# Patient Record
Sex: MALE | Race: WHITE | NOT HISPANIC OR LATINO | Employment: OTHER | URBAN - METROPOLITAN AREA
[De-identification: names, ages, dates, MRNs, and addresses within clinical notes are randomized per-mention and may not be internally consistent; named-entity substitution may affect disease eponyms.]

---

## 2017-02-10 ENCOUNTER — COMMUNICATION - HEALTHEAST (OUTPATIENT)
Dept: ADMINISTRATIVE | Facility: CLINIC | Age: 72
End: 2017-02-10

## 2017-04-03 ENCOUNTER — AMBULATORY - HEALTHEAST (OUTPATIENT)
Dept: CARDIOLOGY | Facility: CLINIC | Age: 72
End: 2017-04-03

## 2017-04-03 DIAGNOSIS — Z95.810 ICD (IMPLANTABLE CARDIOVERTER-DEFIBRILLATOR), BIVENTRICULAR, IN SITU: ICD-10-CM

## 2017-05-04 ENCOUNTER — AMBULATORY - HEALTHEAST (OUTPATIENT)
Dept: CARDIOLOGY | Facility: CLINIC | Age: 72
End: 2017-05-04

## 2017-05-09 ENCOUNTER — OFFICE VISIT - HEALTHEAST (OUTPATIENT)
Dept: CARDIOLOGY | Facility: CLINIC | Age: 72
End: 2017-05-09

## 2017-05-09 ENCOUNTER — COMMUNICATION - HEALTHEAST (OUTPATIENT)
Dept: CARDIOLOGY | Facility: CLINIC | Age: 72
End: 2017-05-09

## 2017-05-09 ENCOUNTER — AMBULATORY - HEALTHEAST (OUTPATIENT)
Dept: CARDIOLOGY | Facility: CLINIC | Age: 72
End: 2017-05-09

## 2017-05-09 DIAGNOSIS — I48.0 PAROXYSMAL ATRIAL FIBRILLATION (H): ICD-10-CM

## 2017-05-09 DIAGNOSIS — E11.9 TYPE 2 DIABETES MELLITUS (H): ICD-10-CM

## 2017-05-09 DIAGNOSIS — I50.42 CHRONIC COMBINED SYSTOLIC AND DIASTOLIC HEART FAILURE (H): ICD-10-CM

## 2017-05-09 DIAGNOSIS — I42.9 CARDIOMYOPATHY (H): ICD-10-CM

## 2017-05-09 DIAGNOSIS — I25.83 CORONARY ATHEROSCLEROSIS DUE TO LIPID RICH PLAQUE: ICD-10-CM

## 2017-05-09 DIAGNOSIS — Z95.810 ICD (IMPLANTABLE CARDIOVERTER-DEFIBRILLATOR), BIVENTRICULAR, IN SITU: ICD-10-CM

## 2017-05-09 ASSESSMENT — MIFFLIN-ST. JEOR: SCORE: 1536.18

## 2017-05-23 ENCOUNTER — HOSPITAL ENCOUNTER (OUTPATIENT)
Dept: CARDIOLOGY | Facility: HOSPITAL | Age: 72
Discharge: HOME | End: 2017-05-23
Attending: INTERNAL MEDICINE

## 2017-05-23 DIAGNOSIS — I50.42 CHRONIC COMBINED SYSTOLIC AND DIASTOLIC HEART FAILURE (H): ICD-10-CM

## 2017-05-23 DIAGNOSIS — I42.9 CARDIOMYOPATHY (H): ICD-10-CM

## 2017-05-23 DIAGNOSIS — I25.83 CORONARY ATHEROSCLEROSIS DUE TO LIPID RICH PLAQUE: ICD-10-CM

## 2017-05-23 ASSESSMENT — MIFFLIN-ST. JEOR: SCORE: 1536.18

## 2017-05-24 LAB
AORTIC ROOT: 3.1 CM
AORTIC VALVE MEAN VELOCITY: 127 CM/S
AV DIMENSIONLESS INDEX VTI: 0.5
AV MEAN GRADIENT: 7 MMHG
AV PEAK GRADIENT: 15.7 MMHG
AV VALVE AREA: 1.6 CM2
AV VELOCITY RATIO: 0.5
BSA FOR ECHO PROCEDURE: 1.98 M2
CV BLOOD PRESSURE: NORMAL MMHG
CV ECHO HEIGHT: 69.5 IN
CV ECHO WEIGHT: 177 LBS
DOP CALC AO PEAK VEL: 198 CM/S
DOP CALC AO VTI: 39.5 CM
DOP CALC LVOT AREA: 3.14 CM2
DOP CALC LVOT DIAMETER: 2 CM
DOP CALC LVOT PEAK VEL: 97.8 CM/S
DOP CALC LVOT STROKE VOLUME: 64.7 CM3
DOP CALCLVOT PEAK VEL VTI: 20.6 CM
EJECTION FRACTION: 26 % (ref 55–75)
FRACTIONAL SHORTENING: 13.1 % (ref 28–44)
INTERVENTRICULAR SEPTUM IN END DIASTOLE: 1.2 CM (ref 0.6–1)
IVS/PW RATIO: 1
LA AREA 1: 23.6 CM2
LA AREA 2: 28.7 CM2
LEFT ATRIUM LENGTH: 5.92 CM
LEFT ATRIUM SIZE: 4.7 CM
LEFT ATRIUM VOLUME INDEX: 49.1 ML/M2
LEFT ATRIUM VOLUME: 97.3 CM3
LEFT VENTRICLE CARDIAC INDEX: 3.1 L/MIN/M2
LEFT VENTRICLE CARDIAC OUTPUT: 6.1 L/MIN
LEFT VENTRICLE DIASTOLIC VOLUME INDEX: 117.2 CM3/M2 (ref 34–74)
LEFT VENTRICLE DIASTOLIC VOLUME: 232 CM3 (ref 62–150)
LEFT VENTRICLE HEART RATE: 94 BPM
LEFT VENTRICLE MASS INDEX: 163 G/M2
LEFT VENTRICLE SYSTOLIC VOLUME INDEX: 86.4 CM3/M2 (ref 11–31)
LEFT VENTRICLE SYSTOLIC VOLUME: 171 CM3 (ref 21–61)
LEFT VENTRICULAR INTERNAL DIMENSION IN DIASTOLE: 6.1 CM (ref 4.2–5.8)
LEFT VENTRICULAR INTERNAL DIMENSION IN SYSTOLE: 5.3 CM (ref 2.5–4)
LEFT VENTRICULAR MASS: 322.7 G
LEFT VENTRICULAR OUTFLOW TRACT MEAN GRADIENT: 2 MMHG
LEFT VENTRICULAR OUTFLOW TRACT MEAN VELOCITY: 75 CM/S
LEFT VENTRICULAR OUTFLOW TRACT PEAK GRADIENT: 4 MMHG
LEFT VENTRICULAR POSTERIOR WALL IN END DIASTOLE: 1.2 CM (ref 0.6–1)
LV STROKE VOLUME INDEX: 32.7 ML/M2
MITRAL REGURGITANT VELOCITY TIME INTEGRAL: 165 CM
MITRAL VALVE E/A RATIO: 1.2
MR FLOW: 12 CM3
MR MEAN GRADIENT: 59 MMHG
MR MEAN VELOCITY: 379 CM/S
MR PEAK GRADIENT: 71.6 MMHG
MR PISA EROA: 0.1 CM2
MR PISA RADIUS: 0.4 CM
MR PISA VN NYQUIST: 30.3 CM/S
MV AVERAGE E/E' RATIO: 15.8 CM/S
MV DECELERATION TIME: 197 MS
MV E'TISSUE VEL-LAT: 6.43 CM/S
MV E'TISSUE VEL-MED: 4.29 CM/S
MV LATERAL E/E' RATIO: 13.2
MV MEDIAL E/E' RATIO: 19.8
MV PEAK A VELOCITY: 72.1 CM/S
MV PEAK E VELOCITY: 84.9 CM/S
MV REGURGITANT VOLUME: 11.9 CC
NUC REST DIASTOLIC VOLUME INDEX: 2832 LBS
NUC REST SYSTOLIC VOLUME INDEX: 69.5 IN
PISA MR PEAK VEL: 423 CM/S
TRICUSPID REGURGITATION PEAK PRESSURE GRADIENT: 21 MMHG
TRICUSPID VALVE ANULAR PLANE SYSTOLIC EXCURSION: 1.7 CM
TRICUSPID VALVE PEAK REGURGITANT VELOCITY: 229 CM/S

## 2017-06-01 ENCOUNTER — AMBULATORY - HEALTHEAST (OUTPATIENT)
Dept: CARDIOLOGY | Facility: CLINIC | Age: 72
End: 2017-06-01

## 2017-06-01 DIAGNOSIS — Z95.810 ICD (IMPLANTABLE CARDIOVERTER-DEFIBRILLATOR), BIVENTRICULAR, IN SITU: ICD-10-CM

## 2017-06-01 DIAGNOSIS — I48.0 PAROXYSMAL ATRIAL FIBRILLATION (H): ICD-10-CM

## 2017-06-01 DIAGNOSIS — I50.42 CHRONIC COMBINED SYSTOLIC AND DIASTOLIC HEART FAILURE (H): ICD-10-CM

## 2017-06-01 DIAGNOSIS — I25.10 CORONARY ARTERY DISEASE INVOLVING NATIVE CORONARY ARTERY: ICD-10-CM

## 2017-06-01 DIAGNOSIS — I42.9 CARDIOMYOPATHY (H): ICD-10-CM

## 2017-06-01 DIAGNOSIS — I50.22 CHF (CONGESTIVE HEART FAILURE), NYHA CLASS IV, CHRONIC, SYSTOLIC (H): ICD-10-CM

## 2017-06-01 LAB — HCC DEVICE COMMENTS: NORMAL

## 2017-06-01 ASSESSMENT — MIFFLIN-ST. JEOR: SCORE: 1546.16

## 2017-06-20 ENCOUNTER — AMBULATORY - HEALTHEAST (OUTPATIENT)
Dept: CARDIOLOGY | Facility: CLINIC | Age: 72
End: 2017-06-20

## 2017-06-20 DIAGNOSIS — E11.9 TYPE 2 DIABETES MELLITUS (H): ICD-10-CM

## 2017-06-21 ENCOUNTER — AMBULATORY - HEALTHEAST (OUTPATIENT)
Dept: CARDIOLOGY | Facility: CLINIC | Age: 72
End: 2017-06-21

## 2017-06-27 ENCOUNTER — RECORDS - HEALTHEAST (OUTPATIENT)
Dept: LAB | Facility: CLINIC | Age: 72
End: 2017-06-27

## 2017-06-27 ENCOUNTER — OFFICE VISIT - HEALTHEAST (OUTPATIENT)
Dept: CARDIOLOGY | Facility: CLINIC | Age: 72
End: 2017-06-27

## 2017-06-27 ENCOUNTER — AMBULATORY - HEALTHEAST (OUTPATIENT)
Dept: CARDIOLOGY | Facility: CLINIC | Age: 72
End: 2017-06-27

## 2017-06-27 DIAGNOSIS — I48.0 PAROXYSMAL ATRIAL FIBRILLATION (H): ICD-10-CM

## 2017-06-27 DIAGNOSIS — E11.9 TYPE 2 DIABETES MELLITUS (H): ICD-10-CM

## 2017-06-27 DIAGNOSIS — I50.22 CHF (CONGESTIVE HEART FAILURE), NYHA CLASS IV, CHRONIC, SYSTOLIC (H): ICD-10-CM

## 2017-06-27 DIAGNOSIS — I42.9 CARDIOMYOPATHY (H): ICD-10-CM

## 2017-06-27 DIAGNOSIS — I25.10 CORONARY ARTERY DISEASE INVOLVING NATIVE CORONARY ARTERY OF NATIVE HEART WITHOUT ANGINA PECTORIS: ICD-10-CM

## 2017-06-27 DIAGNOSIS — Z95.810 ICD (IMPLANTABLE CARDIOVERTER-DEFIBRILLATOR), BIVENTRICULAR, IN SITU: ICD-10-CM

## 2017-06-27 LAB
CHOLEST SERPL-MCNC: 174 MG/DL
FASTING STATUS PATIENT QL REPORTED: YES
HDLC SERPL-MCNC: 31 MG/DL
LDLC SERPL CALC-MCNC: 80 MG/DL
TRIGL SERPL-MCNC: 316 MG/DL

## 2017-06-27 ASSESSMENT — MIFFLIN-ST. JEOR: SCORE: 1536.18

## 2017-06-29 ENCOUNTER — AMBULATORY - HEALTHEAST (OUTPATIENT)
Dept: CARDIOLOGY | Facility: CLINIC | Age: 72
End: 2017-06-29

## 2017-06-30 ENCOUNTER — HOSPITAL ENCOUNTER (OUTPATIENT)
Dept: NUCLEAR MEDICINE | Facility: HOSPITAL | Age: 72
Discharge: HOME | End: 2017-06-30
Attending: INTERNAL MEDICINE

## 2017-06-30 ENCOUNTER — HOSPITAL ENCOUNTER (OUTPATIENT)
Dept: CARDIOLOGY | Facility: HOSPITAL | Age: 72
Discharge: HOME | End: 2017-06-30
Attending: INTERNAL MEDICINE

## 2017-06-30 DIAGNOSIS — I42.9 CARDIOMYOPATHY (H): ICD-10-CM

## 2017-06-30 DIAGNOSIS — I25.10 CORONARY ARTERY DISEASE INVOLVING NATIVE CORONARY ARTERY OF NATIVE HEART WITHOUT ANGINA PECTORIS: ICD-10-CM

## 2017-06-30 LAB
CV STRESS CURRENT BP HE: NORMAL
CV STRESS CURRENT HR HE: 107
CV STRESS CURRENT HR HE: 107
CV STRESS CURRENT HR HE: 111
CV STRESS CURRENT HR HE: 112
CV STRESS CURRENT HR HE: 113
CV STRESS CURRENT HR HE: 114
CV STRESS CURRENT HR HE: 115
CV STRESS CURRENT HR HE: 115
CV STRESS CURRENT HR HE: 75
CV STRESS CURRENT HR HE: 79
CV STRESS CURRENT HR HE: 84
CV STRESS CURRENT HR HE: 84
CV STRESS CURRENT HR HE: 86
CV STRESS CURRENT HR HE: 88
CV STRESS CURRENT HR HE: 89
CV STRESS CURRENT HR HE: 90
CV STRESS CURRENT HR HE: 91
CV STRESS CURRENT HR HE: 98
CV STRESS DEVIATION TIME HE: NORMAL
CV STRESS ECHO PERCENT HR HE: NORMAL
CV STRESS EXERCISE STAGE HE: NORMAL
CV STRESS FINAL RESTING BP HE: NORMAL
CV STRESS FINAL RESTING HR HE: 84
CV STRESS MAX HR HE: 115
CV STRESS MAX TREADMILL GRADE HE: 0
CV STRESS MAX TREADMILL SPEED HE: 1
CV STRESS PEAK DIA BP HE: NORMAL
CV STRESS PEAK SYS BP HE: NORMAL
CV STRESS PHASE HE: NORMAL
CV STRESS PROTOCOL HE: NORMAL
CV STRESS RESTING PT POSITION HE: NORMAL
CV STRESS RESTING PT POSITION HE: NORMAL
CV STRESS ST DEVIATION AMOUNT HE: NORMAL
CV STRESS ST DEVIATION ELEVATION HE: NORMAL
CV STRESS ST EVELATION AMOUNT HE: NORMAL
CV STRESS TEST TYPE HE: NORMAL
CV STRESS TOTAL STAGE TIME MIN 1 HE: NORMAL
NUC STRESS EJECTION FRACTION: 19 %
STRESS ECHO BASELINE BP: NORMAL
STRESS ECHO BASELINE HR: 75
STRESS ECHO CALCULATED PERCENT HR: 77 %
STRESS ECHO LAST STRESS BP: NORMAL
STRESS ECHO LAST STRESS HR: 107
STRESS ECHO POST ESTIMATED WORKLOAD: 1.8
STRESS ECHO POST EXERCISE DUR MIN: 4
STRESS ECHO POST EXERCISE DUR SEC: 4
STRESS ECHO TARGET HR: 127

## 2017-07-03 ENCOUNTER — COMMUNICATION - HEALTHEAST (OUTPATIENT)
Dept: CARDIOLOGY | Facility: CLINIC | Age: 72
End: 2017-07-03

## 2017-07-03 ENCOUNTER — AMBULATORY - HEALTHEAST (OUTPATIENT)
Dept: CARDIOLOGY | Facility: CLINIC | Age: 72
End: 2017-07-03

## 2017-07-03 DIAGNOSIS — I42.9 CARDIOMYOPATHY (H): ICD-10-CM

## 2017-07-03 DIAGNOSIS — R06.02 SOB (SHORTNESS OF BREATH): ICD-10-CM

## 2017-07-25 ENCOUNTER — AMBULATORY - HEALTHEAST (OUTPATIENT)
Dept: CARDIOLOGY | Facility: CLINIC | Age: 72
End: 2017-07-25

## 2017-07-25 ENCOUNTER — RECORDS - HEALTHEAST (OUTPATIENT)
Dept: ADMINISTRATIVE | Facility: OTHER | Age: 72
End: 2017-07-25

## 2017-07-25 ENCOUNTER — OFFICE VISIT - HEALTHEAST (OUTPATIENT)
Dept: CARDIOLOGY | Facility: CLINIC | Age: 72
End: 2017-07-25

## 2017-07-25 DIAGNOSIS — E11.9 TYPE 2 DIABETES MELLITUS (H): ICD-10-CM

## 2017-07-25 DIAGNOSIS — Z95.810 ICD (IMPLANTABLE CARDIOVERTER-DEFIBRILLATOR), BIVENTRICULAR, IN SITU: ICD-10-CM

## 2017-07-25 DIAGNOSIS — I25.10 CORONARY ARTERY DISEASE INVOLVING NATIVE CORONARY ARTERY OF NATIVE HEART WITHOUT ANGINA PECTORIS: ICD-10-CM

## 2017-07-25 DIAGNOSIS — I50.22 CHF (CONGESTIVE HEART FAILURE), NYHA CLASS IV, CHRONIC, SYSTOLIC (H): ICD-10-CM

## 2017-07-25 DIAGNOSIS — I48.0 PAROXYSMAL ATRIAL FIBRILLATION (H): ICD-10-CM

## 2017-07-25 DIAGNOSIS — I42.9 CARDIOMYOPATHY (H): ICD-10-CM

## 2017-07-25 ASSESSMENT — MIFFLIN-ST. JEOR: SCORE: 1522.57

## 2017-07-26 ENCOUNTER — OFFICE VISIT - HEALTHEAST (OUTPATIENT)
Dept: CARDIOLOGY | Facility: CLINIC | Age: 72
End: 2017-07-26

## 2017-07-26 ENCOUNTER — AMBULATORY - HEALTHEAST (OUTPATIENT)
Dept: CARDIOLOGY | Facility: CLINIC | Age: 72
End: 2017-07-26

## 2017-07-26 DIAGNOSIS — E11.9 TYPE 2 DIABETES MELLITUS (H): ICD-10-CM

## 2017-07-26 DIAGNOSIS — I42.9 CARDIOMYOPATHY (H): ICD-10-CM

## 2017-07-26 DIAGNOSIS — I50.20 HEART FAILURE WITH REDUCED EJECTION FRACTION, NYHA CLASS II (H): ICD-10-CM

## 2017-07-26 ASSESSMENT — MIFFLIN-ST. JEOR: SCORE: 1527.1

## 2017-08-08 ENCOUNTER — COMMUNICATION - HEALTHEAST (OUTPATIENT)
Dept: CARDIOLOGY | Facility: CLINIC | Age: 72
End: 2017-08-08

## 2017-08-11 ENCOUNTER — COMMUNICATION - HEALTHEAST (OUTPATIENT)
Dept: CARDIOLOGY | Facility: CLINIC | Age: 72
End: 2017-08-11

## 2017-09-06 ENCOUNTER — AMBULATORY - HEALTHEAST (OUTPATIENT)
Dept: CARDIOLOGY | Facility: CLINIC | Age: 72
End: 2017-09-06

## 2017-09-06 DIAGNOSIS — Z95.810 ICD (IMPLANTABLE CARDIOVERTER-DEFIBRILLATOR) IN PLACE: ICD-10-CM

## 2017-09-06 LAB — HCC DEVICE COMMENTS: NORMAL

## 2017-09-19 ENCOUNTER — AMBULATORY - HEALTHEAST (OUTPATIENT)
Dept: CARDIOLOGY | Facility: CLINIC | Age: 72
End: 2017-09-19

## 2017-09-19 DIAGNOSIS — E11.9 TYPE 2 DIABETES MELLITUS (H): ICD-10-CM

## 2017-09-19 ASSESSMENT — MIFFLIN-ST. JEOR: SCORE: 1504.42

## 2017-09-20 ENCOUNTER — RECORDS - HEALTHEAST (OUTPATIENT)
Dept: ADMINISTRATIVE | Facility: OTHER | Age: 72
End: 2017-09-20

## 2017-09-21 ENCOUNTER — AMBULATORY - HEALTHEAST (OUTPATIENT)
Dept: CARDIOLOGY | Facility: CLINIC | Age: 72
End: 2017-09-21

## 2017-11-09 ENCOUNTER — RECORDS - HEALTHEAST (OUTPATIENT)
Dept: ADMINISTRATIVE | Facility: OTHER | Age: 72
End: 2017-11-09

## 2017-11-09 ENCOUNTER — AMBULATORY - HEALTHEAST (OUTPATIENT)
Dept: CARDIOLOGY | Facility: CLINIC | Age: 72
End: 2017-11-09

## 2017-11-14 ENCOUNTER — AMBULATORY - HEALTHEAST (OUTPATIENT)
Dept: CARDIOLOGY | Facility: CLINIC | Age: 72
End: 2017-11-14

## 2017-11-14 ENCOUNTER — OFFICE VISIT - HEALTHEAST (OUTPATIENT)
Dept: CARDIOLOGY | Facility: CLINIC | Age: 72
End: 2017-11-14

## 2017-11-14 DIAGNOSIS — I50.22 CHRONIC SYSTOLIC HEART FAILURE (H): ICD-10-CM

## 2017-11-14 DIAGNOSIS — E11.9 TYPE 2 DIABETES MELLITUS (H): ICD-10-CM

## 2017-11-14 ASSESSMENT — MIFFLIN-ST. JEOR: SCORE: 1494.45

## 2017-11-17 ENCOUNTER — AMBULATORY - HEALTHEAST (OUTPATIENT)
Dept: CARDIOLOGY | Facility: CLINIC | Age: 72
End: 2017-11-17

## 2017-11-20 ENCOUNTER — COMMUNICATION - HEALTHEAST (OUTPATIENT)
Dept: CARDIOLOGY | Facility: CLINIC | Age: 72
End: 2017-11-20

## 2017-11-22 ENCOUNTER — OFFICE VISIT - HEALTHEAST (OUTPATIENT)
Dept: CARDIOLOGY | Facility: CLINIC | Age: 72
End: 2017-11-22

## 2017-11-22 DIAGNOSIS — I50.20 HEART FAILURE WITH REDUCED EJECTION FRACTION, NYHA CLASS II (H): ICD-10-CM

## 2017-11-22 DIAGNOSIS — I50.22 CHRONIC SYSTOLIC HEART FAILURE (H): ICD-10-CM

## 2017-11-22 DIAGNOSIS — I42.9 CARDIOMYOPATHY (H): ICD-10-CM

## 2017-11-22 ASSESSMENT — MIFFLIN-ST. JEOR: SCORE: 1495.92

## 2017-12-04 ENCOUNTER — OFFICE VISIT - HEALTHEAST (OUTPATIENT)
Dept: CARDIOLOGY | Facility: CLINIC | Age: 72
End: 2017-12-04

## 2017-12-04 DIAGNOSIS — I42.9 CARDIOMYOPATHY (H): ICD-10-CM

## 2017-12-04 DIAGNOSIS — I50.22 CHRONIC SYSTOLIC HEART FAILURE (H): ICD-10-CM

## 2017-12-04 ASSESSMENT — MIFFLIN-ST. JEOR: SCORE: 1491.96

## 2017-12-05 ENCOUNTER — AMBULATORY - HEALTHEAST (OUTPATIENT)
Dept: CARDIOLOGY | Facility: CLINIC | Age: 72
End: 2017-12-05

## 2017-12-05 DIAGNOSIS — Z95.810 ICD (IMPLANTABLE CARDIOVERTER-DEFIBRILLATOR) IN PLACE: ICD-10-CM

## 2017-12-05 LAB — HCC DEVICE COMMENTS: NORMAL

## 2018-01-09 ENCOUNTER — COMMUNICATION - HEALTHEAST (OUTPATIENT)
Dept: CARDIOLOGY | Facility: CLINIC | Age: 73
End: 2018-01-09

## 2018-01-09 ENCOUNTER — AMBULATORY - HEALTHEAST (OUTPATIENT)
Dept: CARDIOLOGY | Facility: CLINIC | Age: 73
End: 2018-01-09

## 2018-01-09 ENCOUNTER — OFFICE VISIT - HEALTHEAST (OUTPATIENT)
Dept: CARDIOLOGY | Facility: CLINIC | Age: 73
End: 2018-01-09

## 2018-01-09 DIAGNOSIS — I42.9 CARDIOMYOPATHY (H): ICD-10-CM

## 2018-01-09 DIAGNOSIS — I50.22 CHRONIC SYSTOLIC HEART FAILURE (H): ICD-10-CM

## 2018-01-09 DIAGNOSIS — E11.9 TYPE 2 DIABETES MELLITUS (H): ICD-10-CM

## 2018-01-09 DIAGNOSIS — E78.5 DYSLIPIDEMIA: ICD-10-CM

## 2018-01-09 ASSESSMENT — MIFFLIN-ST. JEOR: SCORE: 1528.25

## 2018-01-15 ENCOUNTER — RECORDS - HEALTHEAST (OUTPATIENT)
Dept: LAB | Facility: CLINIC | Age: 73
End: 2018-01-15

## 2018-01-15 LAB
C REACTIVE PROTEIN LHE: <0.1 MG/DL (ref 0–0.8)
ERYTHROCYTE [SEDIMENTATION RATE] IN BLOOD BY WESTERGREN METHOD: 9 MM/HR (ref 0–15)

## 2018-01-16 ENCOUNTER — AMBULATORY - HEALTHEAST (OUTPATIENT)
Dept: CARDIOLOGY | Facility: CLINIC | Age: 73
End: 2018-01-16

## 2018-03-05 ENCOUNTER — AMBULATORY - HEALTHEAST (OUTPATIENT)
Dept: CARDIOLOGY | Facility: CLINIC | Age: 73
End: 2018-03-05

## 2018-03-05 DIAGNOSIS — Z95.810 ICD (IMPLANTABLE CARDIOVERTER-DEFIBRILLATOR) IN PLACE: ICD-10-CM

## 2018-03-06 LAB — HCC DEVICE COMMENTS: NORMAL

## 2018-03-19 ENCOUNTER — RECORDS - HEALTHEAST (OUTPATIENT)
Dept: ADMINISTRATIVE | Facility: OTHER | Age: 73
End: 2018-03-19

## 2018-03-19 ENCOUNTER — AMBULATORY - HEALTHEAST (OUTPATIENT)
Dept: CARDIOLOGY | Facility: CLINIC | Age: 73
End: 2018-03-19

## 2018-03-20 ENCOUNTER — AMBULATORY - HEALTHEAST (OUTPATIENT)
Dept: CARDIOLOGY | Facility: CLINIC | Age: 73
End: 2018-03-20

## 2018-03-20 ENCOUNTER — OFFICE VISIT - HEALTHEAST (OUTPATIENT)
Dept: CARDIOLOGY | Facility: CLINIC | Age: 73
End: 2018-03-20

## 2018-03-20 DIAGNOSIS — E78.5 DYSLIPIDEMIA: ICD-10-CM

## 2018-03-20 DIAGNOSIS — I50.22 CHRONIC SYSTOLIC HEART FAILURE (H): ICD-10-CM

## 2018-03-20 DIAGNOSIS — I42.9 CARDIOMYOPATHY (H): ICD-10-CM

## 2018-03-20 DIAGNOSIS — E11.9 TYPE 2 DIABETES MELLITUS (H): ICD-10-CM

## 2018-03-20 ASSESSMENT — MIFFLIN-ST. JEOR: SCORE: 1532.78

## 2018-03-26 ENCOUNTER — COMMUNICATION - HEALTHEAST (OUTPATIENT)
Dept: CARDIOLOGY | Facility: CLINIC | Age: 73
End: 2018-03-26

## 2018-03-29 ENCOUNTER — RECORDS - HEALTHEAST (OUTPATIENT)
Dept: LAB | Facility: CLINIC | Age: 73
End: 2018-03-29

## 2018-03-29 LAB
ALBUMIN SERPL-MCNC: 4 G/DL (ref 3.5–5)
ALP SERPL-CCNC: 63 U/L (ref 45–120)
ALT SERPL W P-5'-P-CCNC: 24 U/L (ref 0–45)
ANION GAP SERPL CALCULATED.3IONS-SCNC: 11 MMOL/L (ref 5–18)
AST SERPL W P-5'-P-CCNC: 21 U/L (ref 0–40)
BILIRUB SERPL-MCNC: 1.6 MG/DL (ref 0–1)
BUN SERPL-MCNC: 23 MG/DL (ref 8–28)
CALCIUM SERPL-MCNC: 9.2 MG/DL (ref 8.5–10.5)
CHLORIDE BLD-SCNC: 103 MMOL/L (ref 98–107)
CO2 SERPL-SCNC: 22 MMOL/L (ref 22–31)
CREAT SERPL-MCNC: 0.88 MG/DL (ref 0.7–1.3)
GFR SERPL CREATININE-BSD FRML MDRD: >60 ML/MIN/1.73M2
GLUCOSE BLD-MCNC: 179 MG/DL (ref 70–125)
POTASSIUM BLD-SCNC: 4.1 MMOL/L (ref 3.5–5)
PROT SERPL-MCNC: 7.2 G/DL (ref 6–8)
SODIUM SERPL-SCNC: 136 MMOL/L (ref 136–145)

## 2018-04-06 ENCOUNTER — RECORDS - HEALTHEAST (OUTPATIENT)
Dept: ADMINISTRATIVE | Facility: OTHER | Age: 73
End: 2018-04-06

## 2018-04-11 ENCOUNTER — OFFICE VISIT - HEALTHEAST (OUTPATIENT)
Dept: CARDIOLOGY | Facility: CLINIC | Age: 73
End: 2018-04-11

## 2018-04-11 DIAGNOSIS — I50.22 CHRONIC SYSTOLIC HEART FAILURE (H): ICD-10-CM

## 2018-04-11 DIAGNOSIS — I50.20 HEART FAILURE WITH REDUCED EJECTION FRACTION, NYHA CLASS II (H): ICD-10-CM

## 2018-04-11 DIAGNOSIS — I42.9 CARDIOMYOPATHY (H): ICD-10-CM

## 2018-04-11 ASSESSMENT — MIFFLIN-ST. JEOR: SCORE: 1541.85

## 2018-04-16 ENCOUNTER — COMMUNICATION - HEALTHEAST (OUTPATIENT)
Dept: CARDIOLOGY | Facility: CLINIC | Age: 73
End: 2018-04-16

## 2018-04-16 ENCOUNTER — AMBULATORY - HEALTHEAST (OUTPATIENT)
Dept: LAB | Facility: CLINIC | Age: 73
End: 2018-04-16

## 2018-04-16 ENCOUNTER — AMBULATORY - HEALTHEAST (OUTPATIENT)
Dept: CARDIOLOGY | Facility: CLINIC | Age: 73
End: 2018-04-16

## 2018-04-16 DIAGNOSIS — I50.22 CHRONIC SYSTOLIC HEART FAILURE (H): ICD-10-CM

## 2018-04-16 LAB
ANION GAP SERPL CALCULATED.3IONS-SCNC: 13 MMOL/L (ref 5–18)
BUN SERPL-MCNC: 28 MG/DL (ref 8–28)
CALCIUM SERPL-MCNC: 9.7 MG/DL (ref 8.5–10.5)
CHLORIDE BLD-SCNC: 99 MMOL/L (ref 98–107)
CO2 SERPL-SCNC: 24 MMOL/L (ref 22–31)
CREAT SERPL-MCNC: 1.21 MG/DL (ref 0.7–1.3)
GFR SERPL CREATININE-BSD FRML MDRD: 59 ML/MIN/1.73M2
GLUCOSE BLD-MCNC: 267 MG/DL (ref 70–125)
POTASSIUM BLD-SCNC: 4.3 MMOL/L (ref 3.5–5)
SODIUM SERPL-SCNC: 136 MMOL/L (ref 136–145)

## 2018-05-01 ENCOUNTER — RECORDS - HEALTHEAST (OUTPATIENT)
Dept: LAB | Facility: CLINIC | Age: 73
End: 2018-05-01

## 2018-05-01 LAB
ALBUMIN SERPL-MCNC: 4.1 G/DL (ref 3.5–5)
ALP SERPL-CCNC: 71 U/L (ref 45–120)
ALT SERPL W P-5'-P-CCNC: 36 U/L (ref 0–45)
ANION GAP SERPL CALCULATED.3IONS-SCNC: 11 MMOL/L (ref 5–18)
AST SERPL W P-5'-P-CCNC: 36 U/L (ref 0–40)
BILIRUB SERPL-MCNC: 1.9 MG/DL (ref 0–1)
BUN SERPL-MCNC: 34 MG/DL (ref 8–28)
CALCIUM SERPL-MCNC: 9.9 MG/DL (ref 8.5–10.5)
CHLORIDE BLD-SCNC: 102 MMOL/L (ref 98–107)
CHOLEST SERPL-MCNC: 159 MG/DL
CO2 SERPL-SCNC: 25 MMOL/L (ref 22–31)
CREAT SERPL-MCNC: 1.02 MG/DL (ref 0.7–1.3)
FASTING STATUS PATIENT QL REPORTED: YES
GFR SERPL CREATININE-BSD FRML MDRD: >60 ML/MIN/1.73M2
GLUCOSE BLD-MCNC: 155 MG/DL (ref 70–125)
HDLC SERPL-MCNC: 30 MG/DL
LDLC SERPL CALC-MCNC: 76 MG/DL
POTASSIUM BLD-SCNC: 4.4 MMOL/L (ref 3.5–5)
PROT SERPL-MCNC: 7.3 G/DL (ref 6–8)
SODIUM SERPL-SCNC: 138 MMOL/L (ref 136–145)
TRIGL SERPL-MCNC: 265 MG/DL

## 2018-05-15 ENCOUNTER — COMMUNICATION - HEALTHEAST (OUTPATIENT)
Dept: CARDIOLOGY | Facility: CLINIC | Age: 73
End: 2018-05-15

## 2018-06-14 ENCOUNTER — AMBULATORY - HEALTHEAST (OUTPATIENT)
Dept: CARDIOLOGY | Facility: CLINIC | Age: 73
End: 2018-06-14

## 2018-06-14 ENCOUNTER — RECORDS - HEALTHEAST (OUTPATIENT)
Dept: ADMINISTRATIVE | Facility: OTHER | Age: 73
End: 2018-06-14

## 2018-06-20 ENCOUNTER — AMBULATORY - HEALTHEAST (OUTPATIENT)
Dept: CARDIOLOGY | Facility: CLINIC | Age: 73
End: 2018-06-20

## 2018-06-20 DIAGNOSIS — I25.10 CORONARY ARTERY DISEASE INVOLVING NATIVE CORONARY ARTERY OF NATIVE HEART WITHOUT ANGINA PECTORIS: ICD-10-CM

## 2018-06-20 DIAGNOSIS — I50.22 CHRONIC SYSTOLIC HEART FAILURE (H): ICD-10-CM

## 2018-06-20 DIAGNOSIS — Z95.810 ICD (IMPLANTABLE CARDIOVERTER-DEFIBRILLATOR), BIVENTRICULAR, IN SITU: ICD-10-CM

## 2018-06-20 DIAGNOSIS — I25.5 ISCHEMIC CARDIOMYOPATHY: ICD-10-CM

## 2018-06-20 DIAGNOSIS — I48.0 PAROXYSMAL ATRIAL FIBRILLATION (H): ICD-10-CM

## 2018-06-20 LAB
HCC DEVICE COMMENTS: NORMAL
HCC DEVICE IMPLANTING PROVIDER: NORMAL
HCC DEVICE MANUFACTURE: NORMAL
HCC DEVICE MODEL: NORMAL
HCC DEVICE SERIAL NUMBER: NORMAL
HCC DEVICE TYPE: NORMAL

## 2018-06-20 ASSESSMENT — MIFFLIN-ST. JEOR: SCORE: 1522.57

## 2018-06-27 ENCOUNTER — OFFICE VISIT - HEALTHEAST (OUTPATIENT)
Dept: CARDIOLOGY | Facility: CLINIC | Age: 73
End: 2018-06-27

## 2018-06-27 ENCOUNTER — COMMUNICATION - HEALTHEAST (OUTPATIENT)
Dept: CARDIOLOGY | Facility: CLINIC | Age: 73
End: 2018-06-27

## 2018-06-27 DIAGNOSIS — I25.10 CORONARY ARTERY DISEASE INVOLVING NATIVE CORONARY ARTERY OF NATIVE HEART WITHOUT ANGINA PECTORIS: ICD-10-CM

## 2018-06-27 DIAGNOSIS — I50.22 CHRONIC SYSTOLIC HEART FAILURE (H): ICD-10-CM

## 2018-06-27 DIAGNOSIS — I50.23 ACUTE ON CHRONIC SYSTOLIC CONGESTIVE HEART FAILURE (H): ICD-10-CM

## 2018-06-27 DIAGNOSIS — E78.5 DYSLIPIDEMIA: ICD-10-CM

## 2018-06-27 DIAGNOSIS — Z95.810 ICD (IMPLANTABLE CARDIOVERTER-DEFIBRILLATOR), BIVENTRICULAR, IN SITU: ICD-10-CM

## 2018-06-27 DIAGNOSIS — I48.0 PAROXYSMAL ATRIAL FIBRILLATION (H): ICD-10-CM

## 2018-06-27 DIAGNOSIS — E11.9 TYPE 2 DIABETES MELLITUS (H): ICD-10-CM

## 2018-06-27 DIAGNOSIS — I25.5 ISCHEMIC CARDIOMYOPATHY: ICD-10-CM

## 2018-06-27 ASSESSMENT — MIFFLIN-ST. JEOR: SCORE: 1531.64

## 2018-07-10 ENCOUNTER — OFFICE VISIT - HEALTHEAST (OUTPATIENT)
Dept: CARDIOLOGY | Facility: CLINIC | Age: 73
End: 2018-07-10

## 2018-07-10 ENCOUNTER — AMBULATORY - HEALTHEAST (OUTPATIENT)
Dept: CARDIOLOGY | Facility: CLINIC | Age: 73
End: 2018-07-10

## 2018-07-10 DIAGNOSIS — E78.1 HYPERTRIGLYCERIDEMIA: ICD-10-CM

## 2018-07-10 DIAGNOSIS — E78.5 DYSLIPIDEMIA: ICD-10-CM

## 2018-07-10 DIAGNOSIS — I50.22 CHRONIC SYSTOLIC HEART FAILURE (H): ICD-10-CM

## 2018-07-10 DIAGNOSIS — E11.9 TYPE 2 DIABETES MELLITUS (H): ICD-10-CM

## 2018-07-10 DIAGNOSIS — I25.5 ISCHEMIC CARDIOMYOPATHY: ICD-10-CM

## 2018-07-10 ASSESSMENT — MIFFLIN-ST. JEOR: SCORE: 1499.89

## 2018-07-16 ENCOUNTER — AMBULATORY - HEALTHEAST (OUTPATIENT)
Dept: CARDIOLOGY | Facility: CLINIC | Age: 73
End: 2018-07-16

## 2018-08-07 ENCOUNTER — COMMUNICATION - HEALTHEAST (OUTPATIENT)
Dept: CARDIOLOGY | Facility: CLINIC | Age: 73
End: 2018-08-07

## 2018-08-07 DIAGNOSIS — I50.23 ACUTE ON CHRONIC SYSTOLIC CONGESTIVE HEART FAILURE (H): ICD-10-CM

## 2018-08-08 ENCOUNTER — COMMUNICATION - HEALTHEAST (OUTPATIENT)
Dept: CARDIOLOGY | Facility: CLINIC | Age: 73
End: 2018-08-08

## 2018-08-08 DIAGNOSIS — I50.20 HEART FAILURE WITH REDUCED EJECTION FRACTION, NYHA CLASS II (H): ICD-10-CM

## 2018-08-08 DIAGNOSIS — I42.9 CARDIOMYOPATHY (H): ICD-10-CM

## 2018-08-10 ENCOUNTER — AMBULATORY - HEALTHEAST (OUTPATIENT)
Dept: CARDIOLOGY | Facility: CLINIC | Age: 73
End: 2018-08-10

## 2018-08-10 ENCOUNTER — OFFICE VISIT - HEALTHEAST (OUTPATIENT)
Dept: CARDIOLOGY | Facility: CLINIC | Age: 73
End: 2018-08-10

## 2018-08-10 DIAGNOSIS — I50.20 HEART FAILURE WITH REDUCED EJECTION FRACTION, NYHA CLASS II (H): ICD-10-CM

## 2018-08-10 DIAGNOSIS — I42.9 CARDIOMYOPATHY (H): ICD-10-CM

## 2018-08-10 DIAGNOSIS — I50.20 HFREF (HEART FAILURE WITH REDUCED EJECTION FRACTION) (H): ICD-10-CM

## 2018-08-10 DIAGNOSIS — I25.5 ISCHEMIC CARDIOMYOPATHY: ICD-10-CM

## 2018-08-10 DIAGNOSIS — I50.22 CHRONIC SYSTOLIC HEART FAILURE (H): ICD-10-CM

## 2018-08-10 LAB
ANION GAP SERPL CALCULATED.3IONS-SCNC: 7 MMOL/L (ref 5–18)
BUN SERPL-MCNC: 26 MG/DL (ref 8–28)
CALCIUM SERPL-MCNC: 9.6 MG/DL (ref 8.5–10.5)
CHLORIDE BLD-SCNC: 105 MMOL/L (ref 98–107)
CO2 SERPL-SCNC: 26 MMOL/L (ref 22–31)
CREAT SERPL-MCNC: 0.99 MG/DL (ref 0.7–1.3)
GFR SERPL CREATININE-BSD FRML MDRD: >60 ML/MIN/1.73M2
GLUCOSE BLD-MCNC: 202 MG/DL (ref 70–125)
MAGNESIUM SERPL-MCNC: 1.9 MG/DL (ref 1.8–2.6)
POTASSIUM BLD-SCNC: 4.6 MMOL/L (ref 3.5–5)
SODIUM SERPL-SCNC: 138 MMOL/L (ref 136–145)

## 2018-08-10 ASSESSMENT — MIFFLIN-ST. JEOR: SCORE: 1508.96

## 2018-08-13 ENCOUNTER — COMMUNICATION - HEALTHEAST (OUTPATIENT)
Dept: CARDIOLOGY | Facility: CLINIC | Age: 73
End: 2018-08-13

## 2018-09-07 ENCOUNTER — COMMUNICATION - HEALTHEAST (OUTPATIENT)
Dept: CARDIOLOGY | Facility: CLINIC | Age: 73
End: 2018-09-07

## 2018-09-07 DIAGNOSIS — I42.9 CARDIOMYOPATHY (H): ICD-10-CM

## 2018-09-10 ENCOUNTER — COMMUNICATION - HEALTHEAST (OUTPATIENT)
Dept: CARDIOLOGY | Facility: CLINIC | Age: 73
End: 2018-09-10

## 2018-09-11 ENCOUNTER — RECORDS - HEALTHEAST (OUTPATIENT)
Dept: ADMINISTRATIVE | Facility: OTHER | Age: 73
End: 2018-09-11

## 2018-09-11 ENCOUNTER — AMBULATORY - HEALTHEAST (OUTPATIENT)
Dept: CARDIOLOGY | Facility: CLINIC | Age: 73
End: 2018-09-11

## 2018-09-12 ENCOUNTER — OFFICE VISIT - HEALTHEAST (OUTPATIENT)
Dept: CARDIOLOGY | Facility: CLINIC | Age: 73
End: 2018-09-12

## 2018-09-12 DIAGNOSIS — I25.5 ISCHEMIC CARDIOMYOPATHY: ICD-10-CM

## 2018-09-12 DIAGNOSIS — I50.22 CHRONIC SYSTOLIC HEART FAILURE (H): ICD-10-CM

## 2018-09-12 ASSESSMENT — MIFFLIN-ST. JEOR: SCORE: 1535.04

## 2018-09-25 ENCOUNTER — COMMUNICATION - HEALTHEAST (OUTPATIENT)
Dept: CARDIOLOGY | Facility: CLINIC | Age: 73
End: 2018-09-25

## 2018-09-25 ENCOUNTER — AMBULATORY - HEALTHEAST (OUTPATIENT)
Dept: CARDIOLOGY | Facility: CLINIC | Age: 73
End: 2018-09-25

## 2018-09-25 DIAGNOSIS — Z95.810 ICD (IMPLANTABLE CARDIOVERTER-DEFIBRILLATOR), BIVENTRICULAR, IN SITU: ICD-10-CM

## 2018-10-05 ENCOUNTER — COMMUNICATION - HEALTHEAST (OUTPATIENT)
Dept: CARDIOLOGY | Facility: CLINIC | Age: 73
End: 2018-10-05

## 2018-10-15 ENCOUNTER — COMMUNICATION - HEALTHEAST (OUTPATIENT)
Dept: CARDIOLOGY | Facility: CLINIC | Age: 73
End: 2018-10-15

## 2018-10-15 DIAGNOSIS — I25.10 CAD (CORONARY ARTERY DISEASE): ICD-10-CM

## 2018-11-08 ENCOUNTER — OFFICE VISIT - HEALTHEAST (OUTPATIENT)
Dept: CARDIOLOGY | Facility: CLINIC | Age: 73
End: 2018-11-08

## 2018-11-08 ENCOUNTER — AMBULATORY - HEALTHEAST (OUTPATIENT)
Dept: CARDIOLOGY | Facility: CLINIC | Age: 73
End: 2018-11-08

## 2018-11-08 DIAGNOSIS — I25.5 ISCHEMIC CARDIOMYOPATHY: ICD-10-CM

## 2018-11-08 DIAGNOSIS — E11.9 DIABETES MELLITUS, TYPE 2 (H): ICD-10-CM

## 2018-11-08 DIAGNOSIS — I50.22 CHRONIC SYSTOLIC HEART FAILURE (H): ICD-10-CM

## 2018-11-08 DIAGNOSIS — E78.5 DYSLIPIDEMIA: ICD-10-CM

## 2018-11-08 ASSESSMENT — MIFFLIN-ST. JEOR: SCORE: 1535.04

## 2018-11-12 ENCOUNTER — AMBULATORY - HEALTHEAST (OUTPATIENT)
Dept: CARDIOLOGY | Facility: CLINIC | Age: 73
End: 2018-11-12

## 2018-11-27 ENCOUNTER — RECORDS - HEALTHEAST (OUTPATIENT)
Dept: LAB | Facility: CLINIC | Age: 73
End: 2018-11-27

## 2018-11-27 LAB
ALBUMIN SERPL-MCNC: 4.3 G/DL (ref 3.5–5)
ANION GAP SERPL CALCULATED.3IONS-SCNC: 12 MMOL/L (ref 5–18)
BUN SERPL-MCNC: 39 MG/DL (ref 8–28)
CALCIUM SERPL-MCNC: 9.9 MG/DL (ref 8.5–10.5)
CHLORIDE BLD-SCNC: 102 MMOL/L (ref 98–107)
CO2 SERPL-SCNC: 25 MMOL/L (ref 22–31)
CREAT SERPL-MCNC: 1.02 MG/DL (ref 0.7–1.3)
GFR SERPL CREATININE-BSD FRML MDRD: >60 ML/MIN/1.73M2
GLUCOSE BLD-MCNC: 114 MG/DL (ref 70–125)
PHOSPHATE SERPL-MCNC: 4.2 MG/DL (ref 2.5–4.5)
POTASSIUM BLD-SCNC: 4.4 MMOL/L (ref 3.5–5)
SODIUM SERPL-SCNC: 139 MMOL/L (ref 136–145)

## 2018-12-24 ENCOUNTER — AMBULATORY - HEALTHEAST (OUTPATIENT)
Dept: CARDIOLOGY | Facility: CLINIC | Age: 73
End: 2018-12-24

## 2018-12-24 DIAGNOSIS — Z95.810 ICD (IMPLANTABLE CARDIOVERTER-DEFIBRILLATOR) IN PLACE: ICD-10-CM

## 2018-12-28 ENCOUNTER — COMMUNICATION - HEALTHEAST (OUTPATIENT)
Dept: CARDIOLOGY | Facility: CLINIC | Age: 73
End: 2018-12-28

## 2019-01-02 ENCOUNTER — COMMUNICATION - HEALTHEAST (OUTPATIENT)
Dept: CARDIOLOGY | Facility: CLINIC | Age: 74
End: 2019-01-02

## 2019-01-04 ENCOUNTER — AMBULATORY - HEALTHEAST (OUTPATIENT)
Dept: CARDIOLOGY | Facility: CLINIC | Age: 74
End: 2019-01-04

## 2019-01-04 ENCOUNTER — OFFICE VISIT - HEALTHEAST (OUTPATIENT)
Dept: CARDIOLOGY | Facility: CLINIC | Age: 74
End: 2019-01-04

## 2019-01-04 ENCOUNTER — RECORDS - HEALTHEAST (OUTPATIENT)
Dept: ADMINISTRATIVE | Facility: OTHER | Age: 74
End: 2019-01-04

## 2019-01-04 ENCOUNTER — HOSPITAL ENCOUNTER (OUTPATIENT)
Dept: LAB | Age: 74
Setting detail: SPECIMEN
Discharge: HOME | End: 2019-01-04

## 2019-01-04 DIAGNOSIS — I25.10 CORONARY ARTERY DISEASE INVOLVING NATIVE CORONARY ARTERY OF NATIVE HEART WITHOUT ANGINA PECTORIS: ICD-10-CM

## 2019-01-04 DIAGNOSIS — I50.22 CHRONIC SYSTOLIC HEART FAILURE (H): ICD-10-CM

## 2019-01-04 DIAGNOSIS — I48.0 PAROXYSMAL ATRIAL FIBRILLATION (H): ICD-10-CM

## 2019-01-04 DIAGNOSIS — I25.5 ISCHEMIC CARDIOMYOPATHY: ICD-10-CM

## 2019-01-04 LAB
ANION GAP SERPL CALCULATED.3IONS-SCNC: 11 MMOL/L (ref 5–18)
ATRIAL RATE - MUSE: 72 BPM
BNP SERPL-MCNC: 398 PG/ML (ref 0–72)
BUN SERPL-MCNC: 32 MG/DL (ref 8–28)
CALCIUM SERPL-MCNC: 9.6 MG/DL (ref 8.5–10.5)
CHLORIDE BLD-SCNC: 104 MMOL/L (ref 98–107)
CO2 SERPL-SCNC: 24 MMOL/L (ref 22–31)
CREAT SERPL-MCNC: 1.08 MG/DL (ref 0.7–1.3)
DIASTOLIC BLOOD PRESSURE - MUSE: NORMAL MMHG
GFR SERPL CREATININE-BSD FRML MDRD: >60 ML/MIN/1.73M2
GLUCOSE BLD-MCNC: 197 MG/DL (ref 70–125)
INTERPRETATION ECG - MUSE: NORMAL
P AXIS - MUSE: 38 DEGREES
POTASSIUM BLD-SCNC: 4.7 MMOL/L (ref 3.5–5)
PR INTERVAL - MUSE: 188 MS
QRS DURATION - MUSE: 124 MS
QT - MUSE: 426 MS
QTC - MUSE: 466 MS
R AXIS - MUSE: -33 DEGREES
SODIUM SERPL-SCNC: 139 MMOL/L (ref 136–145)
SYSTOLIC BLOOD PRESSURE - MUSE: NORMAL MMHG
T AXIS - MUSE: 165 DEGREES
VENTRICULAR RATE- MUSE: 72 BPM

## 2019-01-04 ASSESSMENT — MIFFLIN-ST. JEOR: SCORE: 1539.58

## 2019-01-09 ENCOUNTER — HOSPITAL ENCOUNTER (OUTPATIENT)
Dept: NUCLEAR MEDICINE | Facility: HOSPITAL | Age: 74
Discharge: HOME | End: 2019-01-09

## 2019-01-09 ENCOUNTER — HOSPITAL ENCOUNTER (OUTPATIENT)
Dept: CARDIOLOGY | Facility: HOSPITAL | Age: 74
Discharge: HOME | End: 2019-01-09

## 2019-01-09 DIAGNOSIS — I25.10 CORONARY ARTERY DISEASE INVOLVING NATIVE CORONARY ARTERY OF NATIVE HEART WITHOUT ANGINA PECTORIS: ICD-10-CM

## 2019-01-09 LAB
CV STRESS CURRENT BP HE: NORMAL
CV STRESS CURRENT HR HE: 71
CV STRESS CURRENT HR HE: 75
CV STRESS CURRENT HR HE: 77
CV STRESS CURRENT HR HE: 80
CV STRESS CURRENT HR HE: 84
CV STRESS CURRENT HR HE: 86
CV STRESS CURRENT HR HE: 87
CV STRESS CURRENT HR HE: 88
CV STRESS CURRENT HR HE: 89
CV STRESS CURRENT HR HE: 89
CV STRESS CURRENT HR HE: 90
CV STRESS CURRENT HR HE: 91
CV STRESS CURRENT HR HE: 95
CV STRESS DEVIATION TIME HE: NORMAL
CV STRESS ECHO PERCENT HR HE: NORMAL
CV STRESS EXERCISE STAGE HE: NORMAL
CV STRESS FINAL RESTING BP HE: NORMAL
CV STRESS FINAL RESTING HR HE: 90
CV STRESS MAX HR HE: 98
CV STRESS MAX TREADMILL GRADE HE: 0
CV STRESS MAX TREADMILL SPEED HE: 0
CV STRESS PEAK DIA BP HE: NORMAL
CV STRESS PEAK SYS BP HE: NORMAL
CV STRESS PHASE HE: NORMAL
CV STRESS PROTOCOL HE: NORMAL
CV STRESS RESTING PT POSITION HE: NORMAL
CV STRESS ST DEVIATION AMOUNT HE: NORMAL
CV STRESS ST DEVIATION ELEVATION HE: NORMAL
CV STRESS ST EVELATION AMOUNT HE: NORMAL
CV STRESS TEST TYPE HE: NORMAL
CV STRESS TOTAL STAGE TIME MIN 1 HE: NORMAL
NUC STRESS EJECTION FRACTION: 15 %
STRESS ECHO BASELINE BP: NORMAL
STRESS ECHO BASELINE HR: 72
STRESS ECHO CALCULATED PERCENT HR: 67 %
STRESS ECHO LAST STRESS BP: NORMAL
STRESS ECHO LAST STRESS HR: 89

## 2019-01-09 ASSESSMENT — MIFFLIN-ST. JEOR: SCORE: 1512.36

## 2019-01-10 ENCOUNTER — AMBULATORY - HEALTHEAST (OUTPATIENT)
Dept: CARDIOLOGY | Facility: CLINIC | Age: 74
End: 2019-01-10

## 2019-01-10 ENCOUNTER — COMMUNICATION - HEALTHEAST (OUTPATIENT)
Dept: CARDIOLOGY | Facility: CLINIC | Age: 74
End: 2019-01-10

## 2019-01-10 ENCOUNTER — SURGERY - HEALTHEAST (OUTPATIENT)
Dept: CARDIOLOGY | Facility: CLINIC | Age: 74
End: 2019-01-10

## 2019-01-10 DIAGNOSIS — R94.39 ABNORMAL STRESS TEST: ICD-10-CM

## 2019-01-14 ENCOUNTER — COMMUNICATION - HEALTHEAST (OUTPATIENT)
Dept: CARDIOLOGY | Facility: CLINIC | Age: 74
End: 2019-01-14

## 2019-01-15 ENCOUNTER — SURGERY - HEALTHEAST (OUTPATIENT)
Dept: CARDIOLOGY | Facility: CLINIC | Age: 74
End: 2019-01-15

## 2019-01-15 ASSESSMENT — MIFFLIN-ST. JEOR
SCORE: 1535.5
SCORE: 1519.17

## 2019-01-16 ENCOUNTER — AMBULATORY - HEALTHEAST (OUTPATIENT)
Dept: CARDIOLOGY | Facility: CLINIC | Age: 74
End: 2019-01-16

## 2019-01-16 ASSESSMENT — MIFFLIN-ST. JEOR: SCORE: 1540.04

## 2019-01-17 ENCOUNTER — COMMUNICATION - HEALTHEAST (OUTPATIENT)
Dept: CARDIOLOGY | Facility: CLINIC | Age: 74
End: 2019-01-17

## 2019-01-17 DIAGNOSIS — E78.5 DYSLIPIDEMIA: ICD-10-CM

## 2019-01-17 DIAGNOSIS — I25.10 CORONARY ARTERY DISEASE INVOLVING NATIVE CORONARY ARTERY OF NATIVE HEART WITHOUT ANGINA PECTORIS: ICD-10-CM

## 2019-01-29 ENCOUNTER — OFFICE VISIT - HEALTHEAST (OUTPATIENT)
Dept: CARDIOLOGY | Facility: CLINIC | Age: 74
End: 2019-01-29

## 2019-01-29 DIAGNOSIS — E78.5 DYSLIPIDEMIA: ICD-10-CM

## 2019-01-29 DIAGNOSIS — I50.22 CHRONIC SYSTOLIC HEART FAILURE (H): ICD-10-CM

## 2019-01-29 DIAGNOSIS — I25.10 CORONARY ARTERY DISEASE INVOLVING NATIVE CORONARY ARTERY OF NATIVE HEART WITHOUT ANGINA PECTORIS: ICD-10-CM

## 2019-01-29 DIAGNOSIS — I25.5 ISCHEMIC CARDIOMYOPATHY: ICD-10-CM

## 2019-01-29 DIAGNOSIS — E78.1 HYPERTRIGLYCERIDEMIA: ICD-10-CM

## 2019-01-29 DIAGNOSIS — E11.8 TYPE 2 DIABETES MELLITUS WITH COMPLICATION, WITH LONG-TERM CURRENT USE OF INSULIN (H): ICD-10-CM

## 2019-01-29 DIAGNOSIS — Z79.4 TYPE 2 DIABETES MELLITUS WITH COMPLICATION, WITH LONG-TERM CURRENT USE OF INSULIN (H): ICD-10-CM

## 2019-01-29 ASSESSMENT — MIFFLIN-ST. JEOR: SCORE: 1519.17

## 2019-01-31 ENCOUNTER — COMMUNICATION - HEALTHEAST (OUTPATIENT)
Dept: CARDIOLOGY | Facility: CLINIC | Age: 74
End: 2019-01-31

## 2019-02-14 ENCOUNTER — RECORDS - HEALTHEAST (OUTPATIENT)
Dept: LAB | Facility: CLINIC | Age: 74
End: 2019-02-14

## 2019-02-14 LAB
ALBUMIN SERPL-MCNC: 4.2 G/DL (ref 3.5–5)
ALP SERPL-CCNC: 80 U/L (ref 45–120)
ALT SERPL W P-5'-P-CCNC: 22 U/L (ref 0–45)
ANION GAP SERPL CALCULATED.3IONS-SCNC: 11 MMOL/L (ref 5–18)
AST SERPL W P-5'-P-CCNC: 23 U/L (ref 0–40)
BILIRUB SERPL-MCNC: 2.1 MG/DL (ref 0–1)
BUN SERPL-MCNC: 22 MG/DL (ref 8–28)
CALCIUM SERPL-MCNC: 9.1 MG/DL (ref 8.5–10.5)
CHLORIDE BLD-SCNC: 105 MMOL/L (ref 98–107)
CHOLEST SERPL-MCNC: 140 MG/DL
CO2 SERPL-SCNC: 24 MMOL/L (ref 22–31)
CREAT SERPL-MCNC: 0.99 MG/DL (ref 0.7–1.3)
FASTING STATUS PATIENT QL REPORTED: YES
GFR SERPL CREATININE-BSD FRML MDRD: >60 ML/MIN/1.73M2
GLUCOSE BLD-MCNC: 128 MG/DL (ref 70–125)
HDLC SERPL-MCNC: 27 MG/DL
LDLC SERPL CALC-MCNC: 69 MG/DL
POTASSIUM BLD-SCNC: 4.5 MMOL/L (ref 3.5–5)
PROT SERPL-MCNC: 7.4 G/DL (ref 6–8)
SODIUM SERPL-SCNC: 140 MMOL/L (ref 136–145)
TRIGL SERPL-MCNC: 222 MG/DL

## 2019-03-06 ENCOUNTER — AMBULATORY - HEALTHEAST (OUTPATIENT)
Dept: CARDIOLOGY | Facility: CLINIC | Age: 74
End: 2019-03-06

## 2019-03-06 ENCOUNTER — RECORDS - HEALTHEAST (OUTPATIENT)
Dept: ADMINISTRATIVE | Facility: OTHER | Age: 74
End: 2019-03-06

## 2019-03-12 ENCOUNTER — OFFICE VISIT - HEALTHEAST (OUTPATIENT)
Dept: CARDIOLOGY | Facility: CLINIC | Age: 74
End: 2019-03-12

## 2019-03-12 ENCOUNTER — AMBULATORY - HEALTHEAST (OUTPATIENT)
Dept: CARDIOLOGY | Facility: CLINIC | Age: 74
End: 2019-03-12

## 2019-03-12 DIAGNOSIS — I25.10 CORONARY ARTERY DISEASE INVOLVING NATIVE CORONARY ARTERY OF NATIVE HEART WITHOUT ANGINA PECTORIS: ICD-10-CM

## 2019-03-12 DIAGNOSIS — Z95.810 ICD (IMPLANTABLE CARDIOVERTER-DEFIBRILLATOR), BIVENTRICULAR, IN SITU: ICD-10-CM

## 2019-03-12 DIAGNOSIS — Z79.4 TYPE 2 DIABETES MELLITUS WITH COMPLICATION, WITH LONG-TERM CURRENT USE OF INSULIN (H): ICD-10-CM

## 2019-03-12 DIAGNOSIS — I48.0 PAROXYSMAL ATRIAL FIBRILLATION (H): ICD-10-CM

## 2019-03-12 DIAGNOSIS — I50.22 CHRONIC SYSTOLIC HEART FAILURE (H): ICD-10-CM

## 2019-03-12 DIAGNOSIS — I25.5 ISCHEMIC CARDIOMYOPATHY: ICD-10-CM

## 2019-03-12 DIAGNOSIS — E11.8 TYPE 2 DIABETES MELLITUS WITH COMPLICATION, WITH LONG-TERM CURRENT USE OF INSULIN (H): ICD-10-CM

## 2019-03-12 DIAGNOSIS — E78.1 HYPERTRIGLYCERIDEMIA: ICD-10-CM

## 2019-03-12 DIAGNOSIS — I50.23 ACUTE ON CHRONIC SYSTOLIC HEART FAILURE (H): ICD-10-CM

## 2019-03-12 DIAGNOSIS — E78.5 DYSLIPIDEMIA: ICD-10-CM

## 2019-03-12 ASSESSMENT — MIFFLIN-ST. JEOR
SCORE: 1502.85
SCORE: 1505.57
SCORE: 1502.85

## 2019-03-15 ENCOUNTER — AMBULATORY - HEALTHEAST (OUTPATIENT)
Dept: CARDIOLOGY | Facility: CLINIC | Age: 74
End: 2019-03-15

## 2019-03-19 ENCOUNTER — HOSPITAL ENCOUNTER (OUTPATIENT)
Dept: CARDIOLOGY | Facility: HOSPITAL | Age: 74
Discharge: HOME | End: 2019-03-19
Attending: INTERNAL MEDICINE

## 2019-03-19 DIAGNOSIS — Z95.810 ICD (IMPLANTABLE CARDIOVERTER-DEFIBRILLATOR), BIVENTRICULAR, IN SITU: ICD-10-CM

## 2019-03-19 DIAGNOSIS — I25.10 CORONARY ARTERY DISEASE INVOLVING NATIVE CORONARY ARTERY OF NATIVE HEART WITHOUT ANGINA PECTORIS: ICD-10-CM

## 2019-03-19 DIAGNOSIS — I25.5 ISCHEMIC CARDIOMYOPATHY: ICD-10-CM

## 2019-03-19 ASSESSMENT — MIFFLIN-ST. JEOR: SCORE: 1501.03

## 2019-03-20 LAB
AORTIC ROOT: 3.3 CM
AORTIC VALVE MEAN VELOCITY: 117 CM/S
AV DIMENSIONLESS INDEX VTI: 0.4
AV MEAN GRADIENT: 6 MMHG
AV PEAK GRADIENT: 8.1 MMHG
AV VALVE AREA: 1.1 CM2
AV VELOCITY RATIO: 0.5
BSA FOR ECHO PROCEDURE: 1.94 M2
CV BLOOD PRESSURE: ABNORMAL MMHG
CV ECHO HEIGHT: 69 IN
CV ECHO WEIGHT: 171 LBS
DOP CALC AO PEAK VEL: 142 CM/S
DOP CALC AO VTI: 30 CM
DOP CALC LVOT AREA: 2.83 CM2
DOP CALC LVOT DIAMETER: 1.9 CM
DOP CALC LVOT PEAK VEL: 66.5 CM/S
DOP CALC LVOT STROKE VOLUME: 32 CM3
DOP CALCLVOT PEAK VEL VTI: 11.3 CM
ECHO EJECTION FRACTION ESTIMATED: 25 %
EJECTION FRACTION: 25 % (ref 55–75)
FRACTIONAL SHORTENING: 9 % (ref 28–44)
INTERVENTRICULAR SEPTUM IN END DIASTOLE: 0.94 CM (ref 0.6–1)
IVS/PW RATIO: 0.9
LA AREA 1: 31 CM2
LA AREA 2: 34 CM2
LEFT ATRIUM LENGTH: 5.7 CM
LEFT ATRIUM SIZE: 4.7 CM
LEFT ATRIUM VOLUME INDEX: 81 ML/M2
LEFT ATRIUM VOLUME: 157.2 ML
LEFT VENTRICLE CARDIAC INDEX: 1.2 L/MIN/M2
LEFT VENTRICLE CARDIAC OUTPUT: 2.3 L/MIN
LEFT VENTRICLE DIASTOLIC VOLUME INDEX: 130.4 CM3/M2 (ref 34–74)
LEFT VENTRICLE DIASTOLIC VOLUME: 253 CM3 (ref 62–150)
LEFT VENTRICLE HEART RATE: 71 BPM
LEFT VENTRICLE MASS INDEX: 148 G/M2
LEFT VENTRICLE SYSTOLIC VOLUME INDEX: 98.5 CM3/M2 (ref 11–31)
LEFT VENTRICLE SYSTOLIC VOLUME: 191 CM3 (ref 21–61)
LEFT VENTRICULAR INTERNAL DIMENSION IN DIASTOLE: 6.57 CM (ref 4.2–5.8)
LEFT VENTRICULAR INTERNAL DIMENSION IN SYSTOLE: 5.98 CM (ref 2.5–4)
LEFT VENTRICULAR MASS: 287 G
LEFT VENTRICULAR OUTFLOW TRACT MEAN GRADIENT: 1 MMHG
LEFT VENTRICULAR OUTFLOW TRACT MEAN VELOCITY: 48.2 CM/S
LEFT VENTRICULAR OUTFLOW TRACT PEAK GRADIENT: 2 MMHG
LEFT VENTRICULAR POSTERIOR WALL IN END DIASTOLE: 1.05 CM (ref 0.6–1)
LV STROKE VOLUME INDEX: 16.5 ML/M2
MITRAL REGURGITANT VELOCITY TIME INTEGRAL: 117 CM
MITRAL VALVE E/A RATIO: 1.9
MR FLOW: 37 CM3
MR MEAN GRADIENT: 44 MMHG
MR MEAN VELOCITY: 326 CM/S
MR PEAK GRADIENT: 55.7 MMHG
MR PISA EROA: 0.4 CM2
MR PISA RADIUS: 0.9 CM
MR PISA VN NYQUIST: 30.8 CM/S
MV AVERAGE E/E' RATIO: 17.7 CM/S
MV DECELERATION TIME: 137 MS
MV E'TISSUE VEL-LAT: 6.74 CM/S
MV E'TISSUE VEL-MED: 5.11 CM/S
MV LATERAL E/E' RATIO: 15.6
MV MEDIAL E/E' RATIO: 20.5
MV PEAK A VELOCITY: 54.8 CM/S
MV PEAK E VELOCITY: 105 CM/S
MV REGURGITANT VOLUME: 49.1 CC
NUC REST DIASTOLIC VOLUME INDEX: 2736 LBS
NUC REST SYSTOLIC VOLUME INDEX: 69 IN
PISA MR PEAK VEL: 373 CM/S
TRICUSPID REGURGITATION PEAK PRESSURE GRADIENT: 30.9 MMHG
TRICUSPID VALVE ANULAR PLANE SYSTOLIC EXCURSION: 1.4 CM
TRICUSPID VALVE PEAK REGURGITANT VELOCITY: 278 CM/S

## 2019-03-22 ENCOUNTER — COMMUNICATION - HEALTHEAST (OUTPATIENT)
Dept: CARDIOLOGY | Facility: CLINIC | Age: 74
End: 2019-03-22

## 2019-03-22 DIAGNOSIS — I50.22 CHRONIC SYSTOLIC HEART FAILURE (H): ICD-10-CM

## 2019-03-25 ENCOUNTER — AMBULATORY - HEALTHEAST (OUTPATIENT)
Dept: CARDIOLOGY | Facility: CLINIC | Age: 74
End: 2019-03-25

## 2019-03-25 DIAGNOSIS — Z95.810 ICD (IMPLANTABLE CARDIOVERTER-DEFIBRILLATOR) IN PLACE: ICD-10-CM

## 2019-03-28 ENCOUNTER — COMMUNICATION - HEALTHEAST (OUTPATIENT)
Dept: CARDIOLOGY | Facility: CLINIC | Age: 74
End: 2019-03-28

## 2019-03-28 DIAGNOSIS — I42.9 CARDIOMYOPATHY (H): ICD-10-CM

## 2019-05-03 ENCOUNTER — COMMUNICATION - HEALTHEAST (OUTPATIENT)
Dept: CARDIOLOGY | Facility: CLINIC | Age: 74
End: 2019-05-03

## 2019-05-03 DIAGNOSIS — I42.9 CARDIOMYOPATHY (H): ICD-10-CM

## 2019-05-03 DIAGNOSIS — I50.22 CHRONIC SYSTOLIC HEART FAILURE (H): ICD-10-CM

## 2019-05-13 ENCOUNTER — AMBULATORY - HEALTHEAST (OUTPATIENT)
Dept: CARDIOLOGY | Facility: CLINIC | Age: 74
End: 2019-05-13

## 2019-05-13 ENCOUNTER — COMMUNICATION - HEALTHEAST (OUTPATIENT)
Dept: CARDIOLOGY | Facility: CLINIC | Age: 74
End: 2019-05-13

## 2019-05-13 DIAGNOSIS — I50.22 CHRONIC SYSTOLIC HEART FAILURE (H): ICD-10-CM

## 2019-05-13 DIAGNOSIS — I25.5 ISCHEMIC CARDIOMYOPATHY: ICD-10-CM

## 2019-05-13 DIAGNOSIS — Z95.5 S/P CORONARY ARTERY STENT PLACEMENT: ICD-10-CM

## 2019-05-16 ENCOUNTER — AMBULATORY - HEALTHEAST (OUTPATIENT)
Dept: CARDIOLOGY | Facility: CLINIC | Age: 74
End: 2019-05-16

## 2019-05-17 ENCOUNTER — HOSPITAL ENCOUNTER (OUTPATIENT)
Dept: CARDIOLOGY | Facility: CLINIC | Age: 74
Discharge: HOME | End: 2019-05-17
Attending: INTERNAL MEDICINE

## 2019-05-17 ENCOUNTER — HOSPITAL ENCOUNTER (OUTPATIENT)
Dept: NUCLEAR MEDICINE | Facility: CLINIC | Age: 74
Discharge: HOME | End: 2019-05-17
Attending: INTERNAL MEDICINE

## 2019-05-17 ENCOUNTER — AMBULATORY - HEALTHEAST (OUTPATIENT)
Dept: CARDIOLOGY | Facility: CLINIC | Age: 74
End: 2019-05-17

## 2019-05-17 DIAGNOSIS — Z95.5 S/P CORONARY ARTERY STENT PLACEMENT: ICD-10-CM

## 2019-05-17 DIAGNOSIS — I25.5 ISCHEMIC CARDIOMYOPATHY: ICD-10-CM

## 2019-05-17 DIAGNOSIS — I50.22 CHRONIC SYSTOLIC HEART FAILURE (H): ICD-10-CM

## 2019-05-17 LAB
ANION GAP SERPL CALCULATED.3IONS-SCNC: 9 MMOL/L (ref 5–18)
BUN SERPL-MCNC: 22 MG/DL (ref 8–28)
CALCIUM SERPL-MCNC: 9.6 MG/DL (ref 8.5–10.5)
CHLORIDE BLD-SCNC: 107 MMOL/L (ref 98–107)
CO2 SERPL-SCNC: 23 MMOL/L (ref 22–31)
CREAT SERPL-MCNC: 0.91 MG/DL (ref 0.7–1.3)
CV STRESS CURRENT BP HE: NORMAL
CV STRESS CURRENT HR HE: 65
CV STRESS CURRENT HR HE: 66
CV STRESS CURRENT HR HE: 67
CV STRESS CURRENT HR HE: 67
CV STRESS CURRENT HR HE: 73
CV STRESS CURRENT HR HE: 74
CV STRESS CURRENT HR HE: 74
CV STRESS CURRENT HR HE: 77
CV STRESS CURRENT HR HE: 77
CV STRESS CURRENT HR HE: 80
CV STRESS CURRENT HR HE: 81
CV STRESS CURRENT HR HE: 82
CV STRESS DEVIATION TIME HE: NORMAL
CV STRESS ECHO PERCENT HR HE: NORMAL
CV STRESS EXERCISE STAGE HE: NORMAL
CV STRESS FINAL RESTING BP HE: NORMAL
CV STRESS FINAL RESTING HR HE: 73
CV STRESS MAX HR HE: 82
CV STRESS MAX TREADMILL GRADE HE: 0
CV STRESS MAX TREADMILL SPEED HE: 0
CV STRESS PEAK DIA BP HE: NORMAL
CV STRESS PEAK SYS BP HE: NORMAL
CV STRESS PHASE HE: NORMAL
CV STRESS PROTOCOL HE: NORMAL
CV STRESS RESTING PT POSITION HE: NORMAL
CV STRESS ST DEVIATION AMOUNT HE: NORMAL
CV STRESS ST DEVIATION ELEVATION HE: NORMAL
CV STRESS ST EVELATION AMOUNT HE: NORMAL
CV STRESS TEST TYPE HE: NORMAL
CV STRESS TOTAL STAGE TIME MIN 1 HE: NORMAL
GFR SERPL CREATININE-BSD FRML MDRD: >60 ML/MIN/1.73M2
GLUCOSE BLD-MCNC: 137 MG/DL (ref 70–125)
NUC STRESS EJECTION FRACTION: 27 %
POTASSIUM BLD-SCNC: 4.6 MMOL/L (ref 3.5–5)
SODIUM SERPL-SCNC: 139 MMOL/L (ref 136–145)
STRESS ECHO BASELINE BP: NORMAL
STRESS ECHO BASELINE HR: 68
STRESS ECHO CALCULATED PERCENT HR: 56 %
STRESS ECHO LAST STRESS BP: NORMAL
STRESS ECHO LAST STRESS HR: 77

## 2019-05-28 ENCOUNTER — AMBULATORY - HEALTHEAST (OUTPATIENT)
Dept: CARDIOLOGY | Facility: CLINIC | Age: 74
End: 2019-05-28

## 2019-05-28 ENCOUNTER — COMMUNICATION - HEALTHEAST (OUTPATIENT)
Dept: CARDIOLOGY | Facility: CLINIC | Age: 74
End: 2019-05-28

## 2019-05-28 DIAGNOSIS — Z95.810 ICD (IMPLANTABLE CARDIOVERTER-DEFIBRILLATOR) IN PLACE: ICD-10-CM

## 2019-05-28 DIAGNOSIS — I48.0 PAROXYSMAL ATRIAL FIBRILLATION (H): ICD-10-CM

## 2019-06-22 ENCOUNTER — AMBULATORY - HEALTHEAST (OUTPATIENT)
Dept: CARDIOLOGY | Facility: CLINIC | Age: 74
End: 2019-06-22

## 2019-06-22 DIAGNOSIS — Z95.810 ICD (IMPLANTABLE CARDIOVERTER-DEFIBRILLATOR), BIVENTRICULAR, IN SITU: ICD-10-CM

## 2019-06-23 ENCOUNTER — AMBULATORY - HEALTHEAST (OUTPATIENT)
Dept: CARDIOLOGY | Facility: CLINIC | Age: 74
End: 2019-06-23

## 2019-06-23 DIAGNOSIS — Z95.810 ICD (IMPLANTABLE CARDIOVERTER-DEFIBRILLATOR), BIVENTRICULAR, IN SITU: ICD-10-CM

## 2019-06-24 ENCOUNTER — COMMUNICATION - HEALTHEAST (OUTPATIENT)
Dept: CARDIOLOGY | Facility: CLINIC | Age: 74
End: 2019-06-24

## 2019-06-24 ENCOUNTER — AMBULATORY - HEALTHEAST (OUTPATIENT)
Dept: CARDIOLOGY | Facility: CLINIC | Age: 74
End: 2019-06-24

## 2019-06-24 DIAGNOSIS — E78.5 DYSLIPIDEMIA: ICD-10-CM

## 2019-06-24 DIAGNOSIS — I25.10 CORONARY ARTERY DISEASE INVOLVING NATIVE CORONARY ARTERY OF NATIVE HEART WITHOUT ANGINA PECTORIS: ICD-10-CM

## 2019-07-01 ENCOUNTER — AMBULATORY - HEALTHEAST (OUTPATIENT)
Dept: CARDIOLOGY | Facility: CLINIC | Age: 74
End: 2019-07-01

## 2019-07-01 ENCOUNTER — COMMUNICATION - HEALTHEAST (OUTPATIENT)
Dept: CARDIOLOGY | Facility: CLINIC | Age: 74
End: 2019-07-01

## 2019-07-01 DIAGNOSIS — Z51.81 ENCOUNTER FOR MONITORING AMIODARONE THERAPY: ICD-10-CM

## 2019-07-01 DIAGNOSIS — Z79.899 ENCOUNTER FOR MONITORING AMIODARONE THERAPY: ICD-10-CM

## 2019-07-01 DIAGNOSIS — Z95.810 ICD (IMPLANTABLE CARDIOVERTER-DEFIBRILLATOR), BIVENTRICULAR, IN SITU: ICD-10-CM

## 2019-07-01 DIAGNOSIS — Z95.810 ICD (IMPLANTABLE CARDIOVERTER-DEFIBRILLATOR) IN PLACE: ICD-10-CM

## 2019-07-01 DIAGNOSIS — I48.0 PAROXYSMAL ATRIAL FIBRILLATION (H): ICD-10-CM

## 2019-07-09 ENCOUNTER — COMMUNICATION - HEALTHEAST (OUTPATIENT)
Dept: CARDIOLOGY | Facility: CLINIC | Age: 74
End: 2019-07-09

## 2019-07-10 ENCOUNTER — RECORDS - HEALTHEAST (OUTPATIENT)
Dept: ADMINISTRATIVE | Facility: OTHER | Age: 74
End: 2019-07-10

## 2019-07-10 ENCOUNTER — AMBULATORY - HEALTHEAST (OUTPATIENT)
Dept: CARDIOLOGY | Facility: CLINIC | Age: 74
End: 2019-07-10

## 2019-07-16 ENCOUNTER — AMBULATORY - HEALTHEAST (OUTPATIENT)
Dept: CARDIOLOGY | Facility: CLINIC | Age: 74
End: 2019-07-16

## 2019-07-16 ENCOUNTER — OFFICE VISIT - HEALTHEAST (OUTPATIENT)
Dept: CARDIOLOGY | Facility: CLINIC | Age: 74
End: 2019-07-16

## 2019-07-16 ENCOUNTER — COMMUNICATION - HEALTHEAST (OUTPATIENT)
Dept: CARDIOLOGY | Facility: CLINIC | Age: 74
End: 2019-07-16

## 2019-07-16 DIAGNOSIS — I42.8 NONISCHEMIC CARDIOMYOPATHY (H): ICD-10-CM

## 2019-07-16 DIAGNOSIS — I25.5 ISCHEMIC CARDIOMYOPATHY: ICD-10-CM

## 2019-07-16 DIAGNOSIS — E78.1 HYPERTRIGLYCERIDEMIA: ICD-10-CM

## 2019-07-16 DIAGNOSIS — Z95.810 ICD (IMPLANTABLE CARDIOVERTER-DEFIBRILLATOR) IN PLACE: ICD-10-CM

## 2019-07-16 DIAGNOSIS — J84.10 PULMONARY FIBROSIS, UNSPECIFIED (H): ICD-10-CM

## 2019-07-16 DIAGNOSIS — I48.0 PAROXYSMAL ATRIAL FIBRILLATION (H): ICD-10-CM

## 2019-07-16 DIAGNOSIS — I25.10 CORONARY ARTERY DISEASE INVOLVING NATIVE CORONARY ARTERY OF NATIVE HEART WITHOUT ANGINA PECTORIS: ICD-10-CM

## 2019-07-16 DIAGNOSIS — Z79.4 TYPE 2 DIABETES MELLITUS WITH COMPLICATION, WITH LONG-TERM CURRENT USE OF INSULIN (H): ICD-10-CM

## 2019-07-16 DIAGNOSIS — I50.22 CHRONIC SYSTOLIC HEART FAILURE (H): ICD-10-CM

## 2019-07-16 DIAGNOSIS — E11.8 TYPE 2 DIABETES MELLITUS WITH COMPLICATION, WITH LONG-TERM CURRENT USE OF INSULIN (H): ICD-10-CM

## 2019-07-16 DIAGNOSIS — Z95.5 S/P CORONARY ARTERY STENT PLACEMENT: ICD-10-CM

## 2019-07-16 ASSESSMENT — MIFFLIN-ST. JEOR: SCORE: 1512.36

## 2019-07-17 ENCOUNTER — AMBULATORY - HEALTHEAST (OUTPATIENT)
Dept: CARDIOLOGY | Facility: CLINIC | Age: 74
End: 2019-07-17

## 2019-07-17 ENCOUNTER — TELEPHONE (OUTPATIENT)
Dept: PHARMACY | Facility: PHYSICIAN GROUP | Age: 74
End: 2019-07-17

## 2019-07-17 ENCOUNTER — OFFICE VISIT - HEALTHEAST (OUTPATIENT)
Dept: CARDIOLOGY | Facility: CLINIC | Age: 74
End: 2019-07-17

## 2019-07-17 ENCOUNTER — COMMUNICATION - HEALTHEAST (OUTPATIENT)
Dept: PULMONOLOGY | Facility: OTHER | Age: 74
End: 2019-07-17

## 2019-07-17 DIAGNOSIS — I42.8 NONISCHEMIC CARDIOMYOPATHY (H): ICD-10-CM

## 2019-07-17 DIAGNOSIS — R93.3 ABNORMAL FINDINGS ON DIAGNOSTIC IMAGING OF OTHER PARTS OF DIGESTIVE TRACT: ICD-10-CM

## 2019-07-17 DIAGNOSIS — E78.5 DYSLIPIDEMIA: ICD-10-CM

## 2019-07-17 DIAGNOSIS — I50.22 CHRONIC SYSTOLIC HEART FAILURE (H): ICD-10-CM

## 2019-07-17 DIAGNOSIS — E78.1 HYPERTRIGLYCERIDEMIA: ICD-10-CM

## 2019-07-17 DIAGNOSIS — R59.0 MEDIASTINAL ADENOPATHY: ICD-10-CM

## 2019-07-17 DIAGNOSIS — D86.9 SARCOIDOSIS: ICD-10-CM

## 2019-07-17 DIAGNOSIS — C85.90 LYMPHOMA, UNSPECIFIED BODY REGION, UNSPECIFIED LYMPHOMA TYPE (H): ICD-10-CM

## 2019-07-17 ASSESSMENT — MIFFLIN-ST. JEOR: SCORE: 1524.61

## 2019-07-17 NOTE — TELEPHONE ENCOUNTER
Pt was scheduled for MTM apt this morning, but wasn't a good time as he we just at a cardiology apt and not home yet.  Pt seemed frustrated with the number of calls he has been getting, so mentioned that we could reschedule.  Pt wanted me to ask some of the questions I had now, so we spoke briefly.      Note:  A nurse that works with Dr. Vigil is calling him to do med rec, and make sure he is taking the correct meds/dosing for cardiology medications.       Chart review:    Recommendations:  1. Recommend consider trial on lower dose of Metformin 500-1000 mg daily, or trial off metformin due to his advaned CHF.  Could recheck A1c in after 1 month or review home BG to assess if additional medication is needed.  2.  If another oral agent is needed for Ken, an SGLT-2 inhibitor (Jardiance) may be a good option if not too expensive.    3.  Could consider adding famotidine or pantoprazole for GI prophylaxis.    Assessment:    [Diabetes]:   Pt Currently taking Basaglar 65 units daily, and Metformin 2,000mg BID.    Hospital discharge summary mentioned concern for metformin given his ICD and CHF, and requested assessment for other oral options.  We briefly discussed possible alternatives for diabetes in place of metformin, or discontinuing metformin due to risk of lactic acidosis.  Could consider SGLT-2 inhibitor, but would be concerned with the risk of hypotension given his recent low BP and HR in clinic.  His hypotension while in the cardiology clinic today and with PCP yesterday could be from taking enalapril and Entresto concurrently.  An SGLT-2 inhibitor may be an option if his BP is above 100 mmHg systolic.  Jardiance was shown to reduce CHF mortality and CHF hospitalizations.    His last A1c is below goal of 8.5%, so could consider trying to reduce Metformin or attempt a trial off if needed.    GLYCOSYLATED HGB A1C - 04/22/2019      NAME VALUE REFERENCE RANGE LAB   F HGB A1C 8.0 H         [CHF]:   Per cardiology  notes in [facility], pt was taking both enalapril and Entresto concurrently for a few days after discharge.  Per Dr. Vigil,  pt is to discontinue the enalapril hold Entresto for 2 days and then restart Entresto.  Pt should be taking Furosemide 40mg BID, Carvedilol 25mg BID, and Spironolactone 12.5mg BID.  Pt is also on a study drug called Pemafibrate.    With patient being on Eliquis and clopidogrel, pt has higher risk of GI bleeding.  May be beneficial to add PPI (pantoprazole) for GI prophylaxis or H2RA.  PPIs are more effective, but also interact with his clopidogrel.      Has-Bled Score - 2    7/17/19:  BP 90/62 and heart rate 58.    Results for STEFF FRANK ([facility])   7/11/2019 07:36   Sodium 136   Potassium 4.1   Chloride 104   CO2 22   Anion Gap, Calculation 10   BUN 31 (H)   Creatinine 0.97   GFR MDRD Af Amer >60   GFR MDRD Non Af Amer >60   Calcium 9.1   Glucose 155 (H)          place/person

## 2019-07-19 ENCOUNTER — COMMUNICATION - HEALTHEAST (OUTPATIENT)
Dept: CARDIOLOGY | Facility: CLINIC | Age: 74
End: 2019-07-19

## 2019-07-22 ENCOUNTER — AMBULATORY - HEALTHEAST (OUTPATIENT)
Dept: CARDIOLOGY | Facility: CLINIC | Age: 74
End: 2019-07-22

## 2019-07-23 ENCOUNTER — HOSPITAL ENCOUNTER (OUTPATIENT)
Dept: RESPIRATORY THERAPY | Facility: HOSPITAL | Age: 74
Discharge: HOME | End: 2019-07-23
Attending: INTERNAL MEDICINE

## 2019-07-23 ENCOUNTER — AMBULATORY - HEALTHEAST (OUTPATIENT)
Dept: CARDIOLOGY | Facility: CLINIC | Age: 74
End: 2019-07-23

## 2019-07-23 DIAGNOSIS — R59.0 MEDIASTINAL ADENOPATHY: ICD-10-CM

## 2019-07-23 DIAGNOSIS — Z95.810 ICD (IMPLANTABLE CARDIOVERTER-DEFIBRILLATOR) IN PLACE: ICD-10-CM

## 2019-07-24 ENCOUNTER — HOSPITAL ENCOUNTER (OUTPATIENT)
Dept: PET IMAGING | Facility: HOSPITAL | Age: 74
Discharge: HOME | End: 2019-07-24
Attending: INTERNAL MEDICINE

## 2019-07-24 ENCOUNTER — AMBULATORY - HEALTHEAST (OUTPATIENT)
Dept: CARDIOLOGY | Facility: CLINIC | Age: 74
End: 2019-07-24

## 2019-07-24 DIAGNOSIS — D86.9 SARCOIDOSIS: ICD-10-CM

## 2019-07-24 DIAGNOSIS — R93.3 ABNORMAL FINDINGS ON DIAGNOSTIC IMAGING OF OTHER PARTS OF DIGESTIVE TRACT: ICD-10-CM

## 2019-07-24 LAB — GLUCOSE BLDC GLUCOMTR-MCNC: 151 MG/DL (ref 70–139)

## 2019-07-24 ASSESSMENT — MIFFLIN-ST. JEOR: SCORE: 1494.22

## 2019-07-25 ENCOUNTER — OFFICE VISIT - HEALTHEAST (OUTPATIENT)
Dept: PULMONOLOGY | Facility: OTHER | Age: 74
End: 2019-07-25

## 2019-07-25 DIAGNOSIS — R59.0 LYMPHADENOPATHY, MEDIASTINAL: ICD-10-CM

## 2019-07-25 DIAGNOSIS — J84.9 ILD (INTERSTITIAL LUNG DISEASE) (H): ICD-10-CM

## 2019-07-25 DIAGNOSIS — R59.0 MEDIASTINAL ADENOPATHY: ICD-10-CM

## 2019-07-25 DIAGNOSIS — J60: ICD-10-CM

## 2019-07-25 ASSESSMENT — MIFFLIN-ST. JEOR: SCORE: 1503.75

## 2019-07-26 ENCOUNTER — COMMUNICATION - HEALTHEAST (OUTPATIENT)
Dept: CARDIOLOGY | Facility: CLINIC | Age: 74
End: 2019-07-26

## 2019-07-29 ENCOUNTER — COMMUNICATION - HEALTHEAST (OUTPATIENT)
Dept: CARDIOLOGY | Facility: CLINIC | Age: 74
End: 2019-07-29

## 2019-08-01 ENCOUNTER — RECORDS - HEALTHEAST (OUTPATIENT)
Dept: LAB | Facility: CLINIC | Age: 74
End: 2019-08-01

## 2019-08-01 LAB
ALBUMIN SERPL-MCNC: 4.1 G/DL (ref 3.5–5)
ALP SERPL-CCNC: 87 U/L (ref 45–120)
ALT SERPL W P-5'-P-CCNC: 38 U/L (ref 0–45)
ANION GAP SERPL CALCULATED.3IONS-SCNC: 12 MMOL/L (ref 5–18)
AST SERPL W P-5'-P-CCNC: 30 U/L (ref 0–40)
BILIRUB SERPL-MCNC: 1.3 MG/DL (ref 0–1)
BUN SERPL-MCNC: 33 MG/DL (ref 8–28)
CALCIUM SERPL-MCNC: 9.1 MG/DL (ref 8.5–10.5)
CHLORIDE BLD-SCNC: 106 MMOL/L (ref 98–107)
CO2 SERPL-SCNC: 22 MMOL/L (ref 22–31)
CREAT SERPL-MCNC: 1.23 MG/DL (ref 0.7–1.3)
GFR SERPL CREATININE-BSD FRML MDRD: 58 ML/MIN/1.73M2
GLUCOSE BLD-MCNC: 223 MG/DL (ref 70–125)
POTASSIUM BLD-SCNC: 3.9 MMOL/L (ref 3.5–5)
PROT SERPL-MCNC: 6.8 G/DL (ref 6–8)
SODIUM SERPL-SCNC: 140 MMOL/L (ref 136–145)

## 2019-08-06 ENCOUNTER — RECORDS - HEALTHEAST (OUTPATIENT)
Dept: ADMINISTRATIVE | Facility: OTHER | Age: 74
End: 2019-08-06

## 2019-08-08 ENCOUNTER — AMBULATORY - HEALTHEAST (OUTPATIENT)
Dept: CARDIOLOGY | Facility: CLINIC | Age: 74
End: 2019-08-08

## 2019-08-12 ENCOUNTER — AMBULATORY - HEALTHEAST (OUTPATIENT)
Dept: CARDIOLOGY | Facility: CLINIC | Age: 74
End: 2019-08-12

## 2019-08-13 ENCOUNTER — COMMUNICATION - HEALTHEAST (OUTPATIENT)
Dept: CARDIOLOGY | Facility: CLINIC | Age: 74
End: 2019-08-13

## 2019-08-13 ENCOUNTER — OFFICE VISIT - HEALTHEAST (OUTPATIENT)
Dept: CARDIOLOGY | Facility: CLINIC | Age: 74
End: 2019-08-13

## 2019-08-13 ENCOUNTER — AMBULATORY - HEALTHEAST (OUTPATIENT)
Dept: CARDIOLOGY | Facility: CLINIC | Age: 74
End: 2019-08-13

## 2019-08-13 ENCOUNTER — RECORDS - HEALTHEAST (OUTPATIENT)
Dept: ADMINISTRATIVE | Facility: OTHER | Age: 74
End: 2019-08-13

## 2019-08-13 DIAGNOSIS — I50.22 CHRONIC SYSTOLIC HEART FAILURE (H): ICD-10-CM

## 2019-08-13 DIAGNOSIS — I50.9 HEART FAILURE (H): ICD-10-CM

## 2019-08-13 DIAGNOSIS — I42.8 NONISCHEMIC CARDIOMYOPATHY (H): ICD-10-CM

## 2019-08-13 DIAGNOSIS — I48.91 ATRIAL FIBRILLATION (H): ICD-10-CM

## 2019-08-13 DIAGNOSIS — Z95.810 ICD (IMPLANTABLE CARDIOVERTER-DEFIBRILLATOR), BIVENTRICULAR, IN SITU: ICD-10-CM

## 2019-08-13 DIAGNOSIS — I48.19 PERSISTENT ATRIAL FIBRILLATION (H): ICD-10-CM

## 2019-08-13 DIAGNOSIS — I25.10 CORONARY ARTERY DISEASE INVOLVING NATIVE CORONARY ARTERY OF NATIVE HEART WITHOUT ANGINA PECTORIS: ICD-10-CM

## 2019-08-13 ASSESSMENT — MIFFLIN-ST. JEOR: SCORE: 1535.04

## 2019-08-15 ENCOUNTER — COMMUNICATION - HEALTHEAST (OUTPATIENT)
Dept: CARDIOLOGY | Facility: CLINIC | Age: 74
End: 2019-08-15

## 2019-08-19 ENCOUNTER — COMMUNICATION - HEALTHEAST (OUTPATIENT)
Dept: CARDIOLOGY | Facility: CLINIC | Age: 74
End: 2019-08-19

## 2019-08-20 ENCOUNTER — HOSPITAL ENCOUNTER (OUTPATIENT)
Facility: CLINIC | Age: 74
End: 2019-08-20
Attending: INTERNAL MEDICINE | Admitting: INTERNAL MEDICINE
Payer: COMMERCIAL

## 2019-08-20 ENCOUNTER — COMMUNICATION - HEALTHEAST (OUTPATIENT)
Dept: CARDIOLOGY | Facility: CLINIC | Age: 74
End: 2019-08-20

## 2019-08-20 ENCOUNTER — AMBULATORY - HEALTHEAST (OUTPATIENT)
Dept: CARDIOLOGY | Facility: CLINIC | Age: 74
End: 2019-08-20

## 2019-08-20 DIAGNOSIS — I42.8 NONISCHEMIC CARDIOMYOPATHY (H): ICD-10-CM

## 2019-08-20 DIAGNOSIS — I50.22 CHRONIC SYSTOLIC HEART FAILURE (H): ICD-10-CM

## 2019-08-20 DIAGNOSIS — Z95.5 S/P CORONARY ARTERY STENT PLACEMENT: ICD-10-CM

## 2019-08-20 DIAGNOSIS — I25.5 ISCHEMIC CARDIOMYOPATHY: ICD-10-CM

## 2019-08-20 DIAGNOSIS — I48.91 ATRIAL FIBRILLATION WITH RAPID VENTRICULAR RESPONSE (H): ICD-10-CM

## 2019-08-20 DIAGNOSIS — I48.91 ATRIAL FIBRILLATION (H): ICD-10-CM

## 2019-08-20 LAB
ATRIAL RATE - MUSE: NORMAL BPM
DIASTOLIC BLOOD PRESSURE - MUSE: NORMAL MMHG
INTERPRETATION ECG - MUSE: NORMAL
P AXIS - MUSE: NORMAL DEGREES
PR INTERVAL - MUSE: NORMAL MS
QRS DURATION - MUSE: 130 MS
QT - MUSE: 420 MS
QTC - MUSE: 415 MS
R AXIS - MUSE: -39 DEGREES
SYSTOLIC BLOOD PRESSURE - MUSE: NORMAL MMHG
T AXIS - MUSE: 136 DEGREES
VENTRICULAR RATE- MUSE: 59 BPM

## 2019-08-20 ASSESSMENT — MIFFLIN-ST. JEOR: SCORE: 1517.84

## 2019-08-21 ENCOUNTER — COMMUNICATION - HEALTHEAST (OUTPATIENT)
Dept: CARDIOLOGY | Facility: CLINIC | Age: 74
End: 2019-08-21

## 2019-09-09 ENCOUNTER — COMMUNICATION - HEALTHEAST (OUTPATIENT)
Dept: CARDIOLOGY | Facility: CLINIC | Age: 74
End: 2019-09-09

## 2019-09-11 ENCOUNTER — COMMUNICATION - HEALTHEAST (OUTPATIENT)
Dept: CARDIOLOGY | Facility: CLINIC | Age: 74
End: 2019-09-11

## 2019-09-11 DIAGNOSIS — I48.0 PAROXYSMAL ATRIAL FIBRILLATION (H): ICD-10-CM

## 2019-09-12 ENCOUNTER — RECORDS - HEALTHEAST (OUTPATIENT)
Dept: ADMINISTRATIVE | Facility: OTHER | Age: 74
End: 2019-09-12

## 2019-09-16 ENCOUNTER — SURGERY - HEALTHEAST (OUTPATIENT)
Dept: CARDIOLOGY | Facility: CLINIC | Age: 74
End: 2019-09-16

## 2019-09-16 ENCOUNTER — AMBULATORY - HEALTHEAST (OUTPATIENT)
Dept: CARDIOLOGY | Facility: CLINIC | Age: 74
End: 2019-09-16

## 2019-09-16 DIAGNOSIS — E78.1 HYPERTRIGLYCERIDEMIA: ICD-10-CM

## 2019-09-16 DIAGNOSIS — I50.22 CHRONIC SYSTOLIC HEART FAILURE (H): ICD-10-CM

## 2019-09-16 DIAGNOSIS — I25.10 CORONARY ARTERY DISEASE INVOLVING NATIVE CORONARY ARTERY OF NATIVE HEART WITHOUT ANGINA PECTORIS: ICD-10-CM

## 2019-09-16 DIAGNOSIS — I42.8 NONISCHEMIC CARDIOMYOPATHY (H): ICD-10-CM

## 2019-09-16 DIAGNOSIS — I48.19 PERSISTENT ATRIAL FIBRILLATION (H): ICD-10-CM

## 2019-09-16 DIAGNOSIS — Z95.810 ICD (IMPLANTABLE CARDIOVERTER-DEFIBRILLATOR), BIVENTRICULAR, IN SITU: ICD-10-CM

## 2019-09-16 DIAGNOSIS — R06.01 ORTHOPNEA: ICD-10-CM

## 2019-09-16 ASSESSMENT — MIFFLIN-ST. JEOR: SCORE: 1558.86

## 2019-09-17 ENCOUNTER — COMMUNICATION - HEALTHEAST (OUTPATIENT)
Dept: CARDIOLOGY | Facility: CLINIC | Age: 74
End: 2019-09-17

## 2019-09-18 ENCOUNTER — COMMUNICATION - HEALTHEAST (OUTPATIENT)
Dept: CARDIOLOGY | Facility: CLINIC | Age: 74
End: 2019-09-18

## 2019-09-19 ENCOUNTER — OFFICE VISIT - HEALTHEAST (OUTPATIENT)
Dept: CARDIOLOGY | Facility: CLINIC | Age: 74
End: 2019-09-19

## 2019-09-19 ENCOUNTER — COMMUNICATION - HEALTHEAST (OUTPATIENT)
Dept: CARDIOLOGY | Facility: CLINIC | Age: 74
End: 2019-09-19

## 2019-09-19 DIAGNOSIS — I48.19 PERSISTENT ATRIAL FIBRILLATION (H): ICD-10-CM

## 2019-09-19 DIAGNOSIS — I50.23 ACUTE ON CHRONIC SYSTOLIC CONGESTIVE HEART FAILURE (H): ICD-10-CM

## 2019-09-19 LAB
ANION GAP SERPL CALCULATED.3IONS-SCNC: 10 MMOL/L (ref 5–18)
BNP SERPL-MCNC: 1917 PG/ML (ref 0–72)
BUN SERPL-MCNC: 27 MG/DL (ref 8–28)
CALCIUM SERPL-MCNC: 9.4 MG/DL (ref 8.5–10.5)
CHLORIDE BLD-SCNC: 105 MMOL/L (ref 98–107)
CO2 SERPL-SCNC: 26 MMOL/L (ref 22–31)
CREAT SERPL-MCNC: 1.06 MG/DL (ref 0.7–1.3)
GFR SERPL CREATININE-BSD FRML MDRD: >60 ML/MIN/1.73M2
GLUCOSE BLD-MCNC: 137 MG/DL (ref 70–125)
MAGNESIUM SERPL-MCNC: 1.5 MG/DL (ref 1.8–2.6)
POTASSIUM BLD-SCNC: 4.5 MMOL/L (ref 3.5–5)
SODIUM SERPL-SCNC: 141 MMOL/L (ref 136–145)

## 2019-09-19 ASSESSMENT — MIFFLIN-ST. JEOR: SCORE: 1540.71

## 2019-09-20 ENCOUNTER — AMBULATORY - HEALTHEAST (OUTPATIENT)
Dept: CARDIOLOGY | Facility: CLINIC | Age: 74
End: 2019-09-20

## 2019-09-20 DIAGNOSIS — Z95.810 ICD (IMPLANTABLE CARDIOVERTER-DEFIBRILLATOR), BIVENTRICULAR, IN SITU: ICD-10-CM

## 2019-09-21 ENCOUNTER — TRANSFERRED RECORDS (OUTPATIENT)
Dept: HEALTH INFORMATION MANAGEMENT | Facility: CLINIC | Age: 74
End: 2019-09-21

## 2019-09-23 ENCOUNTER — MEDICAL CORRESPONDENCE (OUTPATIENT)
Dept: HEALTH INFORMATION MANAGEMENT | Facility: CLINIC | Age: 74
End: 2019-09-23

## 2019-09-23 ENCOUNTER — COMMUNICATION - HEALTHEAST (OUTPATIENT)
Dept: CARDIOLOGY | Facility: CLINIC | Age: 74
End: 2019-09-23

## 2019-09-24 ENCOUNTER — MEDICAL CORRESPONDENCE (OUTPATIENT)
Dept: HEALTH INFORMATION MANAGEMENT | Facility: CLINIC | Age: 74
End: 2019-09-24

## 2019-09-27 ENCOUNTER — AMBULATORY - HEALTHEAST (OUTPATIENT)
Dept: CARDIOLOGY | Facility: CLINIC | Age: 74
End: 2019-09-27

## 2019-09-27 ENCOUNTER — SURGERY - HEALTHEAST (OUTPATIENT)
Dept: CARDIOLOGY | Facility: CLINIC | Age: 74
End: 2019-09-27

## 2019-09-27 DIAGNOSIS — Z95.810 ICD (IMPLANTABLE CARDIOVERTER-DEFIBRILLATOR), BIVENTRICULAR, IN SITU: ICD-10-CM

## 2019-09-27 ASSESSMENT — MIFFLIN-ST. JEOR: SCORE: 1502.85

## 2019-10-01 ENCOUNTER — AMBULATORY - HEALTHEAST (OUTPATIENT)
Dept: CARDIOLOGY | Facility: CLINIC | Age: 74
End: 2019-10-01

## 2019-10-07 ENCOUNTER — COMMUNICATION - HEALTHEAST (OUTPATIENT)
Dept: SCHEDULING | Facility: CLINIC | Age: 74
End: 2019-10-07

## 2019-10-07 ENCOUNTER — RECORDS - HEALTHEAST (OUTPATIENT)
Dept: LAB | Facility: CLINIC | Age: 74
End: 2019-10-07

## 2019-10-07 ENCOUNTER — ANCILLARY PROCEDURE (OUTPATIENT)
Dept: PET IMAGING | Facility: CLINIC | Age: 74
End: 2019-10-07
Payer: COMMERCIAL

## 2019-10-07 DIAGNOSIS — D86.9 SARCOID: ICD-10-CM

## 2019-10-07 LAB — URATE SERPL-MCNC: 13.5 MG/DL (ref 3–8)

## 2019-10-09 ENCOUNTER — ANCILLARY PROCEDURE (OUTPATIENT)
Dept: PET IMAGING | Facility: CLINIC | Age: 74
End: 2019-10-09
Payer: COMMERCIAL

## 2019-10-09 ENCOUNTER — COMMUNICATION - HEALTHEAST (OUTPATIENT)
Dept: CARDIOLOGY | Facility: CLINIC | Age: 74
End: 2019-10-09

## 2019-10-09 DIAGNOSIS — I50.9 CHF (CONGESTIVE HEART FAILURE) (H): Primary | ICD-10-CM

## 2019-10-09 DIAGNOSIS — Z09 ENCOUNTER FOR FOLLOW-UP EXAMINATION AFTER COMPLETED TREATMENT FOR CONDITIONS OTHER THAN MALIGNANT NEOPLASM: ICD-10-CM

## 2019-10-09 DIAGNOSIS — I48.91 ATRIAL FIBRILLATION WITH RAPID VENTRICULAR RESPONSE (H): ICD-10-CM

## 2019-10-09 DIAGNOSIS — D86.9 SARCOID: ICD-10-CM

## 2019-10-09 DIAGNOSIS — I42.8 NONISCHEMIC CARDIOMYOPATHY (H): ICD-10-CM

## 2019-10-09 LAB — GLUCOSE SERPL-MCNC: 140 MG/DL (ref 70–99)

## 2019-10-10 ENCOUNTER — COMMUNICATION - HEALTHEAST (OUTPATIENT)
Dept: CARDIOLOGY | Facility: CLINIC | Age: 74
End: 2019-10-10

## 2019-10-10 ENCOUNTER — OFFICE VISIT - HEALTHEAST (OUTPATIENT)
Dept: CARDIOLOGY | Facility: CLINIC | Age: 74
End: 2019-10-10

## 2019-10-10 DIAGNOSIS — I50.22 CHRONIC SYSTOLIC HEART FAILURE (H): ICD-10-CM

## 2019-10-10 DIAGNOSIS — I48.19 PERSISTENT ATRIAL FIBRILLATION (H): ICD-10-CM

## 2019-10-10 DIAGNOSIS — I42.8 NONISCHEMIC CARDIOMYOPATHY (H): ICD-10-CM

## 2019-10-10 LAB
ANION GAP SERPL CALCULATED.3IONS-SCNC: 11 MMOL/L (ref 5–18)
BUN SERPL-MCNC: 58 MG/DL (ref 8–28)
CALCIUM SERPL-MCNC: 9.3 MG/DL (ref 8.5–10.5)
CHLORIDE BLD-SCNC: 102 MMOL/L (ref 98–107)
CO2 SERPL-SCNC: 20 MMOL/L (ref 22–31)
CREAT SERPL-MCNC: 1.22 MG/DL (ref 0.7–1.3)
GFR SERPL CREATININE-BSD FRML MDRD: 58 ML/MIN/1.73M2
GLUCOSE BLD-MCNC: 407 MG/DL (ref 70–125)
POTASSIUM BLD-SCNC: 4.2 MMOL/L (ref 3.5–5)
SODIUM SERPL-SCNC: 133 MMOL/L (ref 136–145)

## 2019-10-10 ASSESSMENT — MIFFLIN-ST. JEOR: SCORE: 1471.55

## 2019-10-16 ENCOUNTER — COMMUNICATION - HEALTHEAST (OUTPATIENT)
Dept: CARDIOLOGY | Facility: CLINIC | Age: 74
End: 2019-10-16

## 2019-10-16 DIAGNOSIS — I50.22 CHRONIC SYSTOLIC HEART FAILURE (H): ICD-10-CM

## 2019-10-17 ENCOUNTER — COMMUNICATION - HEALTHEAST (OUTPATIENT)
Dept: CARDIOLOGY | Facility: CLINIC | Age: 74
End: 2019-10-17

## 2019-10-17 DIAGNOSIS — I50.22 CHRONIC SYSTOLIC HEART FAILURE (H): ICD-10-CM

## 2019-10-31 ENCOUNTER — AMBULATORY - HEALTHEAST (OUTPATIENT)
Dept: CARDIOLOGY | Facility: CLINIC | Age: 74
End: 2019-10-31

## 2019-10-31 DIAGNOSIS — Z95.810 ICD (IMPLANTABLE CARDIOVERTER-DEFIBRILLATOR), BIVENTRICULAR, IN SITU: ICD-10-CM

## 2019-10-31 DIAGNOSIS — I42.8 NONISCHEMIC CARDIOMYOPATHY (H): ICD-10-CM

## 2019-10-31 ASSESSMENT — MIFFLIN-ST. JEOR: SCORE: 1502.85

## 2019-11-06 ENCOUNTER — RECORDS - HEALTHEAST (OUTPATIENT)
Dept: ADMINISTRATIVE | Facility: OTHER | Age: 74
End: 2019-11-06

## 2019-11-06 ENCOUNTER — AMBULATORY - HEALTHEAST (OUTPATIENT)
Dept: CARDIOLOGY | Facility: CLINIC | Age: 74
End: 2019-11-06

## 2019-11-06 ENCOUNTER — OFFICE VISIT - HEALTHEAST (OUTPATIENT)
Dept: CARDIOLOGY | Facility: CLINIC | Age: 74
End: 2019-11-06

## 2019-11-06 DIAGNOSIS — I50.22 CHRONIC SYSTOLIC HEART FAILURE (H): ICD-10-CM

## 2019-11-06 DIAGNOSIS — Z95.810 ICD (IMPLANTABLE CARDIOVERTER-DEFIBRILLATOR), BIVENTRICULAR, IN SITU: ICD-10-CM

## 2019-11-06 DIAGNOSIS — I42.8 NONISCHEMIC CARDIOMYOPATHY (H): ICD-10-CM

## 2019-11-06 ASSESSMENT — MIFFLIN-ST. JEOR: SCORE: 1501.03

## 2019-11-12 ENCOUNTER — COMMUNICATION - HEALTHEAST (OUTPATIENT)
Dept: CARDIOLOGY | Facility: CLINIC | Age: 74
End: 2019-11-12

## 2019-11-12 ENCOUNTER — AMBULATORY - HEALTHEAST (OUTPATIENT)
Dept: CARDIOLOGY | Facility: CLINIC | Age: 74
End: 2019-11-12

## 2019-11-12 ENCOUNTER — OFFICE VISIT - HEALTHEAST (OUTPATIENT)
Dept: CARDIOLOGY | Facility: CLINIC | Age: 74
End: 2019-11-12

## 2019-11-12 DIAGNOSIS — I50.22 CHRONIC SYSTOLIC HEART FAILURE (H): ICD-10-CM

## 2019-11-12 DIAGNOSIS — E11.9 TYPE 2 DIABETES MELLITUS (H): ICD-10-CM

## 2019-11-12 DIAGNOSIS — I42.8 NONISCHEMIC CARDIOMYOPATHY (H): ICD-10-CM

## 2019-11-12 DIAGNOSIS — Z95.810 ICD (IMPLANTABLE CARDIOVERTER-DEFIBRILLATOR), BIVENTRICULAR, IN SITU: ICD-10-CM

## 2019-11-12 DIAGNOSIS — I48.19 PERSISTENT ATRIAL FIBRILLATION (H): ICD-10-CM

## 2019-11-12 DIAGNOSIS — R93.89 ABNORMAL CHEST CT: ICD-10-CM

## 2019-11-12 DIAGNOSIS — I25.10 CORONARY ARTERY DISEASE INVOLVING NATIVE CORONARY ARTERY OF NATIVE HEART WITHOUT ANGINA PECTORIS: ICD-10-CM

## 2019-11-12 DIAGNOSIS — E78.5 DYSLIPIDEMIA: ICD-10-CM

## 2019-11-12 ASSESSMENT — MIFFLIN-ST. JEOR: SCORE: 1519.17

## 2019-11-14 ENCOUNTER — AMBULATORY - HEALTHEAST (OUTPATIENT)
Dept: CARDIOLOGY | Facility: CLINIC | Age: 74
End: 2019-11-14

## 2019-11-26 ENCOUNTER — AMBULATORY - HEALTHEAST (OUTPATIENT)
Dept: CARDIOLOGY | Facility: CLINIC | Age: 74
End: 2019-11-26

## 2019-11-26 DIAGNOSIS — I42.8 NONISCHEMIC CARDIOMYOPATHY (H): ICD-10-CM

## 2019-11-26 DIAGNOSIS — Z95.810 ICD (IMPLANTABLE CARDIOVERTER-DEFIBRILLATOR), BIVENTRICULAR, IN SITU: ICD-10-CM

## 2019-11-26 ASSESSMENT — MIFFLIN-ST. JEOR: SCORE: 1471.55

## 2019-12-10 ENCOUNTER — COMMUNICATION - HEALTHEAST (OUTPATIENT)
Dept: CARDIOLOGY | Facility: CLINIC | Age: 74
End: 2019-12-10

## 2019-12-12 ENCOUNTER — RECORDS - HEALTHEAST (OUTPATIENT)
Dept: LAB | Facility: CLINIC | Age: 74
End: 2019-12-12

## 2019-12-12 LAB
ALBUMIN SERPL-MCNC: 3.9 G/DL (ref 3.5–5)
ALP SERPL-CCNC: 93 U/L (ref 45–120)
ALT SERPL W P-5'-P-CCNC: 16 U/L (ref 0–45)
ANION GAP SERPL CALCULATED.3IONS-SCNC: 12 MMOL/L (ref 5–18)
AST SERPL W P-5'-P-CCNC: 20 U/L (ref 0–40)
BILIRUB SERPL-MCNC: 1.7 MG/DL (ref 0–1)
BUN SERPL-MCNC: 21 MG/DL (ref 8–28)
CALCIUM SERPL-MCNC: 9.3 MG/DL (ref 8.5–10.5)
CHLORIDE BLD-SCNC: 104 MMOL/L (ref 98–107)
CHOLEST SERPL-MCNC: 93 MG/DL
CO2 SERPL-SCNC: 24 MMOL/L (ref 22–31)
CREAT SERPL-MCNC: 1.01 MG/DL (ref 0.7–1.3)
FASTING STATUS PATIENT QL REPORTED: NO
GFR SERPL CREATININE-BSD FRML MDRD: >60 ML/MIN/1.73M2
GLUCOSE BLD-MCNC: 218 MG/DL (ref 70–125)
HDLC SERPL-MCNC: 27 MG/DL
LDLC SERPL CALC-MCNC: 24 MG/DL
POTASSIUM BLD-SCNC: 4.6 MMOL/L (ref 3.5–5)
PROT SERPL-MCNC: 7 G/DL (ref 6–8)
SODIUM SERPL-SCNC: 140 MMOL/L (ref 136–145)
TRIGL SERPL-MCNC: 208 MG/DL

## 2019-12-18 ENCOUNTER — COMMUNICATION - HEALTHEAST (OUTPATIENT)
Dept: CARDIOLOGY | Facility: CLINIC | Age: 74
End: 2019-12-18

## 2019-12-18 ASSESSMENT — MIFFLIN-ST. JEOR: SCORE: 1478.35

## 2019-12-19 ENCOUNTER — SURGERY - HEALTHEAST (OUTPATIENT)
Dept: SURGERY | Facility: HOSPITAL | Age: 74
End: 2019-12-19

## 2019-12-19 ENCOUNTER — ANESTHESIA - HEALTHEAST (OUTPATIENT)
Dept: SURGERY | Facility: HOSPITAL | Age: 74
End: 2019-12-19

## 2020-01-04 ENCOUNTER — COMMUNICATION - HEALTHEAST (OUTPATIENT)
Dept: CARDIOLOGY | Facility: CLINIC | Age: 75
End: 2020-01-04

## 2020-01-04 DIAGNOSIS — E78.5 DYSLIPIDEMIA: ICD-10-CM

## 2020-01-04 DIAGNOSIS — I50.9 CHF (CONGESTIVE HEART FAILURE) (H): ICD-10-CM

## 2020-01-04 DIAGNOSIS — I25.10 CORONARY ARTERY DISEASE INVOLVING NATIVE CORONARY ARTERY OF NATIVE HEART WITHOUT ANGINA PECTORIS: ICD-10-CM

## 2020-01-06 ENCOUNTER — COMMUNICATION - HEALTHEAST (OUTPATIENT)
Dept: CARDIOLOGY | Facility: CLINIC | Age: 75
End: 2020-01-06

## 2020-01-08 ENCOUNTER — RECORDS - HEALTHEAST (OUTPATIENT)
Dept: ADMINISTRATIVE | Facility: OTHER | Age: 75
End: 2020-01-08

## 2020-01-10 ENCOUNTER — OFFICE VISIT - HEALTHEAST (OUTPATIENT)
Dept: CARDIOLOGY | Facility: CLINIC | Age: 75
End: 2020-01-10

## 2020-01-10 ENCOUNTER — AMBULATORY - HEALTHEAST (OUTPATIENT)
Dept: CARDIOLOGY | Facility: CLINIC | Age: 75
End: 2020-01-10

## 2020-01-10 DIAGNOSIS — I50.22 CHRONIC SYSTOLIC HEART FAILURE (H): ICD-10-CM

## 2020-01-10 DIAGNOSIS — I25.10 CORONARY ARTERY DISEASE INVOLVING NATIVE CORONARY ARTERY OF NATIVE HEART WITHOUT ANGINA PECTORIS: ICD-10-CM

## 2020-01-10 DIAGNOSIS — I25.10 CORONARY ARTERY DISEASE INVOLVING NATIVE CORONARY ARTERY: ICD-10-CM

## 2020-01-10 DIAGNOSIS — I48.19 PERSISTENT ATRIAL FIBRILLATION (H): ICD-10-CM

## 2020-01-10 DIAGNOSIS — Z95.810 ICD (IMPLANTABLE CARDIOVERTER-DEFIBRILLATOR), BIVENTRICULAR, IN SITU: ICD-10-CM

## 2020-01-10 DIAGNOSIS — I42.8 NONISCHEMIC CARDIOMYOPATHY (H): ICD-10-CM

## 2020-01-10 DIAGNOSIS — Z95.810 ICD (IMPLANTABLE CARDIOVERTER-DEFIBRILLATOR) IN PLACE: ICD-10-CM

## 2020-01-10 DIAGNOSIS — I25.5 ISCHEMIC CARDIOMYOPATHY: ICD-10-CM

## 2020-01-10 DIAGNOSIS — E11.9 TYPE 2 DIABETES MELLITUS (H): ICD-10-CM

## 2020-01-10 DIAGNOSIS — I42.8 OTHER CARDIOMYOPATHY (H): ICD-10-CM

## 2020-01-10 DIAGNOSIS — I48.91 ATRIAL FIBRILLATION (H): ICD-10-CM

## 2020-01-10 LAB
ANION GAP SERPL CALCULATED.3IONS-SCNC: 11 MMOL/L (ref 5–18)
BNP SERPL-MCNC: 2212 PG/ML (ref 0–74)
BUN SERPL-MCNC: 12 MG/DL (ref 8–28)
CALCIUM SERPL-MCNC: 9.4 MG/DL (ref 8.5–10.5)
CHLORIDE BLD-SCNC: 102 MMOL/L (ref 98–107)
CO2 SERPL-SCNC: 26 MMOL/L (ref 22–31)
CREAT SERPL-MCNC: 0.77 MG/DL (ref 0.7–1.3)
GFR SERPL CREATININE-BSD FRML MDRD: >60 ML/MIN/1.73M2
GLUCOSE BLD-MCNC: 88 MG/DL (ref 70–125)
POTASSIUM BLD-SCNC: 4.7 MMOL/L (ref 3.5–5)
SODIUM SERPL-SCNC: 139 MMOL/L (ref 136–145)

## 2020-01-10 ASSESSMENT — MIFFLIN-ST. JEOR: SCORE: 1451.14

## 2020-01-13 ENCOUNTER — CARE COORDINATION (OUTPATIENT)
Dept: CARDIOLOGY | Facility: CLINIC | Age: 75
End: 2020-01-13

## 2020-01-13 NOTE — PROGRESS NOTES
Received referral via staff message from Dr. Payton's nurse for patient with ischemic cardiomyopathy from Dr. Mary Jane Vigil, [facility] in [place] [phone].  Per Dr. Vigil's note in care everywhere:    1. Nonischemic cardiomyopathy (H) - in past ejection fraction 60% and now diminished based on stress testing and echo. Felt to be nonischemic.  On carvedilol and Entresto.  Has biventricular ICD in place with frequent ventricular pacing.  Symptoms not improved will need to consider upgrading to LVAD, have him see Dr. Jb Muhammad. No room to add Aldactone.  Not sarcoid related cardiomyopathy based on PET scan and unable to obtain MRI given ICD. Status post AV marianna ablation with permanent pacing, will recheck limited echo to reevaluate ejection fraction.   2. Coronary artery disease involving native coronary artery of native heart without angina pectoris -angiography January 2019 showed 20% left main, mid 80% LAD lesion that received rotational atherectomy as well as drug-coated stent, circumflex normal and right coronary artery with a proximal 99% stenosis that received a drug-coated stent.  Nuclear stress test May of 2019 showed diminished ejection fraction with nontransmural inferior and inferoseptal scar with no significant ischemia.  Medical therapy   3. Chronic systolic heart failure (H) -significant fluid retention, back on furosemide only once a day, did have altitude sickness in [place], if he should decide to go again we will need to consider Diamox.   4. Biventricular ICD, in situ -Biotronik device placed in 2013 with Medtronic right atrial lead, Biotronik right ventricular lead and [place] left ventricular lead now with 94% biventricular pacing, no atrial pacing giving recent ablation and will discontinue digoxin.   5. Persistent atrial fibrillation )-persistent and symptomatic and that it caused him go to heart failure.  Amiodarone was related to pulmonary fibrosis. Had AV marianna  ablation and as above recheck echo. On Eliquis twice daily.   6. Type 2 diabetes mellitus (H) -hemoglobin A1c 8.7 and defer to primary.        Called and spoke with patient today.  Discussed need for appt and CPX prior. Dr. Payton has opening on Monday 1/20 and patient is able to come.  Directions and CPX prep discussed with patient. No need for labs - patient had on 1/10. Will have  mail confirmation letter to him.    1/15 - Patient notified. Appts canceled due to insurance issue.  See separate care coordination note from today.    2/11/20 - Patient was seen by Dr. Payton at [facility] for initial consult.

## 2020-01-14 ENCOUNTER — COMMUNICATION - HEALTHEAST (OUTPATIENT)
Dept: CARDIOLOGY | Facility: CLINIC | Age: 75
End: 2020-01-14

## 2020-01-14 ENCOUNTER — AMBULATORY - HEALTHEAST (OUTPATIENT)
Dept: CARDIOLOGY | Facility: CLINIC | Age: 75
End: 2020-01-14

## 2020-01-14 DIAGNOSIS — I50.22 CHRONIC SYSTOLIC HEART FAILURE (H): ICD-10-CM

## 2020-01-14 DIAGNOSIS — I25.5 ISCHEMIC CARDIOMYOPATHY: Primary | ICD-10-CM

## 2020-01-15 ENCOUNTER — CARE COORDINATION (OUTPATIENT)
Dept: CARDIOLOGY | Facility: CLINIC | Age: 75
End: 2020-01-15

## 2020-01-15 NOTE — PROGRESS NOTES
Patient is scheduled for CPX and appt with Dr. Payton on Monday. Received staff message today from financial services/billing that we are out of network for the patient and his CPX could not be authorized.  Although his insurance is Medicare, he has an Advantage plan which requires him to use specific networks.  We may be able to get andrae authorization for the visit, but it is too short notice to obtain by Monday.  I will call patient today and let him  Know.  He may wish to have referring physician send referral to center which is in his network so he doesn't have to pay more out of pocket. Will cancel appts for Monday after I speak with patient.    11:30 - Called and informed patient that the [facility] is out of network for his Medicare Advantage plan.  He's not sure really what he has but I offered to contact his insurance carrier to find out if [facility] ([facility]) or [facility] is in network for advanced HF and possible LVAD.  If neither is in network, we will need to get authorization for him to come to the [facility].  Will call patient back within a week.

## 2020-01-22 ENCOUNTER — HOSPITAL ENCOUNTER (OUTPATIENT)
Dept: CARDIOLOGY | Facility: HOSPITAL | Age: 75
Discharge: HOME | End: 2020-01-22
Attending: INTERNAL MEDICINE

## 2020-01-22 ENCOUNTER — HOSPITAL ENCOUNTER (OUTPATIENT)
Dept: CT IMAGING | Facility: HOSPITAL | Age: 75
Discharge: HOME | End: 2020-01-22
Attending: INTERNAL MEDICINE

## 2020-01-22 DIAGNOSIS — R59.0 MEDIASTINAL ADENOPATHY: ICD-10-CM

## 2020-01-22 DIAGNOSIS — I25.10 CORONARY ARTERY DISEASE INVOLVING NATIVE CORONARY ARTERY OF NATIVE HEART WITHOUT ANGINA PECTORIS: ICD-10-CM

## 2020-01-22 DIAGNOSIS — I42.8 NONISCHEMIC CARDIOMYOPATHY (H): ICD-10-CM

## 2020-01-22 LAB
BSA FOR ECHO PROCEDURE: 1.88 M2
CV BLOOD PRESSURE: ABNORMAL MMHG
CV ECHO HEIGHT: 69 IN
CV ECHO WEIGHT: 160 LBS
EJECTION FRACTION: 29 % (ref 55–75)
FRACTIONAL SHORTENING: 11.5 % (ref 28–44)
INTERVENTRICULAR SEPTUM IN END DIASTOLE: 1.1 CM (ref 0.6–1)
IVS/PW RATIO: 1.4
LEFT VENTRICLE DIASTOLIC VOLUME INDEX: 64.4 CM3/M2 (ref 34–74)
LEFT VENTRICLE DIASTOLIC VOLUME: 121 CM3 (ref 62–150)
LEFT VENTRICLE MASS INDEX: 126.5 G/M2
LEFT VENTRICLE SYSTOLIC VOLUME INDEX: 45.7 CM3/M2 (ref 11–31)
LEFT VENTRICLE SYSTOLIC VOLUME: 86 CM3 (ref 21–61)
LEFT VENTRICULAR INTERNAL DIMENSION IN DIASTOLE: 6.1 CM (ref 4.2–5.8)
LEFT VENTRICULAR INTERNAL DIMENSION IN SYSTOLE: 5.4 CM (ref 2.5–4)
LEFT VENTRICULAR MASS: 237.7 G
LEFT VENTRICULAR POSTERIOR WALL IN END DIASTOLE: 0.8 CM (ref 0.6–1)
NUC REST DIASTOLIC VOLUME INDEX: 2560 LBS
NUC REST SYSTOLIC VOLUME INDEX: 69 IN
TRICUSPID REGURGITATION PEAK PRESSURE GRADIENT: 28.3 MMHG
TRICUSPID VALVE PEAK REGURGITANT VELOCITY: 266 CM/S

## 2020-01-22 ASSESSMENT — MIFFLIN-ST. JEOR: SCORE: 1451.14

## 2020-01-24 ENCOUNTER — OFFICE VISIT - HEALTHEAST (OUTPATIENT)
Dept: PULMONOLOGY | Facility: OTHER | Age: 75
End: 2020-01-24

## 2020-01-24 DIAGNOSIS — J18.9 ATYPICAL PNEUMONIA: ICD-10-CM

## 2020-01-24 ASSESSMENT — MIFFLIN-ST. JEOR: SCORE: 1473.82

## 2020-01-29 ENCOUNTER — COMMUNICATION - HEALTHEAST (OUTPATIENT)
Dept: CARDIOLOGY | Facility: CLINIC | Age: 75
End: 2020-01-29

## 2020-02-04 ENCOUNTER — COMMUNICATION - HEALTHEAST (OUTPATIENT)
Dept: CARDIOLOGY | Facility: CLINIC | Age: 75
End: 2020-02-04

## 2020-02-04 ENCOUNTER — COMMUNICATION - HEALTHEAST (OUTPATIENT)
Dept: PULMONOLOGY | Facility: OTHER | Age: 75
End: 2020-02-04

## 2020-02-04 DIAGNOSIS — J18.9 ATYPICAL PNEUMONIA: ICD-10-CM

## 2020-02-07 ENCOUNTER — COMMUNICATION - HEALTHEAST (OUTPATIENT)
Dept: CARDIOLOGY | Facility: CLINIC | Age: 75
End: 2020-02-07

## 2020-02-07 ENCOUNTER — CARE COORDINATION (OUTPATIENT)
Dept: CARDIOLOGY | Facility: CLINIC | Age: 75
End: 2020-02-07

## 2020-02-07 DIAGNOSIS — E78.5 DYSLIPIDEMIA: ICD-10-CM

## 2020-02-07 NOTE — PROGRESS NOTES
2/5/20 - Reviewed notes to see if [facility] had received message that we were not in network for patient's Medicare Advantage plan. Dr. Payton's nurse had sent message back to them about week ago, but notes there indicate they haven't heard back about referral. I left message on Dr. Vigil's nurse's phone to call me to discuss further when she is back in the office on Friday.    2/6/20 - Talked to another nurse from [facility] to discuss insurance issue.  Attempted to call [employer] to clarify but was on lengthy hold    2/7/20 - I called and spoke to representative from [facility]. Tax ID given for [facility] and she informed me patient would be considered in network.  I will call patient on Monday and get him rescheduled for CPX and advanced HF provider.    2/10/20 - Per Dr. Payton's nurse, patient is scheduled to see him at [facility] tomorrow on 2/11.  Will close out referral.

## 2020-02-11 ENCOUNTER — OFFICE VISIT - HEALTHEAST (OUTPATIENT)
Dept: CARDIOLOGY | Facility: CLINIC | Age: 75
End: 2020-02-11

## 2020-02-11 DIAGNOSIS — I50.22 CHRONIC SYSTOLIC HEART FAILURE (H): ICD-10-CM

## 2020-02-11 PROBLEM — J84.9 ILD (INTERSTITIAL LUNG DISEASE) (H): Status: ACTIVE | Noted: 2019-07-25

## 2020-02-11 PROBLEM — J60: Status: ACTIVE | Noted: 2020-02-11

## 2020-02-11 PROBLEM — E11.42 DIABETIC PERIPHERAL NEUROPATHY ASSOCIATED WITH TYPE 2 DIABETES MELLITUS (H): Status: ACTIVE | Noted: 2020-02-11

## 2020-02-11 PROBLEM — R94.39 CARDIOVASCULAR STRESS TEST ABNORMAL: Status: ACTIVE | Noted: 2020-02-11

## 2020-02-11 PROBLEM — M10.472: Status: ACTIVE | Noted: 2020-02-11

## 2020-02-11 PROBLEM — I48.19 PERSISTENT ATRIAL FIBRILLATION (H): Status: ACTIVE | Noted: 2020-02-11

## 2020-02-11 PROBLEM — R59.0 MEDIASTINAL LYMPHADENOPATHY: Status: ACTIVE | Noted: 2020-02-11

## 2020-02-11 PROBLEM — I25.10 CORONARY ARTERY DISEASE INVOLVING NATIVE CORONARY ARTERY: Status: ACTIVE | Noted: 2020-02-11

## 2020-02-11 PROBLEM — J18.9 ATYPICAL PNEUMONIA: Status: ACTIVE | Noted: 2020-01-24

## 2020-02-11 PROBLEM — I10 BENIGN ESSENTIAL HYPERTENSION: Status: ACTIVE | Noted: 2020-02-11

## 2020-02-11 PROBLEM — R93.89 ABNORMAL CHEST CT: Status: ACTIVE | Noted: 2020-02-11

## 2020-02-11 ASSESSMENT — MIFFLIN-ST. JEOR: SCORE: 1510.1

## 2020-02-12 ENCOUNTER — COMMUNICATION - HEALTHEAST (OUTPATIENT)
Dept: CARDIOLOGY | Facility: CLINIC | Age: 75
End: 2020-02-12

## 2020-02-12 DIAGNOSIS — I48.0 PAROXYSMAL ATRIAL FIBRILLATION (H): ICD-10-CM

## 2020-02-13 DIAGNOSIS — J84.9 ILD (INTERSTITIAL LUNG DISEASE) (H): ICD-10-CM

## 2020-02-13 DIAGNOSIS — R09.89 OTHER SPECIFIED SYMPTOMS AND SIGNS INVOLVING THE CIRCULATORY AND RESPIRATORY SYSTEMS: ICD-10-CM

## 2020-02-13 DIAGNOSIS — Z79.899 OTHER LONG TERM (CURRENT) DRUG THERAPY: ICD-10-CM

## 2020-02-13 DIAGNOSIS — Z12.5 SCREENING PSA (PROSTATE SPECIFIC ANTIGEN): ICD-10-CM

## 2020-02-13 DIAGNOSIS — R79.9 ABNORMAL FINDING OF BLOOD CHEMISTRY, UNSPECIFIED: ICD-10-CM

## 2020-02-13 DIAGNOSIS — I50.22 CHRONIC SYSTOLIC CONGESTIVE HEART FAILURE (H): Primary | ICD-10-CM

## 2020-02-13 RX ORDER — LIDOCAINE 40 MG/G
CREAM TOPICAL
Status: CANCELLED | OUTPATIENT
Start: 2020-02-13

## 2020-02-13 NOTE — PROGRESS NOTES
Called pt today to introduce self and explain LVAD eval process. Discussed testing/procedures and need for a caregiver. Pt verbalized understanding. Eval ordered with plans to complete in approx 3 weeks.

## 2020-02-14 ENCOUNTER — MEDICAL CORRESPONDENCE (OUTPATIENT)
Dept: HEALTH INFORMATION MANAGEMENT | Facility: CLINIC | Age: 75
End: 2020-02-14

## 2020-02-17 ENCOUNTER — COMMUNICATION - HEALTHEAST (OUTPATIENT)
Dept: CARDIOLOGY | Facility: CLINIC | Age: 75
End: 2020-02-17

## 2020-02-17 ENCOUNTER — RECORDS - HEALTHEAST (OUTPATIENT)
Dept: LAB | Facility: CLINIC | Age: 75
End: 2020-02-17

## 2020-02-17 LAB
CREAT UR-MCNC: 22.3 MG/DL
MICROALBUMIN UR-MCNC: 4.61 MG/DL (ref 0–1.99)
MICROALBUMIN/CREAT UR: 206.7 MG/G

## 2020-02-18 ENCOUNTER — HOSPITAL ENCOUNTER (OUTPATIENT)
Facility: CLINIC | Age: 75
DRG: 247 | End: 2020-02-18
Attending: INTERNAL MEDICINE | Admitting: INTERNAL MEDICINE
Payer: COMMERCIAL

## 2020-02-18 DIAGNOSIS — I50.22 CHRONIC SYSTOLIC CONGESTIVE HEART FAILURE (H): ICD-10-CM

## 2020-02-19 ENCOUNTER — DOCUMENTATION ONLY (OUTPATIENT)
Dept: CARE COORDINATION | Facility: CLINIC | Age: 75
End: 2020-02-19

## 2020-02-24 ENCOUNTER — TELEPHONE (OUTPATIENT)
Dept: CARDIOLOGY | Facility: CLINIC | Age: 75
End: 2020-02-24

## 2020-02-24 ENCOUNTER — COMMUNICATION - HEALTHEAST (OUTPATIENT)
Dept: CARDIOLOGY | Facility: CLINIC | Age: 75
End: 2020-02-24

## 2020-02-24 NOTE — TELEPHONE ENCOUNTER
D: Pt and wife called to report increase in SOB. They state he is having a harder time walking up the aisle at [employer]. He states the SOB seems to be intermittent. He notices it with activity and when he is talking. He did become more SOB throughout our conversation. He has noticed that his abdomen is bloated and is having trouble buttoning some of his pants. He states his urine output has tapered off over the last several days. He does not think he is drinking enough. He reports that his weight has been stable.   I: Offered support and reassurance. Pt and wife were instructed to call with any worsening SOB, especially at rest, further decrease in urine output, fever, or chills. Pt was instructed to avoid increasing his fluid intake, as this may be an issue of fluid overload rather than dehydration. Notified pt's heart failure RN.  A: Pt and wife verbalized understanding.   P: Plan of care per heart failure team. Pt is set up for pre VAD education and VAD workup on 02/27 and 02/28. Please contact on-call VAD Coordinator with any VAD related needs.

## 2020-02-25 ENCOUNTER — HOSPITAL ENCOUNTER (INPATIENT)
Facility: CLINIC | Age: 75
LOS: 5 days | Discharge: HOME | DRG: 247 | End: 2020-03-01
Attending: EMERGENCY MEDICINE | Admitting: INTERNAL MEDICINE
Payer: COMMERCIAL

## 2020-02-25 ENCOUNTER — PATIENT OUTREACH (OUTPATIENT)
Dept: CARDIOLOGY | Facility: CLINIC | Age: 75
End: 2020-02-25

## 2020-02-25 ENCOUNTER — APPOINTMENT (OUTPATIENT)
Dept: GENERAL RADIOLOGY | Facility: CLINIC | Age: 75
DRG: 247 | End: 2020-02-25
Attending: EMERGENCY MEDICINE
Payer: COMMERCIAL

## 2020-02-25 ENCOUNTER — COMMUNICATION - HEALTHEAST (OUTPATIENT)
Dept: CARDIOLOGY | Facility: CLINIC | Age: 75
End: 2020-02-25

## 2020-02-25 ENCOUNTER — COMMUNICATION - HEALTHEAST (OUTPATIENT)
Dept: PULMONOLOGY | Facility: OTHER | Age: 75
End: 2020-02-25

## 2020-02-25 DIAGNOSIS — I50.9 ACUTE ON CHRONIC CONGESTIVE HEART FAILURE, UNSPECIFIED HEART FAILURE TYPE (H): ICD-10-CM

## 2020-02-25 DIAGNOSIS — I25.10 CORONARY ARTERY DISEASE INVOLVING NATIVE CORONARY ARTERY OF NATIVE HEART WITHOUT ANGINA PECTORIS: ICD-10-CM

## 2020-02-25 DIAGNOSIS — I50.23 ACUTE ON CHRONIC SYSTOLIC HEART FAILURE (H): ICD-10-CM

## 2020-02-25 DIAGNOSIS — I50.22 CHRONIC SYSTOLIC CONGESTIVE HEART FAILURE (H): ICD-10-CM

## 2020-02-25 DIAGNOSIS — I50.23 ACUTE ON CHRONIC SYSTOLIC HEART FAILURE (H): Primary | ICD-10-CM

## 2020-02-25 DIAGNOSIS — I50.22 CHRONIC SYSTOLIC HEART FAILURE (H): Primary | ICD-10-CM

## 2020-02-25 DIAGNOSIS — Z95.820 S/P ANGIOPLASTY WITH STENT: ICD-10-CM

## 2020-02-25 LAB
ANION GAP SERPL CALCULATED.3IONS-SCNC: 7 MMOL/L (ref 3–14)
BASOPHILS # BLD AUTO: 0 10E9/L (ref 0–0.2)
BASOPHILS NFR BLD AUTO: 0.4 %
BUN SERPL-MCNC: 43 MG/DL (ref 7–30)
CALCIUM SERPL-MCNC: 8.7 MG/DL (ref 8.5–10.1)
CHLORIDE SERPL-SCNC: 104 MMOL/L (ref 94–109)
CO2 SERPL-SCNC: 24 MMOL/L (ref 20–32)
CREAT SERPL-MCNC: 1.01 MG/DL (ref 0.66–1.25)
DIFFERENTIAL METHOD BLD: ABNORMAL
EOSINOPHIL # BLD AUTO: 0.1 10E9/L (ref 0–0.7)
EOSINOPHIL NFR BLD AUTO: 1.6 %
ERYTHROCYTE [DISTWIDTH] IN BLOOD BY AUTOMATED COUNT: 19 % (ref 10–15)
GFR SERPL CREATININE-BSD FRML MDRD: 73 ML/MIN/{1.73_M2}
GLUCOSE BLDC GLUCOMTR-MCNC: 187 MG/DL (ref 70–99)
GLUCOSE SERPL-MCNC: 129 MG/DL (ref 70–99)
HCT VFR BLD AUTO: 39.2 % (ref 40–53)
HGB BLD-MCNC: 12.1 G/DL (ref 13.3–17.7)
IMM GRANULOCYTES # BLD: 0 10E9/L (ref 0–0.4)
IMM GRANULOCYTES NFR BLD: 0.4 %
INTERPRETATION ECG - MUSE: NORMAL
LYMPHOCYTES # BLD AUTO: 1.5 10E9/L (ref 0.8–5.3)
LYMPHOCYTES NFR BLD AUTO: 19.5 %
MCH RBC QN AUTO: 30.6 PG (ref 26.5–33)
MCHC RBC AUTO-ENTMCNC: 30.9 G/DL (ref 31.5–36.5)
MCV RBC AUTO: 99 FL (ref 78–100)
MONOCYTES # BLD AUTO: 0.7 10E9/L (ref 0–1.3)
MONOCYTES NFR BLD AUTO: 9.2 %
NEUTROPHILS # BLD AUTO: 5.2 10E9/L (ref 1.6–8.3)
NEUTROPHILS NFR BLD AUTO: 68.9 %
NRBC # BLD AUTO: 0 10*3/UL
NRBC BLD AUTO-RTO: 0 /100
NT-PROBNP SERPL-MCNC: 5924 PG/ML (ref 0–900)
PLATELET # BLD AUTO: 123 10E9/L (ref 150–450)
POTASSIUM SERPL-SCNC: 4.3 MMOL/L (ref 3.4–5.3)
RBC # BLD AUTO: 3.95 10E12/L (ref 4.4–5.9)
SODIUM SERPL-SCNC: 136 MMOL/L (ref 133–144)
TROPONIN I SERPL-MCNC: <0.015 UG/L (ref 0–0.04)
WBC # BLD AUTO: 7.5 10E9/L (ref 4–11)

## 2020-02-25 PROCEDURE — 25000128 H RX IP 250 OP 636: Performed by: EMERGENCY MEDICINE

## 2020-02-25 PROCEDURE — 85025 COMPLETE CBC W/AUTO DIFF WBC: CPT | Performed by: EMERGENCY MEDICINE

## 2020-02-25 PROCEDURE — 84484 ASSAY OF TROPONIN QUANT: CPT | Performed by: EMERGENCY MEDICINE

## 2020-02-25 PROCEDURE — 96365 THER/PROPH/DIAG IV INF INIT: CPT | Performed by: EMERGENCY MEDICINE

## 2020-02-25 PROCEDURE — 25000132 ZZH RX MED GY IP 250 OP 250 PS 637: Performed by: STUDENT IN AN ORGANIZED HEALTH CARE EDUCATION/TRAINING PROGRAM

## 2020-02-25 PROCEDURE — 25800030 ZZH RX IP 258 OP 636: Performed by: STUDENT IN AN ORGANIZED HEALTH CARE EDUCATION/TRAINING PROGRAM

## 2020-02-25 PROCEDURE — 25000128 H RX IP 250 OP 636: Performed by: STUDENT IN AN ORGANIZED HEALTH CARE EDUCATION/TRAINING PROGRAM

## 2020-02-25 PROCEDURE — 93010 ELECTROCARDIOGRAM REPORT: CPT | Mod: Z6 | Performed by: EMERGENCY MEDICINE

## 2020-02-25 PROCEDURE — 96376 TX/PRO/DX INJ SAME DRUG ADON: CPT | Performed by: EMERGENCY MEDICINE

## 2020-02-25 PROCEDURE — 83880 ASSAY OF NATRIURETIC PEPTIDE: CPT | Performed by: EMERGENCY MEDICINE

## 2020-02-25 PROCEDURE — 99221 1ST HOSP IP/OBS SF/LOW 40: CPT | Mod: AI | Performed by: INTERNAL MEDICINE

## 2020-02-25 PROCEDURE — 99285 EMERGENCY DEPT VISIT HI MDM: CPT | Mod: 25 | Performed by: EMERGENCY MEDICINE

## 2020-02-25 PROCEDURE — 93005 ELECTROCARDIOGRAM TRACING: CPT | Performed by: EMERGENCY MEDICINE

## 2020-02-25 PROCEDURE — 00000146 ZZHCL STATISTIC GLUCOSE BY METER IP

## 2020-02-25 PROCEDURE — 71046 X-RAY EXAM CHEST 2 VIEWS: CPT

## 2020-02-25 PROCEDURE — 25000125 ZZHC RX 250: Performed by: STUDENT IN AN ORGANIZED HEALTH CARE EDUCATION/TRAINING PROGRAM

## 2020-02-25 PROCEDURE — 80048 BASIC METABOLIC PNL TOTAL CA: CPT | Performed by: EMERGENCY MEDICINE

## 2020-02-25 PROCEDURE — 21400000 ZZH R&B CCU UMMC

## 2020-02-25 RX ORDER — FUROSEMIDE 40 MG
40 TABLET ORAL 2 TIMES DAILY
Status: ON HOLD | COMMUNITY
End: 2020-03-01

## 2020-02-25 RX ORDER — LIDOCAINE 40 MG/G
CREAM TOPICAL
Status: DISCONTINUED | OUTPATIENT
Start: 2020-02-25 | End: 2020-03-01 | Stop reason: HOSPADM

## 2020-02-25 RX ORDER — ALUMINA, MAGNESIA, AND SIMETHICONE 2400; 2400; 240 MG/30ML; MG/30ML; MG/30ML
30 SUSPENSION ORAL EVERY 4 HOURS PRN
Status: DISCONTINUED | OUTPATIENT
Start: 2020-02-25 | End: 2020-03-01 | Stop reason: HOSPADM

## 2020-02-25 RX ORDER — INSULIN GLARGINE 100 [IU]/ML
25 INJECTION, SOLUTION SUBCUTANEOUS AT BEDTIME
Status: DISCONTINUED | OUTPATIENT
Start: 2020-02-25 | End: 2020-02-27

## 2020-02-25 RX ORDER — ACETAMINOPHEN 650 MG/1
650 SUPPOSITORY RECTAL EVERY 4 HOURS PRN
Status: DISCONTINUED | OUTPATIENT
Start: 2020-02-25 | End: 2020-03-01 | Stop reason: HOSPADM

## 2020-02-25 RX ORDER — FUROSEMIDE 10 MG/ML
40 INJECTION INTRAMUSCULAR; INTRAVENOUS ONCE
Status: COMPLETED | OUTPATIENT
Start: 2020-02-25 | End: 2020-02-25

## 2020-02-25 RX ORDER — CARVEDILOL 12.5 MG/1
12.5 TABLET ORAL 2 TIMES DAILY WITH MEALS
Status: DISCONTINUED | OUTPATIENT
Start: 2020-02-25 | End: 2020-02-27

## 2020-02-25 RX ORDER — IBUPROFEN 200 MG
200 TABLET ORAL DAILY
Status: ON HOLD | COMMUNITY
End: 2020-03-01

## 2020-02-25 RX ORDER — ASPIRIN 81 MG/1
81 TABLET, CHEWABLE ORAL DAILY
Status: DISCONTINUED | OUTPATIENT
Start: 2020-02-26 | End: 2020-02-25

## 2020-02-25 RX ORDER — DIGOXIN 125 MCG
125 TABLET ORAL DAILY
Status: DISCONTINUED | OUTPATIENT
Start: 2020-02-25 | End: 2020-02-25

## 2020-02-25 RX ORDER — DEXTROSE MONOHYDRATE 25 G/50ML
25-50 INJECTION, SOLUTION INTRAVENOUS
Status: DISCONTINUED | OUTPATIENT
Start: 2020-02-25 | End: 2020-03-01 | Stop reason: HOSPADM

## 2020-02-25 RX ORDER — NICOTINE POLACRILEX 4 MG
15-30 LOZENGE BUCCAL
Status: DISCONTINUED | OUTPATIENT
Start: 2020-02-25 | End: 2020-03-01 | Stop reason: HOSPADM

## 2020-02-25 RX ORDER — HYDROCODONE BITARTRATE AND ACETAMINOPHEN 10; 325 MG/1; MG/1
1 TABLET ORAL EVERY 6 HOURS PRN
Status: DISCONTINUED | OUTPATIENT
Start: 2020-02-25 | End: 2020-03-01 | Stop reason: HOSPADM

## 2020-02-25 RX ORDER — POTASSIUM CHLORIDE 1500 MG/1
20 TABLET, EXTENDED RELEASE ORAL DAILY
Status: ON HOLD | COMMUNITY
End: 2020-07-30

## 2020-02-25 RX ORDER — ACETAMINOPHEN 325 MG/1
650 TABLET ORAL EVERY 4 HOURS PRN
Status: DISCONTINUED | OUTPATIENT
Start: 2020-02-25 | End: 2020-03-01 | Stop reason: HOSPADM

## 2020-02-25 RX ORDER — DIGOXIN 125 MCG
125 TABLET ORAL DAILY
Status: DISCONTINUED | OUTPATIENT
Start: 2020-02-26 | End: 2020-02-25

## 2020-02-25 RX ORDER — HEPARIN SODIUM 5000 [USP'U]/.5ML
5000 INJECTION, SOLUTION INTRAVENOUS; SUBCUTANEOUS EVERY 12 HOURS
Status: DISCONTINUED | OUTPATIENT
Start: 2020-02-25 | End: 2020-02-27

## 2020-02-25 RX ORDER — INSULIN GLARGINE 100 [IU]/ML
25 INJECTION, SOLUTION SUBCUTANEOUS AT BEDTIME
Status: DISCONTINUED | OUTPATIENT
Start: 2020-02-26 | End: 2020-02-25

## 2020-02-25 RX ORDER — POLYETHYLENE GLYCOL 3350 17 G/17G
17 POWDER, FOR SOLUTION ORAL DAILY PRN
Status: DISCONTINUED | OUTPATIENT
Start: 2020-02-25 | End: 2020-03-01 | Stop reason: HOSPADM

## 2020-02-25 RX ORDER — ATORVASTATIN CALCIUM 40 MG/1
40 TABLET, FILM COATED ORAL EVERY EVENING
Status: DISCONTINUED | OUTPATIENT
Start: 2020-02-25 | End: 2020-03-01 | Stop reason: HOSPADM

## 2020-02-25 RX ORDER — INSULIN GLARGINE 100 [IU]/ML
65 INJECTION, SOLUTION SUBCUTANEOUS AT BEDTIME
Status: DISCONTINUED | OUTPATIENT
Start: 2020-02-25 | End: 2020-02-25

## 2020-02-25 RX ORDER — CLOPIDOGREL BISULFATE 75 MG/1
75 TABLET ORAL DAILY
Status: DISCONTINUED | OUTPATIENT
Start: 2020-02-25 | End: 2020-02-26

## 2020-02-25 RX ADMIN — ATORVASTATIN CALCIUM 40 MG: 40 TABLET, FILM COATED ORAL at 21:17

## 2020-02-25 RX ADMIN — FUROSEMIDE 40 MG: 10 INJECTION, SOLUTION INTRAVENOUS at 14:25

## 2020-02-25 RX ADMIN — SACUBITRIL AND VALSARTAN 1 TABLET: 24; 26 TABLET, FILM COATED ORAL at 21:15

## 2020-02-25 RX ADMIN — FUROSEMIDE 40 MG: 10 INJECTION, SOLUTION INTRAVENOUS at 16:31

## 2020-02-25 RX ADMIN — HEPARIN SODIUM 5000 UNITS: 5000 INJECTION, SOLUTION INTRAVENOUS; SUBCUTANEOUS at 21:19

## 2020-02-25 RX ADMIN — CLOPIDOGREL BISULFATE 75 MG: 75 TABLET, FILM COATED ORAL at 21:16

## 2020-02-25 RX ADMIN — CARVEDILOL 12.5 MG: 12.5 TABLET, FILM COATED ORAL at 21:16

## 2020-02-25 RX ADMIN — INSULIN GLARGINE 25 UNITS: 100 INJECTION, SOLUTION SUBCUTANEOUS at 23:22

## 2020-02-25 RX ADMIN — FUROSEMIDE 5 MG/HR: 10 INJECTION, SOLUTION INTRAVENOUS at 19:01

## 2020-02-25 ASSESSMENT — ACTIVITIES OF DAILY LIVING (ADL)
RETIRED_EATING: 0-->INDEPENDENT
WHICH_OF_THE_ABOVE_FUNCTIONAL_RISKS_HAD_A_RECENT_ONSET_OR_CHANGE?: AMBULATION;TRANSFERRING;TOILETING
BATHING: 0-->INDEPENDENT
SWALLOWING: 0-->SWALLOWS FOODS/LIQUIDS WITHOUT DIFFICULTY
COGNITION: 0 - NO COGNITION ISSUES REPORTED
TRANSFERRING: 0-->INDEPENDENT
FALL_HISTORY_WITHIN_LAST_SIX_MONTHS: NO
PRIOR_FUNCTIONAL_LEVEL_COMMENT: 4
AMBULATION: 0-->INDEPENDENT
RETIRED_COMMUNICATION: 0-->UNDERSTANDS/COMMUNICATES WITHOUT DIFFICULTY
DRESS: 0-->INDEPENDENT
TOILETING: 0-->INDEPENDENT

## 2020-02-25 ASSESSMENT — ENCOUNTER SYMPTOMS
SHORTNESS OF BREATH: 1
CONFUSION: 0
ARTHRALGIAS: 0
COUGH: 1
ABDOMINAL PAIN: 0
HEADACHES: 0
EYE REDNESS: 0
NECK STIFFNESS: 0
DIFFICULTY URINATING: 0
FATIGUE: 1
COLOR CHANGE: 0
FEVER: 0

## 2020-02-25 ASSESSMENT — MIFFLIN-ST. JEOR: SCORE: 1496.51

## 2020-02-25 NOTE — Clinical Note
The first balloon was inserted into the left anterior descending and middle left anterior descending.Max pressure = 18 loulou. Total duration = 14 seconds.

## 2020-02-25 NOTE — Clinical Note
Stent deployed in the ostium right coronary artery. Max pressure = 13 loulou. Total duration = 12 seconds.

## 2020-02-25 NOTE — ED NOTES
ED Triage Provider Note  [facility]  Encounter Date: Feb 25, 2020  1:13 PM     History:  Chief Complaint   Patient presents with     Shortness of Breath     Ken Doss is a 74 year old male who presents with a few weeks of worsening SOB, especially when he is laying down and trying to sleep.  He denies chest pain, cough, fevers or chills.  He is being evaluated by cardiology for potential LVAD in the near future.       Review of Systems:  Review of Systems  ROS: 14 point ROS neg other than the symptoms noted above in the HPI.      Exam:  /78   Temp 97.7  F (36.5  C) (Oral)   Resp 16   Wt 80.2 kg (176 lb 11.2 oz)   SpO2 98%   General: No acute distress. Appears stated age.   Cardio: Regular rate, extremities well perfused  Resp: Normal work of breathing, grossly normal respiratory rate  Neuro: Alert. CN II-XII grossly intact. Grossly intact strength.       Medical Decision Making:  Patient arriving to the ED with problem as above. A medical screening exam was performed. EKG, labs, CXR orders initiated from Triage. The patient is appropriate to wait for next available ed bed.     Raulito Wu MD      Note created by Raulito Wu MD on 2/25/2020 at 1:13 PM       Raulito Wu MD  02/25/20 1310

## 2020-02-25 NOTE — ED PROVIDER NOTES
[facility] EMERGENCY DEPARTMENT ([facility])  2/25/20  History     Chief Complaint   Patient presents with     Shortness of Breath     The history is provided by the patient and medical records.     Ken Doss is a 74 year old male with a past medical history of pulmonary anthracosis, mediastinal lymphadenopathy, pulmonary fibrosis, nonischemic cardiomyopathy, persistent atrial fibrillation, CAD involving native coronary artery, type 2 diabetes mellitus, biventricular ICD, hypertriglyceridemia, interstitial lung disease, and chronic systolic heart failure who is s/p coronary angiogram x2, and cardiac catheterization who presents to the Emergency Department for evaluation of shortness of breath.  Patient states he has a 3-4-month history of increased shortness of breath.  He states he has been slowly losing his ability to do things.  He states that even walking out to his car is challenging, and he has to stop and take a breath.  Patient states he has had no energy.  Patient states he has also not been outputting as much urine as normal, and noticing additional leg swelling.  Patient has a future cardiology appointment on 2/27/2020 for a possible LVAD.  He also states that his ability to exercise has diminished.  He states that as well as the leg swelling, he has noticed additional abdominal bloating as well.  Patient states that laying down causes increased coughing, and shortness of breath.  Patient states he has had to sleep in recliners lately due to this shortness of breath, and coughing.  Additionally, patient has had very low appetite, and hard time keeping his blood sugars up.  Patient denies any recent changes in diuretic dosages.    History reviewed. No pertinent past medical history.    History reviewed. No pertinent surgical history.    History reviewed. No pertinent family history.    Social History     Tobacco Use     Smoking status: Not on file   Substance Use Topics     Alcohol use:  Not on file       No current facility-administered medications for this encounter.      Current Outpatient Medications   Medication     apixaban ANTICOAGULANT (ELIQUIS) 5 MG tablet     aspirin (ASA) 81 MG tablet     atorvastatin (LIPITOR) 40 MG tablet     blood glucose (NO BRAND SPECIFIED) test strip     carvedilol (COREG) 12.5 MG tablet     clopidogrel (PLAVIX) 75 MG tablet     digoxin (LANOXIN) 125 MCG tablet     furosemide (LASIX) 40 MG tablet     glucosamine-chondroitin (GLUCOSAMINE CHONDR COMPLEX) 500-400 MG CAPS per capsule     insulin glargine (BASAGLAR KWIKPEN) 100 UNIT/ML pen     metFORMIN (GLUCOPHAGE) 1000 MG tablet     acetaminophen (TYLENOL) 500 MG tablet     Coenzyme Q10 (COQ-10) 100 MG CAPS     HYDROcodone-acetaminophen (NORCO)  MG per tablet     Multiple Vitamin (MULTI-VITAMINS) TABS     sacubitril-valsartan (ENTRESTO) 24-26 MG per tablet      No Known Allergies    I have reviewed the Medications, Allergies, Past Medical and Surgical History, and Social History in the Epic system.    Review of Systems   Constitutional: Positive for fatigue. Negative for fever.   HENT: Negative for congestion.    Eyes: Negative for redness.   Respiratory: Positive for cough and shortness of breath.    Cardiovascular: Positive for leg swelling. Negative for chest pain.   Gastrointestinal: Negative for abdominal pain.   Genitourinary: Negative for difficulty urinating.   Musculoskeletal: Negative for arthralgias and neck stiffness.   Skin: Negative for color change.   Neurological: Negative for headaches.   Psychiatric/Behavioral: Negative for confusion.     ROS: 14 point ROS neg other than the symptoms noted above in the HPI.  Physical Exam   BP: 110/78  Heart Rate: 68  Temp: 97.7  F (36.5  C)  Resp: 16  Weight: 80.2 kg (176 lb 11.2 oz)  SpO2: 98 %      Physical Exam  Constitutional:       General: He is not in acute distress.     Appearance: He is well-developed. He is not diaphoretic.   HENT:      Head:  Normocephalic and atraumatic.      Mouth/Throat:      Pharynx: No oropharyngeal exudate.   Eyes:      General: No scleral icterus.        Right eye: No discharge.         Left eye: No discharge.      Pupils: Pupils are equal, round, and reactive to light.   Neck:      Musculoskeletal: Normal range of motion and neck supple.   Cardiovascular:      Rate and Rhythm: Normal rate and regular rhythm.      Heart sounds: Murmur present. Systolic murmur present with a grade of 4/6. No friction rub. No gallop.    Pulmonary:      Effort: Pulmonary effort is normal. No respiratory distress.      Breath sounds: Rales present. No wheezing.   Chest:      Chest wall: No tenderness.   Abdominal:      General: Bowel sounds are normal. There is no distension.      Palpations: Abdomen is soft.      Tenderness: There is no abdominal tenderness.   Musculoskeletal: Normal range of motion.         General: No tenderness or deformity.      Right lower leg: Edema present.      Left lower leg: Edema present.   Skin:     General: Skin is warm and dry.      Coloration: Skin is not pale.      Findings: No erythema or rash.   Neurological:      Mental Status: He is alert and oriented to person, place, and time.      Cranial Nerves: No cranial nerve deficit.         ED Course   2:44 PM  The patient was seen and examined by Rambo Lange DO in Room ED31.    Medications   furosemide (LASIX) injection 40 mg (40 mg Intravenous Given 2/25/20 1425)         Procedures             EKG Interpretation:      Interpreted by Rambo Lange DO  Time reviewed: 1325  Symptoms at time of EKG: shortness of breath   Rhythm: paced  Rate: 60  Axis: Normal  Ectopy: none  Conduction: nonspecific interventricular conduction block  ST Segments/ T Waves: No acute ischemic changes  Q Waves: nonspecific  Comparison to prior: No old EKG available    Clinical Impression: non-specific EKG                              Labs Ordered and Resulted from Time of ED Arrival Up to the  Time of Departure from the ED   CBC WITH PLATELETS DIFFERENTIAL - Abnormal; Notable for the following components:       Result Value    RBC Count 3.95 (*)     Hemoglobin 12.1 (*)     Hematocrit 39.2 (*)     MCHC 30.9 (*)     RDW 19.0 (*)     Platelet Count 123 (*)     All other components within normal limits   BASIC METABOLIC PANEL - Abnormal; Notable for the following components:    Glucose 129 (*)     Urea Nitrogen 43 (*)     All other components within normal limits   NT PROBNP INPATIENT - Abnormal; Notable for the following components:    N-Terminal Pro BNP Inpatient 5,924 (*)     All other components within normal limits   TROPONIN I     Results for orders placed or performed during the hospital encounter of 02/25/20 (from the past 24 hour(s))   EKG 12-lead, tracing only   Result Value Ref Range    Interpretation ECG Click View Image link to view waveform and result    CBC with platelets differential   Result Value Ref Range    WBC 7.5 4.0 - 11.0 10e9/L    RBC Count 3.95 (L) 4.4 - 5.9 10e12/L    Hemoglobin 12.1 (L) 13.3 - 17.7 g/dL    Hematocrit 39.2 (L) 40.0 - 53.0 %    MCV 99 78 - 100 fl    MCH 30.6 26.5 - 33.0 pg    MCHC 30.9 (L) 31.5 - 36.5 g/dL    RDW 19.0 (H) 10.0 - 15.0 %    Platelet Count 123 (L) 150 - 450 10e9/L    Diff Method Automated Method     % Neutrophils 68.9 %    % Lymphocytes 19.5 %    % Monocytes 9.2 %    % Eosinophils 1.6 %    % Basophils 0.4 %    % Immature Granulocytes 0.4 %    Nucleated RBCs 0 0 /100    Absolute Neutrophil 5.2 1.6 - 8.3 10e9/L    Absolute Lymphocytes 1.5 0.8 - 5.3 10e9/L    Absolute Monocytes 0.7 0.0 - 1.3 10e9/L    Absolute Eosinophils 0.1 0.0 - 0.7 10e9/L    Absolute Basophils 0.0 0.0 - 0.2 10e9/L    Abs Immature Granulocytes 0.0 0 - 0.4 10e9/L    Absolute Nucleated RBC 0.0    Basic metabolic panel   Result Value Ref Range    Sodium 136 133 - 144 mmol/L    Potassium 4.3 3.4 - 5.3 mmol/L    Chloride 104 94 - 109 mmol/L    Carbon Dioxide 24 20 - 32 mmol/L    Anion Gap 7  3 - 14 mmol/L    Glucose 129 (H) 70 - 99 mg/dL    Urea Nitrogen 43 (H) 7 - 30 mg/dL    Creatinine 1.01 0.66 - 1.25 mg/dL    GFR Estimate 73 >60 mL/min/[1.73_m2]    GFR Estimate If Black 84 >60 mL/min/[1.73_m2]    Calcium 8.7 8.5 - 10.1 mg/dL   Troponin I   Result Value Ref Range    Troponin I ES <0.015 0.000 - 0.045 ug/L   Nt probnp inpatient (BNP)   Result Value Ref Range    N-Terminal Pro BNP Inpatient 5,924 (H) 0 - 900 pg/mL            Assessments & Plan (with Medical Decision Making)   This is a 74 year old male with a known EF of 20-25% with increasing shortness of breath and fatigue. This has been progressive in nature. He also notes orthopnea. On exam he has peripheral edema, rales and a murmur. ECG shows no acute abnormalities. CXR shows likely pulmonary edema. BNP is 5924. Patient was given furosemide with an increase in urine output. I discussed the case with Cardiology who will admit to their service.     I have reviewed the nursing notes.    I have reviewed the findings, diagnosis, plan and need for follow up with the patient.    New Prescriptions    No medications on file       Final diagnoses:   None   Marc GARCIA, am serving as a trained medical scribe to document services personally performed by Rambo Lange DO, based on the provider's statements to me.      Rambo GARCIA DO, was physically present and have reviewed and verified the accuracy of this note documented by Marc Granados.    2/25/2020   [facility], [place], EMERGENCY DEPARTMENT     Rambo Lange DO  02/25/20 2058       Rambo Lange DO  02/25/20 2058

## 2020-02-25 NOTE — Clinical Note
The first balloon was inserted into the left anterior descending and middle left anterior descending.Max pressure = 16 loulou. Total duration = 15 seconds.

## 2020-02-25 NOTE — ED NOTES
[facility], [place]   ED Nurse to Floor Handoff     Ken Doss is a 74 year old male who speaks English and lives with a spouse,  in a home  They arrived in the ED by car from home    ED Chief Complaint: Shortness of Breath    ED Dx;   Final diagnoses:   Acute on chronic congestive heart failure, unspecified heart failure type (H)         Needed?: No    Allergies: No Known Allergies.  Past Medical Hx: History reviewed. No pertinent past medical history.   Baseline Mental status: WDL  Current Mental Status changes: at basesline    Infection present or suspected this encounter: no  Sepsis suspected: No  Isolation type: No active isolations     Activity level - Baseline/Home:  Independent  Activity Level - Current:   Stand with Assist    Bariatric equipment needed?: No    In the ED these meds were given:   Medications   furosemide (LASIX) injection 40 mg (40 mg Intravenous Given 2/25/20 1425)   furosemide (LASIX) injection 40 mg (40 mg Intravenous Given 2/25/20 1631)       Drips running?  No    Home pump  No    Current LDAs       Labs results:   Labs Ordered and Resulted from Time of ED Arrival Up to the Time of Departure from the ED   CBC WITH PLATELETS DIFFERENTIAL - Abnormal; Notable for the following components:       Result Value    RBC Count 3.95 (*)     Hemoglobin 12.1 (*)     Hematocrit 39.2 (*)     MCHC 30.9 (*)     RDW 19.0 (*)     Platelet Count 123 (*)     All other components within normal limits   BASIC METABOLIC PANEL - Abnormal; Notable for the following components:    Glucose 129 (*)     Urea Nitrogen 43 (*)     All other components within normal limits   NT PROBNP INPATIENT - Abnormal; Notable for the following components:    N-Terminal Pro BNP Inpatient 5,924 (*)     All other components within normal limits   TROPONIN I       Imaging Studies:   Recent Results (from the past 24 hour(s))   XR Chest 2 Views    Narrative    EXAM: XR CHEST 2 VW  2/25/2020  2:10 PM     HISTORY:  sob       COMPARISON:  None    FINDINGS:   PA and lateral radiographs of the chest. Left chest pacemaker with  dual leads, both appear intact.    The trachea is midline. The cardiomediastinal silhouette is enlarged.  The point vasculature are prominent with redistribution. The vascular  pedicle is wide. Bibasilar and posterior opacities.    No acute osseous abnormalities. The upper abdomen is within normal  limits.        Impression    IMPRESSION: Pulmonary interstitial edema. Superimposed infection is  not excluded.     I have personally reviewed the examination and initial interpretation  and I agree with the findings.    FIDENCIO BOCANEGRA MD       Recent vital signs:   BP (!) 125/97   Pulse 60   Temp 97.7  F (36.5  C) (Oral)   Resp 18   Wt 80.2 kg (176 lb 11.2 oz)   SpO2 99%     Weikert Coma Scale Score: 15 (02/25/20 1309)       Cardiac Rhythm: Other  Pt needs tele? Yes  Skin/wound Issues: None- crusted shingles spots on buttocks    Code Status: Full Code    Pain control: fair    Nausea control: pt had none    Abnormal labs/tests/findings requiring intervention: see epic     Family present during ED course? Yes   Family Comments/Social Situation comments: wife     Tasks needing completion: None    Vania Ann, RN  [phone] [facility]

## 2020-02-25 NOTE — Clinical Note
The first balloon was inserted into the right coronary artery and ostium right coronary artery.Max pressure = 15 loulou. Total duration = 8 seconds.     Max pressure = 12 loulou. Total duration = 8 seconds.    Balloon reinflated a second time: Max pressure = 12 loulou. Total duration = 8 seconds.

## 2020-02-25 NOTE — Clinical Note
The first balloon was inserted into the right coronary artery and ostium right coronary artery.Max pressure = 16 loulou. Total duration = 14 seconds.     Max pressure = 10 loulou. Total duration = 5 seconds.    Balloon reinflated a second time: Max pressure = 10 loulou. Total duration = 5 seconds.  Balloon reinflated a third time: Max pressure = 18 loulou. Total duration = 30 seconds.

## 2020-02-25 NOTE — ED TRIAGE NOTES
"Pt arrived via car with c/o shortness of breath \"on and off for a few weeks.\" Pt reports that he cannot sleep at night. Reports no recent changes in his diuretic. Pt reports he last saw his cardiologist a few weeks ago.   "

## 2020-02-25 NOTE — Clinical Note
Ultrasound catheter inserted for high resolution imaging into left anterior descending and middle left anterior descending.

## 2020-02-25 NOTE — H&P
Cardiology History and Physical  Ken Doss MRN: [id number]  Age: 74 year old, : 1945  Primary care provider: Lewis Reeves            Assessment and Plan:     Mr. Doss is a 75 y/o M w/ HFrEF (LVEF 20-25%), CAD s/p PCI pLAD and pRCA, atrial fibrillation s/p AVN ablation s/p CRT-D, DM, ILD presenting with ESTRADA, LE edema, and orthopnea all consistent with acute on chronic LV systolic heart failure exacerbation 2/2 non-ischemic cardiomyopathy.    #Acute decompensation of chronic LV systolic heart failure:  Grossly decompensation on exam. NYHA class III. Has had 2 CHF admissions in 2019. However, no end organ dysfunction and tolerating modest dose of neurohormonal blockade as outpatient. Overall plan to diurese patient, then in coming days undergo RHC, CPX +/- coronary angiogram in order to assess cardiac performance and timing of the need for advanced therapies. Patient would be DT LVAD given age.    Plan:  -s/p lasix 40 mg IV x2 in ED; start lasix ggt at 5 mg/hr  -Decrease home coreg to 12.5 mg PO BID  -Continue Entresto 24/26 mg PO BID  -Plan for  RHC, CPX +/- coronary angiogram tentatively on     #CAD s/p PCI pLAD, pRCA in 2019:  -Continue ASA, plavix  -May stop plavix given >12 mo after PCI    #Afib:  [id number] score of 5.  -Holding apixaban given upcoming procedures    #DM:  -Continue home glargine, holding metformin, start ISS    FEN: Low NA diet, 2L fluid restriction  PPX: SQH    Code Status: FULL CODE     Patient discussed with staff attending, Dr. Payton.    Julio Noyola MD  Cardiology Fellow  Pager: [phone]            Chief Complaint:     LE edema, orthopnea, PND          History of Present Illness:     Mr. Doss is a 75 y/o M w/ HFrEF (LVEF 20-25%), CAD s/p PCI pLAD and pRCA, atrial fibrillation s/p AVN ablation s/p CRT-D, DM, ILD presenting with ESTRADA, LE edema, and orthopnea.    Patient recently saw Dr. Payton in outpatient consultation on  2/11/20. Per that outpatient clinci note, patient about 10 year ago  Started to develop ESTRADA and at that time was noted ot have a reduced LV systolic function thought to be due to silent MI. No coronary angiogram was done at that time.    About 2 year ago his functional status continued to decline. Angiogram in 1/2019 showed obstructive disease in his epicardial arteries and received PCI to pLAD and pRCA. He has had 2 hospitalizations in the last year for fluid retention and heart failure exacerbations. His most recent hospitalization was in July.    Over past 3-4 nights he has been experiencing increased orthopnea, PND and has been havign to wake up in the middle of the night and walk around in order to breath easier. Increase LE edema as well. He is now able to walk only less than half a mile on a flat surface however a year ago he could walk up to a mile. He is getting winded when he climbs a flight of stairs. He feels tired and exhausted most of the time. I would classify him has NYHA class III            Past Medical History:     History reviewed. No pertinent past medical history.           Past Surgical History:      History reviewed. No pertinent surgical history.           Social History:     Social History     Socioeconomic History     Marital status: Unknown     Spouse name: Not on file     Number of children: Not on file     Years of education: Not on file     Highest education level: Not on file   Occupational History     Not on file   Social Needs     Financial resource strain: Not on file     Food insecurity:     Worry: Not on file     Inability: Not on file     Transportation needs:     Medical: Not on file     Non-medical: Not on file   Tobacco Use     Smoking status: Not on file   Substance and Sexual Activity     Alcohol use: Not on file     Drug use: Not on file     Sexual activity: Not on file   Lifestyle     Physical activity:     Days per week: Not on file     Minutes per session: Not on file      Stress: Not on file   Relationships     Social connections:     Talks on phone: Not on file     Gets together: Not on file     Attends Orthodox service: Not on file     Active member of club or organization: Not on file     Attends meetings of clubs or organizations: Not on file     Relationship status: Not on file     Intimate partner violence:     Fear of current or ex partner: Not on file     Emotionally abused: Not on file     Physically abused: Not on file     Forced sexual activity: Not on file   Other Topics Concern     Not on file   Social History Narrative     Not on file              Family History:     History reviewed. No pertinent family history.  Family history reviewed and updated in EPIC          Allergies:     No Known Allergies           Medications:     (Not in a hospital admission)                   Physical Exam:     B/P: 125/97, T: 97.7, P: 60, R: 18    Wt Readings from Last 4 Encounters:   02/25/20 80.2 kg (176 lb 11.2 oz)         Intake/Output Summary (Last 24 hours) at 2/25/2020 1700  Last data filed at 2/25/2020 1633  Gross per 24 hour   Intake --   Output 1900 ml   Net -1900 ml       Gen: No acute distress  HEENT: NC/AT, PERRL, EOM intact, MMM, OP without exudates  PULM/THORAX: Clear to auscultation bilaterally, no rales/rhonchi/wheezes  CV: normal rate, regular rhythm, normal S1 and S2, no murmurs or rubs. JVD to earlobe  ABD: Soft, NTND, bowel sounds present, no masses  EXT: WWP. 2+ pitting edema in BLE  NEURO: CN II-XII grossly intact. A&Ox3              Data:     Labs Reviewed on Admission  Pertinent for:  Lab Results   Component Value Date    TROPI <0.015 02/25/2020               Most Recent Imaging:       Echo: (1/22/20)  Limited study to evaluate left ventricular ejection fraction.    Definity contrast utilized    Moderate left ventricular enlargement.    Left ventricular systolic function is severely reduced. Left ventricular   ejection fraction estimated 20 to 25%. Global  hypokinesis with possibly   more prominent wall motion abnormality involving the inferior wall and   apical portions of the left ventricle.    Abnormal left ventricular septal motion    Normal right ventricular size and systolic function.    Mild to moderate mitral regurgitation.    Moderate to severe tricuspid regurgitation. The RV systolic pressure is   28 mmHg plus right atrial pressure.    Pacer lead catheter within the right heart.    Moderate left atrial enlargement    Compared to the previous study March 2019. Biventricular ejection   fraction appears similar. The degree of tricuspid insufficiency appears   more prominent.

## 2020-02-25 NOTE — PROGRESS NOTES
Third call to Skyler.     He reports he is currently on the way to the [facility] emergency room. He reports he is still feeling poorly and so will present to ED for further care. He states he didn't sleep well overnight because he couldn't breathe well. He thinks his bloating is maybe a little better and feels his breathing is better when he is upright. He tells me he just took his second dose of diuretic for the day. Let him know we would follow along with his care at the emergency room. He was thankful for the call.    Did not discuss the previous recommendations as he will go to emergency room now.

## 2020-02-25 NOTE — PROGRESS NOTES
Discussed recent symptoms with dr diamond.  Date: 2/25/2020    Time of Call: 9:56 AM     Diagnosis:  Heart failure     [ VORB ] Ordering provider: Tata Diamond MD    Order: Increase lasix to 80mg in AM and 40mg in PM. BMP today     Order received by: chalino atwood RN     Follow-up/additional notes: orders placed. Called Skyler, left voicemail and asked for call back when able to discuss.

## 2020-02-25 NOTE — LETTER
Ken Doss MRN# [id number]   YOB: 1945 Age: 74 year old     Date of Admission:  2/26/2020  Date of Discharge:  3/1/2020  Admitting Physician:  Tata Payton MD  Discharging Physician: George Johnson MD (c [phone])  Discharging Service:  Cardiology  Hospitalization Status: Inpatient     Primary Care Clinic:  [facility]  Primary Care Provider: Lewis Reeves     To Whom it May Concern:            We would like to communicate our care of Ken Doss, who was recently admitted to the [facility].  He was treated for acute heart failure likely secondary to restenosis of prior PCI of his LAD and RCA. You may find our plan and course of therapy in attached discharged summary, but we would like to coordinate post-hospital care with you. The patient verbalized he would like assistance with his cardiac medications and management of his heart failure in ambulatory setting. We scheduled close follow up with the [facility]. He may need more instruction on future visits. After reviewing the following discharge summary, please contact us if you have any remaining questions. The Discharging Physician will be the best informed, with their contact information listed above.  If unable to reach them, or if you have received this letter in error, please call [phone] and someone will try to help you.

## 2020-02-26 ENCOUNTER — ANCILLARY PROCEDURE (OUTPATIENT)
Dept: CARDIOLOGY | Facility: CLINIC | Age: 75
DRG: 247 | End: 2020-02-26
Attending: STUDENT IN AN ORGANIZED HEALTH CARE EDUCATION/TRAINING PROGRAM
Payer: COMMERCIAL

## 2020-02-26 LAB
ANION GAP SERPL CALCULATED.3IONS-SCNC: 8 MMOL/L (ref 3–14)
BUN SERPL-MCNC: 32 MG/DL (ref 7–30)
CALCIUM SERPL-MCNC: 8.2 MG/DL (ref 8.5–10.1)
CHLORIDE SERPL-SCNC: 105 MMOL/L (ref 94–109)
CO2 SERPL-SCNC: 26 MMOL/L (ref 20–32)
CREAT SERPL-MCNC: 0.87 MG/DL (ref 0.66–1.25)
ERYTHROCYTE [DISTWIDTH] IN BLOOD BY AUTOMATED COUNT: 18.6 % (ref 10–15)
GFR SERPL CREATININE-BSD FRML MDRD: 85 ML/MIN/{1.73_M2}
GLUCOSE BLDC GLUCOMTR-MCNC: 166 MG/DL (ref 70–99)
GLUCOSE BLDC GLUCOMTR-MCNC: 195 MG/DL (ref 70–99)
GLUCOSE BLDC GLUCOMTR-MCNC: 246 MG/DL (ref 70–99)
GLUCOSE BLDC GLUCOMTR-MCNC: 74 MG/DL (ref 70–99)
GLUCOSE SERPL-MCNC: 111 MG/DL (ref 70–99)
HCT VFR BLD AUTO: 35.2 % (ref 40–53)
HGB BLD-MCNC: 11.4 G/DL (ref 13.3–17.7)
MAGNESIUM SERPL-MCNC: 1.5 MG/DL (ref 1.6–2.3)
MAGNESIUM SERPL-MCNC: 2.4 MG/DL (ref 1.6–2.3)
MCH RBC QN AUTO: 30.6 PG (ref 26.5–33)
MCHC RBC AUTO-ENTMCNC: 32.4 G/DL (ref 31.5–36.5)
MCV RBC AUTO: 94 FL (ref 78–100)
PLATELET # BLD AUTO: 108 10E9/L (ref 150–450)
POTASSIUM SERPL-SCNC: 2.9 MMOL/L (ref 3.4–5.3)
POTASSIUM SERPL-SCNC: 4.5 MMOL/L (ref 3.4–5.3)
RBC # BLD AUTO: 3.73 10E12/L (ref 4.4–5.9)
SODIUM SERPL-SCNC: 139 MMOL/L (ref 133–144)
WBC # BLD AUTO: 6.2 10E9/L (ref 4–11)

## 2020-02-26 PROCEDURE — 25000132 ZZH RX MED GY IP 250 OP 250 PS 637: Performed by: STUDENT IN AN ORGANIZED HEALTH CARE EDUCATION/TRAINING PROGRAM

## 2020-02-26 PROCEDURE — 36415 COLL VENOUS BLD VENIPUNCTURE: CPT | Performed by: STUDENT IN AN ORGANIZED HEALTH CARE EDUCATION/TRAINING PROGRAM

## 2020-02-26 PROCEDURE — 00000146 ZZHCL STATISTIC GLUCOSE BY METER IP

## 2020-02-26 PROCEDURE — 85027 COMPLETE CBC AUTOMATED: CPT | Performed by: STUDENT IN AN ORGANIZED HEALTH CARE EDUCATION/TRAINING PROGRAM

## 2020-02-26 PROCEDURE — 25000128 H RX IP 250 OP 636: Performed by: STUDENT IN AN ORGANIZED HEALTH CARE EDUCATION/TRAINING PROGRAM

## 2020-02-26 PROCEDURE — 40000802 ZZH SITE CHECK

## 2020-02-26 PROCEDURE — 25800030 ZZH RX IP 258 OP 636: Performed by: STUDENT IN AN ORGANIZED HEALTH CARE EDUCATION/TRAINING PROGRAM

## 2020-02-26 PROCEDURE — 93284 PRGRMG EVAL IMPLANTABLE DFB: CPT | Mod: 26 | Performed by: INTERNAL MEDICINE

## 2020-02-26 PROCEDURE — 80048 BASIC METABOLIC PNL TOTAL CA: CPT | Performed by: STUDENT IN AN ORGANIZED HEALTH CARE EDUCATION/TRAINING PROGRAM

## 2020-02-26 PROCEDURE — 83735 ASSAY OF MAGNESIUM: CPT | Performed by: INTERNAL MEDICINE

## 2020-02-26 PROCEDURE — 40000559 ZZH STATISTIC FAILED PERIPHERAL IV START

## 2020-02-26 PROCEDURE — 83735 ASSAY OF MAGNESIUM: CPT | Performed by: STUDENT IN AN ORGANIZED HEALTH CARE EDUCATION/TRAINING PROGRAM

## 2020-02-26 PROCEDURE — 40000141 ZZH STATISTIC PERIPHERAL IV START W/O US GUIDANCE

## 2020-02-26 PROCEDURE — 84132 ASSAY OF SERUM POTASSIUM: CPT | Performed by: INTERNAL MEDICINE

## 2020-02-26 PROCEDURE — 93284 PRGRMG EVAL IMPLANTABLE DFB: CPT

## 2020-02-26 PROCEDURE — 99232 SBSQ HOSP IP/OBS MODERATE 35: CPT | Mod: 25 | Performed by: INTERNAL MEDICINE

## 2020-02-26 PROCEDURE — 36415 COLL VENOUS BLD VENIPUNCTURE: CPT | Performed by: INTERNAL MEDICINE

## 2020-02-26 PROCEDURE — 21400000 ZZH R&B CCU UMMC

## 2020-02-26 RX ORDER — ASPIRIN 81 MG/1
81 TABLET, CHEWABLE ORAL DAILY
Status: DISCONTINUED | OUTPATIENT
Start: 2020-02-27 | End: 2020-02-27

## 2020-02-26 RX ORDER — MAGNESIUM SULFATE HEPTAHYDRATE 40 MG/ML
4 INJECTION, SOLUTION INTRAVENOUS EVERY 4 HOURS PRN
Status: DISCONTINUED | OUTPATIENT
Start: 2020-02-26 | End: 2020-03-01 | Stop reason: HOSPADM

## 2020-02-26 RX ORDER — POTASSIUM CHLORIDE 1.5 G/1.58G
20-40 POWDER, FOR SOLUTION ORAL
Status: DISCONTINUED | OUTPATIENT
Start: 2020-02-26 | End: 2020-03-01 | Stop reason: HOSPADM

## 2020-02-26 RX ORDER — POTASSIUM CHLORIDE 750 MG/1
20-40 TABLET, EXTENDED RELEASE ORAL
Status: DISCONTINUED | OUTPATIENT
Start: 2020-02-26 | End: 2020-03-01 | Stop reason: HOSPADM

## 2020-02-26 RX ORDER — MAGNESIUM SULFATE HEPTAHYDRATE 40 MG/ML
2 INJECTION, SOLUTION INTRAVENOUS DAILY PRN
Status: DISCONTINUED | OUTPATIENT
Start: 2020-02-26 | End: 2020-03-01 | Stop reason: HOSPADM

## 2020-02-26 RX ORDER — POTASSIUM CHLORIDE 29.8 MG/ML
20 INJECTION INTRAVENOUS
Status: DISCONTINUED | OUTPATIENT
Start: 2020-02-26 | End: 2020-02-26

## 2020-02-26 RX ORDER — POTASSIUM CHLORIDE 7.45 MG/ML
10 INJECTION INTRAVENOUS
Status: DISCONTINUED | OUTPATIENT
Start: 2020-02-26 | End: 2020-03-01 | Stop reason: HOSPADM

## 2020-02-26 RX ORDER — POTASSIUM CL/LIDO/0.9 % NACL 10MEQ/0.1L
10 INTRAVENOUS SOLUTION, PIGGYBACK (ML) INTRAVENOUS
Status: DISCONTINUED | OUTPATIENT
Start: 2020-02-26 | End: 2020-03-01 | Stop reason: HOSPADM

## 2020-02-26 RX ADMIN — CLOPIDOGREL BISULFATE 75 MG: 75 TABLET, FILM COATED ORAL at 08:56

## 2020-02-26 RX ADMIN — SACUBITRIL AND VALSARTAN 1 TABLET: 24; 26 TABLET, FILM COATED ORAL at 19:38

## 2020-02-26 RX ADMIN — HEPARIN SODIUM 5000 UNITS: 5000 INJECTION, SOLUTION INTRAVENOUS; SUBCUTANEOUS at 12:54

## 2020-02-26 RX ADMIN — MAGNESIUM SULFATE HEPTAHYDRATE 4 G: 40 INJECTION, SOLUTION INTRAVENOUS at 10:15

## 2020-02-26 RX ADMIN — CARVEDILOL 12.5 MG: 12.5 TABLET, FILM COATED ORAL at 17:27

## 2020-02-26 RX ADMIN — POTASSIUM CHLORIDE 40 MEQ: 750 TABLET, EXTENDED RELEASE ORAL at 07:36

## 2020-02-26 RX ADMIN — HEPARIN SODIUM 5000 UNITS: 5000 INJECTION, SOLUTION INTRAVENOUS; SUBCUTANEOUS at 21:27

## 2020-02-26 RX ADMIN — POTASSIUM CHLORIDE 40 MEQ: 750 TABLET, EXTENDED RELEASE ORAL at 09:12

## 2020-02-26 RX ADMIN — FUROSEMIDE 5 MG/HR: 10 INJECTION, SOLUTION INTRAVENOUS at 12:53

## 2020-02-26 RX ADMIN — SACUBITRIL AND VALSARTAN 1 TABLET: 24; 26 TABLET, FILM COATED ORAL at 08:56

## 2020-02-26 RX ADMIN — INSULIN GLARGINE 12 UNITS: 100 INJECTION, SOLUTION SUBCUTANEOUS at 21:38

## 2020-02-26 RX ADMIN — ATORVASTATIN CALCIUM 40 MG: 40 TABLET, FILM COATED ORAL at 19:38

## 2020-02-26 RX ADMIN — CARVEDILOL 12.5 MG: 12.5 TABLET, FILM COATED ORAL at 08:56

## 2020-02-26 ASSESSMENT — ACTIVITIES OF DAILY LIVING (ADL)
ADLS_ACUITY_SCORE: 11
ADLS_ACUITY_SCORE: 13
ADLS_ACUITY_SCORE: 11
ADLS_ACUITY_SCORE: 13
ADLS_ACUITY_SCORE: 11
ADLS_ACUITY_SCORE: 11

## 2020-02-26 ASSESSMENT — MIFFLIN-ST. JEOR: SCORE: 1495.6

## 2020-02-26 NOTE — PHARMACY-ADMISSION MEDICATION HISTORY
"Admission medication history interview status for the 2/25/2020 admission is complete. See Epic admission navigator for allergy information, pharmacy, prior to admission medications and immunization status.     Medication history interview sources:  Patient, Patient's spouse (Ph#: [phone])    Changes made to PTA medication list (reason)  Added:  -Ibuprofen (per patient - takes once daily in the morning)  -Advil PM (per patient - takes once daily at night)  -Potassium Chloride (per patient's spouse - on med list)  Deleted:   -Aspirin (per patient - no longer taking)  -Digoxin (per patient- doesn't recognize med - not on med list per patient's spouse)  -Norco (per patient - no longer taking - takes tylenol and ibuprofen for pain relief)  Changed:   -Tylenol from prn to 500 mg bid (per patient - takes on a regular basis instead of as needed)  -Carvedilol from 25mg BID to 12.5 mg bid (per patient's spouse from med list - Pulmonology office visit states this dose as well)  -Glucosamine-chondroitin from prn to 1 tablet every day (per patient - takes on regular basis, not prn)    Additional medication history information (including reliability of information, actions taken by pharmacist):    Patient appeared to be moderately reliable historian for medication reconciliation. Patient knew some doses of his medications and knew some of the medications he took today. Called Patient's spouse to make sure doses were correct as she had a medication list with her. However, she was unable to help out with medications that the patient was unable to determine if he took today or not.    Patient stated that he is currently taking 65 units of insulin glargine, but has had to decrease his insulin dose since he hasn't been eating as much as he usually does. Patient was not sure what this new dose of insulin is. Patient stated that he has been having some hypoglycemic events with this (I.e. shaking) with blood sugars \"between 50 and " "60\". When asked, patient currently doesn't have a glucagon kit with him at home. However, patient is working with HCP to determine the best course of action for his insulin dosing. Pt stated that he might be switching to insulin glargine 20-25 units taken BID, but has not started this yet.      Prior to Admission medications    Medication Sig Last Dose Taking? Auth Provider   acetaminophen (TYLENOL) 500 MG tablet Take 500 mg by mouth 2 times daily  2/25/2020 at AM Yes Reported, Patient   apixaban ANTICOAGULANT (ELIQUIS) 5 MG tablet Take 5 mg by mouth 2 times daily 2/25/2020 at AM Yes Reported, Patient   atorvastatin (LIPITOR) 40 MG tablet Take 40 mg by mouth daily 2/24/2020 at PM Yes Reported, Patient   blood glucose (NO BRAND SPECIFIED) test strip 4 times daily 2/25/2020 at Unknown time Yes Reported, Patient   furosemide (LASIX) 40 MG tablet Take 40 mg by mouth 2 times daily 2/25/2020 at AM Yes Reported, Patient   ibuprofen (ADVIL/MOTRIN) 200 MG tablet Take 200 mg by mouth daily 2/25/2020 at AM Yes Unknown, Entered By History   Ibuprofen-diphenhydrAMINE Cit (ADVIL PM PO) Take 1 tablet by mouth At Bedtime 2/24/2020 at PM Yes Unknown, Entered By History   insulin glargine (BASAGLAR KWIKPEN) 100 UNIT/ML pen Inject 65 Units Subcutaneous At Bedtime 2/24/2020 at PM Yes Reported, Patient   metFORMIN (GLUCOPHAGE) 1000 MG tablet Take 1,000 mg by mouth 2 times daily (with meals) 2/25/2020 at AM Yes Reported, Patient   Multiple Vitamin (MULTI-VITAMINS) TABS Take 1 tablet by mouth daily 2/25/2020 at AM Yes Reported, Patient   sacubitril-valsartan (ENTRESTO) 24-26 MG per tablet Take 1 tablet by mouth 2 times daily 2/25/2020 at AM Yes Reported, Patient   carvedilol (COREG) 12.5 MG tablet Take 12.5 mg by mouth 2 times daily  Unknown at Unknown time  Reported, Patient   clopidogrel (PLAVIX) 75 MG tablet Take 75 mg by mouth daily Unknown at Unknown time  Reported, Patient   Coenzyme Q10 (COQ-10) 100 MG CAPS Take 100 mg by mouth " daily Unknown at Unknown time  Reported, Patient   glucosamine-chondroitin (GLUCOSAMINE CHONDR COMPLEX) 500-400 MG CAPS per capsule Take 1 tablet by mouth daily  Unknown at Unknown time  Reported, Patient   potassium chloride ER (KLOR-CON M) 20 MEQ CR tablet Take 20 mEq by mouth daily Unknown at Unknown time  Unknown, Entered By History       Medication history completed by: Beni De Souza, Pharmacist Intern (PD2)    I have reviewed the medication history.    Tahira Ward, AnahyD

## 2020-02-26 NOTE — PROGRESS NOTES
"75 y/o M w/ HFrEF (LVEF 20-25%), CAD s/p PCI pLAD and pRCA, atrial fibrillation s/p AVN ablation s/p CRT-D, DM, ILD presenting with ESTRADA, LE edema, and orthopnea all consistent with acute on chronic LV systolic heart failure exacerbation 2/2 non-ischemic cardiomyopathy.    D/I: Monitor shows 100% V paced 60-70s. Continues on lasix drip at 5 mg/hour with 1500 ml UO/MN. Denies pain or shortness of breath. \"This is the best I've felt in days\". Up in halls independently without difficulties. No visitors present this shift. See flowsheets for assessments and additional data.  A: Stable HF.   P: Continue VAD w/u per patient. NPO after MN tonight for angio/right heart cath. Continue current cares and notify providers with questions or concerns.   "

## 2020-02-26 NOTE — PROGRESS NOTES
Heart Failure Cardiology Progress Note  Ken Doss MRN: [id number]  Age: 74 year old, : 1945  Date: 2020            Assessment and Plan:     Mr. Doss is a 75 y/o M w/ HFrEF (LVEF 20-25%), CAD s/p PCI pLAD and pRCA, atrial fibrillation s/p AVN ablation s/p CRT-D, DM, ILD presenting with ESTRADA, LE edema, and orthopnea all consistent with acute on chronic LV systolic heart failure exacerbation 2/2 non-ischemic cardiomyopathy.     #Acute decompensation of chronic LV systolic heart failure:  Grossly decompensation on exam. NYHA class III. Has had 2 CHF admissions in 2019. However, no end organ dysfunction and tolerating modest dose of neurohormonal blockade as outpatient. Overall plan to diurese patient, then in coming days undergo RHC, CPX +/- coronary angiogram in order to assess cardiac performance and timing of the need for advanced therapies. Patient would be DT LVAD given age.     Plan:  -Continue lasix ggt at 5 mg/hr --> goal net negative 2.0L over next 24 hrs  -Decreased home coreg to 12.5 mg PO BID  -Continude Entresto 24/26 mg PO BID  -Plan for RHC + cardiopulmonary stress test + coronary angiogram on      #CAD s/p PCI pLAD, pRCA in 2019:  -Continue plavix  -May stop plavix given >12 mo after PCI and restart ASA     #Afib:  [id number] score of 5.  -Holding apixaban given upcoming procedures     #DM:  -Continue home glargine, holding metformin, start ISS     FEN: Low NA diet, 2L fluid restriction  PPX: SQH     Code Status: FULL CODE     Patient discussed with staff attending, Dr. Payton.     Julio Noyola MD  Cardiology Fellow  Pager: [phone]                  Subjective/Interval Events     No acute overnight events. This AM, patient reporting that he finally was able to sleep. No orthopnea, PND, CP, lightheadedness, dizziness.          Objective     /70 (BP Location: Right arm)   Pulse 60   Temp 97.7  F (36.5  C) (Oral)   Resp 16   " Ht 1.753 m (5' 9\")   Wt 76.5 kg (168 lb 11.2 oz)   SpO2 95%   BMI 24.91 kg/m    Temp:  [97.6  F (36.4  C)-97.9  F (36.6  C)] 97.7  F (36.5  C)  Pulse:  [60-65] 60  Heart Rate:  [53-73] 73  Resp:  [12-26] 16  BP: (103-134)/(66-97) 103/70  SpO2:  [92 %-99 %] 95 %  Wt Readings from Last 2 Encounters:   02/26/20 76.5 kg (168 lb 11.2 oz)     I/O last 3 completed shifts:  In: 654.91 [P.O.:600; I.V.:54.91]  Out: 3500 [Urine:3500]      Gen: No acute distress  HEENT: NC/AT, PERRL, EOM intact, MMM, OP without exudates  PULM/THORAX: Clear to auscultation bilaterally, no rales/rhonchi/wheezes  CV: normal rate, regular rhythm, normal S1 and S2, no murmurs or rubs. JVP 10 cm  ABD: Soft, NTND, bowel sounds present, no masses  EXT: WWP. 1+ pitting edema in BLE  NEURO: CN II-XII grossly intact. A&Ox3            Data:     Recent Results (from the past 24 hour(s))   EKG 12-lead, tracing only    Collection Time: 02/25/20  1:20 PM   Result Value Ref Range    Interpretation ECG Click View Image link to view waveform and result    CBC with platelets differential    Collection Time: 02/25/20  1:45 PM   Result Value Ref Range    WBC 7.5 4.0 - 11.0 10e9/L    RBC Count 3.95 (L) 4.4 - 5.9 10e12/L    Hemoglobin 12.1 (L) 13.3 - 17.7 g/dL    Hematocrit 39.2 (L) 40.0 - 53.0 %    MCV 99 78 - 100 fl    MCH 30.6 26.5 - 33.0 pg    MCHC 30.9 (L) 31.5 - 36.5 g/dL    RDW 19.0 (H) 10.0 - 15.0 %    Platelet Count 123 (L) 150 - 450 10e9/L    Diff Method Automated Method     % Neutrophils 68.9 %    % Lymphocytes 19.5 %    % Monocytes 9.2 %    % Eosinophils 1.6 %    % Basophils 0.4 %    % Immature Granulocytes 0.4 %    Nucleated RBCs 0 0 /100    Absolute Neutrophil 5.2 1.6 - 8.3 10e9/L    Absolute Lymphocytes 1.5 0.8 - 5.3 10e9/L    Absolute Monocytes 0.7 0.0 - 1.3 10e9/L    Absolute Eosinophils 0.1 0.0 - 0.7 10e9/L    Absolute Basophils 0.0 0.0 - 0.2 10e9/L    Abs Immature Granulocytes 0.0 0 - 0.4 10e9/L    Absolute Nucleated RBC 0.0    Basic metabolic " panel    Collection Time: 02/25/20  1:45 PM   Result Value Ref Range    Sodium 136 133 - 144 mmol/L    Potassium 4.3 3.4 - 5.3 mmol/L    Chloride 104 94 - 109 mmol/L    Carbon Dioxide 24 20 - 32 mmol/L    Anion Gap 7 3 - 14 mmol/L    Glucose 129 (H) 70 - 99 mg/dL    Urea Nitrogen 43 (H) 7 - 30 mg/dL    Creatinine 1.01 0.66 - 1.25 mg/dL    GFR Estimate 73 >60 mL/min/[1.73_m2]    GFR Estimate If Black 84 >60 mL/min/[1.73_m2]    Calcium 8.7 8.5 - 10.1 mg/dL   Troponin I    Collection Time: 02/25/20  1:45 PM   Result Value Ref Range    Troponin I ES <0.015 0.000 - 0.045 ug/L   Nt probnp inpatient (BNP)    Collection Time: 02/25/20  1:45 PM   Result Value Ref Range    N-Terminal Pro BNP Inpatient 5,924 (H) 0 - 900 pg/mL   Glucose by meter    Collection Time: 02/25/20  9:05 PM   Result Value Ref Range    Glucose 187 (H) 70 - 99 mg/dL   Glucose by meter    Collection Time: 02/26/20  2:31 AM   Result Value Ref Range    Glucose 166 (H) 70 - 99 mg/dL   CBC with platelets    Collection Time: 02/26/20  6:04 AM   Result Value Ref Range    WBC 6.2 4.0 - 11.0 10e9/L    RBC Count 3.73 (L) 4.4 - 5.9 10e12/L    Hemoglobin 11.4 (L) 13.3 - 17.7 g/dL    Hematocrit 35.2 (L) 40.0 - 53.0 %    MCV 94 78 - 100 fl    MCH 30.6 26.5 - 33.0 pg    MCHC 32.4 31.5 - 36.5 g/dL    RDW 18.6 (H) 10.0 - 15.0 %    Platelet Count 108 (L) 150 - 450 10e9/L   Basic metabolic panel    Collection Time: 02/26/20  6:04 AM   Result Value Ref Range    Sodium 139 133 - 144 mmol/L    Potassium 2.9 (L) 3.4 - 5.3 mmol/L    Chloride 105 94 - 109 mmol/L    Carbon Dioxide 26 20 - 32 mmol/L    Anion Gap 8 3 - 14 mmol/L    Glucose 111 (H) 70 - 99 mg/dL    Urea Nitrogen 32 (H) 7 - 30 mg/dL    Creatinine 0.87 0.66 - 1.25 mg/dL    GFR Estimate 85 >60 mL/min/[1.73_m2]    GFR Estimate If Black >90 >60 mL/min/[1.73_m2]    Calcium 8.2 (L) 8.5 - 10.1 mg/dL   Magnesium    Collection Time: 02/26/20  6:04 AM   Result Value Ref Range    Magnesium 1.5 (L) 1.6 - 2.3 mg/dL       Recent  Results (from the past 24 hour(s))   XR Chest 2 Views    Narrative    EXAM: XR CHEST 2 VW  2/25/2020 2:10 PM     HISTORY:  sob       COMPARISON:  None    FINDINGS:   PA and lateral radiographs of the chest. Left chest pacemaker with  dual leads, both appear intact.    The trachea is midline. The cardiomediastinal silhouette is enlarged.  The point vasculature are prominent with redistribution. The vascular  pedicle is wide. Bibasilar and posterior opacities.    No acute osseous abnormalities. The upper abdomen is within normal  limits.        Impression    IMPRESSION: Pulmonary interstitial edema. Superimposed infection is  not excluded.     I have personally reviewed the examination and initial interpretation  and I agree with the findings.    FIDENCIO BOCANEGRA MD             Medications     Current Facility-Administered Medications   Medication     acetaminophen (TYLENOL) Suppository 650 mg     acetaminophen (TYLENOL) tablet 650 mg     alum & mag hydroxide-simethicone (MAALOX  ES) suspension 30 mL     atorvastatin (LIPITOR) tablet 40 mg     carvedilol (COREG) tablet 12.5 mg     clopidogrel (PLAVIX) tablet 75 mg     glucose gel 15-30 g    Or     dextrose 50 % injection 25-50 mL    Or     glucagon injection 1 mg     furosemide (LASIX) 100 mg in sodium chloride 0.9 % 100 mL infusion     heparin ANTICOAGULANT injection 5,000 Units     HYDROcodone-acetaminophen (NORCO)  MG per tablet 1 tablet     insulin aspart (NovoLOG) injection (RAPID ACTING)     insulin aspart (NovoLOG) injection (RAPID ACTING)     insulin glargine (BASAGLAR KWIKPEN) injection 25 Units     lidocaine (LMX4) cream     lidocaine 1 % 0.1-1 mL     magnesium sulfate 2 g in water intermittent infusion     magnesium sulfate 4 g in 100 mL sterile water (premade)     medication instruction     polyethylene glycol (MIRALAX) Packet 17 g     potassium chloride (KLOR-CON) Packet 20-40 mEq     potassium chloride 10 mEq in 100 mL intermittent infusion with 10  mg lidocaine     potassium chloride 10 mEq in 100 mL sterile water intermittent infusion (premix)     potassium chloride ER (KLOR-CON M) CR tablet 20-40 mEq     sacubitril-valsartan (ENTRESTO) 24-26 MG per tablet 1 tablet     sodium chloride (PF) 0.9% PF flush 3 mL     sodium chloride (PF) 0.9% PF flush 3 mL

## 2020-02-26 NOTE — PROGRESS NOTES
Admission~ 2035  Diagnosis:HF exacerbation  Admitted from:[facility]  Via: Wheelchair  Accompanied by: RN  Belongings: Placed in closet; home meds were sent to security.  Admission Profile: Completed  Teaching: orientation to unit, call don't fall, use of console, meal times, visiting hours, when to call for the RN (angina/sob/dizziness, etc.), and enforced importance of safety   Access: Pt came with PIV- infusing Lasix at 5mg/hr  Telemetry: Placed on patient  Height/Weight: Completed  2RN Full skin assessment: done with ELVIRA Rai.

## 2020-02-26 NOTE — PLAN OF CARE
AVSS. A&OX4.  Pt denies discomfort.  Lasix gtt continues at 5mg/hr.  Voiding per urinal(see I&O).  Using call light appropriately for assist.  Pt stable, pleasant.  Slept between cares and assessments in NAD.  Continue to monitor fluid status/response to diuretics, labs.  Anticipate RHC/coronary angiogram 2/27.

## 2020-02-27 ENCOUNTER — SURGERY (OUTPATIENT)
Age: 75
End: 2020-02-27
Payer: COMMERCIAL

## 2020-02-27 ENCOUNTER — HOSPITAL ENCOUNTER (OUTPATIENT)
Dept: CARDIOLOGY | Facility: CLINIC | Age: 75
Discharge: HOME | DRG: 247 | End: 2020-02-27
Attending: INTERNAL MEDICINE | Admitting: INTERNAL MEDICINE
Payer: COMMERCIAL

## 2020-02-27 DIAGNOSIS — I50.22 CHRONIC SYSTOLIC CONGESTIVE HEART FAILURE (H): ICD-10-CM

## 2020-02-27 PROBLEM — I50.23 ACUTE ON CHRONIC SYSTOLIC HEART FAILURE (H): Status: ACTIVE | Noted: 2020-02-25

## 2020-02-27 LAB
ANION GAP SERPL CALCULATED.3IONS-SCNC: 7 MMOL/L (ref 3–14)
BUN SERPL-MCNC: 33 MG/DL (ref 7–30)
CALCIUM SERPL-MCNC: 8 MG/DL (ref 8.5–10.1)
CHLORIDE SERPL-SCNC: 106 MMOL/L (ref 94–109)
CO2 SERPL-SCNC: 25 MMOL/L (ref 20–32)
CREAT SERPL-MCNC: 1.11 MG/DL (ref 0.66–1.25)
ERYTHROCYTE [DISTWIDTH] IN BLOOD BY AUTOMATED COUNT: 18.6 % (ref 10–15)
GFR SERPL CREATININE-BSD FRML MDRD: 65 ML/MIN/{1.73_M2}
GLUCOSE BLDC GLUCOMTR-MCNC: 101 MG/DL (ref 70–99)
GLUCOSE BLDC GLUCOMTR-MCNC: 114 MG/DL (ref 70–99)
GLUCOSE BLDC GLUCOMTR-MCNC: 323 MG/DL (ref 70–99)
GLUCOSE BLDC GLUCOMTR-MCNC: 65 MG/DL (ref 70–99)
GLUCOSE BLDC GLUCOMTR-MCNC: 76 MG/DL (ref 70–99)
GLUCOSE BLDC GLUCOMTR-MCNC: 91 MG/DL (ref 70–99)
GLUCOSE SERPL-MCNC: 99 MG/DL (ref 70–99)
HCT VFR BLD AUTO: 37.4 % (ref 40–53)
HGB BLD-MCNC: 12 G/DL (ref 13.3–17.7)
KCT BLD-ACNC: 227 SEC (ref 75–150)
KCT BLD-ACNC: 296 SEC (ref 75–150)
MCH RBC QN AUTO: 30.7 PG (ref 26.5–33)
MCHC RBC AUTO-ENTMCNC: 32.1 G/DL (ref 31.5–36.5)
MCV RBC AUTO: 96 FL (ref 78–100)
PLATELET # BLD AUTO: 146 10E9/L (ref 150–450)
POTASSIUM SERPL-SCNC: 3.9 MMOL/L (ref 3.4–5.3)
RBC # BLD AUTO: 3.91 10E12/L (ref 4.4–5.9)
SODIUM SERPL-SCNC: 138 MMOL/L (ref 133–144)
WBC # BLD AUTO: 7.6 10E9/L (ref 4–11)

## 2020-02-27 PROCEDURE — 82962 GLUCOSE BLOOD TEST: CPT

## 2020-02-27 PROCEDURE — C1769 GUIDE WIRE: HCPCS | Performed by: INTERNAL MEDICINE

## 2020-02-27 PROCEDURE — 25000128 H RX IP 250 OP 636: Performed by: STUDENT IN AN ORGANIZED HEALTH CARE EDUCATION/TRAINING PROGRAM

## 2020-02-27 PROCEDURE — 93456 R HRT CORONARY ARTERY ANGIO: CPT | Mod: 26 | Performed by: INTERNAL MEDICINE

## 2020-02-27 PROCEDURE — B2111ZZ FLUOROSCOPY OF MULTIPLE CORONARY ARTERIES USING LOW OSMOLAR CONTRAST: ICD-10-PCS | Performed by: INTERNAL MEDICINE

## 2020-02-27 PROCEDURE — 27210794 ZZH OR GENERAL SUPPLY STERILE: Performed by: INTERNAL MEDICINE

## 2020-02-27 PROCEDURE — C1725 CATH, TRANSLUMIN NON-LASER: HCPCS | Performed by: INTERNAL MEDICINE

## 2020-02-27 PROCEDURE — 25000132 ZZH RX MED GY IP 250 OP 250 PS 637: Performed by: STUDENT IN AN ORGANIZED HEALTH CARE EDUCATION/TRAINING PROGRAM

## 2020-02-27 PROCEDURE — 92928 PRQ TCAT PLMT NTRAC ST 1 LES: CPT | Mod: LD | Performed by: INTERNAL MEDICINE

## 2020-02-27 PROCEDURE — 99152 MOD SED SAME PHYS/QHP 5/>YRS: CPT | Performed by: INTERNAL MEDICINE

## 2020-02-27 PROCEDURE — 21400000 ZZH R&B CCU UMMC

## 2020-02-27 PROCEDURE — C1894 INTRO/SHEATH, NON-LASER: HCPCS | Performed by: INTERNAL MEDICINE

## 2020-02-27 PROCEDURE — 4A023N6 MEASUREMENT OF CARDIAC SAMPLING AND PRESSURE, RIGHT HEART, PERCUTANEOUS APPROACH: ICD-10-PCS | Performed by: INTERNAL MEDICINE

## 2020-02-27 PROCEDURE — 25000128 H RX IP 250 OP 636: Performed by: INTERNAL MEDICINE

## 2020-02-27 PROCEDURE — 25800030 ZZH RX IP 258 OP 636: Performed by: STUDENT IN AN ORGANIZED HEALTH CARE EDUCATION/TRAINING PROGRAM

## 2020-02-27 PROCEDURE — 25000132 ZZH RX MED GY IP 250 OP 250 PS 637: Performed by: INTERNAL MEDICINE

## 2020-02-27 PROCEDURE — C1887 CATHETER, GUIDING: HCPCS | Performed by: INTERNAL MEDICINE

## 2020-02-27 PROCEDURE — 93456 R HRT CORONARY ARTERY ANGIO: CPT | Performed by: INTERNAL MEDICINE

## 2020-02-27 PROCEDURE — 00000146 ZZHCL STATISTIC GLUCOSE BY METER IP

## 2020-02-27 PROCEDURE — 80048 BASIC METABOLIC PNL TOTAL CA: CPT | Performed by: STUDENT IN AN ORGANIZED HEALTH CARE EDUCATION/TRAINING PROGRAM

## 2020-02-27 PROCEDURE — 36415 COLL VENOUS BLD VENIPUNCTURE: CPT | Performed by: STUDENT IN AN ORGANIZED HEALTH CARE EDUCATION/TRAINING PROGRAM

## 2020-02-27 PROCEDURE — C1874 STENT, COATED/COV W/DEL SYS: HCPCS | Performed by: INTERNAL MEDICINE

## 2020-02-27 PROCEDURE — 94621 CARDIOPULM EXERCISE TESTING: CPT

## 2020-02-27 PROCEDURE — 99232 SBSQ HOSP IP/OBS MODERATE 35: CPT | Mod: 25 | Performed by: INTERNAL MEDICINE

## 2020-02-27 PROCEDURE — C1753 CATH, INTRAVAS ULTRASOUND: HCPCS | Performed by: INTERNAL MEDICINE

## 2020-02-27 PROCEDURE — 92978 ENDOLUMINL IVUS OCT C 1ST: CPT | Mod: LD | Performed by: INTERNAL MEDICINE

## 2020-02-27 PROCEDURE — 99153 MOD SED SAME PHYS/QHP EA: CPT | Performed by: INTERNAL MEDICINE

## 2020-02-27 PROCEDURE — 027135Z DILATION OF CORONARY ARTERY, TWO ARTERIES WITH TWO DRUG-ELUTING INTRALUMINAL DEVICES, PERCUTANEOUS APPROACH: ICD-10-PCS | Performed by: INTERNAL MEDICINE

## 2020-02-27 PROCEDURE — 25000125 ZZHC RX 250: Performed by: INTERNAL MEDICINE

## 2020-02-27 PROCEDURE — 92978 ENDOLUMINL IVUS OCT C 1ST: CPT | Mod: 26 | Performed by: INTERNAL MEDICINE

## 2020-02-27 PROCEDURE — C9600 PERC DRUG-EL COR STENT SING: HCPCS | Performed by: INTERNAL MEDICINE

## 2020-02-27 PROCEDURE — 85027 COMPLETE CBC AUTOMATED: CPT | Performed by: STUDENT IN AN ORGANIZED HEALTH CARE EDUCATION/TRAINING PROGRAM

## 2020-02-27 PROCEDURE — 25000131 ZZH RX MED GY IP 250 OP 636 PS 637: Performed by: STUDENT IN AN ORGANIZED HEALTH CARE EDUCATION/TRAINING PROGRAM

## 2020-02-27 PROCEDURE — 94621 CARDIOPULM EXERCISE TESTING: CPT | Mod: 26 | Performed by: INTERNAL MEDICINE

## 2020-02-27 PROCEDURE — 85347 COAGULATION TIME ACTIVATED: CPT

## 2020-02-27 DEVICE — STENT SYNERGY DRUG ELUTING 4.00X16MM  H7493926016400: Type: IMPLANTABLE DEVICE | Status: FUNCTIONAL

## 2020-02-27 DEVICE — STENT SYNERGY DRUG ELUTING 3.00X16MM  H7493926016300: Type: IMPLANTABLE DEVICE | Status: FUNCTIONAL

## 2020-02-27 RX ORDER — FUROSEMIDE 10 MG/ML
40 INJECTION INTRAMUSCULAR; INTRAVENOUS ONCE
Status: COMPLETED | OUTPATIENT
Start: 2020-02-27 | End: 2020-02-27

## 2020-02-27 RX ORDER — NITROGLYCERIN 5 MG/ML
VIAL (ML) INTRAVENOUS
Status: DISCONTINUED | OUTPATIENT
Start: 2020-02-27 | End: 2020-02-27 | Stop reason: HOSPADM

## 2020-02-27 RX ORDER — INSULIN GLARGINE 100 [IU]/ML
15 INJECTION, SOLUTION SUBCUTANEOUS AT BEDTIME
Status: DISCONTINUED | OUTPATIENT
Start: 2020-02-27 | End: 2020-03-01 | Stop reason: HOSPADM

## 2020-02-27 RX ORDER — NICARDIPINE HYDROCHLORIDE 2.5 MG/ML
INJECTION INTRAVENOUS
Status: DISCONTINUED | OUTPATIENT
Start: 2020-02-27 | End: 2020-02-27 | Stop reason: HOSPADM

## 2020-02-27 RX ORDER — HEPARIN SODIUM 1000 [USP'U]/ML
INJECTION, SOLUTION INTRAVENOUS; SUBCUTANEOUS
Status: DISCONTINUED | OUTPATIENT
Start: 2020-02-27 | End: 2020-02-27 | Stop reason: HOSPADM

## 2020-02-27 RX ORDER — LANOLIN ALCOHOL/MO/W.PET/CERES
3 CREAM (GRAM) TOPICAL
Status: DISCONTINUED | OUTPATIENT
Start: 2020-02-27 | End: 2020-03-01 | Stop reason: HOSPADM

## 2020-02-27 RX ORDER — IOPAMIDOL 755 MG/ML
INJECTION, SOLUTION INTRAVASCULAR
Status: DISCONTINUED | OUTPATIENT
Start: 2020-02-27 | End: 2020-02-27 | Stop reason: HOSPADM

## 2020-02-27 RX ORDER — FENTANYL CITRATE 50 UG/ML
INJECTION, SOLUTION INTRAMUSCULAR; INTRAVENOUS
Status: DISCONTINUED | OUTPATIENT
Start: 2020-02-27 | End: 2020-02-27 | Stop reason: HOSPADM

## 2020-02-27 RX ORDER — ASPIRIN 81 MG/1
TABLET, CHEWABLE ORAL
Status: DISCONTINUED | OUTPATIENT
Start: 2020-02-27 | End: 2020-02-27 | Stop reason: HOSPADM

## 2020-02-27 RX ORDER — CARVEDILOL 6.25 MG/1
6.25 TABLET ORAL 2 TIMES DAILY WITH MEALS
Status: DISCONTINUED | OUTPATIENT
Start: 2020-02-27 | End: 2020-03-01 | Stop reason: HOSPADM

## 2020-02-27 RX ORDER — POTASSIUM CHLORIDE 750 MG/1
40 TABLET, EXTENDED RELEASE ORAL DAILY
Status: DISCONTINUED | OUTPATIENT
Start: 2020-02-27 | End: 2020-03-01 | Stop reason: HOSPADM

## 2020-02-27 RX ADMIN — ATORVASTATIN CALCIUM 40 MG: 40 TABLET, FILM COATED ORAL at 19:50

## 2020-02-27 RX ADMIN — POTASSIUM CHLORIDE 40 MEQ: 750 TABLET, EXTENDED RELEASE ORAL at 12:03

## 2020-02-27 RX ADMIN — NITROGLYCERIN 200 MCG: 5 INJECTION, SOLUTION INTRAVENOUS at 14:43

## 2020-02-27 RX ADMIN — FUROSEMIDE 40 MG: 10 INJECTION, SOLUTION INTRAVENOUS at 12:04

## 2020-02-27 RX ADMIN — FUROSEMIDE 10 MG/HR: 10 INJECTION, SOLUTION INTRAVENOUS at 21:21

## 2020-02-27 RX ADMIN — APIXABAN 5 MG: 5 TABLET, FILM COATED ORAL at 19:50

## 2020-02-27 RX ADMIN — HEPARIN SODIUM 4000 UNITS: 1000 INJECTION INTRAVENOUS; SUBCUTANEOUS at 15:30

## 2020-02-27 RX ADMIN — LIDOCAINE HYDROCHLORIDE 5 ML: 10 INJECTION, SOLUTION EPIDURAL; INFILTRATION; INTRACAUDAL; PERINEURAL at 14:41

## 2020-02-27 RX ADMIN — IOPAMIDOL 122 ML: 755 INJECTION, SOLUTION INTRAVENOUS at 15:50

## 2020-02-27 RX ADMIN — INSULIN ASPART 3 UNITS: 100 INJECTION, SOLUTION INTRAVENOUS; SUBCUTANEOUS at 21:15

## 2020-02-27 RX ADMIN — FENTANYL CITRATE 25 MCG: 50 INJECTION, SOLUTION INTRAMUSCULAR; INTRAVENOUS at 15:32

## 2020-02-27 RX ADMIN — FENTANYL CITRATE 25 MCG: 50 INJECTION, SOLUTION INTRAMUSCULAR; INTRAVENOUS at 14:23

## 2020-02-27 RX ADMIN — MIDAZOLAM 0.5 MG: 1 INJECTION INTRAMUSCULAR; INTRAVENOUS at 14:23

## 2020-02-27 RX ADMIN — HEPARIN SODIUM 4000 UNITS: 1000 INJECTION INTRAVENOUS; SUBCUTANEOUS at 14:53

## 2020-02-27 RX ADMIN — HEPARIN SODIUM 5000 UNITS: 1000 INJECTION INTRAVENOUS; SUBCUTANEOUS at 14:46

## 2020-02-27 RX ADMIN — INSULIN GLARGINE 15 UNITS: 100 INJECTION, SOLUTION SUBCUTANEOUS at 21:14

## 2020-02-27 RX ADMIN — SACUBITRIL AND VALSARTAN 1 TABLET: 24; 26 TABLET, FILM COATED ORAL at 08:05

## 2020-02-27 RX ADMIN — TICAGRELOR 180 MG: 90 TABLET ORAL at 15:48

## 2020-02-27 RX ADMIN — FUROSEMIDE 5 MG/HR: 10 INJECTION, SOLUTION INTRAVENOUS at 10:12

## 2020-02-27 RX ADMIN — POTASSIUM CHLORIDE 20 MEQ: 750 TABLET, EXTENDED RELEASE ORAL at 11:06

## 2020-02-27 RX ADMIN — CARVEDILOL 6.25 MG: 6.25 TABLET, FILM COATED ORAL at 18:12

## 2020-02-27 RX ADMIN — CARVEDILOL 12.5 MG: 12.5 TABLET, FILM COATED ORAL at 08:06

## 2020-02-27 RX ADMIN — MIDAZOLAM 0.5 MG: 1 INJECTION INTRAMUSCULAR; INTRAVENOUS at 14:38

## 2020-02-27 RX ADMIN — TICAGRELOR 90 MG: 90 TABLET ORAL at 19:50

## 2020-02-27 RX ADMIN — ASPIRIN 81 MG CHEWABLE TABLET 81 MG: 81 TABLET CHEWABLE at 08:06

## 2020-02-27 RX ADMIN — SACUBITRIL AND VALSARTAN 1 TABLET: 49; 51 TABLET, FILM COATED ORAL at 19:50

## 2020-02-27 RX ADMIN — DEXTROSE 15 G: 15 GEL ORAL at 16:36

## 2020-02-27 RX ADMIN — LIDOCAINE HYDROCHLORIDE 5 ML: 10 INJECTION, SOLUTION EPIDURAL; INFILTRATION; INTRACAUDAL; PERINEURAL at 14:23

## 2020-02-27 RX ADMIN — ASPIRIN 81 MG 243 MG: 81 TABLET ORAL at 14:07

## 2020-02-27 RX ADMIN — MIDAZOLAM 0.5 MG: 1 INJECTION INTRAMUSCULAR; INTRAVENOUS at 15:11

## 2020-02-27 ASSESSMENT — ACTIVITIES OF DAILY LIVING (ADL)
ADLS_ACUITY_SCORE: 11

## 2020-02-27 ASSESSMENT — MIFFLIN-ST. JEOR
SCORE: 1499.63
SCORE: 1493.33
SCORE: 1493.33

## 2020-02-27 NOTE — PROVIDER NOTIFICATION
Pt had 6 beats of V-tach at 1230. Cards 2 notified and aware. Strip printed out and placed in chart. Pt currently down in cath lab.  Yair Delgado RN on 2/27/2020 at 1:43 PM

## 2020-02-27 NOTE — PROGRESS NOTES
Heart Failure Cardiology Progress Note  Ken Doss MRN: [id number]  Age: 74 year old, : 1945  Date: 2020            Assessment and Plan:     Mr. Doss is a 75 y/o M w/ HFrEF (LVEF 20-25%), CAD s/p PCI pLAD and pRCA, atrial fibrillation s/p AVN ablation s/p CRT-D, DM, ILD presenting with ESTRADA, LE edema, and orthopnea all consistent with acute on chronic LV systolic heart failure exacerbation 2/2 non-ischemic cardiomyopathy.     #Acute decompensation of chronic LV systolic heart failure:  Grossly decompensation on exam. NYHA class III. Has had 2 CHF admissions in 2019. However, no end organ dysfunction and tolerating modest dose of neurohormonal blockade as outpatient. Overall plan to diurese patient, then in coming days undergo RHC, CPX and coronary angiogram in order to assess cardiac performance and timing of the need for advanced therapies. Patient would be DT LVAD given age.     Plan:  -Continue lasix ggt at 5 mg/hr --> goal net negative 2.0L over next 24 hrs  -Decreased home coreg to 12.5 mg PO BID  -Continude Entresto 24/26 mg PO BID  -Plan for RHC + cardiopulmonary stress test + coronary angiogram on      #CAD s/p PCI pLAD, pRCA in 2019:  -Continue ASA 81 mg PO QD     #Afib:  [id number] score of 5.  -Holding apixaban given upcoming procedures     #DM:  -Continue home glargine, holding metformin, start ISS     FEN: Low NA diet, 2L fluid restriction --> NPO on  for CORS/RHC  PPX: SQH --> holding on  AM given procedures     Code Status: FULL CODE     Patient discussed with staff attending, Dr. Payton.     Julio Noyola MD  Cardiology Fellow  Pager: [phone]                  Subjective/Interval Events     No acute overnight events. This AM, patient reporting that he finally was able to sleep. No orthopnea, PND, CP, lightheadedness, dizziness.          Objective     /80 (BP Location: Right arm)   Pulse 62   Temp 98  F (36.7  C)   " Resp 18   Ht 1.753 m (5' 9\")   Wt 76.3 kg (168 lb 3.2 oz)   SpO2 98%   BMI 24.84 kg/m    Temp:  [97.6  F (36.4  C)-98  F (36.7  C)] 98  F (36.7  C)  Pulse:  [60-62] 62  Heart Rate:  [60-71] 71  Resp:  [16-18] 18  BP: ()/(58-80) 105/80  SpO2:  [97 %-100 %] 98 %  Wt Readings from Last 2 Encounters:   02/27/20 76.3 kg (168 lb 3.2 oz)     I/O last 3 completed shifts:  In: 1470 [P.O.:1440; I.V.:30]  Out: 1375 [Urine:1375]      Gen: No acute distress  HEENT: NC/AT, PERRL, EOM intact, MMM, OP without exudates  PULM/THORAX: Clear to auscultation bilaterally, no rales/rhonchi/wheezes  CV: normal rate, regular rhythm, normal S1 and S2, no murmurs or rubs. JVP 10 cm  ABD: Soft, NTND, bowel sounds present, no masses  EXT: WWP. 1+ pitting edema in BLE  NEURO: CN II-XII grossly intact. A&Ox3            Data:     Recent Results (from the past 24 hour(s))   Glucose by meter    Collection Time: 02/26/20  8:08 AM   Result Value Ref Range    Glucose 74 70 - 99 mg/dL   Potassium    Collection Time: 02/26/20  1:36 PM   Result Value Ref Range    Potassium 4.5 3.4 - 5.3 mmol/L   Magnesium    Collection Time: 02/26/20  1:36 PM   Result Value Ref Range    Magnesium 2.4 (H) 1.6 - 2.3 mg/dL   Cardiac Device Check - Inpatient    Collection Time: 02/26/20  1:36 PM   Result Value Ref Range    Date Time Interrogation Session [id number]     Implantable Pulse Generator  [employer]     Implantable Pulse Generator Model Lumax 740 HF-T     Implantable Pulse Generator Serial Number [id number]     Type Interrogation Session In Clinic     Re-programmed During Session Unknown     Clinic Name [facility]     Implantable Pulse Generator Type Cardiac Resynchronization Therapy - Defibrillator     Implantable Pulse Generator Implant Date 20130123     Adis Setting Mode (NBG Code) VVIR     Adis Setting Lower Rate Limit 60 [beats]/min    Adis Setting Maximum Sensor Rate 120 [beats]/min    Adis Setting Hysterisis " Rate 60 [beats]/min    Adis Setting Night Rate 60 [beats]/min    Adis Setting AT Mode Switch Mode Unknown     Lead Channel Setting Sensing Polarity Bipolar     Lead Channel Setting Sensing Sensitivity 0.3 mV    Lead Channel Setting Sensing Polarity Bipolar     Lead Channel Setting Sensing Sensitivity 0.8 mV    Lead Channel Setting Sensing Polarity Bipolar     Lead Channel Setting Sensing Sensitivity 1.6 mV    Ventricular chambers paced during CRT pacing. Unknown     Lead Channel Setting Pacing Polarity Unknown     Lead Channel Setting Sensing Cathode Location Unknown     Lead Channel Setting Sensing Cathode Terminal Unknown     Lead Channel Setting Pacing Polarity Bipolar     Lead Channel Setting Pacing Pulse Width 0.4 ms    Lead Channel Setting Pacing Amplitude 1.5 V    Lead Channel Setting Pacing Polarity Bipolar     Lead Channel Setting Pacing Pulse Width 0.4 ms    Lead Channel Setting Pacing Amplitude 1.75 V    Zone Setting Type Category VF     Zone Setting Detection Interval 260 ms    Zone Setting Type Category VT     Zone Setting Detection Interval 330 ms    Zone Setting Type Category VT     Zone Setting Detection Interval 390 ms    Lead Channel Impedance Value 376 ohm    Lead Channel Sensing Intrinsic Amplitude 1.4 mV    Lead Channel Impedance Value 390 ohm    Lead Channel Sensing Intrinsic Amplitude 7.2 mV    Lead Channel Pacing Threshold Amplitude 0.6 V    Lead Channel Pacing Threshold Pulse Width 0.4 ms    Lead Channel Impedance Value 766 ohm    Lead Channel Sensing Intrinsic Amplitude 17.6 mV    Lead Channel Pacing Threshold Amplitude 0.8 V    Lead Channel Pacing Threshold Pulse Width 0.4 ms    Battery Status Unknown     Battery Voltage 2.78 V    Capacitor Charge Type FULL_ENERGY     Capacitor Charge Type Reformation     Capacitor Charge Type Shock     Capacitor Charge Time 11.6 s    Capacitor Charge Energy 40 J    Adis Statistic RV Percent Paced 91 %    Atrial Tachy Statistic Number Of Mode Switches 0      Therapy Statistic Recent Shocks Delivered 0     Therapy Statistic Total Shocks Delivered 0     Therapy Statistic Total Shocks Aborted 0     Episode Statistic Recent Count 0     Episode Statistic Type Category Other    Glucose by meter    Collection Time: 02/26/20  6:01 PM   Result Value Ref Range    Glucose 246 (H) 70 - 99 mg/dL   Glucose by meter    Collection Time: 02/26/20  9:31 PM   Result Value Ref Range    Glucose 195 (H) 70 - 99 mg/dL   Glucose by meter    Collection Time: 02/27/20  1:51 AM   Result Value Ref Range    Glucose 114 (H) 70 - 99 mg/dL   CBC with platelets    Collection Time: 02/27/20  6:22 AM   Result Value Ref Range    WBC 7.6 4.0 - 11.0 10e9/L    RBC Count 3.91 (L) 4.4 - 5.9 10e12/L    Hemoglobin 12.0 (L) 13.3 - 17.7 g/dL    Hematocrit 37.4 (L) 40.0 - 53.0 %    MCV 96 78 - 100 fl    MCH 30.7 26.5 - 33.0 pg    MCHC 32.1 31.5 - 36.5 g/dL    RDW 18.6 (H) 10.0 - 15.0 %    Platelet Count 146 (L) 150 - 450 10e9/L   Basic metabolic panel    Collection Time: 02/27/20  6:22 AM   Result Value Ref Range    Sodium 138 133 - 144 mmol/L    Potassium 3.9 3.4 - 5.3 mmol/L    Chloride 106 94 - 109 mmol/L    Carbon Dioxide 25 20 - 32 mmol/L    Anion Gap 7 3 - 14 mmol/L    Glucose 99 70 - 99 mg/dL    Urea Nitrogen 33 (H) 7 - 30 mg/dL    Creatinine 1.11 0.66 - 1.25 mg/dL    GFR Estimate 65 >60 mL/min/[1.73_m2]    GFR Estimate If Black 75 >60 mL/min/[1.73_m2]    Calcium 8.0 (L) 8.5 - 10.1 mg/dL   Glucose by meter    Collection Time: 02/27/20  7:54 AM   Result Value Ref Range    Glucose 91 70 - 99 mg/dL       Recent Results (from the past 24 hour(s))   XR Chest 2 Views    Narrative    EXAM: XR CHEST 2 VW  2/25/2020 2:10 PM     HISTORY:  sob       COMPARISON:  None    FINDINGS:   PA and lateral radiographs of the chest. Left chest pacemaker with  dual leads, both appear intact.    The trachea is midline. The cardiomediastinal silhouette is enlarged.  The point vasculature are prominent with redistribution. The  vascular  pedicle is wide. Bibasilar and posterior opacities.    No acute osseous abnormalities. The upper abdomen is within normal  limits.        Impression    IMPRESSION: Pulmonary interstitial edema. Superimposed infection is  not excluded.     I have personally reviewed the examination and initial interpretation  and I agree with the findings.    FIDENCIO BOCANEGRA MD             Medications     Current Facility-Administered Medications   Medication     acetaminophen (TYLENOL) Suppository 650 mg     acetaminophen (TYLENOL) tablet 650 mg     alum & mag hydroxide-simethicone (MAALOX  ES) suspension 30 mL     aspirin (ASA) chewable tablet 81 mg     atorvastatin (LIPITOR) tablet 40 mg     carvedilol (COREG) tablet 12.5 mg     glucose gel 15-30 g    Or     dextrose 50 % injection 25-50 mL    Or     glucagon injection 1 mg     furosemide (LASIX) 100 mg in sodium chloride 0.9 % 100 mL infusion     guaiFENesin (ROBITUSSIN) 20 mg/mL solution 10 mL     HYDROcodone-acetaminophen (NORCO)  MG per tablet 1 tablet     insulin aspart (NovoLOG) injection (RAPID ACTING)     insulin aspart (NovoLOG) injection (RAPID ACTING)     insulin glargine (BASAGLAR KWIKPEN) injection 25 Units     lidocaine (LMX4) cream     lidocaine 1 % 0.1-1 mL     magnesium sulfate 2 g in water intermittent infusion     magnesium sulfate 4 g in 100 mL sterile water (premade)     medication instruction     melatonin tablet 3 mg     polyethylene glycol (MIRALAX) Packet 17 g     potassium chloride (KLOR-CON) Packet 20-40 mEq     potassium chloride 10 mEq in 100 mL intermittent infusion with 10 mg lidocaine     potassium chloride 10 mEq in 100 mL sterile water intermittent infusion (premix)     potassium chloride ER (KLOR-CON M) CR tablet 20-40 mEq     sacubitril-valsartan (ENTRESTO) 24-26 MG per tablet 1 tablet     sodium chloride (PF) 0.9% PF flush 3 mL     sodium chloride (PF) 0.9% PF flush 3 mL

## 2020-02-27 NOTE — PLAN OF CARE
[id number] shift summary    D:Pt admitted with worsening SOB and HF exacerbation. Pt has history of CAD s/p PCI pLAD and pRCA, atrial fibrillation s/p AVN ablation s/p CRT-D, DM, ILD  I/A:Pt received IV diuretics and continues on lasix gtt at 5 mg/hr. Pt is near dry weight by 3 lbs. Pt reports breathing significantly improved. Pt up ambulating in [place] independently. Pt eating and drinking and voiding. Pt has denied any c/o pain. Pt now currently being worked up for LVAD. Pt is planned to have right and left heart cath tomorrow pt is NPO after midnight and aware. Py's FSBG have been stable patient reports hoping to go home Friday.  P: Pt is planned for heart cath tomorrow and possibly other tests for LVAD w/unit(s). Continue current medications and cares and update MD's as needed.

## 2020-02-28 LAB
GLUCOSE BLDC GLUCOMTR-MCNC: 147 MG/DL (ref 70–99)
GLUCOSE BLDC GLUCOMTR-MCNC: 148 MG/DL (ref 70–99)
GLUCOSE BLDC GLUCOMTR-MCNC: 173 MG/DL (ref 70–99)
GLUCOSE BLDC GLUCOMTR-MCNC: 189 MG/DL (ref 70–99)
GLUCOSE BLDC GLUCOMTR-MCNC: 320 MG/DL (ref 70–99)
INTERPRETATION ECG - MUSE: NORMAL

## 2020-02-28 PROCEDURE — 99232 SBSQ HOSP IP/OBS MODERATE 35: CPT | Mod: GC | Performed by: INTERNAL MEDICINE

## 2020-02-28 PROCEDURE — 25000132 ZZH RX MED GY IP 250 OP 250 PS 637: Performed by: STUDENT IN AN ORGANIZED HEALTH CARE EDUCATION/TRAINING PROGRAM

## 2020-02-28 PROCEDURE — 93010 ELECTROCARDIOGRAM REPORT: CPT | Performed by: INTERNAL MEDICINE

## 2020-02-28 PROCEDURE — 25800030 ZZH RX IP 258 OP 636: Performed by: STUDENT IN AN ORGANIZED HEALTH CARE EDUCATION/TRAINING PROGRAM

## 2020-02-28 PROCEDURE — 00000146 ZZHCL STATISTIC GLUCOSE BY METER IP

## 2020-02-28 PROCEDURE — 40000556 ZZH STATISTIC PERIPHERAL IV START W US GUIDANCE

## 2020-02-28 PROCEDURE — 25000128 H RX IP 250 OP 636: Performed by: STUDENT IN AN ORGANIZED HEALTH CARE EDUCATION/TRAINING PROGRAM

## 2020-02-28 PROCEDURE — 21400000 ZZH R&B CCU UMMC

## 2020-02-28 RX ORDER — NALOXONE HYDROCHLORIDE 0.4 MG/ML
.2-.4 INJECTION, SOLUTION INTRAMUSCULAR; INTRAVENOUS; SUBCUTANEOUS
Status: ACTIVE | OUTPATIENT
Start: 2020-02-28 | End: 2020-02-28

## 2020-02-28 RX ORDER — NALOXONE HYDROCHLORIDE 0.4 MG/ML
.1-.4 INJECTION, SOLUTION INTRAMUSCULAR; INTRAVENOUS; SUBCUTANEOUS
Status: DISCONTINUED | OUTPATIENT
Start: 2020-02-28 | End: 2020-03-01 | Stop reason: HOSPADM

## 2020-02-28 RX ORDER — FLUMAZENIL 0.1 MG/ML
0.2 INJECTION, SOLUTION INTRAVENOUS
Status: ACTIVE | OUTPATIENT
Start: 2020-02-28 | End: 2020-02-28

## 2020-02-28 RX ORDER — FENTANYL CITRATE 50 UG/ML
25-50 INJECTION, SOLUTION INTRAMUSCULAR; INTRAVENOUS
Status: ACTIVE | OUTPATIENT
Start: 2020-02-28 | End: 2020-02-28

## 2020-02-28 RX ORDER — SODIUM CHLORIDE 9 MG/ML
INJECTION, SOLUTION INTRAVENOUS CONTINUOUS
Status: ACTIVE | OUTPATIENT
Start: 2020-02-28 | End: 2020-02-28

## 2020-02-28 RX ORDER — ATROPINE SULFATE 0.1 MG/ML
0.5 INJECTION INTRAVENOUS EVERY 5 MIN PRN
Status: ACTIVE | OUTPATIENT
Start: 2020-02-28 | End: 2020-02-28

## 2020-02-28 RX ORDER — NITROGLYCERIN 0.4 MG/1
0.4 TABLET SUBLINGUAL EVERY 5 MIN PRN
Status: DISCONTINUED | OUTPATIENT
Start: 2020-02-28 | End: 2020-03-01 | Stop reason: HOSPADM

## 2020-02-28 RX ADMIN — TICAGRELOR 90 MG: 90 TABLET ORAL at 20:49

## 2020-02-28 RX ADMIN — INSULIN ASPART 3 UNITS: 100 INJECTION, SOLUTION INTRAVENOUS; SUBCUTANEOUS at 22:58

## 2020-02-28 RX ADMIN — TICAGRELOR 90 MG: 90 TABLET ORAL at 08:19

## 2020-02-28 RX ADMIN — FUROSEMIDE 10 MG/HR: 10 INJECTION, SOLUTION INTRAVENOUS at 12:12

## 2020-02-28 RX ADMIN — CARVEDILOL 6.25 MG: 6.25 TABLET, FILM COATED ORAL at 08:18

## 2020-02-28 RX ADMIN — SACUBITRIL AND VALSARTAN 1 TABLET: 49; 51 TABLET, FILM COATED ORAL at 08:19

## 2020-02-28 RX ADMIN — INSULIN GLARGINE 15 UNITS: 100 INJECTION, SOLUTION SUBCUTANEOUS at 22:55

## 2020-02-28 RX ADMIN — APIXABAN 5 MG: 5 TABLET, FILM COATED ORAL at 20:50

## 2020-02-28 RX ADMIN — SACUBITRIL AND VALSARTAN 1 TABLET: 49; 51 TABLET, FILM COATED ORAL at 20:57

## 2020-02-28 RX ADMIN — ATORVASTATIN CALCIUM 40 MG: 40 TABLET, FILM COATED ORAL at 20:50

## 2020-02-28 RX ADMIN — APIXABAN 5 MG: 5 TABLET, FILM COATED ORAL at 08:18

## 2020-02-28 RX ADMIN — POTASSIUM CHLORIDE 40 MEQ: 750 TABLET, EXTENDED RELEASE ORAL at 08:19

## 2020-02-28 RX ADMIN — FUROSEMIDE 10 MG/HR: 10 INJECTION, SOLUTION INTRAVENOUS at 21:02

## 2020-02-28 RX ADMIN — CARVEDILOL 6.25 MG: 6.25 TABLET, FILM COATED ORAL at 18:47

## 2020-02-28 ASSESSMENT — ACTIVITIES OF DAILY LIVING (ADL)
ADLS_ACUITY_SCORE: 11

## 2020-02-28 ASSESSMENT — MIFFLIN-ST. JEOR: SCORE: 1480.18

## 2020-02-28 NOTE — PROGRESS NOTES
Name: Ken Doss MRN: [id number]  : 1945 ESTRADA: 2020  Staff: JOB Tech: SANJIV Age: 74  Sex: Male Hand: Right Educ: 13-15  Vision: 20/40 ?with correction / ?without correction    ORIENTATION     Time  -2     Place       Personal info          Presidents     WRAT4   SS %ile Grade Equiv.     Word Reading  92 30 10.8    WASI-2 FSIQ: 103 Raw T     Vocabulary  37 50     Matrix Reasoning 17 54    WAIS-IV  Raw SSa     Coding 41 8       COWAT (CFL)     Raw: 20  SS: 6 %ile: 6-10    BOSTON NAMING TEST     Raw: 55  SS: 11 %ile: 60-71    FINGER TAPPING   Avg  SS T     RH  44.67 7 41     LH 48 11 56     TRAILS Raw  Err SS %ile     A 31  1 12 72-81     B 125  0 9 29-40    TSAT      Time: 109   z: -0.39     Errors: 2   z: 0.12    WMS-IV  Raw SS / %ile     LM I  15 4     LM II  4 4     LM Recog. 16 17th-25th     VR I  37 13     VR II  5 4     VR Recog. 5 51st-75th    BDI-II     Raw:  10 Interpretation: minimal    STAI     S: 36; %ile: 64 Interpretation: normal     T: 37; %ile: 68 Interpretation: normal

## 2020-02-28 NOTE — PROGRESS NOTES
"Met with patient, wife to present the HM3, HW VAD(s) as possible treatment option.     Discussed patient and caregiver responsibilities before and after VAD implant.  Clarified the need for a caregiver to be present for education and to assist patient for at least first 30 days after a patient returns home. Patient's designated caregiver is wifeCarrol.     Discussed basic overview of VAD equipment, on going management, need for anticoagulation, regular dressing change, grounded three-pronged outlet and functioning telephone. Also discussed frequency of follow-up clinic appointments and the need to stay locally for at least 2-4 weeks.  Reviewed restrictions of having a VAD such as no swimming, bathing, boats, MRI's, or arc welding.     Provided and discussed the VAD educational brochures, information regarding the VAD and transplant support groups, information on INTERMACs registry, and \"VAD What You Should Know\" with additional details. Patient and wife signed \"VAD What You Should Know\" document. VAD coordinator contact information provided.  Encouraged patient, wife to call with questions.         "

## 2020-02-28 NOTE — PROGRESS NOTES
Pre-LVAD SW Note      Met with wife this afternoon after their VAD show and tell. Wife verified that she would be her 's caregiver for first 30 days and that they will have friends to help out as well. No problems identified with his caregiver support team.

## 2020-02-28 NOTE — CONSULTS
Patient of Dr. Back seen in the hospital on patient care unit 6C for psychosocial assessment. 90 minutes spent with patient. 100% of visit consisted of counseling related to issues surrounding LVAD.    Psychosocial Assessment   Name: Ken Doss     MRN:  [id number]        Patient Name / Age / Race: Ken Doss 74 year old    Source of Information: Patient   : Shaunna Shankar MSW   Interview Date: February 28, 2020   Reason for Referral: Mechanical Circulatory Support     Current Living Situation   Location ([facility]/State): [address] [place] [id number]   With Whom: Living arrangements - the patient lives with their spouse.   Type of Home: single family; Split-level home with 7 steps down to lower level to family room, [facility] and 8 steps up where the bedrooms are.   Working Phone? Yes    Three Pronged Outlet? Yes    Distance from Hospital: 30-40 minutes   Need to Relocate Post Surgery? No   Discussed? 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 Has people who work for him and can continue even if he can't   Occupation: See above   Education: A couple of years in college   ? Yes -[employer] (does not receive any care/benefits from the [facility])   Income: salary/wages, SS shelter and spouse's income, which is social security    Insurance: Medicare   Name of Private Insurance: Has [employer] Medicare Advantage with $35 copays for each office visit   Medication Coverage: [employer] part D    In current situation, pt. can afford medication and supply costs:  Yes    Other Financial Concerns/Issues: No, but states he is worried about how many copays he has and how much everything costs     Family/Social Support   Marital Status:    Partner Name: Carrol   Length of Time : 51 years   Partner s Employment: Per  patient, she is in the ministry, but doesn't get paid. Does everything volunteer. Goes on mission trips and speaks at churches.   Relationship: stable/supportive   Children: Son in [place] with 2 Dtrs (). Another Son in [place]- with two 16 y/o twin sons   Parents:    Siblings: Brother  at 26 in an accident and Sister  15 years ago after having lung transplant   Other Family or Friends Close by: Many close friends-Nieces and Nephews   Support System: available, helpful   Primary Support Person: Wife   Issues: Patient expressed worry about being a burden on his wife     Activities/Functional Ability   Current Level: ambulatory and independent with ADL's-No DME   Daily Activities: Complains of having low-energy and feeling tired. Has been able to shovel driveway until this Winter.   Transportation: self      Medical Status   Patient s understanding of need for surgical intervention: Good, no problems   Advanced Directive? No    Details: Wife would be appropriate decision-maker and he says they have had conversations about goals of care. Provided education on completion process and gave a blank copy.   Adherence History: No reported non-compliance   Ability to Adhere to Complex Medical Regime: Yes     Behavioral   Chemical Dependency Issues: No   Smoker? No   Psychiatric impairment: No, but reports he feels like he has mild situational depression.   Coping Style/Strategies: Very large Nany. Likes to work and participate in activities to deal with stress. Has watched TV last few months due to low energy. Very extroverted and enjoys being around people. He and wife often take people in to live with them in their time of need. [place] also [place]. Loves to swim and fish. Aware that he can't swim post-VAD   Recent Legal History: No   Teaching Completed During Assessment Related To Mechanical Circulatory Support: 1. Housing and relocation needs post  surgery.  2. Caregiver needs post surgery.  3. Financial issues related to surgery.  4. Risks of alcohol use post surgery.  5. Common psychosocial stressors pre/post surgery.  6. Support group availability.   Psychosocial Risks Reviewed Related To Mechanical Circulatory Support: 1. Increased stress related to your emotional, family, social, employment, or financial situation.  2. Affect on work and/or disability benefits.  3. Affect on future health and life insurance.  4. Outcome expectations may not be met.  5. Mental Health risks: anxiety, depression, PTSD, guilt, grief and chronic fatigue.     Observed Behavior   Well informed? Yes  Angry? No   Process info well? Yes  Hostile? No   Evasive? No Oriented X3? Yes    Cautious/Suspicious? No Motivated? Yes    Appropriate Behavior? Yes  Depressed? No   Appropriate Affect? Yes  Anxious? No   Interview Observations: Very talkative and engaged in conversation. Asked good questions.     Selection Criteria Met   Plan for Support Yes    Chemical Dependence Yes    Smoking Yes    Mental Health Yes    Adequate Finances Yes      Risk Issues:    None, but wife wasn't present for writer's interview. Per patient she is very busy with mission trips planned and various other activities.    Final Evaluation/Assessment:     Mr. Doss was very friendly and forthcoming with information. Seems to have good family support from wife and friends. May have support from local Son as well. Patient worried about the burden on his wife. She is coming for show and tell today. Will meet with her afterwards. No concerns with chemical dependency or mental health. Has adequate insurance to pay for surgery, supplies and medications. No issues with non-compliance. No psychosocial barriers identified toward pursuing VAD if patient decides. Wife coming today for appointments and to hear about caregiver requirements.    Frailty Score = 4    Patient understands risks and benefits of Mechanical  Circulatory Support: Yes     Recommendation:        Good    Signature: TIFFANY Her     Title: Licensed Independent Clinical                Interview Date: February 27, 2020

## 2020-02-28 NOTE — PLAN OF CARE
D: S/P angioplasty with stent and worsening SOB/CHF.  PMH: CAD s/p PCI pLAD and pRCA, atrial fibrillation s/p AVN ablation s/p CRT-D, DM2, ILD.    I: Monitored vitals and assessed pt status.   Running: Lasix 10mg/hr via new PIV    A: A0x4. VSS, on RA, V paced. Afebrile. Voiding adequately into urinal. Right internal jugular soft but small hematoma remains. Right TR site CDI, good CMS. Pt denies any complaints of pain. Pt endorses feelin occ SOB, RLL has fine crackles. Pt currently being worked up for possible LVAD. LVAD show and tell in room w/ wife tomorrow morning at 1000.    I/O this shift:  In: 240 [P.O.:240]  Out: 700 [Urine:700]    Temp:  [97.3  F (36.3  C)-97.9  F (36.6  C)] 97.3  F (36.3  C)  Pulse:  [61-62] 61  Heart Rate:  [60-75] 75  Resp:  [16-18] 16  BP: (101-112)/(66-86) 103/68  SpO2:  [97 %-100 %] 97 %      P: Continue to monitor Pt status and report changes to treatment team.

## 2020-02-28 NOTE — PROGRESS NOTES
CLINICAL NUTRITION SERVICES    Reason for Assessment:  Heart-healthy nutrition education, received consult.    Diet History:  Unknown if pt has received heart-healthy nutrition education in the past. Pt was busy with RN when attempting to see.       Nutrition Diagnosis:  Unable to assess    Nutrition Prescription/Recs:  Continue heart-healthy diet with emphasis on low-sodium diet and potentially a fluid restriction.     Interventions:  Nutrition Education: Attempted to provide verbal instruction on a heart-healthy diet. Pt was busy with RN when attempting to see. Left the following handouts in pt's room: Nutrition Care Manual handout on Heart-Healthy Nutrition Therapy, Managing Your Fluid Intake (wrote on his handout to use the handout if placed on a fluid restriction in the future), and How to Read Nutrition Labels.       Goals:     Pt will list at least two interventions to make current meal plan more heart-healthy.     Follow-up:   Patient to ask any further nutrition-related questions before discharge. In addition, pt may request outpatient RD appointment.    Zaira Dueñas, MS, RD, LD, [facility]   6C Pgr: [phone]

## 2020-02-28 NOTE — PLAN OF CARE
admitted w/ worsening SOB and HF exacerbation. PMH: CAD s/p PCI pLAD and pRCA, atrial fibrillation s/p AVN ablation s/p CRT-D, DM2, ILD.  A&O x4, VSS on room air. 100% Paced. Pt is SBA. Lasix gtt continues @ 10 mg/hr into right PIV. K replaced per protocol. Voiding adequately into urinal. Pt underwent cardiac stress test this AM. Also had RHC and x2 stents placed. Right internal jugular pulled, soft but small hematoma. Right TR band is currently being deflated (see flowsheets). Pt denies any complaints of pain and SOB. Pt currently being worked up for possible LVAD. LVAD show and tell in room w/ wife tomorrow morning at 1000. Will notify the Cards 2 treatment team w/ any concerns/ changes.  Yair Delgado RN on 2/27/2020 at 6:58 PM

## 2020-02-28 NOTE — CONSULTS
"NAME: Ken Doss \"Skyler\"   MRN: [id number]  : 1945  ESTRADA: 2020  [facility] [address]  [phone]    NEUROPSYCHOLOGICAL EVALUATION    RELEVANT HISTORY AND REASON FOR REFERRAL    This is a report of neuropsychological consultation regarding Skyler Doss, a 74-year-old, right-handed man with 14 years of formal education. He is currently receiving inpatient treatment for acute on chronic systolic heart failure, with a medical history that includes primary cardiomyopathy, hyperlipidemia, hypertension, persistent atrial fibrillation, ICD in situ, interstitial lung disease, type II diabetes, and diabetic peripheral neuropathy. He is under consideration for treating his heart failure with LVAD. He is referred for neuropsychological evaluation in this context, to better understand cognitive and psychosocial factors that are relevant to transplant candidacy. He is referred by Dr. Tata Payton. He was originally scheduled to see me this same day as an outpatient, but declining heart functioning prompted this admission. We were able to complete the evaluation at bedside.     In interview, Mr. Doss demonstrates an appropriate understanding of his cardiac history, additional medical concerns, and the treatment options laid out in front of him. He tells me that he faces life expectancy on the order of a couple of years if he does not move forward with LVAD. On the face of it, he is not interested in pursuing LVAD at this moment, but he is open to learning more about it. His primary concerns are reduced quality of life because of limitations on his activity levels, and the caregiver burden placed on his spouse. He understands that the procedure brings risks of death, infections, and stroke.    His wife ( 52 years) is his core supporter. They have two sons, one who lives in [place] and one who lives in [place]. He sees his sons as " a source of moral support but not as a source of hands-on, day-to-day support. His wife works in the [employer], and he thinks she has a large network of volunteers and helpers that could be called on to provide the requisite 24/7 postsurgical oversight for 30 days. He tells me that his wife has made offers to do everything she needs to in order to provide supportive care for them, but at this time, he is not wanting to force her to change her lifestyle or be burdened with his care.    He feels a little depressed about his current situation, but he does not see a need for clinical attention to it. He has recently experienced some panic-like feelings during times when he cannot breathe properly. He does not have issues with panic or anxiety outside of such moments. He denies any suicidality. He has never experienced a hallucination.    He reports an alcohol habit of about one drink per week currently. He denies any history of alcohol problems (he saw the toll alcohol took on his father). He denies any illicit drug use. He smokes about one or two cigars per year with friends, but he has never been a regular smoker.    He denies any legal troubles or problematic gambling behaviors.    He does not perceive any significant cognitive problems. Any changes are attributed to normal aging. He has no trouble managing various work duties and household projects. He has worked as a  for the last 30 years, and he is still working, but to a lesser degree these days. He has not seen a need to limit his driving in any way. He manages his medications independently. He does not endorse any significant difficulties in tracking his medications. Regarding early life cognitive functioning, he does not endorse significant academic problems. He says he was a fair student overall. He repeated 1st grade, but because he missed too much school due to infectious mononucleosis. He graduated from high school and completed two  years  worth of college.    He tells me that he has fallen two or three times this winter. He had a bad one a few weeks ago, with injuries to his hip and elbow, and he recalls striking his head on the ground. He does not think he lost consciousness for any protracted amount of time. He did not seek clinical attention. I see no neuroimaging studies in the medical records. There is a pending order for a head CT during this admission. There is no history of stroke or seizure. He has never experienced unilateral weakness or numbness. He does not have problems with migraines or headaches. He reports some shakiness in his hands with certain positioning and in relation to blood sugar levels. There are no indications of parkinsonism. He does not report any unusual sensory abnormalities at this time.    BEHAVIORAL OBSERVATIONS    Mr. Doss was polite and cooperative with the evaluation. Mood was neutral to euthymic. Affective display was mood-congruent. He was open, warm, and friendly. Immediate attentional control was appropriate. Speech production was normal. Language comprehension was normal. Thought processes were linear and goal-directed. He demonstrated good insight. He was alert and not somnolent, but he had reduced energy. His effort and persistence were mostly good throughout the evaluation, but there were some subjective concerns that he was not pushing himself as much as possible at times, particularly on memory tests. Otherwise, the test results are seen as reasonable reflections of his current functioning.    MEASURES ADMINISTERED    The following measures were administered by a trained psychometrist, under my direct supervision:    Orientation: Time, Place, Basic Personal Information, Recent [place] Presidents; Wide Range Achievement Test 4: Word Reading; Wechsler Abbreviated Scales of Intelligence-2: Vocabulary, Matrix Reasoning; Wechsler Adult Intelligence Scale-IV: Coding; Controlled Oral Word Association  Test; [place] Naming Test; Neelima Visual Acuity Screen; Finger Tapping; Trail Making Test; Test of Sustained Attention & Tracking; Wechsler Memory Scale-IV: Logical Memory, Visual Reproduction; Martinez Depression Inventory-II; State-Trait Anxiety Inventory.     RESULTS AND INTERPRETATION    Orientation: Orientation was within normal expectations for time, place, and basic personal information. He was able to name four of the last six US presidents.    Intellectual Abilities: Demonstrating vocabulary knowledge was average. Visual reasoning through pattern identification was average.    Language & Related Skills: Basic reading and pronunciation skills were in the average range. Confrontation naming was average. Associative verbal fluency was below average.    Visual Perceptual & Constructional Skills: Binocular, corrected, near-point visual acuity was 20/40 on Neelima screening. Drawings of simple shapes and figures were appropriately organized and recognizable.    Motor Skills: Speeded finger tapping performances were lower average with the dominant right hand and middle average with the nondominant left hand.    Mental Speed & Executive Functioning: As noted above, speeded verbal fluency was below average. Speeded executive control over attention and concentration was normal across a variety of brief verbal tasks. Speeded graphomotor clerical substitution was average. Visual scanning and graphomotor sequencing under simple conditions was high average for speed but had one error. Scanning and sequencing under greater executive demands to control divided attention was average for speed and had zero errors.    Learning & Anterograde Memory: Immediate verbal memory for short stories was in the borderline impaired range. Delayed free recall of the stories was commensurately borderline impaired, and recognition of story details was within the lower average range. Immediate visual memory for simple designs was high  average. Delayed free recall of the designs was borderline impaired, but recognition of them was upper average.    Emotional Functioning: On a brief self-report inventory, endorsed minimally elevated symptoms related to depression (BDI-II = 10). He rated his anxiety experiences as within the normal range, for both his general daily life and in-the-moment feelings during the testing session (Trait Anxiety = 68th percentile; State Anxiety = 64th percentile).    IMPRESSIONS AND RECOMMENDATIONS    The neuropsychological results are mildly abnormal. Mr. Doss has borderline performances on measures of associative verbal fluency and of freely recalled memory, with the caveat of subjective concerns that he did not putting forth as much effort as he could on these particular tasks. Other tests of mental speed yield normal-range performances. His recognition memory performances are stronger than his free recall. In interview, he appears to be a detailed historian and does not seem to have problems regarding memory for recent events. I am not highly concerned for the presence of an encroaching amnestic syndrome, but I cannot rule it out entirely. He may need some extra repetitions and time to learn critical components of post-LVAD care routines. He may need some prompts to complete certain tasks until a routine is firmly established. He has sufficient cognitive capacity to understand his medical condition and to make reasoned decisions in his own best interests.    There are no indications of significant mental health concerns. There are no indications of substance misuse. He describes strong support from his wife, but he currently has the impression that LVAD would make him an unnecessary burden upon her. It will be critical to have his wife present as much as possible during this workup, so they can understand together exactly what level of care and commitment they are facing. I defer to members of the social work team  in assessing the appropriateness of their plan for post-LVAD care.    A neuropsychological baseline has been established. I would be happy to see him again, whenever clinically indicated.    Godwin Person, PhD, LP, ABPP-CN  Board Certified in Clinical Neuropsychology  Licensed Psychologist [id number]     Department of Rehabilitation Medicine  Division of Adult Neuropsychology  [facility]      Time spent: One hour neurobehavioral status exam including interview, clinical assessment by licensed and board-certified neuropsychologist (CPT 55888). One hour neuropsychological testing evaluation by licensed and board-certified neuropsychologist, including integration of patient data, interpretation of standardized test results and clinical data, clinical decision-making, treatment planning, report, and interactive feedback to the patient, first hour (CPT 84166). One hour of neuropsychological testing evaluation by licensed and board-certified neuropsychologist, including integration of patient data, interpretation of standardized test results and clinical data, clinical decision-making, treatment planning, report, and interactive feedback to the patient, subsequent hours (CPT 67004). 30 minutes of psychological and neuropsychological test administration and scoring by technician, first 30 minutes (CPT 81143). 97 minutes psychological or neuropsychological test administration and scoring by technician, subsequent 30-minute intervals (CPT 81207). Diagnoses: I50.22, F54, Z01.818

## 2020-02-28 NOTE — PROGRESS NOTES
Heart Failure Cardiology Progress Note  Ken Doss MRN: [id number]  Age: 74 year old, : 1945  Date: 2020      Faculty Attestation  George Johnson M.D.    I personally saw and examined this patient, reviewed recent laboratories and imaging studies, discussed the case with the housestaff, and conveyed plan to the patient.  I answered all questions from patient and/or family. I agree with the examination, assessment and plan outlined here.              Assessment and Plan:     Mr. Doss is a 73 y/o M w/ HFrEF (LVEF 20-25%), CAD s/p PCI pLAD and pRCA, atrial fibrillation s/p AVN ablation s/p CRT-D, DM, ILD presenting with ESTRADA, LE edema, and orthopnea all consistent with acute on chronic LV systolic heart failure exacerbation 2/2 non-ischemic cardiomyopathy.     #Acute decompensation of chronic LV systolic heart failure:  Grossly decompensation on exam. NYHA class III. Has had 2 CHF admissions in 2019. However, no end organ dysfunction and tolerating modest dose of neurohormonal blockade as outpatient. Overall plan to diurese patient, and complete LVAD eval as outpatient.     Plan:  -Continue lasix ggt at 10 mg/hr --> goal net negative 2.0L over next 24 hrs  -Decreased home coreg to 6.25 mg PO BID  -Continude Entresto 49/51 mg PO BID  -CPX results pending from 20  -VAD show and tell and neuropsychology on     #CAD s/p PCI pLAD, pRCA in 2019:  Angiogram on  with IRS of mLAD and ostial RCA REGLA from 2019 s/p PCI of both on 20.  -Continue brillenta 90 mg PO BID     #Afib:  [id number] score of 5.  -On Apixaban     #DM:  -Continue home glargine, holding metformin, start ISS     FEN: Low NA diet, 2L fluid restriction --> NPO on  for CORS/RHC  PPX: SQH --> holding on  AM given procedures     Code Status: FULL CODE     Patient discussed with staff attending, Dr. Johnson.     Julio Noyola MD  Cardiology Fellow  Pager: [phone]        "           Subjective/Interval Events     No acute overnight events. This AM, patient reporting that he finally was able to sleep. No orthopnea, PND, CP, lightheadedness, dizziness.          Objective     BP 97/68 (BP Location: Left arm)   Pulse 61   Temp 97.8  F (36.6  C) (Oral)   Resp 20   Ht 1.753 m (5' 9\")   Wt 75 kg (165 lb 4.8 oz)   SpO2 98%   BMI 24.41 kg/m    Temp:  [97.3  F (36.3  C)-97.9  F (36.6  C)] 97.8  F (36.6  C)  Pulse:  [61] 61  Heart Rate:  [60-75] 61  Resp:  [16-20] 20  BP: ()/(66-86) 97/68  SpO2:  [97 %-100 %] 98 %  Wt Readings from Last 2 Encounters:   02/28/20 75 kg (165 lb 4.8 oz)     I/O last 3 completed shifts:  In: 1306.5 [P.O.:1110; I.V.:196.5]  Out: 2990 [Urine:2990]      Gen: No acute distress  HEENT: NC/AT, PERRL, EOM intact, MMM, OP without exudates  PULM/THORAX: Clear to auscultation bilaterally, no rales/rhonchi/wheezes  CV: normal rate, regular rhythm, normal S1 and S2, no murmurs or rubs. JVP 10 cm  ABD: Soft, NTND, bowel sounds present, no masses  EXT: WWP. 1+ pitting edema in BLE  NEURO: CN II-XII grossly intact. A&Ox3            Data:     Recent Results (from the past 24 hour(s))   Glucose by meter    Collection Time: 02/27/20 12:16 PM   Result Value Ref Range    Glucose 76 70 - 99 mg/dL   Activated clotting time POCT    Collection Time: 02/27/20  3:03 PM   Result Value Ref Range    Activated Clot Time 296 (H) 75 - 150 sec   Activated clotting time POCT    Collection Time: 02/27/20  3:26 PM   Result Value Ref Range    Activated Clot Time 227 (H) 75 - 150 sec   Glucose by meter    Collection Time: 02/27/20  4:33 PM   Result Value Ref Range    Glucose 65 (L) 70 - 99 mg/dL   Glucose by meter    Collection Time: 02/27/20  4:56 PM   Result Value Ref Range    Glucose 101 (H) 70 - 99 mg/dL   Glucose by meter    Collection Time: 02/27/20  9:13 PM   Result Value Ref Range    Glucose 323 (H) 70 - 99 mg/dL   EKG 12-lead, tracing only     Collection Time: 02/28/20 12:47 AM "   Result Value Ref Range    Interpretation ECG Click View Image link to view waveform and result    Glucose by meter    Collection Time: 02/28/20  2:08 AM   Result Value Ref Range    Glucose 173 (H) 70 - 99 mg/dL   Glucose by meter    Collection Time: 02/28/20  8:20 AM   Result Value Ref Range    Glucose 147 (H) 70 - 99 mg/dL       Recent Results (from the past 24 hour(s))   XR Chest 2 Views    Narrative    EXAM: XR CHEST 2 VW  2/25/2020 2:10 PM     HISTORY:  sob       COMPARISON:  None    FINDINGS:   PA and lateral radiographs of the chest. Left chest pacemaker with  dual leads, both appear intact.    The trachea is midline. The cardiomediastinal silhouette is enlarged.  The point vasculature are prominent with redistribution. The vascular  pedicle is wide. Bibasilar and posterior opacities.    No acute osseous abnormalities. The upper abdomen is within normal  limits.        Impression    IMPRESSION: Pulmonary interstitial edema. Superimposed infection is  not excluded.     I have personally reviewed the examination and initial interpretation  and I agree with the findings.    FIDENCIO BOCANEGRA MD             Medications     Current Facility-Administered Medications   Medication     0.9% sodium chloride BOLUS     acetaminophen (TYLENOL) Suppository 650 mg     acetaminophen (TYLENOL) tablet 650 mg     alum & mag hydroxide-simethicone (MAALOX  ES) suspension 30 mL     apixaban ANTICOAGULANT (ELIQUIS) tablet 5 mg     atorvastatin (LIPITOR) tablet 40 mg     atropine injection 0.5 mg     carvedilol (COREG) tablet 6.25 mg     Continuing ACE inhibitor/ARB/ARNI from home medication list OR ACE inhibitor/ARB order already placed during this visit     Continuing beta blocker from home medication list OR beta blocker order already placed during this visit     Continuing statin from home medication list OR statin order already placed during this visit     glucose gel 15-30 g    Or     dextrose 50 % injection 25-50 mL    Or      glucagon injection 1 mg     fentaNYL (PF) (SUBLIMAZE) injection 25-50 mcg     flumazenil (ROMAZICON) injection 0.2 mg     furosemide (LASIX) 100 mg in sodium chloride 0.9 % 100 mL infusion     guaiFENesin (ROBITUSSIN) 20 mg/mL solution 10 mL     HYDROcodone-acetaminophen (NORCO)  MG per tablet 1 tablet     insulin aspart (NovoLOG) injection (RAPID ACTING)     insulin aspart (NovoLOG) injection (RAPID ACTING)     insulin glargine (BASAGLAR KWIKPEN) injection 15 Units     lidocaine (LMX4) cream     lidocaine 1 % 0.1-1 mL     magnesium sulfate 2 g in water intermittent infusion     magnesium sulfate 4 g in 100 mL sterile water (premade)     medication instruction     melatonin tablet 3 mg     midazolam (VERSED) injection 0.5-1 mg     naloxone (NARCAN) injection 0.1-0.4 mg     naloxone (NARCAN) injection 0.2-0.4 mg     nitroGLYcerin (NITROSTAT) sublingual tablet 0.4 mg     Percutaneous Coronary Intervention orders placed (this is information for BPA alerting)     polyethylene glycol (MIRALAX) Packet 17 g     potassium chloride (KLOR-CON) Packet 20-40 mEq     potassium chloride 10 mEq in 100 mL intermittent infusion with 10 mg lidocaine     potassium chloride 10 mEq in 100 mL sterile water intermittent infusion (premix)     potassium chloride ER (KLOR-CON M) CR tablet 20-40 mEq     potassium chloride ER (KLOR-CON M) CR tablet 40 mEq     reason aspirin not prescribed (intentional)     sacubitril-valsartan (ENTRESTO) 49-51 MG per tablet 1 tablet     sodium chloride (PF) 0.9% PF flush 3 mL     sodium chloride (PF) 0.9% PF flush 3 mL     ticagrelor (BRILINTA) tablet 90 mg

## 2020-02-29 LAB
ANION GAP SERPL CALCULATED.3IONS-SCNC: 4 MMOL/L (ref 3–14)
BUN SERPL-MCNC: 26 MG/DL (ref 7–30)
CALCIUM SERPL-MCNC: 8.1 MG/DL (ref 8.5–10.1)
CHLORIDE SERPL-SCNC: 105 MMOL/L (ref 94–109)
CHOLEST SERPL-MCNC: 77 MG/DL
CO2 SERPL-SCNC: 28 MMOL/L (ref 20–32)
CREAT SERPL-MCNC: 0.93 MG/DL (ref 0.66–1.25)
ERYTHROCYTE [DISTWIDTH] IN BLOOD BY AUTOMATED COUNT: 18.9 % (ref 10–15)
GFR SERPL CREATININE-BSD FRML MDRD: 81 ML/MIN/{1.73_M2}
GLUCOSE BLDC GLUCOMTR-MCNC: 130 MG/DL (ref 70–99)
GLUCOSE BLDC GLUCOMTR-MCNC: 166 MG/DL (ref 70–99)
GLUCOSE BLDC GLUCOMTR-MCNC: 177 MG/DL (ref 70–99)
GLUCOSE BLDC GLUCOMTR-MCNC: 196 MG/DL (ref 70–99)
GLUCOSE BLDC GLUCOMTR-MCNC: 266 MG/DL (ref 70–99)
GLUCOSE BLDC GLUCOMTR-MCNC: 368 MG/DL (ref 70–99)
GLUCOSE SERPL-MCNC: 190 MG/DL (ref 70–99)
HCT VFR BLD AUTO: 34.7 % (ref 40–53)
HDLC SERPL-MCNC: 32 MG/DL
HGB BLD-MCNC: 11 G/DL (ref 13.3–17.7)
LDLC SERPL CALC-MCNC: 29 MG/DL
MAGNESIUM SERPL-MCNC: 1.6 MG/DL (ref 1.6–2.3)
MCH RBC QN AUTO: 30.3 PG (ref 26.5–33)
MCHC RBC AUTO-ENTMCNC: 31.7 G/DL (ref 31.5–36.5)
MCV RBC AUTO: 96 FL (ref 78–100)
MDC_IDC_MSMT_BATTERY_STATUS: NORMAL
MDC_IDC_MSMT_BATTERY_VOLTAGE: 2.78 V
MDC_IDC_MSMT_CAP_CHARGE_ENERGY: 40 J
MDC_IDC_MSMT_CAP_CHARGE_TIME: 11.6 S
MDC_IDC_MSMT_CAP_CHARGE_TYPE: NORMAL
MDC_IDC_MSMT_LEADCHNL_LV_IMPEDANCE_VALUE: 766 OHM
MDC_IDC_MSMT_LEADCHNL_LV_PACING_THRESHOLD_AMPLITUDE: 0.8 V
MDC_IDC_MSMT_LEADCHNL_LV_PACING_THRESHOLD_PULSEWIDTH: 0.4 MS
MDC_IDC_MSMT_LEADCHNL_LV_SENSING_INTR_AMPL: 17.6 MV
MDC_IDC_MSMT_LEADCHNL_RA_IMPEDANCE_VALUE: 376 OHM
MDC_IDC_MSMT_LEADCHNL_RA_SENSING_INTR_AMPL: 1.4 MV
MDC_IDC_MSMT_LEADCHNL_RV_IMPEDANCE_VALUE: 390 OHM
MDC_IDC_MSMT_LEADCHNL_RV_PACING_THRESHOLD_AMPLITUDE: 0.6 V
MDC_IDC_MSMT_LEADCHNL_RV_PACING_THRESHOLD_PULSEWIDTH: 0.4 MS
MDC_IDC_MSMT_LEADCHNL_RV_SENSING_INTR_AMPL: 7.2 MV
MDC_IDC_PG_IMPLANT_DTM: NORMAL
MDC_IDC_PG_MFG: NORMAL
MDC_IDC_PG_MODEL: NORMAL
MDC_IDC_PG_SERIAL: NORMAL
MDC_IDC_PG_TYPE: NORMAL
MDC_IDC_SESS_CLINIC_NAME: NORMAL
MDC_IDC_SESS_DTM: NORMAL
MDC_IDC_SESS_REPROGRAMMED: NORMAL
MDC_IDC_SESS_TYPE: NORMAL
MDC_IDC_SET_BRADY_AT_MODE_SWITCH_MODE: NORMAL
MDC_IDC_SET_BRADY_HYSTRATE: 60 {BEATS}/MIN
MDC_IDC_SET_BRADY_LOWRATE: 60 {BEATS}/MIN
MDC_IDC_SET_BRADY_MAX_SENSOR_RATE: 120 {BEATS}/MIN
MDC_IDC_SET_BRADY_MODE: NORMAL
MDC_IDC_SET_BRADY_NIGHT_RATE: 60 {BEATS}/MIN
MDC_IDC_SET_CRT_PACED_CHAMBERS: NORMAL
MDC_IDC_SET_LEADCHNL_LV_PACING_AMPLITUDE: 1.75 V
MDC_IDC_SET_LEADCHNL_LV_PACING_POLARITY: NORMAL
MDC_IDC_SET_LEADCHNL_LV_PACING_PULSEWIDTH: 0.4 MS
MDC_IDC_SET_LEADCHNL_LV_SENSING_POLARITY: NORMAL
MDC_IDC_SET_LEADCHNL_LV_SENSING_SENSITIVITY: 1.6 MV
MDC_IDC_SET_LEADCHNL_RA_PACING_CATHODE_ELECTRODE_1: NORMAL
MDC_IDC_SET_LEADCHNL_RA_PACING_CATHODE_LOCATION_1: NORMAL
MDC_IDC_SET_LEADCHNL_RA_PACING_POLARITY: NORMAL
MDC_IDC_SET_LEADCHNL_RA_SENSING_POLARITY: NORMAL
MDC_IDC_SET_LEADCHNL_RA_SENSING_SENSITIVITY: 0.3 MV
MDC_IDC_SET_LEADCHNL_RV_PACING_AMPLITUDE: 1.5 V
MDC_IDC_SET_LEADCHNL_RV_PACING_POLARITY: NORMAL
MDC_IDC_SET_LEADCHNL_RV_PACING_PULSEWIDTH: 0.4 MS
MDC_IDC_SET_LEADCHNL_RV_SENSING_POLARITY: NORMAL
MDC_IDC_SET_LEADCHNL_RV_SENSING_SENSITIVITY: 0.8 MV
MDC_IDC_SET_ZONE_DETECTION_INTERVAL: 260 MS
MDC_IDC_SET_ZONE_DETECTION_INTERVAL: 330 MS
MDC_IDC_SET_ZONE_DETECTION_INTERVAL: 390 MS
MDC_IDC_SET_ZONE_TYPE: NORMAL
MDC_IDC_STAT_AT_MODE_SW_COUNT: 0
MDC_IDC_STAT_BRADY_RV_PERCENT_PACED: 91 %
MDC_IDC_STAT_EPISODE_RECENT_COUNT: 0
MDC_IDC_STAT_EPISODE_TYPE: NORMAL
MDC_IDC_STAT_TACHYTHERAPY_SHOCKS_ABORTED_TOTAL: 0
MDC_IDC_STAT_TACHYTHERAPY_SHOCKS_DELIVERED_RECENT: 0
MDC_IDC_STAT_TACHYTHERAPY_SHOCKS_DELIVERED_TOTAL: 0
NONHDLC SERPL-MCNC: 45 MG/DL
PLATELET # BLD AUTO: 160 10E9/L (ref 150–450)
POTASSIUM SERPL-SCNC: 3.5 MMOL/L (ref 3.4–5.3)
RBC # BLD AUTO: 3.63 10E12/L (ref 4.4–5.9)
SODIUM SERPL-SCNC: 138 MMOL/L (ref 133–144)
TRIGL SERPL-MCNC: 79 MG/DL
WBC # BLD AUTO: 7.2 10E9/L (ref 4–11)

## 2020-02-29 PROCEDURE — 36415 COLL VENOUS BLD VENIPUNCTURE: CPT | Performed by: PHYSICIAN ASSISTANT

## 2020-02-29 PROCEDURE — 25000128 H RX IP 250 OP 636: Performed by: STUDENT IN AN ORGANIZED HEALTH CARE EDUCATION/TRAINING PROGRAM

## 2020-02-29 PROCEDURE — 99232 SBSQ HOSP IP/OBS MODERATE 35: CPT | Mod: GC | Performed by: INTERNAL MEDICINE

## 2020-02-29 PROCEDURE — 25000132 ZZH RX MED GY IP 250 OP 250 PS 637: Performed by: STUDENT IN AN ORGANIZED HEALTH CARE EDUCATION/TRAINING PROGRAM

## 2020-02-29 PROCEDURE — 25000131 ZZH RX MED GY IP 250 OP 636 PS 637: Performed by: STUDENT IN AN ORGANIZED HEALTH CARE EDUCATION/TRAINING PROGRAM

## 2020-02-29 PROCEDURE — 00000146 ZZHCL STATISTIC GLUCOSE BY METER IP

## 2020-02-29 PROCEDURE — 21400000 ZZH R&B CCU UMMC

## 2020-02-29 PROCEDURE — 80061 LIPID PANEL: CPT | Performed by: PHYSICIAN ASSISTANT

## 2020-02-29 PROCEDURE — 85027 COMPLETE CBC AUTOMATED: CPT | Performed by: PHYSICIAN ASSISTANT

## 2020-02-29 PROCEDURE — 80048 BASIC METABOLIC PNL TOTAL CA: CPT | Performed by: PHYSICIAN ASSISTANT

## 2020-02-29 PROCEDURE — 25000125 ZZHC RX 250: Performed by: STUDENT IN AN ORGANIZED HEALTH CARE EDUCATION/TRAINING PROGRAM

## 2020-02-29 PROCEDURE — 83735 ASSAY OF MAGNESIUM: CPT | Performed by: PHYSICIAN ASSISTANT

## 2020-02-29 RX ORDER — FUROSEMIDE 10 MG/ML
100 INJECTION INTRAMUSCULAR; INTRAVENOUS ONCE
Status: COMPLETED | OUTPATIENT
Start: 2020-02-29 | End: 2020-02-29

## 2020-02-29 RX ORDER — BUMETANIDE 2 MG/1
2 TABLET ORAL
Status: DISCONTINUED | OUTPATIENT
Start: 2020-02-29 | End: 2020-02-29

## 2020-02-29 RX ADMIN — CARVEDILOL 6.25 MG: 6.25 TABLET, FILM COATED ORAL at 17:58

## 2020-02-29 RX ADMIN — HYDROCODONE BITARTRATE AND ACETAMINOPHEN 1 TABLET: 10; 325 TABLET ORAL at 06:15

## 2020-02-29 RX ADMIN — SACUBITRIL AND VALSARTAN 1 TABLET: 49; 51 TABLET, FILM COATED ORAL at 19:43

## 2020-02-29 RX ADMIN — TICAGRELOR 90 MG: 90 TABLET ORAL at 19:43

## 2020-02-29 RX ADMIN — MAGNESIUM SULFATE 2 G: 2 INJECTION INTRAVENOUS at 03:47

## 2020-02-29 RX ADMIN — PREDNISONE 30 MG: 10 TABLET ORAL at 17:58

## 2020-02-29 RX ADMIN — TICAGRELOR 90 MG: 90 TABLET ORAL at 08:58

## 2020-02-29 RX ADMIN — APIXABAN 5 MG: 5 TABLET, FILM COATED ORAL at 19:43

## 2020-02-29 RX ADMIN — ATORVASTATIN CALCIUM 40 MG: 40 TABLET, FILM COATED ORAL at 19:43

## 2020-02-29 RX ADMIN — POTASSIUM CHLORIDE 20 MEQ: 750 TABLET, EXTENDED RELEASE ORAL at 03:47

## 2020-02-29 RX ADMIN — SACUBITRIL AND VALSARTAN 1 TABLET: 49; 51 TABLET, FILM COATED ORAL at 08:58

## 2020-02-29 RX ADMIN — CARVEDILOL 6.25 MG: 6.25 TABLET, FILM COATED ORAL at 08:58

## 2020-02-29 RX ADMIN — APIXABAN 5 MG: 5 TABLET, FILM COATED ORAL at 08:58

## 2020-02-29 RX ADMIN — POTASSIUM CHLORIDE 40 MEQ: 750 TABLET, EXTENDED RELEASE ORAL at 08:58

## 2020-02-29 RX ADMIN — BUMETANIDE 3 MG: 2 TABLET ORAL at 16:06

## 2020-02-29 RX ADMIN — INSULIN GLARGINE 15 UNITS: 100 INJECTION, SOLUTION SUBCUTANEOUS at 22:11

## 2020-02-29 RX ADMIN — HYDROCODONE BITARTRATE AND ACETAMINOPHEN 1 TABLET: 10; 325 TABLET ORAL at 22:03

## 2020-02-29 RX ADMIN — FUROSEMIDE 10 MG/HR: 10 INJECTION, SOLUTION INTRAVENOUS at 10:24

## 2020-02-29 RX ADMIN — HYDROCODONE BITARTRATE AND ACETAMINOPHEN 1 TABLET: 10; 325 TABLET ORAL at 14:28

## 2020-02-29 RX ADMIN — ACETAMINOPHEN 650 MG: 325 TABLET, FILM COATED ORAL at 00:20

## 2020-02-29 RX ADMIN — FUROSEMIDE 100 MG: 10 INJECTION, SOLUTION INTRAVENOUS at 08:57

## 2020-02-29 ASSESSMENT — ACTIVITIES OF DAILY LIVING (ADL)
ADLS_ACUITY_SCORE: 11
ADLS_ACUITY_SCORE: 12
ADLS_ACUITY_SCORE: 11
ADLS_ACUITY_SCORE: 11

## 2020-02-29 NOTE — PLAN OF CARE
Pt is a/o x 4 overnight. Tele V paced with underlying afib. Rate in 60's. Lasix gtt continues at 10mg/hr. Good urine output. Voiding in urinal at bedside. Pt has old R neck and R radial puncture sites that are CDI. Bruising around old neck site. Pt is c/o pain in R foot and ankle. He thinks it may be gout pain. Will inform MD this am. Norco given x 1. K and Mg replaced overnight. Will continue to monitor pt.

## 2020-02-29 NOTE — PROGRESS NOTES
[id number] Shift  D: S/P angioplasty with stent and worsening SOB/CHF.  PMH: CAD s/p PCI pLAD and pRCA, atrial fibrillation s/p AVN ablation s/p CRT-D, DM2, ILD.     I: Monitored vitals and assessed pt status.   Running: Lasix 10mg/hr via  PIV     A: A0x4. VSS, on RA, V paced. Afebrile. Voiding adequately into urinal. Right ankle with pain, patient thinks it might be gout returning. Patient ambulated independently in hallway this shift. Pt endorses feeling occ SOB, RLL.  HS , coverage given.      P: Continue to monitor Pt status and report changes to treatment team.

## 2020-02-29 NOTE — PROGRESS NOTES
Heart Failure Cardiology Progress Note  Ken Doss MRN: [id number]  Age: 74 year old, : 1945  Date: 2020        I personally saw and examined this patient, reviewed imaging and laboratory studies, confirmed physical examination and discussed results and plan with patient and or family.      Assessment and Plan:     Mr. Doss is a 75 y/o M w/ HFrEF (LVEF 20-25%), CAD s/p PCI pLAD and pRCA, atrial fibrillation s/p AVN ablation s/p CRT-D, DM, ILD presenting with ESTRADA, LE edema, and orthopnea all consistent with acute on chronic LV systolic heart failure exacerbation / non-ischemic cardiomyopathy.     #Acute decompensation of chronic LV systolic heart failure:  Grossly decompensation on exam. NYHA class III. Has had 2 CHF admissions in 2019. However, no end organ dysfunction and tolerating modest dose of neurohormonal blockade as outpatient. Overall plan to diurese patient, and complete LVAD eval as outpatient.     Plan:  -Continue lasix ggt at 10 mg/hr after bolus on  AM of --> goal net negative 2.0L over next 24 hrs  -Decreased home coreg to 6.25 mg PO BID  -Continude Entresto 49/51 mg PO BID  -CPX results pending from 20  -Had VAD show and tell and neuropsychology on     #CAD s/p PCI pLAD, pRCA in 2019:  Angiogram on  with IRS of mLAD and ostial RCA REGLA from 2019 s/p PCI of both on 20.  -Continue brillenta 90 mg PO BID     #Afib:  [id number] score of 5.  -On Apixaban     #DM:  -Continue home glargine, holding metformin, start ISS     FEN: Low NA diet, 2L fluid restriction  PPX: SQH     Code Status: FULL CODE     Patient discussed with staff attending, Dr. Johnson.     Julio Noyola MD  Cardiology Fellow  Pager: [phone]                  Subjective/Interval Events     No acute overnight events. This AM, patient reporting that he finally was able to sleep. No orthopnea, PND, CP, lightheadedness, dizziness.          Objective  "    /80   Pulse 60   Temp 97.8  F (36.6  C) (Oral)   Resp 18   Ht 1.753 m (5' 9\")   Wt 75 kg (165 lb 4.8 oz)   SpO2 98%   BMI 24.41 kg/m    Temp:  [97.8  F (36.6  C)-98.5  F (36.9  C)] 97.8  F (36.6  C)  Pulse:  [60] 60  Heart Rate:  [60-67] 60  Resp:  [18-20] 18  BP: (107-126)/(64-80) 126/80  SpO2:  [96 %-99 %] 98 %  Wt Readings from Last 2 Encounters:   02/28/20 75 kg (165 lb 4.8 oz)     I/O last 3 completed shifts:  In: 1530 [P.O.:1400; I.V.:130]  Out: 4475 [Urine:4475]      Gen: No acute distress  HEENT: NC/AT, PERRL, EOM intact, MMM, OP without exudates  PULM/THORAX: Clear to auscultation bilaterally, no rales/rhonchi/wheezes  CV: normal rate, regular rhythm, normal S1 and S2, no murmurs or rubs. JVP 10 cm  ABD: Soft, NTND, bowel sounds present, no masses  EXT: WWP. 1+ pitting edema in BLE  NEURO: CN II-XII grossly intact. A&Ox3            Data:     Recent Results (from the past 24 hour(s))   Glucose by meter    Collection Time: 02/28/20 12:28 PM   Result Value Ref Range    Glucose 148 (H) 70 - 99 mg/dL   Glucose by meter    Collection Time: 02/28/20  6:46 PM   Result Value Ref Range    Glucose 189 (H) 70 - 99 mg/dL   Glucose by meter    Collection Time: 02/28/20 10:02 PM   Result Value Ref Range    Glucose 320 (H) 70 - 99 mg/dL   Glucose by meter    Collection Time: 02/29/20  2:33 AM   Result Value Ref Range    Glucose 177 (H) 70 - 99 mg/dL   Basic metabolic panel    Collection Time: 02/29/20  2:48 AM   Result Value Ref Range    Sodium 138 133 - 144 mmol/L    Potassium 3.5 3.4 - 5.3 mmol/L    Chloride 105 94 - 109 mmol/L    Carbon Dioxide 28 20 - 32 mmol/L    Anion Gap 4 3 - 14 mmol/L    Glucose 190 (H) 70 - 99 mg/dL    Urea Nitrogen 26 7 - 30 mg/dL    Creatinine 0.93 0.66 - 1.25 mg/dL    GFR Estimate 81 >60 mL/min/[1.73_m2]    GFR Estimate If Black >90 >60 mL/min/[1.73_m2]    Calcium 8.1 (L) 8.5 - 10.1 mg/dL   CBC with platelets    Collection Time: 02/29/20  2:48 AM   Result Value Ref Range    " WBC 7.2 4.0 - 11.0 10e9/L    RBC Count 3.63 (L) 4.4 - 5.9 10e12/L    Hemoglobin 11.0 (L) 13.3 - 17.7 g/dL    Hematocrit 34.7 (L) 40.0 - 53.0 %    MCV 96 78 - 100 fl    MCH 30.3 26.5 - 33.0 pg    MCHC 31.7 31.5 - 36.5 g/dL    RDW 18.9 (H) 10.0 - 15.0 %    Platelet Count 160 150 - 450 10e9/L   Lipid Profile    Collection Time: 02/29/20  2:48 AM   Result Value Ref Range    Cholesterol 77 <200 mg/dL    Triglycerides 79 <150 mg/dL    HDL Cholesterol 32 (L) >39 mg/dL    LDL Cholesterol Calculated 29 <100 mg/dL    Non HDL Cholesterol 45 <130 mg/dL   Magnesium    Collection Time: 02/29/20  2:48 AM   Result Value Ref Range    Magnesium 1.6 1.6 - 2.3 mg/dL   Glucose by meter    Collection Time: 02/29/20  6:57 AM   Result Value Ref Range    Glucose 130 (H) 70 - 99 mg/dL       Recent Results (from the past 24 hour(s))   XR Chest 2 Views    Narrative    EXAM: XR CHEST 2 VW  2/25/2020 2:10 PM     HISTORY:  sob       COMPARISON:  None    FINDINGS:   PA and lateral radiographs of the chest. Left chest pacemaker with  dual leads, both appear intact.    The trachea is midline. The cardiomediastinal silhouette is enlarged.  The point vasculature are prominent with redistribution. The vascular  pedicle is wide. Bibasilar and posterior opacities.    No acute osseous abnormalities. The upper abdomen is within normal  limits.        Impression    IMPRESSION: Pulmonary interstitial edema. Superimposed infection is  not excluded.     I have personally reviewed the examination and initial interpretation  and I agree with the findings.    FIDENCIO BOCANEGRA MD             Medications     Current Facility-Administered Medications   Medication     acetaminophen (TYLENOL) Suppository 650 mg     acetaminophen (TYLENOL) tablet 650 mg     alum & mag hydroxide-simethicone (MAALOX  ES) suspension 30 mL     apixaban ANTICOAGULANT (ELIQUIS) tablet 5 mg     atorvastatin (LIPITOR) tablet 40 mg     carvedilol (COREG) tablet 6.25 mg     Continuing ACE  inhibitor/ARB/ARNI from home medication list OR ACE inhibitor/ARB order already placed during this visit     Continuing beta blocker from home medication list OR beta blocker order already placed during this visit     Continuing statin from home medication list OR statin order already placed during this visit     glucose gel 15-30 g    Or     dextrose 50 % injection 25-50 mL    Or     glucagon injection 1 mg     furosemide (LASIX) 500 mg/50mL infusion ADULT MAX CONC     guaiFENesin (ROBITUSSIN) 20 mg/mL solution 10 mL     HYDROcodone-acetaminophen (NORCO)  MG per tablet 1 tablet     insulin aspart (NovoLOG) injection (RAPID ACTING)     insulin aspart (NovoLOG) injection (RAPID ACTING)     insulin glargine (BASAGLAR KWIKPEN) injection 15 Units     lidocaine (LMX4) cream     lidocaine 1 % 0.1-1 mL     magnesium sulfate 2 g in water intermittent infusion     magnesium sulfate 4 g in 100 mL sterile water (premade)     medication instruction     melatonin tablet 3 mg     naloxone (NARCAN) injection 0.1-0.4 mg     nitroGLYcerin (NITROSTAT) sublingual tablet 0.4 mg     Percutaneous Coronary Intervention orders placed (this is information for BPA alerting)     polyethylene glycol (MIRALAX) Packet 17 g     potassium chloride (KLOR-CON) Packet 20-40 mEq     potassium chloride 10 mEq in 100 mL intermittent infusion with 10 mg lidocaine     potassium chloride 10 mEq in 100 mL sterile water intermittent infusion (premix)     potassium chloride ER (KLOR-CON M) CR tablet 20-40 mEq     potassium chloride ER (KLOR-CON M) CR tablet 40 mEq     reason aspirin not prescribed (intentional)     sacubitril-valsartan (ENTRESTO) 49-51 MG per tablet 1 tablet     sodium chloride (PF) 0.9% PF flush 3 mL     sodium chloride (PF) 0.9% PF flush 3 mL     ticagrelor (BRILINTA) tablet 90 mg

## 2020-02-29 NOTE — PROGRESS NOTES
[id number]:  73 y/o M w/ HFrEF (LVEF 20-25%), CAD s/p PCI pLAD and pRCA, atrial fibrillation s/p AVN ablation s/p CRT-D, DM, ILD presenting with ESTRADA, LE edema, and orthopnea all consistent with acute on chronic LV systolic heart failure exacerbation 2/2 non-ischemic cardiomyopathy.     D/I: Monitor shows 100% V paced 60-70s. Continues on lasix drip at 10 mg/hour with 2950 ml UO/MN. Denies pain. C/O intermittent shortness of breath, both while waling and with position changes. States that he has shortness of breath at home but not this severe. States that he has been conservative with fluid intake. Patient is concerned that his shortness of breath occurs more often. Up in halls independently this evening without difficulties. Wife present this afternoon. See flowsheets for assessments and additional data.  A: Stable HF. Increased shortness of breath.   P: Continue lasix drip per orders. Monitor I/O. Monitor shortness of breath symptoms.Continue VAD w/u per patient. Continue current cares and notify providers with questions or concerns.

## 2020-03-01 ENCOUNTER — PATIENT OUTREACH (OUTPATIENT)
Dept: CARE COORDINATION | Facility: CLINIC | Age: 75
End: 2020-03-01

## 2020-03-01 VITALS
RESPIRATION RATE: 18 BRPM | WEIGHT: 154.2 LBS | BODY MASS INDEX: 22.84 KG/M2 | TEMPERATURE: 98.8 F | OXYGEN SATURATION: 98 % | DIASTOLIC BLOOD PRESSURE: 73 MMHG | SYSTOLIC BLOOD PRESSURE: 127 MMHG | HEART RATE: 61 BPM | HEIGHT: 69 IN

## 2020-03-01 LAB
ANION GAP SERPL CALCULATED.3IONS-SCNC: 5 MMOL/L (ref 3–14)
BUN SERPL-MCNC: 21 MG/DL (ref 7–30)
CALCIUM SERPL-MCNC: 8.5 MG/DL (ref 8.5–10.1)
CHLORIDE SERPL-SCNC: 103 MMOL/L (ref 94–109)
CO2 SERPL-SCNC: 27 MMOL/L (ref 20–32)
CREAT SERPL-MCNC: 0.8 MG/DL (ref 0.66–1.25)
GFR SERPL CREATININE-BSD FRML MDRD: 88 ML/MIN/{1.73_M2}
GLUCOSE BLDC GLUCOMTR-MCNC: 251 MG/DL (ref 70–99)
GLUCOSE BLDC GLUCOMTR-MCNC: 255 MG/DL (ref 70–99)
GLUCOSE BLDC GLUCOMTR-MCNC: 348 MG/DL (ref 70–99)
GLUCOSE BLDC GLUCOMTR-MCNC: 362 MG/DL (ref 70–99)
GLUCOSE BLDC GLUCOMTR-MCNC: 415 MG/DL (ref 70–99)
GLUCOSE SERPL-MCNC: 270 MG/DL (ref 70–99)
MAGNESIUM SERPL-MCNC: 1.8 MG/DL (ref 1.6–2.3)
POTASSIUM SERPL-SCNC: 3.9 MMOL/L (ref 3.4–5.3)
SODIUM SERPL-SCNC: 136 MMOL/L (ref 133–144)

## 2020-03-01 PROCEDURE — 25000128 H RX IP 250 OP 636: Performed by: STUDENT IN AN ORGANIZED HEALTH CARE EDUCATION/TRAINING PROGRAM

## 2020-03-01 PROCEDURE — 80048 BASIC METABOLIC PNL TOTAL CA: CPT | Performed by: INTERNAL MEDICINE

## 2020-03-01 PROCEDURE — 25000132 ZZH RX MED GY IP 250 OP 250 PS 637: Performed by: STUDENT IN AN ORGANIZED HEALTH CARE EDUCATION/TRAINING PROGRAM

## 2020-03-01 PROCEDURE — 99238 HOSP IP/OBS DSCHRG MGMT 30/<: CPT | Mod: GC | Performed by: INTERNAL MEDICINE

## 2020-03-01 PROCEDURE — 36415 COLL VENOUS BLD VENIPUNCTURE: CPT | Performed by: INTERNAL MEDICINE

## 2020-03-01 PROCEDURE — 83735 ASSAY OF MAGNESIUM: CPT | Performed by: INTERNAL MEDICINE

## 2020-03-01 PROCEDURE — 00000146 ZZHCL STATISTIC GLUCOSE BY METER IP

## 2020-03-01 PROCEDURE — 25000131 ZZH RX MED GY IP 250 OP 636 PS 637: Performed by: STUDENT IN AN ORGANIZED HEALTH CARE EDUCATION/TRAINING PROGRAM

## 2020-03-01 RX ORDER — BUMETANIDE 1 MG/1
3 TABLET ORAL
Qty: 180 TABLET | Refills: 0 | Status: SHIPPED | OUTPATIENT
Start: 2020-03-01 | End: 2020-03-11

## 2020-03-01 RX ORDER — CARVEDILOL 6.25 MG/1
6.25 TABLET ORAL 2 TIMES DAILY WITH MEALS
Qty: 60 TABLET | Refills: 0 | Status: SHIPPED | OUTPATIENT
Start: 2020-03-01 | End: 2021-01-01

## 2020-03-01 RX ADMIN — BUMETANIDE 3 MG: 2 TABLET ORAL at 16:24

## 2020-03-01 RX ADMIN — MAGNESIUM SULFATE 2 G: 2 INJECTION INTRAVENOUS at 11:07

## 2020-03-01 RX ADMIN — POTASSIUM CHLORIDE 40 MEQ: 750 TABLET, EXTENDED RELEASE ORAL at 07:45

## 2020-03-01 RX ADMIN — TICAGRELOR 90 MG: 90 TABLET ORAL at 07:51

## 2020-03-01 RX ADMIN — POTASSIUM CHLORIDE 20 MEQ: 750 TABLET, EXTENDED RELEASE ORAL at 16:23

## 2020-03-01 RX ADMIN — PREDNISONE 30 MG: 10 TABLET ORAL at 07:46

## 2020-03-01 RX ADMIN — SACUBITRIL AND VALSARTAN 1 TABLET: 49; 51 TABLET, FILM COATED ORAL at 07:51

## 2020-03-01 RX ADMIN — APIXABAN 5 MG: 5 TABLET, FILM COATED ORAL at 07:51

## 2020-03-01 RX ADMIN — CARVEDILOL 6.25 MG: 6.25 TABLET, FILM COATED ORAL at 07:46

## 2020-03-01 RX ADMIN — BUMETANIDE 3 MG: 2 TABLET ORAL at 07:45

## 2020-03-01 ASSESSMENT — ACTIVITIES OF DAILY LIVING (ADL)
ADLS_ACUITY_SCORE: 12
ADLS_ACUITY_SCORE: 13
ADLS_ACUITY_SCORE: 12
ADLS_ACUITY_SCORE: 12
ADLS_ACUITY_SCORE: 13

## 2020-03-01 ASSESSMENT — MIFFLIN-ST. JEOR: SCORE: 1429.83

## 2020-03-01 NOTE — PLAN OF CARE
"/65 (BP Location: Left arm)   Pulse 61   Temp 98  F (36.7  C) (Oral)   Resp 18   Ht 1.753 m (5' 9\")   Wt 69.9 kg (154 lb 3.2 oz)   SpO2 93%   BMI 22.77 kg/m      Shift: 1361-9220   Reason for Admission: acute on chronic LV systolic heart failure exacerbation 2/2 non-ischemic cardiomyopathy.  VS: VSS on RA. V-paced at 100%  Neuros: A/Ox4, able to make needs known. Anxious.   GI/: No BMs this shift, voiding adequately  Diet: 2g Na+ diet, BG ACHS.   Drains/Lines: PIV SL  Activity: A1 to SBA + walker.   Pain/Nausea: Denies both  Labs: Mg+ replaced this shift.   Plan of care: Plan for discharge today at 5pm. Will continue to monitor and follow POC.   "

## 2020-03-01 NOTE — PLAN OF CARE
D: S/P angioplasty with stent and worsening SOB/CHF.  PMH: CAD s/p PCI pLAD and pRCA, atrial fibrillation s/p AVN ablation s/p CRT-D, DM2, ILD. LVAD workup.     A/I: Pt is a/o x 4. VSS. Sats 94% RA. Medicated x 1 for pain in R foot due to possible gout. Prednisone started yesterday with improvement in symptoms. Ice applied. Gets up at bedside to use urinal with walker and SBA. Tele V paced. Lasix gtt discontinued yesterday. PO bumex started. Old R wrist and R neck puncture sites CDI.     P: Will continue to monitor pt.

## 2020-03-01 NOTE — DISCHARGE SUMMARY
[facility], [place]    Discharge Summary  Cardiology        Faculty Attestation  George Johnson M.D.    I personally saw and examined this patient, reviewed recent laboratories and imaging studies, discussed the case with the housestaff, and conveyed plan to the patient.  I answered all questions from patient and/or family. I agree with the examination, assessment and plan outlined here.  30 minutes        Date of Admission:  2/25/2020  Date of Discharge:  3/1/2020  Discharging Provider: Pantera Diaz MD    Discharge Diagnoses   Principal Problem:    Acute on chronic systolic heart failure (H)  Active Problems:    Chronic systolic congestive heart failure (H)    Heart failure (H)    Coronary artery disease      History of Present Illness   Mr. Doss is a 73 y/o M w/ HFrEF secondary to ischemic cardiomyopathy (LVEF 20-25%), CAD s/p PCI pLAD and pRCA (1/2019), atrial fibrillation s/p AVN ablation s/p CRT-D, DM, ILD presenting with ESTRADA, LE edema, and orthopnea all consistent with acute on chronic LV systolic heart failure exacerbation found to have restenosis of prior stents. Two additional stents were placed in mid LAD and ostial RCA. He was diuresed for acute heart failure. Discharged on ticagrelor and apixaban with close follow up in [facility] clinic.     Hospital Course   Ken Doss was admitted on 2/25/2020.  The following problems were addressed during his hospitalization:    #Acute on chronic systolic heart failure secondary to ischemic cardiomyopathy, NYHA Class III, stg C  #CAD s/p PCI pLAD, pRCA (1/2019, 2/2020)  Gross decompensation on physical exam on admission. Had 2 prior CHF admissions in 2019 but with no end organ dysfunction and tolerating modest dose of neurohormonal blockade as outpatient. He was diuresed and underwent RHC and angiogram for evaluation of advanced therapies. He was found to have restenosis of prior PCIs from 2019. Two new stents  "were placed in mid LAD and ostial RCA. He was started on ticagrelor and treated for acute heart failure with diuresis. Coreg was decreased given bradycardia while admitted, and entresto increased. He will eventually need advanced therapies, but given stability - workup can be done outpatient. He received a \"show and tell\" LVAD and seen by neuropsychology. Given age, LVAD will be a DT. On discharge:  -diuresis: Bumex 3 mg BID  -BB: coreg 6.25 mg PO BID  -ACE/ARB/ARNi: Entresto 49/51 mg PO BID  -Aldosterone antagonism: none  -antiplatelet: ticagrelor, 12 month therapy. No ASA as he is on apixaban.  -CPX results pending from 2/28/20     #Afib:  [id number] score of 5.  -continued Apixaban    Pantera Diaz MD  Internal Medicine, PGY-1    Significant Results and Procedures     Right heart cath 2/27/20:      Right sided filling pressures are moderately elevated.    Moderately elevated pulmonary artery hypertension.    Left sided filling pressures are moderately elevated.    Reduced cardiac output level.    Hemodynamic data has been modified in Epic per physician review.     Two vessel CAD with in stent restenosis of the ostial RCA stent and the mid LAD stent.  IVUS performed to size the mid LAD vessel.  PCI of the mid LAD and ostial RCA with two drug eluting stents.    Pending Results      Cardio-pulmonary stress test:  - These results will be followed up by Dr. Reeves    Code Status   Full Code    Primary Care Physician   Lewis Reeves    Physical Exam   Temp: 98.8  F (37.1  C) Temp src: Oral BP: 127/73 Pulse: 61 Heart Rate: 70 Resp: 18 SpO2: 98 % O2 Device: None (Room air)    Vitals:    02/27/20 0700 02/28/20 0210 03/01/20 0250   Weight: 76.3 kg (168 lb 3.2 oz) 75 kg (165 lb 4.8 oz) 69.9 kg (154 lb 3.2 oz)     Vital Signs with Ranges  Temp:  [97.7  F (36.5  C)-98.8  F (37.1  C)] 98.8  F (37.1  C)  Pulse:  [61] 61  Heart Rate:  [60-73] 70  Resp:  [18] 18  BP: (110-127)/(65-84) 127/73  SpO2:  [93 %-98 " %] 98 %  I/O last 3 completed shifts:  In: 1220 [P.O.:1220]  Out: 2180 [Urine:2180]    Gen: No acute distress  HEENT: NC/AT, PERRL, EOM intact, MMM, OP without exudates  PULM/THORAX: Clear to auscultation bilaterally, no rales/rhonchi/wheezes  CV: normal rate, regular rhythm, normal S1 and S2, no murmurs or rubs. No JVP. No LE edema.  ABD: Soft, NTND, bowel sounds present, no masses  EXT: WWP. No LE edema.  NEURO: CN II-XII grossly intact. A&Ox3    Time Spent on this Encounter   I, Pantera Diaz MD, personally saw the patient today and spent greater than 30 minutes discharging this patient.    Discharge Disposition   Discharged to home  Condition at discharge: Stable    Consultations This Hospital Stay   MEDICATION HISTORY IP PHARMACY CONSULT  VASCULAR ACCESS CARE ADULT IP CONSULT  VASCULAR ACCESS CARE ADULT IP CONSULT  NEUROPSYCHOLOGY IP CONSULT  NUTRITION SERVICES ADULT IP CONSULT  PHARMACY IP CONSULT  PHARMACY IP CONSULT  VASCULAR ACCESS CARE ADULT IP CONSULT  SMOKING CESSATION PROGRAM IP CONSULT  SMOKING CESSATION PROGRAM IP CONSULT    Discharge Orders      Follow-Up with Cardiac Advanced Practice Provider      CARDIAC REHAB REFERRAL      Cardiac Rehab Referral      Reason for your hospital stay    You were treated for acute worsening of your heart failure. Your heart catheterization found blockages in the arteries of your heart, 2 new stents were placed. You were given diuretic medications for your heart failure.     Adult [facility] Follow-up and recommended labs and tests    Follow up with [facility] cardiology clinic , at [facility], on 3/4/20 to evaluate medication change and for hospital follow- up. The following labs/tests are recommended: BMP, CBC, BNP.    Appointments on [place] and/or [facility] (with [facility] or [facility] provider or service). Call [phone] if you haven't heard regarding these appointments within 7 days of discharge.     Follow Up and recommended labs  and tests    Follow up with [facility] cardiology clinic , at [facility], on 3/4/20 to evaluate medication change and for hospital follow- up. The following labs/tests are recommended: BMP, CBC, BNP.     Activity    Your activity upon discharge: activity as tolerated     Discharge Instructions    You were treated for acute worsening of your heart failure. Your heart catheterization found blockages in the arteries of your heart, 2 new stents were placed. We would like you to follow up with [facility] (cardiology) clinic on Wednesday 3/4 and then on Monday 3/9 to evaluate your weight and check some basic blood work. Please return to the hospital for chest pain, worsening shortness of breath, worsening swelling of your extremities, or weight gain.     Full Code     Diet    Follow this diet upon discharge: Orders Placed This Encounter      Advance Diet as Tolerated: Regular Diet Adult; 2 gm NA Diet     Discharge Medications   Current Discharge Medication List      START taking these medications    Details   bumetanide (BUMEX) 1 MG tablet Take 3 tablets (3 mg) by mouth 2 times daily  Qty: 180 tablet, Refills: 0    Associated Diagnoses: Acute on chronic systolic heart failure (H)      sacubitril-valsartan (ENTRESTO) 49-51 MG per tablet Take 1 tablet by mouth 2 times daily  Qty: 60 tablet, Refills: 0    Associated Diagnoses: Acute on chronic systolic heart failure (H)      ticagrelor (BRILINTA) 90 MG tablet Take 1 tablet (90 mg) by mouth 2 times daily  Qty: 30 tablet, Refills: 0    Associated Diagnoses: S/P angioplasty with stent; Coronary artery disease involving native coronary artery of native heart without angina pectoris         CONTINUE these medications which have NOT CHANGED    Details   acetaminophen (TYLENOL) 500 MG tablet Take 500 mg by mouth 2 times daily       apixaban ANTICOAGULANT (ELIQUIS) 5 MG tablet Take 5 mg by mouth 2 times daily      atorvastatin (LIPITOR) 40 MG tablet Take 40 mg by mouth  daily      blood glucose (NO BRAND SPECIFIED) test strip 4 times daily      insulin glargine (BASAGLAR KWIKPEN) 100 UNIT/ML pen Inject 65 Units Subcutaneous At Bedtime      metFORMIN (GLUCOPHAGE) 1000 MG tablet Take 1,000 mg by mouth 2 times daily (with meals)      carvedilol (COREG) 12.5 MG tablet Take 12.5 mg by mouth 2 times daily       Coenzyme Q10 (COQ-10) 100 MG CAPS Take 100 mg by mouth daily      glucosamine-chondroitin (GLUCOSAMINE CHONDR COMPLEX) 500-400 MG CAPS per capsule Take 1 tablet by mouth daily       potassium chloride ER (KLOR-CON M) 20 MEQ CR tablet Take 20 mEq by mouth daily         STOP taking these medications       clopidogrel (PLAVIX) 75 MG tablet Comments:   Reason for Stopping:         furosemide (LASIX) 40 MG tablet Comments:   Reason for Stopping:         HYDROcodone-acetaminophen (NORCO)  MG per tablet Comments:   Reason for Stopping:         ibuprofen (ADVIL/MOTRIN) 200 MG tablet Comments:   Reason for Stopping:         Ibuprofen-diphenhydrAMINE Cit (ADVIL PM PO) Comments:   Reason for Stopping:         Multiple Vitamin (MULTI-VITAMINS) TABS Comments:   Reason for Stopping:         sacubitril-valsartan (ENTRESTO) 24-26 MG per tablet Comments:   Reason for Stopping:             Allergies   No Known Allergies  Data   Most Recent 3 CBC's:  Recent Labs   Lab Test 02/29/20  0248 02/27/20  0622 02/26/20  0604   WBC 7.2 7.6 6.2   HGB 11.0* 12.0* 11.4*   MCV 96 96 94    146* 108*      Most Recent 3 BMP's:  Recent Labs   Lab Test 03/01/20  0617 02/29/20  0248 02/27/20  0622    138 138   POTASSIUM 3.9 3.5 3.9   CHLORIDE 103 105 106   CO2 27 28 25   BUN 21 26 33*   CR 0.80 0.93 1.11   ANIONGAP 5 4 7   LYNNE 8.5 8.1* 8.0*   * 190* 99     Most Recent 2 LFT's:No lab results found.  Most Recent INR's and Anticoagulation Dosing History:  Anticoagulation Dose History     There is no flowsheet data to display.        Most Recent 3 Troponin's:  Recent Labs   Lab Test  02/25/20  1345   TROPI <0.015     Most Recent Cholesterol Panel:  Recent Labs   Lab Test 02/29/20  0248   CHOL 77   LDL 29   HDL 32*   TRIG 79     Most Recent 6 Bacteria Isolates From Any Culture (See EPIC Reports for Culture Details):No lab results found.  Most Recent TSH, T4 and A1c Labs:No lab results found.

## 2020-03-01 NOTE — PLAN OF CARE
Neuro: A&Ox4.   Cardiac: 100% v paced, HR 60s.   Respiratory: Sating * on RA.  GI/: Adequate urine output. Lasix gtt dc'd this shift, started po bumex. Small BM x 1   Diet/appetite: Tolerating 2g Na+ diet. Eating fair  Activity:  Assist of 1 w walker, KARLI, new onset R ankle pain, tenderness, swelling, started prednisone this shift for possible gout flare.   Pain: R ankle/foot pain. Norco x 1 with some relief. Ice packs applied  Skin: Pt has old R neck and R radial puncture sites that are CDI. Bruising around old neck site.  LDA's: L PIV SL    Plan: Continue with POC. Notify primary team with changes.

## 2020-03-02 ENCOUNTER — AMBULATORY - HEALTHEAST (OUTPATIENT)
Dept: CARDIOLOGY | Facility: CLINIC | Age: 75
End: 2020-03-02

## 2020-03-02 ENCOUNTER — COMMUNICATION - HEALTHEAST (OUTPATIENT)
Dept: CARDIOLOGY | Facility: CLINIC | Age: 75
End: 2020-03-02

## 2020-03-02 ENCOUNTER — PATIENT OUTREACH (OUTPATIENT)
Dept: CARDIOLOGY | Facility: CLINIC | Age: 75
End: 2020-03-02

## 2020-03-02 DIAGNOSIS — I42.8 NONISCHEMIC CARDIOMYOPATHY (H): ICD-10-CM

## 2020-03-02 DIAGNOSIS — Z95.810 ICD (IMPLANTABLE CARDIOVERTER-DEFIBRILLATOR), BIVENTRICULAR, IN SITU: ICD-10-CM

## 2020-03-02 DIAGNOSIS — I50.22 CHRONIC SYSTOLIC CONGESTIVE HEART FAILURE (H): Primary | ICD-10-CM

## 2020-03-02 NOTE — TELEPHONE ENCOUNTER
Patient was contacted by an RN for post DC follow up so no duplicate post DC follow up call will be made    Mireya Collier CMA   Post Hospital Discharge Team

## 2020-03-02 NOTE — PROGRESS NOTES
"DISCHARGE   Discharged to: Home  Via: Automobile  Accompanied by: Family  Discharge Instructions: diet, activity, medications, follow up appointments, when to call the MD, and what to watchout for (i.e. s/s of infection, increasing SOB, palpitations, chest pain,)  Prescriptions: To be filled by discharge  pharmacy per pt's request; medication list reviewed & sent with pt  Follow Up Appointments: arranged; information given  Belongings: All sent with pt  IV: out  Telemetry: off  Pt exhibits understanding of above discharge instructions; all questions answered.    Elvi Vazquez RN  Patient discharged at 1800.   We thoroughly discussed discharge plans, medications, and future appointments. Patient very anxious about all of future appointments, but does plan to follow up in [facility] on 3/4. His BG at 1630 was 415. He was asymptomatic, but BG had been charted as higher due to prednisone for gout. CARDS 2 provider contacted, and instructed to give Novolog 10 units, then recheck BG prior to leaving hospital. BG recheck was 348. Patient's home long acting insulin was increased, also restarted on Metformin for discharge home.   Patient did not have Prednisone ordered as a discharge medication. He stated the prednisone was the only medication that was helping his gout. I offered to contact the provider to address this, but he refused. States \"will figure this out tomorrow\".     Elvi Vazquez RN  "

## 2020-03-02 NOTE — PROGRESS NOTES
Spoke with patient on phone. He gave permission to send discharge summary to Dr. Mary Jane Vigil to assist in coordinating care.    Pantera Grimm  Internal Medicine, PGY-1

## 2020-03-02 NOTE — PROGRESS NOTES
Tell me how you have been doing since you were discharged from the hospital.  He is feeling okay. Reports he didn't get home til late yesterday and so he hasn't been home long. Struggling with all the changes.   Reports his weight last night at bedtime was 154lb but today was 158lb this morning. He reports he is weighing himself in different clothing. Reviewed that if he gains 3lb in one day or 5lb in a week, he needs to call us. He tells me that his weight SHOULD be increasing since he used to be 172lb. Discussed that a lot of that weight was fluid and that we don't want his weight to return to that previous level. Discussed that he may gain some table weight now that he is home and eating better, but that 3lb in one day is a lot and he should call us to report this. He stated understanding. Discouraged him from self titrating diuretics and asked him again to please call us if weight going up. He denies swelling at this point. Reports continued gout issues as he is waiting for PCP approval on his medication. Reports breathing is okay. Reports slept fairly well last night; only got up 1 time to use the restroom.   Reports he has a device check today to change some setting.      ASSISTANCE  Do you have someone at home to assist you with your daily activities?  His wife assists with his care usually, but unfortunately she is out of the state on a trip with her grandkids.      MEDICATIONS  I would like to review your discharge medications and answer any questions you may have about them and also make sure you have all the medications that are new to you, and discuss any changes that were made to your pre-hospital medications.     Discussed new medications: bumex, brilinta and entresto 49-51mg.   Discussed that he should not be taking lasix anymore and that bumex has taken this place as his new water pill/diuretic.   Discussed that his entresto dose has increased so he should double check his bottles and make sure he is  taking the new dose BID. Discussed brilinta is for his new stents and that he should make sure to take this.  He asks if he should still take lipitor since it wasn't on his list. Discussed that atorvastatin is lipitor and that it is on the list, he reviewed the list and found it. He will continue taking it.   He confirms he knows to stop ibuprofen/motrin/advil and only to use tylenol.      Is someone helping you to set up your medications?    No (wife usually around to help but out of town now) - he is struggling as he usually sets up 4 weeks of meds at a time and now has to go back through and remove/re set up medications as there were changes.      FOLLOW UP  Do you have a follow up appointment with your provider?   What other discharge instructions do you have?   Are you to get labs, procedures or tests before you see your provider?    He claims he has an appointment on Wednesday March 4th. However, nothing scheduled. Scheduled and confirmed appointment for 3/5 (next available [facility] appointment). Initially he declined appointments until his wife returned on Friday. However, discussed that we really need to recheck labs and make sure meds are working now that he is home from hospital. He then goes on to tell me that he needs more diuretics and that he isn't peeing much. He further reports he hasn't taken any of his morning medications yet today, including bumex. Discussed that could be contributing to him not urinating as much today. He took AM bumex dose while on the phone. Encouraged him to review the rest of his meds and take his morning doses after we finished on the phone. He sounded very overwhelmed. Offered him to go through all his medications. Offered him to bring in hsi medications to his upcoming appointment so we can help him and help him understand what each medication does for him.      CONTACT INFORMATION  Please feel free to call us with any other questions or symptoms that are concerning for you at  [phone], if it is after 4:30 in the afternoon, or a weekend please call [phone] and ask for the on call specialist.  We want to do everything we can to help prevent you needing to return to the ED, so please do not hesitate to call us.       HEART FAILURE PATIENTS  Please weigh yourself daily and record your weights.  If you gain 2 pounds in 24 hours or 5 pounds in one week please call [phone].     DIET  It is recommended you follow a 2000 mg low sodium diet, avoid processed food, canned food and fast food restaurants.

## 2020-03-04 ENCOUNTER — RECORDS - HEALTHEAST (OUTPATIENT)
Dept: ADMINISTRATIVE | Facility: OTHER | Age: 75
End: 2020-03-04

## 2020-03-04 NOTE — PROGRESS NOTES
Palliative Care Outpatient Clinic Consultation Note    Patient:  Ken Doss    Chief Complaint:   Ken Doss 74 year old male who is presenting to the palliative medicine clinic today at the request of Dr. Johnson for a palliative care consultation secondary to LVAD evaluation.   The patient's primary care provider is:  Lewis Reeves.     History of Present Illness:  eKn Doss is a 73yo M with HFrEF w EF 20-25%, CAD s/p PCI, AFib s/p AV node ablation with CRT-D, DM, and ILD seen for LVAD evaluation.  He is here alone.      He says he feels the best he has in the past year since coming out the hospital.  This weekend he walked a mile around the track and felt well.  He says prior to his hospitalization, he had been having more fatigue during the day and finding he had to rest more with his daily activities.  He lives a very active lifestyle, is still working on and off, active with volunteering with the [employer] and with doing projects around his homes.  He denies any shortness of breath, he has a good appetite.  His mood is doing well.     Patient's Disease Understanding: He has a good understanding of the heart failure, that it is chronic and progressive.  He has been told that he may have a prognosis of 1 to 2 years without an LVAD, which was a shock to him.    Coping: His mariano and family are big supports to him.  He worries about the impact on his wife aware he to need more caregiver support, and the changes that would happen with the LVAD.  He states she works, is an active speaker, and does mission trips to [facility] often, which he knows she would scale back to take care of him, but he is conflicted about asking her to do this.     Social History  Living Situation: Lives with wife  Children: 2 sons - one in [place], one in [place].  4 grandchildren  Actual/Potential Caregiver(s): His wife   Support System: family, [employer] community  Occupation: Part time .  Previous  "[employer] .    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  Substance Use/History of misuse: Not discussed  Financial Concerns: Not discussed  Spiritual Background: Chiquis, wife is a .  Spiritual Concerns/Needs: Denies  Social History     Tobacco Use     Smoking status: Not on file   Substance Use Topics     Alcohol use: Not on file     Drug use: Not on file       Family History  No family history on file.  Patient's Involvement with Prior History of Serious Illness in Family: His mother  at age 54 of heart disease.  His father lived into his late 80s, and Skyler was his caregiver for the last 11 years.  He describes that his father had a cardiac arrest, and was placed on life support, then lived 8 years after that.  He is also seen many friends and family have severe chronic illness, including kidney failure and strokes in the past.     Advance Care Planning:  Advance Directive:    Says has not completed, but has had extensive conversations with his wife about what would be important to him.    Palliative Care questions for pre- LVAD patients:    What are your personal hopes/goals for receiving the LVAD?  He says the biggest advantage he sees is to help him live longer, but he has concerns about what his quality of life would be without extra life.  His hope would be that he would be able to maintain his current level of independence, without much disruption to his wife's life.    What are the activities/abilities that make your life worth living?  He says \"what I am doing now\".  He very much values his independence, ability to do projects at his home, and be an active part of his community.    What does a typical day look like for you?  He says he is an early riser, will make breakfast, watch the news, and then usually is working or running errands through most of the day.  He says he " has something scheduled most days.  He tries to not over schedule himself, but most days is active throughout the day.    There are risks for kidney failure in patients with advancing heart disease who need LVAD support.  The places/locations that offer dialysis and accept patients with LVADs may be limited in your community.  What do you know about dialysis? How would your possible need for dialysis influence your quality of life?  He has seen a friend live on dialysis for many years, but is aware of the restrictions that dialysis may place.  He is not sure if he would want this, and says it would depend on his current quality of life.    The need to be on a ventilator/breathing machine through a tracheostomy (a tube in your neck) is a risk with LVAD. What are with circumstances you would want your medical providers and family to keep you alive with long term mechanical ventilation?  He is very concerned about the lack of independence where he did need long-term ventilation, does not think that  that he would want a tracheostomy or his life prolonged with ventilation if needed long-term.    An LVAD carries a risk of stroke which, for some people, may limit their ability to communicate their wishes to others.  After a stroke, what sort of outcomes would be unacceptable to you (ie needing to live in nursing facility, loss of independence.. Etc.) He says if the stroke affected his cognition to the point he had difficulty being alert or interacting, or if it affected his independence then that would not be acceptable to him.    LVAD s are often placed with future heart transplant consideration. Some of our patients are determined not to be heart transplant candidates, or choose to forego heart transplant surgery for personal reasons.  If you had an LVAD placed inside of you for the remainder of your life, what would be your concerns? That any of the previously mentioned complications could occur, he is also very  concerned about infection.  He says were he in a dying state and he would accept deactivation of the LVAD.    What circumstances or events would lead you to decide or ask your health care agent to decide that you would want the LVAD turned off and be allowed to die a natural death?  He says were he in a dying state and he would accept deactivation of the LVAD.        No Known Allergies  Current Outpatient Medications   Medication Sig Dispense Refill     acetaminophen (TYLENOL) 500 MG tablet Take 500 mg by mouth 2 times daily        apixaban ANTICOAGULANT (ELIQUIS) 5 MG tablet Take 5 mg by mouth 2 times daily       atorvastatin (LIPITOR) 40 MG tablet Take 40 mg by mouth daily       blood glucose (NO BRAND SPECIFIED) test strip 4 times daily       bumetanide (BUMEX) 1 MG tablet Take 3 tablets (3 mg) by mouth 2 times daily 180 tablet 0     carvedilol (COREG) 6.25 MG tablet Take 1 tablet (6.25 mg) by mouth 2 times daily (with meals) 60 tablet 0     Coenzyme Q10 (COQ-10) 100 MG CAPS Take 100 mg by mouth daily       glucosamine-chondroitin (GLUCOSAMINE CHONDR COMPLEX) 500-400 MG CAPS per capsule Take 1 tablet by mouth daily        insulin glargine (BASAGLAR KWIKPEN) 100 UNIT/ML pen Inject 65 Units Subcutaneous At Bedtime       metFORMIN (GLUCOPHAGE) 1000 MG tablet Take 1,000 mg by mouth 2 times daily (with meals)       potassium chloride ER (KLOR-CON M) 20 MEQ CR tablet Take 20 mEq by mouth daily       sacubitril-valsartan (ENTRESTO) 49-51 MG per tablet Take 1 tablet by mouth 2 times daily 60 tablet 0     ticagrelor (BRILINTA) 90 MG tablet Take 1 tablet (90 mg) by mouth 2 times daily 30 tablet 0     No past medical history on file.  Past Surgical History:   Procedure Laterality Date     CV CORONARY ANGIOGRAM N/A 2/27/2020    Procedure: CV CORONARY ANGIOGRAM;  Surgeon: Pedro Fontaine MD;  Location:  HE[facility] CARD[facility] CATH LAB     CV INTRAVASULAR ULTRASOUND N/A 2/27/2020    Procedure: Intravascular Ultrasound;  " Surgeon: Pedro Fontaine MD;  Location:  HE[facility] CARDI[facility] CATH LAB     CV PCI ANGIOPLASTY N/A 2/27/2020    Procedure: Percutaneous Coronary Intervention Angioplasty;  Surgeon: Pedro Fontaine MD;  Location:  HE[facility] CARDIAC CATH LAB     CV RIGHT HEART CATH N/A 2/27/2020    Procedure: Right Heart Cath;  Surgeon: Pedro Fontaine MD;  Location:  HEART CARDIAC CATH LAB       REVIEW OF SYSTEMS:   ROS: 10 point ROS neg other than the symptoms noted above in the HPI and here:  Palliative Symptom Review (0=no symptom/no concern, 1=mild, 2=moderate, 3=severe):      Fatigue: 0-1      Depressive Symptoms: 0      Anxiety: 0      Drowsiness: 0      Poor Appetite: 0      Shortness of Breath: 0      Insomnia: 0      Other: 0      Overall (0 good/no concerns, 3 very poor):  0    /65   Pulse 71   Temp 97.5  F (36.4  C) (Oral)   Ht 1.753 m (5' 9\")   Wt 69.5 kg (153 lb 4.8 oz)   SpO2 97%   BMI 22.64 kg/m      Constitutional: Sitting up in chair, comfortable-appearing, in no distress   Eyes: Conjunctiva clear. Sclera anicteric  Respiratory:  Normal respiratory effort on room air.   Musculoskeletal: No lower extremity edema.  Gait: normal, steady without aid  Neuro: Conversational, no tremor.    Psych: Alert, appropriately interactive, full affect, clear sensorium.     Data Reviewed:  LABS:   Recent Labs   Lab Test 03/01/20  0617 02/29/20  0248    138   POTASSIUM 3.9 3.5   CHLORIDE 103 105   CO2 27 28   ANIONGAP 5 4   * 190*   BUN 21 26   CR 0.80 0.93   LYNNE 8.5 8.1*     GFR >90    EKG 2/28/20 - paced, QTc 512    TTE 1/22/20 via Care Everywhere  LV EF 20-25%, global hypokinesis.  Normal RV ventricular size and function.  Mild-mod MR.  Mod-severe tricuspic regurgitation.  RVSP 28 mmHg + RAP.      CT Chest wo 1/22/20 via Care Everywhere  1.  Pulmonary edema and small pleural effusions compromise evaluation of fibrotic interstitial lung disease.  2.  However, mild " basilar predominant fibrotic changes are present, best seen on prone imaging. Mild reticulation and traction bronchiectasis seen. Minimal subpleural cystic change, though cannot definitively confirm honeycombing. Findings meet criteria   for probable UIP pattern.  3.  New left upper lobe slightly consolidative opacity with differentials of asymmetric edema or infectious / inflammatory.  4.  Overall, mostly stable adenopathy. Prevascular node shows slight increased size. Consider continued follow-up.  5.  Cardiomegaly. Trace pericardial effusion. Severe coronary calcifications.  6.  Other findings in the report.    PFT 7/23/19  FEV1 is 2.74 L (91% predicted) and is normal.  FVC is 3.26 L (82% predicted) and is normal.  FEV1/FVC is 84% and is normal.    There was no improvement in spirometry after a single inhaled dose of   bronchodilator.  Of note, patient did cough during the FVC maneuver   following bronchodilator administration, which may affect the true values   of this study.      TLC is 4.99 L (72% predicted) and is mildly reduced.  RV is 1.83 L (68% predicted) and is normal.    DLCO is 15.59 mL/min/mmHg (62% predicted) and is normal when it is   corrected for hemoglobin.    Impression:  This pulmonary function study is mildly abnormal.  Spirometry   is normal and there is no bronchodilator response.  Total lung capacity is   mildly reduced.  The diffusion capacity, when corrected for hemoglobin, is   normal.      Impressions, Recommendations & Counseling:  Palliative Performance Score:  80  Decision Making Capacity:  Intact    Ken Doss is a 75yo M with HFrEF w EF 20-25%, CAD s/p PCI, AFib s/p AV node ablation with CRT-D, DM, and ILD seen for LVAD evaluation.  I introduced the palliative care team, our role in symptom, emotional and spiritual support, and particularly our role in preparedness planning before LVAD.  He was very engaged in the conversation, and was open that he is feeling well now and it  is difficult to consider the LVAD with how well he is feeling.  We discussed the expected trajectory of heart failure, and that I would expect his fatigue would increase, his functional ability would become more limited with time, and the possibility he would have multiple hospitalizations in the future.  He has had multiple life experiences both with his father who had had aggressive care after a cardiac arrest and recovered well, and with several friends and acquaintances who have had serious chronic illnesses and experience suffering because of them.  He is very worried about the risks of infection, stroke, and needing to spend more time in the hospital, as well as the toll this would take on his wife.  He overall values his independence and ability to be productive around his house, in his job, and in his community.  He has a good understanding of his heart disease and the LVAD.  He has not gone to support group, and I encouraged him to attend this to be able to envision what his life would look like with the LVAD.  He had many questions about preserving quality of life, and after the discussion of risks was actually very relieved by the discussion that an LVAD could be deactivated (or that other invasive treatments could be stopped), and the fact that it were he to have a complication he would not necessarily have to have his life prolonged in a state that he did not find acceptable.    He overall is weighing his choices, and does not seem decided yet.  I encouraged him to continue to bring his questions to his LVAD team and to support group.  He appears to be making decisions in line with the values he has lived out in his life and from experiences he has had in the past.  I have no concerns with continuing LVAD evaluation and placement were he to choose this.  We also discussed supportive and continued medical care for the remainder of his life if he did not choose LVAD, including usual course of severe heart  failure and eventual hospice.     RTC PRN  60 minutes spent face-to-face, >50% in counseling on disease understanding, preparedness planning    Elvi Fuentes MD  Palliative Medicine  Pager [phone]

## 2020-03-05 ENCOUNTER — OFFICE VISIT (OUTPATIENT)
Dept: CARDIOLOGY | Facility: CLINIC | Age: 75
End: 2020-03-05
Attending: NURSE PRACTITIONER
Payer: COMMERCIAL

## 2020-03-05 ENCOUNTER — ALLIED HEALTH/NURSE VISIT (OUTPATIENT)
Dept: TRANSPLANT | Facility: CLINIC | Age: 75
End: 2020-03-05
Attending: INTERNAL MEDICINE
Payer: COMMERCIAL

## 2020-03-05 VITALS
SYSTOLIC BLOOD PRESSURE: 105 MMHG | HEART RATE: 70 BPM | DIASTOLIC BLOOD PRESSURE: 60 MMHG | WEIGHT: 150.6 LBS | HEIGHT: 69 IN | OXYGEN SATURATION: 97 % | BODY MASS INDEX: 22.31 KG/M2

## 2020-03-05 DIAGNOSIS — I50.22 CHRONIC SYSTOLIC CONGESTIVE HEART FAILURE (H): ICD-10-CM

## 2020-03-05 DIAGNOSIS — Z95.810 IMPLANTABLE CARDIOVERTER-DEFIBRILLATOR (ICD) IN SITU: ICD-10-CM

## 2020-03-05 DIAGNOSIS — I50.22 CHRONIC SYSTOLIC CONGESTIVE HEART FAILURE (H): Primary | ICD-10-CM

## 2020-03-05 DIAGNOSIS — I42.9 PRIMARY CARDIOMYOPATHY (H): ICD-10-CM

## 2020-03-05 DIAGNOSIS — I48.19 PERSISTENT ATRIAL FIBRILLATION (H): ICD-10-CM

## 2020-03-05 DIAGNOSIS — I10 BENIGN ESSENTIAL HYPERTENSION: ICD-10-CM

## 2020-03-05 LAB
6 MIN WALK (FT): 1120 FT
6 MIN WALK (M): 341 M
ANION GAP SERPL CALCULATED.3IONS-SCNC: 7 MMOL/L (ref 3–14)
BUN SERPL-MCNC: 33 MG/DL (ref 7–30)
CALCIUM SERPL-MCNC: 9.1 MG/DL (ref 8.5–10.1)
CHLORIDE SERPL-SCNC: 95 MMOL/L (ref 94–109)
CO2 SERPL-SCNC: 28 MMOL/L (ref 20–32)
CREAT SERPL-MCNC: 0.84 MG/DL (ref 0.66–1.25)
DLCOCOR-%PRED-PRE: 71 %
DLCOCOR-PRE: 17.64 ML/MIN/MMHG
DLCOUNC-%PRED-PRE: 63 %
DLCOUNC-PRE: 15.55 ML/MIN/MMHG
DLCOUNC-PRED: 24.52 ML/MIN/MMHG
ERV-%PRED-PRE: 56 %
ERV-PRE: 0.72 L
ERV-PRED: 1.27 L
EXPTIME-PRE: 7.2 SEC
FEF2575-%PRED-PRE: 169 %
FEF2575-PRE: 3.75 L/SEC
FEF2575-PRED: 2.22 L/SEC
FEFMAX-%PRED-PRE: 105 %
FEFMAX-PRE: 8.15 L/SEC
FEFMAX-PRED: 7.72 L/SEC
FEV1-%PRED-PRE: 83 %
FEV1-PRE: 2.49 L
FEV1FEV6-PRE: 87 %
FEV1FEV6-PRED: 77 %
FEV1FVC-PRE: 87 %
FEV1FVC-PRED: 76 %
FEV1SVC-PRE: 86 %
FEV1SVC-PRED: 66 %
FIFMAX-PRE: 5.86 L/SEC
FRCPLETH-%PRED-PRE: 58 %
FRCPLETH-PRE: 2.16 L
FRCPLETH-PRED: 3.68 L
FVC-%PRED-PRE: 72 %
FVC-PRE: 2.87 L
FVC-PRED: 3.96 L
GFR SERPL CREATININE-BSD FRML MDRD: 86 ML/MIN/{1.73_M2}
GLUCOSE SERPL-MCNC: 229 MG/DL (ref 70–99)
IC-%PRED-PRE: 67 %
IC-PRE: 2.17 L
IC-PRED: 3.22 L
NT-PROBNP SERPL-MCNC: 7195 PG/ML (ref 0–125)
POTASSIUM SERPL-SCNC: 4 MMOL/L (ref 3.4–5.3)
RVPLETH-%PRED-PRE: 53 %
RVPLETH-PRE: 1.44 L
RVPLETH-PRED: 2.69 L
SODIUM SERPL-SCNC: 130 MMOL/L (ref 133–144)
TLCPLETH-%PRED-PRE: 62 %
TLCPLETH-PRE: 4.33 L
TLCPLETH-PRED: 6.92 L
VA-%PRED-PRE: 68 %
VA-PRE: 4.22 L
VC-%PRED-PRE: 64 %
VC-PRE: 2.89 L
VC-PRED: 4.49 L

## 2020-03-05 PROCEDURE — 99204 OFFICE O/P NEW MOD 45 MIN: CPT | Mod: ZP | Performed by: NURSE PRACTITIONER

## 2020-03-05 PROCEDURE — 36415 COLL VENOUS BLD VENIPUNCTURE: CPT | Performed by: NURSE PRACTITIONER

## 2020-03-05 PROCEDURE — 83880 ASSAY OF NATRIURETIC PEPTIDE: CPT | Performed by: NURSE PRACTITIONER

## 2020-03-05 PROCEDURE — G0463 HOSPITAL OUTPT CLINIC VISIT: HCPCS | Mod: ZF

## 2020-03-05 PROCEDURE — 80048 BASIC METABOLIC PNL TOTAL CA: CPT | Performed by: NURSE PRACTITIONER

## 2020-03-05 PROCEDURE — 97802 MEDICAL NUTRITION INDIV IN: CPT | Mod: ZF

## 2020-03-05 ASSESSMENT — PAIN SCALES - GENERAL: PAINLEVEL: NO PAIN (0)

## 2020-03-05 ASSESSMENT — MIFFLIN-ST. JEOR: SCORE: 1413.5

## 2020-03-05 NOTE — PROGRESS NOTES
Outpatient MNT: VAD Evaluation    Current BMI: 22.7 kg/m2 (HT 69 in,  lbs/70 kg)  BMI is within criteria of <35 for heart transplant       Time Spent: 30 minutes  Visit Type: Initial   Referring Physician: Dr. Back   Pt presented: alone    Medical dx associated with RD referral  Heart Failure     History of previous txp: None    Nutrition Assessment  Appetite: Pt reports that his appetite has improved since being discharged from the hospital over the last few days. He eats       Vitamins, Supplements, Pertinent Meds: Fish oil  Herbal Medicines/Supplements: None     Diet Recall  Breakfast Eggs and toast, Cheerios or Raisin Bran with skim milk and fruit, oatmeal   Lunch 1/4 meat or grilled cheese or peanut butter sandwich    Dinner Pot roast, homemade soup, grilled cheese sandwich   Snacks Fruit cups, popcorn, ice cream, 1-4 Boost per week    Beverages Water, skim milk, 3-4 cans of diet coke per week, black coffee, OJ   Alcohol Beer or hark alcohol (1-2 drinks per week)    Dining out 1-5 meals per week      Physical Activity  No designated exercise time      Anthropometrics  Height:   69 in   BMI:    22.7 kg/m2    Weight Status:Normal BMI   Weight:  154 lbs (70 kg)           IBW (lb): 160#  % IBW: 96%    Wt Hx: Pt reports unintentionally losing lean body mass over the last few months but it is difficult to quantify d/t fluid status. Skyler would like to restore his wt.     Dosing BW: 154 lbs/70 kg       Frailty Screening (note: frailty screening includes several items, handgrip strength being only ONE of several factors)   Weakness Meets criteria for frailty if  strength (average of 3 trials, dominant hand) is:    Men  ?29 kg for BMI ?24  ?30 kg for BMI 24.1-26  ?30 kg for BMI 26.1-28  ?32 kg for BMI >28 Women  ?17 kg for BMI ?23  ?17.3 kg for BMI 23.1-26  ?18 kg for BMI 26.1-29  ?21 kg for BMI >29    Equipment: Jhonny hand dynamometer  Participant attempts to squeeze the dynamometer maximally 3 times with  Medical Necessity Clause: This procedure was medically necessary because the lesions that were treated were: the dominant hand.   Average of 3 trials: 16 kg with BMI of 23  Meets criteria for frailty based on handgrip strength alone: Yes    Malnutrition  % Intake: Decreased intake does not meet criteria for malnutrition, improved over the last week   % Weight Loss: difficult to assess d/t fluid status   Subcutaneous Fat Loss: Facial region: moderate, Upper arm:  moderate and Lower arm:  moderate  Muscle Loss: Temporal: moderate, Facial & jaw region:  moderate, Upper arm (bicep, tricep):  moderate, Lower arm  (forearm):  moderate and Dorsal hand:  moderate  Fluid Accumulation/Edema: None noted today   Malnutrition Diagnosis: Severe malnutrition in the context of chronic illness.     Estimated Nutrition Needs  Energy  7388-9305 kcal/day     (30-35 kcal/kg for increased needs)   Protein  56-70 g/day     (0.8-1 g/kg for maintenance)           Fluid  Pending MD   Micronutrient   Na+: <2000 mg/day              Nutrition Diagnosis  Food and nutrition related knowledge deficit r/t pre heart transplant eval AEB pt verbalized not hearing pre/post transplant diet guidelines.    Nutrition Intervention  Nutrition education provided:  Discussed sodium intake (low sodium foods and drinks, seasoning food without salt and tips for low sodium diet).    Discussed the importance of consuming adequate protein. Helped the pt identify several low sodium protein sources that he already enjoys eating and encouraged him to eat at least 3 servings per day. Encouraged pt to drink daily Ensure to promote wt restoration.       Reviewed post VAD nutrition:  High likelihood of early satiety postop with placement of VAD, requiring small/frequent meals. Discussed need for high protein intake for healing and how adequate protein intake may be impacted by early satiety.     Patient Understanding: Pt verbalized understanding of education provided.  Expected Compliance: Good  Follow-Up Plans: PRN     Nutrition Goals  1. Limit Na+ <2000mg/day  2. Pt to  Medical Necessity Information: It is in your best interest to select a reason for this procedure from the list below. All of these items fulfill various CMS LCD requirements except the new and changing color options. verbalize understanding of 3 aspects of post txp education provided     Kira Buchanan RD, LD  [facility] [phone]   Include Z78.9 (Other Specified Conditions Influencing Health Status) As An Associated Diagnosis?: No Consent: The patient's consent was obtained including but not limited to risks of crusting, scabbing, blistering, scarring, darker or lighter pigmentary change, recurrence, incomplete removal and infection. Detail Level: Detailed Post-Care Instructions: I reviewed with the patient in detail post-care instructions. Patient is to wear sunprotection, and avoid picking at any of the treated lesions. Pt may apply Vaseline to crusted or scabbing areas. Duration Of Freeze Thaw-Cycle (Seconds): 0

## 2020-03-05 NOTE — NURSING NOTE
Diet: Patient instructed regarding a heart failure healthy diet, including discussion of reduced fat and 2000 mg daily sodium restriction, daily weights, medication purpose and compliance, fluid restrictions and resources for patient and family to access for assistance with heart failure management.       Labs: Patient was given results of the laboratory testing obtained today and patient was instructed about when to return for the next laboratory testing.     Med Reconcile: Reviewed and verified all current medications with the patient. The updated medication list was printed and given to the patient.     Med changes: increase bumex to 4 mg BID and potassium to 40 mEq daily for 3 days and then return to bumex 3 mg BID and potassium 20 mEq daily    Return Appointment: Patient given instructions regarding scheduling next clinic visit: LVAD appt as scheduled on 3/9, [place] on 3/10 at [facility] in 1 month    Patient stated he understood all health information given and agreed to call with further questions or concerns.     Liz Sánchez RN

## 2020-03-05 NOTE — PATIENT INSTRUCTIONS
Take your medicines every day, as directed    Changes made today:  o Bumex 4 mg twice a day for 3 days  o Potassium 40 mEq for 3 days     On Sunday go back to 3 mg twice a day of Bumex and 20 mEq daily Potassium        Monitor Your Weight and Symptoms    Contact us if you:      Gain 2 pounds in one day or 5 pounds in one week    Feel more short of breath    Notice more leg swelling    Feel lightheadeded   Change your lifestyle    Limit Salt or Sodium:    2000 mg  Limit Fluids:    2000 mL or approximately 64 ounces  Eat a Heart Healthy Diet    Low in saturated fats  Stay Active:    Aim to move at least 150 minutes every  week         To Contact us    During Business Hours:  [phone], option # 1 (University)  Then option # 4 (medical questions)     After hours, weekends or holidays:   [phone], Option #4  Ask to speak to the On-Call Cardiologist. Inform them you are a CORE/heart failure patient at the [facility].     Use [employer] allows you to communicate directly with your heart team through secure messaging.    [employer] can be accessed any time on your phone, computer, or tablet.    If you need assistance, we'd be happy to help!         Keep your Heart Appointments:    CORE in 4 weeks     Follow up with Dr Payton as scheduled on 3/10    LVAD appointments as scheduled on 3/9

## 2020-03-05 NOTE — PROGRESS NOTES
HPI: Ken is a 74 year old White male with a past medical history of CAD s/p PCI pLAD and pRCA, atrial fibrillation s/p AVN ablation s/p CRT-D, DM, ILD     Patient is new to [facility] [place] [facility]. Patient has been feeling better now he says as opposed to how he was prior. His weight he says is better it was 148 lbs today. Patient had some SOB with activity before, currently he has no SOB. He had some coughing before nothing now. He was able to walk without feeling SOB. Appetite is getting better. Sleeping better.  Swelling in legs and abdomen was present before but none now. He has had some extra bags of potato chips last night and pop.     Denies SOB, ESTRADA, PND, orthopnea, edema, weight gain, chest pain, palpitations, lightheadedness, dizziness, near syncopal/syncopal episodes. Ken has been following salt and fluid restrictions.      PAST MEDICAL HISTORY:  No past medical history on file.    FAMILY HISTORY:  No family history on file.    SOCIAL HISTORY:  Social History     Tobacco Use     Smoking status: Never Smoker     Smokeless tobacco: Never Used   Substance Use Topics     Alcohol use: Yes     Comment: occas     Drug use: Never           CURRENT MEDICATIONS:  Current Outpatient Medications   Medication Sig Dispense Refill     acetaminophen (TYLENOL) 500 MG tablet Take 500 mg by mouth 2 times daily        apixaban ANTICOAGULANT (ELIQUIS) 5 MG tablet Take 5 mg by mouth 2 times daily       atorvastatin (LIPITOR) 40 MG tablet Take 40 mg by mouth daily       blood glucose (NO BRAND SPECIFIED) test strip 4 times daily       bumetanide (BUMEX) 1 MG tablet Take 3 tablets (3 mg) by mouth 2 times daily 180 tablet 0     carvedilol (COREG) 6.25 MG tablet Take 1 tablet (6.25 mg) by mouth 2 times daily (with meals) 60 tablet 0     Coenzyme Q10 (COQ-10) 100 MG CAPS Take 100 mg by mouth daily       glucosamine-chondroitin (GLUCOSAMINE CHONDR COMPLEX) 500-400 MG CAPS per capsule Take 1 tablet by mouth daily        insulin glargine  "(BASAGLAR KWIKPEN) 100 UNIT/ML pen Inject 65 Units Subcutaneous At Bedtime       metFORMIN (GLUCOPHAGE) 1000 MG tablet Take 1,000 mg by mouth 2 times daily (with meals)       potassium chloride ER (KLOR-CON M) 20 MEQ CR tablet Take 20 mEq by mouth daily       sacubitril-valsartan (ENTRESTO) 49-51 MG per tablet Take 1 tablet by mouth 2 times daily 60 tablet 0     ticagrelor (BRILINTA) 90 MG tablet Take 1 tablet (90 mg) by mouth 2 times daily 30 tablet 0       ROS:  Review Of Systems  Skin: negative  Eyes: negative  Ears/Nose/Throat: negative  Respiratory: No shortness of breath, dyspnea on exertion, cough, or hemoptysis  Cardiovascular: negative  Gastrointestinal: negative  Genitourinary: negative  Musculoskeletal: negative  Neurologic: negative  Psychiatric: negative  Hematologic/Lymphatic/Immunologic: negative  Endocrine: negative      EXAM:  /60 (BP Location: Right arm, Patient Position: Chair, Cuff Size: Adult Regular)   Pulse 70   Ht 1.753 m (5' 9\")   Wt 68.3 kg (150 lb 9.6 oz)   SpO2 97%   BMI 22.24 kg/m    General: alert, articulate, and in no acute distress.  HEENT: normocephalic, atraumatic, anicteric sclera, EOMI, mucosa moist, no cyanosis.   Neck: neck supple.  No adenopathy, masses, or carotid bruits.  JVP  3 cm above clavicle   Heart: regular rhythm, normal S1/S2, no murmur, gallop, rub.  Precordium quiet with normal PMI.     Lungs: clear, no rales, ronchi, or wheezing.  No accessory muscle use, respirations unlabored.   Abdomen: soft, non-tender, bowel sounds present, no hepatomegaly  Extremities: trace edema.   No cyanosis.     Neurological: alert and oriented x 3.  normal speech and affect, no gross motor deficits  Skin:  No ecchymoses, rashes, or clubbing.    Labs:  CBC RESULTS:  Lab Results   Component Value Date    WBC 7.2 02/29/2020    RBC 3.63 (L) 02/29/2020    HGB 11.0 (L) 02/29/2020    HCT 34.7 (L) 02/29/2020    MCV 96 02/29/2020    MCH 30.3 02/29/2020    MCHC 31.7 02/29/2020    RDW " 18.9 (H) 02/29/2020     02/29/2020       CMP RESULTS:  Lab Results   Component Value Date     (L) 03/05/2020    POTASSIUM 4.0 03/05/2020    CHLORIDE 95 03/05/2020    CO2 28 03/05/2020    ANIONGAP 7 03/05/2020     (H) 03/05/2020    BUN 33 (H) 03/05/2020    CR 0.84 03/05/2020    GFRESTIMATED 86 03/05/2020    GFRESTBLACK >90 03/05/2020    LYNNE 9.1 03/05/2020        INR RESULTS:  No results found for: INR    No components found for: CK  Lab Results   Component Value Date    MAG 1.8 03/01/2020     Lab Results   Component Value Date    NTBNP 7,195 (H) 03/05/2020     @BRIEFLABR (dig)@    Most recent echocardiogram:  No results found for this or any previous visit (from the past 8760 hour(s)).      Assessment and Plan:    In summary,Ken  is a  74 year old male who is here for an initial CORE visit. He is hypervolemic today and it is felt to be due to dietary indiscretion.  We talked more about the importance of watching his diet. We will do a temporary 3 days worth of diuretic increase.      1.  Chronic systolic heart failure secondary to ICM .  Stage C  NYHA Class III  ACEi/ARB: yes  BB: yes  Aldosterone antagonist: deferred while other medical therapy is prioritized  SCD prophylaxis: ICD  Fluid status: hypervolemic  Anticoagulation: apixiban  Antiplatelet:  ASA dose   NSAID use:  Contraindicated.  Avoid use.        2.  Follow-up   Bumex 4 mg twice a day for 3 days   Potassium 40 mEq 3 days   CORE in about 4 weeks  Dr. Payton next week as scheduled- labs then      Carla LE, CNP  [facility]

## 2020-03-05 NOTE — LETTER
3/5/2020      RE: Ken Doss  [address] [place] [id number]       Dear Colleague,    Thank you for the opportunity to participate in the care of your patient, Ken Doss, at the [facility]. Please see a copy of my visit note below.      HPI: Ken is a 74 year old White male with a past medical history of CAD s/p PCI pLAD and pRCA, atrial fibrillation s/p AVN ablation s/p CRT-D, DM, ILD     Patient is new to [facility]. Patient has been feeling better now he says as opposed to how he was prior. His weight he says is better it was 148 lbs today. Patient had some SOB with activity before, currently he has no SOB. He had some coughing before nothing now. He was able to walk without feeling SOB. Appetite is getting better. Sleeping better.  Swelling in legs and abdomen was present before but none now. He has had some extra bags of potato chips last night and pop.     Denies SOB, ESTRADA, PND, orthopnea, edema, weight gain, chest pain, palpitations, lightheadedness, dizziness, near syncopal/syncopal episodes. Ken has been following salt and fluid restrictions.      PAST MEDICAL HISTORY:  No past medical history on file.    FAMILY HISTORY:  No family history on file.    SOCIAL HISTORY:  Social History     Tobacco Use     Smoking status: Never Smoker     Smokeless tobacco: Never Used   Substance Use Topics     Alcohol use: Yes     Comment: occas     Drug use: Never           CURRENT MEDICATIONS:  Current Outpatient Medications   Medication Sig Dispense Refill     acetaminophen (TYLENOL) 500 MG tablet Take 500 mg by mouth 2 times daily        apixaban ANTICOAGULANT (ELIQUIS) 5 MG tablet Take 5 mg by mouth 2 times daily       atorvastatin (LIPITOR) 40 MG tablet Take 40 mg by mouth daily       blood glucose (NO BRAND SPECIFIED) test strip 4 times daily       bumetanide (BUMEX) 1 MG tablet Take 3 tablets (3 mg) by mouth 2 times daily 180 tablet 0  "    carvedilol (COREG) 6.25 MG tablet Take 1 tablet (6.25 mg) by mouth 2 times daily (with meals) 60 tablet 0     Coenzyme Q10 (COQ-10) 100 MG CAPS Take 100 mg by mouth daily       glucosamine-chondroitin (GLUCOSAMINE CHONDR COMPLEX) 500-400 MG CAPS per capsule Take 1 tablet by mouth daily        insulin glargine (BASAGLAR KWIKPEN) 100 UNIT/ML pen Inject 65 Units Subcutaneous At Bedtime       metFORMIN (GLUCOPHAGE) 1000 MG tablet Take 1,000 mg by mouth 2 times daily (with meals)       potassium chloride ER (KLOR-CON M) 20 MEQ CR tablet Take 20 mEq by mouth daily       sacubitril-valsartan (ENTRESTO) 49-51 MG per tablet Take 1 tablet by mouth 2 times daily 60 tablet 0     ticagrelor (BRILINTA) 90 MG tablet Take 1 tablet (90 mg) by mouth 2 times daily 30 tablet 0       ROS:  Review Of Systems  Skin: negative  Eyes: negative  Ears/Nose/Throat: negative  Respiratory: No shortness of breath, dyspnea on exertion, cough, or hemoptysis  Cardiovascular: negative  Gastrointestinal: negative  Genitourinary: negative  Musculoskeletal: negative  Neurologic: negative  Psychiatric: negative  Hematologic/Lymphatic/Immunologic: negative  Endocrine: negative      EXAM:  /60 (BP Location: Right arm, Patient Position: Chair, Cuff Size: Adult Regular)   Pulse 70   Ht 1.753 m (5' 9\")   Wt 68.3 kg (150 lb 9.6 oz)   SpO2 97%   BMI 22.24 kg/m     General: alert, articulate, and in no acute distress.  HEENT: normocephalic, atraumatic, anicteric sclera, EOMI, mucosa moist, no cyanosis.   Neck: neck supple.  No adenopathy, masses, or carotid bruits.  JVP  3 cm above clavicle   Heart: regular rhythm, normal S1/S2, no murmur, gallop, rub.  Precordium quiet with normal PMI.     Lungs: clear, no rales, ronchi, or wheezing.  No accessory muscle use, respirations unlabored.   Abdomen: soft, non-tender, bowel sounds present, no hepatomegaly  Extremities: trace edema.   No cyanosis.     Neurological: alert and oriented x 3.  normal speech " and affect, no gross motor deficits  Skin:  No ecchymoses, rashes, or clubbing.    Labs:  CBC RESULTS:  Lab Results   Component Value Date    WBC 7.2 02/29/2020    RBC 3.63 (L) 02/29/2020    HGB 11.0 (L) 02/29/2020    HCT 34.7 (L) 02/29/2020    MCV 96 02/29/2020    MCH 30.3 02/29/2020    MCHC 31.7 02/29/2020    RDW 18.9 (H) 02/29/2020     02/29/2020       CMP RESULTS:  Lab Results   Component Value Date     (L) 03/05/2020    POTASSIUM 4.0 03/05/2020    CHLORIDE 95 03/05/2020    CO2 28 03/05/2020    ANIONGAP 7 03/05/2020     (H) 03/05/2020    BUN 33 (H) 03/05/2020    CR 0.84 03/05/2020    GFRESTIMATED 86 03/05/2020    GFRESTBLACK >90 03/05/2020    LYNNE 9.1 03/05/2020        INR RESULTS:  No results found for: INR    No components found for: CK  Lab Results   Component Value Date    MAG 1.8 03/01/2020     Lab Results   Component Value Date    NTBNP 7,195 (H) 03/05/2020     @BRIEFLABR (dig)@    Most recent echocardiogram:  No results found for this or any previous visit (from the past 8760 hour(s)).      Assessment and Plan:    In summary,Ken  is a  74 year old male who is here for an initial CORE visit. He is hypervolemic today and it is felt to be due to dietary indiscretion.  We talked more about the importance of watching his diet. We will do a temporary 3 days worth of diuretic increase.      1.  Chronic systolic heart failure secondary to ICM .  Stage C  NYHA Class III  ACEi/ARB: yes  BB: yes  Aldosterone antagonist: deferred while other medical therapy is prioritized  SCD prophylaxis: ICD  Fluid status: hypervolemic  Anticoagulation: apixiban  Antiplatelet:  ASA dose   NSAID use:  Contraindicated.  Avoid use.        2.  Follow-up   Bumex 4 mg twice a day for 3 days   Potassium 40 mEq 3 days   CORE in about 4 weeks  Dr. Payton next week as scheduled- labs then      Carla LE, CNP  [facility]

## 2020-03-05 NOTE — NURSING NOTE
Chief Complaint   Patient presents with     New Patient     New CORE 74 y.o male with HFrEF EF 20-25% with labs prior.     Vitals were taken and medications were reconciled.     Lety Art RMA  10:16 AM

## 2020-03-09 ENCOUNTER — COMMUNICATION - HEALTHEAST (OUTPATIENT)
Dept: CARDIOLOGY | Facility: CLINIC | Age: 75
End: 2020-03-09

## 2020-03-09 ENCOUNTER — OFFICE VISIT (OUTPATIENT)
Dept: CARDIOLOGY | Facility: CLINIC | Age: 75
End: 2020-03-09
Attending: INTERNAL MEDICINE
Payer: COMMERCIAL

## 2020-03-09 ENCOUNTER — ANCILLARY PROCEDURE (OUTPATIENT)
Dept: ULTRASOUND IMAGING | Facility: CLINIC | Age: 75
End: 2020-03-09
Attending: INTERNAL MEDICINE
Payer: COMMERCIAL

## 2020-03-09 ENCOUNTER — OFFICE VISIT (OUTPATIENT)
Dept: PALLIATIVE CARE | Facility: CLINIC | Age: 75
End: 2020-03-09
Attending: INTERNAL MEDICINE
Payer: COMMERCIAL

## 2020-03-09 ENCOUNTER — ANCILLARY PROCEDURE (OUTPATIENT)
Dept: CT IMAGING | Facility: CLINIC | Age: 75
End: 2020-03-09
Attending: INTERNAL MEDICINE
Payer: COMMERCIAL

## 2020-03-09 VITALS
TEMPERATURE: 97.5 F | HEART RATE: 71 BPM | DIASTOLIC BLOOD PRESSURE: 65 MMHG | BODY MASS INDEX: 22.71 KG/M2 | HEIGHT: 69 IN | SYSTOLIC BLOOD PRESSURE: 101 MMHG | WEIGHT: 153.3 LBS | OXYGEN SATURATION: 97 %

## 2020-03-09 VITALS
SYSTOLIC BLOOD PRESSURE: 92 MMHG | HEIGHT: 69 IN | DIASTOLIC BLOOD PRESSURE: 54 MMHG | BODY MASS INDEX: 22.66 KG/M2 | WEIGHT: 153 LBS | HEART RATE: 69 BPM | OXYGEN SATURATION: 97 %

## 2020-03-09 DIAGNOSIS — I50.22 CHRONIC SYSTOLIC CONGESTIVE HEART FAILURE (H): ICD-10-CM

## 2020-03-09 DIAGNOSIS — R09.89 OTHER SPECIFIED SYMPTOMS AND SIGNS INVOLVING THE CIRCULATORY AND RESPIRATORY SYSTEMS: ICD-10-CM

## 2020-03-09 DIAGNOSIS — Z79.899 OTHER LONG TERM (CURRENT) DRUG THERAPY: ICD-10-CM

## 2020-03-09 DIAGNOSIS — Z12.5 SCREENING PSA (PROSTATE SPECIFIC ANTIGEN): ICD-10-CM

## 2020-03-09 DIAGNOSIS — Z71.89 COUNSELING REGARDING ADVANCED CARE PLANNING AND GOALS OF CARE: Primary | ICD-10-CM

## 2020-03-09 DIAGNOSIS — R79.9 ABNORMAL FINDING OF BLOOD CHEMISTRY, UNSPECIFIED: ICD-10-CM

## 2020-03-09 LAB
ABO + RH BLD: NORMAL
ABO + RH BLD: NORMAL
ALBUMIN SERPL-MCNC: 3.8 G/DL (ref 3.4–5)
ALBUMIN UR-MCNC: 30 MG/DL
ALP SERPL-CCNC: 105 U/L (ref 40–150)
ALT SERPL W P-5'-P-CCNC: 37 U/L (ref 0–70)
AMPHETAMINES UR QL SCN: NEGATIVE
ANION GAP SERPL CALCULATED.3IONS-SCNC: 7 MMOL/L (ref 3–14)
APPEARANCE UR: CLEAR
APTT PPP: 33 SEC (ref 22–37)
AST SERPL W P-5'-P-CCNC: 29 U/L (ref 0–45)
BARBITURATES UR QL: NEGATIVE
BASOPHILS # BLD AUTO: 0 10E9/L (ref 0–0.2)
BASOPHILS NFR BLD AUTO: 0.2 %
BENZODIAZ UR QL: NEGATIVE
BILIRUB SERPL-MCNC: 2.4 MG/DL (ref 0.2–1.3)
BILIRUB UR QL STRIP: NEGATIVE
BLD GP AB SCN SERPL QL: NORMAL
BLOOD BANK CMNT PATIENT-IMP: NORMAL
BUN SERPL-MCNC: 37 MG/DL (ref 7–30)
CALCIUM SERPL-MCNC: 9.6 MG/DL (ref 8.5–10.1)
CANNABINOIDS UR QL SCN: NEGATIVE
CHLORIDE SERPL-SCNC: 100 MMOL/L (ref 94–109)
CO2 SERPL-SCNC: 30 MMOL/L (ref 20–32)
COCAINE UR QL: NEGATIVE
COLOR UR AUTO: YELLOW
CREAT SERPL-MCNC: 0.82 MG/DL (ref 0.66–1.25)
DIFFERENTIAL METHOD BLD: ABNORMAL
EOSINOPHIL # BLD AUTO: 0 10E9/L (ref 0–0.7)
EOSINOPHIL NFR BLD AUTO: 0.2 %
ERYTHROCYTE [DISTWIDTH] IN BLOOD BY AUTOMATED COUNT: 17.3 % (ref 10–15)
ETHANOL UR QL SCN: NEGATIVE
FERRITIN SERPL-MCNC: 127 NG/ML (ref 26–388)
GFR SERPL CREATININE-BSD FRML MDRD: 87 ML/MIN/{1.73_M2}
GLUCOSE SERPL-MCNC: 95 MG/DL (ref 70–99)
GLUCOSE UR STRIP-MCNC: NEGATIVE MG/DL
HBA1C MFR BLD: 7.8 % (ref 0–5.6)
HCT VFR BLD AUTO: 40.9 % (ref 40–53)
HGB BLD-MCNC: 13.6 G/DL (ref 13.3–17.7)
HGB UR QL STRIP: NEGATIVE
HYALINE CASTS #/AREA URNS LPF: 6 /LPF (ref 0–2)
IMM GRANULOCYTES # BLD: 0.1 10E9/L (ref 0–0.4)
IMM GRANULOCYTES NFR BLD: 0.5 %
INR PPP: 1.29 (ref 0.86–1.14)
IRON SATN MFR SERPL: 36 % (ref 15–46)
IRON SERPL-MCNC: 143 UG/DL (ref 35–180)
KETONES UR STRIP-MCNC: NEGATIVE MG/DL
LEUKOCYTE ESTERASE UR QL STRIP: NEGATIVE
LYMPHOCYTES # BLD AUTO: 1.5 10E9/L (ref 0.8–5.3)
LYMPHOCYTES NFR BLD AUTO: 12.1 %
MCH RBC QN AUTO: 31.1 PG (ref 26.5–33)
MCHC RBC AUTO-ENTMCNC: 33.3 G/DL (ref 31.5–36.5)
MCV RBC AUTO: 93 FL (ref 78–100)
MONOCYTES # BLD AUTO: 0.9 10E9/L (ref 0–1.3)
MONOCYTES NFR BLD AUTO: 6.8 %
MUCOUS THREADS #/AREA URNS LPF: PRESENT /LPF
NEUTROPHILS # BLD AUTO: 10 10E9/L (ref 1.6–8.3)
NEUTROPHILS NFR BLD AUTO: 80.2 %
NITRATE UR QL: NEGATIVE
NRBC # BLD AUTO: 0 10*3/UL
NRBC BLD AUTO-RTO: 0 /100
OPIATES UR QL SCN: NEGATIVE
PCP UR QL SCN: NEGATIVE
PH UR STRIP: 6 PH (ref 5–7)
PHOSPHATE SERPL-MCNC: 3.8 MG/DL (ref 2.5–4.5)
PLATELET # BLD AUTO: 259 10E9/L (ref 150–450)
POTASSIUM SERPL-SCNC: 3.4 MMOL/L (ref 3.4–5.3)
PREALB SERPL IA-MCNC: 34 MG/DL (ref 15–45)
PROT SERPL-MCNC: 7.8 G/DL (ref 6.8–8.8)
PSA SERPL-ACNC: 1.2 UG/L (ref 0–4)
RADIOLOGIST FLAGS: NORMAL
RBC # BLD AUTO: 4.38 10E12/L (ref 4.4–5.9)
RBC #/AREA URNS AUTO: 1 /HPF (ref 0–2)
SODIUM SERPL-SCNC: 137 MMOL/L (ref 133–144)
SOURCE: ABNORMAL
SP GR UR STRIP: 1.01 (ref 1–1.03)
SPECIMEN EXP DATE BLD: NORMAL
SQUAMOUS #/AREA URNS AUTO: <1 /HPF (ref 0–1)
TIBC SERPL-MCNC: 394 UG/DL (ref 240–430)
TRANSFERRIN SERPL-MCNC: 304 MG/DL (ref 210–360)
TSH SERPL DL<=0.005 MIU/L-ACNC: 1.02 MU/L (ref 0.4–4)
UROBILINOGEN UR STRIP-MCNC: 0 MG/DL (ref 0–2)
WBC # BLD AUTO: 12.5 10E9/L (ref 4–11)
WBC #/AREA URNS AUTO: 3 /HPF (ref 0–5)

## 2020-03-09 PROCEDURE — G0463 HOSPITAL OUTPT CLINIC VISIT: HCPCS

## 2020-03-09 PROCEDURE — 00000401 ZZHCL STATISTIC THROMBIN TIME NC: Performed by: INTERNAL MEDICINE

## 2020-03-09 PROCEDURE — G0463 HOSPITAL OUTPT CLINIC VISIT: HCPCS | Mod: ZF,27

## 2020-03-09 PROCEDURE — 99205 OFFICE O/P NEW HI 60 MIN: CPT | Performed by: INTERNAL MEDICINE

## 2020-03-09 PROCEDURE — 85730 THROMBOPLASTIN TIME PARTIAL: CPT | Performed by: INTERNAL MEDICINE

## 2020-03-09 PROCEDURE — 85613 RUSSELL VIPER VENOM DILUTED: CPT | Performed by: INTERNAL MEDICINE

## 2020-03-09 PROCEDURE — 80307 DRUG TEST PRSMV CHEM ANLYZR: CPT | Performed by: INTERNAL MEDICINE

## 2020-03-09 PROCEDURE — 80320 DRUG SCREEN QUANTALCOHOLS: CPT | Performed by: INTERNAL MEDICINE

## 2020-03-09 PROCEDURE — G0463 HOSPITAL OUTPT CLINIC VISIT: HCPCS | Mod: ZF

## 2020-03-09 PROCEDURE — 84466 ASSAY OF TRANSFERRIN: CPT | Performed by: INTERNAL MEDICINE

## 2020-03-09 RX ORDER — FUROSEMIDE 40 MG
40 TABLET ORAL 2 TIMES DAILY
COMMUNITY
Start: 2020-01-06 | End: 2020-03-10

## 2020-03-09 RX ORDER — PREDNISONE 20 MG/1
10 TABLET ORAL DAILY
COMMUNITY
Start: 2020-03-04 | End: 2021-01-01

## 2020-03-09 RX ORDER — CLOPIDOGREL BISULFATE 75 MG/1
75 TABLET ORAL DAILY
COMMUNITY
Start: 2020-01-06 | End: 2020-03-10

## 2020-03-09 RX ORDER — ACYCLOVIR 200 MG/1
10 CAPSULE ORAL ONCE
Status: COMPLETED | OUTPATIENT
Start: 2020-03-09 | End: 2020-03-09

## 2020-03-09 RX ORDER — VALACYCLOVIR HYDROCHLORIDE 1 G/1
1000 TABLET, FILM COATED ORAL
Status: ON HOLD | COMMUNITY
Start: 2020-02-08 | End: 2020-07-30

## 2020-03-09 RX ADMIN — Medication 5 ML: at 16:45

## 2020-03-09 RX ADMIN — ACYCLOVIR 10 ML: 200 CAPSULE ORAL at 16:35

## 2020-03-09 ASSESSMENT — PAIN SCALES - GENERAL
PAINLEVEL: NO PAIN (0)
PAINLEVEL: NO PAIN (0)

## 2020-03-09 ASSESSMENT — MIFFLIN-ST. JEOR
SCORE: 1424.38
SCORE: 1425.74

## 2020-03-09 NOTE — LETTER
3/9/2020       RE: Ken Doss  [address] [place] [id number]     Dear Colleague,    Thank you for referring your patient, Ken Doss, to the [facility] at [facility]. Please see a copy of my visit note below.     Palliative Care Outpatient Clinic Consultation Note    Patient:  Ken Doss    Chief Complaint:   Ken Doss 74 year old male who is presenting to the palliative medicine clinic today at the request of Dr. Johnson for a palliative care consultation secondary to LVAD evaluation.   The patient's primary care provider is:  Lewis Reeves     History of Present Illness:  Ken Doss is a 75yo M with HFrEF w EF 20-25%, CAD s/p PCI, AFib s/p AV node ablation with CRT-D, DM, and ILD seen for LVAD evaluation.  He is here alone.      He says he feels the best he has in the past year since coming out the hospital.  This weekend he walked a mile around the track and felt well.  He says prior to his hospitalization, he had been having more fatigue during the day and finding he had to rest more with his daily activities.  He lives a very active lifestyle, is still working on and off, active with volunteering with the [employer] and with doing projects around his homes.  He denies any shortness of breath, he has a good appetite.  His mood is doing well.     Patient's Disease Understanding: He has a good understanding of the heart failure, that it is chronic and progressive.  He has been told that he may have a prognosis of 1 to 2 years without an LVAD, which was a shock to him.    Coping: His mariano and family are big supports to him.  He worries about the impact on his wife aware he to need more caregiver support, and the changes that would happen with the LVAD.  He states she works, is an active speaker, and does mission trips to [facility] often, which he knows she would scale back to take care of him, but he is  "conflicted about asking her to do this.     Social History  Living Situation: Lives with wife  Children: 2 sons - one in [place], one in [place].  4 grandchildren  Actual/Potential Caregiver(s): His wife   Support System: family, Taoism community  Occupation: Part time .  Previous [employer] .    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  Substance Use/History of misuse: Not discussed  Financial Concerns: Not discussed  Spiritual Background: Chiquis, wife is a .  Spiritual Concerns/Needs: Denies  Social History     Tobacco Use     Smoking status: Not on file   Substance Use Topics     Alcohol use: Not on file     Drug use: Not on file       Family History  No family history on file.  Patient's Involvement with Prior History of Serious Illness in Family: His mother  at age 54 of heart disease.  His father lived into his late 80s, and Skyler was his caregiver for the last 11 years.  He describes that his father had a cardiac arrest, and was placed on life support, then lived 8 years after that.  He is also seen many friends and family have severe chronic illness, including kidney failure and strokes in the past.     Advance Care Planning:  Advance Directive:    Says has not completed, but has had extensive conversations with his wife about what would be important to him.    Palliative Care questions for pre- LVAD patients:    What are your personal hopes/goals for receiving the LVAD?  He says the biggest advantage he sees is to help him live longer, but he has concerns about what his quality of life would be without extra life.  His hope would be that he would be able to maintain his current level of independence, without much disruption to his wife's life.    What are the activities/abilities that make your life worth living?  He says \"what I am doing now\".  He very much " values his independence, ability to do projects at his home, and be an active part of his community.    What does a typical day look like for you?  He says he is an early riser, will make breakfast, watch the news, and then usually is working or running errands through most of the day.  He says he has something scheduled most days.  He tries to not over schedule himself, but most days is active throughout the day.    There are risks for kidney failure in patients with advancing heart disease who need LVAD support.  The places/locations that offer dialysis and accept patients with LVADs may be limited in your community.  What do you know about dialysis? How would your possible need for dialysis influence your quality of life?  He has seen a friend live on dialysis for many years, but is aware of the restrictions that dialysis may place.  He is not sure if he would want this, and says it would depend on his current quality of life.    The need to be on a ventilator/breathing machine through a tracheostomy (a tube in your neck) is a risk with LVAD. What are with circumstances you would want your medical providers and family to keep you alive with long term mechanical ventilation?  He is very concerned about the lack of independence where he did need long-term ventilation, does not think that  that he would want a tracheostomy or his life prolonged with ventilation if needed long-term.    An LVAD carries a risk of stroke which, for some people, may limit their ability to communicate their wishes to others.  After a stroke, what sort of outcomes would be unacceptable to you (ie needing to live in nursing facility, loss of independence.. Etc.) He says if the stroke affected his cognition to the point he had difficulty being alert or interacting, or if it affected his independence then that would not be acceptable to him.    LVAD s are often placed with future heart transplant consideration. Some of our patients are  determined not to be heart transplant candidates, or choose to forego heart transplant surgery for personal reasons.  If you had an LVAD placed inside of you for the remainder of your life, what would be your concerns? That any of the previously mentioned complications could occur, he is also very concerned about infection.  He says were he in a dying state and he would accept deactivation of the LVAD.    What circumstances or events would lead you to decide or ask your health care agent to decide that you would want the LVAD turned off and be allowed to die a natural death?  He says were he in a dying state and he would accept deactivation of the LVAD.        No Known Allergies  Current Outpatient Medications   Medication Sig Dispense Refill     acetaminophen (TYLENOL) 500 MG tablet Take 500 mg by mouth 2 times daily        apixaban ANTICOAGULANT (ELIQUIS) 5 MG tablet Take 5 mg by mouth 2 times daily       atorvastatin (LIPITOR) 40 MG tablet Take 40 mg by mouth daily       blood glucose (NO BRAND SPECIFIED) test strip 4 times daily       bumetanide (BUMEX) 1 MG tablet Take 3 tablets (3 mg) by mouth 2 times daily 180 tablet 0     carvedilol (COREG) 6.25 MG tablet Take 1 tablet (6.25 mg) by mouth 2 times daily (with meals) 60 tablet 0     Coenzyme Q10 (COQ-10) 100 MG CAPS Take 100 mg by mouth daily       glucosamine-chondroitin (GLUCOSAMINE CHONDR COMPLEX) 500-400 MG CAPS per capsule Take 1 tablet by mouth daily        insulin glargine (BASAGLAR KWIKPEN) 100 UNIT/ML pen Inject 65 Units Subcutaneous At Bedtime       metFORMIN (GLUCOPHAGE) 1000 MG tablet Take 1,000 mg by mouth 2 times daily (with meals)       potassium chloride ER (KLOR-CON M) 20 MEQ CR tablet Take 20 mEq by mouth daily       sacubitril-valsartan (ENTRESTO) 49-51 MG per tablet Take 1 tablet by mouth 2 times daily 60 tablet 0     ticagrelor (BRILINTA) 90 MG tablet Take 1 tablet (90 mg) by mouth 2 times daily 30 tablet 0     No past medical history on  "file.  Past Surgical History:   Procedure Laterality Date     CV CORONARY ANGIOGRAM N/A 2/27/2020    Procedure: CV CORONARY ANGIOGRAM;  Surgeon: Pedro Fontaine MD;  Location:  HEART CARDIAC CATH LAB     CV INTRAVASULAR ULTRASOUND N/A 2/27/2020    Procedure: Intravascular Ultrasound;  Surgeon: Pedro Fontaine MD;  Location:  HEART CARDIAC CATH LAB     CV PCI ANGIOPLASTY N/A 2/27/2020    Procedure: Percutaneous Coronary Intervention Angioplasty;  Surgeon: Pedro Fontaine MD;  Location:  HEART CARDIAC CATH LAB     CV RIGHT HEART CATH N/A 2/27/2020    Procedure: Right Heart Cath;  Surgeon: Pedro Fontaine MD;  Location:  HEART CARDIAC CATH LAB       REVIEW OF SYSTEMS:   ROS: 10 point ROS neg other than the symptoms noted above in the HPI and here:  Palliative Symptom Review (0=no symptom/no concern, 1=mild, 2=moderate, 3=severe):      Fatigue: 0-1      Depressive Symptoms: 0      Anxiety: 0      Drowsiness: 0      Poor Appetite: 0      Shortness of Breath: 0      Insomnia: 0      Other: 0      Overall (0 good/no concerns, 3 very poor):  0    /65   Pulse 71   Temp 97.5  F (36.4  C) (Oral)   Ht 1.753 m (5' 9\")   Wt 69.5 kg (153 lb 4.8 oz)   SpO2 97%   BMI 22.64 kg/m      Constitutional: Sitting up in chair, comfortable-appearing, in no distress   Eyes: Conjunctiva clear. Sclera anicteric  Respiratory:  Normal respiratory effort on room air.   Musculoskeletal: No lower extremity edema.  Gait: normal, steady without aid  Neuro: Conversational, no tremor.    Psych: Alert, appropriately interactive, full affect, clear sensorium.     Data Reviewed:  LABS:   Recent Labs   Lab Test 03/01/20  0617 02/29/20  0248    138   POTASSIUM 3.9 3.5   CHLORIDE 103 105   CO2 27 28   ANIONGAP 5 4   * 190*   BUN 21 26   CR 0.80 0.93   LYNNE 8.5 8.1*     GFR >90    EKG 2/28/20 - paced, QTc 512    TTE 1/22/20 via Care Everywhere  LV EF 20-25%, global " hypokinesis.  Normal RV ventricular size and function.  Mild-mod MR.  Mod-severe tricuspic regurgitation.  RVSP 28 mmHg + RAP.      CT Chest wo 1/22/20 via Care Everywhere  1.  Pulmonary edema and small pleural effusions compromise evaluation of fibrotic interstitial lung disease.  2.  However, mild basilar predominant fibrotic changes are present, best seen on prone imaging. Mild reticulation and traction bronchiectasis seen. Minimal subpleural cystic change, though cannot definitively confirm honeycombing. Findings meet criteria   for probable UIP pattern.  3.  New left upper lobe slightly consolidative opacity with differentials of asymmetric edema or infectious / inflammatory.  4.  Overall, mostly stable adenopathy. Prevascular node shows slight increased size. Consider continued follow-up.  5.  Cardiomegaly. Trace pericardial effusion. Severe coronary calcifications.  6.  Other findings in the report.    PFT 7/23/19  FEV1 is 2.74 L (91% predicted) and is normal.  FVC is 3.26 L (82% predicted) and is normal.  FEV1/FVC is 84% and is normal.    There was no improvement in spirometry after a single inhaled dose of   bronchodilator.  Of note, patient did cough during the FVC maneuver   following bronchodilator administration, which may affect the true values   of this study.      TLC is 4.99 L (72% predicted) and is mildly reduced.  RV is 1.83 L (68% predicted) and is normal.    DLCO is 15.59 mL/min/mmHg (62% predicted) and is normal when it is   corrected for hemoglobin.    Impression:  This pulmonary function study is mildly abnormal.  Spirometry   is normal and there is no bronchodilator response.  Total lung capacity is   mildly reduced.  The diffusion capacity, when corrected for hemoglobin, is   normal.      Impressions, Recommendations & Counseling:  Palliative Performance Score:  80  Decision Making Capacity:  Intact    Ken Doss is a 75yo M with HFrEF w EF 20-25%, CAD s/p PCI, AFib s/p AV node  ablation with CRT-D, DM, and ILD seen for LVAD evaluation.  I introduced the palliative care team, our role in symptom, emotional and spiritual support, and particularly our role in preparedness planning before LVAD.  He was very engaged in the conversation, and was open that he is feeling well now and it is difficult to consider the LVAD with how well he is feeling.  We discussed the expected trajectory of heart failure, and that I would expect his fatigue would increase, his functional ability would become more limited with time, and the possibility he would have multiple hospitalizations in the future.  He has had multiple life experiences both with his father who had had aggressive care after a cardiac arrest and recovered well, and with several friends and acquaintances who have had serious chronic illnesses and experience suffering because of them.  He is very worried about the risks of infection, stroke, and needing to spend more time in the hospital, as well as the toll this would take on his wife.  He overall values his independence and ability to be productive around his house, in his job, and in his community.  He has a good understanding of his heart disease and the LVAD.  He has not gone to support group, and I encouraged him to attend this to be able to envision what his life would look like with the LVAD.  He had many questions about preserving quality of life, and after the discussion of risks was actually very relieved by the discussion that an LVAD could be deactivated (or that other invasive treatments could be stopped), and the fact that it were he to have a complication he would not necessarily have to have his life prolonged in a state that he did not find acceptable.    He overall is weighing his choices, and does not seem decided yet.  I encouraged him to continue to bring his questions to his LVAD team and to support group.  He appears to be making decisions in line with the values he has  lived out in his life and from experiences he has had in the past.  I have no concerns with continuing LVAD evaluation and placement were he to choose this.  We also discussed supportive and continued medical care for the remainder of his life if he did not choose LVAD, including usual course of severe heart failure and eventual hospice.     RTC PRN  60 minutes spent face-to-face, >50% in counseling on disease understanding, preparedness planning    Elvi Fuentes MD  Palliative Medicine  Pager [phone]

## 2020-03-09 NOTE — LETTER
3/9/2020       RE: Ken Doss  [address] [place] [phone]     Dear Colleague,    Thank you for referring your patient, Ken Doss, to the [facility] HEART CARE at [facility]. Please see a copy of my visit note below.      History of Present Illness:      Mr. Doss is a 73 y/o M w/ HFrEF (LVEF 20-25%), CAD s/p PCI pLAD and pRCA, atrial fibrillation s/p AVN ablation s/p CRT-D, DM, ILD presenting with ESTRADA, LE edema, and orthopnea. He is being evaluated for therapies for advanced heart failure.   .     About 2 year ago his functional status continued to decline. Angiogram in 1/2019 showed obstructive disease in his epicardial arteries and received PCI to pLAD and pRCA. He has had 2 hospitalizations in the last year for fluid retention and heart failure exacerbations. His most recent hospitalization was in July.     Over past 3-4 nights he has been experiencing increased orthopnea, PND and has been havign to wake up in the middle of the night and walk around in order to breath easier. Increase LE edema as well. He is now able to walk only less than half a mile on a flat surface however a year ago he could walk up to a mile. He is getting winded when he climbs a flight of stairs. He feels tired and exhausted most of the time. I would classify him has NYHA class III              Past Medical History:      Past Medical History[]   History reviewed. No pertinent past medical history.               Past Surgical History:      Past Surgical History   History reviewed. No pertinent surgical history.               Social History:      Social History   Social History            Socioeconomic History     Marital status: Unknown       Spouse name: Not on file     Number of children: Not on file     Years of education: Not on file     Highest education level: Not on file   Occupational History     Not on file   Social Needs     Financial resource strain: Not on  file     Food insecurity:       Worry: Not on file       Inability: Not on file     Transportation needs:       Medical: Not on file       Non-medical: Not on file   Tobacco Use     Smoking status: Not on file   Substance and Sexual Activity     Alcohol use: Not on file     Drug use: Not on file     Sexual activity: Not on file   Lifestyle     Physical activity:       Days per week: Not on file       Minutes per session: Not on file     Stress: Not on file   Relationships     Social connections:       Talks on phone: Not on file       Gets together: Not on file       Attends Sikh service: Not on file       Active member of club or organization: Not on file       Attends meetings of clubs or organizations: Not on file       Relationship status: Not on file     Intimate partner violence:       Fear of current or ex partner: Not on file       Emotionally abused: Not on file       Physically abused: Not on file       Forced sexual activity: Not on file   Other Topics Concern     Not on file   Social History Narrative     Not on file                  Family History:      Family History   History reviewed. No pertinent family history.     Family history reviewed and updated in EPIC           Allergies:      No Known Allergies            Medications:        reviewed                           Physical Exam:      B/P: 125/97, T: 97.7, P: 60, R: 18                   Gen: No acute distress  HEENT: NC/AT, PERRL, EOM intact, MMM, OP without exudates  PULM/THORAX: Clear to auscultation bilaterally, no rales/rhonchi/wheezes  CV: normal rate, regular rhythm, normal S1 and S2, no murmurs or rubs. JVD to earlobe  ABD: Soft, NTND, bowel sounds present, no masses  EXT: WWP. 2+ pitting edema in BLE  NEURO: CN II-XII grossly intact. A&Ox3                 Data:      Labs Reviewed on Admission  Pertinent for:          Lab Results   Component Value Date     TROPI <0.015 02/25/2020                   Most Recent Imaging:         Echo:  (1/22/20)  Limited study to evaluate left ventricular ejection fraction.    Definity contrast utilized    Moderate left ventricular enlargement.    Left ventricular systolic function is severely reduced. Left ventricular   ejection fraction estimated 20 to 25%. Global hypokinesis with possibly   more prominent wall motion abnormality involving the inferior wall and   apical portions of the left ventricle.    Abnormal left ventricular septal motion    Normal right ventricular size and systolic function.    Mild to moderate mitral regurgitation.    Moderate to severe tricuspid regurgitation. The RV systolic pressure is   28 mmHg plus right atrial pressure.    Pacer lead catheter within the right heart.    Moderate left atrial enlargement    Compared to the previous study March 2019. Biventricular ejection   fraction appears similar. The degree of tricuspid insufficiency appears   more prominent.            Assessment and Plan:      Mr. Doss is a 75 y/o M w/ HFrEF (LVEF 20-25%), CAD s/p PCI pLAD and pRCA, atrial fibrillation s/p AVN ablation s/p CRT-D, DM, ILD presenting with ESTRADA, LE edema, and orthopnea all consistent with acute on chronic LV systolic heart failure exacerbation 2/2 non-ischemic cardiomyopathy.     I discussed the risks and benefits of LVAD placement as DT including the risks of death, bleeding, stroke, infection, renal failure, RV failure and arrhythmias. He understands and I answered all his. Questions. He is completing his  evaluation and we will decide on his candidacy after that.    Again, thank you for allowing me to participate in the care of your patient.      Sincerely,    Vladimir Hallman MD

## 2020-03-09 NOTE — LETTER
3/9/2020      RE: Ken Doss  [address] [place] [phone]       No notes on file    Vladimir Hallman MD

## 2020-03-09 NOTE — NURSING NOTE
Chief Complaint   Patient presents with     New Patient     VAD eval     Vitals were taken and medications reconciled.     Sherwin Luna CMA  3:35 PM

## 2020-03-09 NOTE — Clinical Note
3/9/2020      RE: Ken Doss  [address] [place] [phone]       Dear Colleague,    Thank you for the opportunity to participate in the care of your patient, Ken Doss, at the [facility] at [facility]. Please see a copy of my visit note below.    No notes on file    Please do not hesitate to contact me if you have any questions/concerns.     Sincerely,     Vladimir Hallman MD

## 2020-03-09 NOTE — PATIENT INSTRUCTIONS
Thank you for coming into the Palliative Care Clinic today.      We discussed preparedness planning and questions as below.      Return in clinic as needed for a follow-up.      You can reach the Palliative Care Team during business hours at the following numbers:   -For the [facility], call [phone]     To reach the Palliative Care Provider on-call After-hours or on holidays and weekends, call: [phone].  Please note that we are not able to provide pain medication refills on evenings or weekends.       Palliative Care questions for pre- LVAD patients:    What are your personal hopes/goals for receiving the LVAD?    What are the activities/abilities that make your life worth living?    What does a typical day look like for you?    There are risks for kidney failure in patients with advancing heart disease who need LVAD support.  The places/locations that offer dialysis and accept patients with LVADs may be limited in your community.  What do you know about dialysis? How would your possible need for dialysis influence your quality of life?     The need to be on a ventilator/breathing machine through a tracheostomy (a tube in your neck) is a risk with LVAD. What are with circumstances you would want your medical providers and family to keep you alive with long term mechanical ventilation?    An LVAD carries a risk of stroke which, for some people, may limit their ability to communicate their wishes to others.  After a stroke, what sort of outcomes would be unacceptable to you (ie needing to live in nursing facility, loss of independence.. Etc.)    LVAD s are often placed with future heart transplant consideration. Some of our patients are determined not to be heart transplant candidates, or choose to forego heart transplant surgery for personal reasons.  If you had an LVAD placed inside of you for the remainder of your life, what would be your concerns?    What  circumstances or events would lead you to decide or ask your health care agent to decide that you would want the LVAD turned off and be allowed to die a natural death?

## 2020-03-10 ENCOUNTER — TELEPHONE (OUTPATIENT)
Dept: CARDIOLOGY | Facility: CLINIC | Age: 75
End: 2020-03-10

## 2020-03-10 ENCOUNTER — OFFICE VISIT - HEALTHEAST (OUTPATIENT)
Dept: CARDIOLOGY | Facility: CLINIC | Age: 75
End: 2020-03-10

## 2020-03-10 DIAGNOSIS — I50.22 CHRONIC SYSTOLIC HEART FAILURE (H): ICD-10-CM

## 2020-03-10 ASSESSMENT — MIFFLIN-ST. JEOR: SCORE: 1442.06

## 2020-03-10 NOTE — TELEPHONE ENCOUNTER
Returned call to [facility]. Discussed incidental finding on Ultrasound from 3/9 of dilated main pancreatic duct. Recommend follow up with MRI    March 10, 2020  3:18 PM  Dr. Payton notified of incidental finding.

## 2020-03-10 NOTE — TELEPHONE ENCOUNTER
[facility] Call Center    Phone Message    May a detailed message be left on voicemail: yes     Reason for Call: Other: Morelia from [place] is calling in with a incidental finding.  Please call back for further details.       Action Taken: Message routed to:  [facility] ([facility] cardio    Travel Screening: Not Applicable

## 2020-03-11 ENCOUNTER — DOCUMENTATION ONLY (OUTPATIENT)
Dept: CARDIOLOGY | Facility: CLINIC | Age: 75
End: 2020-03-11

## 2020-03-11 ENCOUNTER — TELEPHONE (OUTPATIENT)
Dept: CARDIOLOGY | Facility: CLINIC | Age: 75
End: 2020-03-11

## 2020-03-11 DIAGNOSIS — I25.10 CORONARY ARTERY DISEASE INVOLVING NATIVE CORONARY ARTERY OF NATIVE HEART WITHOUT ANGINA PECTORIS: ICD-10-CM

## 2020-03-11 DIAGNOSIS — I50.23 ACUTE ON CHRONIC SYSTOLIC HEART FAILURE (H): ICD-10-CM

## 2020-03-11 DIAGNOSIS — I50.22 CHRONIC SYSTOLIC CONGESTIVE HEART FAILURE (H): Primary | ICD-10-CM

## 2020-03-11 DIAGNOSIS — Z95.820 S/P ANGIOPLASTY WITH STENT: ICD-10-CM

## 2020-03-11 LAB — LA PPP-IMP: NEGATIVE

## 2020-03-11 RX ORDER — BUMETANIDE 1 MG/1
3 TABLET ORAL
Qty: 540 TABLET | Refills: 1 | Status: SHIPPED | OUTPATIENT
Start: 2020-03-11 | End: 2020-09-12

## 2020-03-11 NOTE — TELEPHONE ENCOUNTER
[facility] Call Center    Phone Message    May a detailed message be left on voicemail: yes     Reason for Call: Medication Refill Request    Has the patient contacted the pharmacy for the refill? Yes   Name of medication being requested: bumetanide (BUMEX) 1 MG tablet and ticagrelor (BRILINTA) 90 MG tablet  Provider who prescribed the medication: Dr. Johnson  Pharmacy: [facility]   Phone #: [phone]  Fax #: [phone]  Date medication is needed: 3/13/20   Julio from the pharmacy states that Skyler will be out of the medication on Saturday (3/14/20), so they are hoping to get these refilled for him before then.    Action Taken: Message routed to:  [facility] LISA Cardio    Travel Screening: Not Applicable

## 2020-03-12 ENCOUNTER — CARE COORDINATION (OUTPATIENT)
Dept: CARDIOLOGY | Facility: CLINIC | Age: 75
End: 2020-03-12

## 2020-03-12 ENCOUNTER — AMBULATORY - HEALTHEAST (OUTPATIENT)
Dept: CARDIOLOGY | Facility: CLINIC | Age: 75
End: 2020-03-12

## 2020-03-12 DIAGNOSIS — I50.22 CHRONIC SYSTOLIC HEART FAILURE (H): ICD-10-CM

## 2020-03-12 LAB
ALBUMIN SERPL-MCNC: 3.7 G/DL (ref 3.5–5)
ALP SERPL-CCNC: 112 U/L (ref 45–120)
ALT SERPL W P-5'-P-CCNC: 27 U/L (ref 0–45)
ANION GAP SERPL CALCULATED.3IONS-SCNC: 12 MMOL/L (ref 5–18)
AST SERPL W P-5'-P-CCNC: 30 U/L (ref 0–40)
BILIRUB DIRECT SERPL-MCNC: 0.7 MG/DL
BILIRUB SERPL-MCNC: 1.9 MG/DL (ref 0–1)
BNP SERPL-MCNC: 1838 PG/ML (ref 0–74)
BUN SERPL-MCNC: 26 MG/DL (ref 8–28)
CALCIUM SERPL-MCNC: 8.7 MG/DL (ref 8.5–10.5)
CHLORIDE BLD-SCNC: 102 MMOL/L (ref 98–107)
CO2 SERPL-SCNC: 25 MMOL/L (ref 22–31)
CREAT SERPL-MCNC: 0.86 MG/DL (ref 0.7–1.3)
GFR SERPL CREATININE-BSD FRML MDRD: >60 ML/MIN/1.73M2
GLUCOSE BLD-MCNC: 136 MG/DL (ref 70–125)
POTASSIUM BLD-SCNC: 4 MMOL/L (ref 3.5–5)
PROT SERPL-MCNC: 7 G/DL (ref 6–8)
SODIUM SERPL-SCNC: 139 MMOL/L (ref 136–145)

## 2020-03-12 NOTE — PROGRESS NOTES
Pt paged oncall coordinator about additional labs required for LVAD workup.  Pt advised that lab orders have been sent to [facility] in [place] (ph: [phone] & fax: [phone]) Pt advised that he will need to call to make a lab appointment, no walk ins taken.  Pt verbalized understanding of the instructions given.  Will call VAD coordinator with further needs and questions.

## 2020-03-12 NOTE — LETTER
Patient Name: Ken Doss     : 1945     Diagnosis/ICD-10: Heart Failure, unspecified I50.9   Requesting Physician: Dr. Tata Payton   Date of Request: 20   Date to Draw 2020     **Please fax results to Jenny BRADFORD RN VAD Coord at [phone].                Call [phone] with Questions    ORDER CODE TESTS JAIDA.VOL.    CBASIC Na, K, Cl, CO2, Crea, BUN, Glu, Ca GG 0.5-1    BHEPAT Alb, AlkP, ALT, AST, BBil, TP GG 0.5-1    BLIP Chol, Trig, HDL, LDL GG 1-2    HGP CBC & Platelet P 0.3-1    HGDP CBC, Differential & Platelet P 0.3-1    PLT Platelet  P 0.3-1    INR INR B 2.7    PTT PTT B 2.7    LD Lactate Dehydrogenase GG 0.3-1   X PHGB Plasma Hemoglobin GG 2-3    Pre-Albumin Pre-Albumin RG 1.0   X PEth Phosphatidylethanol              Signed,      Tata Payton MD  Heart Failure, Mechanical Circulatory Support and Transplant Cardiology   of Medicine,  Division of Cardiology, [facility]

## 2020-03-18 ENCOUNTER — AMBULATORY - HEALTHEAST (OUTPATIENT)
Dept: CARDIOLOGY | Facility: CLINIC | Age: 75
End: 2020-03-18

## 2020-03-18 ENCOUNTER — OFFICE VISIT - HEALTHEAST (OUTPATIENT)
Dept: CARDIOLOGY | Facility: CLINIC | Age: 75
End: 2020-03-18

## 2020-03-18 ENCOUNTER — OFFICE VISIT - HEALTHEAST (OUTPATIENT)
Dept: PULMONOLOGY | Facility: OTHER | Age: 75
End: 2020-03-18

## 2020-03-18 ENCOUNTER — RECORDS - HEALTHEAST (OUTPATIENT)
Dept: RADIOLOGY | Facility: CLINIC | Age: 75
End: 2020-03-18

## 2020-03-18 ENCOUNTER — COMMUNICATION - HEALTHEAST (OUTPATIENT)
Dept: CARDIOLOGY | Facility: CLINIC | Age: 75
End: 2020-03-18

## 2020-03-18 DIAGNOSIS — J84.9 ILD (INTERSTITIAL LUNG DISEASE) (H): ICD-10-CM

## 2020-03-18 DIAGNOSIS — E78.5 DYSLIPIDEMIA: ICD-10-CM

## 2020-03-18 DIAGNOSIS — I42.8 NONISCHEMIC CARDIOMYOPATHY (H): ICD-10-CM

## 2020-03-18 DIAGNOSIS — I48.19 PERSISTENT ATRIAL FIBRILLATION (H): ICD-10-CM

## 2020-03-18 DIAGNOSIS — E78.1 HYPERTRIGLYCERIDEMIA: ICD-10-CM

## 2020-03-18 DIAGNOSIS — I50.22 CHRONIC SYSTOLIC HEART FAILURE (H): ICD-10-CM

## 2020-03-23 ENCOUNTER — AMBULATORY - HEALTHEAST (OUTPATIENT)
Dept: CARDIOLOGY | Facility: CLINIC | Age: 75
End: 2020-03-23

## 2020-03-23 DIAGNOSIS — I50.22 CHRONIC SYSTOLIC HEART FAILURE (H): ICD-10-CM

## 2020-03-26 ENCOUNTER — COMMUNICATION - HEALTHEAST (OUTPATIENT)
Dept: CARDIOLOGY | Facility: CLINIC | Age: 75
End: 2020-03-26

## 2020-04-10 ENCOUNTER — AMBULATORY - HEALTHEAST (OUTPATIENT)
Dept: CARDIOLOGY | Facility: CLINIC | Age: 75
End: 2020-04-10

## 2020-04-10 ENCOUNTER — AMBULATORY - HEALTHEAST (OUTPATIENT)
Dept: LAB | Facility: HOSPITAL | Age: 75
End: 2020-04-10

## 2020-04-10 DIAGNOSIS — I50.22 CHRONIC SYSTOLIC HEART FAILURE (H): ICD-10-CM

## 2020-04-10 LAB
ALBUMIN SERPL-MCNC: 3.9 G/DL (ref 3.5–5)
ALP SERPL-CCNC: 78 U/L (ref 45–120)
ALT SERPL W P-5'-P-CCNC: 15 U/L (ref 0–45)
ANION GAP SERPL CALCULATED.3IONS-SCNC: 10 MMOL/L (ref 5–18)
AST SERPL W P-5'-P-CCNC: 24 U/L (ref 0–40)
BASOPHILS # BLD AUTO: 0 THOU/UL (ref 0–0.2)
BASOPHILS NFR BLD AUTO: 0 % (ref 0–2)
BILIRUB SERPL-MCNC: 1.7 MG/DL (ref 0–1)
BUN SERPL-MCNC: 33 MG/DL (ref 8–28)
CALCIUM SERPL-MCNC: 9 MG/DL (ref 8.5–10.5)
CHLORIDE BLD-SCNC: 100 MMOL/L (ref 98–107)
CO2 SERPL-SCNC: 26 MMOL/L (ref 22–31)
CREAT SERPL-MCNC: 1.1 MG/DL (ref 0.7–1.3)
EOSINOPHIL # BLD AUTO: 0.1 THOU/UL (ref 0–0.4)
EOSINOPHIL NFR BLD AUTO: 2 % (ref 0–6)
ERYTHROCYTE [DISTWIDTH] IN BLOOD BY AUTOMATED COUNT: 16.7 % (ref 11–14.5)
GFR SERPL CREATININE-BSD FRML MDRD: >60 ML/MIN/1.73M2
GLUCOSE BLD-MCNC: 259 MG/DL (ref 70–125)
HCT VFR BLD AUTO: 36.3 % (ref 40–54)
HGB BLD-MCNC: 12.1 G/DL (ref 14–18)
LYMPHOCYTES # BLD AUTO: 1.2 THOU/UL (ref 0.8–4.4)
LYMPHOCYTES NFR BLD AUTO: 14 % (ref 20–40)
MCH RBC QN AUTO: 31.5 PG (ref 27–34)
MCHC RBC AUTO-ENTMCNC: 33.3 G/DL (ref 32–36)
MCV RBC AUTO: 95 FL (ref 80–100)
MONOCYTES # BLD AUTO: 0.7 THOU/UL (ref 0–0.9)
MONOCYTES NFR BLD AUTO: 8 % (ref 2–10)
NEUTROPHILS # BLD AUTO: 6.7 THOU/UL (ref 2–7.7)
NEUTROPHILS NFR BLD AUTO: 76 % (ref 50–70)
PLATELET # BLD AUTO: 184 THOU/UL (ref 140–440)
PMV BLD AUTO: 9.9 FL (ref 8.5–12.5)
POTASSIUM BLD-SCNC: 4.4 MMOL/L (ref 3.5–5)
PROT SERPL-MCNC: 7.5 G/DL (ref 6–8)
RBC # BLD AUTO: 3.84 MILL/UL (ref 4.4–6.2)
SODIUM SERPL-SCNC: 136 MMOL/L (ref 136–145)
WBC: 8.8 THOU/UL (ref 4–11)

## 2020-04-10 NOTE — PROGRESS NOTES
History of Present Illness:      Mr. Doss is a 73 y/o M w/ HFrEF (LVEF 20-25%), CAD s/p PCI pLAD and pRCA, atrial fibrillation s/p AVN ablation s/p CRT-D, DM, ILD presenting with ESTRADA, LE edema, and orthopnea. He is being evaluated for therapies for advanced heart failure.   .     About 2 year ago his functional status continued to decline. Angiogram in 1/2019 showed obstructive disease in his epicardial arteries and received PCI to pLAD and pRCA. He has had 2 hospitalizations in the last year for fluid retention and heart failure exacerbations. His most recent hospitalization was in July.     Over past 3-4 nights he has been experiencing increased orthopnea, PND and has been havign to wake up in the middle of the night and walk around in order to breath easier. Increase LE edema as well. He is now able to walk only less than half a mile on a flat surface however a year ago he could walk up to a mile. He is getting winded when he climbs a flight of stairs. He feels tired and exhausted most of the time. I would classify him has NYHA class III              Past Medical History:      Past Medical History[]   History reviewed. No pertinent past medical history.               Past Surgical History:      Past Surgical History   History reviewed. No pertinent surgical history.               Social History:      Social History   Social History            Socioeconomic History     Marital status: Unknown       Spouse name: Not on file     Number of children: Not on file     Years of education: Not on file     Highest education level: Not on file   Occupational History     Not on file   Social Needs     Financial resource strain: Not on file     Food insecurity:       Worry: Not on file       Inability: Not on file     Transportation needs:       Medical: Not on file       Non-medical: Not on file   Tobacco Use     Smoking status: Not on file   Substance and Sexual Activity     Alcohol use: Not on file     Drug use:  Not on file     Sexual activity: Not on file   Lifestyle     Physical activity:       Days per week: Not on file       Minutes per session: Not on file     Stress: Not on file   Relationships     Social connections:       Talks on phone: Not on file       Gets together: Not on file       Attends Spiritism service: Not on file       Active member of club or organization: Not on file       Attends meetings of clubs or organizations: Not on file       Relationship status: Not on file     Intimate partner violence:       Fear of current or ex partner: Not on file       Emotionally abused: Not on file       Physically abused: Not on file       Forced sexual activity: Not on file   Other Topics Concern     Not on file   Social History Narrative     Not on file                  Family History:      Family History   History reviewed. No pertinent family history.     Family history reviewed and updated in EPIC           Allergies:      No Known Allergies            Medications:        reviewed                           Physical Exam:      B/P: 125/97, T: 97.7, P: 60, R: 18                   Gen: No acute distress  HEENT: NC/AT, PERRL, EOM intact, MMM, OP without exudates  PULM/THORAX: Clear to auscultation bilaterally, no rales/rhonchi/wheezes  CV: normal rate, regular rhythm, normal S1 and S2, no murmurs or rubs. JVD to earlobe  ABD: Soft, NTND, bowel sounds present, no masses  EXT: WWP. 2+ pitting edema in BLE  NEURO: CN II-XII grossly intact. A&Ox3                 Data:      Labs Reviewed on Admission  Pertinent for:          Lab Results   Component Value Date     TROPI <0.015 02/25/2020                   Most Recent Imaging:         Echo: (1/22/20)  Limited study to evaluate left ventricular ejection fraction.    Definity contrast utilized    Moderate left ventricular enlargement.    Left ventricular systolic function is severely reduced. Left ventricular   ejection fraction estimated 20 to 25%. Global hypokinesis with  possibly   more prominent wall motion abnormality involving the inferior wall and   apical portions of the left ventricle.    Abnormal left ventricular septal motion    Normal right ventricular size and systolic function.    Mild to moderate mitral regurgitation.    Moderate to severe tricuspid regurgitation. The RV systolic pressure is   28 mmHg plus right atrial pressure.    Pacer lead catheter within the right heart.    Moderate left atrial enlargement    Compared to the previous study March 2019. Biventricular ejection   fraction appears similar. The degree of tricuspid insufficiency appears   more prominent.            Assessment and Plan:      Mr. Doss is a 75 y/o M w/ HFrEF (LVEF 20-25%), CAD s/p PCI pLAD and pRCA, atrial fibrillation s/p AVN ablation s/p CRT-D, DM, ILD presenting with ESTRADA, LE edema, and orthopnea all consistent with acute on chronic LV systolic heart failure exacerbation 2/2 non-ischemic cardiomyopathy.     I discussed the risks and benefits of LVAD placement as DT including the risks of death, bleeding, stroke, infection, renal failure, RV failure and arrhythmias. He understands and I answered all his. Questions. He is completing his  evaluation and we will decide on his candidacy after that.

## 2020-04-13 LAB
ARUP MISCELLANEOUS TEST: NORMAL
MISCELLANEOUS TEST DEPT. - HE HISTORICAL: NORMAL
PERFORMING LAB: NORMAL
SPECIMEN STATUS: NORMAL
TEST NAME: NORMAL

## 2020-04-14 ENCOUNTER — OFFICE VISIT - HEALTHEAST (OUTPATIENT)
Dept: CARDIOLOGY | Facility: CLINIC | Age: 75
End: 2020-04-14

## 2020-04-14 DIAGNOSIS — K86.89 DILATION OF PANCREATIC DUCT: Primary | ICD-10-CM

## 2020-04-14 DIAGNOSIS — I50.22 CHRONIC SYSTOLIC HEART FAILURE (H): ICD-10-CM

## 2020-04-14 ASSESSMENT — MIFFLIN-ST. JEOR: SCORE: 1414.85

## 2020-04-15 LAB — ARUP MISCELLANEOUS TEST: NORMAL

## 2020-04-16 LAB
MISCELLANEOUS TEST DEPT. - HE HISTORICAL: NORMAL
PERFORMING LAB: NORMAL
SPECIMEN STATUS: NORMAL
TEST NAME: NORMAL

## 2020-04-22 ENCOUNTER — AMBULATORY - HEALTHEAST (OUTPATIENT)
Dept: CARDIOLOGY | Facility: CLINIC | Age: 75
End: 2020-04-22

## 2020-05-13 ENCOUNTER — COMMUNICATION - HEALTHEAST (OUTPATIENT)
Dept: CARDIOLOGY | Facility: CLINIC | Age: 75
End: 2020-05-13

## 2020-05-14 ENCOUNTER — AMBULATORY - HEALTHEAST (OUTPATIENT)
Dept: CARDIOLOGY | Facility: CLINIC | Age: 75
End: 2020-05-14

## 2020-05-14 DIAGNOSIS — I50.22 CHRONIC SYSTOLIC HEART FAILURE (H): ICD-10-CM

## 2020-05-14 LAB
ALBUMIN SERPL-MCNC: 3.9 G/DL (ref 3.5–5)
ALP SERPL-CCNC: 104 U/L (ref 45–120)
ALT SERPL W P-5'-P-CCNC: 22 U/L (ref 0–45)
ANION GAP SERPL CALCULATED.3IONS-SCNC: 13 MMOL/L (ref 5–18)
AST SERPL W P-5'-P-CCNC: 23 U/L (ref 0–40)
BASOPHILS # BLD AUTO: 0 THOU/UL (ref 0–0.2)
BASOPHILS NFR BLD AUTO: 0 % (ref 0–2)
BILIRUB DIRECT SERPL-MCNC: 0.5 MG/DL
BILIRUB SERPL-MCNC: 1.3 MG/DL (ref 0–1)
BUN SERPL-MCNC: 27 MG/DL (ref 8–28)
CALCIUM SERPL-MCNC: 8.9 MG/DL (ref 8.5–10.5)
CHLORIDE BLD-SCNC: 103 MMOL/L (ref 98–107)
CO2 SERPL-SCNC: 23 MMOL/L (ref 22–31)
CREAT SERPL-MCNC: 1.06 MG/DL (ref 0.7–1.3)
EOSINOPHIL # BLD AUTO: 0.3 THOU/UL (ref 0–0.4)
EOSINOPHIL NFR BLD AUTO: 3 % (ref 0–6)
ERYTHROCYTE [DISTWIDTH] IN BLOOD BY AUTOMATED COUNT: 15.3 % (ref 11–14.5)
GFR SERPL CREATININE-BSD FRML MDRD: >60 ML/MIN/1.73M2
GLUCOSE BLD-MCNC: 219 MG/DL (ref 70–125)
HCT VFR BLD AUTO: 36.7 % (ref 40–54)
HGB BLD-MCNC: 11.9 G/DL (ref 14–18)
LYMPHOCYTES # BLD AUTO: 1.3 THOU/UL (ref 0.8–4.4)
LYMPHOCYTES NFR BLD AUTO: 15 % (ref 20–40)
MCH RBC QN AUTO: 31.9 PG (ref 27–34)
MCHC RBC AUTO-ENTMCNC: 32.4 G/DL (ref 32–36)
MCV RBC AUTO: 98 FL (ref 80–100)
MONOCYTES # BLD AUTO: 0.8 THOU/UL (ref 0–0.9)
MONOCYTES NFR BLD AUTO: 9 % (ref 2–10)
NEUTROPHILS # BLD AUTO: 6.5 THOU/UL (ref 2–7.7)
NEUTROPHILS NFR BLD AUTO: 73 % (ref 50–70)
PLATELET # BLD AUTO: 223 THOU/UL (ref 140–440)
PMV BLD AUTO: 10.3 FL (ref 8.5–12.5)
POTASSIUM BLD-SCNC: 4.1 MMOL/L (ref 3.5–5)
PROT SERPL-MCNC: 7.4 G/DL (ref 6–8)
RBC # BLD AUTO: 3.73 MILL/UL (ref 4.4–6.2)
SODIUM SERPL-SCNC: 139 MMOL/L (ref 136–145)
WBC: 8.9 THOU/UL (ref 4–11)

## 2020-05-15 LAB — NT-PROBNP SERPL-MCNC: 2791 PG/ML (ref 0–125)

## 2020-05-18 ENCOUNTER — PATIENT OUTREACH (OUTPATIENT)
Dept: CARDIOLOGY | Facility: CLINIC | Age: 75
End: 2020-05-18

## 2020-05-18 DIAGNOSIS — I25.10 CORONARY ARTERY DISEASE INVOLVING NATIVE CORONARY ARTERY OF NATIVE HEART WITHOUT ANGINA PECTORIS: ICD-10-CM

## 2020-05-18 DIAGNOSIS — I50.22 CHRONIC SYSTOLIC HEART FAILURE (H): Primary | ICD-10-CM

## 2020-05-18 NOTE — TELEPHONE ENCOUNTER
Called Skyler to review lab results. Stable, no changes per Dr. Diamond. Skyler states understanding. He reports he was trying to fill his eliquis but pharmacy wouldn't fill it and stated he had transitioned to brilinta. He wanted to be sure this was correct - will double check with dr diamond.   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   Planning for follow up in June (message already sent to [facility] for scheduling at outreach). Skyler states no other questions at this time.

## 2020-05-19 NOTE — TELEPHONE ENCOUNTER
Discussed with Dr. Payton. Patient should remain on brilinta and eliquis at this time.  Called Ze[facility] to let him know. Sent in new rx to his pharmacy. He has some with him and will  a refill around Friday.   Continues to report feeling well although is having some diarrhea. Reports 4-5 times per day. Reports this started a few days ago and that some days are okay and other days he has to go 4-5 times. Asked him to keep an eye on it. He is thinking maybe apple juice is causing it so he will stop the apple juice. Asked him to call us if getting worse or not getting better. No further questions at this time.

## 2020-06-01 ENCOUNTER — COMMUNICATION - HEALTHEAST (OUTPATIENT)
Dept: CARDIOLOGY | Facility: CLINIC | Age: 75
End: 2020-06-01

## 2020-06-16 ENCOUNTER — AMBULATORY - HEALTHEAST (OUTPATIENT)
Dept: CARDIOLOGY | Facility: CLINIC | Age: 75
End: 2020-06-16

## 2020-06-16 ENCOUNTER — COMMUNICATION - HEALTHEAST (OUTPATIENT)
Dept: CARDIOLOGY | Facility: CLINIC | Age: 75
End: 2020-06-16

## 2020-06-16 DIAGNOSIS — I42.8 NONISCHEMIC CARDIOMYOPATHY (H): ICD-10-CM

## 2020-06-16 DIAGNOSIS — I50.22 CHRONIC SYSTOLIC HEART FAILURE (H): ICD-10-CM

## 2020-06-22 ENCOUNTER — RECORDS - HEALTHEAST (OUTPATIENT)
Dept: ADMINISTRATIVE | Facility: OTHER | Age: 75
End: 2020-06-22

## 2020-06-23 ENCOUNTER — OFFICE VISIT - HEALTHEAST (OUTPATIENT)
Dept: CARDIOLOGY | Facility: CLINIC | Age: 75
End: 2020-06-23

## 2020-06-23 DIAGNOSIS — I50.22 CHRONIC SYSTOLIC HEART FAILURE (H): ICD-10-CM

## 2020-06-23 DIAGNOSIS — I50.22 CHRONIC SYSTOLIC HEART FAILURE (H): Primary | ICD-10-CM

## 2020-06-23 RX ORDER — LIDOCAINE 40 MG/G
CREAM TOPICAL
Status: CANCELLED | OUTPATIENT
Start: 2020-06-23

## 2020-06-23 NOTE — PROGRESS NOTES
Orders from outreach clinic at [facility] entered.  Date: 6/23/2020    Time of Call: 2:01 PM     Diagnosis:  Heart failure     [ VORB ] Ordering provider: Tata Payton MD    Order: CPX and RHC and labs in mid July.     Order received by: chalino Nassar RN     Follow-up/additional notes: orders placed. Will contact patient for scheduling.

## 2020-06-24 DIAGNOSIS — Z11.59 ENCOUNTER FOR SCREENING FOR OTHER VIRAL DISEASES: Primary | ICD-10-CM

## 2020-06-25 ENCOUNTER — AMBULATORY - HEALTHEAST (OUTPATIENT)
Dept: CARDIOLOGY | Facility: CLINIC | Age: 75
End: 2020-06-25

## 2020-06-27 ENCOUNTER — COMMUNICATION - HEALTHEAST (OUTPATIENT)
Dept: CARDIOLOGY | Facility: CLINIC | Age: 75
End: 2020-06-27

## 2020-06-27 DIAGNOSIS — E78.5 DYSLIPIDEMIA: ICD-10-CM

## 2020-06-29 DIAGNOSIS — Z11.59 ENCOUNTER FOR SCREENING FOR OTHER VIRAL DISEASES: Primary | ICD-10-CM

## 2020-06-30 ENCOUNTER — PATIENT OUTREACH (OUTPATIENT)
Dept: CARDIOLOGY | Facility: CLINIC | Age: 75
End: 2020-06-30

## 2020-06-30 NOTE — TELEPHONE ENCOUNTER
Called Skyler to discuss referral to GI to further evaluate pancreatic duct.   He will have labs drawn tomorrow.  He also reports he will now be out of town until 7/28 instead of 7/18 as previusuly reported. will work on rescheduling his testing to later in the month.

## 2020-07-01 ENCOUNTER — HOSPITAL ENCOUNTER (OUTPATIENT)
Dept: ADMINISTRATIVE | Facility: OTHER | Age: 75
Discharge: HOME | End: 2020-07-01

## 2020-07-01 ENCOUNTER — AMBULATORY - HEALTHEAST (OUTPATIENT)
Dept: CARDIOLOGY | Facility: CLINIC | Age: 75
End: 2020-07-01

## 2020-07-01 DIAGNOSIS — I50.22 CHRONIC SYSTOLIC HEART FAILURE (H): ICD-10-CM

## 2020-07-01 LAB
ANION GAP SERPL CALCULATED.3IONS-SCNC: 11 MMOL/L (ref 5–18)
BUN SERPL-MCNC: 34 MG/DL (ref 8–28)
CALCIUM SERPL-MCNC: 9.2 MG/DL (ref 8.5–10.5)
CHLORIDE BLD-SCNC: 98 MMOL/L (ref 98–107)
CO2 SERPL-SCNC: 26 MMOL/L (ref 22–31)
CREAT SERPL-MCNC: 1.19 MG/DL (ref 0.7–1.3)
GFR SERPL CREATININE-BSD FRML MDRD: 60 ML/MIN/1.73M2
GLUCOSE BLD-MCNC: 189 MG/DL (ref 70–125)
POTASSIUM BLD-SCNC: 4.2 MMOL/L (ref 3.5–5)
SODIUM SERPL-SCNC: 135 MMOL/L (ref 136–145)

## 2020-07-02 ENCOUNTER — RECORDS - HEALTHEAST (OUTPATIENT)
Dept: LAB | Facility: CLINIC | Age: 75
End: 2020-07-02

## 2020-07-02 ENCOUNTER — AMBULATORY - HEALTHEAST (OUTPATIENT)
Dept: CARDIOLOGY | Facility: CLINIC | Age: 75
End: 2020-07-02

## 2020-07-02 DIAGNOSIS — Z95.810 ICD (IMPLANTABLE CARDIOVERTER-DEFIBRILLATOR), BIVENTRICULAR, IN SITU: ICD-10-CM

## 2020-07-02 DIAGNOSIS — I42.8 NONISCHEMIC CARDIOMYOPATHY (H): ICD-10-CM

## 2020-07-02 LAB
ANION GAP SERPL CALCULATED.3IONS-SCNC: 17 MMOL/L (ref 5–18)
BUN SERPL-MCNC: 29 MG/DL (ref 8–28)
CALCIUM SERPL-MCNC: 8.8 MG/DL (ref 8.5–10.5)
CHLORIDE BLD-SCNC: 99 MMOL/L (ref 98–107)
CO2 SERPL-SCNC: 19 MMOL/L (ref 22–31)
CREAT SERPL-MCNC: 1.32 MG/DL (ref 0.7–1.3)
GFR SERPL CREATININE-BSD FRML MDRD: 53 ML/MIN/1.73M2
GLUCOSE BLD-MCNC: 277 MG/DL (ref 70–125)
POTASSIUM BLD-SCNC: 4.2 MMOL/L (ref 3.5–5)
SODIUM SERPL-SCNC: 135 MMOL/L (ref 136–145)
VIT B12 SERPL-MCNC: 410 PG/ML (ref 213–816)

## 2020-07-03 LAB — B BURGDOR IGG+IGM SER QL: 9.79 INDEX VALUE

## 2020-07-10 LAB
B BURGDOR AB SER-IMP: ABNORMAL
LYME AB IGG BAND(S): ABNORMAL
LYME AB IGM BAND(S): ABNORMAL
LYME IGG BLOT: POSITIVE
LYME IGM BLOT: NEGATIVE

## 2020-07-21 ENCOUNTER — AMBULATORY - HEALTHEAST (OUTPATIENT)
Dept: FAMILY MEDICINE | Facility: CLINIC | Age: 75
End: 2020-07-21

## 2020-07-21 DIAGNOSIS — Z11.59 ENCOUNTER FOR SCREENING FOR OTHER VIRAL DISEASES: ICD-10-CM

## 2020-07-22 ENCOUNTER — OFFICE VISIT - HEALTHEAST (OUTPATIENT)
Dept: CARDIOLOGY | Facility: CLINIC | Age: 75
End: 2020-07-22

## 2020-07-22 DIAGNOSIS — E78.1 HYPERTRIGLYCERIDEMIA: ICD-10-CM

## 2020-07-22 DIAGNOSIS — Z79.4 TYPE 2 DIABETES MELLITUS WITH OTHER CIRCULATORY COMPLICATION, WITH LONG-TERM CURRENT USE OF INSULIN (H): ICD-10-CM

## 2020-07-22 DIAGNOSIS — E11.59 TYPE 2 DIABETES MELLITUS WITH OTHER CIRCULATORY COMPLICATION, WITH LONG-TERM CURRENT USE OF INSULIN (H): ICD-10-CM

## 2020-07-22 DIAGNOSIS — E78.5 DYSLIPIDEMIA: ICD-10-CM

## 2020-07-27 ENCOUNTER — AMBULATORY - HEALTHEAST (OUTPATIENT)
Dept: CARDIOLOGY | Facility: CLINIC | Age: 75
End: 2020-07-27

## 2020-07-27 ENCOUNTER — COMMUNICATION - HEALTHEAST (OUTPATIENT)
Dept: CARDIOLOGY | Facility: CLINIC | Age: 75
End: 2020-07-27

## 2020-07-27 ENCOUNTER — TELEPHONE (OUTPATIENT)
Dept: CARDIOLOGY | Facility: CLINIC | Age: 75
End: 2020-07-27

## 2020-07-27 DIAGNOSIS — Z95.810 ICD (IMPLANTABLE CARDIOVERTER-DEFIBRILLATOR), BIVENTRICULAR, IN SITU: ICD-10-CM

## 2020-07-27 DIAGNOSIS — I42.8 NONISCHEMIC CARDIOMYOPATHY (H): ICD-10-CM

## 2020-07-27 NOTE — TELEPHONE ENCOUNTER
[facility] Call Center    Phone Message    May a detailed message be left on voicemail: yes     Reason for Call: Other: Pt is having a right heart cath procedure done at the [facility] on 7/30, and he would like to have someone from Dr Appiah's care team call him to discuss this procerdure. He states he needs info about what will take place during the procedure. He also will need info about prep, whether he will be staying overnight, etc. Please call pt today to discuss, thanks!     Action Taken: Message routed to:  [facility] Cardiology    Travel Screening: Not Applicable

## 2020-07-27 NOTE — TELEPHONE ENCOUNTER
Returned call to Skyler. He did not answer. Left voicemail letting him know about his testing and what the tests are and what they look for. Let him know I sent out a letter at the beginning of July with the prep information. REiterated he needs to hold eliquis for 2 days and short acting insulin the morning of. Reviewed NPO instructions. Scheduled for covid test tomorrow.  Also let him know we had to reschedule his appt with [facility] out to 7/31. Will be able to discuss results at this appt.   Left phone number for him to call back if further questions.

## 2020-07-28 ENCOUNTER — AMBULATORY - HEALTHEAST (OUTPATIENT)
Dept: FAMILY MEDICINE | Facility: CLINIC | Age: 75
End: 2020-07-28

## 2020-07-28 DIAGNOSIS — Z11.59 ENCOUNTER FOR SCREENING FOR OTHER VIRAL DISEASES: ICD-10-CM

## 2020-07-29 ENCOUNTER — PATIENT OUTREACH (OUTPATIENT)
Dept: CARDIOLOGY | Facility: CLINIC | Age: 75
End: 2020-07-29

## 2020-07-29 ENCOUNTER — TELEPHONE (OUTPATIENT)
Dept: CARDIOLOGY | Facility: CLINIC | Age: 75
End: 2020-07-29

## 2020-07-29 NOTE — TELEPHONE ENCOUNTER
Called Skyler as I received a message that he still had questions regarding his testing. He did not answer. Left voicemail asking him to please call me to discuss testing and questions for tomorrow.

## 2020-07-29 NOTE — TELEPHONE ENCOUNTER
Call complete for pre procedure reminder, travel screen and updated visitor policy.  COVID test in process- completed 7/28 at [facility].

## 2020-07-30 ENCOUNTER — AMBULATORY - HEALTHEAST (OUTPATIENT)
Dept: CARDIOLOGY | Facility: CLINIC | Age: 75
End: 2020-07-30

## 2020-07-30 ENCOUNTER — HOSPITAL ENCOUNTER (OUTPATIENT)
Dept: CARDIOLOGY | Facility: CLINIC | Age: 75
End: 2020-07-30
Attending: INTERNAL MEDICINE
Payer: COMMERCIAL

## 2020-07-30 ENCOUNTER — RECORDS - HEALTHEAST (OUTPATIENT)
Dept: ADMINISTRATIVE | Facility: OTHER | Age: 75
End: 2020-07-30

## 2020-07-30 ENCOUNTER — APPOINTMENT (OUTPATIENT)
Dept: MEDSURG UNIT | Facility: CLINIC | Age: 75
End: 2020-07-30
Payer: COMMERCIAL

## 2020-07-30 ENCOUNTER — HOSPITAL ENCOUNTER (OUTPATIENT)
Facility: CLINIC | Age: 75
Discharge: HOME | End: 2020-07-30
Attending: INTERNAL MEDICINE | Admitting: INTERNAL MEDICINE
Payer: COMMERCIAL

## 2020-07-30 ENCOUNTER — COMMUNICATION - HEALTHEAST (OUTPATIENT)
Dept: CARDIOLOGY | Facility: CLINIC | Age: 75
End: 2020-07-30

## 2020-07-30 ENCOUNTER — APPOINTMENT (OUTPATIENT)
Dept: LAB | Facility: CLINIC | Age: 75
End: 2020-07-30
Attending: INTERNAL MEDICINE
Payer: COMMERCIAL

## 2020-07-30 VITALS
SYSTOLIC BLOOD PRESSURE: 102 MMHG | DIASTOLIC BLOOD PRESSURE: 59 MMHG | OXYGEN SATURATION: 99 % | RESPIRATION RATE: 16 BRPM | TEMPERATURE: 97.7 F

## 2020-07-30 DIAGNOSIS — I50.22 CHRONIC SYSTOLIC HEART FAILURE (H): ICD-10-CM

## 2020-07-30 LAB
ALBUMIN SERPL-MCNC: 3.6 G/DL (ref 3.4–5)
ALP SERPL-CCNC: 88 U/L (ref 40–150)
ALT SERPL W P-5'-P-CCNC: 24 U/L (ref 0–70)
ANION GAP SERPL CALCULATED.3IONS-SCNC: 8 MMOL/L (ref 3–14)
AST SERPL W P-5'-P-CCNC: 23 U/L (ref 0–45)
BASOPHILS # BLD AUTO: 0 10E9/L (ref 0–0.2)
BASOPHILS NFR BLD AUTO: 0.4 %
BILIRUB SERPL-MCNC: 1.4 MG/DL (ref 0.2–1.3)
BUN SERPL-MCNC: 40 MG/DL (ref 7–30)
CALCIUM SERPL-MCNC: 8.8 MG/DL (ref 8.5–10.1)
CHLORIDE SERPL-SCNC: 105 MMOL/L (ref 94–109)
CO2 SERPL-SCNC: 23 MMOL/L (ref 20–32)
CREAT SERPL-MCNC: 1.28 MG/DL (ref 0.66–1.25)
DIFFERENTIAL METHOD BLD: ABNORMAL
EOSINOPHIL # BLD AUTO: 0.2 10E9/L (ref 0–0.7)
EOSINOPHIL NFR BLD AUTO: 2.2 %
ERYTHROCYTE [DISTWIDTH] IN BLOOD BY AUTOMATED COUNT: 13.1 % (ref 10–15)
GFR SERPL CREATININE-BSD FRML MDRD: 55 ML/MIN/{1.73_M2}
GLUCOSE BLDC GLUCOMTR-MCNC: 102 MG/DL (ref 70–99)
GLUCOSE SERPL-MCNC: 153 MG/DL (ref 70–99)
HCT VFR BLD AUTO: 37 % (ref 40–53)
HGB BLD-MCNC: 12.3 G/DL (ref 13.3–17.7)
IMM GRANULOCYTES # BLD: 0 10E9/L (ref 0–0.4)
IMM GRANULOCYTES NFR BLD: 0.4 %
LYMPHOCYTES # BLD AUTO: 1.6 10E9/L (ref 0.8–5.3)
LYMPHOCYTES NFR BLD AUTO: 14.7 %
MCH RBC QN AUTO: 32.9 PG (ref 26.5–33)
MCHC RBC AUTO-ENTMCNC: 33.2 G/DL (ref 31.5–36.5)
MCV RBC AUTO: 99 FL (ref 78–100)
MONOCYTES # BLD AUTO: 0.9 10E9/L (ref 0–1.3)
MONOCYTES NFR BLD AUTO: 8.5 %
NEUTROPHILS # BLD AUTO: 7.9 10E9/L (ref 1.6–8.3)
NEUTROPHILS NFR BLD AUTO: 73.8 %
NRBC # BLD AUTO: 0 10*3/UL
NRBC BLD AUTO-RTO: 0 /100
PLATELET # BLD AUTO: 182 10E9/L (ref 150–450)
POTASSIUM SERPL-SCNC: 4.2 MMOL/L (ref 3.4–5.3)
PROT SERPL-MCNC: 7.7 G/DL (ref 6.8–8.8)
RBC # BLD AUTO: 3.74 10E12/L (ref 4.4–5.9)
SODIUM SERPL-SCNC: 137 MMOL/L (ref 133–144)
WBC # BLD AUTO: 10.7 10E9/L (ref 4–11)

## 2020-07-30 PROCEDURE — 82962 GLUCOSE BLOOD TEST: CPT

## 2020-07-30 PROCEDURE — 80053 COMPREHEN METABOLIC PANEL: CPT | Performed by: INTERNAL MEDICINE

## 2020-07-30 PROCEDURE — 25000125 ZZHC RX 250: Performed by: INTERNAL MEDICINE

## 2020-07-30 PROCEDURE — 94621 CARDIOPULM EXERCISE TESTING: CPT | Mod: 26 | Performed by: INTERNAL MEDICINE

## 2020-07-30 PROCEDURE — 93451 RIGHT HEART CATH: CPT | Mod: 26 | Performed by: INTERNAL MEDICINE

## 2020-07-30 PROCEDURE — 36415 COLL VENOUS BLD VENIPUNCTURE: CPT | Performed by: INTERNAL MEDICINE

## 2020-07-30 PROCEDURE — 85025 COMPLETE CBC W/AUTO DIFF WBC: CPT | Performed by: INTERNAL MEDICINE

## 2020-07-30 PROCEDURE — 93451 RIGHT HEART CATH: CPT | Performed by: INTERNAL MEDICINE

## 2020-07-30 PROCEDURE — 27210794 ZZH OR GENERAL SUPPLY STERILE: Performed by: INTERNAL MEDICINE

## 2020-07-30 PROCEDURE — 94621 CARDIOPULM EXERCISE TESTING: CPT

## 2020-07-30 PROCEDURE — 83880 ASSAY OF NATRIURETIC PEPTIDE: CPT | Performed by: INTERNAL MEDICINE

## 2020-07-30 PROCEDURE — 40000166 ZZH STATISTIC PP CARE STAGE 1

## 2020-07-30 RX ORDER — AMOXICILLIN 500 MG
1200 CAPSULE ORAL DAILY
COMMUNITY

## 2020-07-30 RX ORDER — LIDOCAINE 40 MG/G
CREAM TOPICAL
Status: COMPLETED | OUTPATIENT
Start: 2020-07-30 | End: 2020-07-30

## 2020-07-30 RX ORDER — GABAPENTIN 100 MG/1
100 CAPSULE ORAL 3 TIMES DAILY
COMMUNITY

## 2020-07-30 RX ORDER — SPIRONOLACTONE 25 MG/1
12.5 TABLET ORAL DAILY
COMMUNITY
End: 2021-07-19

## 2020-07-30 RX ADMIN — LIDOCAINE: 40 CREAM TOPICAL at 10:20

## 2020-07-30 NOTE — IP AVS SNAPSHOT
[address] [address]                                    After Visit Summary   7/30/2020    Ken Doss    MRN: [id number]           After Visit Summary Signature Page    I have received my discharge instructions, and my questions have been answered. I have discussed any challenges I see with this plan with the nurse or doctor.    ..........................................................................................................................................  Patient/Patient Representative Signature      ..........................................................................................................................................  Patient Representative Print Name and Relationship to Patient    ..................................................               ................................................  Date                                   Time    ..........................................................................................................................................  Reviewed by Signature/Title    ...................................................              ..............................................  Date                                               Time          22EPIC Rev 08/18

## 2020-07-30 NOTE — DISCHARGE INSTRUCTIONS
[facility]                        Interventional Cardiology  Discharge Instructions   Post Right Heart Cath      AFTER YOU GO HOME:    DO drink plenty of fluids    DO resume your regular diet and medications unless otherwise instructed by your Primary Physician    Do Not scrub the procedure site vigorously    No lotion or powder to the puncture site for 3 days    CALL YOUR PRIMARY PHYSICIAN IF: You may resume all normal activity.  Monitor neck site for bleeding, swelling, or voice changes. If you notice bleeding or swelling immediately apply pressure to the site and call number below to speak with Cardiology Fellow.  If you experience any changes in your breathing you should call your doctor immediately or come to the closest Emergency Department.  Do not drive yourself.    ADDITIONAL INSTRUCTIONS: Medications: You are to resume all home medications including anticoagulation therapy unless otherwise advised by your primary cardiologist or nurse coordinator.    Follow Up: Per your primary cardiology team    If you have any questions or concerns regarding your procedure site please call [phone] at anytime and ask for Cardiology Fellow on call.  They are available 24 hours a day.  You may also contact the Cardiology Clinic after hours number at [phone].                                                       Telephone Numbers [phone] Monday-Friday 8:00 am to 4:30 pm    [phone] After 4:30 pm Monday-Friday, Weekends & Holidays  Ask for Interventional Cardiologist on call. Someone is on call 24 hours/day   [facility] toll free number [phone] Monday-Friday 8:00 am to 4:30 pm   [facility] Emergency Dept [phone]

## 2020-07-30 NOTE — PROGRESS NOTES
[facility]   Interventional Cardiology        Consenting/Education for Cardiac Cath Lab Procedure: Right Heart Catheterization     Patient understands we would like to perform .Right Heart Catheterization. This procedure will be performed by Dr. Geronimo.    The patient understands the following:     Right Heart Catheterization: A fine tube (catheter) is put into the vein of the groin/neck.  It is carefully passed along until it reaches the heart and then goes up into the blood vessels of the lungs. This is done to measure a variety of pressures in your heart and can tell us how well the heart is filling and emptying, as well as monitor fluid status.     No sedation is planned for this procedure. Patient also understands risks and complications of the procedure which include, but are not limited to bruising/swelling around the incision site, infection, bleeding, allergic reaction to local anesthetic, air embolism, arterial puncture, stroke, heart attack.       Patient verbalized understanding of risks and benefits and has elected to proceed with the procedure or procedures listed above.    MIMI Gomez, CNP  [facility] Cardiology

## 2020-07-30 NOTE — PROGRESS NOTES
1035  Prep is complete for procedure. Skyler is here for RHC.  Denies any pain at this time.  CTRN

## 2020-07-30 NOTE — IP AVS SNAPSHOT
MRN:[id number]                      After Visit Summary   7/30/2020    Ken Doss    MRN: [id number]           Visit Information        Department      7/30/2020  9:33 AM [address] [facility]          Review of your medicines      UNREVIEWED medicines. Ask your doctor about these medicines       Dose / Directions   acetaminophen 500 MG tablet  Commonly known as:  TYLENOL      Dose:  500 mg  Take 500 mg by mouth 2 times daily  Refills:  0     apixaban ANTICOAGULANT 5 MG tablet  Commonly known as:  ELIQUIS ANTICOAGULANT  Used for:  Chronic systolic heart failure (H), Coronary artery disease involving native coronary artery of native heart without angina pectoris      Dose:  5 mg  Take 1 tablet (5 mg) by mouth 2 times daily  Quantity:  180 tablet  Refills:  1     atorvastatin 40 MG tablet  Commonly known as:  LIPITOR      Dose:  40 mg  Take 40 mg by mouth daily  Refills:  0     bumetanide 1 MG tablet  Commonly known as:  BUMEX  Used for:  Acute on chronic systolic heart failure (H)      Dose:  3 mg  Take 3 tablets (3 mg) by mouth 2 times daily  Quantity:  540 tablet  Refills:  1     carvedilol 6.25 MG tablet  Commonly known as:  COREG  Used for:  Acute on chronic systolic heart failure (H)      Dose:  6.25 mg  Take 1 tablet (6.25 mg) by mouth 2 times daily (with meals)  Quantity:  60 tablet  Refills:  0     diphenhydrAMINE-acetaminophen  MG tablet  Commonly known as:  TYLENOL PM      Dose:  2 tablet  Take 2 tablets by mouth nightly as needed for sleep  Refills:  0     gabapentin 100 MG capsule  Commonly known as:  NEURONTIN      Dose:  100 mg  Take 100 mg by mouth 3 times daily  Refills:  0     insulin glargine 100 UNIT/ML pen      Dose:  65 Units  Inject 65 Units Subcutaneous At Bedtime  Refills:  0     metFORMIN 1000 MG tablet  Commonly known as:  GLUCOPHAGE      Dose:  1,000 mg  Take 1,000 mg by mouth 2 times daily (with meals)  Refills:  0     MULTI VITAMIN DAILY PO      Refills:   0     predniSONE 20 MG tablet  Commonly known as:  DELTASONE      Refills:  0     sacubitril-valsartan 49-51 MG per tablet  Commonly known as:  ENTRESTO  Used for:  Acute on chronic systolic heart failure (H)      Dose:  1 tablet  Take 1 tablet by mouth 2 times daily  Quantity:  60 tablet  Refills:  0     spironolactone 25 MG tablet  Commonly known as:  ALDACTONE      Dose:  12.5 mg  Take 12.5 mg by mouth daily  Refills:  0     ticagrelor 90 MG tablet  Commonly known as:  BRILINTA  Used for:  S/P angioplasty with stent, Coronary artery disease involving native coronary artery of native heart without angina pectoris      Dose:  90 mg  Take 1 tablet (90 mg) by mouth 2 times daily  Quantity:  180 tablet  Refills:  1        CONTINUE these medicines which have NOT CHANGED       Dose / Directions   blood glucose test strip  Commonly known as:  NO BRAND SPECIFIED      4 times daily  Refills:  0     CoQ-10 100 MG Caps      Dose:  100 mg  Take 100 mg by mouth daily  Refills:  0     Fish Oil 500 MG Caps      Dose:  500 mg  Take 500 mg by mouth every other day  Refills:  0     Glucosamine Chondr Complex 500-400 MG Caps per capsule  Generic drug:  glucosamine-chondroitin      Dose:  1 tablet  Take 1 tablet by mouth daily  Refills:  0              Protect others around you: Learn how to safely use, store and throw away your medicines at www.disposemymeds.org.       Follow-ups after your visit       Your next 10 appointments already scheduled    Jul 30, 2020  Procedure with Ronny Geronimo MD  [facility], [facility],  Heart Cath Lab ([facility], [facility]) [address] [id number] [phone]   The [facility] is located on the corner of [facility] [facility] [address] [facility] and [address] on the [facility]. It is easily accessible from virtually any point in the [place] area, via [id number] and [id number].   Aug 03, 2020 10:00 AM CDT  Telephone Visit with  Godwin Calles MD  [facility] ([facility]) [address] [address] [phone]   [facility]  Note: this is not an onsite visit; there is no need to come to the facility.  Please have a list of all current medications available for appointment.         Care Instructions       Further instructions from your care team       [facility]                        Interventional Cardiology  Discharge Instructions   Post Right Heart Cath      AFTER YOU GO HOME:    DO drink plenty of fluids    DO resume your regular diet and medications unless otherwise instructed by your Primary Physician    Do Not scrub the procedure site vigorously    No lotion or powder to the puncture site for 3 days    CALL YOUR PRIMARY PHYSICIAN IF: You may resume all normal activity.  Monitor neck site for bleeding, swelling, or voice changes. If you notice bleeding or swelling immediately apply pressure to the site and call number below to speak with Cardiology Fellow.  If you experience any changes in your breathing you should call your doctor immediately or come to the closest Emergency Department.  Do not drive yourself.    ADDITIONAL INSTRUCTIONS: Medications: You are to resume all home medications including anticoagulation therapy unless otherwise advised by your primary cardiologist or nurse coordinator.    Follow Up: Per your primary cardiology team    If you have any questions or concerns regarding your procedure site please call [phone] at anytime and ask for Cardiology Fellow on call.  They are available 24 hours a day.  You may also contact the Cardiology Clinic after hours number at [phone].                                                       Telephone Numbers [phone] Monday-Friday 8:00 am to 4:30 pm    [phone] After 4:30 pm Monday-Friday, Weekends & Holidays  Ask for Interventional  "Cardiologist on call. Someone is on call 24 hours/day   [facility] toll free number [phone] Monday-Friday 8:00 am to 4:30 pm   [facility] Emergency Dept [phone]                   Additional Information About Your Visit       MyChart Information    DailyTick[employer] lets you send messages to your doctor, view your test results, renew your prescriptions, schedule appointments and more. To sign up, go to [email or web address] . Click on \"Log in\" on the left side of the screen, which will take you to the Welcome page. Then click on \"Sign up Now\" on the right side of the page.     You will be asked to enter the access code listed below, as well as some personal information. Please follow the directions to create your username and password.     Your access code is: [email or web address]  Expires: 2020  6:31 AM     Your access code will  in 60 days. If you need help or a new code, please call your   [facility] clinic or [phone].       Care EveryWhere ID    This is your Care EveryWhere ID. This could be used by other organizations to access your [facility] medical records  [id number]       Your Vitals Were  Most recent update: 2020 10:18 AM    Blood Pressure   102/59 (BP Location: Right arm)    Temperature   97.7  F (36.5  C) (Oral)    Respirations   14    Pulse Oximetry   99%           Primary Care Provider Office Phone # Fax #    Lewis Reeves [phone]      Equal Access to Services    [facility] : Hadii david martini hadasho Sobriali, waaxda luqadaha, qaybta kaalmada smitha, claudine dove . So [facility] [phone].    ATENCIÓN: Si habla español, tiene a staton disposición servicios gratuitos de asistencia lingüística. Llame al [phone].    We comply with applicable federal and state civil rights laws, including the [place] Human Rights Act. We do not discriminate on the basis of race, color, creed, Orthodoxy, national origin, marital status, age, " disability, sex, sexual orientation, or gender identity.       Thank you!    Thank you for choosing [facility] for your care. Our goal is always to provide you with excellent care. Hearing back from our patients is one way we can continue to improve our services. Please take a few minutes to complete the written survey that you may receive in the mail after you visit with us. Thank you!            Medication List      Medications          Morning Afternoon Evening Bedtime As Needed    blood glucose test strip  Also known as:  NO BRAND SPECIFIED  INSTRUCTIONS:  4 times daily                     CoQ-10 100 MG Caps  INSTRUCTIONS:  Take 100 mg by mouth daily                     Fish Oil 500 MG Caps  INSTRUCTIONS:  Take 500 mg by mouth every other day                     Glucosamine Chondr Complex 500-400 MG Caps per capsule  INSTRUCTIONS:  Take 1 tablet by mouth daily  Generic drug:  glucosamine-chondroitin                       ASK your doctor about these medications          Morning Afternoon Evening Bedtime As Needed    acetaminophen 500 MG tablet  Also known as:  TYLENOL  INSTRUCTIONS:  Take 500 mg by mouth 2 times daily                     apixaban ANTICOAGULANT 5 MG tablet  Also known as:  ELIQUIS ANTICOAGULANT  INSTRUCTIONS:  Take 1 tablet (5 mg) by mouth 2 times daily                     atorvastatin 40 MG tablet  Also known as:  LIPITOR  INSTRUCTIONS:  Take 40 mg by mouth daily                     bumetanide 1 MG tablet  Also known as:  BUMEX  INSTRUCTIONS:  Take 3 tablets (3 mg) by mouth 2 times daily                     carvedilol 6.25 MG tablet  Also known as:  COREG  INSTRUCTIONS:  Take 1 tablet (6.25 mg) by mouth 2 times daily (with meals)                     diphenhydrAMINE-acetaminophen  MG tablet  Also known as:  TYLENOL PM  INSTRUCTIONS:  Take 2 tablets by mouth nightly as needed for sleep                     gabapentin 100 MG capsule  Also known as:  NEURONTIN  INSTRUCTIONS:  Take 100 mg by mouth  3 times daily                     insulin glargine 100 UNIT/ML pen  INSTRUCTIONS:  Inject 65 Units Subcutaneous At Bedtime                     metFORMIN 1000 MG tablet  Also known as:  GLUCOPHAGE  INSTRUCTIONS:  Take 1,000 mg by mouth 2 times daily (with meals)                     MULTI VITAMIN DAILY PO                     predniSONE 20 MG tablet  Also known as:  DELTASONE                     sacubitril-valsartan 49-51 MG per tablet  Also known as:  ENTRESTO  INSTRUCTIONS:  Take 1 tablet by mouth 2 times daily                     spironolactone 25 MG tablet  Also known as:  ALDACTONE  INSTRUCTIONS:  Take 12.5 mg by mouth daily                     ticagrelor 90 MG tablet  Also known as:  BRILINTA  INSTRUCTIONS:  Take 1 tablet (90 mg) by mouth 2 times daily

## 2020-07-31 ENCOUNTER — AMBULATORY - HEALTHEAST (OUTPATIENT)
Dept: CARDIOLOGY | Facility: CLINIC | Age: 75
End: 2020-07-31

## 2020-07-31 ENCOUNTER — COMMUNICATION - HEALTHEAST (OUTPATIENT)
Dept: SCHEDULING | Facility: CLINIC | Age: 75
End: 2020-07-31

## 2020-07-31 ENCOUNTER — OFFICE VISIT - HEALTHEAST (OUTPATIENT)
Dept: CARDIOLOGY | Facility: CLINIC | Age: 75
End: 2020-07-31

## 2020-07-31 ENCOUNTER — HOSPITAL ENCOUNTER (OUTPATIENT)
Dept: RADIOLOGY | Facility: HOSPITAL | Age: 75
Discharge: HOME | End: 2020-07-31
Attending: INTERNAL MEDICINE

## 2020-07-31 DIAGNOSIS — Z95.810 ICD (IMPLANTABLE CARDIOVERTER-DEFIBRILLATOR), BIVENTRICULAR, IN SITU: ICD-10-CM

## 2020-07-31 DIAGNOSIS — I42.8 NONISCHEMIC CARDIOMYOPATHY (H): ICD-10-CM

## 2020-07-31 DIAGNOSIS — I50.22 CHRONIC SYSTOLIC HEART FAILURE (H): ICD-10-CM

## 2020-07-31 LAB
HCC DEVICE COMMENTS: NORMAL
HCC DEVICE IMPLANTING PROVIDER: NORMAL
HCC DEVICE MANUFACTURE: NORMAL
HCC DEVICE MODEL: NORMAL
HCC DEVICE SERIAL NUMBER: NORMAL
HCC DEVICE TYPE: NORMAL
NT-PROBNP SERPL-MCNC: 3182 PG/ML (ref 0–900)

## 2020-07-31 ASSESSMENT — MIFFLIN-ST. JEOR
SCORE: 1451.14
SCORE: 1461.36

## 2020-08-03 ENCOUNTER — PATIENT OUTREACH (OUTPATIENT)
Dept: GASTROENTEROLOGY | Facility: CLINIC | Age: 75
End: 2020-08-03

## 2020-08-03 ENCOUNTER — VIRTUAL VISIT (OUTPATIENT)
Dept: GASTROENTEROLOGY | Facility: CLINIC | Age: 75
End: 2020-08-03
Attending: INTERNAL MEDICINE
Payer: COMMERCIAL

## 2020-08-03 ENCOUNTER — TELEPHONE (OUTPATIENT)
Dept: GASTROENTEROLOGY | Facility: CLINIC | Age: 75
End: 2020-08-03

## 2020-08-03 DIAGNOSIS — K86.89 PANCREATIC DUCT DILATED: Primary | ICD-10-CM

## 2020-08-03 DIAGNOSIS — K86.89 DILATED PANCREATIC DUCT: Primary | ICD-10-CM

## 2020-08-03 PROCEDURE — 40001009 ZZH VIDEO/TELEPHONE VISIT; NO CHARGE

## 2020-08-03 NOTE — LETTER
8/3/2020       RE: Ken Doss  [address] [place] [id number]     Dear Colleague,    Thank you for referring your patient, Ken Doss, to the [facility] at [facility]. Please see a copy of my visit note below.    GI CLINIC VISIT - NEW PATIENT    CC/REFERRING MD:  Tata Payton  REASON FOR CONSULTATION: Dilated pancreatic duct on imaging.    ASSESSMENT/PLAN:  Incidentally noted mild dilation of the main pancreatic duct in an asymptomatic patient.  Significant cardiac comorbidity and now not pursuing LVAD treatment.   Discussed potential clinical implications and small possibility of pancreatic mass lesion or IPMN. He would not be a candidate for resection of these if identified, but would be of prognostic significance.    Discussed options of further evaluation with EUS vs CT with contrast. Given comorbidity, I recommended CT to ensure no major abnormalities.  Presuming CT unremarkable, then no further evaluation.    PLAN:  - CT abdomen with IV contrast. If unremarkable, no further evaluation.    RTC PRN    Thank you for this consultation.  It was a pleasure to participate in the care of this patient; please contact us with any further questions.  A total of 16 minutes was spent with this patient, >50% of which was counseling regarding the above delineated issues.    BIN Calles MD  Associate Professor of Medicine  Division of Gastroenterology, Hepatology and Nutrition  [facility]  -------------------------------------------------------------------------------------------------------------------  HPI:  The pt is a/n 74 year old male who I was asked to see in consultation at the request of Dr. Tata Payton for evaluation of an incidentally identified dilated main pancreatic duct on ultrasound.    The pt has a significant history of cardiac disease and has been undergoing evaluation for possible LVAD. He  stated now that he is not wanting to pursue this.     He underwent abdominal ultrasound of the abdomen, apparently as part of his cardiac evaluation, on 3/9/20 which noted the main pancreatic duct to measure 4 mm in the pancreatic body. This was personally reviewed - a region of the pancreas is seen with dilated duct without apparent obstructing lesion seen (incomplete visualization). The bile duct is non-dilated at 6 mm and several small stones are seen in the gallbladder.    CT 3/9/20 without contrast was unremarkable within limits of a non-contrast study.    He is well from a GI standpoint and denies abdominal pain, jaundice, unexplained weight loss or diarrhea.    ROS:  See history of present illness.    PERTINENT PAST MEDICAL HISTORY:  Past Medical History:   Diagnosis Date     Diabetes mellitus, type 2 (H)        PREVIOUS SURGERIES:  Past Surgical History:   Procedure Laterality Date     CORONARY ANGIOGRAPHY ADULT ORDER       CV CORONARY ANGIOGRAM N/A 2/27/2020    Procedure: CV CORONARY ANGIOGRAM;  Surgeon: Pedro Fontaine MD;  Location:  HEART CARDIAC CATH LAB     CV INTRAVASULAR ULTRASOUND N/A 2/27/2020    Procedure: Intravascular Ultrasound;  Surgeon: Pedro Fontaine MD;  Location:  HEART CARDIAC CATH LAB     CV PCI ANGIOPLASTY N/A 2/27/2020    Procedure: Percutaneous Coronary Intervention Angioplasty;  Surgeon: Pedro Fontaine MD;  Location:  HEART CARDIAC CATH LAB     CV RIGHT HEART CATH N/A 2/27/2020    Procedure: Right Heart Cath;  Surgeon: Pedro Fontaine MD;  Location:  HEART CARDIAC CATH LAB     CV RIGHT HEART CATH N/A 7/30/2020    Procedure: CV RIGHT HEART CATH;  Surgeon: Ronny Geronimo MD;  Location:  HEART CARDIAC CATH LAB         ALLERGIES:   No Known Allergies    PERTINENT MEDICATIONS:    Current Outpatient Medications:      acetaminophen (TYLENOL) 500 MG tablet, Take 500 mg by mouth 2 times daily , Disp: , Rfl:       apixaban ANTICOAGULANT (ELIQUIS ANTICOAGULANT) 5 MG tablet, Take 1 tablet (5 mg) by mouth 2 times daily, Disp: 180 tablet, Rfl: 1     atorvastatin (LIPITOR) 40 MG tablet, Take 40 mg by mouth daily, Disp: , Rfl:      blood glucose (NO BRAND SPECIFIED) test strip, 4 times daily, Disp: , Rfl:      bumetanide (BUMEX) 1 MG tablet, Take 3 tablets (3 mg) by mouth 2 times daily, Disp: 540 tablet, Rfl: 1     carvedilol (COREG) 6.25 MG tablet, Take 1 tablet (6.25 mg) by mouth 2 times daily (with meals), Disp: 60 tablet, Rfl: 0     Coenzyme Q10 (COQ-10) 100 MG CAPS, Take 100 mg by mouth daily, Disp: , Rfl:      diphenhydrAMINE-acetaminophen (TYLENOL PM)  MG tablet, Take 2 tablets by mouth nightly as needed for sleep, Disp: , Rfl:      gabapentin (NEURONTIN) 100 MG capsule, Take 100 mg by mouth 3 times daily, Disp: , Rfl:      glucosamine-chondroitin (GLUCOSAMINE CHONDR COMPLEX) 500-400 MG CAPS per capsule, Take 1 tablet by mouth daily , Disp: , Rfl:      insulin glargine (BASAGLAR KWIKPEN) 100 UNIT/ML pen, Inject 65 Units Subcutaneous At Bedtime, Disp: , Rfl:      metFORMIN (GLUCOPHAGE) 1000 MG tablet, Take 1,000 mg by mouth 2 times daily (with meals), Disp: , Rfl:      Multiple Vitamin (MULTI VITAMIN DAILY PO), , Disp: , Rfl:      Omega-3 Fatty Acids (FISH OIL) 500 MG CAPS, Take 500 mg by mouth every other day, Disp: , Rfl:      predniSONE (DELTASONE) 20 MG tablet, , Disp: , Rfl:      sacubitril-valsartan (ENTRESTO) 49-51 MG per tablet, Take 1 tablet by mouth 2 times daily, Disp: 60 tablet, Rfl: 0     spironolactone (ALDACTONE) 25 MG tablet, Take 12.5 mg by mouth daily, Disp: , Rfl:      ticagrelor (BRILINTA) 90 MG tablet, Take 1 tablet (90 mg) by mouth 2 times daily, Disp: 180 tablet, Rfl: 1    SOCIAL HISTORY:  Social History     Socioeconomic History     Marital status: Unknown     Spouse name: Not on file     Number of children: Not on file     Years of education: Not on file     Highest education level: Not on file    Occupational History     Not on file   Social Needs     Financial resource strain: Not on file     Food insecurity     Worry: Not on file     Inability: Not on file     Transportation needs     Medical: Not on file     Non-medical: Not on file   Tobacco Use     Smoking status: Never Smoker     Smokeless tobacco: Never Used   Substance and Sexual Activity     Alcohol use: Yes     Comment: occas     Drug use: Never     Sexual activity: Not on file   Lifestyle     Physical activity     Days per week: Not on file     Minutes per session: Not on file     Stress: Not on file   Relationships     Social connections     Talks on phone: Not on file     Gets together: Not on file     Attends Anabaptist service: Not on file     Active member of club or organization: Not on file     Attends meetings of clubs or organizations: Not on file     Relationship status: Not on file     Intimate partner violence     Fear of current or ex partner: Not on file     Emotionally abused: Not on file     Physically abused: Not on file     Forced sexual activity: Not on file   Other Topics Concern     Not on file   Social History Narrative     Not on file     FAMILY HISTORY:  No family history on file.    PHYSICAL EXAMINATION:  Vitals reviewed, AFVSS   Wt   Wt Readings from Last 2 Encounters:   03/09/20 69.4 kg (153 lb)   03/09/20 69.5 kg (153 lb 4.8 oz)      Not examined - telephone visit.    PERTINENT STUDIES:    Orders Only on 03/09/2020   Component Date Value Ref Range Status     PSA 03/09/2020 1.20  0 - 4 ug/L Final     Hemoglobin A1C 03/09/2020 7.8* 0 - 5.6 % Final     Amphetamine Qual Urine 03/09/2020 Negative  NEG^Negative Final     Barbiturates Qual Urine 03/09/2020 Negative  NEG^Negative Final     Benzodiazepine Qual Urine 03/09/2020 Negative  NEG^Negative Final     Cannabinoids Qual Urine 03/09/2020 Negative  NEG^Negative Final     Cocaine Qual Urine 03/09/2020 Negative  NEG^Negative Final     Ethanol Qual Urine 03/09/2020 Negative   NEG^Negative Final     Opiates Qualitative Urine 03/09/2020 Negative  NEG^Negative Final     PCP Qual Urine 03/09/2020 Negative  NEG^Negative Final     Color Urine 03/09/2020 Yellow   Final     Appearance Urine 03/09/2020 Clear   Final     Glucose Urine 03/09/2020 Negative  NEG^Negative mg/dL Final     Bilirubin Urine 03/09/2020 Negative  NEG^Negative Final     Ketones Urine 03/09/2020 Negative  NEG^Negative mg/dL Final     Specific Gravity Urine 03/09/2020 1.014  1.003 - 1.035 Final     Blood Urine 03/09/2020 Negative  NEG^Negative Final     pH Urine 03/09/2020 6.0  5.0 - 7.0 pH Final     Protein Albumin Urine 03/09/2020 30* NEG^Negative mg/dL Final     Urobilinogen mg/dL 03/09/2020 0.0  0.0 - 2.0 mg/dL Final     Nitrite Urine 03/09/2020 Negative  NEG^Negative Final     Leukocyte Esterase Urine 03/09/2020 Negative  NEG^Negative Final     Source 03/09/2020 Midstream Urine   Final     WBC Urine 03/09/2020 3  0 - 5 /HPF Final     RBC Urine 03/09/2020 1  0 - 2 /HPF Final     Squamous Epithelial /HPF Urine 03/09/2020 <1  0 - 1 /HPF Final     Mucous Urine 03/09/2020 Present* NEG^Negative /LPF Final     Hyaline Casts 03/09/2020 6* 0 - 2 /LPF Final     PTT 03/09/2020 33  22 - 37 sec Final     INR 03/09/2020 1.29* 0.86 - 1.14 Final     Lupus Result 03/09/2020 Negative  NEG^Negative Final     TSH 03/09/2020 1.02  0.40 - 4.00 mU/L Final     ABO 03/09/2020 O   Final     RH(D) 03/09/2020 Pos   Final     Antibody Screen 03/09/2020 Neg   Final     Test Valid Only At 03/09/2020 [facility]      Final     Specimen Expires 03/09/2020 03/12/2020   Final     Phosphorus 03/09/2020 3.8  2.5 - 4.5 mg/dL Final     Transferrin 03/09/2020 304  210 - 360 mg/dL Final     Ferritin 03/09/2020 127  26 - 388 ng/mL Final     Iron 03/09/2020 143  35 - 180 ug/dL Final     Iron Binding Cap 03/09/2020 394  240 - 430 ug/dL Final     Iron Saturation Index 03/09/2020 36  15 - 46 % Final     WBC 03/09/2020  12.5* 4.0 - 11.0 10e9/L Final     RBC Count 03/09/2020 4.38* 4.4 - 5.9 10e12/L Final     Hemoglobin 03/09/2020 13.6  13.3 - 17.7 g/dL Final     Hematocrit 03/09/2020 40.9  40.0 - 53.0 % Final     MCV 03/09/2020 93  78 - 100 fl Final     MCH 03/09/2020 31.1  26.5 - 33.0 pg Final     MCHC 03/09/2020 33.3  31.5 - 36.5 g/dL Final     RDW 03/09/2020 17.3* 10.0 - 15.0 % Final     Platelet Count 03/09/2020 259  150 - 450 10e9/L Final     Diff Method 03/09/2020 Automated Method   Final     % Neutrophils 03/09/2020 80.2  % Final     % Lymphocytes 03/09/2020 12.1  % Final     % Monocytes 03/09/2020 6.8  % Final     % Eosinophils 03/09/2020 0.2  % Final     % Basophils 03/09/2020 0.2  % Final     % Immature Granulocytes 03/09/2020 0.5  % Final     Nucleated RBCs 03/09/2020 0  0 /100 Final     Absolute Neutrophil 03/09/2020 10.0* 1.6 - 8.3 10e9/L Final     Absolute Lymphocytes 03/09/2020 1.5  0.8 - 5.3 10e9/L Final     Absolute Monocytes 03/09/2020 0.9  0.0 - 1.3 10e9/L Final     Absolute Eosinophils 03/09/2020 0.0  0.0 - 0.7 10e9/L Final     Absolute Basophils 03/09/2020 0.0  0.0 - 0.2 10e9/L Final     Abs Immature Granulocytes 03/09/2020 0.1  0 - 0.4 10e9/L Final     Absolute Nucleated RBC 03/09/2020 0.0   Final     Sodium 03/09/2020 137  133 - 144 mmol/L Final     Potassium 03/09/2020 3.4  3.4 - 5.3 mmol/L Final     Chloride 03/09/2020 100  94 - 109 mmol/L Final     Carbon Dioxide 03/09/2020 30  20 - 32 mmol/L Final     Anion Gap 03/09/2020 7  3 - 14 mmol/L Final     Glucose 03/09/2020 95  70 - 99 mg/dL Final     Urea Nitrogen 03/09/2020 37* 7 - 30 mg/dL Final     Creatinine 03/09/2020 0.82  0.66 - 1.25 mg/dL Final     GFR Estimate 03/09/2020 87  >60 mL/min/[1.73_m2] Final     GFR Estimate If Black 03/09/2020 >90  >60 mL/min/[1.73_m2] Final     Calcium 03/09/2020 9.6  8.5 - 10.1 mg/dL Final     Bilirubin Total 03/09/2020 2.4* 0.2 - 1.3 mg/dL Final     Albumin 03/09/2020 3.8  3.4 - 5.0 g/dL Final     Protein Total 03/09/2020  7.8  6.8 - 8.8 g/dL Final     Alkaline Phosphatase 03/09/2020 105  40 - 150 U/L Final     ALT 03/09/2020 37  0 - 70 U/L Final     AST 03/09/2020 29  0 - 45 U/L Final     Prealbumin 03/09/2020 34  15 - 45 mg/dL Final       Again, thank you for allowing me to participate in the care of your patient.  Sincerely,    Godwin Calles MD

## 2020-08-03 NOTE — LETTER
"    8/3/2020         RE: Ken Doss  [address] [place] [id number]        Dear Colleague,    Thank you for referring your patient, Ken Doss, to the [facility]. Please see a copy of my visit note below.    Ken Doss is a 74 year old male who is being evaluated via a billable telephone visit.      The patient has been notified of following:     \"This telephone visit will be conducted via a call between you and your physician/provider. We have found that certain health care needs can be provided without the need for a physical exam.  This service lets us provide the care you need with a short phone conversation.  If a prescription is necessary we can send it directly to your pharmacy.  If lab work is needed we can place an order for that and you can then stop by our lab to have the test done at a later time.    Telephone visits are billed at different rates depending on your insurance coverage. During this emergency period, for some insurers they may be billed the same as an in-person visit.  Please reach out to your insurance provider with any questions.    If during the course of the call the physician/provider feels a telephone visit is not appropriate, you will not be charged for this service.\"    Patient has given verbal consent for Telephone visit?  Yes    What phone number would you like to be contacted at? [phone]    How would you like to obtain your AVS? Mail a copy     Vitals - Patient Reported  Weight (Patient Reported): 69.9 kg (154 lb)  Height (Patient Reported): 175.3 cm (5' 9.02\")  BMI (Based on Pt Reported Ht/Wt): 22.73  Pain Score: No Pain (0)    Rayray Hood LPN    Phone call duration:16 minutes            Ken Doss is a 74 year old male who is being evaluated via a billable telephone visit.      The patient has been notified of following:     \"This telephone visit will be conducted via a call between you and your " "physician/provider. We have found that certain health care needs can be provided without the need for a physical exam.  This service lets us provide the care you need with a short phone conversation.  If a prescription is necessary we can send it directly to your pharmacy.  If lab work is needed we can place an order for that and you can then stop by our lab to have the test done at a later time.    Telephone visits are billed at different rates depending on your insurance coverage. During this emergency period, for some insurers they may be billed the same as an in-person visit.  Please reach out to your insurance provider with any questions.    If during the course of the call the physician/provider feels a telephone visit is not appropriate, you will not be charged for this service.\"    Patient has given verbal consent for Telephone visit?  Yes    What phone number would you like to be contacted at? [phone]    How would you like to obtain your AVS? Mail a copy     Vitals - Patient Reported  Weight (Patient Reported): 69.9 kg (154 lb)  Height (Patient Reported): 175.3 cm (5' 9.02\")  BMI (Based on Pt Reported Ht/Wt): 22.73  Pain Score: No Pain (0)    Rayray Hood LPN    Phone call duration: 16 minutes    GI CLINIC VISIT - NEW PATIENT    CC/REFERRING MD:  Tata Payton  REASON FOR CONSULTATION: Dilated pancreatic duct on imaging.    ASSESSMENT/PLAN:  Incidentally noted mild dilation of the main pancreatic duct in an asymptomatic patient.  Significant cardiac comorbidity and now not pursuing LVAD treatment.   Discussed potential clinical implications and small possibility of pancreatic mass lesion or IPMN. He would not be a candidate for resection of these if identified, but would be of prognostic significance.    Discussed options of further evaluation with EUS vs CT with contrast. Given comorbidity, I recommended CT to ensure no major abnormalities.  Presuming CT unremarkable, then no further " evaluation.    PLAN:  - CT abdomen with IV contrast. If unremarkable, no further evaluation.    [facility] PRN    Thank you for this consultation.  It was a pleasure to participate in the care of this patient; please contact us with any further questions.  A total of 16 minutes was spent with this patient, >50% of which was counseling regarding the above delineated issues.      BIN Calles MD  Associate Professor of Medicine  Division of Gastroenterology, Hepatology and Nutrition  [facility]  -------------------------------------------------------------------------------------------------------------------  HPI:  The pt is a/n 74 year old male who I was asked to see in consultation at the request of Dr. Tata Payton for evaluation of an incidentally identified dilated main pancreatic duct on ultrasound.    The pt has a significant history of cardiac disease and has been undergoing evaluation for possible LVAD. He stated now that he is not wanting to pursue this.     He underwent abdominal ultrasound of the abdomen, apparently as part of his cardiac evaluation, on 3/9/20 which noted the main pancreatic duct to measure 4 mm in the pancreatic body. This was personally reviewed - a region of the pancreas is seen with dilated duct without apparent obstructing lesion seen (incomplete visualization). The bile duct is non-dilated at 6 mm and several small stones are seen in the gallbladder.    CT 3/9/20 without contrast was unremarkable within limits of a non-contrast study.    He is well from a GI standpoint and denies abdominal pain, jaundice, unexplained weight loss or diarrhea.    ROS:  See history of present illness.    PERTINENT PAST MEDICAL HISTORY:  Past Medical History:   Diagnosis Date     Diabetes mellitus, type 2 (H)        PREVIOUS SURGERIES:  Past Surgical History:   Procedure Laterality Date     CORONARY ANGIOGRAPHY ADULT ORDER       CV CORONARY ANGIOGRAM N/A 2/27/2020    Procedure: CV  CORONARY ANGIOGRAM;  Surgeon: Pedro Fontaine MD;  Location:  HEART CARDIAC CATH LAB     CV INTRAVASULAR ULTRASOUND N/A 2/27/2020    Procedure: Intravascular Ultrasound;  Surgeon: Pedro Fontaine MD;  Location:  HEART CARDIAC CATH LAB     CV PCI ANGIOPLASTY N/A 2/27/2020    Procedure: Percutaneous Coronary Intervention Angioplasty;  Surgeon: Pedro Fontaine MD;  Location:  HEART CARDIAC CATH LAB     CV RIGHT HEART CATH N/A 2/27/2020    Procedure: Right Heart Cath;  Surgeon: Pedro Fontaine MD;  Location:  HEART CARDIAC CATH LAB     CV RIGHT HEART CATH N/A 7/30/2020    Procedure: CV RIGHT HEART CATH;  Surgeon: Ronny Geronimo MD;  Location:  HEART CARDIAC CATH LAB         ALLERGIES:   No Known Allergies    PERTINENT MEDICATIONS:    Current Outpatient Medications:      acetaminophen (TYLENOL) 500 MG tablet, Take 500 mg by mouth 2 times daily , Disp: , Rfl:      apixaban ANTICOAGULANT (ELIQUIS ANTICOAGULANT) 5 MG tablet, Take 1 tablet (5 mg) by mouth 2 times daily, Disp: 180 tablet, Rfl: 1     atorvastatin (LIPITOR) 40 MG tablet, Take 40 mg by mouth daily, Disp: , Rfl:      blood glucose (NO BRAND SPECIFIED) test strip, 4 times daily, Disp: , Rfl:      bumetanide (BUMEX) 1 MG tablet, Take 3 tablets (3 mg) by mouth 2 times daily, Disp: 540 tablet, Rfl: 1     carvedilol (COREG) 6.25 MG tablet, Take 1 tablet (6.25 mg) by mouth 2 times daily (with meals), Disp: 60 tablet, Rfl: 0     Coenzyme Q10 (COQ-10) 100 MG CAPS, Take 100 mg by mouth daily, Disp: , Rfl:      diphenhydrAMINE-acetaminophen (TYLENOL PM)  MG tablet, Take 2 tablets by mouth nightly as needed for sleep, Disp: , Rfl:      gabapentin (NEURONTIN) 100 MG capsule, Take 100 mg by mouth 3 times daily, Disp: , Rfl:      glucosamine-chondroitin (GLUCOSAMINE CHONDR COMPLEX) 500-400 MG CAPS per capsule, Take 1 tablet by mouth daily , Disp: , Rfl:      insulin glargine (BASAGLAR KWIKPEN) 100  UNIT/ML pen, Inject 65 Units Subcutaneous At Bedtime, Disp: , Rfl:      metFORMIN (GLUCOPHAGE) 1000 MG tablet, Take 1,000 mg by mouth 2 times daily (with meals), Disp: , Rfl:      Multiple Vitamin (MULTI VITAMIN DAILY PO), , Disp: , Rfl:      Omega-3 Fatty Acids (FISH OIL) 500 MG CAPS, Take 500 mg by mouth every other day, Disp: , Rfl:      predniSONE (DELTASONE) 20 MG tablet, , Disp: , Rfl:      sacubitril-valsartan (ENTRESTO) 49-51 MG per tablet, Take 1 tablet by mouth 2 times daily, Disp: 60 tablet, Rfl: 0     spironolactone (ALDACTONE) 25 MG tablet, Take 12.5 mg by mouth daily, Disp: , Rfl:      ticagrelor (BRILINTA) 90 MG tablet, Take 1 tablet (90 mg) by mouth 2 times daily, Disp: 180 tablet, Rfl: 1    SOCIAL HISTORY:  Social History     Socioeconomic History     Marital status: Unknown     Spouse name: Not on file     Number of children: Not on file     Years of education: Not on file     Highest education level: Not on file   Occupational History     Not on file   Social Needs     Financial resource strain: Not on file     Food insecurity     Worry: Not on file     Inability: Not on file     Transportation needs     Medical: Not on file     Non-medical: Not on file   Tobacco Use     Smoking status: Never Smoker     Smokeless tobacco: Never Used   Substance and Sexual Activity     Alcohol use: Yes     Comment: occas     Drug use: Never     Sexual activity: Not on file   Lifestyle     Physical activity     Days per week: Not on file     Minutes per session: Not on file     Stress: Not on file   Relationships     Social connections     Talks on phone: Not on file     Gets together: Not on file     Attends Confucianist service: Not on file     Active member of club or organization: Not on file     Attends meetings of clubs or organizations: Not on file     Relationship status: Not on file     Intimate partner violence     Fear of current or ex partner: Not on file     Emotionally abused: Not on file     Physically  abused: Not on file     Forced sexual activity: Not on file   Other Topics Concern     Not on file   Social History Narrative     Not on file       FAMILY HISTORY:  No family history on file.      PHYSICAL EXAMINATION:  Vitals reviewed, AFVSS   Wt   Wt Readings from Last 2 Encounters:   03/09/20 69.4 kg (153 lb)   03/09/20 69.5 kg (153 lb 4.8 oz)      Not examined - telephone visit.    PERTINENT STUDIES:    Orders Only on 03/09/2020   Component Date Value Ref Range Status     PSA 03/09/2020 1.20  0 - 4 ug/L Final     Hemoglobin A1C 03/09/2020 7.8* 0 - 5.6 % Final     Amphetamine Qual Urine 03/09/2020 Negative  NEG^Negative Final     Barbiturates Qual Urine 03/09/2020 Negative  NEG^Negative Final     Benzodiazepine Qual Urine 03/09/2020 Negative  NEG^Negative Final     Cannabinoids Qual Urine 03/09/2020 Negative  NEG^Negative Final     Cocaine Qual Urine 03/09/2020 Negative  NEG^Negative Final     Ethanol Qual Urine 03/09/2020 Negative  NEG^Negative Final     Opiates Qualitative Urine 03/09/2020 Negative  NEG^Negative Final     PCP Qual Urine 03/09/2020 Negative  NEG^Negative Final     Color Urine 03/09/2020 Yellow   Final     Appearance Urine 03/09/2020 Clear   Final     Glucose Urine 03/09/2020 Negative  NEG^Negative mg/dL Final     Bilirubin Urine 03/09/2020 Negative  NEG^Negative Final     Ketones Urine 03/09/2020 Negative  NEG^Negative mg/dL Final     Specific Gravity Urine 03/09/2020 1.014  1.003 - 1.035 Final     Blood Urine 03/09/2020 Negative  NEG^Negative Final     pH Urine 03/09/2020 6.0  5.0 - 7.0 pH Final     Protein Albumin Urine 03/09/2020 30* NEG^Negative mg/dL Final     Urobilinogen mg/dL 03/09/2020 0.0  0.0 - 2.0 mg/dL Final     Nitrite Urine 03/09/2020 Negative  NEG^Negative Final     Leukocyte Esterase Urine 03/09/2020 Negative  NEG^Negative Final     Source 03/09/2020 Midstream Urine   Final     WBC Urine 03/09/2020 3  0 - 5 /HPF Final     RBC Urine 03/09/2020 1  0 - 2 /HPF Final     Squamous  Epithelial /HPF Urine 03/09/2020 <1  0 - 1 /HPF Final     Mucous Urine 03/09/2020 Present* NEG^Negative /LPF Final     Hyaline Casts 03/09/2020 6* 0 - 2 /LPF Final     PTT 03/09/2020 33  22 - 37 sec Final     INR 03/09/2020 1.29* 0.86 - 1.14 Final     Lupus Result 03/09/2020 Negative  NEG^Negative Final     TSH 03/09/2020 1.02  0.40 - 4.00 mU/L Final     ABO 03/09/2020 O   Final     RH(D) 03/09/2020 Pos   Final     Antibody Screen 03/09/2020 Neg   Final     Test Valid Only At 03/09/2020 [facility]      Final     Specimen Expires 03/09/2020 03/12/2020   Final     Phosphorus 03/09/2020 3.8  2.5 - 4.5 mg/dL Final     Transferrin 03/09/2020 304  210 - 360 mg/dL Final     Ferritin 03/09/2020 127  26 - 388 ng/mL Final     Iron 03/09/2020 143  35 - 180 ug/dL Final     Iron Binding Cap 03/09/2020 394  240 - 430 ug/dL Final     Iron Saturation Index 03/09/2020 36  15 - 46 % Final     WBC 03/09/2020 12.5* 4.0 - 11.0 10e9/L Final     RBC Count 03/09/2020 4.38* 4.4 - 5.9 10e12/L Final     Hemoglobin 03/09/2020 13.6  13.3 - 17.7 g/dL Final     Hematocrit 03/09/2020 40.9  40.0 - 53.0 % Final     MCV 03/09/2020 93  78 - 100 fl Final     MCH 03/09/2020 31.1  26.5 - 33.0 pg Final     MCHC 03/09/2020 33.3  31.5 - 36.5 g/dL Final     RDW 03/09/2020 17.3* 10.0 - 15.0 % Final     Platelet Count 03/09/2020 259  150 - 450 10e9/L Final     Diff Method 03/09/2020 Automated Method   Final     % Neutrophils 03/09/2020 80.2  % Final     % Lymphocytes 03/09/2020 12.1  % Final     % Monocytes 03/09/2020 6.8  % Final     % Eosinophils 03/09/2020 0.2  % Final     % Basophils 03/09/2020 0.2  % Final     % Immature Granulocytes 03/09/2020 0.5  % Final     Nucleated RBCs 03/09/2020 0  0 /100 Final     Absolute Neutrophil 03/09/2020 10.0* 1.6 - 8.3 10e9/L Final     Absolute Lymphocytes 03/09/2020 1.5  0.8 - 5.3 10e9/L Final     Absolute Monocytes 03/09/2020 0.9  0.0 - 1.3 10e9/L Final     Absolute  Eosinophils 03/09/2020 0.0  0.0 - 0.7 10e9/L Final     Absolute Basophils 03/09/2020 0.0  0.0 - 0.2 10e9/L Final     Abs Immature Granulocytes 03/09/2020 0.1  0 - 0.4 10e9/L Final     Absolute Nucleated RBC 03/09/2020 0.0   Final     Sodium 03/09/2020 137  133 - 144 mmol/L Final     Potassium 03/09/2020 3.4  3.4 - 5.3 mmol/L Final     Chloride 03/09/2020 100  94 - 109 mmol/L Final     Carbon Dioxide 03/09/2020 30  20 - 32 mmol/L Final     Anion Gap 03/09/2020 7  3 - 14 mmol/L Final     Glucose 03/09/2020 95  70 - 99 mg/dL Final     Urea Nitrogen 03/09/2020 37* 7 - 30 mg/dL Final     Creatinine 03/09/2020 0.82  0.66 - 1.25 mg/dL Final     GFR Estimate 03/09/2020 87  >60 mL/min/[1.73_m2] Final     GFR Estimate If Black 03/09/2020 >90  >60 mL/min/[1.73_m2] Final     Calcium 03/09/2020 9.6  8.5 - 10.1 mg/dL Final     Bilirubin Total 03/09/2020 2.4* 0.2 - 1.3 mg/dL Final     Albumin 03/09/2020 3.8  3.4 - 5.0 g/dL Final     Protein Total 03/09/2020 7.8  6.8 - 8.8 g/dL Final     Alkaline Phosphatase 03/09/2020 105  40 - 150 U/L Final     ALT 03/09/2020 37  0 - 70 U/L Final     AST 03/09/2020 29  0 - 45 U/L Final     Prealbumin 03/09/2020 34  15 - 45 mg/dL Final             Again, thank you for allowing me to participate in the care of your patient.        Sincerely,        Godwin Calles MD

## 2020-08-03 NOTE — TELEPHONE ENCOUNTER
Telephone visit today re incidental dilation of main pancreatic duct.    Can't have MRI due to defibrillator. EUS higher risk due to significant cardiac history (recent stents, EF 10%).    Ana M - please arrange for CT abdomen with contrast pancreatic protocol. Ind - dilated pancreatic duct.    If normal, will defer further evaluation. Otherwise reconsider EUS.    BIN Calles MD  Professor of Medicine  Division of Gastroenterology, Hepatology and Nutrition  [facility]

## 2020-08-03 NOTE — PROGRESS NOTES
"Ken Doss is a 74 year old male who is being evaluated via a billable telephone visit.      The patient has been notified of following:     \"This telephone visit will be conducted via a call between you and your physician/provider. We have found that certain health care needs can be provided without the need for a physical exam.  This service lets us provide the care you need with a short phone conversation.  If a prescription is necessary we can send it directly to your pharmacy.  If lab work is needed we can place an order for that and you can then stop by our lab to have the test done at a later time.    Telephone visits are billed at different rates depending on your insurance coverage. During this emergency period, for some insurers they may be billed the same as an in-person visit.  Please reach out to your insurance provider with any questions.    If during the course of the call the physician/provider feels a telephone visit is not appropriate, you will not be charged for this service.\"    Patient has given verbal consent for Telephone visit?  Yes    What phone number would you like to be contacted at? [phone]    How would you like to obtain your AVS? Mail a copy     Vitals - Patient Reported  Weight (Patient Reported): 69.9 kg (154 lb)  Height (Patient Reported): 175.3 cm (5' 9.02\")  BMI (Based on Pt Reported Ht/Wt): 22.73  Pain Score: No Pain (0)    Rayray Hood LPN    Phone call duration:16 minutes          "

## 2020-08-11 NOTE — PROGRESS NOTES
"Ken Doss is a 74 year old male who is being evaluated via a billable telephone visit.      The patient has been notified of following:     \"This telephone visit will be conducted via a call between you and your physician/provider. We have found that certain health care needs can be provided without the need for a physical exam.  This service lets us provide the care you need with a short phone conversation.  If a prescription is necessary we can send it directly to your pharmacy.  If lab work is needed we can place an order for that and you can then stop by our lab to have the test done at a later time.    Telephone visits are billed at different rates depending on your insurance coverage. During this emergency period, for some insurers they may be billed the same as an in-person visit.  Please reach out to your insurance provider with any questions.    If during the course of the call the physician/provider feels a telephone visit is not appropriate, you will not be charged for this service.\"    Patient has given verbal consent for Telephone visit?  Yes    What phone number would you like to be contacted at? [phone]    How would you like to obtain your AVS? Mail a copy     Vitals - Patient Reported  Weight (Patient Reported): 69.9 kg (154 lb)  Height (Patient Reported): 175.3 cm (5' 9.02\")  BMI (Based on Pt Reported Ht/Wt): 22.73  Pain Score: No Pain (0)    Rayray Hood LPN    Phone call duration: 16 minutes    GI CLINIC VISIT - NEW PATIENT    CC/REFERRING MD:  Tata Payton  REASON FOR CONSULTATION: Dilated pancreatic duct on imaging.    ASSESSMENT/PLAN:  Incidentally noted mild dilation of the main pancreatic duct in an asymptomatic patient.  Significant cardiac comorbidity and now not pursuing LVAD treatment.   Discussed potential clinical implications and small possibility of pancreatic mass lesion or IPMN. He would not be a candidate for resection of these if identified, but would be " of prognostic significance.    Discussed options of further evaluation with EUS vs CT with contrast. Given comorbidity, I recommended CT to ensure no major abnormalities.  Presuming CT unremarkable, then no further evaluation.    PLAN:  - CT abdomen with IV contrast. If unremarkable, no further evaluation.    C PRN    Thank you for this consultation.  It was a pleasure to participate in the care of this patient; please contact us with any further questions.  A total of 16 minutes was spent with this patient, >50% of which was counseling regarding the above delineated issues.      BIN Calles MD  Associate Professor of Medicine  Division of Gastroenterology, Hepatology and Nutrition  [facility]  -------------------------------------------------------------------------------------------------------------------  HPI:  The pt is a/n 74 year old male who I was asked to see in consultation at the request of Dr. Tata Payton for evaluation of an incidentally identified dilated main pancreatic duct on ultrasound.    The pt has a significant history of cardiac disease and has been undergoing evaluation for possible LVAD. He stated now that he is not wanting to pursue this.     He underwent abdominal ultrasound of the abdomen, apparently as part of his cardiac evaluation, on 3/9/20 which noted the main pancreatic duct to measure 4 mm in the pancreatic body. This was personally reviewed - a region of the pancreas is seen with dilated duct without apparent obstructing lesion seen (incomplete visualization). The bile duct is non-dilated at 6 mm and several small stones are seen in the gallbladder.    CT 3/9/20 without contrast was unremarkable within limits of a non-contrast study.    He is well from a GI standpoint and denies abdominal pain, jaundice, unexplained weight loss or diarrhea.    ROS:  See history of present illness.    PERTINENT PAST MEDICAL HISTORY:  Past Medical History:   Diagnosis  Date     Diabetes mellitus, type 2 (H)        PREVIOUS SURGERIES:  Past Surgical History:   Procedure Laterality Date     CORONARY ANGIOGRAPHY ADULT ORDER       CV CORONARY ANGIOGRAM N/A 2/27/2020    Procedure: CV CORONARY ANGIOGRAM;  Surgeon: Pedro Fontaine MD;  Location: [facility] CARDIAC CATH LAB     CV INTRAVASULAR ULTRASOUND N/A 2/27/2020    Procedure: Intravascular Ultrasound;  Surgeon: Pedro Fontaine MD;  Location:  HEART CARDIAC CATH LAB     CV PCI ANGIOPLASTY N/A 2/27/2020    Procedure: Percutaneous Coronary Intervention Angioplasty;  Surgeon: Pedro Fontaine MD;  Location:  HEART CARDIAC CATH LAB     CV RIGHT HEART CATH N/A 2/27/2020    Procedure: Right Heart Cath;  Surgeon: Pedro Fontaine MD;  Location:  HEART CARDIAC CATH LAB     CV RIGHT HEART CATH N/A 7/30/2020    Procedure: CV RIGHT HEART CATH;  Surgeon: Ronny Geronimo MD;  Location:  HEART CARDIAC CATH LAB         ALLERGIES:   No Known Allergies    PERTINENT MEDICATIONS:    Current Outpatient Medications:      acetaminophen (TYLENOL) 500 MG tablet, Take 500 mg by mouth 2 times daily , Disp: , Rfl:      apixaban ANTICOAGULANT (ELIQUIS ANTICOAGULANT) 5 MG tablet, Take 1 tablet (5 mg) by mouth 2 times daily, Disp: 180 tablet, Rfl: 1     atorvastatin (LIPITOR) 40 MG tablet, Take 40 mg by mouth daily, Disp: , Rfl:      blood glucose (NO BRAND SPECIFIED) test strip, 4 times daily, Disp: , Rfl:      bumetanide (BUMEX) 1 MG tablet, Take 3 tablets (3 mg) by mouth 2 times daily, Disp: 540 tablet, Rfl: 1     carvedilol (COREG) 6.25 MG tablet, Take 1 tablet (6.25 mg) by mouth 2 times daily (with meals), Disp: 60 tablet, Rfl: 0     Coenzyme Q10 (COQ-10) 100 MG CAPS, Take 100 mg by mouth daily, Disp: , Rfl:      diphenhydrAMINE-acetaminophen (TYLENOL PM)  MG tablet, Take 2 tablets by mouth nightly as needed for sleep, Disp: , Rfl:      gabapentin (NEURONTIN) 100 MG capsule, Take 100  mg by mouth 3 times daily, Disp: , Rfl:      glucosamine-chondroitin (GLUCOSAMINE CHONDR COMPLEX) 500-400 MG CAPS per capsule, Take 1 tablet by mouth daily , Disp: , Rfl:      insulin glargine (BASAGLAR KWIKPEN) 100 UNIT/ML pen, Inject 65 Units Subcutaneous At Bedtime, Disp: , Rfl:      metFORMIN (GLUCOPHAGE) 1000 MG tablet, Take 1,000 mg by mouth 2 times daily (with meals), Disp: , Rfl:      Multiple Vitamin (MULTI VITAMIN DAILY PO), , Disp: , Rfl:      Omega-3 Fatty Acids (FISH OIL) 500 MG CAPS, Take 500 mg by mouth every other day, Disp: , Rfl:      predniSONE (DELTASONE) 20 MG tablet, , Disp: , Rfl:      sacubitril-valsartan (ENTRESTO) 49-51 MG per tablet, Take 1 tablet by mouth 2 times daily, Disp: 60 tablet, Rfl: 0     spironolactone (ALDACTONE) 25 MG tablet, Take 12.5 mg by mouth daily, Disp: , Rfl:      ticagrelor (BRILINTA) 90 MG tablet, Take 1 tablet (90 mg) by mouth 2 times daily, Disp: 180 tablet, Rfl: 1    SOCIAL HISTORY:  Social History     Socioeconomic History     Marital status: Unknown     Spouse name: Not on file     Number of children: Not on file     Years of education: Not on file     Highest education level: Not on file   Occupational History     Not on file   Social Needs     Financial resource strain: Not on file     Food insecurity     Worry: Not on file     Inability: Not on file     Transportation needs     Medical: Not on file     Non-medical: Not on file   Tobacco Use     Smoking status: Never Smoker     Smokeless tobacco: Never Used   Substance and Sexual Activity     Alcohol use: Yes     Comment: occas     Drug use: Never     Sexual activity: Not on file   Lifestyle     Physical activity     Days per week: Not on file     Minutes per session: Not on file     Stress: Not on file   Relationships     Social connections     Talks on phone: Not on file     Gets together: Not on file     Attends Taoism service: Not on file     Active member of club or organization: Not on file      Attends meetings of clubs or organizations: Not on file     Relationship status: Not on file     Intimate partner violence     Fear of current or ex partner: Not on file     Emotionally abused: Not on file     Physically abused: Not on file     Forced sexual activity: Not on file   Other Topics Concern     Not on file   Social History Narrative     Not on file       FAMILY HISTORY:  No family history on file.      PHYSICAL EXAMINATION:  Vitals reviewed, AFVSS   Wt   Wt Readings from Last 2 Encounters:   03/09/20 69.4 kg (153 lb)   03/09/20 69.5 kg (153 lb 4.8 oz)      Not examined - telephone visit.    PERTINENT STUDIES:    Orders Only on 03/09/2020   Component Date Value Ref Range Status     PSA 03/09/2020 1.20  0 - 4 ug/L Final     Hemoglobin A1C 03/09/2020 7.8* 0 - 5.6 % Final     Amphetamine Qual Urine 03/09/2020 Negative  NEG^Negative Final     Barbiturates Qual Urine 03/09/2020 Negative  NEG^Negative Final     Benzodiazepine Qual Urine 03/09/2020 Negative  NEG^Negative Final     Cannabinoids Qual Urine 03/09/2020 Negative  NEG^Negative Final     Cocaine Qual Urine 03/09/2020 Negative  NEG^Negative Final     Ethanol Qual Urine 03/09/2020 Negative  NEG^Negative Final     Opiates Qualitative Urine 03/09/2020 Negative  NEG^Negative Final     PCP Qual Urine 03/09/2020 Negative  NEG^Negative Final     Color Urine 03/09/2020 Yellow   Final     Appearance Urine 03/09/2020 Clear   Final     Glucose Urine 03/09/2020 Negative  NEG^Negative mg/dL Final     Bilirubin Urine 03/09/2020 Negative  NEG^Negative Final     Ketones Urine 03/09/2020 Negative  NEG^Negative mg/dL Final     Specific Gravity Urine 03/09/2020 1.014  1.003 - 1.035 Final     Blood Urine 03/09/2020 Negative  NEG^Negative Final     pH Urine 03/09/2020 6.0  5.0 - 7.0 pH Final     Protein Albumin Urine 03/09/2020 30* NEG^Negative mg/dL Final     Urobilinogen mg/dL 03/09/2020 0.0  0.0 - 2.0 mg/dL Final     Nitrite Urine 03/09/2020 Negative  NEG^Negative Final      Leukocyte Esterase Urine 03/09/2020 Negative  NEG^Negative Final     Source 03/09/2020 Midstream Urine   Final     WBC Urine 03/09/2020 3  0 - 5 /HPF Final     RBC Urine 03/09/2020 1  0 - 2 /HPF Final     Squamous Epithelial /HPF Urine 03/09/2020 <1  0 - 1 /HPF Final     Mucous Urine 03/09/2020 Present* NEG^Negative /LPF Final     Hyaline Casts 03/09/2020 6* 0 - 2 /LPF Final     PTT 03/09/2020 33  22 - 37 sec Final     INR 03/09/2020 1.29* 0.86 - 1.14 Final     Lupus Result 03/09/2020 Negative  NEG^Negative Final     TSH 03/09/2020 1.02  0.40 - 4.00 mU/L Final     ABO 03/09/2020 O   Final     RH(D) 03/09/2020 Pos   Final     Antibody Screen 03/09/2020 Neg   Final     Test Valid Only At 03/09/2020 [facility]      Final     Specimen Expires 03/09/2020 03/12/2020   Final     Phosphorus 03/09/2020 3.8  2.5 - 4.5 mg/dL Final     Transferrin 03/09/2020 304  210 - 360 mg/dL Final     Ferritin 03/09/2020 127  26 - 388 ng/mL Final     Iron 03/09/2020 143  35 - 180 ug/dL Final     Iron Binding Cap 03/09/2020 394  240 - 430 ug/dL Final     Iron Saturation Index 03/09/2020 36  15 - 46 % Final     WBC 03/09/2020 12.5* 4.0 - 11.0 10e9/L Final     RBC Count 03/09/2020 4.38* 4.4 - 5.9 10e12/L Final     Hemoglobin 03/09/2020 13.6  13.3 - 17.7 g/dL Final     Hematocrit 03/09/2020 40.9  40.0 - 53.0 % Final     MCV 03/09/2020 93  78 - 100 fl Final     MCH 03/09/2020 31.1  26.5 - 33.0 pg Final     MCHC 03/09/2020 33.3  31.5 - 36.5 g/dL Final     RDW 03/09/2020 17.3* 10.0 - 15.0 % Final     Platelet Count 03/09/2020 259  150 - 450 10e9/L Final     Diff Method 03/09/2020 Automated Method   Final     % Neutrophils 03/09/2020 80.2  % Final     % Lymphocytes 03/09/2020 12.1  % Final     % Monocytes 03/09/2020 6.8  % Final     % Eosinophils 03/09/2020 0.2  % Final     % Basophils 03/09/2020 0.2  % Final     % Immature Granulocytes 03/09/2020 0.5  % Final     Nucleated RBCs 03/09/2020 0  0 /100  Final     Absolute Neutrophil 03/09/2020 10.0* 1.6 - 8.3 10e9/L Final     Absolute Lymphocytes 03/09/2020 1.5  0.8 - 5.3 10e9/L Final     Absolute Monocytes 03/09/2020 0.9  0.0 - 1.3 10e9/L Final     Absolute Eosinophils 03/09/2020 0.0  0.0 - 0.7 10e9/L Final     Absolute Basophils 03/09/2020 0.0  0.0 - 0.2 10e9/L Final     Abs Immature Granulocytes 03/09/2020 0.1  0 - 0.4 10e9/L Final     Absolute Nucleated RBC 03/09/2020 0.0   Final     Sodium 03/09/2020 137  133 - 144 mmol/L Final     Potassium 03/09/2020 3.4  3.4 - 5.3 mmol/L Final     Chloride 03/09/2020 100  94 - 109 mmol/L Final     Carbon Dioxide 03/09/2020 30  20 - 32 mmol/L Final     Anion Gap 03/09/2020 7  3 - 14 mmol/L Final     Glucose 03/09/2020 95  70 - 99 mg/dL Final     Urea Nitrogen 03/09/2020 37* 7 - 30 mg/dL Final     Creatinine 03/09/2020 0.82  0.66 - 1.25 mg/dL Final     GFR Estimate 03/09/2020 87  >60 mL/min/[1.73_m2] Final     GFR Estimate If Black 03/09/2020 >90  >60 mL/min/[1.73_m2] Final     Calcium 03/09/2020 9.6  8.5 - 10.1 mg/dL Final     Bilirubin Total 03/09/2020 2.4* 0.2 - 1.3 mg/dL Final     Albumin 03/09/2020 3.8  3.4 - 5.0 g/dL Final     Protein Total 03/09/2020 7.8  6.8 - 8.8 g/dL Final     Alkaline Phosphatase 03/09/2020 105  40 - 150 U/L Final     ALT 03/09/2020 37  0 - 70 U/L Final     AST 03/09/2020 29  0 - 45 U/L Final     Prealbumin 03/09/2020 34  15 - 45 mg/dL Final

## 2020-08-12 ENCOUNTER — ANCILLARY PROCEDURE (OUTPATIENT)
Dept: CT IMAGING | Facility: CLINIC | Age: 75
End: 2020-08-12
Attending: INTERNAL MEDICINE
Payer: COMMERCIAL

## 2020-08-12 DIAGNOSIS — K86.89 PANCREATIC DUCT DILATED: ICD-10-CM

## 2020-08-12 RX ORDER — IOPAMIDOL 755 MG/ML
93 INJECTION, SOLUTION INTRAVASCULAR ONCE
Status: COMPLETED | OUTPATIENT
Start: 2020-08-12 | End: 2020-08-12

## 2020-08-12 RX ADMIN — IOPAMIDOL 93 ML: 755 INJECTION, SOLUTION INTRAVASCULAR at 07:52

## 2020-08-20 ENCOUNTER — COMMUNICATION - HEALTHEAST (OUTPATIENT)
Dept: CARDIOLOGY | Facility: CLINIC | Age: 75
End: 2020-08-20

## 2020-09-01 ENCOUNTER — COMMUNICATION - HEALTHEAST (OUTPATIENT)
Dept: CARDIOLOGY | Facility: CLINIC | Age: 75
End: 2020-09-01

## 2020-09-02 ENCOUNTER — AMBULATORY - HEALTHEAST (OUTPATIENT)
Dept: CARDIOLOGY | Facility: CLINIC | Age: 75
End: 2020-09-02

## 2020-09-02 DIAGNOSIS — I50.22 CHRONIC SYSTOLIC HEART FAILURE (H): ICD-10-CM

## 2020-09-03 DIAGNOSIS — I25.10 CORONARY ARTERY DISEASE INVOLVING NATIVE CORONARY ARTERY OF NATIVE HEART WITHOUT ANGINA PECTORIS: ICD-10-CM

## 2020-09-03 DIAGNOSIS — Z95.820 S/P ANGIOPLASTY WITH STENT: ICD-10-CM

## 2020-09-03 LAB — BNP SERPL-MCNC: 603 PG/ML (ref 0–74)

## 2020-09-04 ENCOUNTER — AMBULATORY - HEALTHEAST (OUTPATIENT)
Dept: CARDIOLOGY | Facility: CLINIC | Age: 75
End: 2020-09-04

## 2020-09-04 ENCOUNTER — SURGERY - HEALTHEAST (OUTPATIENT)
Dept: CARDIOLOGY | Facility: CLINIC | Age: 75
End: 2020-09-04

## 2020-09-04 DIAGNOSIS — I42.8 NONISCHEMIC CARDIOMYOPATHY (H): ICD-10-CM

## 2020-09-04 DIAGNOSIS — Z45.02 END OF BATTERY LIFE OF CARDIAC RESYNCHRONIZATION THERAPY DEFIBRILLATOR (CRT-D): ICD-10-CM

## 2020-09-04 DIAGNOSIS — Z95.810 ICD (IMPLANTABLE CARDIOVERTER-DEFIBRILLATOR), BIVENTRICULAR, IN SITU: ICD-10-CM

## 2020-09-08 ENCOUNTER — COMMUNICATION - HEALTHEAST (OUTPATIENT)
Dept: CARDIOLOGY | Facility: CLINIC | Age: 75
End: 2020-09-08

## 2020-09-08 ENCOUNTER — AMBULATORY - HEALTHEAST (OUTPATIENT)
Dept: CARDIOLOGY | Facility: CLINIC | Age: 75
End: 2020-09-08

## 2020-09-08 ENCOUNTER — TELEPHONE (OUTPATIENT)
Dept: CARDIOLOGY | Facility: CLINIC | Age: 75
End: 2020-09-08

## 2020-09-08 DIAGNOSIS — I50.23 ACUTE ON CHRONIC SYSTOLIC HEART FAILURE (H): ICD-10-CM

## 2020-09-08 DIAGNOSIS — Z11.59 ENCOUNTER FOR SCREENING FOR OTHER VIRAL DISEASES: ICD-10-CM

## 2020-09-08 NOTE — TELEPHONE ENCOUNTER
Brilinta 90 MG Oral Tablet   Last Written Prescription Date:  3/11/2020  Last Fill Quantity: 180,   # refills: 1  Last Office Visit : 3/9/2020  Future Office visit:  None    Routing refill request to provider for review/approval because:  Abnormal labs:   HGB & CR    Refer to clinic for review     Recent Labs   Lab Test 07/30/20 0729   HGB 12.3*             Normal serum creatinine on file in past 12 months         Recent Labs   Lab Test 07/30/20 0729   CR 1.28*             Shaunna Beckwith RN  Central Triage Red Flags/Med Refills

## 2020-09-08 NOTE — TELEPHONE ENCOUNTER
Pt called stated that the EP provider at [facility] has scheduled him for a gen change for his pacemaker and wanted us to be aware.  I spoke with aTta Payton MD and he stated that it was appropriate for the Gen Change.  I let the pt know and he is going to continue to keep up is the look of any changes.  Lena Hernandez RN on 9/10/2020 at 12:28 PM    I called the patient but his phone went straight to voicemail.  Requested he call us back.  Lena Hernandez RN on 9/8/2020 at 3:05 PM

## 2020-09-08 NOTE — TELEPHONE ENCOUNTER
[facility] Call Center    Phone Message    May a detailed message be left on voicemail: yes     Reason for Call: Other: Pt would like a call back to discuss the pt pacemaker as mariiaey got a call from a Dr they never heard of to put in a new pacemaker and they would like to discuss this with the Dr     Action Taken: Message routed to:  [facility] Cardio    Travel Screening: Not Applicable

## 2020-09-09 ENCOUNTER — COMMUNICATION - HEALTHEAST (OUTPATIENT)
Dept: CARDIOLOGY | Facility: CLINIC | Age: 75
End: 2020-09-09

## 2020-09-10 NOTE — TELEPHONE ENCOUNTER
3 month ERx filled; requested patient get labs drawn to follow up on elevated Cr levels. Esther Wagner RN on 9/10/2020 at 10:56 AM

## 2020-09-11 ENCOUNTER — RECORDS - HEALTHEAST (OUTPATIENT)
Dept: LAB | Facility: CLINIC | Age: 75
End: 2020-09-11

## 2020-09-11 LAB
ALBUMIN SERPL-MCNC: 4.2 G/DL (ref 3.5–5)
ANION GAP SERPL CALCULATED.3IONS-SCNC: 13 MMOL/L (ref 5–18)
BUN SERPL-MCNC: 44 MG/DL (ref 8–28)
CALCIUM SERPL-MCNC: 9.4 MG/DL (ref 8.5–10.5)
CHLORIDE BLD-SCNC: 98 MMOL/L (ref 98–107)
CO2 SERPL-SCNC: 24 MMOL/L (ref 22–31)
CREAT SERPL-MCNC: 1.35 MG/DL (ref 0.7–1.3)
GFR SERPL CREATININE-BSD FRML MDRD: 52 ML/MIN/1.73M2
GLUCOSE BLD-MCNC: 298 MG/DL (ref 70–125)
PHOSPHATE SERPL-MCNC: 3.5 MG/DL (ref 2.5–4.5)
POTASSIUM BLD-SCNC: 4.6 MMOL/L (ref 3.5–5)
SODIUM SERPL-SCNC: 135 MMOL/L (ref 136–145)

## 2020-09-12 RX ORDER — BUMETANIDE 1 MG/1
3 TABLET ORAL 2 TIMES DAILY
Qty: 540 TABLET | Refills: 1 | Status: SHIPPED | OUTPATIENT
Start: 2020-09-12 | End: 2021-01-01

## 2020-09-12 RX ORDER — BUMETANIDE 1 MG/1
3 TABLET ORAL 2 TIMES DAILY
Qty: 540 TABLET | Refills: 1 | Status: SHIPPED | OUTPATIENT
Start: 2020-09-12 | End: 2020-09-12

## 2020-09-12 NOTE — TELEPHONE ENCOUNTER
"Re-sending Rx.  Received message that initial transmission failed.   \"E-Prescribing Status: Transmission to pharmacy failed (9/12/2020  9:35 AM CDT)\"  "

## 2020-09-12 NOTE — TELEPHONE ENCOUNTER
Last Clinic Visit: 3/9/2020 [facility]    *last Creatinine level was just slightly above parameter and has been reviewed by prescriber 8/2/2020.  No med changes.  Creatinine   Date Value Ref Range Status   07/30/2020 1.28 (H) 0.66 - 1.25 mg/dL Final

## 2020-09-13 ENCOUNTER — AMBULATORY - HEALTHEAST (OUTPATIENT)
Dept: FAMILY MEDICINE | Facility: CLINIC | Age: 75
End: 2020-09-13

## 2020-09-13 DIAGNOSIS — Z11.59 ENCOUNTER FOR SCREENING FOR OTHER VIRAL DISEASES: ICD-10-CM

## 2020-09-15 ENCOUNTER — COMMUNICATION - HEALTHEAST (OUTPATIENT)
Dept: SCHEDULING | Facility: CLINIC | Age: 75
End: 2020-09-15

## 2020-09-15 ENCOUNTER — TELEPHONE (OUTPATIENT)
Dept: CARDIOLOGY | Facility: CLINIC | Age: 75
End: 2020-09-15

## 2020-09-15 NOTE — TELEPHONE ENCOUNTER
[facility] Call Center    Phone Message    May a detailed message be left on voicemail: yes     Reason for Call: Other: Pt would like a call back to discuss the new medication he is on and the changes     Action Taken: Message routed to:  [facility] Cardio    Travel Screening: Not Applicable

## 2020-09-15 NOTE — TELEPHONE ENCOUNTER
I called pt to discuss the new medication that he started.  He is going to take prednisone (d/t gout flare-up) and some amoxacillin (dental procedure). He is scheduled for his Pacmaker placement on Thursday.  Lena Hernandez RN on 9/15/2020 at 10:04 AM

## 2020-09-17 ENCOUNTER — AMBULATORY - HEALTHEAST (OUTPATIENT)
Dept: CARDIOLOGY | Facility: CLINIC | Age: 75
End: 2020-09-17

## 2020-09-17 ENCOUNTER — SURGERY - HEALTHEAST (OUTPATIENT)
Dept: CARDIOLOGY | Facility: CLINIC | Age: 75
End: 2020-09-17

## 2020-09-17 ASSESSMENT — MIFFLIN-ST. JEOR: SCORE: 1428.46

## 2020-09-18 ENCOUNTER — AMBULATORY - HEALTHEAST (OUTPATIENT)
Dept: CARDIOLOGY | Facility: CLINIC | Age: 75
End: 2020-09-18

## 2020-09-18 ENCOUNTER — TELEPHONE (OUTPATIENT)
Dept: CARDIOLOGY | Facility: CLINIC | Age: 75
End: 2020-09-18

## 2020-09-18 ENCOUNTER — COMMUNICATION - HEALTHEAST (OUTPATIENT)
Dept: CARDIOLOGY | Facility: CLINIC | Age: 75
End: 2020-09-18

## 2020-09-18 DIAGNOSIS — I44.2 CHB (COMPLETE HEART BLOCK) (H): ICD-10-CM

## 2020-09-18 DIAGNOSIS — Z95.810 ICD (IMPLANTABLE CARDIOVERTER-DEFIBRILLATOR), BIVENTRICULAR, IN SITU: ICD-10-CM

## 2020-09-18 ASSESSMENT — MIFFLIN-ST. JEOR: SCORE: 1451.14

## 2020-09-18 NOTE — TELEPHONE ENCOUNTER
Called and LM with patient, asking if he had called to schedule his F/U appt for October or November. Asked patient to give me a call when able. Esther Wagner RN on 9/18/2020 at 1:13 PM

## 2020-09-22 ENCOUNTER — AMBULATORY - HEALTHEAST (OUTPATIENT)
Dept: CARDIOLOGY | Facility: CLINIC | Age: 75
End: 2020-09-22

## 2020-09-24 ENCOUNTER — AMBULATORY - HEALTHEAST (OUTPATIENT)
Dept: CARDIOLOGY | Facility: CLINIC | Age: 75
End: 2020-09-24

## 2020-09-24 DIAGNOSIS — Z95.810 ICD (IMPLANTABLE CARDIOVERTER-DEFIBRILLATOR), BIVENTRICULAR, IN SITU: ICD-10-CM

## 2020-09-24 DIAGNOSIS — I42.8 NONISCHEMIC CARDIOMYOPATHY (H): ICD-10-CM

## 2020-09-24 LAB
HCC DEVICE COMMENTS: NORMAL
HCC DEVICE COMMENTS: NORMAL
HCC DEVICE IMPLANTING PROVIDER: NORMAL
HCC DEVICE IMPLANTING PROVIDER: NORMAL
HCC DEVICE MANUFACTURE: NORMAL
HCC DEVICE MANUFACTURE: NORMAL
HCC DEVICE MODEL: NORMAL
HCC DEVICE MODEL: NORMAL
HCC DEVICE SERIAL NUMBER: NORMAL
HCC DEVICE SERIAL NUMBER: NORMAL
HCC DEVICE TYPE: NORMAL
HCC DEVICE TYPE: NORMAL

## 2020-09-25 ENCOUNTER — OFFICE VISIT - HEALTHEAST (OUTPATIENT)
Dept: CARDIOLOGY | Facility: CLINIC | Age: 75
End: 2020-09-25

## 2020-09-25 DIAGNOSIS — I48.19 PERSISTENT ATRIAL FIBRILLATION (H): ICD-10-CM

## 2020-09-25 DIAGNOSIS — Z95.810 ICD (IMPLANTABLE CARDIOVERTER-DEFIBRILLATOR), BIVENTRICULAR, IN SITU: ICD-10-CM

## 2020-09-25 DIAGNOSIS — I44.2 CHB (COMPLETE HEART BLOCK) (H): ICD-10-CM

## 2020-10-01 ENCOUNTER — AMBULATORY - HEALTHEAST (OUTPATIENT)
Dept: CARDIOLOGY | Facility: CLINIC | Age: 75
End: 2020-10-01

## 2020-10-01 DIAGNOSIS — R00.0 WIDE-COMPLEX TACHYCARDIA: ICD-10-CM

## 2020-10-01 DIAGNOSIS — Z95.810 ICD (IMPLANTABLE CARDIOVERTER-DEFIBRILLATOR), BIVENTRICULAR, IN SITU: ICD-10-CM

## 2020-10-01 DIAGNOSIS — I42.8 NONISCHEMIC CARDIOMYOPATHY (H): ICD-10-CM

## 2020-10-01 DIAGNOSIS — I48.21 PERMANENT ATRIAL FIBRILLATION (H): ICD-10-CM

## 2020-10-01 DIAGNOSIS — I50.22 CHRONIC SYSTOLIC HEART FAILURE (H): ICD-10-CM

## 2020-10-01 DIAGNOSIS — I25.10 CORONARY ARTERY DISEASE INVOLVING NATIVE CORONARY ARTERY: ICD-10-CM

## 2020-10-01 LAB
ANION GAP SERPL CALCULATED.3IONS-SCNC: 11 MMOL/L (ref 5–18)
BNP SERPL-MCNC: 591 PG/ML (ref 0–74)
BUN SERPL-MCNC: 37 MG/DL (ref 8–28)
CALCIUM SERPL-MCNC: 9.2 MG/DL (ref 8.5–10.5)
CHLORIDE BLD-SCNC: 98 MMOL/L (ref 98–107)
CO2 SERPL-SCNC: 25 MMOL/L (ref 22–31)
CREAT SERPL-MCNC: 1.53 MG/DL (ref 0.7–1.3)
GFR SERPL CREATININE-BSD FRML MDRD: 45 ML/MIN/1.73M2
GLUCOSE BLD-MCNC: 272 MG/DL (ref 70–125)
HCC DEVICE COMMENTS: NORMAL
HCC DEVICE IMPLANTING PROVIDER: NORMAL
HCC DEVICE MANUFACTURE: NORMAL
HCC DEVICE MODEL: NORMAL
HCC DEVICE SERIAL NUMBER: NORMAL
HCC DEVICE TYPE: NORMAL
POTASSIUM BLD-SCNC: 4.5 MMOL/L (ref 3.5–5)
SODIUM SERPL-SCNC: 134 MMOL/L (ref 136–145)

## 2020-10-01 ASSESSMENT — MIFFLIN-ST. JEOR: SCORE: 1463.84

## 2020-10-07 ENCOUNTER — OFFICE VISIT - HEALTHEAST (OUTPATIENT)
Dept: PULMONOLOGY | Facility: OTHER | Age: 75
End: 2020-10-07

## 2020-10-07 ENCOUNTER — RECORDS - HEALTHEAST (OUTPATIENT)
Dept: PULMONOLOGY | Facility: OTHER | Age: 75
End: 2020-10-07

## 2020-10-07 ENCOUNTER — RECORDS - HEALTHEAST (OUTPATIENT)
Dept: ADMINISTRATIVE | Facility: OTHER | Age: 75
End: 2020-10-07

## 2020-10-07 DIAGNOSIS — J84.9 ILD (INTERSTITIAL LUNG DISEASE) (H): ICD-10-CM

## 2020-10-07 DIAGNOSIS — J84.9 INTERSTITIAL PULMONARY DISEASE, UNSPECIFIED (H): ICD-10-CM

## 2020-10-07 ASSESSMENT — MIFFLIN-ST. JEOR: SCORE: 1460.21

## 2020-10-27 ENCOUNTER — OFFICE VISIT - HEALTHEAST (OUTPATIENT)
Dept: CARDIOLOGY | Facility: CLINIC | Age: 75
End: 2020-10-27

## 2020-10-27 DIAGNOSIS — I50.22 CHRONIC SYSTOLIC HEART FAILURE (H): ICD-10-CM

## 2020-11-23 ENCOUNTER — COMMUNICATION - HEALTHEAST (OUTPATIENT)
Dept: CARDIOLOGY | Facility: CLINIC | Age: 75
End: 2020-11-23

## 2020-11-24 ENCOUNTER — AMBULATORY - HEALTHEAST (OUTPATIENT)
Dept: CARDIOLOGY | Facility: CLINIC | Age: 75
End: 2020-11-24

## 2020-11-24 ENCOUNTER — OFFICE VISIT - HEALTHEAST (OUTPATIENT)
Dept: CARDIOLOGY | Facility: CLINIC | Age: 75
End: 2020-11-24

## 2020-11-24 DIAGNOSIS — Z79.4 TYPE 2 DIABETES MELLITUS WITH OTHER CIRCULATORY COMPLICATION, WITH LONG-TERM CURRENT USE OF INSULIN (H): ICD-10-CM

## 2020-11-24 DIAGNOSIS — E11.59 TYPE 2 DIABETES MELLITUS WITH OTHER CIRCULATORY COMPLICATION, WITH LONG-TERM CURRENT USE OF INSULIN (H): ICD-10-CM

## 2020-11-24 DIAGNOSIS — E78.1 HYPERTRIGLYCERIDEMIA: ICD-10-CM

## 2020-11-24 DIAGNOSIS — E78.5 DYSLIPIDEMIA: ICD-10-CM

## 2020-11-24 ASSESSMENT — MIFFLIN-ST. JEOR
SCORE: 1460.21
SCORE: 1460.21

## 2020-11-27 ENCOUNTER — COMMUNICATION - HEALTHEAST (OUTPATIENT)
Dept: CARDIOLOGY | Facility: CLINIC | Age: 75
End: 2020-11-27

## 2020-11-27 DIAGNOSIS — I50.22 CHRONIC SYSTOLIC HEART FAILURE (H): ICD-10-CM

## 2020-11-27 DIAGNOSIS — I25.10 CORONARY ARTERY DISEASE INVOLVING NATIVE CORONARY ARTERY OF NATIVE HEART WITHOUT ANGINA PECTORIS: ICD-10-CM

## 2020-12-01 RX ORDER — APIXABAN 5 MG/1
TABLET, FILM COATED ORAL
Qty: 180 TABLET | Refills: 0 | OUTPATIENT
Start: 2020-12-01

## 2020-12-13 ENCOUNTER — AMBULATORY - HEALTHEAST (OUTPATIENT)
Dept: CARDIOLOGY | Facility: CLINIC | Age: 75
End: 2020-12-13

## 2020-12-13 DIAGNOSIS — R00.0 WIDE-COMPLEX TACHYCARDIA: ICD-10-CM

## 2020-12-13 DIAGNOSIS — Z95.810 ICD (IMPLANTABLE CARDIOVERTER-DEFIBRILLATOR), BIVENTRICULAR, IN SITU: ICD-10-CM

## 2020-12-13 DIAGNOSIS — I48.21 PERMANENT ATRIAL FIBRILLATION (H): ICD-10-CM

## 2020-12-13 DIAGNOSIS — I44.2 CHB (COMPLETE HEART BLOCK) (H): ICD-10-CM

## 2020-12-13 DIAGNOSIS — I42.8 NONISCHEMIC CARDIOMYOPATHY (H): ICD-10-CM

## 2020-12-14 ENCOUNTER — COMMUNICATION - HEALTHEAST (OUTPATIENT)
Dept: CARDIOLOGY | Facility: CLINIC | Age: 75
End: 2020-12-14

## 2021-01-01 ENCOUNTER — HOSPITAL ENCOUNTER (INPATIENT)
Facility: HOSPITAL | Age: 76
LOS: 1 days | Discharge: HOME | DRG: 264 | End: 2021-12-30
Attending: INTERNAL MEDICINE | Admitting: THORACIC SURGERY (CARDIOTHORACIC VASCULAR SURGERY)
Payer: COMMERCIAL

## 2021-01-01 ENCOUNTER — TELEPHONE (OUTPATIENT)
Dept: CARDIOLOGY | Facility: CLINIC | Age: 76
End: 2021-01-01
Payer: COMMERCIAL

## 2021-01-01 ENCOUNTER — TELEPHONE (OUTPATIENT)
Dept: CARDIOLOGY | Facility: CLINIC | Age: 76
End: 2021-01-01

## 2021-01-01 ENCOUNTER — OFFICE VISIT (OUTPATIENT)
Dept: CARDIOLOGY | Facility: CLINIC | Age: 76
End: 2021-01-01
Payer: COMMERCIAL

## 2021-01-01 ENCOUNTER — HOSPITAL ENCOUNTER (OUTPATIENT)
Dept: CARDIOLOGY | Facility: CLINIC | Age: 76
Discharge: HOME | End: 2021-11-22
Attending: INTERNAL MEDICINE | Admitting: INTERNAL MEDICINE
Payer: COMMERCIAL

## 2021-01-01 ENCOUNTER — LAB REQUISITION (OUTPATIENT)
Dept: LAB | Facility: CLINIC | Age: 76
End: 2021-01-01

## 2021-01-01 ENCOUNTER — DOCUMENTATION ONLY (OUTPATIENT)
Dept: CARDIOLOGY | Facility: CLINIC | Age: 76
End: 2021-01-01

## 2021-01-01 ENCOUNTER — LAB (OUTPATIENT)
Dept: LAB | Facility: CLINIC | Age: 76
End: 2021-01-01
Payer: COMMERCIAL

## 2021-01-01 ENCOUNTER — PREP FOR PROCEDURE (OUTPATIENT)
Dept: CARDIOLOGY | Facility: CLINIC | Age: 76
End: 2021-01-01
Payer: COMMERCIAL

## 2021-01-01 ENCOUNTER — ANCILLARY PROCEDURE (OUTPATIENT)
Dept: CARDIOLOGY | Facility: CLINIC | Age: 76
End: 2021-01-01
Attending: INTERNAL MEDICINE
Payer: COMMERCIAL

## 2021-01-01 ENCOUNTER — PREP FOR PROCEDURE (OUTPATIENT)
Dept: CARDIOLOGY | Facility: CLINIC | Age: 76
End: 2021-01-01

## 2021-01-01 ENCOUNTER — PATIENT OUTREACH (OUTPATIENT)
Dept: CARDIOLOGY | Facility: CLINIC | Age: 76
End: 2021-01-01
Payer: COMMERCIAL

## 2021-01-01 ENCOUNTER — HOSPITAL ENCOUNTER (OUTPATIENT)
Dept: RADIOLOGY | Facility: HOSPITAL | Age: 76
Discharge: HOME | End: 2021-11-09
Attending: INTERNAL MEDICINE | Admitting: INTERNAL MEDICINE
Payer: COMMERCIAL

## 2021-01-01 ENCOUNTER — DOCUMENTATION ONLY (OUTPATIENT)
Dept: CARDIOLOGY | Facility: CLINIC | Age: 76
End: 2021-01-01
Payer: COMMERCIAL

## 2021-01-01 ENCOUNTER — ANESTHESIA EVENT (OUTPATIENT)
Dept: CARDIOLOGY | Facility: HOSPITAL | Age: 76
DRG: 264 | End: 2021-01-01
Payer: COMMERCIAL

## 2021-01-01 ENCOUNTER — TRANSCRIBE ORDERS (OUTPATIENT)
Dept: CARDIOLOGY | Facility: CLINIC | Age: 76
End: 2021-01-01
Payer: COMMERCIAL

## 2021-01-01 ENCOUNTER — HOSPITAL ENCOUNTER (OUTPATIENT)
Facility: HOSPITAL | Age: 76
Discharge: HOME | End: 2021-11-12
Attending: INTERNAL MEDICINE | Admitting: INTERNAL MEDICINE
Payer: COMMERCIAL

## 2021-01-01 ENCOUNTER — APPOINTMENT (OUTPATIENT)
Dept: RADIOLOGY | Facility: HOSPITAL | Age: 76
DRG: 264 | End: 2021-01-01
Attending: INTERNAL MEDICINE
Payer: COMMERCIAL

## 2021-01-01 ENCOUNTER — APPOINTMENT (OUTPATIENT)
Dept: RADIOLOGY | Facility: HOSPITAL | Age: 76
DRG: 264 | End: 2021-01-01
Attending: PHYSICIAN ASSISTANT
Payer: COMMERCIAL

## 2021-01-01 ENCOUNTER — HOSPITAL ENCOUNTER (OUTPATIENT)
Dept: CARDIOLOGY | Facility: CLINIC | Age: 76
Discharge: HOME | End: 2021-09-22
Attending: INTERNAL MEDICINE | Admitting: INTERNAL MEDICINE
Payer: COMMERCIAL

## 2021-01-01 ENCOUNTER — ANESTHESIA (OUTPATIENT)
Dept: CARDIOLOGY | Facility: HOSPITAL | Age: 76
DRG: 264 | End: 2021-01-01
Payer: COMMERCIAL

## 2021-01-01 ENCOUNTER — OFFICE VISIT (OUTPATIENT)
Dept: CARDIOLOGY | Facility: CLINIC | Age: 76
End: 2021-01-01

## 2021-01-01 VITALS
BODY MASS INDEX: 23.92 KG/M2 | TEMPERATURE: 98.1 F | OXYGEN SATURATION: 93 % | HEIGHT: 69 IN | HEART RATE: 73 BPM | RESPIRATION RATE: 16 BRPM | DIASTOLIC BLOOD PRESSURE: 73 MMHG | SYSTOLIC BLOOD PRESSURE: 120 MMHG

## 2021-01-01 VITALS
HEART RATE: 70 BPM | BODY MASS INDEX: 23.92 KG/M2 | SYSTOLIC BLOOD PRESSURE: 160 MMHG | WEIGHT: 162 LBS | DIASTOLIC BLOOD PRESSURE: 80 MMHG

## 2021-01-01 VITALS
HEART RATE: 72 BPM | RESPIRATION RATE: 16 BRPM | SYSTOLIC BLOOD PRESSURE: 92 MMHG | WEIGHT: 159.8 LBS | HEIGHT: 69 IN | DIASTOLIC BLOOD PRESSURE: 62 MMHG | BODY MASS INDEX: 23.67 KG/M2

## 2021-01-01 VITALS
WEIGHT: 162 LBS | SYSTOLIC BLOOD PRESSURE: 85 MMHG | BODY MASS INDEX: 23.92 KG/M2 | DIASTOLIC BLOOD PRESSURE: 57 MMHG | HEART RATE: 75 BPM | OXYGEN SATURATION: 99 %

## 2021-01-01 VITALS
HEIGHT: 69 IN | RESPIRATION RATE: 16 BRPM | WEIGHT: 159.8 LBS | HEART RATE: 72 BPM | DIASTOLIC BLOOD PRESSURE: 62 MMHG | BODY MASS INDEX: 23.67 KG/M2 | SYSTOLIC BLOOD PRESSURE: 92 MMHG

## 2021-01-01 VITALS
RESPIRATION RATE: 16 BRPM | HEIGHT: 69 IN | OXYGEN SATURATION: 99 % | WEIGHT: 166.4 LBS | BODY MASS INDEX: 24.65 KG/M2 | TEMPERATURE: 97.6 F | DIASTOLIC BLOOD PRESSURE: 54 MMHG | HEART RATE: 75 BPM | SYSTOLIC BLOOD PRESSURE: 110 MMHG

## 2021-01-01 VITALS
DIASTOLIC BLOOD PRESSURE: 60 MMHG | SYSTOLIC BLOOD PRESSURE: 92 MMHG | HEART RATE: 84 BPM | BODY MASS INDEX: 23.25 KG/M2 | WEIGHT: 157 LBS | RESPIRATION RATE: 16 BRPM | HEIGHT: 69 IN

## 2021-01-01 DIAGNOSIS — I50.22 CHRONIC SYSTOLIC HEART FAILURE (H): ICD-10-CM

## 2021-01-01 DIAGNOSIS — I50.23 ACUTE ON CHRONIC SYSTOLIC HEART FAILURE (H): ICD-10-CM

## 2021-01-01 DIAGNOSIS — Z11.59 ENCOUNTER FOR SCREENING FOR OTHER VIRAL DISEASES: ICD-10-CM

## 2021-01-01 DIAGNOSIS — T82.198A MALFUNCTION OF IMPLANTABLE DEFIBRILLATOR VENTRICULAR (ICD) LEAD: Primary | ICD-10-CM

## 2021-01-01 DIAGNOSIS — Z00.6 EXAMINATION OF PARTICIPANT OR CONTROL IN CLINICAL RESEARCH: Primary | ICD-10-CM

## 2021-01-01 DIAGNOSIS — I25.10 CORONARY ARTERY DISEASE INVOLVING NATIVE CORONARY ARTERY OF NATIVE HEART WITHOUT ANGINA PECTORIS: ICD-10-CM

## 2021-01-01 DIAGNOSIS — Z95.810 ICD (IMPLANTABLE CARDIOVERTER-DEFIBRILLATOR) IN PLACE: Primary | ICD-10-CM

## 2021-01-01 DIAGNOSIS — I42.8 NON-ISCHEMIC CARDIOMYOPATHY (H): ICD-10-CM

## 2021-01-01 DIAGNOSIS — T82.198A MALFUNCTION OF IMPLANTABLE DEFIBRILLATOR VENTRICULAR (ICD) LEAD: ICD-10-CM

## 2021-01-01 DIAGNOSIS — I48.19 PERSISTENT ATRIAL FIBRILLATION (H): ICD-10-CM

## 2021-01-01 DIAGNOSIS — Z95.810 IMPLANTABLE CARDIOVERTER-DEFIBRILLATOR (ICD) IN SITU: ICD-10-CM

## 2021-01-01 DIAGNOSIS — I42.9 PRIMARY CARDIOMYOPATHY (H): ICD-10-CM

## 2021-01-01 DIAGNOSIS — Z95.810 ICD (IMPLANTABLE CARDIOVERTER-DEFIBRILLATOR) IN PLACE: ICD-10-CM

## 2021-01-01 DIAGNOSIS — I10 ESSENTIAL (PRIMARY) HYPERTENSION: ICD-10-CM

## 2021-01-01 DIAGNOSIS — I50.22 CHRONIC SYSTOLIC HEART FAILURE (H): Primary | ICD-10-CM

## 2021-01-01 DIAGNOSIS — I50.9 CHF (CONGESTIVE HEART FAILURE) (H): ICD-10-CM

## 2021-01-01 DIAGNOSIS — I10 BENIGN ESSENTIAL HYPERTENSION: Primary | ICD-10-CM

## 2021-01-01 DIAGNOSIS — E78.5 HYPERLIPIDEMIA LDL GOAL <70: Primary | ICD-10-CM

## 2021-01-01 DIAGNOSIS — I42.9 PRIMARY CARDIOMYOPATHY (H): Primary | ICD-10-CM

## 2021-01-01 DIAGNOSIS — Z01.818 ENCOUNTER FOR OTHER PREPROCEDURAL EXAMINATION: ICD-10-CM

## 2021-01-01 DIAGNOSIS — E78.5 HYPERLIPIDEMIA, UNSPECIFIED: ICD-10-CM

## 2021-01-01 DIAGNOSIS — Z95.810 ICD (IMPLANTABLE CARDIOVERTER-DEFIBRILLATOR), BIVENTRICULAR, IN SITU: ICD-10-CM

## 2021-01-01 DIAGNOSIS — Z95.810 BIVENTRICULAR IMPLANTABLE CARDIOVERTER-DEFIBRILLATOR (ICD) IN SITU: ICD-10-CM

## 2021-01-01 LAB
ABO/RH(D): NORMAL
ALBUMIN SERPL-MCNC: 3.2 G/DL (ref 3.5–5)
ALBUMIN SERPL-MCNC: 4 G/DL (ref 3.5–5)
ALP SERPL-CCNC: 76 U/L (ref 45–120)
ALT SERPL W P-5'-P-CCNC: 23 U/L (ref 0–45)
ANION GAP SERPL CALCULATED.3IONS-SCNC: 10 MMOL/L (ref 5–18)
ANION GAP SERPL CALCULATED.3IONS-SCNC: 12 MMOL/L (ref 5–18)
ANION GAP SERPL CALCULATED.3IONS-SCNC: 12 MMOL/L (ref 5–18)
ANION GAP SERPL CALCULATED.3IONS-SCNC: 13 MMOL/L (ref 5–18)
ANION GAP SERPL CALCULATED.3IONS-SCNC: 13 MMOL/L (ref 5–18)
ANION GAP SERPL CALCULATED.3IONS-SCNC: 14 MMOL/L (ref 5–18)
ANION GAP SERPL CALCULATED.3IONS-SCNC: 7 MMOL/L (ref 3–14)
ANION GAP SERPL CALCULATED.3IONS-SCNC: 9 MMOL/L (ref 5–18)
ANTIBODY SCREEN: NEGATIVE
AST SERPL W P-5'-P-CCNC: 25 U/L (ref 0–40)
ATRIAL RATE - MUSE: 72 BPM
BILIRUB SERPL-MCNC: 1.3 MG/DL (ref 0–1)
BLD PROD TYP BPU: NORMAL
BLOOD COMPONENT TYPE: NORMAL
BUN SERPL-MCNC: 40 MG/DL (ref 8–28)
BUN SERPL-MCNC: 44 MG/DL (ref 8–28)
BUN SERPL-MCNC: 44 MG/DL (ref 8–28)
BUN SERPL-MCNC: 47 MG/DL (ref 8–28)
BUN SERPL-MCNC: 51 MG/DL (ref 8–28)
BUN SERPL-MCNC: 52 MG/DL (ref 8–28)
BUN SERPL-MCNC: 59 MG/DL (ref 8–28)
BUN SERPL-MCNC: 64 MG/DL (ref 7–30)
CALCIUM SERPL-MCNC: 7.9 MG/DL (ref 8.5–10.5)
CALCIUM SERPL-MCNC: 8.4 MG/DL (ref 8.5–10.5)
CALCIUM SERPL-MCNC: 9.1 MG/DL (ref 8.5–10.5)
CALCIUM SERPL-MCNC: 9.2 MG/DL (ref 8.5–10.5)
CALCIUM SERPL-MCNC: 9.2 MG/DL (ref 8.5–10.5)
CALCIUM SERPL-MCNC: 9.3 MG/DL (ref 8.5–10.1)
CALCIUM SERPL-MCNC: 9.5 MG/DL (ref 8.5–10.5)
CALCIUM SERPL-MCNC: 9.6 MG/DL (ref 8.5–10.5)
CHLORIDE BLD-SCNC: 102 MMOL/L (ref 94–109)
CHLORIDE BLD-SCNC: 102 MMOL/L (ref 98–107)
CHLORIDE BLD-SCNC: 103 MMOL/L (ref 98–107)
CHLORIDE BLD-SCNC: 103 MMOL/L (ref 98–107)
CHLORIDE BLD-SCNC: 105 MMOL/L (ref 98–107)
CHLORIDE BLD-SCNC: 107 MMOL/L (ref 98–107)
CHLORIDE BLD-SCNC: 108 MMOL/L (ref 98–107)
CHLORIDE BLD-SCNC: 110 MMOL/L (ref 98–107)
CHOLEST SERPL-MCNC: 117 MG/DL
CO2 SERPL-SCNC: 17 MMOL/L (ref 22–31)
CO2 SERPL-SCNC: 18 MMOL/L (ref 22–31)
CO2 SERPL-SCNC: 18 MMOL/L (ref 22–31)
CO2 SERPL-SCNC: 20 MMOL/L (ref 22–31)
CO2 SERPL-SCNC: 21 MMOL/L (ref 22–31)
CO2 SERPL-SCNC: 23 MMOL/L (ref 22–31)
CO2 SERPL-SCNC: 24 MMOL/L (ref 20–32)
CO2 SERPL-SCNC: 25 MMOL/L (ref 22–31)
CODING SYSTEM: NORMAL
CREAT SERPL-MCNC: 1.59 MG/DL (ref 0.7–1.3)
CREAT SERPL-MCNC: 1.73 MG/DL (ref 0.7–1.3)
CREAT SERPL-MCNC: 1.76 MG/DL (ref 0.7–1.3)
CREAT SERPL-MCNC: 1.78 MG/DL (ref 0.7–1.3)
CREAT SERPL-MCNC: 1.85 MG/DL (ref 0.7–1.3)
CREAT SERPL-MCNC: 1.86 MG/DL (ref 0.66–1.25)
CREAT SERPL-MCNC: 1.88 MG/DL (ref 0.7–1.3)
CREAT SERPL-MCNC: 1.97 MG/DL (ref 0.7–1.3)
CREAT SERPL-MCNC: 1.98 MG/DL (ref 0.7–1.3)
CROSSMATCH: NORMAL
DIASTOLIC BLOOD PRESSURE - MUSE: NORMAL MMHG
ERYTHROCYTE [DISTWIDTH] IN BLOOD BY AUTOMATED COUNT: 12.6 % (ref 10–15)
ERYTHROCYTE [DISTWIDTH] IN BLOOD BY AUTOMATED COUNT: 13.4 % (ref 10–15)
ERYTHROCYTE [DISTWIDTH] IN BLOOD BY AUTOMATED COUNT: 13.5 % (ref 10–15)
GFR SERPL CREATININE-BSD FRML MDRD: 32 ML/MIN/1.73M2
GFR SERPL CREATININE-BSD FRML MDRD: 34 ML/MIN/1.73M2
GFR SERPL CREATININE-BSD FRML MDRD: 35 ML/MIN/1.73M2
GFR SERPL CREATININE-BSD FRML MDRD: 35 ML/MIN/1.73M2
GFR SERPL CREATININE-BSD FRML MDRD: 37 ML/MIN/1.73M2
GFR SERPL CREATININE-BSD FRML MDRD: 37 ML/MIN/1.73M2
GFR SERPL CREATININE-BSD FRML MDRD: 38 ML/MIN/1.73M2
GFR SERPL CREATININE-BSD FRML MDRD: 39 ML/MIN/1.73M2
GFR SERPL CREATININE-BSD FRML MDRD: 45 ML/MIN/1.73M2
GLUCOSE BLD-MCNC: 103 MG/DL (ref 70–125)
GLUCOSE BLD-MCNC: 110 MG/DL (ref 70–125)
GLUCOSE BLD-MCNC: 135 MG/DL (ref 70–125)
GLUCOSE BLD-MCNC: 164 MG/DL (ref 70–125)
GLUCOSE BLD-MCNC: 164 MG/DL (ref 70–99)
GLUCOSE BLD-MCNC: 180 MG/DL (ref 70–125)
GLUCOSE BLD-MCNC: 261 MG/DL (ref 70–125)
GLUCOSE BLD-MCNC: 359 MG/DL (ref 70–125)
GLUCOSE BLDC GLUCOMTR-MCNC: 107 MG/DL (ref 70–99)
GLUCOSE BLDC GLUCOMTR-MCNC: 132 MG/DL (ref 70–99)
GLUCOSE BLDC GLUCOMTR-MCNC: 158 MG/DL (ref 70–99)
GLUCOSE BLDC GLUCOMTR-MCNC: 249 MG/DL (ref 70–99)
GLUCOSE BLDC GLUCOMTR-MCNC: 253 MG/DL (ref 70–99)
GLUCOSE BLDC GLUCOMTR-MCNC: 393 MG/DL (ref 70–99)
HBA1C MFR BLD: 7.5 %
HCT VFR BLD AUTO: 26.8 % (ref 40–53)
HCT VFR BLD AUTO: 31.2 % (ref 40–53)
HCT VFR BLD AUTO: 35.7 % (ref 40–53)
HDLC SERPL-MCNC: 30 MG/DL
HGB BLD-MCNC: 10.3 G/DL (ref 13.3–17.7)
HGB BLD-MCNC: 10.4 G/DL (ref 13.3–17.7)
HGB BLD-MCNC: 12 G/DL (ref 13.3–17.7)
HGB BLD-MCNC: 8.7 G/DL (ref 13.3–17.7)
HGB BLD-MCNC: 9.9 G/DL (ref 13.3–17.7)
HOLD SPECIMEN: NORMAL
INTERPRETATION ECG - MUSE: NORMAL
ISSUE DATE AND TIME: NORMAL
LACTATE SERPL-SCNC: 0.9 MMOL/L (ref 0.7–2)
LDLC SERPL CALC-MCNC: 52 MG/DL
LVEF ECHO: NORMAL
MCH RBC QN AUTO: 33.1 PG (ref 26.5–33)
MCH RBC QN AUTO: 33.3 PG (ref 26.5–33)
MCH RBC QN AUTO: 33.5 PG (ref 26.5–33)
MCHC RBC AUTO-ENTMCNC: 32.5 G/DL (ref 31.5–36.5)
MCHC RBC AUTO-ENTMCNC: 33 G/DL (ref 31.5–36.5)
MCHC RBC AUTO-ENTMCNC: 33.6 G/DL (ref 31.5–36.5)
MCV RBC AUTO: 100 FL (ref 78–100)
MCV RBC AUTO: 103 FL (ref 78–100)
MCV RBC AUTO: 99 FL (ref 78–100)
MDC_IDC_EPISODE_DTM: NORMAL
MDC_IDC_EPISODE_DURATION: 1 S
MDC_IDC_EPISODE_DURATION: 17 S
MDC_IDC_EPISODE_DURATION: 2 S
MDC_IDC_EPISODE_ID: 46
MDC_IDC_EPISODE_ID: 47
MDC_IDC_EPISODE_ID: 48
MDC_IDC_EPISODE_ID: 49
MDC_IDC_EPISODE_ID: 50
MDC_IDC_EPISODE_ID: 51
MDC_IDC_EPISODE_ID: 52
MDC_IDC_EPISODE_ID: 53
MDC_IDC_EPISODE_ID: 54
MDC_IDC_EPISODE_ID: 55
MDC_IDC_EPISODE_ID: 56
MDC_IDC_EPISODE_ID: 57
MDC_IDC_EPISODE_THERAPY_RESULT: NORMAL
MDC_IDC_EPISODE_TYPE: NORMAL
MDC_IDC_LEAD_IMPLANT_DT: NORMAL
MDC_IDC_LEAD_LOCATION: NORMAL
MDC_IDC_LEAD_LOCATION_DETAIL_1: NORMAL
MDC_IDC_LEAD_MFG: NORMAL
MDC_IDC_LEAD_MODEL: NORMAL
MDC_IDC_LEAD_POLARITY_TYPE: NORMAL
MDC_IDC_LEAD_SERIAL: NORMAL
MDC_IDC_MSMT_BATTERY_DTM: NORMAL
MDC_IDC_MSMT_BATTERY_REMAINING_LONGEVITY: 93 MO
MDC_IDC_MSMT_BATTERY_REMAINING_LONGEVITY: 94 MO
MDC_IDC_MSMT_BATTERY_REMAINING_LONGEVITY: 98 MO
MDC_IDC_MSMT_BATTERY_REMAINING_LONGEVITY: 99 MO
MDC_IDC_MSMT_BATTERY_RRT_TRIGGER: NORMAL
MDC_IDC_MSMT_BATTERY_STATUS: NORMAL
MDC_IDC_MSMT_BATTERY_VOLTAGE: 2.93 V
MDC_IDC_MSMT_BATTERY_VOLTAGE: 2.99 V
MDC_IDC_MSMT_CAP_CHARGE_DTM: NORMAL
MDC_IDC_MSMT_CAP_CHARGE_ENERGY: 35 J
MDC_IDC_MSMT_CAP_CHARGE_ENERGY: 35 J
MDC_IDC_MSMT_CAP_CHARGE_ENERGY: NORMAL
MDC_IDC_MSMT_CAP_CHARGE_ENERGY: NORMAL
MDC_IDC_MSMT_CAP_CHARGE_TIME: 3.8 S
MDC_IDC_MSMT_CAP_CHARGE_TIME: 3.8 S
MDC_IDC_MSMT_CAP_CHARGE_TIME: 8.3 S
MDC_IDC_MSMT_CAP_CHARGE_TIME: 8.3 S
MDC_IDC_MSMT_CAP_CHARGE_TYPE: NORMAL
MDC_IDC_MSMT_LEADCHNL_LV_IMPEDANCE_VALUE: 3000 OHM
MDC_IDC_MSMT_LEADCHNL_LV_IMPEDANCE_VALUE: 456 OHM
MDC_IDC_MSMT_LEADCHNL_LV_IMPEDANCE_VALUE: 475 OHM
MDC_IDC_MSMT_LEADCHNL_LV_IMPEDANCE_VALUE: 475 OHM
MDC_IDC_MSMT_LEADCHNL_LV_IMPEDANCE_VALUE: 494 OHM
MDC_IDC_MSMT_LEADCHNL_LV_IMPEDANCE_VALUE: 513 OHM
MDC_IDC_MSMT_LEADCHNL_LV_PACING_THRESHOLD_AMPLITUDE: 0.5 V
MDC_IDC_MSMT_LEADCHNL_LV_PACING_THRESHOLD_AMPLITUDE: 0.75 V
MDC_IDC_MSMT_LEADCHNL_LV_PACING_THRESHOLD_PULSEWIDTH: 0.4 MS
MDC_IDC_MSMT_LEADCHNL_RA_IMPEDANCE_VALUE: 399 OHM
MDC_IDC_MSMT_LEADCHNL_RV_IMPEDANCE_VALUE: 304 OHM
MDC_IDC_MSMT_LEADCHNL_RV_IMPEDANCE_VALUE: 304 OHM
MDC_IDC_MSMT_LEADCHNL_RV_IMPEDANCE_VALUE: 323 OHM
MDC_IDC_MSMT_LEADCHNL_RV_IMPEDANCE_VALUE: 323 OHM
MDC_IDC_MSMT_LEADCHNL_RV_IMPEDANCE_VALUE: 399 OHM
MDC_IDC_MSMT_LEADCHNL_RV_IMPEDANCE_VALUE: 399 OHM
MDC_IDC_MSMT_LEADCHNL_RV_IMPEDANCE_VALUE: 437 OHM
MDC_IDC_MSMT_LEADCHNL_RV_PACING_THRESHOLD_AMPLITUDE: 0.62 V
MDC_IDC_MSMT_LEADCHNL_RV_PACING_THRESHOLD_AMPLITUDE: 0.62 V
MDC_IDC_MSMT_LEADCHNL_RV_PACING_THRESHOLD_AMPLITUDE: 0.75 V
MDC_IDC_MSMT_LEADCHNL_RV_PACING_THRESHOLD_PULSEWIDTH: 0.4 MS
MDC_IDC_MSMT_LEADCHNL_RV_SENSING_INTR_AMPL: 2.4 MV
MDC_IDC_MSMT_LEADCHNL_RV_SENSING_INTR_AMPL: 4.4 MV
MDC_IDC_MSMT_LEADCHNL_RV_SENSING_INTR_AMPL: 4.4 MV
MDC_IDC_PG_IMPLANT_DTM: NORMAL
MDC_IDC_PG_MFG: NORMAL
MDC_IDC_PG_MODEL: NORMAL
MDC_IDC_PG_SERIAL: NORMAL
MDC_IDC_PG_TYPE: NORMAL
MDC_IDC_SESS_CLINIC_NAME: NORMAL
MDC_IDC_SESS_DTM: NORMAL
MDC_IDC_SESS_TYPE: NORMAL
MDC_IDC_SET_BRADY_LOWRATE: 70 {BEATS}/MIN
MDC_IDC_SET_BRADY_MAX_SENSOR_RATE: 120 {BEATS}/MIN
MDC_IDC_SET_BRADY_MODE: NORMAL
MDC_IDC_SET_CRT_LVRV_DELAY: 30 MS
MDC_IDC_SET_CRT_PACED_CHAMBERS: NORMAL
MDC_IDC_SET_LEADCHNL_LV_PACING_AMPLITUDE: 2 V
MDC_IDC_SET_LEADCHNL_LV_PACING_ANODE_ELECTRODE_1: NORMAL
MDC_IDC_SET_LEADCHNL_LV_PACING_ANODE_LOCATION_1: NORMAL
MDC_IDC_SET_LEADCHNL_LV_PACING_CAPTURE_MODE: NORMAL
MDC_IDC_SET_LEADCHNL_LV_PACING_CATHODE_ELECTRODE_1: NORMAL
MDC_IDC_SET_LEADCHNL_LV_PACING_CATHODE_LOCATION_1: NORMAL
MDC_IDC_SET_LEADCHNL_LV_PACING_POLARITY: NORMAL
MDC_IDC_SET_LEADCHNL_LV_PACING_PULSEWIDTH: 0.4 MS
MDC_IDC_SET_LEADCHNL_RA_SENSING_ANODE_ELECTRODE_1: NORMAL
MDC_IDC_SET_LEADCHNL_RA_SENSING_ANODE_LOCATION_1: NORMAL
MDC_IDC_SET_LEADCHNL_RA_SENSING_CATHODE_ELECTRODE_1: NORMAL
MDC_IDC_SET_LEADCHNL_RA_SENSING_CATHODE_LOCATION_1: NORMAL
MDC_IDC_SET_LEADCHNL_RA_SENSING_POLARITY: NORMAL
MDC_IDC_SET_LEADCHNL_RA_SENSING_SENSITIVITY: NORMAL
MDC_IDC_SET_LEADCHNL_RV_PACING_AMPLITUDE: 2 V
MDC_IDC_SET_LEADCHNL_RV_PACING_AMPLITUDE: NORMAL
MDC_IDC_SET_LEADCHNL_RV_PACING_ANODE_ELECTRODE_1: NORMAL
MDC_IDC_SET_LEADCHNL_RV_PACING_ANODE_LOCATION_1: NORMAL
MDC_IDC_SET_LEADCHNL_RV_PACING_CAPTURE_MODE: NORMAL
MDC_IDC_SET_LEADCHNL_RV_PACING_CATHODE_ELECTRODE_1: NORMAL
MDC_IDC_SET_LEADCHNL_RV_PACING_CATHODE_LOCATION_1: NORMAL
MDC_IDC_SET_LEADCHNL_RV_PACING_POLARITY: NORMAL
MDC_IDC_SET_LEADCHNL_RV_PACING_PULSEWIDTH: 0.4 MS
MDC_IDC_SET_LEADCHNL_RV_SENSING_ANODE_ELECTRODE_1: NORMAL
MDC_IDC_SET_LEADCHNL_RV_SENSING_ANODE_LOCATION_1: NORMAL
MDC_IDC_SET_LEADCHNL_RV_SENSING_CATHODE_ELECTRODE_1: NORMAL
MDC_IDC_SET_LEADCHNL_RV_SENSING_CATHODE_LOCATION_1: NORMAL
MDC_IDC_SET_LEADCHNL_RV_SENSING_POLARITY: NORMAL
MDC_IDC_SET_LEADCHNL_RV_SENSING_SENSITIVITY: 0.45 MV
MDC_IDC_SET_ZONE_DETECTION_BEATS_DENOMINATOR: 40 {BEATS}
MDC_IDC_SET_ZONE_DETECTION_BEATS_NUMERATOR: 30 {BEATS}
MDC_IDC_SET_ZONE_DETECTION_INTERVAL: 240 MS
MDC_IDC_SET_ZONE_DETECTION_INTERVAL: 320 MS
MDC_IDC_SET_ZONE_DETECTION_INTERVAL: 370 MS
MDC_IDC_SET_ZONE_DETECTION_INTERVAL: 450 MS
MDC_IDC_SET_ZONE_TYPE: NORMAL
MDC_IDC_STAT_AT_BURDEN_PERCENT: 0 %
MDC_IDC_STAT_AT_BURDEN_PERCENT: 100 %
MDC_IDC_STAT_AT_DTM_END: NORMAL
MDC_IDC_STAT_AT_DTM_START: NORMAL
MDC_IDC_STAT_AT_MODE_SW_COUNT: 0
MDC_IDC_STAT_BRADY_AP_VP_PERCENT: 0 %
MDC_IDC_STAT_BRADY_AP_VS_PERCENT: 0 %
MDC_IDC_STAT_BRADY_AS_VP_PERCENT: 90.57 %
MDC_IDC_STAT_BRADY_AS_VP_PERCENT: 91.71 %
MDC_IDC_STAT_BRADY_AS_VP_PERCENT: 92.06 %
MDC_IDC_STAT_BRADY_AS_VS_PERCENT: 7.94 %
MDC_IDC_STAT_BRADY_AS_VS_PERCENT: 8.29 %
MDC_IDC_STAT_BRADY_AS_VS_PERCENT: 9.43 %
MDC_IDC_STAT_BRADY_DTM_END: NORMAL
MDC_IDC_STAT_BRADY_DTM_START: NORMAL
MDC_IDC_STAT_BRADY_RA_PERCENT_PACED: 0 %
MDC_IDC_STAT_BRADY_RA_PERCENT_PACED: NORMAL
MDC_IDC_STAT_BRADY_RA_PERCENT_PACED: NORMAL
MDC_IDC_STAT_BRADY_RV_PERCENT_PACED: 88.26 %
MDC_IDC_STAT_BRADY_RV_PERCENT_PACED: 90.57 %
MDC_IDC_STAT_BRADY_RV_PERCENT_PACED: 91.71 %
MDC_IDC_STAT_BRADY_RV_PERCENT_PACED: 92.06 %
MDC_IDC_STAT_CRT_DTM_END: NORMAL
MDC_IDC_STAT_CRT_DTM_START: NORMAL
MDC_IDC_STAT_CRT_LV_PERCENT_PACED: 88.23 %
MDC_IDC_STAT_CRT_LV_PERCENT_PACED: 90.54 %
MDC_IDC_STAT_CRT_LV_PERCENT_PACED: 91.68 %
MDC_IDC_STAT_CRT_LV_PERCENT_PACED: 92.03 %
MDC_IDC_STAT_CRT_PERCENT_PACED: 88.23 %
MDC_IDC_STAT_CRT_PERCENT_PACED: 90.54 %
MDC_IDC_STAT_CRT_PERCENT_PACED: 91.68 %
MDC_IDC_STAT_CRT_PERCENT_PACED: 92.03 %
MDC_IDC_STAT_EPISODE_RECENT_COUNT: 0
MDC_IDC_STAT_EPISODE_RECENT_COUNT: 1
MDC_IDC_STAT_EPISODE_RECENT_COUNT: 1
MDC_IDC_STAT_EPISODE_RECENT_COUNT: 3
MDC_IDC_STAT_EPISODE_RECENT_COUNT: 3
MDC_IDC_STAT_EPISODE_RECENT_COUNT: 49
MDC_IDC_STAT_EPISODE_RECENT_COUNT: 7
MDC_IDC_STAT_EPISODE_RECENT_COUNT_DTM_END: NORMAL
MDC_IDC_STAT_EPISODE_RECENT_COUNT_DTM_START: NORMAL
MDC_IDC_STAT_EPISODE_TOTAL_COUNT: 0
MDC_IDC_STAT_EPISODE_TOTAL_COUNT: 1
MDC_IDC_STAT_EPISODE_TOTAL_COUNT: 1
MDC_IDC_STAT_EPISODE_TOTAL_COUNT: 52
MDC_IDC_STAT_EPISODE_TOTAL_COUNT: 53
MDC_IDC_STAT_EPISODE_TOTAL_COUNT: 56
MDC_IDC_STAT_EPISODE_TOTAL_COUNT: 56
MDC_IDC_STAT_EPISODE_TOTAL_COUNT_DTM_END: NORMAL
MDC_IDC_STAT_EPISODE_TOTAL_COUNT_DTM_START: NORMAL
MDC_IDC_STAT_EPISODE_TYPE: NORMAL
MDC_IDC_STAT_TACHYTHERAPY_ATP_DELIVERED_RECENT: 0
MDC_IDC_STAT_TACHYTHERAPY_ATP_DELIVERED_TOTAL: 0
MDC_IDC_STAT_TACHYTHERAPY_RECENT_DTM_END: NORMAL
MDC_IDC_STAT_TACHYTHERAPY_RECENT_DTM_START: NORMAL
MDC_IDC_STAT_TACHYTHERAPY_SHOCKS_ABORTED_RECENT: 0
MDC_IDC_STAT_TACHYTHERAPY_SHOCKS_ABORTED_TOTAL: 0
MDC_IDC_STAT_TACHYTHERAPY_SHOCKS_DELIVERED_RECENT: 0
MDC_IDC_STAT_TACHYTHERAPY_SHOCKS_DELIVERED_RECENT: 1
MDC_IDC_STAT_TACHYTHERAPY_SHOCKS_DELIVERED_RECENT: 1
MDC_IDC_STAT_TACHYTHERAPY_SHOCKS_DELIVERED_TOTAL: 0
MDC_IDC_STAT_TACHYTHERAPY_SHOCKS_DELIVERED_TOTAL: 1
MDC_IDC_STAT_TACHYTHERAPY_SHOCKS_DELIVERED_TOTAL: 1
MDC_IDC_STAT_TACHYTHERAPY_TOTAL_DTM_END: NORMAL
MDC_IDC_STAT_TACHYTHERAPY_TOTAL_DTM_START: NORMAL
NT-PROBNP SERPL-MCNC: 5755 PG/ML (ref 0–1800)
NT-PROBNP SERPL-MCNC: 6357 PG/ML (ref 0–450)
P AXIS - MUSE: NORMAL DEGREES
PHOSPHATE SERPL-MCNC: 3.9 MG/DL (ref 2.5–4.5)
PLATELET # BLD AUTO: 129 10E3/UL (ref 150–450)
PLATELET # BLD AUTO: 179 10E3/UL (ref 150–450)
PLATELET # BLD AUTO: 195 10E3/UL (ref 150–450)
POTASSIUM BLD-SCNC: 4.3 MMOL/L (ref 3.4–5.3)
POTASSIUM BLD-SCNC: 4.4 MMOL/L (ref 3.5–5)
POTASSIUM BLD-SCNC: 4.4 MMOL/L (ref 3.5–5)
POTASSIUM BLD-SCNC: 4.5 MMOL/L (ref 3.5–5)
POTASSIUM BLD-SCNC: 4.6 MMOL/L (ref 3.5–5)
POTASSIUM BLD-SCNC: 4.7 MMOL/L (ref 3.5–5)
POTASSIUM BLD-SCNC: 4.7 MMOL/L (ref 3.5–5)
POTASSIUM BLD-SCNC: 5 MMOL/L (ref 3.5–5)
PR INTERVAL - MUSE: NORMAL MS
PROT SERPL-MCNC: 6.3 G/DL (ref 6–8)
QRS DURATION - MUSE: 168 MS
QT - MUSE: 514 MS
QTC - MUSE: 558 MS
R AXIS - MUSE: 232 DEGREES
RBC # BLD AUTO: 2.6 10E6/UL (ref 4.4–5.9)
RBC # BLD AUTO: 3.11 10E6/UL (ref 4.4–5.9)
RBC # BLD AUTO: 3.6 10E6/UL (ref 4.4–5.9)
SARS-COV-2 RNA RESP QL NAA+PROBE: NEGATIVE
SARS-COV-2 RNA RESP QL NAA+PROBE: NEGATIVE
SODIUM SERPL-SCNC: 132 MMOL/L (ref 136–145)
SODIUM SERPL-SCNC: 133 MMOL/L (ref 133–144)
SODIUM SERPL-SCNC: 137 MMOL/L (ref 136–145)
SODIUM SERPL-SCNC: 138 MMOL/L (ref 136–145)
SODIUM SERPL-SCNC: 140 MMOL/L (ref 136–145)
SODIUM SERPL-SCNC: 140 MMOL/L (ref 136–145)
SPECIMEN EXPIRATION DATE: NORMAL
SYSTOLIC BLOOD PRESSURE - MUSE: NORMAL MMHG
T AXIS - MUSE: 62 DEGREES
TRIGL SERPL-MCNC: 176 MG/DL
UNIT ABO/RH: NORMAL
UNIT NUMBER: NORMAL
UNIT STATUS: NORMAL
UNIT TYPE ISBT: 5100
VENTRICULAR RATE- MUSE: 71 BPM
WBC # BLD AUTO: 8.2 10E3/UL (ref 4–11)
WBC # BLD AUTO: 8.7 10E3/UL (ref 4–11)
WBC # BLD AUTO: 8.9 10E3/UL (ref 4–11)

## 2021-01-01 PROCEDURE — 71046 X-RAY EXAM CHEST 2 VIEWS: CPT

## 2021-01-01 PROCEDURE — 99232 SBSQ HOSP IP/OBS MODERATE 35: CPT | Performed by: EMERGENCY MEDICINE

## 2021-01-01 PROCEDURE — 76000 FLUOROSCOPY <1 HR PHYS/QHP: CPT | Mod: 26 | Performed by: INTERNAL MEDICINE

## 2021-01-01 PROCEDURE — 93296 REM INTERROG EVL PM/IDS: CPT | Performed by: INTERNAL MEDICINE

## 2021-01-01 PROCEDURE — 83605 ASSAY OF LACTIC ACID: CPT | Performed by: INTERNAL MEDICINE

## 2021-01-01 PROCEDURE — 94621 CARDIOPULM EXERCISE TESTING: CPT

## 2021-01-01 PROCEDURE — 250N000011 HC RX IP 250 OP 636: Performed by: NURSE ANESTHETIST, CERTIFIED REGISTERED

## 2021-01-01 PROCEDURE — 83880 ASSAY OF NATRIURETIC PEPTIDE: CPT | Performed by: INTERNAL MEDICINE

## 2021-01-01 PROCEDURE — 272N000001 HC OR GENERAL SUPPLY STERILE: Performed by: INTERNAL MEDICINE

## 2021-01-01 PROCEDURE — 999N000208 ECHOCARDIOGRAM COMPLETE

## 2021-01-01 PROCEDURE — 99215 OFFICE O/P EST HI 40 MIN: CPT | Performed by: INTERNAL MEDICINE

## 2021-01-01 PROCEDURE — C1725 CATH, TRANSLUMIN NON-LASER: HCPCS | Performed by: INTERNAL MEDICINE

## 2021-01-01 PROCEDURE — 250N000012 HC RX MED GY IP 250 OP 636 PS 637: Performed by: EMERGENCY MEDICINE

## 2021-01-01 PROCEDURE — 250N000011 HC RX IP 250 OP 636: Performed by: INTERNAL MEDICINE

## 2021-01-01 PROCEDURE — 99232 SBSQ HOSP IP/OBS MODERATE 35: CPT | Performed by: STUDENT IN AN ORGANIZED HEALTH CARE EDUCATION/TRAINING PROGRAM

## 2021-01-01 PROCEDURE — C1733 CATH, EP, OTHR THAN COOL-TIP: HCPCS | Performed by: INTERNAL MEDICINE

## 2021-01-01 PROCEDURE — 258N000003 HC RX IP 258 OP 636: Performed by: INTERNAL MEDICINE

## 2021-01-01 PROCEDURE — 75827 VEIN X-RAY CHEST: CPT | Performed by: INTERNAL MEDICINE

## 2021-01-01 PROCEDURE — U0005 INFEC AGEN DETEC AMPLI PROBE: HCPCS

## 2021-01-01 PROCEDURE — C1887 CATHETER, GUIDING: HCPCS | Performed by: INTERNAL MEDICINE

## 2021-01-01 PROCEDURE — 36415 COLL VENOUS BLD VENIPUNCTURE: CPT | Performed by: INTERNAL MEDICINE

## 2021-01-01 PROCEDURE — 999N000065 XR CHEST PORT 1 VIEW

## 2021-01-01 PROCEDURE — 86850 RBC ANTIBODY SCREEN: CPT | Performed by: INTERNAL MEDICINE

## 2021-01-01 PROCEDURE — 80048 BASIC METABOLIC PNL TOTAL CA: CPT

## 2021-01-01 PROCEDURE — 250N000009 HC RX 250: Performed by: INTERNAL MEDICINE

## 2021-01-01 PROCEDURE — 93010 ELECTROCARDIOGRAM REPORT: CPT | Performed by: GENERAL ACUTE CARE HOSPITAL

## 2021-01-01 PROCEDURE — 99223 1ST HOSP IP/OBS HIGH 75: CPT | Mod: 25 | Performed by: GENERAL ACUTE CARE HOSPITAL

## 2021-01-01 PROCEDURE — U0003 INFECTIOUS AGENT DETECTION BY NUCLEIC ACID (DNA OR RNA); SEVERE ACUTE RESPIRATORY SYNDROME CORONAVIRUS 2 (SARS-COV-2) (CORONAVIRUS DISEASE [COVID-19]), AMPLIFIED PROBE TECHNIQUE, MAKING USE OF HIGH THROUGHPUT TECHNOLOGIES AS DESCRIBED BY CMS-2020-01-R: HCPCS

## 2021-01-01 PROCEDURE — 250N000009 HC RX 250: Performed by: NURSE ANESTHETIST, CERTIFIED REGISTERED

## 2021-01-01 PROCEDURE — 255N000002 HC RX 255 OP 636: Performed by: INTERNAL MEDICINE

## 2021-01-01 PROCEDURE — 93005 ELECTROCARDIOGRAM TRACING: CPT

## 2021-01-01 PROCEDURE — 85018 HEMOGLOBIN: CPT | Performed by: NURSE PRACTITIONER

## 2021-01-01 PROCEDURE — 99232 SBSQ HOSP IP/OBS MODERATE 35: CPT | Performed by: GENERAL ACUTE CARE HOSPITAL

## 2021-01-01 PROCEDURE — G0463 HOSPITAL OUTPT CLINIC VISIT: HCPCS

## 2021-01-01 PROCEDURE — 93799 UNLISTED CV SVC/PROCEDURE: CPT | Performed by: INTERNAL MEDICINE

## 2021-01-01 PROCEDURE — P9016 RBC LEUKOCYTES REDUCED: HCPCS | Performed by: INTERNAL MEDICINE

## 2021-01-01 PROCEDURE — 250N000013 HC RX MED GY IP 250 OP 250 PS 637: Performed by: INTERNAL MEDICINE

## 2021-01-01 PROCEDURE — 80069 RENAL FUNCTION PANEL: CPT | Performed by: FAMILY MEDICINE

## 2021-01-01 PROCEDURE — C1894 INTRO/SHEATH, NON-LASER: HCPCS | Performed by: INTERNAL MEDICINE

## 2021-01-01 PROCEDURE — 83880 ASSAY OF NATRIURETIC PEPTIDE: CPT | Performed by: PATHOLOGY

## 2021-01-01 PROCEDURE — 36415 COLL VENOUS BLD VENIPUNCTURE: CPT | Performed by: PATHOLOGY

## 2021-01-01 PROCEDURE — 93295 DEV INTERROG REMOTE 1/2/MLT: CPT | Performed by: INTERNAL MEDICINE

## 2021-01-01 PROCEDURE — 80048 BASIC METABOLIC PNL TOTAL CA: CPT | Performed by: PATHOLOGY

## 2021-01-01 PROCEDURE — 36415 COLL VENOUS BLD VENIPUNCTURE: CPT

## 2021-01-01 PROCEDURE — 85018 HEMOGLOBIN: CPT | Performed by: INTERNAL MEDICINE

## 2021-01-01 PROCEDURE — 80048 BASIC METABOLIC PNL TOTAL CA: CPT | Performed by: INTERNAL MEDICINE

## 2021-01-01 PROCEDURE — 94621 CARDIOPULM EXERCISE TESTING: CPT | Mod: 26 | Performed by: INTERNAL MEDICINE

## 2021-01-01 PROCEDURE — 80061 LIPID PANEL: CPT | Performed by: FAMILY MEDICINE

## 2021-01-01 PROCEDURE — 258N000003 HC RX IP 258 OP 636: Performed by: NURSE ANESTHETIST, CERTIFIED REGISTERED

## 2021-01-01 PROCEDURE — 80048 BASIC METABOLIC PNL TOTAL CA: CPT | Performed by: FAMILY MEDICINE

## 2021-01-01 PROCEDURE — 93284 PRGRMG EVAL IMPLANTABLE DFB: CPT | Performed by: INTERNAL MEDICINE

## 2021-01-01 PROCEDURE — 85027 COMPLETE CBC AUTOMATED: CPT | Performed by: EMERGENCY MEDICINE

## 2021-01-01 PROCEDURE — 99214 OFFICE O/P EST MOD 30 MIN: CPT | Performed by: NURSE PRACTITIONER

## 2021-01-01 PROCEDURE — 86901 BLOOD TYPING SEROLOGIC RH(D): CPT | Performed by: INTERNAL MEDICINE

## 2021-01-01 PROCEDURE — C1759 CATH, INTRA ECHOCARDIOGRAPHY: HCPCS | Performed by: INTERNAL MEDICINE

## 2021-01-01 PROCEDURE — 36415 COLL VENOUS BLD VENIPUNCTURE: CPT | Performed by: EMERGENCY MEDICINE

## 2021-01-01 PROCEDURE — 80048 BASIC METABOLIC PNL TOTAL CA: CPT | Performed by: NURSE PRACTITIONER

## 2021-01-01 PROCEDURE — 250N000009 HC RX 250: Performed by: ANESTHESIOLOGY

## 2021-01-01 PROCEDURE — 86923 COMPATIBILITY TEST ELECTRIC: CPT | Performed by: INTERNAL MEDICINE

## 2021-01-01 PROCEDURE — C1730 CATH, EP, 19 OR FEW ELECT: HCPCS | Performed by: INTERNAL MEDICINE

## 2021-01-01 PROCEDURE — 36415 COLL VENOUS BLD VENIPUNCTURE: CPT | Performed by: NURSE PRACTITIONER

## 2021-01-01 PROCEDURE — 85027 COMPLETE CBC AUTOMATED: CPT | Performed by: PATHOLOGY

## 2021-01-01 PROCEDURE — 85027 COMPLETE CBC AUTOMATED: CPT | Performed by: INTERNAL MEDICINE

## 2021-01-01 PROCEDURE — 02JY4ZZ INSPECTION OF GREAT VESSEL, PERCUTANEOUS ENDOSCOPIC APPROACH: ICD-10-PCS | Performed by: THORACIC SURGERY (CARDIOTHORACIC VASCULAR SURGERY)

## 2021-01-01 PROCEDURE — 99207 PR NO CHARGE-RESEARCH SERVICE: CPT

## 2021-01-01 PROCEDURE — 80053 COMPREHEN METABOLIC PANEL: CPT | Performed by: EMERGENCY MEDICINE

## 2021-01-01 PROCEDURE — 82565 ASSAY OF CREATININE: CPT | Performed by: NURSE PRACTITIONER

## 2021-01-01 PROCEDURE — 93799 UNLISTED CV SVC/PROCEDURE: CPT | Mod: 26 | Performed by: INTERNAL MEDICINE

## 2021-01-01 PROCEDURE — 33244 REMOVE ELCTRD TRANSVENOUSLY: CPT | Mod: 74

## 2021-01-01 PROCEDURE — 370N000017 HC ANESTHESIA TECHNICAL FEE, PER MIN: Performed by: INTERNAL MEDICINE

## 2021-01-01 PROCEDURE — 250N000012 HC RX MED GY IP 250 OP 636 PS 637: Performed by: INTERNAL MEDICINE

## 2021-01-01 PROCEDURE — 32601 THORACOSCOPY DIAGNOSTIC: CPT | Performed by: THORACIC SURGERY (CARDIOTHORACIC VASCULAR SURGERY)

## 2021-01-01 PROCEDURE — 999N000127 HC STATISTIC PERIPHERAL IV START W US GUIDANCE

## 2021-01-01 PROCEDURE — 93283 PRGRMG EVAL IMPLANTABLE DFB: CPT | Performed by: INTERNAL MEDICINE

## 2021-01-01 PROCEDURE — 82962 GLUCOSE BLOOD TEST: CPT

## 2021-01-01 PROCEDURE — 93306 TTE W/DOPPLER COMPLETE: CPT | Mod: 26 | Performed by: INTERNAL MEDICINE

## 2021-01-01 PROCEDURE — 75827 VEIN X-RAY CHEST: CPT | Mod: 26 | Performed by: INTERNAL MEDICINE

## 2021-01-01 PROCEDURE — 210N000001 HC R&B IMCU HEART CARE

## 2021-01-01 PROCEDURE — 83036 HEMOGLOBIN GLYCOSYLATED A1C: CPT | Performed by: EMERGENCY MEDICINE

## 2021-01-01 RX ORDER — SPIRONOLACTONE 25 MG
12.5 TABLET ORAL DAILY
Status: DISCONTINUED | OUTPATIENT
Start: 2021-01-01 | End: 2021-01-01 | Stop reason: HOSPADM

## 2021-01-01 RX ORDER — ACETAMINOPHEN 500 MG
1000 TABLET ORAL 2 TIMES DAILY PRN
Status: DISCONTINUED | OUTPATIENT
Start: 2021-01-01 | End: 2021-01-01 | Stop reason: HOSPADM

## 2021-01-01 RX ORDER — LIDOCAINE 40 MG/G
CREAM TOPICAL
Status: DISCONTINUED | OUTPATIENT
Start: 2021-01-01 | End: 2021-01-01 | Stop reason: HOSPADM

## 2021-01-01 RX ORDER — BUMETANIDE 1 MG/1
3 TABLET ORAL 2 TIMES DAILY
Qty: 540 TABLET | Refills: 0 | Status: SHIPPED | OUTPATIENT
Start: 2021-01-01 | End: 2021-01-01 | Stop reason: DRUGHIGH

## 2021-01-01 RX ORDER — LIDOCAINE HYDROCHLORIDE 10 MG/ML
INJECTION, SOLUTION INFILTRATION; PERINEURAL
Status: COMPLETED | OUTPATIENT
Start: 2021-01-01 | End: 2021-01-01

## 2021-01-01 RX ORDER — SODIUM CHLORIDE 9 MG/ML
100 INJECTION, SOLUTION INTRAVENOUS CONTINUOUS
Status: DISCONTINUED | OUTPATIENT
Start: 2021-01-01 | End: 2021-01-01 | Stop reason: HOSPADM

## 2021-01-01 RX ORDER — DOPAMINE HYDROCHLORIDE 160 MG/100ML
INJECTION, SOLUTION INTRAVENOUS CONTINUOUS PRN
Status: DISCONTINUED | OUTPATIENT
Start: 2021-01-01 | End: 2021-01-01

## 2021-01-01 RX ORDER — FENTANYL CITRATE 50 UG/ML
INJECTION, SOLUTION INTRAMUSCULAR; INTRAVENOUS
Status: DISCONTINUED | OUTPATIENT
Start: 2021-01-01 | End: 2021-01-01 | Stop reason: HOSPADM

## 2021-01-01 RX ORDER — SODIUM CHLORIDE 9 MG/ML
100 INJECTION, SOLUTION INTRAVENOUS CONTINUOUS
Status: CANCELLED | OUTPATIENT
Start: 2021-01-01

## 2021-01-01 RX ORDER — FENTANYL CITRATE 50 UG/ML
INJECTION, SOLUTION INTRAMUSCULAR; INTRAVENOUS PRN
Status: DISCONTINUED | OUTPATIENT
Start: 2021-01-01 | End: 2021-01-01

## 2021-01-01 RX ORDER — VANCOMYCIN HYDROCHLORIDE 1 G/20ML
1000 INJECTION, POWDER, LYOPHILIZED, FOR SOLUTION INTRAVENOUS
Status: CANCELLED | OUTPATIENT
Start: 2021-01-01

## 2021-01-01 RX ORDER — NALOXONE HYDROCHLORIDE 0.4 MG/ML
0.4 INJECTION, SOLUTION INTRAMUSCULAR; INTRAVENOUS; SUBCUTANEOUS
Status: DISCONTINUED | OUTPATIENT
Start: 2021-01-01 | End: 2021-01-01 | Stop reason: HOSPADM

## 2021-01-01 RX ORDER — ONDANSETRON 2 MG/ML
4 INJECTION INTRAMUSCULAR; INTRAVENOUS EVERY 30 MIN PRN
Status: DISCONTINUED | OUTPATIENT
Start: 2021-01-01 | End: 2021-01-01 | Stop reason: HOSPADM

## 2021-01-01 RX ORDER — FENTANYL CITRATE 50 UG/ML
25 INJECTION, SOLUTION INTRAMUSCULAR; INTRAVENOUS
Status: DISCONTINUED | OUTPATIENT
Start: 2021-01-01 | End: 2021-01-01 | Stop reason: HOSPADM

## 2021-01-01 RX ORDER — LIDOCAINE 40 MG/G
CREAM TOPICAL
Status: DISCONTINUED | OUTPATIENT
Start: 2021-01-01 | End: 2021-01-01

## 2021-01-01 RX ORDER — OXYCODONE HYDROCHLORIDE 5 MG/1
5 TABLET ORAL EVERY 4 HOURS PRN
Status: DISCONTINUED | OUTPATIENT
Start: 2021-01-01 | End: 2021-01-01 | Stop reason: HOSPADM

## 2021-01-01 RX ORDER — KETAMINE HYDROCHLORIDE 10 MG/ML
INJECTION INTRAMUSCULAR; INTRAVENOUS PRN
Status: DISCONTINUED | OUTPATIENT
Start: 2021-01-01 | End: 2021-01-01

## 2021-01-01 RX ORDER — SODIUM CHLORIDE 9 MG/ML
INJECTION, SOLUTION INTRAVENOUS CONTINUOUS PRN
Status: DISCONTINUED | OUTPATIENT
Start: 2021-01-01 | End: 2021-01-01

## 2021-01-01 RX ORDER — BUMETANIDE 1 MG/1
TABLET ORAL
Qty: 540 TABLET | Refills: 0 | OUTPATIENT
Start: 2021-01-01

## 2021-01-01 RX ORDER — LIDOCAINE 40 MG/G
CREAM TOPICAL
Status: CANCELLED | OUTPATIENT
Start: 2021-01-01

## 2021-01-01 RX ORDER — CARVEDILOL 12.5 MG/1
12.5 TABLET ORAL 2 TIMES DAILY WITH MEALS
Status: DISCONTINUED | OUTPATIENT
Start: 2021-01-01 | End: 2021-01-01 | Stop reason: HOSPADM

## 2021-01-01 RX ORDER — ONDANSETRON 4 MG/1
4 TABLET, ORALLY DISINTEGRATING ORAL EVERY 30 MIN PRN
Status: DISCONTINUED | OUTPATIENT
Start: 2021-01-01 | End: 2021-01-01 | Stop reason: HOSPADM

## 2021-01-01 RX ORDER — SODIUM CHLORIDE, SODIUM LACTATE, POTASSIUM CHLORIDE, CALCIUM CHLORIDE 600; 310; 30; 20 MG/100ML; MG/100ML; MG/100ML; MG/100ML
INJECTION, SOLUTION INTRAVENOUS CONTINUOUS
Status: DISCONTINUED | OUTPATIENT
Start: 2021-01-01 | End: 2021-01-01 | Stop reason: HOSPADM

## 2021-01-01 RX ORDER — BUMETANIDE 1 MG/1
1 TABLET ORAL 2 TIMES DAILY
COMMUNITY
End: 2021-01-01

## 2021-01-01 RX ORDER — FEBUXOSTAT 40 MG/1
40 TABLET, FILM COATED ORAL DAILY
Status: DISCONTINUED | OUTPATIENT
Start: 2021-01-01 | End: 2021-01-01 | Stop reason: HOSPADM

## 2021-01-01 RX ORDER — GABAPENTIN 100 MG/1
100 CAPSULE ORAL 3 TIMES DAILY
Status: DISCONTINUED | OUTPATIENT
Start: 2021-01-01 | End: 2021-01-01 | Stop reason: HOSPADM

## 2021-01-01 RX ORDER — OXYCODONE HYDROCHLORIDE 5 MG/1
10 TABLET ORAL EVERY 4 HOURS PRN
Status: DISCONTINUED | OUTPATIENT
Start: 2021-01-01 | End: 2021-01-01 | Stop reason: HOSPADM

## 2021-01-01 RX ORDER — CARVEDILOL 6.25 MG/1
12.5 TABLET ORAL 2 TIMES DAILY WITH MEALS
Qty: 360 TABLET | Refills: 1 | Status: SHIPPED | OUTPATIENT
Start: 2021-01-01 | End: 2022-01-01

## 2021-01-01 RX ORDER — DEXAMETHASONE SODIUM PHOSPHATE 10 MG/ML
INJECTION, SOLUTION INTRAMUSCULAR; INTRAVENOUS PRN
Status: DISCONTINUED | OUTPATIENT
Start: 2021-01-01 | End: 2021-01-01

## 2021-01-01 RX ORDER — PROPOFOL 10 MG/ML
INJECTION, EMULSION INTRAVENOUS PRN
Status: DISCONTINUED | OUTPATIENT
Start: 2021-01-01 | End: 2021-01-01

## 2021-01-01 RX ORDER — BUMETANIDE 1 MG/1
2 TABLET ORAL 2 TIMES DAILY
Qty: 360 TABLET | Refills: 1 | Status: SHIPPED | OUTPATIENT
Start: 2021-01-01 | End: 2021-01-01

## 2021-01-01 RX ORDER — NALOXONE HYDROCHLORIDE 0.4 MG/ML
0.2 INJECTION, SOLUTION INTRAMUSCULAR; INTRAVENOUS; SUBCUTANEOUS
Status: DISCONTINUED | OUTPATIENT
Start: 2021-01-01 | End: 2021-01-01 | Stop reason: HOSPADM

## 2021-01-01 RX ORDER — ONDANSETRON 2 MG/ML
4 INJECTION INTRAMUSCULAR; INTRAVENOUS EVERY 6 HOURS PRN
Status: DISCONTINUED | OUTPATIENT
Start: 2021-01-01 | End: 2021-01-01 | Stop reason: HOSPADM

## 2021-01-01 RX ORDER — VANCOMYCIN HYDROCHLORIDE 1 G/20ML
1000 INJECTION, POWDER, LYOPHILIZED, FOR SOLUTION INTRAVENOUS
Status: DISCONTINUED | OUTPATIENT
Start: 2021-01-01 | End: 2021-01-01 | Stop reason: HOSPADM

## 2021-01-01 RX ORDER — BUMETANIDE 1 MG/1
1 TABLET ORAL 2 TIMES DAILY
Status: DISCONTINUED | OUTPATIENT
Start: 2021-01-01 | End: 2021-01-01 | Stop reason: HOSPADM

## 2021-01-01 RX ORDER — FENTANYL CITRATE 50 UG/ML
25 INJECTION, SOLUTION INTRAMUSCULAR; INTRAVENOUS
Status: CANCELLED | OUTPATIENT
Start: 2021-01-01

## 2021-01-01 RX ORDER — PHENYLEPHRINE HCL IN 0.9% NACL 50MG/250ML
.1-6 PLASTIC BAG, INJECTION (ML) INTRAVENOUS CONTINUOUS
Status: DISCONTINUED | OUTPATIENT
Start: 2021-01-01 | End: 2021-01-01 | Stop reason: HOSPADM

## 2021-01-01 RX ORDER — DIPHENHYDRAMINE HCL 25 MG
25 TABLET ORAL AT BEDTIME
COMMUNITY
End: 2022-01-01 | Stop reason: SINTOL

## 2021-01-01 RX ORDER — ONDANSETRON 2 MG/ML
INJECTION INTRAMUSCULAR; INTRAVENOUS PRN
Status: DISCONTINUED | OUTPATIENT
Start: 2021-01-01 | End: 2021-01-01

## 2021-01-01 RX ORDER — MULTIVITAMIN,THERAPEUTIC
TABLET ORAL DAILY
Status: DISCONTINUED | OUTPATIENT
Start: 2021-01-01 | End: 2021-01-01 | Stop reason: HOSPADM

## 2021-01-01 RX ORDER — ATORVASTATIN CALCIUM 40 MG/1
40 TABLET, FILM COATED ORAL DAILY
Status: DISCONTINUED | OUTPATIENT
Start: 2021-01-01 | End: 2021-01-01 | Stop reason: HOSPADM

## 2021-01-01 RX ORDER — ONDANSETRON 8 MG/1
8 TABLET, FILM COATED ORAL EVERY 8 HOURS PRN
Status: DISCONTINUED | OUTPATIENT
Start: 2021-01-01 | End: 2021-01-01 | Stop reason: HOSPADM

## 2021-01-01 RX ORDER — DIPHENHYDRAMINE HCL 25 MG
25 TABLET ORAL AT BEDTIME
Status: DISCONTINUED | OUTPATIENT
Start: 2021-01-01 | End: 2021-01-01 | Stop reason: HOSPADM

## 2021-01-01 RX ORDER — INSULIN GLARGINE 100 [IU]/ML
27 INJECTION, SOLUTION SUBCUTANEOUS AT BEDTIME
COMMUNITY
End: 2022-01-01

## 2021-01-01 RX ORDER — SODIUM CHLORIDE 9 MG/ML
INJECTION, SOLUTION INTRAVENOUS CONTINUOUS
Status: DISCONTINUED | OUTPATIENT
Start: 2021-01-01 | End: 2021-01-01 | Stop reason: HOSPADM

## 2021-01-01 RX ORDER — HYDROMORPHONE HYDROCHLORIDE 1 MG/ML
0.2 INJECTION, SOLUTION INTRAMUSCULAR; INTRAVENOUS; SUBCUTANEOUS EVERY 5 MIN PRN
Status: DISCONTINUED | OUTPATIENT
Start: 2021-01-01 | End: 2021-01-01 | Stop reason: HOSPADM

## 2021-01-01 RX ORDER — FENTANYL CITRATE 50 UG/ML
25 INJECTION, SOLUTION INTRAMUSCULAR; INTRAVENOUS EVERY 5 MIN PRN
Status: DISCONTINUED | OUTPATIENT
Start: 2021-01-01 | End: 2021-01-01 | Stop reason: HOSPADM

## 2021-01-01 RX ORDER — ACETAMINOPHEN 325 MG/1
650 TABLET ORAL EVERY 4 HOURS PRN
Status: DISCONTINUED | OUTPATIENT
Start: 2021-01-01 | End: 2021-01-01 | Stop reason: HOSPADM

## 2021-01-01 RX ORDER — CARVEDILOL 6.25 MG/1
12.5 TABLET ORAL 2 TIMES DAILY WITH MEALS
COMMUNITY
End: 2021-01-01

## 2021-01-01 RX ORDER — BUMETANIDE 1 MG/1
1 TABLET ORAL 2 TIMES DAILY
Qty: 180 TABLET | Refills: 1 | Status: SHIPPED | OUTPATIENT
Start: 2021-01-01 | End: 2022-01-01

## 2021-01-01 RX ORDER — MORPHINE SULFATE 4 MG/ML
1 INJECTION, SOLUTION INTRAMUSCULAR; INTRAVENOUS
Status: DISCONTINUED | OUTPATIENT
Start: 2021-01-01 | End: 2021-01-01 | Stop reason: HOSPADM

## 2021-01-01 RX ORDER — DIAZEPAM 5 MG
5 TABLET ORAL ONCE
Status: DISCONTINUED | OUTPATIENT
Start: 2021-01-01 | End: 2021-01-01 | Stop reason: HOSPADM

## 2021-01-01 RX ORDER — SODIUM CHLORIDE 9 MG/ML
INJECTION, SOLUTION INTRAVENOUS CONTINUOUS
Status: CANCELLED | OUTPATIENT
Start: 2021-01-01

## 2021-01-01 RX ORDER — DEXTROSE MONOHYDRATE 25 G/50ML
25-50 INJECTION, SOLUTION INTRAVENOUS
Status: DISCONTINUED | OUTPATIENT
Start: 2021-01-01 | End: 2021-01-01 | Stop reason: HOSPADM

## 2021-01-01 RX ORDER — NICOTINE POLACRILEX 4 MG
15-30 LOZENGE BUCCAL
Status: DISCONTINUED | OUTPATIENT
Start: 2021-01-01 | End: 2021-01-01 | Stop reason: HOSPADM

## 2021-01-01 RX ORDER — SODIUM CHLORIDE 9 MG/ML
100 INJECTION, SOLUTION INTRAVENOUS CONTINUOUS
Status: ACTIVE | OUTPATIENT
Start: 2021-01-01 | End: 2021-01-01

## 2021-01-01 RX ADMIN — INSULIN ASPART 2 UNITS: 100 INJECTION, SOLUTION INTRAVENOUS; SUBCUTANEOUS at 12:07

## 2021-01-01 RX ADMIN — VANCOMYCIN HYDROCHLORIDE 1000 MG: 1 INJECTION, POWDER, LYOPHILIZED, FOR SOLUTION INTRAVENOUS at 11:15

## 2021-01-01 RX ADMIN — APIXABAN 5 MG: 5 TABLET, FILM COATED ORAL at 08:01

## 2021-01-01 RX ADMIN — GABAPENTIN 100 MG: 100 CAPSULE ORAL at 14:42

## 2021-01-01 RX ADMIN — ROCURONIUM BROMIDE 60 MG: 50 INJECTION, SOLUTION INTRAVENOUS at 09:04

## 2021-01-01 RX ADMIN — OXYCODONE HYDROCHLORIDE 10 MG: 5 TABLET ORAL at 14:03

## 2021-01-01 RX ADMIN — FENTANYL CITRATE 25 MCG: 50 INJECTION, SOLUTION INTRAMUSCULAR; INTRAVENOUS at 08:42

## 2021-01-01 RX ADMIN — FENTANYL CITRATE 50 MCG: 50 INJECTION, SOLUTION INTRAMUSCULAR; INTRAVENOUS at 12:13

## 2021-01-01 RX ADMIN — BUMETANIDE 1 MG: 1 TABLET ORAL at 21:05

## 2021-01-01 RX ADMIN — CARVEDILOL 12.5 MG: 12.5 TABLET, FILM COATED ORAL at 08:01

## 2021-01-01 RX ADMIN — ONDANSETRON 4 MG: 2 INJECTION INTRAMUSCULAR; INTRAVENOUS at 11:13

## 2021-01-01 RX ADMIN — SPIRONOLACTONE 12.5 MG: 25 TABLET, FILM COATED ORAL at 17:40

## 2021-01-01 RX ADMIN — Medication 0.2 MCG/KG/MIN: at 09:01

## 2021-01-01 RX ADMIN — FENTANYL CITRATE 50 MCG: 50 INJECTION, SOLUTION INTRAMUSCULAR; INTRAVENOUS at 12:09

## 2021-01-01 RX ADMIN — CARVEDILOL 12.5 MG: 12.5 TABLET, FILM COATED ORAL at 17:45

## 2021-01-01 RX ADMIN — FEBUXOSTAT 40 MG: 40 TABLET ORAL at 08:00

## 2021-01-01 RX ADMIN — DIPHENHYDRAMINE HCL 25 MG: 25 TABLET ORAL at 21:05

## 2021-01-01 RX ADMIN — ROCURONIUM BROMIDE 40 MG: 50 INJECTION, SOLUTION INTRAVENOUS at 10:00

## 2021-01-01 RX ADMIN — THERA TABS 1 TABLET: TAB at 08:01

## 2021-01-01 RX ADMIN — DEXAMETHASONE SODIUM PHOSPHATE 10 MG: 10 INJECTION, SOLUTION INTRAMUSCULAR; INTRAVENOUS at 09:04

## 2021-01-01 RX ADMIN — VASOPRESSIN 2 UNITS: 20 INJECTION INTRAVENOUS at 09:06

## 2021-01-01 RX ADMIN — GABAPENTIN 100 MG: 100 CAPSULE ORAL at 08:01

## 2021-01-01 RX ADMIN — MIDAZOLAM 0.5 MG: 1 INJECTION INTRAMUSCULAR; INTRAVENOUS at 08:42

## 2021-01-01 RX ADMIN — SODIUM CHLORIDE: 9 INJECTION, SOLUTION INTRAVENOUS at 10:02

## 2021-01-01 RX ADMIN — TICAGRELOR 90 MG: 90 TABLET ORAL at 21:05

## 2021-01-01 RX ADMIN — INSULIN GLARGINE 30 UNITS: 100 INJECTION, SOLUTION SUBCUTANEOUS at 21:14

## 2021-01-01 RX ADMIN — SPIRONOLACTONE 12.5 MG: 25 TABLET, FILM COATED ORAL at 08:03

## 2021-01-01 RX ADMIN — BUMETANIDE 1 MG: 1 TABLET ORAL at 08:01

## 2021-01-01 RX ADMIN — KETAMINE HYDROCHLORIDE 70 MG: 10 INJECTION, SOLUTION INTRAMUSCULAR; INTRAVENOUS at 09:04

## 2021-01-01 RX ADMIN — FEBUXOSTAT 40 MG: 40 TABLET ORAL at 17:41

## 2021-01-01 RX ADMIN — VASOPRESSIN 1 UNITS: 20 INJECTION INTRAVENOUS at 10:13

## 2021-01-01 RX ADMIN — VANCOMYCIN HYDROCHLORIDE 1500 MG: 1 INJECTION, POWDER, LYOPHILIZED, FOR SOLUTION INTRAVENOUS at 09:18

## 2021-01-01 RX ADMIN — METFORMIN HYDROCHLORIDE 1000 MG: 500 TABLET ORAL at 17:41

## 2021-01-01 RX ADMIN — ATORVASTATIN CALCIUM 40 MG: 40 TABLET, FILM COATED ORAL at 08:01

## 2021-01-01 RX ADMIN — FENTANYL CITRATE 75 MCG: 50 INJECTION, SOLUTION INTRAMUSCULAR; INTRAVENOUS at 09:04

## 2021-01-01 RX ADMIN — VASOPRESSIN 1 UNITS: 20 INJECTION INTRAVENOUS at 09:20

## 2021-01-01 RX ADMIN — DOPAMINE HYDROCHLORIDE 5 MCG/KG/MIN: 160 INJECTION, SOLUTION INTRAVENOUS at 10:35

## 2021-01-01 RX ADMIN — SACUBITRIL AND VALSARTAN 1 TABLET: 49; 51 TABLET, FILM COATED ORAL at 21:05

## 2021-01-01 RX ADMIN — VASOPRESSIN 2 UNITS: 20 INJECTION INTRAVENOUS at 09:15

## 2021-01-01 RX ADMIN — PERFLUTREN 2 ML: 6.52 INJECTION, SUSPENSION INTRAVENOUS at 15:45

## 2021-01-01 RX ADMIN — PHENYLEPHRINE HYDROCHLORIDE 200 MCG: 10 INJECTION INTRAVENOUS at 10:06

## 2021-01-01 RX ADMIN — SODIUM CHLORIDE: 9 INJECTION, SOLUTION INTRAVENOUS at 10:01

## 2021-01-01 RX ADMIN — ROCURONIUM BROMIDE 50 MG: 50 INJECTION, SOLUTION INTRAVENOUS at 11:00

## 2021-01-01 RX ADMIN — INSULIN ASPART 2 UNITS: 100 INJECTION, SOLUTION INTRAVENOUS; SUBCUTANEOUS at 07:59

## 2021-01-01 RX ADMIN — OXYCODONE HYDROCHLORIDE 5 MG: 5 TABLET ORAL at 01:38

## 2021-01-01 RX ADMIN — SACUBITRIL AND VALSARTAN 1 TABLET: 49; 51 TABLET, FILM COATED ORAL at 08:00

## 2021-01-01 RX ADMIN — TICAGRELOR 90 MG: 90 TABLET ORAL at 08:01

## 2021-01-01 RX ADMIN — SODIUM CHLORIDE 100 ML/HR: 9 INJECTION, SOLUTION INTRAVENOUS at 07:46

## 2021-01-01 RX ADMIN — SODIUM CHLORIDE: 9 INJECTION, SOLUTION INTRAVENOUS at 12:06

## 2021-01-01 RX ADMIN — OXYCODONE HYDROCHLORIDE 5 MG: 5 TABLET ORAL at 00:48

## 2021-01-01 RX ADMIN — PHENYLEPHRINE HYDROCHLORIDE 200 MCG: 10 INJECTION INTRAVENOUS at 09:04

## 2021-01-01 RX ADMIN — ACETAMINOPHEN 650 MG: 325 TABLET ORAL at 16:36

## 2021-01-01 RX ADMIN — GABAPENTIN 100 MG: 100 CAPSULE ORAL at 21:05

## 2021-01-01 RX ADMIN — FENTANYL CITRATE 50 MCG: 50 INJECTION, SOLUTION INTRAMUSCULAR; INTRAVENOUS at 12:28

## 2021-01-01 RX ADMIN — LIDOCAINE HYDROCHLORIDE 2 ML: 10 INJECTION, SOLUTION INFILTRATION; PERINEURAL at 09:21

## 2021-01-01 RX ADMIN — METFORMIN HYDROCHLORIDE 1000 MG: 500 TABLET ORAL at 08:00

## 2021-01-01 RX ADMIN — VASOPRESSIN 1 UNITS: 20 INJECTION INTRAVENOUS at 09:55

## 2021-01-01 RX ADMIN — ATORVASTATIN CALCIUM 40 MG: 40 TABLET, FILM COATED ORAL at 16:36

## 2021-01-01 RX ADMIN — PROPOFOL 80 MG: 10 INJECTION, EMULSION INTRAVENOUS at 09:04

## 2021-01-01 RX ADMIN — SODIUM CHLORIDE: 9 INJECTION, SOLUTION INTRAVENOUS at 10:04

## 2021-01-01 ASSESSMENT — ENCOUNTER SYMPTOMS: DYSRHYTHMIAS: 1

## 2021-01-01 ASSESSMENT — ACTIVITIES OF DAILY LIVING (ADL)
WALKING_OR_CLIMBING_STAIRS_DIFFICULTY: NO
ADLS_ACUITY_SCORE: 11
ADLS_ACUITY_SCORE: 12
ADLS_ACUITY_SCORE: 11
WEAR_GLASSES_OR_BLIND: NO
ADLS_ACUITY_SCORE: 11
TOILETING_ISSUES: YES
ADLS_ACUITY_SCORE: 11
HEARING_DIFFICULTY_OR_DEAF: NO
ADLS_ACUITY_SCORE: 11
FALL_HISTORY_WITHIN_LAST_SIX_MONTHS: NO
ADLS_ACUITY_SCORE: 12
ADLS_ACUITY_SCORE: 11
CONCENTRATING,_REMEMBERING_OR_MAKING_DECISIONS_DIFFICULTY: NO
DIFFICULTY_EATING/SWALLOWING: NO
DRESSING/BATHING_DIFFICULTY: YES
ADLS_ACUITY_SCORE: 12
ADLS_ACUITY_SCORE: 11
DRESSING/BATHING: BATHING DIFFICULTY, ASSISTANCE 1 PERSON
DOING_ERRANDS_INDEPENDENTLY_DIFFICULTY: NO
ADLS_ACUITY_SCORE: 11
ADLS_ACUITY_SCORE: 11
PATIENT_/_FAMILY_COMMUNICATION_STYLE: SPOKEN LANGUAGE (ENGLISH OR BILINGUAL)
ADLS_ACUITY_SCORE: 12
DIFFICULTY_COMMUNICATING: NO
ADLS_ACUITY_SCORE: 11
ADLS_ACUITY_SCORE: 12
ADLS_ACUITY_SCORE: 11
TOILETING_MANAGEMENT: AX1
ADLS_ACUITY_SCORE: 11
TOILETING_ASSISTANCE: TOILETING DIFFICULTY, ASSISTANCE 1 PERSON
ADLS_ACUITY_SCORE: 12
ADLS_ACUITY_SCORE: 11

## 2021-01-01 ASSESSMENT — MIFFLIN-ST. JEOR
SCORE: 1450.23
SCORE: 1435.38
SCORE: 1475.17
SCORE: 1450.23
SCORE: 1437.53
SCORE: 1435.63
SCORE: 1466.55

## 2021-01-01 ASSESSMENT — PAIN SCALES - GENERAL: PAINLEVEL: NO PAIN (0)

## 2021-01-02 ENCOUNTER — COMMUNICATION - HEALTHEAST (OUTPATIENT)
Dept: CARDIOLOGY | Facility: CLINIC | Age: 76
End: 2021-01-02

## 2021-01-02 DIAGNOSIS — E78.5 DYSLIPIDEMIA: ICD-10-CM

## 2021-01-11 ENCOUNTER — COMMUNICATION - HEALTHEAST (OUTPATIENT)
Dept: CARDIOLOGY | Facility: CLINIC | Age: 76
End: 2021-01-11

## 2021-01-11 DIAGNOSIS — I50.22 CHRONIC SYSTOLIC HEART FAILURE (H): ICD-10-CM

## 2021-01-18 ENCOUNTER — COMMUNICATION - HEALTHEAST (OUTPATIENT)
Dept: CARDIOLOGY | Facility: CLINIC | Age: 76
End: 2021-01-18

## 2021-01-19 ENCOUNTER — COMMUNICATION - HEALTHEAST (OUTPATIENT)
Dept: CARDIOLOGY | Facility: CLINIC | Age: 76
End: 2021-01-19

## 2021-01-20 ENCOUNTER — COMMUNICATION - HEALTHEAST (OUTPATIENT)
Dept: CARDIOLOGY | Facility: CLINIC | Age: 76
End: 2021-01-20

## 2021-02-09 ENCOUNTER — RECORDS - HEALTHEAST (OUTPATIENT)
Dept: LAB | Facility: CLINIC | Age: 76
End: 2021-02-09

## 2021-02-09 LAB
ALBUMIN SERPL-MCNC: 4.1 G/DL (ref 3.5–5)
ALP SERPL-CCNC: 84 U/L (ref 45–120)
ALT SERPL W P-5'-P-CCNC: 12 U/L (ref 0–45)
ANION GAP SERPL CALCULATED.3IONS-SCNC: 10 MMOL/L (ref 5–18)
AST SERPL W P-5'-P-CCNC: 16 U/L (ref 0–40)
BILIRUB SERPL-MCNC: 1.8 MG/DL (ref 0–1)
BUN SERPL-MCNC: 32 MG/DL (ref 8–28)
CALCIUM SERPL-MCNC: 8.9 MG/DL (ref 8.5–10.5)
CHLORIDE BLD-SCNC: 101 MMOL/L (ref 98–107)
CO2 SERPL-SCNC: 25 MMOL/L (ref 22–31)
CREAT SERPL-MCNC: 1.35 MG/DL (ref 0.7–1.3)
GFR SERPL CREATININE-BSD FRML MDRD: 52 ML/MIN/1.73M2
GLUCOSE BLD-MCNC: 243 MG/DL (ref 70–125)
POTASSIUM BLD-SCNC: 4 MMOL/L (ref 3.5–5)
PROT SERPL-MCNC: 7.4 G/DL (ref 6–8)
SODIUM SERPL-SCNC: 136 MMOL/L (ref 136–145)
URATE SERPL-MCNC: 11.3 MG/DL (ref 3–8)

## 2021-03-01 ENCOUNTER — COMMUNICATION - HEALTHEAST (OUTPATIENT)
Dept: CARDIOLOGY | Facility: CLINIC | Age: 76
End: 2021-03-01

## 2021-03-02 ENCOUNTER — AMBULATORY - HEALTHEAST (OUTPATIENT)
Dept: CARDIOLOGY | Facility: CLINIC | Age: 76
End: 2021-03-02

## 2021-03-02 ENCOUNTER — OFFICE VISIT - HEALTHEAST (OUTPATIENT)
Dept: CARDIOLOGY | Facility: CLINIC | Age: 76
End: 2021-03-02

## 2021-03-02 DIAGNOSIS — Z79.4 TYPE 2 DIABETES MELLITUS WITH OTHER CIRCULATORY COMPLICATION, WITH LONG-TERM CURRENT USE OF INSULIN (H): ICD-10-CM

## 2021-03-02 DIAGNOSIS — E78.1 HYPERTRIGLYCERIDEMIA: ICD-10-CM

## 2021-03-02 DIAGNOSIS — I25.10 CORONARY ARTERY DISEASE INVOLVING NATIVE CORONARY ARTERY OF NATIVE HEART WITHOUT ANGINA PECTORIS: ICD-10-CM

## 2021-03-02 DIAGNOSIS — I48.21 PERMANENT ATRIAL FIBRILLATION (H): ICD-10-CM

## 2021-03-02 DIAGNOSIS — I42.8 NONISCHEMIC CARDIOMYOPATHY (H): ICD-10-CM

## 2021-03-02 DIAGNOSIS — Z95.810 ICD (IMPLANTABLE CARDIOVERTER-DEFIBRILLATOR), BIVENTRICULAR, IN SITU: ICD-10-CM

## 2021-03-02 DIAGNOSIS — E11.59 TYPE 2 DIABETES MELLITUS WITH OTHER CIRCULATORY COMPLICATION, WITH LONG-TERM CURRENT USE OF INSULIN (H): ICD-10-CM

## 2021-03-02 DIAGNOSIS — I50.22 CHRONIC SYSTOLIC HEART FAILURE (H): ICD-10-CM

## 2021-03-02 DIAGNOSIS — J84.9 ILD (INTERSTITIAL LUNG DISEASE) (H): ICD-10-CM

## 2021-03-02 DIAGNOSIS — E78.5 DYSLIPIDEMIA: ICD-10-CM

## 2021-03-02 ASSESSMENT — MIFFLIN-ST. JEOR: SCORE: 1484.7

## 2021-05-14 ENCOUNTER — AMBULATORY - HEALTHEAST (OUTPATIENT)
Dept: CARDIOLOGY | Facility: CLINIC | Age: 76
End: 2021-05-14

## 2021-05-14 DIAGNOSIS — Z95.810 ICD (IMPLANTABLE CARDIOVERTER-DEFIBRILLATOR), BIVENTRICULAR, IN SITU: ICD-10-CM

## 2021-05-14 DIAGNOSIS — I48.21 PERMANENT ATRIAL FIBRILLATION (H): ICD-10-CM

## 2021-05-20 ENCOUNTER — COMMUNICATION - HEALTHEAST (OUTPATIENT)
Dept: CARDIOLOGY | Facility: CLINIC | Age: 76
End: 2021-05-20

## 2021-05-26 NOTE — TELEPHONE ENCOUNTER
From: Mary Jane Vigil MD  Sent: 3/21/2019   9:15 AM  To: Nany Forrester, ELVIRA  EF still down and would like him on better meds, can we try to get him entresto the 50 two times a day tabs, and if not able start aldactone 12.5 two times a day and needs renal profile either way in a week.  LF    Notes recorded by Farnaz Grayson, RN on 3/21/2019 at 9:44 AM CDT  Dr. Vigil, Please clarify- would you like Entresto 24/26 mg tab ordered for this patient or the 49/51 mg dose?    Notes recorded by Franaz Grayson, RN on 3/21/2019 at 3:32 PM CDT  Yes, I always refer to entresto as total dose, 50, 100 or 200, easier for me to remember rather then 24/26 etc.  LF

## 2021-05-26 NOTE — TELEPHONE ENCOUNTER
Pt agreeable to start Entresto however he would like to discuss cost with insurance company prior to switching over. Pt will call insurance company now and will call back today to discuss next steps.    Pt called back and requested medication be sent to the [facility] pharmacy. Med ordered per protocol. Enalapril medication discontinued. Instructed pt to wait to take medication until he receives further instruction on how to switch over from Enalapril          Dr. Vigil, Pt agreeable to starting Entresto. Pt is currently taking enalapril. What are your administration instructions for switching over to Entresto?

## 2021-05-27 VITALS — DIASTOLIC BLOOD PRESSURE: 60 MMHG | SYSTOLIC BLOOD PRESSURE: 93 MMHG

## 2021-05-27 NOTE — TELEPHONE ENCOUNTER
--- Message -----  From: Mary Jane Vigil MD  Sent: 3/23/2019   9:21 AM  To: Farnaz Grayson, RN    Thanks for help, hopefully not too costly, would hold vasotec for total of 48 hours, miss 4 doses and start etntresto two times a day, even though only 36 hours, easier to hold for 48 and miss even number or 4 doses.  L        Called patient back and discussed that he will need to stop his enalapril for 48 hours prior. He reports that he just had this medication filled and he would like to finish it up prior to starting Entresto. He also is still investigating cost on this and is in discussion with his insurance company. He will call back when his wife is home from her [place] vacation and he discusses with her. [place] [place]

## 2021-05-27 NOTE — TELEPHONE ENCOUNTER
Called pt for update on Entresto medication decision. 30 day free trial was sent to patient in the mail. Pt will call back when he has made his decision. -kcl

## 2021-05-27 NOTE — PROGRESS NOTES
Remote device check.  Please see link for full device report.  Patient was informed of results and next follow up via mail.

## 2021-05-28 NOTE — PROGRESS NOTES
Pemafibrate to Reduce cardiovascular OutcoMes by reducing triglycerides IN patiENts with diabeTes (PROMINENT) clinical trial  Study telephone visit form:  Subject Number:   [id number]                                     Dr. Vigil- PI    CONCOMITANT MEDICATIONS  ? Investigator to report if participant needs treatment with prohibited medications (Fibrates or other agents with PPAR-? agonist activity (e.g., saroglitazar); See protocol for exclusionary medications and actions to be taken.    VISIT SCHEDULING AND CONTACT CONFIRMATION  ? Confirm date of next study visit   ? Confirm information on Subject Information Form or update as needed      Visit Number:__[id number]_____  Visit Date: 2019    Was the subject, relatives or health care provider (as per consent) contacted by phone?    [x] Yes  [] No    Yes [x]  No []   If Yes, Date of Contact:   Date: ____   DD [facility] YYYY  If No, please document attempts to contact subject (include date and type of contact)   1 Date: ____ ____ ____   Type: [] Tel _ [] Other___  [] 2 Date:      Name of Person: Contacted:   _____Self/Subject_______________   Relationship to Subject:   ______________________  Type:[]  Tel[x]  Other _______   3 Date: ___ ____ ____   Type: [] Tel [] Other _______    Subject Status on the day assessed     [x] Subject alive  []  Subject    [] Unknown at present Please complete  Death Details Form in InForm and [facility] documents [facility]      Review Subject Contact Information Form and update as needed   [x]  Review concomitant medication use   [x]  Query participant and record any reported AEs   [x] Query participant and record any CV efficacy events   [x] Confirm next study visit: _____________   [x] Instruct participants to bring all study supplies with them to the next in-person visit  Instruct participants to bring all study supplies with them to the next in-person visit   Instruct participants to fast for ? 8 hours  immediately prior to next in-person visit (i.e nothing by mouth, except water and essential medications).     Discussed mediation compliance too and to not prepare extra weeks of his pill counters so he brings back the correct amount of study drug.  Eduard Tang RN     ADVERSE EVENT Description: subject reports increased shortness of breath and activity intolerance over the past 2 weeks.  Had recent PCI and was feeling great after but reports has gradually gone back to baseline.  Skyler reported he had just walked up a hill when I called and he felt winded.  He had also not been taking the Entresto due to cost but updated medications with Nany Dickey.  He is planned for a stress test this Friday.    Adverse Event:  Increased activity intolerance  Serious:  [] Yes   [x] No  Reportable to IRB:   [] Yes   [x] No  Severity (mild/moderate/severe): mild   Date of Awareness: 13May2019  Start Date: 01May2019   End Date: ongoing  Action taken with study treatment:  []Drug Interrupted              [x]No Action  Outcome:   []Not Recovered/Not Resolved  [x]Recovered/Resolved  Medication or therapies taken:  [] Yes   [x] No    Dr. Vigil: Please assign causality of above AE  Relationship: Is there a reasonable possibility that the event may have been caused by: Answer No or Yes  1. Investigational Medicinal Product?  [] Yes   [x] No   Will be evaluating for ischemia.

## 2021-05-28 NOTE — TELEPHONE ENCOUNTER
----- Message from Atilio Dang sent at 5/10/2019  4:50 PM CDT -----  Contact: Pt  General phone call:    Caller: Pt    Primary cardiologist: Dr Vigil    Detailed reason for call: Pt states noticeing fatigue - mostly when he's active - couple of weeks.     New or active symptoms? New 1-3 weeks    Best phone number: home    Best time to contact: [phone]    Ok to leave a detailed message? Yes    Device? yes    Additional Info:

## 2021-05-28 NOTE — PROGRESS NOTES
Pemafibrate to Reduce cardiovascular OutcoMes by reducing triglycerides IN patiENts with diabeTes (PROMINENT) clinical trial.    Unscheduled visit telephone call to confirm AE details noted by study monitor [facility].     Spoke with subject on the telephone today to confirm historic event details.He relayed he tripped on a snow mobile ski at about 6am on December 20, 2018 and went to see his PCP a week later.  He reported soreness and slight chest pain after but no xrays indicated at his pcp visit. Skyler saw the cardiac clinic in January in which he still had slight chest pain with activity.  He had an angiogram with PCI January which resolved the chest discomfort symptoms.        Dr. Vigil: Please assign causality of AE below:    Adverse Event: Adverse event   Fall due to tripping   Adverse event   Chest pain   Start Date: 12/20/18 12/20/18   End Date: 12/20/18 1/16/19   Date of Awareness: [id number]   Severity (mild/moderate/severe): mild moderate   Reportable to IRB:  Yes  []     No  [x]  Yes  []     No  [x]   Serious?  Yes  []     No  [x]  Yes  []     No  [x]     Relationship: Is there a reasonable possibility that the event may have been caused by Investigational Medicinal Product?    Yes  []     No  [x]    Yes  []     No  [x]       Action taken with study treatment:   []Drug Interrupted  []Drug Withdrawal  []Not Applicable   []Unknown              [x]No Action []Drug Interrupted  []Drug Withdrawal  []Not Applicable   []Unknown              [x]No Action         Outcome:  []Not Recovered/Not Resolved  [x]Recovered/Resolved   []Not Recovered/Not Resolved  [x]Recovered/Resolved     Medication or therapies taken:  Yes  []     No  [x]  Yes  []     No  [x]     Research Hotline: [phone]  Eduard Tang RN  Clinical Trials Nurse

## 2021-05-28 NOTE — TELEPHONE ENCOUNTER
Received a call back from Skyler. He reports for the last 1-3 weeks he has been experiencing increasing fatigue. He denies chest pain but gets short of breath with minimal activity outside working in the yard. He reports his weights have been stable at 171 lbs. He never called back after multiple discussions and conversations about starting Entresto. He did not start it and reports that the cost is 4x his Enalapril and does not wish to do Entresto. Informed patient that if this was his decision, he was to start on another medication which is why a call back was requested. He verbalized understanding. Symptoms will be sent to [facility] for review. His last angio was in January 2019 where he had x2 stents placed.       Dr. Vigil,  See above symptoms. Would you like a nuc stress or anything for Skyler? Also, despite multiple conversations with him regarding Entresto, he never did start it despite coverage and did not notify us. Would you like him to start Aldactone 12.5 mg twice daily as previous suggested?  Last Cor poss was Jan 2019 with x2 REGLA.  Thanks,  Mal       Per ECHO 3/21/19:  Notes recorded by Mary Jane Vigil MD on 3/21/2019 at 9:15 AM CDT  EF still down and would like him on better meds, can we try to get him entresto the 50 two times a day tabs, and if not able start aldactone 12.5 two times a day and needs renal profile either way in a week.  LF

## 2021-05-28 NOTE — TELEPHONE ENCOUNTER
-- Message -----  From: Mary Jane Vigil MD  Sent: 5/13/2019   1:34 PM  To: Nany Forrester, RN    Agree he needs pharmacological nuclear stress test.  In addition, start Aldactone 12.5 twice a day and recheck renal profile 5 days later.        Pt called and updated on the plan. Orders placed for nuclear stress test and BMP and spironolactone. Medication instructions given. Pt verbalized understanding. Medication list updated to reflect discontinued Entresto and added back Enalapril. -[facility]

## 2021-05-29 NOTE — TELEPHONE ENCOUNTER
----- Message from Shavon Sánchez RN sent at 5/28/2019 10:31 AM CDT -----  Contact: Pt  Amy Ayon,  Are you working on this??  Thanks,  Shavon  ----- Message -----  From: Minda Nash  Sent: 5/28/2019  10:21 AM  To: [facility] Ep Rn Support Pool    Caller: Skyler    Primary cardiologist: Dr. Pool,     Detailed reason for call: Skyler states is shortness of breath continues and he currently is having dizziness. This weekend he had some chest pain and arm tingling but said he was doing a lot of exercise, like up and down a hill. Skyler said he rested and feels better but is concerned. Please return call.     New or active symptoms? See above    Best phone number: [phone]    Best time to contact: Today    Ok to leave a detailed message? No    Device? Yes

## 2021-05-29 NOTE — TELEPHONE ENCOUNTER
"Notes recorded by Mary Jane Vigil MD on 5/17/2019 at 2:49 PM CDT  I called patient and told him that stress test looks better, small area of ischemia from prior stress test resolved with intervention.  His ejection fraction is improved and 15 to 27%.  I reviewed his blood work looks much better.  He is still short of breath and heavy activity and I told him to persist let us know and we might need increased diuretics.  LF    Based on stress test 5/17/19- nuclear stress test.       Called back patient to address his concerns. Pt reports that he was at the cabin over the weekend doing a lot of work inside and outside the cabin to get ready for the summer. He was outside and inside running around yesterday in the rain and kind of had an \"episode\" of shortness of breath and then he felt some pain in his chest down to his left arm. He was really working hard and going [place] and [place]. His wife tried to take his pulse and it was difficult due to it being rapid and sporadic. Device alert showed persistent Atrial Fibrillation per Device RN. Pt was unaware of this . Will forward to Dr. Vigil.        Dr. Vigil,  Skyler still reporting shortness of breath, most notably yesterday while cleaning up and such at the cabin in the rain. He had a device alert for Afib- this will be routed to you. He reported he did have some chest pain down his left arm that resolved yesterday. Recommendations?  Thanks,  Mal     "

## 2021-05-29 NOTE — TELEPHONE ENCOUNTER
-- Message -----  From: Mary Jane Vigil MD  Sent: 5/28/2019  12:45 PM  To: Nany Forrester, RN    I would not hesitate to start him on Eliquis 5 mg by mouth twice a day.  He needs to take his medications that he missed.  If no better he needs to come into rapid access clinic or possibly atrial fibrillation rapid access clinic.  If he remains in atrial fibrillation for some period of time we would need to do cardioversion in about 3 weeks.  Once again, needs to take anticoagulant as above.  LF        Called Skyler back to discuss recommendations. LVM for him to call back. -[facility] [place] [place]

## 2021-05-29 NOTE — TELEPHONE ENCOUNTER
Received a call back from Skyler. He is feeling better now that he has had a dose of his medications in. He states his pulse feels regular. He refuses RAC clinic or coming in to see LBF sooner than July. He states he is feeling fine and anticipates feeling better once he is back on schedule with his medications. He is agreeable to starting Eliquis. We discussed new medication education. Rx sent to [facility]

## 2021-05-29 NOTE — TELEPHONE ENCOUNTER
Addendum:    Dr. Vigil,  See below device check and the report that patient missed all of his medications Sunday and Monday.  Thanks,  Mal

## 2021-05-29 NOTE — TELEPHONE ENCOUNTER
Remote Report:  Type: Alert for Atrial burden above limit (> 25% of day).   Presenting: Atrial fibrillation with RVs/LVp, ventricular rate average ~100s-110s, intermittent atrial undersensing noted.   Lead/Battery Status: Stable.   Atrial Arrhythmias: Atrial monitoring episodes=24, mean number of mode switching per day=7, total SVT episodes=27.  Atrial burden 0.4% (mean value), mean ventricular heart rate during AT/AF =105bpm.   Vent Arrhythmias: Since 3/25/19, none treated VT/VF detected, one ventricular monitored episode detected, MCL 336ms.   Anticoagulant: None. Per EMR, patient is taking aspirin 81mg daily.   Comments: Appears normal device function. BiV pacing 94%RV and 98% LV. Routed to Device RN for review.   Alerts: None. CAH ADD:     Alert for AF, per logbook AF started 5/27/19 at 17:24 and is ongoing as of 02:00 today (5/28/19), previous events noted on logbook lasting up to 3hrs. See note in Epic. RAMÍREZ    I spoke to Skyler as well. He notes he was away from the monitor this weekend, therefore the diagnostics on his (BIOTRONIK) device are off, he thinks he was probably in AF all day Monday. He isn't sure if he is still in it or not.     He also notes he missed ALL his meds both Sunday and Monday.     He has not taken a blood thinner before. CHADS2-VASc of 4 per Dr. Vigil's note. Unable to tell if he is still in AF without office interrogation or alert from tomorrow. His monitor does not send manual remotes. RAMÍREZ

## 2021-05-30 ENCOUNTER — RECORDS - HEALTHEAST (OUTPATIENT)
Dept: ADMINISTRATIVE | Facility: CLINIC | Age: 76
End: 2021-05-30

## 2021-05-30 VITALS — HEIGHT: 70 IN | WEIGHT: 177 LBS | BODY MASS INDEX: 25.34 KG/M2

## 2021-05-30 NOTE — TELEPHONE ENCOUNTER
"  Patient called back today, states he has been feeling like he is more short of breath and worn out the last couple days. States his activity tolerance has decreased quite a bit, feels like he has been \"working hard all day but I'm just sitting in recliner.\" Denies significant weight gain or notable edema. Just states he \"wiped out.\" States he has company coming for Holiday and has quite a busy week next week, but would really like to get in to see someone \"sooner than later.\" Does not feel like he can wait until 7/17/19 appointments stating \"the way I feel now I could be dead by then.\" If is difficult to say if symptoms correlate with AF events, will need to do full device interrogation in clinic to get more data on this particular model.     He denies chest pain, palpitations, near-syncope. States he hasn't been sleeping well either. Does not sound to be in distress over the phone today. I reviewed with him that it may be tricky to find an opening in [facility] that fits with his schedule and device. Reviewed when he should seek ER. Will route to schedulers to see if there is anything open this week or next with Device RN. He agrees.     Zoie Granado RN    "

## 2021-05-30 NOTE — TELEPHONE ENCOUNTER
----- Message from Mary Jane Vigil MD sent at 7/16/2019 11:28 AM CDT -----  Can you please call this gentleman and review medications with him, he came in with 2 bags full of pills, I strongly suspect noncompliant and he was taking enalapril and Entresto!  I have told him to stop enalapril, hold Entresto for 2 days and then restart.  Had several different doses of carvedilol which are leave at 25 twice daily and Lasix 40 twice daily.  LF          Called patient to go over all his medications. Pt had multiple bottles of the majority of his prescriptions with varying dosages. We went over his list a few times and seem to have it straightened out to the best of ability given this was done over the phone. He did say that he was not taking his spironolactone 12.5 mg twice a day as of today but then backtracked and didn't think he was taking that for awhile. Will address with LBF if that is still required. He will make sure he takes coreg 25 mg twice a day, instead of 37.5 mg twice and day. Rx for lasix on file is 40 mg once a day but he takes it twice a day. Will address with LBF.      Dr. Vigil,  Long convo with Skyler- I think we have it straightened out. His Rx for Furosemide on file is 40 mg once a day but you want it twice daily, correct? This is how he has been taking. He has Spironolactone 12.5 mg twice a day. Unsure if the patient has been taking this or not- he was not positive. I see it listed- should he resume/continue?   Thanks,  Kaveh

## 2021-05-30 NOTE — TELEPHONE ENCOUNTER
----- Message from Minda Nash sent at 7/2/2019  4:10 PM CDT -----  Contact: Pharm  The medication or refill issue is below:    Primary Cardiologist: Dr. Vigil    Medication: Amiodarone (PACERONE) 200 MG tablet     Issue / Concern: Pharmacist has questions regarding Rx Amiodarone. Please call back.      Preferred Pharmacy: [facility] [address] [place] [address] [address] [place] [address] Phone Number for Patient: n/a

## 2021-05-30 NOTE — TELEPHONE ENCOUNTER
"Patient returned call to report \" a few\" episodes of increased heart rate.    He denies lightheadedness, dizziness, shortness of breath and chest pain/pressure.    He continues taking eliquis and all other prescribed medication.  He has no concerns at this time and is on his way up to the cabin for the weekend.    Encouraged patient to stay hydrated and to return the call for prolonged or worsening symptoms. He verbalized understanding and has no concerns at this time.  "

## 2021-05-30 NOTE — PROGRESS NOTES
Assessment/Plan:     No changes to his medications today. He will continue to follow-up with research per protocol for Pemafibrate to Reduce cardiovascular OutcoMes by reducing triglycerides IN patiENts with diabeTes (PROMINENT) clinical trial study.  Reviewed Dr. Vigil note from yesterday and confirmed with patient that he is taking furosemide 40 mg twice a day-med list corrected.  He is unsure about spironolactone.  He wants to call Dr. Vigil nurse to confirm although I showed the note from Dr. Vigil that he should stay on spironolactone 12.5 mg twice a day.    Offer to schedule follow-up appointment with me/ Vera in heart failure clinic in 2 to 3 weeks as recommended by Dr. Vigil but patient declined stating that he understood the changes made by Dr. Vigil's  and he will continue to follow-up with his PCP and Dr. Vigil.    Eduard, research clinician and I both have noticed patient having some difficulties with his medication list and gets frustrated easily. Since he declined HF f/u, offered referral to [facility]TM but he declined.    Blood pressure today is 90/62 and heart rate 58.  Blood pressures improved.  He felt a little lightheaded this morning from low blood sugar which relieved after having sugar tablets.  Subjective:     Ken Doss is 73 years old male with significant past medical history of coronary artery disease with status post PCI to mid LAD and RCA in January 2019, medical noncompliance, hypertriglyceridemia, diabetes type 2, pulmonary fibrosis, paroxysmal atrial fibrillation, and nonischemic cardiomyopathy with chronic systolic heart failure who is seen at [facility] today for Pemafibrate to Reduce cardiovascular OutcoMes by reducing triglycerides IN patiENts with diabeTes (PROMINENT) clinical trial study .    Patient saw Dr. Vigil yesterday and was noted to have some confusion about his med list.  He was recommended to follow-up with heart failure clinic in the  next 2 to 3 weeks.  He has recommended to stop enalapril and hold Entresto for 2 days and then restart.  He also recommended to stay on carvedilol 25 mg twice a day.  He further clarified that he should stay on furosemide 40 mg twice a day and spironolactone 12.5 mg twice a day.    Today, patient tells me that since recent hospitalization, he overall feels decline in his activity level.  He continues to have fatigue which is unchanged from the baseline. He denies lightheadedness, shortness of breath, dyspnea on exertion, orthopnea, PND, palpitations, chest pain, abdominal fullness/bloating and lower extremity edema.  He is unhappy with the cost of Entresto.  He will update Dr. Vigil if interested is not making him feel better with his heart failure symptoms.    His weight has been stable at home between 168 to 172 pounds.  Patient Active Problem List   Diagnosis     Type 2 diabetes mellitus (H)     Depression     Coronary artery disease involving native coronary artery     Paroxysmal Atrial Fibrillation     Biventricular ICD, in situ     Heart failure with reduced ejection fraction (H)     Hypertriglyceridemia     Acute chest pain     Atrial fibrillation with rapid ventricular response (H)     Wide-complex tachycardia (H)     Nonischemic cardiomyopathy (H)     Abnormal chest CT     Pulmonary fibrosis, unspecified (H)     Mediastinal adenopathy       Past Medical History:   Diagnosis Date     Atrial fibrillation (H) 10/29/2014     Chronic systolic heart failure (H) Dec 2012     Coronary artery disease involving native coronary artery     Non obstructive disease by cath in 2007 Cor angio Nov 2016: RCA 70% (FFR 0.89), and OM1 75%        Depressive disorder, not elsewhere classified 10/24/2014     Diabetes mellitus, type II (H) 28/Jan/2013     Dyslipidemia 2007     Fractures     ankle, leg and arm     ICD (implantable cardioverter-defibrillator) lead failure 11/26/2014    High voltage lead tip inserted in RV free wall  RV lead extraction and implantation of the new septal RV lead November 2014      Infectious mononucleosis      Ischemic cardiomyopathy 4/22/2015    Chronic: LVEF 25- 30% LVEF 26% echo May 2017     Kidney stone      LBBB (left bundle branch block)     noted on Dec 19, 2012     Myocardial infarction (H)      S/P ICD (internal cardiac defibrillator) procedure 11/26/2014       Past Surgical History:   Procedure Laterality Date     APPENDECTOMY       CARDIAC CATHETERIZATION N/A 12/12/2016    Procedure: Coronary Angiogram;  Surgeon: Delgado Ramirez MD;  Location: [facility] Cath Lab;  Service:      CV CORONARY ANGIOGRAM N/A 1/15/2019    Procedure: Coronary Angiogram;  Surgeon: Mason Correa MD;  Location: [facility] Cath Lab;  Service: Cardiology     CV LEFT HEART CATHETERIZATION WO LEFT VETRICULOGRAM Left 1/15/2019    Procedure: Left Heart Catheterization Without Left Ventriculogram;  Surgeon: Mason Correa MD;  Location: [facility] Cath Lab;  Service: Cardiology     EP ICD INSERT       CO L HRT CATH W/NJX L VENTRICULOGRAPHY IMG S&I Left 12/12/2016    Procedure: Left Heart Catheterization with Left Ventriculogram;  Surgeon: Delgado Ramirez MD;  Location: [facility] Cath Lab;  Service: Cardiology     CO REMOVE TONSILS/ADENOIDS,<13 Y/O      Description: Tonsillectomy With Adenoidectomy;  Recorded: 06/10/2014;     CO SHLDR ARTHROSCOP,DIAGNOSTIC      Description: Arthroscopy Shoulder;  Recorded: 06/10/2014;     REPLACEMENT TOTAL KNEE      bilat     ROTATOR CUFF REPAIR      right     US LYMPH NODE BIOPSY  7/10/2019       Family History   Problem Relation Age of Onset     Sudden death Mother         unexpected death in her sleep in her 50s       Social History     Socioeconomic History     Marital status:      Spouse name: Not on file     Number of children: Not on file     Years of education: Not on file     Highest education level: Not on file   Occupational History     Not on file   Social  Needs     Financial resource strain: Not on file     Food insecurity:     Worry: Not on file     Inability: Not on file     Transportation needs:     Medical: Not on file     Non-medical: Not on file   Tobacco Use     Smoking status: Light Tobacco Smoker     Types: Cigars     Smokeless tobacco: Never Used     Tobacco comment: cigars few times a year only   Substance and Sexual Activity     Alcohol use: Yes     Comment: 3-4/month     Drug use: No     Sexual activity: Not on file   Lifestyle     Physical activity:     Days per week: Not on file     Minutes per session: Not on file     Stress: Not on file   Relationships     Social connections:     Talks on phone: Not on file     Gets together: Not on file     Attends Buddhism service: Not on file     Active member of club or organization: Not on file     Attends meetings of clubs or organizations: Not on file     Relationship status: Not on file     Intimate partner violence:     Fear of current or ex partner: Not on file     Emotionally abused: Not on file     Physically abused: Not on file     Forced sexual activity: Not on file   Other Topics Concern     Not on file   Social History Narrative     Not on file       Current Outpatient Medications   Medication Sig Dispense Refill     ACCU-CHEK SMARTVIEW TEST STRIP strips        acetaminophen (TYLENOL) 500 MG tablet Take 500 mg by mouth every 6 (six) hours as needed for pain.       apixaban (ELIQUIS) 5 mg Tab tablet Take 1 tablet (5 mg total) by mouth 2 (two) times a day. 120 tablet 2     aspirin 81 MG EC tablet Take 81 mg by mouth daily.       atorvastatin (LIPITOR) 40 MG tablet TAKE 1 TABLET BY MOUTH AT BEDTIME 90 tablet 1     BASAGLAR KWIKPEN U-100 INSULIN 100 unit/mL (3 mL) pen Inject 65 Units under the skin at bedtime.              carvedilol (COREG) 12.5 MG tablet Take 2 tablets (25 mg total) by mouth 2 (two) times a day with meals. 120 tablet 0     clopidogrel (PLAVIX) 75 mg tablet TAKE 1 TABLET BY MOUTH ONCE  DAILY 90 tablet 1     coenzyme Q10 (COQ-10) 100 mg capsule Take 1 capsule (100 mg total) by mouth daily.  0     digoxin (LANOXIN) 125 mcg tablet Take 1 tablet (125 mcg total) by mouth daily with supper. 60 tablet 0     furosemide (LASIX) 40 MG tablet Take 1 tablet (40 mg total) by mouth daily. (Patient taking differently: Take 40 mg by mouth 2 (two) times a day at 9am and 6pm.       ) 190 tablet 1     glucosamine-chondroitin 500-400 mg cap Take 1 capsule by mouth daily. 1500mg/1200mg once daily       metFORMIN (GLUCOPHAGE) 1000 MG tablet Take 1,000 mg by mouth 2 (two) times a day with meals.       multivitamin therapeutic (THERAGRAN) tablet Take 1 tablet by mouth daily.       sacubitril-valsartan (ENTRESTO) 24-26 mg Tab tablet Take 1 tablet by mouth 2 (two) times a day. 60 tablet 0     Study Drug pemafibrate 0.2 mg/placebo (PROMINENT) tablet Take 0.2 mg by mouth 2 (two) times a day.       magnesium oxide (MAG-OX) 400 mg (241.3 mg magnesium) tablet Take 1 tablet (400 mg total) by mouth 2 (two) times a day for 7 days.  0     spironolactone (ALDACTONE) 25 MG tablet Take 0.5 tablets (12.5 mg total) by mouth 2 (two) times a day at 9am and 6pm. 90 tablet 3     Current Facility-Administered Medications   Medication Dose Route Frequency Provider Last Rate Last Dose     Study Drug pemafibrate 0.2 mg/placebo tablet 0.2 mg (PROMINENT)  0.2 mg Oral BID Eduard Tang RN           No Known Allergies    Objective:     Vitals:    07/17/19 0738   BP: 90/62   Pulse: (!) 58   Resp: 16     Wt Readings from Last 3 Encounters:   07/17/19 172 lb 11.2 oz (78.3 kg)   07/16/19 170 lb (77.1 kg)   07/12/19 162 lb 14.4 oz (73.9 kg)     ROS:  Negative unless noted in HPI    General Appearance:   A & O, cooperative and in no acute distress.   HEENT:   No scleral icterus; the mucous membranes were pink and moist. Cervical lymph nodes nontender and nonpalpable.   Neck: JVP normal. No HJR. Thyroid symmetry with no mass or tenderness noted    Chest: The spine was straight. The chest was symmetric   Lungs:   Respirations unlabored; the lungs are clear to auscultation.   Cardiovascular:   S1 and S2 without murmur, clicks or rubs. Radial, carotid and posterior tibial pulses are intact and symmetrical.  No carotid bruits noted   Abdomen:  Soft, nontender, nondistended, bowel sounds present   Extremities: No cyanosis, clubbing, or edema. Strength 5/5 bilateral UE & LE   Skin: No bruising or wounds   Neurologic: Mood and affect are appropriate. No paresthesia noted             Arcelia Ring [facility]   Heart Failure Clinic

## 2021-05-30 NOTE — PROGRESS NOTES
Device interrogation and chart reviewed.  At this point the patient seems to have had significant clinical improvement following his hospitalization with improved rate control of his newly persistent atrial fibrillation.  At best I would quote him a 50% ablation success rate for controlling his atrial fibrillation given his comorbid conditions.  He would be a potential candidate for AV node ablation to provide complete rate control as he already has a normally functioning LV lead in place.  I would suggest that he see the EP nurse practitioner in the device clinic in 2-4 weeks to further discuss these options.

## 2021-05-30 NOTE — TELEPHONE ENCOUNTER
----- Message from Atilio Dang sent at 7/9/2019  8:44 AM CDT -----  Contact: Pt  General phone call:    Caller: Pt    Primary cardiologist: Dr Vigil    Detailed reason for call: Pt states he has apt next week but not sure he will make it til then.  Pt having.  shortness of breath, difficulty sleeping, lethargic.     New or active symptoms? Ongoing    Best phone number: [phone]    Best time to contact: any    Ok to leave a detailed message? Yes    Device? YES    Additional Info: Pt would like recommendation.

## 2021-05-30 NOTE — TELEPHONE ENCOUNTER
--- Message -----  From: Mary Jane Vigil MD  Sent: 7/16/2019   4:52 PM  To: Nany Forrester, RN    Thanks for looking into this, I would like him on the furosemide 40 twice daily which he told me he was doing take in the afternoon dose more in the afternoon, and the Aldactone 12-1/2 twice a day, and tell her we may lower the dose of these.  The main thing was no Entresto and enalapril at the same time, discontinue the enalapril hold Entresto for 2 days and restart.  LF        Medication list updated previously updated. Pt reports that he thinks he has it straight. He was a little short this morning he reports because he had a low blood sugar. He has since had breakfast and feels better. He needs a refill of spirolactone and entresto. He states that Entresto is quite spendy for him- over 150 dollars per month. He wants to know if this is 100% necessary. It was prescribed to him when he was last hospitalized 7/9-7/12 by a covering cardiologist. Writer spoke with the patient back in May, where he refused Entresto then due to cost. He will pay for it now but may call back in the future if this becomes burdensome to him. Refills sent to [facility] per his request. -[facility] [place]

## 2021-05-30 NOTE — PROGRESS NOTES
Type: CRT-D alert remote transmission for long AT/AF.  Presenting rhythm: unavailable.  Battery/lead status: stable.  Arrhythmias: since 7/16/19; AF burden 100%(up from 7%), current episode in progress for 8 days, overall ventricular rates controlled. No VT/VF detected.  Anticoagulant: Eliquis.   Comments: appears to be normal ICD function. Pacing: RV 4%, LV 96%. Routed to device RN for review.  Device/lead alerts: none. TOM     Transmission reviewed, known PAF. Current episode on progress since device last cleared on 7/16/19 in clinic. V-rates controlled but true BiV pacing (RV/LV) is down to 4%, due to intrinsic rate interference. LRL on device set at 50 bpm, and average v-rates while in AF are ~90s.   CRT (LV only- trigger pacing) is at 96%.      Call placed to patient to review/assess, LVM for callback.    Per last OV notes Dr. Vigil is monitoring AF episodes/duration. Next OV is on 8/13/19 with Arcelia MATUTE CNP/Device RN.     Will forward device findings to Dr. Vigil.     Zoie Granado, RN

## 2021-05-30 NOTE — PROGRESS NOTES
Pemafibrate to Reduce cardiovascular OutcoMes by reducing triglycerides IN patiENts with diabeTes (PROMINENT) clinical trial.    ADVERSE EVENT Description: Subject [id number] went to the ED yesterday with complaints of chest pain, increased dyspnea on exertion , abdominal pain, weakness, and recent dental infection.  He was put in observation status but then admitted today at [facility].  He recently had a coronary stent in January and his device showed A-fib with rvr and was given amiodarone last week.  He is having a pulmonology work up today and his care team is continuing to look into etiology.    Adverse Event:   Worsening atrial fibrillation with rapid ventricular response  mdn [id number]  Serious:  [x] Yes   [] No  Reportable to IRB:   [] Yes   [x] No  Severity (mild/moderate/severe): severe   Date of Awareness: 10Jul2019  Start Date: 26Jun2019 - records report chest pain started 2 weeks ago, but also note his symptoms started 3 days ago.  Presented 09Jul2019 and was admitted 10Jul2019  End Date:   12jul2019  mdn [id number]   Action taken with study treatment:  [x] Dose Not Changed  [] Dose Increased  [] Dose Reduced   [] Drug interrupted              [] Drug withdrawal   [] Not applicable   [] Unknown  Outcome:   []Not Recovered/Not Resolved  [x]Recovered/Resolved  []Recovered/Resolved with Sequelae - specify   []Recovering/Resolving  []Unknown   []Fatal  Medication or therapies taken:  [x] Yes   [] No    Dr. Vigil: Please assign causality of above AE  Related to study drug?     [] Yes   [x] No

## 2021-05-30 NOTE — PATIENT INSTRUCTIONS - HE
Mr Ken Doss,  I enjoyed visiting with you again today.  I am glad to hear you are doing well.  Per our conversation stop the ENALAPRIL and hold the ENTRESTO for 2 days and then restart the ENTRESTO two times a day.  Take only the CARVEDILOL 25 mg two times a day.  I will plan on you seeing nurses in 3 weeks with a pacer check.  Alex Vigil

## 2021-05-30 NOTE — PROGRESS NOTES
In clinic device check with Device RN followed by office visit with Dr. Vigil.  Please see link for full device report.  Patient was informed of results and next follow up during today's visit.

## 2021-05-30 NOTE — TELEPHONE ENCOUNTER
-- Message -----  From: Mary Jane Vigil MD  Sent: 7/2/2019   1:22 PM  To: Zoie Granado, ELVIRA, Nany Forrester, RN    Can we get him amio 200 two times a day, will need k, alt, ast, tsh and pft.  LF  ----- Message -----  From: Zoie Granado RN  Sent: 7/2/2019  12:37 PM  To: Mary Jane Vigil MD, Nany Forrester, RN    Hi Dr. Vigil,    I did mention that maybe he should go to ER for evaluation but he was not too keen on that idea because of company and also states he has so much going on again next week? He has a biotronik device so it is so difficult to see what is actually going on via remote on these devices. I could have him start Amio and then get him in for device check next avail.   Unfortunately, it sounds like Next possible RAC isn't until Monday  ~Zoie       ----- Message -----  From: Mary Jane Vigil MD  Sent: 7/2/2019  12:20 PM  To: Zoie Granado RN    Thanks for keeping me in the loop.  Given his profoundly low ejection fraction and recurrent atrial fibrillation would recommend starting amiodarone 200 mg twice a day.  Agree with reprogramming device, possibly the best way to get all this done would be if he presented to an emergency room.  If because of his company he does not, can restart the amiodarone and plan on device reprogramming next week?  LF  ----- Message -----  From: Zoie Granado RN  Sent: 7/2/2019  12:10 PM  To: Mary Jane Vigil MD, *    ----- Message from Zoie Granado RN sent at 7/2/2019 12:10 PM CDT -----  Device Schedulers, I am not sure if there is any possibility of RAC visit yet this week/or early next week? He needs a device RN appt too for reprogramming.     Dr. Vigil: just an YARIEL

## 2021-05-30 NOTE — TELEPHONE ENCOUNTER
"See telephone encounter 7/1/19        Called patient back to address his concerns. Pt had a device check 7/1/19- see documentation there with device RN and LBF. Pt was noted to have increase AF burden and was symptomatic with shortness of breath and decreased activity tolerance. He was advised to go to the ER and did require device reprogramming. He had refused. It was advised he start Amiodarone in the interim and have a RAC appt 7/8/19 which also did not work with him due to the holiday and family in [facility]. He called in today because he reports he is still short of breath and \"might not make it to his appt next week with LBF\". He says he also had an abscessed tooth during all of this and is wiped out from this. He says even walking a short distance, he is completely wiped out. Informed patient that based on last documentation, he was advised to go to the ER should his symptoms persist or worsen. He has not started Amiodarone. He says all his pills \"look the same and he is mixed up\". Informed patient that he should go to the ER and that LBF would be notified. He will go to [facility].      Dr. Vigil,  Skyler is still feeling poorly- see telephone encounter from 7/1/19. I advised him to go the ER- . He never did start Amio. Thanks,  Mal   "

## 2021-05-30 NOTE — TELEPHONE ENCOUNTER
FYI: Patient has not returned my calls to clinic, he is not aware of need to start new medication and has not confirmed that RAC opening will work. I have 2 VMs out to patient, please defer to me if he should call back.   Zoie Granado RN

## 2021-05-30 NOTE — PATIENT INSTRUCTIONS - HE
It was great to see you again today for the Prominent triglyceride study.  We will call you in 2 months for a study telephone visit.    We will also see you back in 4 months for your Visit 15 which is a NON- fasting visit.  Please remember to bring back your study drug and packages (both empty or full) to every study visit.  Inform us of any changes in your health and call with any questions.  Thanks!    Research Hotline: [phone]  Eduard Tang RN  Clinical Trials Nurse

## 2021-05-30 NOTE — PROGRESS NOTES
RESPIRATORY CARE NOTE     Patient Name: Ken Doss  Today's Date: 7/23/2019     Complete PFT done. Pt performed tests with good effort. Test results meet ATS criteria. Post FVC maneuver contained coughing, which may affect the validity of FEV1. Results scanned into epic. Pt left in no distress.       WILMER BarajasT

## 2021-05-30 NOTE — PROGRESS NOTES
[facility] Follow-up Note    Assessment & Plan        1. Coronary artery disease involving native coronary artery of native heart without angina pectoris -coronary angiography January 2019 showed 20% left main disease, mid LAD 80% lesion that received rotational atherectomy as well as drug-coated stent, circumflex was normal, right coronary artery had a proximal 99% lesion that received a drug-coated stent.  Plavix until January 2020 and then indefinite aspirin.   2. Nonischemic cardiomyopathy (H)-ejection fraction 60 to 19% based on nuclear and echo with most recent ejection fraction 20 to 25%.  Was hospitalized due to heart failure and is being seen post discharge doing well.  On Entresto, he was on Vasotec as well and holding Vasotec and hold Entresto for 2 days and just restarted Entresto not Vasotec.  On carvedilol uncertain dose but make sure he is on 25 twice a day.  Does have a CRT-D in place.  97% by V pacing.   3. Chronic systolic heart failure (H) -no signs or symptoms currently following hospitalization and on Lasix 40 mg twice a day.   4. Medical noncompliance- somewhat confused, taking enalapril as well as Entresto.  Has a bag full of pills with numerous pill bottles.  I reviewed these with him but also will have my nurse review as well.   5. Hypertriglyceridemia -LDL 69 which is excellent from February this year.   6. Type 2 diabetes mellitus with complication, with long-term current use of insulin (H) -hemoglobin A1c 8.6 and defer to primary.   7. Pulmonary fibrosis, unspecified (H) -no amiodarone, looks like granulomatous disease and defer to pulmonary.   8. Paroxysmal atrial fibrillation (H) -symptomatic and that had caused him to go into heart failure.  Amiodarone unable to be used secondary to pulmonary fibrosis.  Recheck device in several weeks now that he is on digoxin and carvedilol and if significant atrial fibrillation seen might need to consider sotalol.  Consider also  ablation.  On Eliquis.     Plan  1.  Discontinue enalapril.  2.  Hold Entresto for 2 days and restart.  3.  Keep carvedilol 25 twice a day.  4.  Return to heart failure nurse practitioners for general education in 2 to 3 weeks and device check at that time.  5.  Significant atrial fibrillation seen consider sotalol 40 twice a day.  6.  Follow-up with me thereafter.    Subjective  CC: 73-year-old white gentleman being seen post hospital discharge visit.  He is a little fatigued and lightheaded on getting up, otherwise no chest discomfort, palpitations, shortness of breath, PND, orthopnea or peripheral edema.  He tells me he feels better, a little hesitant to do more activity outside, and is going to retire as a .    Medications  Current Outpatient Medications   Medication Sig Note     ACCU-CHEK SMARTVIEW TEST STRIP strips       acetaminophen (TYLENOL) 500 MG tablet Take 500 mg by mouth every 6 (six) hours as needed for pain.      apixaban (ELIQUIS) 5 mg Tab tablet Take 1 tablet (5 mg total) by mouth 2 (two) times a day.      aspirin 81 MG EC tablet Take 81 mg by mouth daily.      atorvastatin (LIPITOR) 40 MG tablet TAKE 1 TABLET BY MOUTH AT BEDTIME      BASAGLAR KWIKPEN U-100 INSULIN 100 unit/mL (3 mL) pen Inject 65 Units under the skin at bedtime.             carvedilol (COREG) 12.5 MG tablet Take 2 tablets (25 mg total) by mouth 2 (two) times a day with meals.      clopidogrel (PLAVIX) 75 mg tablet TAKE 1 TABLET BY MOUTH ONCE DAILY      coenzyme Q10 (COQ-10) 100 mg capsule Take 1 capsule (100 mg total) by mouth daily.      digoxin (LANOXIN) 125 mcg tablet Take 1 tablet (125 mcg total) by mouth daily with supper.      furosemide (LASIX) 40 MG tablet Take 1 tablet (40 mg total) by mouth daily.      glucosamine-chondroitin 500-400 mg cap Take 1 capsule by mouth daily. 1500mg/1200mg once daily      HYDROcodone-acetaminophen (NORCO )  mg per tablet Take 1 tablet by mouth every 6 (six)  "hours.      metFORMIN (GLUCOPHAGE) 1000 MG tablet Take 1,000 mg by mouth 2 (two) times a day with meals.      multivitamin therapeutic (THERAGRAN) tablet Take 1 tablet by mouth daily.      sacubitril-valsartan (ENTRESTO) 24-26 mg Tab tablet Take 1 tablet by mouth 2 (two) times a day.      spironolactone (ALDACTONE) 25 MG tablet Take 0.5 tablets (12.5 mg total) by mouth 2 (two) times a day at 9am and 6pm.      Study Drug pemafibrate 0.2 mg/placebo (PROMINENT) tablet Take 0.2 mg by mouth 2 (two) times a day. 7/9/2019: Patient thinks he has taken this today but is unsure.     magnesium oxide (MAG-OX) 400 mg (241.3 mg magnesium) tablet Take 1 tablet (400 mg total) by mouth 2 (two) times a day for 7 days.        Objective  BP (!) 80/46 (Patient Site: Right Arm, Patient Position: Sitting, Cuff Size: Adult Regular)   Pulse 68   Resp 16   Ht 5' 10\" (1.778 m)   Wt 170 lb (77.1 kg)   BMI 24.39 kg/m      General Appearance:    Alert, cooperative, no distress, appears stated age   Head:    Normocephalic, without obvious abnormality, atraumatic   Throat:   Lips, mucosa, and tongue normal; teeth and gums normal   Neck:   Supple, symmetrical, trachea midline, no adenopathy;        thyroid:  No enlargement/tenderness/nodules; no carotid    bruit or JVD   Back:     Symmetric, no curvature, ROM normal, no CVA tenderness   Lungs:     Clear to auscultation bilaterally, respirations unlabored   Chest wall:    No tenderness, left-sided pacemaker/ICD noted   Heart:    Regular rate and rhythm, S1 and S2 normal, no murmur, rub   or gallop   Abdomen:     Soft, non-tender, bowel sounds active all four quadrants,     no masses, no organomegaly   Extremities:   Normal, atraumatic, no cyanosis or edema   Pulses:   2+ and symmetric all extremities   Skin:   Skin color, texture, turgor normal, no rashes or lesions     Results    Lab Results personally reviewed   Lab Results   Component Value Date    CHOL 140 02/14/2019    CHOL 110 01/15/2019 "     Lab Results   Component Value Date    HDL 27 (L) 02/14/2019    HDL 30 (L) 01/15/2019     Lab Results   Component Value Date    LDLCALC 69 02/14/2019    LDLCALC 45 01/15/2019     Lab Results   Component Value Date    TRIG 222 (H) 02/14/2019    TRIG 173 (H) 01/15/2019     Lab Results   Component Value Date    WBC 9.1 07/11/2019    HGB 10.4 (L) 07/11/2019    HCT 30.5 (L) 07/11/2019     07/11/2019     Lab Results   Component Value Date    CREATININE 0.97 07/11/2019    BUN 31 (H) 07/11/2019     07/11/2019    K 4.1 07/11/2019    CO2 22 07/11/2019     Review of Systems:   General: WNL  Eyes: WNL  Ears/Nose/Throat: WNL  Lungs: WNL  Heart: WNL  Stomach: WNL  Bladder: WNL  Muscle/Joints: WNL  Skin: WNL  Nervous System: WNL  Mental Health: WNL     Blood: WNL

## 2021-05-30 NOTE — TELEPHONE ENCOUNTER
----- Message from Minda Nash sent at 7/19/2019 10:15 AM CDT -----  Contact: PATIENT  Caller: Ken    Primary cardiologist: Dr. Quintero    Detailed reason for call: Please call back, pt having symptoms and Rx question.    Best phone number: [phone]    Best time to contact: Today    Ok to leave a detailed message? Yes    Device? Yes

## 2021-05-30 NOTE — PROGRESS NOTES
Pulmonary Clinic Follow-up Visit    Impression: 73 male with history of CAD s/p PCI/stents in Jan 2019, afib on eliquis, NICM EF 25%, DM2, atrial fibrillation on NOAC, recent admission for afib and CHF exacerbation. Found to have peripheral fibrosis and hilar/mediastinal adenopathy. infectious and autoimmune workup has been negative. FNA of an abnormal left supraclavicular node showed non-necrotizing granulomas possibly related to anthracosis/pneumoconiosis vs. Processing artifact vs. Other environmental exposure. PFT's showed very mild restriction and moderate diffusion impairment. 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 Symptomatically he is doing better since his hospitalization and really doesn't have significant breathing limitations. PET/CT and biopsy findings are not consistent with lymphoproliferative disease or other malignancy. I do not think additional invasive procedure such as bronchoscopy with EBUS is going to yield much more information at this point and in the absence of significant symptoms would not recommend any specific treatment.     Plan:  - encouraged him to remain active and continue to exercise as able  - follow up in 6 months with repeat CT of the chest  - he'll call if he has worsening shortness of breath, cough or hemoptysis. Consideration for trial of oral steroids if develops any of the aforementioned symptoms.  - advised him to avoid toxic fumes/inhalational exposures especially with regards to the fire put and asbestos exposures.      All questions answered. Follow up in 6 months as above.     CCx: hospital discharge follow up    HPI: Interim history: I last saw Skyler on 7/11 while he was in the hospital. He was hospitalized at [facility] from 7/9 - 7/12 for chest pain and atrial fibrillation with RVR as well as CHF exacerbation. He was seen by  cardiology and cardiac medications were optimized.  A CT chest revealed worsening ILD and hilar/mediastinal adenopathy. We did a biopsy of an abnormal cervical lymph node which was essentially non-diagnostic but did show some rare foci of non-necrotizing gramulomatous inflammation which could be due to anthracosis/envirionmental exposures. Since that time, he reports he's generally been doing well. He is really not having much shortness of breath since his heart issues were addressed in the hospital.  He is able to do all his ADL's without breathing limitation.   He is able to walk up the steep hill to his house in [place] Without shortness of breath.   Denies fevers, chills, chest pressure, palpitations, peripheral edema, unintentional weight loss.  He does have a wood-burning stove and has a fire pit that he uses regularly outside.   He did work in construction.    ROS:  A 12-system review was obtained and was negative with the exception of the symptoms endorsed in the history of present illness.    PMH:  Past Medical History:   Diagnosis Date     Atrial fibrillation (H) 10/29/2014     Chronic systolic heart failure (H) Dec 2012     Coronary artery disease involving native coronary artery     Non obstructive disease by cath in 2007 Cor angio Nov 2016: RCA 70% (FFR 0.89), and OM1 75%        Depressive disorder, not elsewhere classified 10/24/2014     Diabetes mellitus, type II (H) 28/Jan/2013     Dyslipidemia 2007     Fractures     ankle, leg and arm     ICD (implantable cardioverter-defibrillator) lead failure 11/26/2014    High voltage lead tip inserted in RV free wall RV lead extraction and implantation of the new septal RV lead November 2014      Infectious mononucleosis      Ischemic cardiomyopathy 4/22/2015    Chronic: LVEF 25- 30% LVEF 26% echo May 2017     Kidney stone      LBBB (left bundle branch block)     noted on Dec 19, 2012     Myocardial infarction (H)      S/P ICD (internal cardiac defibrillator)  procedure 11/26/2014       PSH:  Past Surgical History:   Procedure Laterality Date     APPENDECTOMY       CARDIAC CATHETERIZATION N/A 12/12/2016    Procedure: Coronary Angiogram;  Surgeon: Delgado Ramirez MD;  Location: [facility] Cath Lab;  Service:      CV CORONARY ANGIOGRAM N/A 1/15/2019    Procedure: Coronary Angiogram;  Surgeon: Mason Correa MD;  Location: [facility] Cath Lab;  Service: Cardiology     CV LEFT HEART CATHETERIZATION WO LEFT VETRICULOGRAM Left 1/15/2019    Procedure: Left Heart Catheterization Without Left Ventriculogram;  Surgeon: Mason Correa MD;  Location: [facility] Cath Lab;  Service: Cardiology     EP ICD INSERT       OH L HRT CATH W/NJX L VENTRICULOGRAPHY IMG S&I Left 12/12/2016    Procedure: Left Heart Catheterization with Left Ventriculogram;  Surgeon: Delgado Ramirez MD;  Location: [facility] Cath Lab;  Service: Cardiology     OH REMOVE TONSILS/ADENOIDS,<11 Y/O      Description: Tonsillectomy With Adenoidectomy;  Recorded: 06/10/2014;     OH SHLDR ARTHROSCOP,DIAGNOSTIC      Description: Arthroscopy Shoulder;  Recorded: 06/10/2014;     REPLACEMENT TOTAL KNEE      bilat     ROTATOR CUFF REPAIR      right     US LYMPH NODE BIOPSY  7/10/2019       Allergies:  No Known Allergies    Family HX:  Family History   Problem Relation Age of Onset     Sudden death Mother         unexpected death in her sleep in her 50s       Social Hx:  Social History     Socioeconomic History     Marital status:      Spouse name: Not on file     Number of children: Not on file     Years of education: Not on file     Highest education level: Not on file   Occupational History     Not on file   Social Needs     Financial resource strain: Not on file     Food insecurity:     Worry: Not on file     Inability: Not on file     Transportation needs:     Medical: Not on file     Non-medical: Not on file   Tobacco Use     Smoking status: Light Tobacco Smoker     Types: Cigars      Smokeless tobacco: Never Used     Tobacco comment: cigars few times a year only   Substance and Sexual Activity     Alcohol use: Yes     Comment: 3-4/month     Drug use: No     Sexual activity: Not on file   Lifestyle     Physical activity:     Days per week: Not on file     Minutes per session: Not on file     Stress: Not on file   Relationships     Social connections:     Talks on phone: Not on file     Gets together: Not on file     Attends Voodoo service: Not on file     Active member of club or organization: Not on file     Attends meetings of clubs or organizations: Not on file     Relationship status: Not on file     Intimate partner violence:     Fear of current or ex partner: Not on file     Emotionally abused: Not on file     Physically abused: Not on file     Forced sexual activity: Not on file   Other Topics Concern     Not on file   Social History Narrative     Not on file       Current Meds:  Current Outpatient Medications   Medication Sig Dispense Refill     ACCU-CHEK SMARTVIEW TEST STRIP strips        acetaminophen (TYLENOL) 500 MG tablet Take 500 mg by mouth every 6 (six) hours as needed for pain.       apixaban (ELIQUIS) 5 mg Tab tablet Take 1 tablet (5 mg total) by mouth 2 (two) times a day. 120 tablet 2     aspirin 81 MG EC tablet Take 81 mg by mouth daily.       atorvastatin (LIPITOR) 40 MG tablet TAKE 1 TABLET BY MOUTH AT BEDTIME 90 tablet 1     BASAGLAR KWIKPEN U-100 INSULIN 100 unit/mL (3 mL) pen Inject 65 Units under the skin at bedtime.              carvedilol (COREG) 12.5 MG tablet Take 2 tablets (25 mg total) by mouth 2 (two) times a day with meals. 120 tablet 0     clopidogrel (PLAVIX) 75 mg tablet TAKE 1 TABLET BY MOUTH ONCE DAILY 90 tablet 1     coenzyme Q10 (COQ-10) 100 mg capsule Take 1 capsule (100 mg total) by mouth daily.  0     digoxin (LANOXIN) 125 mcg tablet Take 1 tablet (125 mcg total) by mouth daily with supper. 60 tablet 0     edoxaban tosylate (STUDY DRUG EDOXABAN 30 MG  "<ENVISAGE-AMANDA AF>) tablet Take by mouth daily.       furosemide (LASIX) 40 MG tablet Take 1 tablet (40 mg total) by mouth daily. (Patient taking differently: Take 40 mg by mouth 2 (two) times a day at 9am and 6pm.       ) 190 tablet 1     glucosamine-chondroitin 500-400 mg cap Take 1 capsule by mouth daily. 1500mg/1200mg once daily       metFORMIN (GLUCOPHAGE) 1000 MG tablet Take 1,000 mg by mouth 2 (two) times a day with meals.       multivitamin therapeutic (THERAGRAN) tablet Take 1 tablet by mouth daily.       sacubitril-valsartan (ENTRESTO) 24-26 mg Tab tablet Take 1 tablet by mouth 2 (two) times a day. 180 tablet 0     spironolactone (ALDACTONE) 25 MG tablet Take 0.5 tablets (12.5 mg total) by mouth 2 (two) times a day at 9am and 6pm. 90 tablet 3     Study Drug pemafibrate 0.2 mg/placebo (PROMINENT) tablet Take 0.2 mg by mouth 2 (two) times a day.       Current Facility-Administered Medications   Medication Dose Route Frequency Provider Last Rate Last Dose     Study Drug pemafibrate 0.2 mg/placebo tablet 0.2 mg (PROMINENT)  0.2 mg Oral BID Eduard Tang, RN           Physical Exam:  /68   Pulse 76   Resp 20   Ht 5' 10\" (1.778 m)   Wt 168 lb 1.6 oz (76.2 kg)   SpO2 96% Comment: RA  BMI 24.12 kg/m    Gen: awake, alert, oriented, no distress  HEENT: nasal turbinates are unremarkable, no oropharyngeal lesions, no cervical or supraclavicular lymphadenopathy  CV: RRR, no M/G/R  Resp: very slight bibasilar crackles, good air movement no wheezing or rhonchi.   Abd: soft, nontender, no palpable organomegaly  Skin: no apparent rashes  Ext: no cyanosis, clubbing or edema  Neuro: alert, nonfocal    Labs:  Reviewed  Lorie-1, SSB/SSA, CARA< CCP, RF, Scl-70 all negative  hypersens pneumonitis panel 1 neg  ACE level 36  Aldolase wnl    Left supraclavicular node bx  LEFT SUPRACLAVICULAR LYMPH NODE, NEEDLE BIOPSY:     -   REACTIVE PARACORTICAL HYPERPLASIA     -   RARE FOCI OF NONNECROTIZING GRANULOMATOUS " INFLAMMATION, ASSOCIATED WITH PIGMENT     -   GMS STAIN FOR FUNGI IS NEGATIVE     -   AURAMINE/RHODAMINE AND KINYOUN STAINS FOR ACID-FAST BACILLI ARE NEGATIVE     -   RARE SCATTERED EOSINOPHILS AND NEUTROPHILS ARE IDENTIFIED     -   NEGATIVE FOR CARCINOMA     -   NO EVIDENCE OF MALIGNANT LYMPHOPROLIFERATIVE DISORDER     -   PLEASE SEE COMMENT   Electronically signed by Franki Merritt MD on 7/11/2019 at 1905   Comment     The morphologic findings are most consistent with a reactive/inflammatory process. The rare microgranulomas are nonnecrotizing, and they are associated with foreign pigment, possibly related to anthracosis, processing artifact (e.g., formalin), or other foreign substance. Recommend clinical correlation and follow up.      LN flow negative for lymphoproliferative disorder.    Imaging studies:  CTA chest July 2019  IMPRESSION:   CONCLUSION:  1.  No aortic dissection or acute vascular pathology.  2.  New mediastinal and hilar lymphadenopathy may relate to lymphoproliferative disease or metastatic nodes. Sarcoid also possible. There are small nodes in the left supraclavicular region, as well. Recommend follow-up PET/CT is indicated.  3.  Diffuse reticular interstitial prominence within lung parenchyma most suggestive of developing pulmonary fibrosis.    PET/CT   IMPRESSION:   CONCLUSION:  1.  Mildly FDG avid bilateral symmetric hilar and mediastinal lymphadenopathy most likely reactive and could represent sarcoidosis.  2.  Minimally FDG avid peripheral reticular opacities in the lung should be inflammatory, possibly fibrotic.    Echo March 2019    When compared to the previous study dated 5/23/2017, no significant change.    Left ventricle ejection fraction is severely decreased. The estimated left ventricular ejection fraction is 20-25%.    Normal right ventricular size and systolic function.    Mild to moderate mitral regurgitation    Device lead in right heart chambers    PFT's  7/23/2019  The  spirometry was performed with adequate reproducibility.  The flow volume loop is essentially normal.     FEV1 is 2.74 L (91% predicted) and is normal.  FVC is 3.26 L (82% predicted) and is normal.  FEV1/FVC is 84% and is normal.     There was no improvement in spirometry after a single inhaled dose of bronchodilator.  Of note, patient did cough during the FVC maneuver following bronchodilator administration, which may affect the true values of this study.       TLC is 4.99 L (72% predicted) and is mildly reduced.  RV is 1.83 L (68% predicted) and is normal.     DLCO is 15.59 mL/min/mmHg (62% predicted) and is normal when it is corrected for hemoglobin.     Impression:  This pulmonary function study is mildly abnormal.  Spirometry is normal and there is no bronchodilator response.  Total lung capacity is mildly reduced.  The diffusion capacity, when corrected for hemoglobin, is normal.      Luis Manuel (Sarah Catalan MD  [facility]  Pager [phone]  Clinic [phone]

## 2021-05-30 NOTE — TELEPHONE ENCOUNTER
Pharmacy concerned about amiodarone dose and simvastatin interaction. Pt will be seen 7/8 and dose reevaluated.

## 2021-05-30 NOTE — TELEPHONE ENCOUNTER
--- Message -----  From: Mary Jane Vigil MD  Sent: 7/2/2019   3:53 PM  To: Nany Forrester RN    Can start amiodarone now, 200 twice a day, can be seen by racking at that time draw blood work and schedule PFTs.    ----- Message -----  From: Nany Forrester RN  Sent: 7/2/2019   2:51 PM  To: Zoie Granado RN, Mary Jane Vigil MD    Also in addition to timing of labs- we are getting in [facility] hopefully Monday. Should [facility] MD address further Amiodarone dosing and how long patient should stay on this current dose?   Thanks,  Kaveh NEAL RN placed orders for labs- which can be done at [facility] appt Monday and PFT's be to scheduled at that time. Order placed for Amiodarone 200 mg twice a day and sent to pharmacy on file. Message sent to Beryl for Amiodarone Monitoring program. -[employer] [facility] [employer]

## 2021-05-30 NOTE — PROGRESS NOTES
Pemafibrate to Reduce cardiovascular OutcoMes by reducing triglycerides IN patiENts with diabeTes (PROMINENT) clinical trial.    ADVERSE EVENT Description: Subject diagnosed with likely pulmonary fibrosis during hospital admission for a-fib induced heart failure symptoms. Following with pulmonology.    Adverse Event: Pulmonary fibrosis  Serious:  [] Yes   [x] No  Reportable to IRB:   [] Yes   [x] No  Severity (mild/moderate/severe): moderate   Date of Awareness: 23Jul2019  Start Date: 10jul2019  End Date: ongoing  Action taken with study treatment:  [x] Dose Not Changed  [] Dose Increased  [] Dose Reduced   [] Drug interrupted              [] Drug withdrawal   [] Not applicable   [] Unknown  Outcome:   [x]Not Recovered/Not Resolved  []Recovered/Resolved  []Recovered/Resolved with Sequelae - specify   []Recovering/Resolving  []Unknown   []Fatal  Medication or therapies taken:  [] Yes   [x] No    Dr. Vigil: Please assign causality of above AE  Related to study drug?     [] Yes   [x] No          Subject also returned study boxes that he forgot for the visit last week:  Boxes:   [id number] with 0 tabs  [id number] with 0 tabs  [id number] with 0 tabs    [id number] with 58 tabs for 89% compliance.  Will continue to reinforce compliance.

## 2021-05-30 NOTE — TELEPHONE ENCOUNTER
-- Message -----  From: Mary Jane Vigil MD  Sent: 7/9/2019   9:23 AM  To: Nany Forrester, ELVIRA    Would recommend starting amiodarone, incidentally I look at my schedule and do not have a patient to 1030 so he can get here this morning the next 15 to 20 minutes we can see him but sounds like he will be admitted in any event.  He also needs BNP, BMP and most likely IV diuretic.  LF      Pt advised to go to ER previously as admission is likely in any event.

## 2021-05-30 NOTE — TELEPHONE ENCOUNTER
Pt was called, corresponding information/recommendations reviewed, verbalized understanding, has no further questions at this time, contact information was given for further concerns/questions and scheduling notified to contact pt   Briefly discussed recommendations below, pt understands further detailed discussion and options would be dicussed at his consult with RIP NP   7/30/2019 12:20 PM  Padmini Sawyer RN

## 2021-05-30 NOTE — PROGRESS NOTES
Pemafibrate to Reduce cardiovascular OutcoMes by reducing triglycerides IN patiENts with diabeTes (PROMINENT) clinical trial.    Subject seen in clinic today for study Visit 15.      Subject seen by provider Arcelia Ring CNP for study related physical exam.  See provider note and flow sheets for vital signs.    Waist measures 94 cm    Labs drawn per protocol.  Urine sample collected.  Results will be scanned into 'media'.     EQ-5D-5L questionnaire completed if applicable.  Must complete annually (Month 12, etc.)    Study efficacy, endpoints and adverse events reviewed with subject.  Cardiovascular risk discussed.     Study alcohol consumption stipulations and education reviewed with subject:    Subject reports  [] Never [x] Current [] Former .   1 drinks per [] Day [] Week [] Month [x] Occasional.  1 maximum drinks per sitting.     Spoke with subject at length about his medications.  He forgot the study drug today and said he will bring it back this week.  I gave him a bag for keeping track of his medications.  I discussed with Arcelia Ring and encouraged her to order MTM with one of our pharmacists, but Skyler has declined. I  continued to reinforce medication compliance and explained the rational for medications and study drug.    Study medication box #'s [id number], [id number], [id number], [id number] dispensed to subject.  Education provided on medication compliance and administration    Plan: Study Visit 16 telephone call with subject in 2 months.  Will schedule study Visit17  with subject in 4 months back at [facility].    Eduard Tang RN  Clinical Trials Nurse

## 2021-05-30 NOTE — TELEPHONE ENCOUNTER
Type: CRT-D alert remote transmission for AF and SVT.  Presenting rhythm: unavailable.  Battery/lead status: stable.  Arrhythmias: since 6/23/19; two VT2 episodes and one SVT, these all appear to be the same episode beginning on 11:58pm on 6/30, ending at 12:02am on 7/1, appears to be AF with RVR, ATP1 delivered. Overall ventricular rates controlled.  Anticoagulant: Eliquis.   Comments: appears to be normal ICD function. Device biVP 93%. Routed to device RN for review.  Device/lead alerts: none. TOM     Transmission reviewed with [employer] support. Increase in AF daily burden noted starting yesterday 6/30/19. Episodes # 128 and #130 show AF w/RVR. Both episodes show undersensing of AF at times making device see V>A and following VT algorithm because of the atrial drop out. Both episodes therefore received ATP.  Recommendations per tech services are to increase RA sensitivity to 0.3 mV to decrease chances of AF undersensing in future.     Call placed to patient to assess and review need to have reprogramming done on device. LVM for callback.     Zoie Granado, RN

## 2021-05-30 NOTE — PATIENT INSTRUCTIONS - HE
Ken Doss,    It was a pleasure to see you today at the [facility].     My recommendations after this visit include:    - Dr. Vigil recommended you to stay on Furosemide 40 mg two times a day and Spironolactone 12.5 mg two times a day    - Stay on low sodium diet < 2000 mg/day, monitor daily weight (bring weight log book for each visit) and stay physically active as tolerated    -Please call if you experience rapid weight gain 2-3 lbs two days in a row or 5 lbs in a week with worsening shortness of breath, lightheaded, dizziness, abdominal bloating, and leg swelling     - Follow up in Heart Failure Clinic as needed    - Please call Marva Bethea RN on [phone], if you have any questions or concerns    Arcelia Ring, CNP

## 2021-05-30 NOTE — TELEPHONE ENCOUNTER
" I was able to reach patient this afternoon. He is getting ready to for holiday get together, has son coming in from far away who he \"never gets to see.\" He states he is having some rough days but really wants to enjoy [place] time and [place]. We discussed starting Amiodarone but he would really like to wait to start this medication until after seeing someone because of the required follow up. States his schedule is too busy right now.       He agreed to be seen in [facility] but next opening that works for him is on 7/16/19 with Dr. Vigil and Device. Advised that he should seek ER before then if condition worsens. He verbalized understanding and states he will seek more urgent attention when needed. He states he knows he has been pushing himself to hard to get things ready which \"might be adding to the problem.\" He was advised to take it easy and rest. He agrees.     Will update Dr. Musa Granado RN    "

## 2021-05-31 VITALS — HEIGHT: 70 IN | BODY MASS INDEX: 25.34 KG/M2 | WEIGHT: 177 LBS

## 2021-05-31 VITALS — HEIGHT: 70 IN | BODY MASS INDEX: 24.02 KG/M2 | WEIGHT: 167.8 LBS

## 2021-05-31 VITALS — HEIGHT: 70 IN | WEIGHT: 175 LBS | BODY MASS INDEX: 25.05 KG/M2

## 2021-05-31 VITALS — HEIGHT: 70 IN | WEIGHT: 170 LBS | BODY MASS INDEX: 24.34 KG/M2

## 2021-05-31 VITALS — HEIGHT: 69 IN | BODY MASS INDEX: 26.22 KG/M2 | WEIGHT: 177 LBS

## 2021-05-31 VITALS — WEIGHT: 169 LBS | BODY MASS INDEX: 25.03 KG/M2 | HEIGHT: 69 IN

## 2021-05-31 VITALS — WEIGHT: 174 LBS | HEIGHT: 70 IN | BODY MASS INDEX: 24.91 KG/M2

## 2021-05-31 VITALS — BODY MASS INDEX: 25.34 KG/M2 | WEIGHT: 177 LBS | HEIGHT: 70 IN

## 2021-05-31 VITALS — HEIGHT: 69 IN | BODY MASS INDEX: 25.03 KG/M2 | WEIGHT: 169 LBS

## 2021-05-31 VITALS — HEIGHT: 70 IN | BODY MASS INDEX: 25.65 KG/M2 | WEIGHT: 179.2 LBS

## 2021-05-31 NOTE — PROGRESS NOTES
Pemafibrate to Reduce cardiovascular OutcoMes by reducing triglycerides IN patiENts with diabeTes (PROMINENT) clinical trial.    ADVERSE EVENT Description: Mr. Doss  [id number] patient with history of coronary artery disease, nonischemic cardiomyopathy with ICD in place, A. fib on amiodarone and Eliquis, presented with main complaint of shortness of breath, weakness, chest pain, work-up in the ER including a CT chest showed evidence of hilar and mediastinal lymphadenopathy along with pulmonary fibrosis, was admitted to the hospital for further management.  Cardiology noted his A-Fib with RVR was likely contributed to his acute systolic heart failure, diuresed with lasix and improved.      Serious Adverse Event: Acute systolic heart failure Study protocol states MACE if heart failure hospitalization   Serious:  [x] Yes   [] No  Reportable to IRB:   [] Yes   [x] No  Severity (mild/moderate/severe): severe  Date of Awareness: [id number] (initially reported A-Fib with rvr but today added event to clarify if MACE per study sponsor)  Start Date: 7/9/19  End Date: 7/12/19  Action taken with study treatment:  [x] Dose Not Changed    Outcome:   []Not Recovered/Not Resolved  [x]Recovered/Resolved    Medication or therapies taken:  [x] Yes, lasix, digoxin    Dr. Vigil: Please assign causality of above INDERJIT  Related to study drug?     [] Yes   [x] No

## 2021-05-31 NOTE — PROGRESS NOTES
In clinic device check with Arcelia Ring RN, CNP.  Please see link for full device report.  Patient was informed of results and next follow up during today's visit.       Type: In clinic CRT-D check. Seen with Arcelia Ring RN, CNP.   Presenting: Atrial fibrillation with RVS/LVP 60-80s  Lead/Battery Status: Stable  Atrial Arrhythmias: 207 mode switches, burden 99.3%. V-rates >/=120bpm ~12%. AF persistent since last in clinic on 7/16/2019.   Vent Arrhythmias: 2 VT monitor episodes. 8/5/2019 and 7/26/2019, review of EGMs show 14-18 sec duration showing NSVT ~160s falling into his monitor zone. Asymptomatic.   Anticoagulant: Eliquis.   Comments: RV pacing 7% (down from 89% on 7/16/2019) and CRT pacing 96% (down from 97%). Reviewed with TS, CRT pacing is the equivalent of trigger pacing on an RVS and PVCs, so overall BiV pacing is down significantly. Reviewed device findings with Arcelia, routed to Dr. Vigil per her request and she notes plan is for consult for possible AVNA. BK

## 2021-05-31 NOTE — PROGRESS NOTES
Patient was seen in clinic after starting sotalol 40mg twice a day. Confirmed with patient that he is snapping a 80 mg pill in half. He states that he thinks he has been feeling a little better since starting sotalol but then again, last night he did not sleep much at all and he does not know why. He denies dizziness or light headedness but endorses fatigue. He spent a good amount of time going over medications with CMA and then additional 20 minutes with writer discussing his medications. He brought in a duffle bag of his meds in bottles. He sets up approximately 2-3 weeks of his medications at a time. I encouraged him to set up about 1 week at a time, if able, so that if changes are made, it is not as cumbersome to undo all his work. We confirmed his appt with CY. Writer personally walked patient out to device scheduling to arrange for EP consult and device check per recall. He understands that a call will be made if changes will be made to his medications today.      BP: 88/50 - was given a glass of water. He reports his BP's stay around this range and sometimes 100's systolic. He has a cuff at home. He occasionally checks it with his wife.   EKG: Electronic ventricular pacemaker  Rate: 59 bpm  QT/QTc: 420/415      Underlying rhythm appears to still be afib/aflutter.

## 2021-05-31 NOTE — TELEPHONE ENCOUNTER
Called pt and reviewed medication list one by one. Pt wrote down the following changes:    -Stop aspirin  -Decrease Carvedilol to 18.75 mg two times a day  -Start sotalol 40 mg two times a day    Continue all other medications. Med list updated. Cored and sotalol ordered per protocol. EKG ordered for 1 week from now per Dr. Vigil's recommendations. Msg sent to schedulers to arrange next Wednesday. Pt agreeable to plan. Encouraged him to call with any further questions. Pt was given my direct line.

## 2021-05-31 NOTE — TELEPHONE ENCOUNTER
Received notification that EKG is scheduled for 8/16. Called pt and informed him that this needs to be scheduled for next Wednesday. Pt verbalized understanding however, phone connection was poor. Msg sent to schedulers to call pt to reschedule for next Wednesday.

## 2021-05-31 NOTE — PATIENT INSTRUCTIONS - HE
Ken Doss,    It was a pleasure to see you today at the [facility].     My recommendations after this visit include:    - Please make sure to take Spironolactone only  12.5 mg (half a tablet of 25 mg) twice a day.      - Follow up in Heart Failure Clinic with Arcelia 4-6 weeks    - Please call Marva Bethea RN on [phone], if you have any questions or concerns    Arcelia Ring CNP

## 2021-05-31 NOTE — TELEPHONE ENCOUNTER
----- Message from Vera Sixto sent at 8/13/2019  3:35 PM CDT -----  Contact: ELLIOT  Caller: ELLIOT     Primary cardiologist: Musa    Detailed reason for call: [facility] needs verbal hold and/or bridge order for 3 days, regarding the patients upcoming procedure. Please return call.     New or active symptoms? n/a     Best phone number: [facility] [phone], press option 1, twice     Best time to contact: Any     Ok to leave a detailed message? Yes     Device? No     Additional Info: [place] Fax [phone]

## 2021-05-31 NOTE — TELEPHONE ENCOUNTER
Called pt who states he understand his medication changes and has no further questions. Pt will call if anything comes up. -kcl

## 2021-05-31 NOTE — TELEPHONE ENCOUNTER
----- Message from Atilio Dang sent at 8/21/2019  3:55 PM CDT -----  Contact: Pt  It was per Nany and the Pt is having a colonoscopy that day so he actually was the one who needed to RS the apt.   ----- Message -----  From: Marva Leiva, RN  Sent: 8/21/2019   3:51 PM  To: Atilio Trimble - who sent the staff message to cancel?  Pt gets very easily confused and we're trying to minimize the number of people he talks to.  I don't know why the 9/10 appt with Chime needed to be rescheduled.... But I can help.    ----- Message -----  From: Atilio Dang  Sent: 8/21/2019   3:33 PM  To: Marva Leiva, RN    General phone call:    Caller: Pt    Primary cardiologist: Arcelia RODRIGUEZ / Dr Vigil    Detailed reason for call: Per a staff message we needed to RS the Arcelia Y apt on 9/10 - we RS the apt but then Pt asked why he needs the apt - asked if it could be covered via phonecall -   If possible please call the Pt to explain why he would be seeing Chime - thank you     New or active symptoms? na  Best phone number: home  Best time to contact: any  Ok to leave a detailed message? yes  Device? yes    Additional Info:

## 2021-05-31 NOTE — TELEPHONE ENCOUNTER
----- Message from Arcelia Ring NP sent at 8/13/2019  1:23 PM CDT -----  Nany,  Could you please f/u with rec from Dr. Vigil.   Thank you!  CY    ----- Message -----  From: Mary Jane Vigil MD  Sent: 8/13/2019   1:19 PM  To: Arcelia Ring NP    Back down to no asa and also would get sotalol 40 two times a day for a fib and recheck ecg for qt in a week.  LF  ----- Message -----  From: Arcelia Ring NP  Sent: 8/13/2019   9:28 AM  To: MD Dr. Musa Horta,  Saw pt for HF f/u doing well except SBP in low 80's with lightheaded- taking spironolactone 25 mg two times a day- asked to cut back to half two times a day.    He is also on triple therapy with ASA 81/plavix/eliquis with most recent PCI in 1/2019. Did you want him to stay on triple therapy or stop ASA? Please clarify  Thanks  CY

## 2021-05-31 NOTE — TELEPHONE ENCOUNTER
"Received a call from Skyler directly. Pt had a colonoscopy scheduled for tomorrow and had been taking Plavix/eliquis/aspirin up until this point. Skyler was very frustrated and agitated because \"how is he supposed to know he is on blood thinners\". See previous telephone encounters from writer and patient regarding his medications and getting them straightened out. Explained to patient that his colonoscopy was likely scheduled a long time ago and they usually ask the patient if they are on blood thinning drugs. Explained to Skyler that he has been on Plavix for some time- last PCI in January. He was still upset and was taking all these medications despite seeing his PMD last week for a pre-op. Writer was able to calm patient down and he did think that he had his medications finally straightened out until this moment. He had been taking his spironolactone incorrectly despite this being clarified on 7/16/19 with writer. He was told that he can stop aspirin as outlined below. He states he still feels fatigue and low-energy. Did mention that he should follow up with EP NP re: afib. It is documented that a voicemail was left to set this appt up but he states his phone has not been working properly. Will help patient facilitate once he calls back. He was at a [employer] drop [employer] for clothes.         Arcelia and Dr. Vigil,    See above. Direct call received from kSyler because he was supposed to have a colonoscopy tomorrow and it was cancelled because he did not realize he was on blood thinners.. Pt has been on Plavix/Eliquis and Aspirin. I don't know what happened there but ELLIOT never contacted our office for hold orders. My thought is that Skyler never told them he was on these types of drugs. He was very upset and talking to him about starting sotalol at this time would likely not help the situation and I worry about his comprehension with medications. Would it be acceptable to get him into Afib NP ASAP to discuss medications in person? "     Thanks,  Kaveh

## 2021-05-31 NOTE — TELEPHONE ENCOUNTER
[facility] patient coordinator Ginny called directly and LM for hold orders for patient's plavix and eliquis. This was already called in by previous RN on 8/14/19- see telephone encounter. Called [facility] back and LM for them to return call.

## 2021-05-31 NOTE — TELEPHONE ENCOUNTER
"----- Message from Mary Jane Vigil MD sent at 8/20/2019  1:33 PM CDT -----  If he is now hypotensive and dizzy I certainly would go back to 12.5.    LF  ----- Message -----  From: Nany Forrester, RN  Sent: 8/20/2019  11:56 AM  To: Mary Jane Vigil MD   He is taking 18.75 mg twice daily- CY changed after last HF appt.   ----- Message -----  From: Mary Jane Vigil MD  Sent: 8/20/2019  11:49 AM  To: Nany Forrester, RN    Thanks for the update, reviewed ECG and look stable.  I am confused, states he is taking carvedilol to 12-1/2 mg or 25 mg twice a day for a total of 50?  Given the lightheadedness and low blood pressure I would recommend going back to just 12-1/2 twice a day or 25 total in a day.  Thanks for your help.  LF  ----- Message -----  From: Nayn Forrester, RN  Sent: 8/20/2019   9:49 AM  To: Mary Jane Vigil MD          Called patient and updated on LBF review on EKG. He verbalized understanding. He will only take 12.5 mg of carvedilol now due to BP. He kept sayign that he will only take the \"littler of the pills now\". Reiterated that I do not know what his pills look like and that it would be more reasonable to only set up a weeks worth of his medicines. Unfortunately, he has 3 weeks set up ahead of time at this moment to \"get ahead of things\". Repeated instruction to make sure his carvedilol is 12.5 mg twice a day. WIll have Farnaz check in AM how he is doing to make sure this is straight.      Farnaz-  Can you call Skyler in AM and make sure his carvedilol is down to 12.5 mg twice a day?  Thanks,  Mal   "

## 2021-05-31 NOTE — TELEPHONE ENCOUNTER
While at EKG today, patient mentioned he needed a refill of his digoxin and his spirolactone. This was done. -evelyn

## 2021-05-31 NOTE — TELEPHONE ENCOUNTER
Received a call back from Ginny. She did get the message last week with hold orders. See encounter 8/13/19. No further questions at this time. -evelyn

## 2021-05-31 NOTE — TELEPHONE ENCOUNTER
----- Message -----  From: Mary Jane Vigil MD  Sent: 8/14/2019   8:42 AM  To: Nany Forrester RN  Hold eliquis for 3 days and plavix for 7 days and restart night of colonoscopy.  LF        Called [facility] and shared with representative Dr. Vigil's recommendations. No further questions/concerns. -kcl

## 2021-05-31 NOTE — TELEPHONE ENCOUNTER
Noted.      Dr. Vigil,  Pt on plavix and eliquis. Last intervention was in January of this year. Hold orders for upcoming rescheduled colonoscopy please, if appropriate.   Thanks,  Mal

## 2021-06-01 VITALS — BODY MASS INDEX: 24.48 KG/M2 | WEIGHT: 171 LBS | HEIGHT: 70 IN

## 2021-06-01 VITALS — BODY MASS INDEX: 24.91 KG/M2 | WEIGHT: 174 LBS | HEIGHT: 70 IN

## 2021-06-01 VITALS — BODY MASS INDEX: 25.2 KG/M2 | HEIGHT: 70 IN | WEIGHT: 176 LBS

## 2021-06-01 VITALS — HEIGHT: 69 IN | WEIGHT: 180 LBS | BODY MASS INDEX: 26.66 KG/M2

## 2021-06-01 VITALS — HEIGHT: 69 IN | BODY MASS INDEX: 26.36 KG/M2 | WEIGHT: 178 LBS

## 2021-06-01 VITALS — HEIGHT: 70 IN | WEIGHT: 169 LBS | BODY MASS INDEX: 24.2 KG/M2

## 2021-06-01 NOTE — PROGRESS NOTES
In clinic device check with Patricia Girard CNP.  Please see link for full device report.  Patient was informed of results and next follow up during today's visit.

## 2021-06-01 NOTE — PROGRESS NOTES
Pemafibrate to Reduce cardiovascular OutcoMes by reducing triglycerides IN patiENts with diabeTes (PROMINENT) clinical trial.    ADVERSE EVENT Description: Mr. Doss saw Arcelia Ring yesterday for a heart failure clinic visit at which he presented with shortness of breath.  He conveyed salt and diet indiscretion.  Was encouraged to go to the ED if symptoms persisted.  He came to [facility] ED last night and was admitted due to worsening heart failure [crackles, 2+ edema, weight gain, shortness of breath].  After IV lasix diuresis he is improving.   Negative troponins. BNP of1,917 and 1,333.    Adverse Event:  Acute systolic heart failure exacerbation  Serious:  [x] Yes   [] No  Reportable to IRB:   [] Yes   [x] No  Severity (mild/moderate/severe): severe  Date of Awareness: 9/20/2019  Start Date: 9/19/2019  End Date: ongoing (expected to discharge 9/21/2019  Action taken with study treatment:  [x] Dose Not Changed  [] Dose Increased  [] Dose Reduced   [] Drug interrupted              [] Drug withdrawal   [] Not applicable   [] Unknown  Outcome:   []Not Recovered/Not Resolved  []Recovered/Resolved  []Recovered/Resolved with Sequelae - specify   [x]Recovering/Resolving  []Unknown   []Fatal  Medication or therapies taken:  [x] Yes   [] No    Dr. Vigil: Please assign causality of above AE  Related to study drug?     [] Yes   [x] No       Eduard Tang RN  Clinical Trials Nurse  [phone]

## 2021-06-01 NOTE — TELEPHONE ENCOUNTER
===View-only below this line===  ----- Message -----  From: Mary Jane Vigil MD  Sent: 9/25/2019   8:40 AM CDT  To: Nany Forrester RN    Tried calling, left your number to call back, will try to sign order today, ideally FDG PET scan at the [facility] to rule out sarcoid.  LF

## 2021-06-01 NOTE — PROGRESS NOTES
Dr Vigil received a call from the pt, requesting another call to review the instructions for his procedure  Pt seemingly forgetful, and does not remember our PC from 9/24 when this was reviewed  Pt also states he did not get our letter in the mail  Darlene

## 2021-06-01 NOTE — TELEPHONE ENCOUNTER
----- Message from Mary Jane Vigil MD sent at 9/23/2019  4:07 PM CDT -----  Can we see if we can get cardiac PET over the [facility], we do not do that, looking for sarcoid?LF  ----- Message -----  From: Luis Manuel Catalan MD  Sent: 9/23/2019   3:43 PM CDT  To: Mary Jane Vigil MD    Dear Dr. Vigil -  Thanks for reaching out. I reviewed the recent cath and lymph node biopsy report. Looks like he had a challenging cath with 2 stents deployed in January - sounds more like ischemic cardiomyopathy in that sense, would you agree? Given the uncertainty of the diagnosis by lymph node (not definitive for sarcoid) I would favor a cMRI or cardiac PET if he cannot get an MRI, before committing him to several months of high dose steroid therapy, especially in the absence of respiratory symptoms. Let me know your thoughts.  Thx  AS  ----- Message -----  From: Mary Jane Vigil MD  Sent: 9/20/2019   3:29 PM CDT  To: MD Dr. 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  Biopsy does show some inflammation.  No malignancy.  I wonder if he has cardiac sarcoid as the etiology of his heart failure and unfortunately cannot get MRI.  Nuclear imaging is sometimes helpful with PET but we do not do that.  Would be reasonable to simply treat him with steroids for short-term to see if we can improve his ejection fraction?  Your thoughts.  Alex Vigil            Noted above request. Writer called patient and he reports that he is doing quite well today. He is watching his salt and water intake. He was able to work all day and does not feel short of breath at all, which is great. He has his procedure on Friday and his preoperative physical on Thursday. Writer encouraged patient to call with any other questions or concerns before Friday. He verbalized understanding.        * Addendum: Spoke with Dr. Vigil. He will call Skyler regarding  the need for cardiac PET.     Called Imaging scheduled at the [facility] and was transferred to their [facility]IR department where cardiac PET scans are done for sarcoid. This PET scan will need PET myocardial perfusion and PET cardiac viability. This order will need to be faxed over along with patient demographics, insurance information and any notes pertaining to why this scan is needed. This order will need to be written. Will update LBF.   -c

## 2021-06-01 NOTE — TELEPHONE ENCOUNTER
Spoke with imaging department at the [facility] research center where cardiac PET scans are done to evaluate for cardiac sarcoid involvement. ULISSES, whom writer spoke with, states that the order will need to be faxed and it will need to state that PET myocardial perfusion and PET cardiac viability are required. Order written out for LBF to sign. Chart notes, patient demographics and insurance information will also need to be faxed over. Once this is done, they will do a PA if it is required of their insurance and contact patient to arrange scheduling.      Dr. Vigil,  The order will need to be written out and signed and faxed with the above information to the [facility] imaging department- [facility]. Once they receive info- they will place on their end and determine if PA is required and go through these necessary steps etc. I will have the necessary information on Farnaz's desk for you to sign and review my written order if you have time tomorrow.  Thanks,  Kaveh

## 2021-06-01 NOTE — TELEPHONE ENCOUNTER
Patient reached today with BNP results which have nearly doubled since last result at over 1900. He was seen today in clinic by Arcelia Ring CNP who advised him to be seen in ED for potential admission for IV diuresis. He declined at that time. 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    He states his breathing is pretty labored at rest and he has not been sleeping well at night.  Skyler has a pending ablation procedure next week. He was advised that it is in his best interest to seek ED evaluation today and IV diuresis to reduce the workload of his heart.   He noted that he was eating a polish sausage sandwich and pickles for lunch. It was brought to his attention that these selections were not in his best interest given the sodium content, and would only exacerbate his issues.   He agreed to present to [facility] ED today [place]. He was in agreement that he needed to address the fluid overload and get the fluid off. He should get to the ED mid afternoon, therefore additional medication changes as documented were not given to him today.   Arcelia Ring CNP was updated on this plan.     sk/RN

## 2021-06-01 NOTE — PROGRESS NOTES
1945  Home:[phone] (home) Cell:[phone] (mobile)  Emergency Contact: Karen Doss     +++Important patient information for CSC/Cath Lab staff : None+++    [facility] EP Cath Lab Procedure Order   Ablation Type:  AVN Ablation  Specific location of pathway: Right     Diagnosis:  AF ; cardiomyopathy  Anticipated Case Duration:  Standard   Scheduling Needs/Timeframe:  ASAP Please call pt to schedule -needs in the next 2 weeks  MD Preference: Dr Yrn KENNEY Assist:  No Specific MD:  N/A    Current Device: BIV ICD  Device Company/Device Rep Needed for Procedure: [employer]    Pre-Procedural Testing needed: None  Mapping System Required:  None  ICE Needed:  No  Special equipment/Staff needed for case: None  Anesthesia:  Conscious Sedation  Research Protocol:  No    [facility] EP Cath Lab Prep   Ordering Provider: Patricia Girard  Ordering Date: 9/16/2019  Orders Status: Intial order placed and Order set placed    H&P:  Compled by Patricia Girard on 9-16-19 if scheduled within 30 days, pt to schedule with PMD if procedure outside of this timeframe  PCP: Lewis Reeves MD, [phone]    Pre-op Labs: Ordered AM of procedure    Medical Records Pertinent for Procedure:  Stress test 5-17-19 EF 27%;   Angiogram w/PCI 1-15-19, Echo 3-19-19 EF 20-25%, EKG 8-20-19 Afib @59 and Device Implant Record Generator change/lead extraction w/SWA 11-26-14    Patient Education:    Teach with Patient: Will be completed via phone prior to procedure, and letter was also sent to pt via mail/[place] with written pre-procedural instructions.    Risks Reviewed:        Cardiac Catheter Ablation    <1% risk for the following: hypotension, hemorrhage, vascular injury including perforation of vein, artery or heart, thrombophlebitis, systemic or pulmonic emboli; cardiac perforation, (tamponade), infection, pneumothorax, arrhythmias, proarrhythmic effects of drugs, radiation exposure.    1-2% complete heart block (for AVNRT or septol accessory  pathway).    <0.5% CVA or MI    <0.1% death    If external defibrillation is needed, 75% risk for superficial burn.    1-2% tamponade and aortic puncture with left sided transeptal approach for left side CHARLY - increase risk of CVA to <2%.    Late arrhythmia recurrences depends upon the primary rhythm disturbance.      Consent: Will be obtained in [facility] day of procedure    Pre-Procedure Instructions that were Reviewed with Patient:  NPO after midnight, Remove all jewelry prior to coming in for procedure, Shower prior to arrival, Notified patient of time and date of procedure by CV , Transportation arrangements needed s/p procedure, Post-procedure follow up process, Sedation plan/orders and Pre-procedure letter was sent to pt by CV     Pre-Procedure Medication Instructions:  Instructions given to pt regarding anticoagulants: Eliquis- instructed to continue anticoagulation uninterrupted through their procedure  Instructions given to pt regarding antiarrhythmic medication: Beta Blocker; Pt instructed to continue medication prior to procedure  Instructions for medication, other than anticoagulants/antiarrhythmics listed above, given to pt: to take all morning medications with small sips of water, with the exception of OTC supplements and MVI    No Known Allergies    Current Outpatient Medications:      ACCU-CHEK SMARTVIEW TEST STRIP strips, , Disp: , Rfl:      acetaminophen (TYLENOL) 500 MG tablet, Take 500 mg by mouth every 6 (six) hours as needed for pain., Disp: , Rfl:      apixaban (ELIQUIS) 5 mg Tab tablet, Take 1 tablet (5 mg total) by mouth 2 (two) times a day., Disp: 180 tablet, Rfl: 0     atorvastatin (LIPITOR) 40 MG tablet, TAKE 1 TABLET BY MOUTH AT BEDTIME, Disp: 90 tablet, Rfl: 1     BASAGLAR KWIKPEN U-100 INSULIN 100 unit/mL (3 mL) pen, Inject 65 Units under the skin at bedtime.    , Disp: , Rfl:      carvedilol (COREG) 6.25 MG tablet, Take 1 tablet (6.25 mg total) by mouth 2 (two) times a  day with meals. Take with 12.5 mg tablet by mouth BID, Disp: 60 tablet, Rfl: 3     clopidogrel (PLAVIX) 75 mg tablet, TAKE 1 TABLET BY MOUTH ONCE DAILY, Disp: 90 tablet, Rfl: 1     coenzyme Q10 (COQ-10) 100 mg capsule, Take 1 capsule (100 mg total) by mouth daily., Disp: , Rfl: 0     digoxin (LANOXIN) 125 mcg tablet, Take 1 tablet (125 mcg total) by mouth daily with supper., Disp: 60 tablet, Rfl: 0     diphenhydrAMINE-acetaminophen (TYLENOL PM EXTRA STRENGTH)  mg Tab, Take 1 tablet by mouth at bedtime as needed., Disp: , Rfl:      furosemide (LASIX) 40 MG tablet, Take 1 tablet (40 mg total) by mouth daily. (Patient taking differently: Take 40 mg by mouth 2 (two) times a day at 9am and 6pm.    ), Disp: 190 tablet, Rfl: 1     glucosamine-chondroitin 500-400 mg cap, Take 1 capsule by mouth daily. 1500mg/1200mg once daily, Disp: , Rfl:      metFORMIN (GLUCOPHAGE) 1000 MG tablet, Take 1,000 mg by mouth 2 (two) times a day with meals., Disp: , Rfl:      multivitamin therapeutic (THERAGRAN) tablet, Take 1 tablet by mouth daily., Disp: , Rfl:      potassium chloride (K-DUR,KLOR-CON) 20 MEQ tablet, Take 1 tablet (20 mEq total) by mouth daily., Disp: 30 tablet, Rfl: 1     sacubitril-valsartan (ENTRESTO) 24-26 mg Tab tablet, Take 1 tablet by mouth 2 (two) times a day., Disp: 180 tablet, Rfl: 0     Study Drug pemafibrate 0.2 mg/placebo (PROMINENT) tablet, Take 0.2 mg by mouth 2 (two) times a day., Disp: , Rfl:     Current Facility-Administered Medications:      Study Drug pemafibrate 0.2 mg/placebo tablet 0.2 mg (PROMINENT), 0.2 mg, Oral, BID, Eduard Tang RN    Documentation Date:9/16/2019 2:05 PM  Shavon Sánchez RN

## 2021-06-01 NOTE — TELEPHONE ENCOUNTER
Dr. Vigil, received a VM from [employer] requesting a call back from you. Are you able to try calling him again this morning?

## 2021-06-01 NOTE — PROGRESS NOTES
Pre-Intra-Post education and instructions as listed below were reviewed with Patient via phone  9/24/2019 1:05 PM  Padmini Sawyer RN

## 2021-06-01 NOTE — PATIENT INSTRUCTIONS - HE
Ken Doss,    It was a pleasure to see you today at the [facility].     My recommendations after this visit include:    Discontinue sotalol     Furosemide increase to 80 mg two times a day for 1 week. If weight if back to baseline, decrease back to 40 mg two times a day. Otherwise continue 80 mg two times a day. Add 20 mEq of potassium (ok to break your 40 mEq tablet in half).     Labs to check kidney function in 2 weeks.     One of the EP nurses will call you to schedule an AV node ablation at [facility]. With either Dr. Pool or Dr. Vences.     Patricia Girard, NP-C  [facility], [place], Electrophysiology  [phone]

## 2021-06-01 NOTE — TELEPHONE ENCOUNTER
----- Message from Mary Jane Vigil MD sent at 9/21/2019 10:49 AM CDT -----  Patient going home little earlier than would like, can we contact him on Monday to make sure breathing is okay?  In addition, can we confirm that he has AV marianna ablation set up with Dr. Pool on Friday, no details actually in chart yet.  LF

## 2021-06-01 NOTE — TELEPHONE ENCOUNTER
----- Message from Mary Jane Vigil MD sent at 9/17/2019  8:13 AM CDT -----  Regarding: RE: Prominent study subject - side effects of IP?  Agree, this is not really a fibric acid derivative, it is a P par alpha agonist, and without being said investigational brochure does not site any increased risk of arrhythmias.  LF  ----- Message -----  From: Eduard Tang RN  Sent: 9/17/2019   6:48 AM  To: Mary Jane Vigil MD, Sadaf Acharya RN, #  Subject: Prominent study subject - side effects of IP?    Hi Dr. Vigil,    There is a question of side effects of Pemafibrate regarding Skyler Doss.  I don't believe arrhythmias or HF exacerbation are listed in the IB and they are not listed in the consent form.  Would you provide your input?    Thanks,   Eduard Tang RN  Clinical Trials Nurse  [phone]    ----- Message -----  From: Patricia Girard, CNP  Sent: 9/16/2019   9:46 AM  To: Eduard Tang RN    Question: I am not real familiar with the prominent study. I just want to confirm with you, that side effects of increased arrythmias and HF are not seen. With other fibrates these arent a concern, so just thought I would ask.

## 2021-06-01 NOTE — TELEPHONE ENCOUNTER
From: Mary Jane Vigil MD  Sent: 9/26/2019  10:57 AM CDT  To: Markus Pool MD, Farnaz Grayson, RN  I called patient today and discussed PET scan at the [facility] looking for cardiac sarcoidosis with him.  He seems amenable to this.  Let us go forward and schedule.  His biggest concern was his AV marianna ablation scheduled for tomorrow, he has not gotten any further phone calls about that.  Can we please contact him today and let him know what medicines he needs to hold, where to be, 1 to be etc.  I will try to contact our EP people here as well.  LF      Message sent to EP pool to address procedure education. Will fax PET CT order to [facility]. -kcl

## 2021-06-01 NOTE — PROGRESS NOTES
Pemafibrate to Reduce cardiovascular OutcoMes by reducing triglycerides IN patiENts with diabeTes (PROMINENT) clinical trial.     Skyler has been on the prominent study of pemafibrate vs placebo to lower triglycerides, raise HDL and prevent future CVD events for the past 2 years.  He has had several recent events related to his heart failure, CAD, and A-fib.      ADVERSE EVENT Description: subject has history of a-fib and RVR with recent CHF hospitalizations.  Rate control was not obtained due to patient intolerance of medications.  Successful ablation and mapping with Dr. Pool this past week.    Adverse Event: persistent atrial fibrillation with rapid ventricular response exacerbation  Serious:  [] Yes   [x] No  Reportable to IRB:   [] Yes   [x] No  Severity (mild/moderate/severe): moderate   Date of Awareness: 01Oct2019  Start Date: 27sep2019  End Date: 27sep2019  Action taken with study treatment:  [x] Dose Not Changed    Outcome:   []Not Recovered/Not Resolved  [x]Recovered/Resolved    Medication or therapies taken:  [x] Yes . His bundle mapping & AV node ablation    Dr. Vigil: Please assign causality of above AE  Related to study drug?     [] Yes   [x] No

## 2021-06-01 NOTE — TELEPHONE ENCOUNTER
Pemafibrate to Reduce cardiovascular OutcoMes by reducing triglycerides IN patiENts with diabeTes (PROMINENT) clinical trial.    Study telephone visit form:    Subject Number:   [id number]                                     Dr. Vigil- PI      CONCOMITANT MEDICATIONS  ? Investigator to report if participant needs treatment with prohibited medications (Fibrates or other agents with PPAR-? agonist activity (e.g., saroglitazar); See protocol for exclusionary medications and actions to be taken.    VISIT SCHEDULING AND CONTACT CONFIRMATION  ? Confirm date of next study visit   ? Confirm information on Subject Information Form or update as needed      Visit Number:_[id number]______  Visit Date: 19    Was the subject, relatives or health care provider (as per consent) contacted by phone?    [x] Yes  [] No    Yes [x]  No []   If Yes, Date of Contact:   Date: _19___ ____ ____   DD [facility] YYYY  If No, please document attempts to contact subject (include date and type of contact)   Date: ____ ____ ____   Type: [] Tel _ [] Other___  [] 2 Date:      Name of Person: Contacted:   _____Self/Subject_______________   Relationship to Subject:   ______________________  Type:[]  Tel[x]  Other _______   Date: ____ ____ ____   Type: [] Tel [] Other _______    Subject Status on the day assessed     [x] Subject alive  []  Subject    [] Unknown at present Please complete  Death Details Form in InForm and submit source documents to [facility]        Review Subject Contact Information Form and update as needed   [x]  Review concomitant medication use  - spent significant time confirming these. Spoke with FIDEL Crow and confirmed she added envisage drug to chart in error - I updated this.  [x]  Query participant and record any reported AEs  - continues to have some activitiy intolerance and weight fluctuations.  Reported eating more last weekend and not as healthy.  Encouraged to keep monitoring and f/u per visit with heartcare coming up.  [x]  Query participant and record any CV efficacy events   [x] Confirm next study visit: ___tba in nov__________   [x] Instruct participants to bring all study supplies with them to the next in-person visit    Instruct participants to bring all study supplies with them to the next in-person visit   Next visit can be fasting or nonfasting per protocol.    Current Outpatient Medications:      acetaminophen (TYLENOL) 500 MG tablet, Take 500 mg by mouth every 6 (six) hours as needed for pain., Disp: , Rfl:      apixaban (ELIQUIS) 5 mg Tab tablet, Take 1 tablet (5 mg total) by mouth 2 (two) times a day., Disp: 120 tablet, Rfl: 2     atorvastatin (LIPITOR) 40 MG tablet, TAKE 1 TABLET BY MOUTH AT BEDTIME, Disp: 90 tablet, Rfl: 1     BASAGLAR KWIKPEN U-100 INSULIN 100 unit/mL (3 mL) pen, Inject 65 Units under the skin at bedtime.    , Disp: , Rfl:      carvedilol (COREG) 12.5 MG tablet, Take 1 tablet (12.5 mg total) by mouth 2 (two) times a day with meals., Disp: 60 tablet, Rfl: 3     clopidogrel (PLAVIX) 75 mg tablet, TAKE 1 TABLET BY MOUTH ONCE DAILY, Disp: 90 tablet, Rfl: 1     coenzyme Q10 (COQ-10) 100 mg capsule, Take 1 capsule (100 mg total) by mouth daily., Disp: , Rfl: 0     digoxin (LANOXIN) 125 mcg tablet, Take 1 tablet (125 mcg total) by mouth daily with supper., Disp: 60 tablet, Rfl: 0     diphenhydrAMINE-acetaminophen (TYLENOL PM EXTRA STRENGTH)  mg Tab, Take 1 tablet by mouth at bedtime as needed., Disp: , Rfl:      furosemide (LASIX) 40 MG tablet, Take 1 tablet (40 mg total) by mouth daily. (Patient taking differently: Take 40 mg by mouth 2 (two) times a day at 9am and 6pm.    ), Disp: 190 tablet, Rfl: 1     glucosamine-chondroitin 500-400 mg cap, Take 1 capsule by mouth daily. 1500mg/1200mg once daily, Disp: , Rfl:      metFORMIN (GLUCOPHAGE) 1000 MG tablet, Take 1,000 mg by mouth 2 (two) times a day with meals., Disp: , Rfl:      multivitamin therapeutic (THERAGRAN) tablet, Take 1 tablet by mouth daily.,  Disp: , Rfl:      sacubitril-valsartan (ENTRESTO) 24-26 mg Tab tablet, Take 1 tablet by mouth 2 (two) times a day., Disp: 180 tablet, Rfl: 0     sotalol (BETAPACE) 80 MG tablet, Take 0.5 tablets (40 mg total) by mouth 2 (two) times a day., Disp: 90 tablet, Rfl: 3     spironolactone (ALDACTONE) 25 MG tablet, Take 0.5 tablets (12.5 mg total) by mouth 2 (two) times a day at 9am and 6pm., Disp: 90 tablet, Rfl: 3     Study Drug pemafibrate 0.2 mg/placebo (PROMINENT) tablet, Take 0.2 mg by mouth 2 (two) times a day., Disp: , Rfl:       Eduard Tang RN  Clinical Trials Nurse

## 2021-06-01 NOTE — TELEPHONE ENCOUNTER
----- Message from Arcelia Ring NP sent at 9/19/2019 11:30 AM CDT -----  Soo,  His BNP significantly elevated. He declined ER visit during clinic visit today.    My first recommendation, he should visit ER for further management given worsening HF symptoms    If he declines, have him cut back Coreg from 15.625 mg two times a day to 12.5 mg two times a day   Add 2.5 mg Metolazone daily X 3 days starting today  Continue Furosemide 80 mg two times a day   Continue Potassium Chloride 20 mEq daily  Start Magnesium Oxide 400 mg two times a day (for low Mg+ level)    Soo, please f/u on patient on his HF status and weight tomorrow morning. Please let me know if further questions  Thank you!  CY

## 2021-06-01 NOTE — PATIENT INSTRUCTIONS - HE
Ken Doss,    It was a pleasure to see you today at the [facility].     My recommendations after this visit include:    - No medications changes made today     - You lab results will be called with recommendation    - Stay on low sodium diet < 1500/day, limit fluid intake to < 48-50 ounces/day, and hold Alcohol intake,  monitor daily weight    - Please go to emergency department if worsening shortness of breath, lightheaded, dizziness    - Please call Soo Leach [phone], if you have any questions or concerns    Arcelia Ring, CNP

## 2021-06-01 NOTE — TELEPHONE ENCOUNTER
--- Message -----  From: Padmini Sawyer RN  Sent: 9/26/2019  11:44 AM CDT  To: Markus Pool MD, Mary Jane Vigil MD, *  Thanks for the heads up.  We sent the pt a procedural letter last week with the instructions, spoke to the pt on Tuesday as well to review these instructions-we even talked about the play he was going to on Friday at that time, and I just spoke to him again today to reiterate these same instructions.  Thank you,  Darlene

## 2021-06-02 VITALS — WEIGHT: 170 LBS | HEIGHT: 70 IN | BODY MASS INDEX: 24.34 KG/M2

## 2021-06-02 VITALS
WEIGHT: 171.4 LBS | BODY MASS INDEX: 25.39 KG/M2 | WEIGHT: 172 LBS | BODY MASS INDEX: 25.48 KG/M2 | HEIGHT: 69 IN | HEIGHT: 69 IN

## 2021-06-02 VITALS — HEIGHT: 69 IN | WEIGHT: 171 LBS | BODY MASS INDEX: 25.33 KG/M2

## 2021-06-02 VITALS — WEIGHT: 179.6 LBS | HEIGHT: 69 IN | BODY MASS INDEX: 26.6 KG/M2

## 2021-06-02 VITALS — HEIGHT: 70 IN | WEIGHT: 175 LBS | BODY MASS INDEX: 25.05 KG/M2

## 2021-06-02 VITALS — BODY MASS INDEX: 25.05 KG/M2 | WEIGHT: 175 LBS | HEIGHT: 70 IN

## 2021-06-02 VITALS — WEIGHT: 176 LBS | BODY MASS INDEX: 25.2 KG/M2 | HEIGHT: 70 IN

## 2021-06-02 VITALS — BODY MASS INDEX: 25.39 KG/M2 | WEIGHT: 171.4 LBS | HEIGHT: 69 IN

## 2021-06-02 VITALS — BODY MASS INDEX: 25.92 KG/M2 | HEIGHT: 69 IN | WEIGHT: 175 LBS

## 2021-06-02 NOTE — TELEPHONE ENCOUNTER
----- Message from Atilio Dang sent at 10/16/2019 11:54 AM CDT -----  Regarding: med questions  General phone call:    Caller: Pt    Primary cardiologist: Dr Vigil    Detailed reason for call: Pt states he has some questions about his medications  Would like a call back please    New or active symptoms? na  Best phone number: home [phone]  Best time to contact: yes  Ok to leave a detailed message? No  Device? YES    Additional Info:       LM for pt to return call to discuss. -kcl

## 2021-06-02 NOTE — TELEPHONE ENCOUNTER
Skyler called and expressed much frustration and confusion on his medications. Went through each medication individually. Pt states he is out of his coreg, potassium, digoxin, Entresto. Informed him that refills of Entresto and digoxin have already been sent to [facility] earlier this month. Caller did refill potassium and coreg. Pt thanked caller and states he will call [facility].

## 2021-06-02 NOTE — TELEPHONE ENCOUNTER
----- Message from Ana M Guerrero sent at 10/10/2019  8:57 AM CDT -----  General phone call:    Caller: [place] from the HE Lab  Primary cardiologist: Linda Marmolejo  Detailed reason for call: Calling with a critical lab.  Glucose .407  New or active symptoms?   Best phone number: Best time to contact:   Ok to leave a detailed message?   Device?     Additional Info:

## 2021-06-02 NOTE — TELEPHONE ENCOUNTER
----- Message from Minda Nash sent at 10/9/2019 11:32 AM CDT -----  Regarding: F  Caller: Ken    Primary cardiologist: Dr. Vigil    Detailed reason for call: Has all his medications available, wants to review his list w/RN. Please call back.     Best phone number: [phone]  Best time to contact: As soon as possible  Ok to leave a detailed message? No  Device? Y    Additional Info:

## 2021-06-02 NOTE — PROGRESS NOTES
In clinic device check with Device RN for lower rate reprogramming 4 weeks s/p AVNA.  Please see link for full device report.  Patient was informed of results and next follow up during today's visit.

## 2021-06-02 NOTE — TELEPHONE ENCOUNTER
----- Message from Minda Nash sent at 10/17/2019  3:45 PM CDT -----  Regarding: LBF  The medication or refill issue is below:    Primary Cardiologist: Dr. Vigil    Medication:  carvedilol (COREG) 6.25 MG tablet     Issue / Concern: Clarification on dose / directions. Please call pharmacy back.     Preferred Pharmacy: [facility] [address] [place] [address] Phone Number for Patient: n/a          Called [facility] to clarify dose. Pt should be taking Carvedilol 12.5 mg two times a day. Pharmacist verbalized understanding.

## 2021-06-02 NOTE — PATIENT INSTRUCTIONS - HE
Ken Doss,    It was a pleasure to see you today at the [facility].     My recommendations after this visit include:  - You will be called with the results of your labs.   - Take lasix (furosemide, water pill) 40 mg daily.  If you feel that you are holding fluid (shortness of breath, swelling) you can take an extra 40 mg.  - Follow up in early November.  - See Patricia in 3 months.    Linda Hardin, CNP

## 2021-06-02 NOTE — TELEPHONE ENCOUNTER
Result Notes for Basic Metabolic Panel     Notes recorded by Linda Hardin CNP on 10/10/2019 at 9:35 AM CDT  Called Skyler with lab results.  He is getting back on his normal diet and fluid intake now.  He had a large breakfast this morning.  Home blood sugars have not been this high.  He will check later today when he gets home and will watch his intake today.  He was encouraged to call his primary if he has high blood sugar readings at home.

## 2021-06-02 NOTE — TELEPHONE ENCOUNTER
Lacey from [facility] called - asking if pt was still taking enalapril.  Informed pharm that enalapril was discontinued in mid-July 2019.      Return call to pt to go over med list.  Pt confirmed everything that is on list, except was taking carvedilol 25 mg two times a day instead of 12.5 mg two times a day.  Reinforced that pt needs to check tablet strength and he needs to make sure to only get 12.5 mg two times a day.    Pt has appt with Linda 10/10/19.  yesy

## 2021-06-02 NOTE — TELEPHONE ENCOUNTER
"Sees Dr. Vigil in Cardiology. He ordered a PET/CT scan    On diet of strict protein and water yesterday and then nothing to eat today. Was told that if he ate anything they would not be able to do the surgery     Patient is a diabetic and his BS are going down.   Most recent numbers were 71 and 66  Feeling very weak  Feels a Dry sweat coming on  Feels like he could pass out  Feeling shakey     No difficulty breathing  No chest pain  No fever    Wife states that they NEED to get his blood sugar up, but is worried about the scan. States they can reschedule if need be but would rather not. Questions what they can do. Would like on call doctor paged for advise.     Okay to leave a detailed message on voicemail.     Page out to Dr. Gamez in Cardiology  -not ok to have low blood sugar, should do what they normally do to raise his BS. Juice would be a good choice.  *When asked if patient would still be able to have scan done, provider replied \"yes\"    Notified Patient's wife. Patient in background verbalizes understanding.    Reason for Disposition    Caller has URGENT medication or insulin pump question and triager unable to answer question    Protocols used: DIABETES - LOW BLOOD SUGAR-LALI-FARIDEH Martinez RN Triage Nurse Advisor    "

## 2021-06-03 VITALS
HEART RATE: 76 BPM | HEIGHT: 69 IN | DIASTOLIC BLOOD PRESSURE: 58 MMHG | SYSTOLIC BLOOD PRESSURE: 118 MMHG | WEIGHT: 164.5 LBS | RESPIRATION RATE: 16 BRPM | BODY MASS INDEX: 24.37 KG/M2

## 2021-06-03 VITALS — HEIGHT: 70 IN | WEIGHT: 170 LBS | BODY MASS INDEX: 24.34 KG/M2

## 2021-06-03 VITALS
RESPIRATION RATE: 16 BRPM | DIASTOLIC BLOOD PRESSURE: 68 MMHG | HEIGHT: 69 IN | WEIGHT: 175 LBS | HEART RATE: 60 BPM | SYSTOLIC BLOOD PRESSURE: 118 MMHG | BODY MASS INDEX: 25.92 KG/M2

## 2021-06-03 VITALS
BODY MASS INDEX: 25.33 KG/M2 | DIASTOLIC BLOOD PRESSURE: 66 MMHG | HEART RATE: 80 BPM | SYSTOLIC BLOOD PRESSURE: 118 MMHG | WEIGHT: 171 LBS | RESPIRATION RATE: 16 BRPM | HEIGHT: 69 IN

## 2021-06-03 VITALS
HEIGHT: 70 IN | WEIGHT: 178 LBS | SYSTOLIC BLOOD PRESSURE: 92 MMHG | HEART RATE: 56 BPM | RESPIRATION RATE: 18 BRPM | BODY MASS INDEX: 25.48 KG/M2 | DIASTOLIC BLOOD PRESSURE: 60 MMHG

## 2021-06-03 VITALS
WEIGHT: 182 LBS | RESPIRATION RATE: 24 BRPM | HEIGHT: 70 IN | BODY MASS INDEX: 26.05 KG/M2 | SYSTOLIC BLOOD PRESSURE: 96 MMHG | HEART RATE: 96 BPM | DIASTOLIC BLOOD PRESSURE: 60 MMHG

## 2021-06-03 VITALS — WEIGHT: 168.1 LBS | BODY MASS INDEX: 24.07 KG/M2 | HEIGHT: 70 IN

## 2021-06-03 VITALS
SYSTOLIC BLOOD PRESSURE: 102 MMHG | HEIGHT: 69 IN | DIASTOLIC BLOOD PRESSURE: 60 MMHG | WEIGHT: 171.4 LBS | HEART RATE: 84 BPM | RESPIRATION RATE: 16 BRPM | BODY MASS INDEX: 25.39 KG/M2

## 2021-06-03 VITALS — BODY MASS INDEX: 25.62 KG/M2 | WEIGHT: 173 LBS | HEIGHT: 69 IN

## 2021-06-03 VITALS — WEIGHT: 175 LBS | BODY MASS INDEX: 25.05 KG/M2 | HEIGHT: 70 IN

## 2021-06-03 VITALS — WEIGHT: 171.4 LBS | BODY MASS INDEX: 25.39 KG/M2 | HEIGHT: 69 IN

## 2021-06-03 VITALS — BODY MASS INDEX: 24.73 KG/M2 | HEIGHT: 70 IN | WEIGHT: 172.7 LBS

## 2021-06-03 VITALS — WEIGHT: 166 LBS | BODY MASS INDEX: 23.77 KG/M2 | HEIGHT: 70 IN

## 2021-06-03 NOTE — PROGRESS NOTES
Pemafibrate to Reduce cardiovascular OutcoMes by reducing triglycerides IN patiENts with diabeTes (PROMINENT) clinical trial.    Subject seen in clinic today for study Visit 17.      Subject seen by provider Dr. Vigil for study related physical exam.  See provider note and flow sheets for vital signs.    Waist measures 97 cm    Labs drawn per protocol. Results will be scanned into 'media'.     EQ-5D-5L questionnaire completed per follow up of event.      We discussed recent ablation and CHF events over the past month (already reported).  He is monitoring daily weights and watching salt intake.  Reviewed medications and adjustment.  Bruise on left arm noted which subject doesn't recall what from - only slightly tender and he denies other bleeding.  Dr. Vigil agrees this is not an AE.  Subject does appear to be a bit more forgetful lately and has trouble tracking all of the appointments, treatments, and medication adjustments lately (which have been numerous).  Will continue to monitor for any significant change.      Study efficacy, endpoints and adverse events reviewed with subject.  Cardiovascular risk discussed. 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    Study alcohol consumption stipulations and education reviewed with subject:    Subject reports  [] Never [x] Current [] Former .   1 drinks per [] Day [] Week [] Month [x] Occasional.  1 maximum drinks per sitting.     Subject forgot study medication at home today, he has another appointment here next week and will meet with me then to return medication.  He reports compliance and not missing doses.  He does setup his pills a few weeks at a time which we discussed the compliance pro's and con's of this at length again today.  Study medication box #'s [id number], [id number], [id number], [id number] dispensed to subject.  Education strongly reinforced on medication compliance and administration    Plan: Study Visit 18 telephone call with  subject in 2 months.  Will schedule study Month 32 / Visit 19  with subject in 4 months back at [facility].    Eduard Tang RN  Clinical Trials Nurse

## 2021-06-03 NOTE — TELEPHONE ENCOUNTER
"Received a call back from [facility]- Pt had this done on 10/7 and 10/9. Results in care everywhere under [facility] under \"other results\".       Skyler Clark had this done October 7th and 9th. Results in Care everywhere. I will also have a copy at my desk.   Mal   "

## 2021-06-03 NOTE — TELEPHONE ENCOUNTER
- Message -----  From: Mary Jane Vigil MD  Sent: 11/12/2019  11:03 AM CST  To: Nany Forrester RN    Thanks, he did not remember getting the scan.  Shows no sarcoid in heart, comforting.  LF

## 2021-06-03 NOTE — PATIENT INSTRUCTIONS - HE
Ken Doss,    It was a pleasure to see you today at the [facility].     My recommendations after this visit include:  - No changes to your medications.    - Continue to monitor for signs of retaining fluid (increasing weights, shortness of breath, swelling) and call with any concerns. [phone]  - Follow up in 6 months.     Linda Hardin, CNP

## 2021-06-03 NOTE — TELEPHONE ENCOUNTER
Called [facility] scheduling department at [phone] to determine the status of patient's cardiac PET scan and if he had been contacted to schedule. Left my call back number to discuss. -evelyn

## 2021-06-03 NOTE — PATIENT INSTRUCTIONS - HE
It was great to see you again today for the Prominent triglyceride study.  We will call you in 2 months for a study telephone visit.    We will also see you back in 4 months for your Visit 19 which is a NON- fasting visit.  Please remember to bring back your study drug and packages (both empty or full) to every study visit.  Inform us of any changes in your health and call with any questions.  Thanks!    Research Hotline: [phone]  Eduard Tang RN  Clinical Trials Nurse

## 2021-06-03 NOTE — PATIENT INSTRUCTIONS - HE
Mr Ken Doss,  I enjoyed visiting with you again today.  I am glad to hear you are doing well.  Per our conversation let the DIGOXIN and we will recheck the echo.  Please follow up on scan at the [facility].  I will plan on seeing you after that.  Alex Vigil

## 2021-06-03 NOTE — TELEPHONE ENCOUNTER
----- Message from Mary Jane Vigil MD sent at 11/12/2019  8:25 AM CST -----  I had requested/ordered cardiac PET scan at the [facility] to rule out sarcoid.  He is unsure if the University or contact him to schedule?  Can you follow-up to make sure this gets done, preferably before the end of the year. LF

## 2021-06-03 NOTE — PROGRESS NOTES
Pemafibrate to Reduce cardiovascular OutcoMes by reducing triglycerides IN patiENts with diabeTes (PROMINENT) clinical trial.    Subject seen in clinic today before his device check for study drug reconciliation.      Study medication box # [id number] with 0 tabs  Study medication box # [id number] with 0 tabs  Study medication box # [id number] with 0 tabs  Study medication box # [id number] with 57 tabs  returned by subject.  Total tablet count of 57 for 95 % compliance.  Subject forgot drug boxes at 12nov2019 study visit thus I met him today to return.   Education provided on medication compliance and administration.  Subject sets up several weeks of all medications in advance, despite several attempts by me and other clinical staff he continues to do so.  I again discussed the drawbacks of this for medication changes and to remember to fill with study drug visits in mind.      Plan: Study Visit 18 telephone call with subject in 2 months.  Will schedule study Month 19 Visit  with subject in 16 weeks back at [facility].    Eduard Tang RN  Clinical Trials Nurse

## 2021-06-04 VITALS
DIASTOLIC BLOOD PRESSURE: 56 MMHG | RESPIRATION RATE: 18 BRPM | SYSTOLIC BLOOD PRESSURE: 94 MMHG | HEART RATE: 64 BPM | BODY MASS INDEX: 22.74 KG/M2 | WEIGHT: 154 LBS

## 2021-06-04 VITALS
WEIGHT: 165 LBS | BODY MASS INDEX: 24.44 KG/M2 | OXYGEN SATURATION: 100 % | HEART RATE: 60 BPM | DIASTOLIC BLOOD PRESSURE: 62 MMHG | SYSTOLIC BLOOD PRESSURE: 98 MMHG | RESPIRATION RATE: 12 BRPM | HEIGHT: 69 IN

## 2021-06-04 VITALS
HEART RATE: 64 BPM | WEIGHT: 154 LBS | BODY MASS INDEX: 22.74 KG/M2 | SYSTOLIC BLOOD PRESSURE: 94 MMHG | DIASTOLIC BLOOD PRESSURE: 56 MMHG

## 2021-06-04 VITALS
RESPIRATION RATE: 14 BRPM | OXYGEN SATURATION: 93 % | WEIGHT: 162.25 LBS | BODY MASS INDEX: 23.96 KG/M2 | HEART RATE: 68 BPM | DIASTOLIC BLOOD PRESSURE: 42 MMHG | SYSTOLIC BLOOD PRESSURE: 98 MMHG

## 2021-06-04 VITALS
WEIGHT: 160 LBS | SYSTOLIC BLOOD PRESSURE: 98 MMHG | RESPIRATION RATE: 14 BRPM | BODY MASS INDEX: 23.7 KG/M2 | HEART RATE: 72 BPM | HEIGHT: 69 IN | DIASTOLIC BLOOD PRESSURE: 50 MMHG

## 2021-06-04 VITALS — HEIGHT: 69 IN | BODY MASS INDEX: 24.59 KG/M2 | WEIGHT: 166 LBS

## 2021-06-04 VITALS
HEART RATE: 64 BPM | DIASTOLIC BLOOD PRESSURE: 60 MMHG | WEIGHT: 173 LBS | BODY MASS INDEX: 25.62 KG/M2 | HEIGHT: 69 IN | RESPIRATION RATE: 16 BRPM | SYSTOLIC BLOOD PRESSURE: 114 MMHG

## 2021-06-04 VITALS
BODY MASS INDEX: 24.37 KG/M2 | RESPIRATION RATE: 12 BRPM | WEIGHT: 164.5 LBS | DIASTOLIC BLOOD PRESSURE: 60 MMHG | SYSTOLIC BLOOD PRESSURE: 116 MMHG | HEART RATE: 68 BPM | HEIGHT: 69 IN

## 2021-06-04 VITALS
HEIGHT: 69 IN | HEART RATE: 68 BPM | SYSTOLIC BLOOD PRESSURE: 92 MMHG | WEIGHT: 162.2 LBS | BODY MASS INDEX: 24.02 KG/M2 | RESPIRATION RATE: 20 BRPM | DIASTOLIC BLOOD PRESSURE: 52 MMHG

## 2021-06-04 VITALS
BODY MASS INDEX: 23.7 KG/M2 | SYSTOLIC BLOOD PRESSURE: 100 MMHG | RESPIRATION RATE: 16 BRPM | HEIGHT: 69 IN | HEART RATE: 60 BPM | WEIGHT: 160 LBS | DIASTOLIC BLOOD PRESSURE: 60 MMHG

## 2021-06-04 VITALS
BODY MASS INDEX: 22.3 KG/M2 | WEIGHT: 151 LBS | HEART RATE: 53 BPM | DIASTOLIC BLOOD PRESSURE: 102 MMHG | SYSTOLIC BLOOD PRESSURE: 108 MMHG

## 2021-06-04 VITALS
WEIGHT: 158 LBS | BODY MASS INDEX: 23.4 KG/M2 | SYSTOLIC BLOOD PRESSURE: 104 MMHG | HEART RATE: 76 BPM | DIASTOLIC BLOOD PRESSURE: 64 MMHG | RESPIRATION RATE: 16 BRPM | HEIGHT: 69 IN

## 2021-06-04 VITALS
HEART RATE: 70 BPM | DIASTOLIC BLOOD PRESSURE: 68 MMHG | BODY MASS INDEX: 22.51 KG/M2 | SYSTOLIC BLOOD PRESSURE: 92 MMHG | HEIGHT: 69 IN | WEIGHT: 152 LBS

## 2021-06-04 VITALS — WEIGHT: 160 LBS | HEIGHT: 69 IN | BODY MASS INDEX: 23.7 KG/M2

## 2021-06-04 NOTE — TELEPHONE ENCOUNTER
----- Message from Valeria Grigsby sent at 1/6/2020  8:46 AM CST -----  Caller: Patient    Primary cardiologist: Dr. Vigil    Detailed reason for call: [place] him, he stated that he had difficulties breathing and had to use oxygen. He is seeing his primary doctor tomorrow to discuss some of the other issues as well that he felt on the trip. He is wondering if he needs to see his cardiologist. Please advise    New or active symptoms? Yes    Best phone number: [phone]    Best time to contact: today    Ok to leave a detailed message? yes    Device? yes           Called back patient to address his concerns. Pt was in [place] last week visiting his son anywhere from [phone] ft up in altitude. He states that when up int [facility], he got very sick and could not breathe. He had to come down the mountain and be seen in a walk in clinic. He was prescribed oxygen as needed and he was told to take it easy. He spent most of his time in the [employer] laying low. He had no appetite and did not sleep well. He has been home since Friday and has slowly been feeling better. He states that he has a loose, hacking cough. He can pretty much just lay on the couch. He does not feel short of breath at rest anymore and was able to eat breakfast this morning and has kept it down. He states his pulse has been in the 60's and his blood pressure has been normal. He is down 6-7 lbs due to lack of appetite from the altitude. He wonders if he should see LBF. It does look like LBF had wanted to see him post cardiac PET scan which he had at the [facility] in the fall. He will see his PMD today- he was unable to get in today. Encouraged him to seek out medical attention int he ER if he develops any chest pain, shortness of breath or feels like his heart is racing. Pt will need a device check per MG. Called back patient to help facilitate- LM for him to call back to get this arranged. -[facility]

## 2021-06-04 NOTE — ANESTHESIA CARE TRANSFER NOTE
Last vitals:   Vitals:    12/19/19 1305   BP: 107/60   Pulse: 60   Resp: 16   Temp: 36.6  C (97.8  F)   SpO2: 100%     Patient's level of consciousness is awake  Spontaneous respirations: yes  Maintains airway independently: yes  Dentition unchanged: yes  Oropharynx: oropharynx clear of all foreign objects    QCDR Measures:  ASA# 20 - Surgical Safety Checklist: WHO surgical safety checklist completed prior to induction    PQRS# 430 - Adult PONV Prevention: 4558F - Pt received => 2 anti-emetic agents (different classes) preop & intraop  ASA# 8 - Peds PONV Prevention: 4558F - Pt received => 2 anti-emetic agents (different classes) preop & intraop  PQRS# 424 - Della-op Temp Management: 4559F - At least one body temp DOCUMENTED => 35.5C or 95.9F within required timeframe  PQRS# 426 - PACU Transfer Protocol: - Transfer of care checklist used  ASA# 14 - Acute Post-op Pain: ASA14B - Patient did NOT experience pain >= 7 out of 10

## 2021-06-04 NOTE — TELEPHONE ENCOUNTER
Called back patient again and was able to get a hold of him. He reports he is feeling better yet even from this AM- his breakfast has stayed down and he is working on lunch. He was transferred to device scheduling to get an appt with LBF and a device check prior. Again, appt with PMD tomorrow. -[facility] [place] [facility]

## 2021-06-04 NOTE — TELEPHONE ENCOUNTER
"Received a call from Skyler - he was looking for \"nuclear scan results\" as they were never discussed with him. He has not had a recent nuclear scan test. With clarification, he meant the [facility] Apologized that [facility] from [facility] him with results but LBF reviewed and no sarcoid. Results mailed home to patient from care everywhere. -[facility] [place]   "

## 2021-06-04 NOTE — ANESTHESIA POSTPROCEDURE EVALUATION
Patient: Ken Doss  COLONOSCOPY with polypectomy  Anesthesia type: MAC    Patient location: Phase II Recovery  Last vitals:   Vitals Value Taken Time   /68 12/19/2019  1:46 PM   Temp 35.9  C (96.6  F) 12/19/2019  1:45 PM   Pulse 60 12/19/2019  1:46 PM   Resp 16 12/19/2019  1:45 PM   SpO2 93 % 12/19/2019  1:46 PM   Vitals shown include unvalidated device data.  Post vital signs: stable  Level of consciousness: awake and responds to simple questions  Post-anesthesia pain: pain controlled  Post-anesthesia nausea and vomiting: no  Pulmonary: unassisted, return to baseline  Cardiovascular: stable and blood pressure at baseline  Hydration: adequate  Anesthetic events: no    QCDR Measures:  ASA# 11 - Della-op Cardiac Arrest: ASA11B - Patient did NOT experience unanticipated cardiac arrest  ASA# 12 - Della-op Mortality Rate: ASA12B - Patient did NOT die  ASA# 13 - PACU Re-Intubation Rate: ASA13B - Patient did NOT require a new airway mgmt  ASA# 10 - Composite Anes Safety: ASA10A - No serious adverse event    Additional Notes:

## 2021-06-04 NOTE — ANESTHESIA PREPROCEDURE EVALUATION
Anesthesia Evaluation      Patient summary reviewed     Airway   Mallampati: I  Neck ROM: limited   Pulmonary - negative ROS    breath sounds clear to auscultation    ROS comment: Interstitial lung disease                         Cardiovascular   Exercise tolerance: > or = 4 METS  (+) pacemaker, hypertension, past MI, CAD, CABG/stent, dysrhythmias, CHF, ,     ECG reviewed (LBBB)  Rhythm: regular  Rate: normal,      ROS comment: Stress echo 5/2019:    The pharmacologic nuclear stress test is abnormal.    There is a medium sized area of nontransmural infarction in the inferior and inferoseptal segment(s) of the left ventricle; no ischemia is detected.    Left ventricular enlargement is noted.    The left ventricular ejection fraction is 27% with inferior akinesis and global hypokinesis out of proportion to the area of infarction.    When compared to the images of 1/9/2019, inferior ischemia is no longer detected and the ejection fraction has improved from 15% to 27%.    DSE x 2 placed 1/2019     Neuro/Psych    (+) neuromuscular disease,  depression,     Endo/Other    (+) diabetes mellitus type 2, arthritis,      GI/Hepatic/Renal - negative ROS      Other findings: Patient is able to walk 0.5-1 miles at a time at the fitness center regularly      Dental                         Anesthesia Plan  Planned anesthetic: MAC    ASA 3   Induction: intravenous   Anesthetic plan and risks discussed with: patient    Post-op plan: routine recovery

## 2021-06-04 NOTE — TELEPHONE ENCOUNTER
----- Message -----  From: Mary Jane Vigil MD  Sent: 12/18/2019   3:07 PM CST  To: Farnaz Grayson RN  I know this 74-year-old gentleman well with a history of coronary artery disease, atrial fibrillation permanent, cardiomyopathy, and recent AV marianna ablation.  He does not quite understand his medications all that well and we have had help him sort this out and he sometimes is too busy to even pay attention to his medications.  With that being said, I agree he would need to hold Eliquis for 3 days and Plavix for at least 5 days prior to any sort of polyp removal.  Going through a prep is awful, if they have a low suspicion of not needed take any biopsies and polyps and he does not mind taking the risk of having to have another prep and another colonoscopy in the near future then I would go tomorrow with him on full anticoagulation may be just holding Eliquis in the morning.  If however there is a high likelihood of polypectomy then I would suggest canceling and rescheduling with 3 days no Eliquis in 5 days no Plavix.  LF

## 2021-06-04 NOTE — TELEPHONE ENCOUNTER
----- Message from Kim Hernandez sent at 12/18/2019 10:55 AM CST -----  Regarding: LBF Pt  General phone call:    Caller: Sadaf from [facility] admit team  Primary cardiologist: Musa     Detailed reason for call: Sadaf is calling with the patient in her office. He may have some medication confusion and they are wondering if he needs to take his meds at noon today. Please give Sadaf a call back as soon as you can.     Best phone number: [phone]  Best time to contact: asap  Ok to leave a detailed message? yes  Device? no  Additional Info:        Attempted to return call to DESI Talavera for her to return call. Noted pt is scheduled for colonoscopy tomorrow. No hold orders have been made from Dr. Vigil. Will wait a return call to discuss. -kcl    Received phone call from Sadaf. Pt is scheduled for colonoscopy tomorrow. Has not held Plavix or Eliquis medications. Sadaf awaiting call from [facility] with plan going forward whether it be to reschedule or continue. [facility] ok to proceed without holding Plavix medication but does recommend 3-day hold or Eliquis. Will await return call from Sadaf with update on plan going forward. Will pass message along to [facility] if hold orders are still needed. Last PCI 1/10/19.     Received another call from Sadaf. Pt is anxiously waiting what to do regarding colonoscopy tomorrow.  Sadaf asks for Dr. Vigil's recommendations regarding plavix and eliquis hold orders.      Dr. Vigil, confusing situation here - pt is scheduled to have a colonoscopy tomorrow. He did not hold his Plavix or Eliquis and he never reached out to us regarding these hold orders prior to procedure.  I received a call from nurse Sadaf at pre-op asking for your recommendations. I assume the procedure will need to be rescheduled at this point. Sadaf is waiting to hear back from [facility] about the plan going forward. Pt had last PCI 1/15/2019. He did complete the bowel prep and is anxiously waiting. Any new recommendations regarding  Plavix and Eliquis hold orders prior to colonoscopy?

## 2021-06-05 VITALS
DIASTOLIC BLOOD PRESSURE: 68 MMHG | BODY MASS INDEX: 23.7 KG/M2 | HEIGHT: 69 IN | RESPIRATION RATE: 20 BRPM | WEIGHT: 160 LBS | HEART RATE: 87 BPM | SYSTOLIC BLOOD PRESSURE: 108 MMHG

## 2021-06-05 VITALS
DIASTOLIC BLOOD PRESSURE: 50 MMHG | HEART RATE: 84 BPM | BODY MASS INDEX: 23.99 KG/M2 | HEIGHT: 69 IN | WEIGHT: 162 LBS | RESPIRATION RATE: 16 BRPM | SYSTOLIC BLOOD PRESSURE: 100 MMHG

## 2021-06-05 VITALS
WEIGHT: 162 LBS | BODY MASS INDEX: 23.99 KG/M2 | RESPIRATION RATE: 16 BRPM | HEIGHT: 69 IN | HEART RATE: 84 BPM | SYSTOLIC BLOOD PRESSURE: 100 MMHG | DIASTOLIC BLOOD PRESSURE: 50 MMHG

## 2021-06-05 VITALS
WEIGHT: 162.8 LBS | HEIGHT: 69 IN | SYSTOLIC BLOOD PRESSURE: 90 MMHG | HEART RATE: 76 BPM | RESPIRATION RATE: 12 BRPM | DIASTOLIC BLOOD PRESSURE: 50 MMHG | BODY MASS INDEX: 24.11 KG/M2

## 2021-06-05 VITALS
BODY MASS INDEX: 24.79 KG/M2 | DIASTOLIC BLOOD PRESSURE: 60 MMHG | HEART RATE: 73 BPM | WEIGHT: 167.4 LBS | HEIGHT: 69 IN | SYSTOLIC BLOOD PRESSURE: 93 MMHG

## 2021-06-05 VITALS — WEIGHT: 155 LBS | BODY MASS INDEX: 22.96 KG/M2 | HEIGHT: 69 IN

## 2021-06-05 VITALS
HEIGHT: 69 IN | BODY MASS INDEX: 23.99 KG/M2 | HEART RATE: 70 BPM | SYSTOLIC BLOOD PRESSURE: 102 MMHG | DIASTOLIC BLOOD PRESSURE: 68 MMHG | OXYGEN SATURATION: 98 % | RESPIRATION RATE: 12 BRPM | WEIGHT: 162 LBS

## 2021-06-05 NOTE — PATIENT INSTRUCTIONS - HE
Mr Ken Doss,  I enjoyed visiting with you again today.  I am concerned with the shortness of breath.  Per our conversation stop the digoxin today, and if no major issues, stop the CLOPIDOGREL next Friday.  Call me at [phone] if issues.  I am going to refer you to our [facility]  I will plan on seeing you after that.  Alex Vigil

## 2021-06-05 NOTE — PROGRESS NOTES
In clinic device check with Dr. Vigil.  Please see link for full device report.  Patient was informed of results and next follow up during today's visit.

## 2021-06-05 NOTE — PROGRESS NOTES
Pulmonary Clinic Follow-up Visit    Impression: 73 male with history of CAD s/p PCI/stents in Jan 2019, afib on eliquis, NICM EF 20-25%, DM2, atrial fibrillation on NOAC, admission in July 2019 for afib and CHF exacerbation during which he was found to have peripheral fibrosis and hilar/mediastinal adenopathy. Infectious and autoimmune workup has been negative. FNA of an abnormal left supraclavicular node showed non-necrotizing granulomas possibly related to anthracosis/pneumoconiosis vs. Processing artifact vs. Other environmental exposure. PFT's showed very mild restriction and moderate diffusion impairment. He does not seem to be very symptomatic or limited by his breathing and remains quite active. BNP was markedly elevated and he has orthopnea suggesting more of a CHF picture.    A high-resolution CT scan performed 2 days ago did show evidence of pulmonary edema, bilateral pleural effusions and some subpleural fibrosis without definite honeycombing suggestive of a probable UIP pattern. There was also a new BRIONNA consolidative opacity concerning for acute infectious vs. Inflammatory process. Given his symptoms of productive cough over the last few days I would like to treat him for a mild community-acquired pneumonia and repeat the CT scan in the near future. We discussed the possibility of bronchoscopy with lung biopsies if there is still uncertainty to see if there is a steroid-responsive process occurring. Surgical lung biopsy may need to be considered however this would be higher risk given his significant heart disease.     Plan:  - ceftin 500mg two times a day for 7 days  - azithromycin 500mg day 1 then 250mg days 2-5 total 5 days/  - repeat CT chest non-contrast in 4-6 weeks.  - encouraged him to remain active and continue to exercise as able  - consideration from bronchoscopy with biopsies vs. cryobiopsies (would need to be done at the ) vs. Surgical lung biopsy - will need to discuss this with  cardiology.     All questions answered. Follow up in 6 weeks with repeat CT chest.    CCx: hospital discharge follow up    HPI: Interim history: I last saw Skyler on 7/25/2019. Since that time he has been followed by cardiology for NICM and is being referred to the  for LVAD therapy. However he is unsure if the [facility] is in [facility] network so he is not sure if he will be seen there.  Today, he reports he's generally feeling OK.  He developed a productive cough with thick greenish phlegm over the last day or so. No fevers, chills or hemoptysis.   He continues to work as a . He is able to climb stairs and walk up hills.  His main complaint is poor sleep and difficulty breathing at night, while laying flat. He does have to prop himself up on a few pillows at night. No significant peripheral edema.   He is taking extra diuretic pills to control his fluid status.     ROS:  A 12-system review was obtained and was negative with the exception of the symptoms endorsed in the history of present illness.    PMH:  Past Medical History:   Diagnosis Date     Abdominal pain      Acute renal failure (H)      Anemia      Arthritis     osteoarthritis     CHF (congestive heart failure) (H)      Chronic systolic heart failure (H) Dec 2012     Coronary artery disease involving native coronary artery     Non obstructive disease by cath in 2007 Cor angio Nov 2016: RCA 70% (FFR 0.89), and OM1 75%        Depressive disorder, not elsewhere classified 10/24/2014     Diabetes mellitus, type II (H) 28/Jan/2013     Diabetic neuropathy (H)      Dyslipidemia 2007     Fractures     ankle, leg and arm     Hypertension      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     ICD (implantable cardioverter-defibrillator) lead failure 11/26/2014    High voltage lead tip inserted in RV free wall RV lead extraction and implantation of the new septal RV lead November 2014      ILD (interstitial lung disease) (H)       Infectious mononucleosis      Ischemic cardiomyopathy 4/22/2015    Chronic: LVEF 25- 30% LVEF 26% echo May 2017     Kidney stone      LBBB (left bundle branch block)     noted on Dec 19, 2012     Lymphadenopathy, mediastinal      Myocardial infarction (H)      Osteoarthritis      Pulmonary anthracosis (H)      Recurrent kidney stones      Shortness of breath     with exertion       PSH:  Past Surgical History:   Procedure Laterality Date     APPENDECTOMY       BIV ICD  11/26/2014    biotronic     CARDIAC CATHETERIZATION N/A 12/12/2016    Procedure: Coronary Angiogram;  Surgeon: Delgado Ramirez MD;  Location: [facility] Lab;  Service:      COLONOSCOPY N/A 12/19/2019    Procedure: COLONOSCOPY with polypectomy;  Surgeon: Delgado Price MD;  Location: [facility] OR;  Service: Gastroenterology     CV CORONARY ANGIOGRAM N/A 1/15/2019    Procedure: Coronary Angiogram;  Surgeon: Mason Correa MD;  Location: [facility] Cath Lab;  Service: Cardiology     CV LEFT HEART CATHETERIZATION WO LEFT VETRICULOGRAM Left 1/15/2019    Procedure: Left Heart Catheterization Without Left Ventriculogram;  Surgeon: Mason Correa MD;  Location: [facility] Lab;  Service: Cardiology     EP ABLATION AV NODE N/A 9/27/2019    Procedure: EP Ablation AV Node;  Surgeon: Markus Pool MD;  Location: [facility] Lab;  Service: Cardiology     EP ICD INSERT      ICD REIMPLANTATION OF A NEW RV LEAD 11/26/2014     INSERT / REPLACE / REMOVE PACEMAKER       JOINT REPLACEMENT      bilateral TKA     KNEE ARTHROSCOPY Bilateral      KY L HRT CATH W/NJX L VENTRICULOGRAPHY IMG S&I Left 12/12/2016    Procedure: Left Heart Catheterization with Left Ventriculogram;  Surgeon: Delgado Ramirez MD;  Location: [facility] Lab;  Service: Cardiology     KY REMOVE TONSILS/ADENOIDS,<11 Y/O      Description: Tonsillectomy With Adenoidectomy;  Recorded: 06/10/2014;     KY SHLDR ARTHROSCOP,DIAGNOSTIC      Description:  Arthroscopy Shoulder;  Recorded: 06/10/2014;     REPLACEMENT TOTAL KNEE      bilat     ROTATOR CUFF REPAIR      right     TONSILLECTOMY AND ADENOIDECTOMY       US LYMPH NODE BIOPSY  7/10/2019       Allergies:  No Known Allergies    Family HX:  Family History   Problem Relation Age of Onset     Sudden death Mother         unexpected death in her sleep in her 50s       Social Hx:  Social History     Socioeconomic History     Marital status:      Spouse name: Not on file     Number of children: Not on file     Years of education: Not on file     Highest education level: Not on file   Occupational History     Not on file   Social Needs     Financial resource strain: Not on file     Food insecurity:     Worry: Not on file     Inability: Not on file     Transportation needs:     Medical: Not on file     Non-medical: Not on file   Tobacco Use     Smoking status: Never Smoker     Smokeless tobacco: Never Used     Tobacco comment: cigars few times a year only   Substance and Sexual Activity     Alcohol use: Yes     Comment: occasional     Drug use: No     Sexual activity: Not on file   Lifestyle     Physical activity:     Days per week: Not on file     Minutes per session: Not on file     Stress: Not on file   Relationships     Social connections:     Talks on phone: Not on file     Gets together: Not on file     Attends Yarsanism service: Not on file     Active member of club or organization: Not on file     Attends meetings of clubs or organizations: Not on file     Relationship status: Not on file     Intimate partner violence:     Fear of current or ex partner: Not on file     Emotionally abused: Not on file     Physically abused: Not on file     Forced sexual activity: Not on file   Other Topics Concern     Not on file   Social History Narrative     Not on file       Current Meds:  Current Outpatient Medications   Medication Sig Dispense Refill     ACCU-CHEK SMARTVIEW TEST STRIP strips        acetaminophen  "(TYLENOL) 500 MG tablet Take 500 mg by mouth every 6 (six) hours as needed for pain.       apixaban (ELIQUIS) 5 mg Tab tablet Take 1 tablet (5 mg total) by mouth 2 (two) times a day. 180 tablet 0     atorvastatin (LIPITOR) 40 MG tablet TAKE 1 TABLET BY MOUTH AT BEDTIME 90 tablet 0     BASAGLAR KWIKPEN U-100 INSULIN 100 unit/mL (3 mL) pen Inject 65 Units under the skin at bedtime. Prescribed for 65 units at HS, patient adjusting based on blood sugars       carvedilol (COREG) 6.25 MG tablet Take 2 tablets (12.5 mg total) by mouth 2 (two) times a day with meals. Take with 12.5 mg tablet by mouth  tablet 3     clopidogrel (PLAVIX) 75 mg tablet TAKE 1 TABLET BY MOUTH ONCE DAILY 90 tablet 0     coenzyme Q10 (COQ-10) 100 mg capsule Take 1 capsule (100 mg total) by mouth daily.  0     diphenhydrAMINE-acetaminophen (TYLENOL PM EXTRA STRENGTH)  mg Tab Take 2 tablets by mouth at bedtime as needed.        ENTRESTO 24-26 mg Tab tablet TAKE 1 TABLET BY MOUTH TWICE DAILY 180 tablet 1     furosemide (LASIX) 40 MG tablet TAKE 1 TABLET BY MOUTH TWICE DAILY 190 tablet 0     glucosamine-chondroitin 500-400 mg cap Take 1 capsule by mouth daily. 1500mg/1200mg once daily       metFORMIN (GLUCOPHAGE) 1000 MG tablet Take 1,000 mg by mouth 2 (two) times a day with meals.       multivitamin therapeutic (THERAGRAN) tablet Take 1 tablet by mouth daily.       potassium chloride (K-DUR,KLOR-CON) 20 MEQ tablet Take 1 tablet (20 mEq total) by mouth daily. 90 tablet 3     Current Facility-Administered Medications   Medication Dose Route Frequency Provider Last Rate Last Dose     Study Drug pemafibrate 0.2 mg/placebo tablet 0.2 mg (PROMINENT)  0.2 mg Oral BID Eduard Tang, RN           Physical Exam:  BP 98/62   Pulse 60   Resp 12   Ht 5' 9\" (1.753 m)   Wt 165 lb (74.8 kg)   SpO2 100%   BMI 24.37 kg/m    Gen: awake, alert, oriented, no distress  HEENT: nasal turbinates are unremarkable, no oropharyngeal lesions, no cervical or " supraclavicular lymphadenopathy  CV: RRR, no M/G/R  Resp: very slight bibasilar crackles, good air movement no wheezing or rhonchi.   Abd: soft, nontender, no palpable organomegaly  Skin: no apparent rashes  Ext: no cyanosis, clubbing or edema  Neuro: alert, nonfocal    Labs:  Reviewed  Lorie-1, SSB/SSA, CARA< CCP, RF, Scl-70 all negative  hypersens pneumonitis panel 1 neg  ACE level 36  Aldolase wnl    Left supraclavicular node bx  LEFT SUPRACLAVICULAR LYMPH NODE, NEEDLE BIOPSY:     -   REACTIVE PARACORTICAL HYPERPLASIA     -   RARE FOCI OF NONNECROTIZING GRANULOMATOUS INFLAMMATION, ASSOCIATED WITH PIGMENT     -   GMS STAIN FOR FUNGI IS NEGATIVE     -   AURAMINE/RHODAMINE AND KINYOUN STAINS FOR ACID-FAST BACILLI ARE NEGATIVE     -   RARE SCATTERED EOSINOPHILS AND NEUTROPHILS ARE IDENTIFIED     -   NEGATIVE FOR CARCINOMA     -   NO EVIDENCE OF MALIGNANT LYMPHOPROLIFERATIVE DISORDER     -   PLEASE SEE COMMENT   Electronically signed by Franki Merritt MD on 7/11/2019 at 1905   Comment     The morphologic findings are most consistent with a reactive/inflammatory process. The rare microgranulomas are nonnecrotizing, and they are associated with foreign pigment, possibly related to anthracosis, processing artifact (e.g., formalin), or other foreign substance. Recommend clinical correlation and follow up.      LN flow negative for lymphoproliferative disorder.    Imaging studies:  CTA chest July 2019  IMPRESSION:   CONCLUSION:  1.  No aortic dissection or acute vascular pathology.  2.  New mediastinal and hilar lymphadenopathy may relate to lymphoproliferative disease or metastatic nodes. Sarcoid also possible. There are small nodes in the left supraclavicular region, as well. Recommend follow-up PET/CT is indicated.  3.  Diffuse reticular interstitial prominence within lung parenchyma most suggestive of developing pulmonary fibrosis.    PET/CT   IMPRESSION:   CONCLUSION:  1.  Mildly FDG avid bilateral symmetric hilar and  mediastinal lymphadenopathy most likely reactive and could represent sarcoidosis.  2.  Minimally FDG avid peripheral reticular opacities in the lung should be inflammatory, possibly fibrotic.    HRCT   IMPRESSION:      1.  Pulmonary edema and small pleural effusions compromise evaluation of fibrotic interstitial lung disease.     2.  However, mild basilar predominant fibrotic changes are present, best seen on prone imaging. Mild reticulation and traction bronchiectasis seen. Minimal subpleural cystic change, though cannot definitively confirm honeycombing. Findings meet criteria   for probable UIP pattern.     3.  New left upper lobe slightly consolidative opacity with differentials of asymmetric edema or infectious / inflammatory.     4.  Overall, mostly stable adenopathy. Prevascular node shows slight increased size. Consider continued follow-up.     5.  Cardiomegaly. Trace pericardial effusion. Severe coronary calcifications.     6.  Other findings in the report.    Echo March 2019    When compared to the previous study dated 5/23/2017, no significant change.    Left ventricle ejection fraction is severely decreased. The estimated left ventricular ejection fraction is 20-25%.    Normal right ventricular size and systolic function.    Mild to moderate mitral regurgitation    Device lead in right heart chambers    PFT's  7/23/2019  The spirometry was performed with adequate reproducibility.  The flow volume loop is essentially normal.     FEV1 is 2.74 L (91% predicted) and is normal.  FVC is 3.26 L (82% predicted) and is normal.  FEV1/FVC is 84% and is normal.     There was no improvement in spirometry after a single inhaled dose of bronchodilator.  Of note, patient did cough during the FVC maneuver following bronchodilator administration, which may affect the true values of this study.       TLC is 4.99 L (72% predicted) and is mildly reduced.  RV is 1.83 L (68% predicted) and is normal.     DLCO is 15.59  mL/min/mmHg (62% predicted) and is normal when it is corrected for hemoglobin.     Impression:  This pulmonary function study is mildly abnormal.  Spirometry is normal and there is no bronchodilator response.  Total lung capacity is mildly reduced.  The diffusion capacity, when corrected for hemoglobin, is moderately reduced.      Luis Manuel Catalan MD ([place]vi)  [facility]  Pager [phone]  Clinic [phone]

## 2021-06-05 NOTE — TELEPHONE ENCOUNTER
"----- Message -----  From: Mary Jane Vigil MD  Sent: 2/4/2020   3:34 PM CST  To: Nany Forrester RN    I strongly suspect this is chf due to poor ef, acute on chronic, and need to confirm taking diuretics.  Insomnia is probably chf/pnd.  Agree needs to get to [facility] for LVAD, but also needs chf np or rac or bad enough ed.  LF        Called patient and updated on response from LBF. He just picked up his prescriptions from Dr. Catalan. He reports he is on Furosemide 40 mg daily and sometimes he takes an extra dose in the afternoon if he feels he is retaining fluid. He states that he does not feel like he is retaining any fluid. He thinks it is related to a \"lung bug\" of some kind. He states that he wants to rest and see if the steroids and tessalon pearls work. He will call us if he decides he wants a RAC appt. He knows when to seek medical attention. He will plan to touch base Thursday. He says [place] \"[place]\" [place] volunteer work and wants to try to make it to this if he can. Will touch base Thursday and per MG- no device check required for RAC appt. -[facility]  "

## 2021-06-05 NOTE — TELEPHONE ENCOUNTER
----- Message from [email or web address] David sent at 1/29/2020  8:32 AM CST -----  Regarding: LBF Pt  General phone call:    Caller: Ken  Primary cardiologist: MESSI    Detailed reason for call: Patient is calling with some questions regarding his medications.    Best phone number: [phone]  Best time to contact: any  Ok to leave a detailed message? yes  Device? yes    Additional Info:      Called pt - he states he is confused on some of his medications. Caller went through each medication one by one. Spent 25 minutes or more going through his medications. Pt repeats medication questions. Appears overwhelmed with how many medication he is taking. Advised him to make an appointment with his PCP to review medication list - he states he recently did that.

## 2021-06-05 NOTE — TELEPHONE ENCOUNTER
----- Message from Tosin Ken sent at 2/7/2020  9:21 AM CST -----  Regarding: RE: Consult  SCHEDULED 02-  Patient is coming to [place] for this.  I did check with Device to make sure he did or did not need a device check, and no he does not.    Ernestina Freeman  ----- Message -----  From: Nany Forrester, RN  Sent: 2/4/2020  11:08 AM CST  To: Tosin Jesus Manuel  Subject: Consult                                          Farnaz Bautista had put a note on this that it was being worked on but I cannot tell the progress. Can we arrange for a consult with Dr. Payton?  Kaveh Freeman   ----- Message -----  From: Mary Jane Vigil MD  Sent: 1/28/2020   2:23 PM CST  To: Nany Forrester, RN    I just received this today, can we confirm that he has been seen by the  or has appointment coming up?  When I look in care everywhere I do not see any notes but I do see some [facility] people looking at his chart. LF  ----- Message -----  From: Tata Payton MD  Sent: 1/28/2020   1:01 PM CST  To: Mary Jane Vigil MD    Sure. Happy to see him and do the necessary    TT   ----- Message -----  From: Mary Jane Vigil MD  Sent: 1/10/2020  10:10 AM CST  To: MD Dr. Modesto Ernst, 74-year-old gentleman I am referring to you, ejection fraction probably around 20% although repeat echo pending.  Does have coronary disease which is been addressed.  On maximally tolerated dose of Entresto and carvedilol.  Also on furosemide as well as Eliquis with recent AV marianna ablation. Symptoms continually diminished, I am concerned he may need LVAD and will send your way for possible cardiopulmonary stress test and further therapy and work-up. LF

## 2021-06-05 NOTE — TELEPHONE ENCOUNTER
Called Skyler for an update this morning. He states he's feeling a little better today, he continues to take antibiotics for a tooth infection as well as prednisone ordered by his pulmonologist. Pt was short of breath with talking however, he explains this is because he is in the middle of making breakfast for him and his wife. Offered RAC appointment again to Skyler but he would like to see how he feels through the weekend before scheduling anything. Pt shares that he did not take his second dose of Lasix yesterday because he was out for a meeting. Educated Skyler on the importance of his Lasix in regards to his heart failure. He verbalized understanding. Advised Skyler to call the clinic if he doesn't feel better. Pt thanked caller and agreeable to plan.

## 2021-06-05 NOTE — TELEPHONE ENCOUNTER
"Received a call directly from patient. He spoke with Farnaz last on 1/29/2020- see telephone encounter where his medication list was gone over.     Skyler calls today because he has difficulty sleeping- most notably over the last 2 weeks. He says his appetite has also been poor and he has had to cut down his insulin dose due to his lessened appetite. Reiterated to patient that he was calling his heart doctor and inquired if he has any cardiac complaints. He says he is a little frustrated and does not know where to start and it always seems to be a problem with his heart. He states he has profound fatigue but he cannot seem to rest. He denies waking up feeling short of breath. He denies weight gain- stable weight at 164 lbs. He denies chest pain, palpitations or sensation of his heart racing and denies lower extremity edema. He endorses a productive cough and sometimes he will cough so much that he will actually make himself throw up. He states that he does get a little winded and short of breath with activity. 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 He was seen recently by his lung doctor and placed on Antibiotics. Encouraged patient to follow up with his lung doctor based on his symptoms and he was transferred to their office. WIll also update LBF.      Dr. Vigil,  See above update on Skyler. No real cardiac specific complaints but he is quite frustrated with his lack of energy and then his inability to sleep. No weight gain, edema, palpitations, chest pain etc. He denies waking up feeling short of breath. He has shortness of breath with activity and a persistent cough- recently seen by Dr. Catalan 1/24/2020. He was placed on Abx that he thinks he is taking but admits to his pills overwhelming him. He recently spoke with Farnaz and reports \" got his pills straightened out\". Just an update unless you have some recommendations. I told him " to speak with Dr. Catalan's office. Medication changes are difficult to do with Skyler over the phone. I sent a message regarding Dr. Appiah; sent another message to scheduling to see where we are on that consult.   Thanks,  Mal

## 2021-06-05 NOTE — PROGRESS NOTES
Pemafibrate to Reduce cardiovascular OutcoMes by reducing triglycerides IN patiENts with diabeTes (PROMINENT) clinical trial.    Per call with subject today and medical record review:    Dr. Vigil: Please assign causality of AE below:    Adverse Event: Adverse event   Altitude sickness Adverse event   Ascending colon polyp removal Adverse event   Abcessed tooth   Description: Subject reported symptoms of fatigue and SOB while traveling with family to [place].  Symptoms we better when went to lower altitude with O2. Resolved when back home 1 week later. Subject had colonoscopy screening and a polyp was removed. Subject reported he had a tooth infection and saw his dentist for treatment.  He reported it was better about a week later.   Start Date: 12/28/19 12/19/19 7/8/19   End Date: 1/3/2020 12/19/19 7/15/19   Date of Awareness: 14Jan2020 14Jan2020 14Jan2020   Severity (mild/moderate/severe): moderate mild moderate   Reportable to IRB:  Yes  []     No  [x]  Yes  []     No  [x]  Yes  []     No  [x]   Serious?  Yes  []     No  [x]  Yes  []     No  [x]  Yes  []     No  [x]     Related to study drug?    Yes  []     No  [x]    Yes  []     No  [x]    Yes  []     No  [x]     Action taken with study treatment: [x] Dose Not Changed  [] Dose Increased [x] Dose Not Changed  [] Dose Increased   [x] Dose Not Changed  [] Dose Increased       Outcome:  []Not Recovered/Not Resolved  [x]Recovered/Resolved []Not Recovered/Not Resolved  [x]Recovered/Resolved []Not Recovered/Not Resolved  [x]Recovered/Resolved   Medication or therapies taken:  Yes  [x]     No  []  Supplemental O2  Yes  [x]     No  []  Polyp removal  Yes  [x]     No  []  Root canal

## 2021-06-05 NOTE — TELEPHONE ENCOUNTER
Pemafibrate to Reduce cardiovascular OutcoMes by reducing triglycerides IN patiENts with diabeTes (PROMINENT) clinical trial.    Study telephone visit form:    Subject Number:   [id number]                                     Dr. Vigil- PI      CONCOMITANT MEDICATIONS  ? Investigator to report if participant needs treatment with prohibited medications (Fibrates or other agents with PPAR-? agonist activity (e.g., saroglitazar); See protocol for exclusionary medications and actions to be taken.    VISIT SCHEDULING AND CONTACT CONFIRMATION  ? Confirm date of next study visit   ? Confirm information on Subject Information Form or update as needed      Visit Number:_[id number]______  Visit Date: 2020      Was the subject, relatives or health care provider (as per consent) contacted by phone?    [x] Yes  [] No    Yes [x]  No []   If Yes, Date of Contact:   Date: ___ ____ ____   [facility] YYYY  If No, please document attempts to contact subject (include date and type of contact)   Date: ____ ____ ____   Type: [] Tel _ [] Other___  [] 2 Date:      Name of Person: Contacted:   _____Self/Subject_______________   Relationship to Subject:   ______________________  Type:[]  Tel[x]  Other _______   Date: __ ____ ____   Type: [] Tel [] Other _______    Subject Status on the day assessed     [x] Subject alive  []  Subject    [] Unknown at present Please complete  Death Details Form in InForm and submit source documents to [facility]        Review Subject Contact Information Form and update as needed   [x]  Review concomitant medication use   [x]  Query participant and record any reported AEs   [x] Query participant and record any CV efficacy events   [x] Confirm next study visit: __TBA feb/mar___________   [x] Instruct participants to bring all study supplies with them to the next in-person visit    Instruct participants to bring all study supplies with them to the next in-person visit   Instruct participants to fast  or not fast for ? 8 hours immediately prior to next in-person visit (i.e nothing by mouth, except water and essential medications).     Eduard Tang RN

## 2021-06-05 NOTE — TELEPHONE ENCOUNTER
Skyler called with complaint of shortness of breath cough and not able to get any rest. Prednisone 40 mg x 65 days and Tessalon pearls ordered per Dr. Catalan. Instructed to go to the Ed if symptoms do not improve with the medications.

## 2021-06-06 NOTE — TELEPHONE ENCOUNTER
Skyler called with complaint of increasing shortness of breath.Said he has not been able to sleep more then 10 minutes in the last week or so. States he can not lie flat and even when just sitting in a chair he becomes very shortness of breath and needs to pant to breath.Denies any fever did say he has had a cough on and off. Instructed to go to the Ed to be evaluated. Has a cardiac procedure scheduled on Thursday, so instructed to go to the Ed at the [facility] to be evaluated. Instructed to not drive, said he would have someone drive him there.

## 2021-06-06 NOTE — PATIENT INSTRUCTIONS - HE
"You were seen today in the Advanced Heart Failure Clinic at [facility].       Cardiology Providers you saw during your visit: Dr. Payton      Medication Changes:   1. No changes in your medications today.      Patient Instructions:  1. We will begin the evaluation for your LVAD. This will involve testing and appointments. One of our coordinators will reach out to you to begin this process of scheduling appointments.    Follow up Appointment Information:  1. Return to see Dr. Payton at [facility] in 2020.      Thank you for allowing us to be a part of your care here at the [facility]      If you have questions or concerns please contact us at:      Cici Hernandez RN BSN CHFN  Cardiology Care Coordinator  [facility]   Questions and [phone]   First press #1 for the [place] and then press #4 for \"Medical Advice\" to reach us Cardiology Nurses.        "

## 2021-06-06 NOTE — PROGRESS NOTES
Diet: Patient instructed regarding a heart failure healthy diet, including discussion of reduced fat and 2000 mg daily sodium restriction, daily weights, medication purpose and compliance, fluid restrictions and resources for patient and family to access for assistance with heart failure management.       Labs: Patient was given results of the laboratory testing obtained today and patient was instructed about when to return for the next laboratory testing.     Med Reconcile: Reviewed and verified all current medications with the patient. The updated medication list was printed and given to the patient. NO CHANGES.     Return Appointment: Patient given instructions regarding scheduling next clinic visit. RTC to see Tata in March 2020.  *LVAD evaluation started.    Patient stated he understood all health information given and agreed to call with further questions or concerns.     Cici Hernandez RN

## 2021-06-06 NOTE — TELEPHONE ENCOUNTER
Wellness Screening Tool  Ken Doss was called prior to upcoming study visit for COVID-19 screening    Symptom Screening  Do you have a:    Fever? no    Cough?  Slight- baseline    New or worsened shortness of breath? No, baseline CHF    Skin rash? no    Within the past 14 days, have you been in contact with someone who:    Is currently being ruled out for COVID-19 (novel coronavirus)? No    Has tested positive for COVID-10?  no    Has symptoms of a respiratory illness (fever, cough, shortness of breath)? no    Have you recently traveled to an area with COVID-19 areas:     Refer to the CDC Coronavirus webpage for COVID-19 areas:  https://www.cdc.gov/coronavirus/2019-ncov/travelers/index.html      no    Within the past 3 weeks, have you been exposed to the following:    Pertussis?  no    Chicken pox?  no    Measles? no  Patient's appointment status: in person, [facility] heart clinic per patient and provider request despite comorbidities  If the answer to any question is  yes , you must reschedule the visit.     Patient directed to the www.oncare.org for 24/7 testing and care.   o If the participant does not have access to online resources please contact the [facility] centralized triage team for instructions [phone]    If the answer to all questions is  no , inform the participant that we are restricting entrance of unnecessary visitors, and they can only be accompanied by those individuals essential to the visit. This would include legal guardians or an agent who must be present with the participant for health care/research related decisions. Patient also informed that  is no longer available and they must self-park in the ramp. [facility] will continue to validate parking in the ramp for its patients.     Ken Doss was instructed to present to clinic 3/18/20 for Prominent Study visit as planned.  Ken Doss was instructed to call Clinical Trials Office main number,  [phone] if symptoms develop overnight BEFORE traveling to the clinic.   Ken Doss agrees with this plan.    LVM for subject yesterday and returned call this morning to complete screening.    Eduard Tang RN

## 2021-06-06 NOTE — PROGRESS NOTES
Dear Dr. Vigil,    We had the pleasure of seeing Mr. Ken Doss in our advanced heart failure outreach clinic at [facility].      As you know, he is a pleasant 74-year-old male with past medical history significant for insulin-dependent type 2 diabetes mellitus, two-vessel coronary artery disease with percutaneous coronary intervention to mid LAD and proximal RCA, severe LV systolic dysfunction with an estimated ejection fraction of 20 to 20%, atrial fibrillation status post AV marianna ablation, status post cardiac resynchronization therapy, and mild interstitial lung disease who was referred to us for consideration of left ventricular assist device as a destination therapy.    We saw him last month and recommended him to complete the work-up for left ventricular assist device.  However, in the interim, he developed worsening exertional shortness of breath and fluid overload.  In light of this, we hospitalized him at the [facility] for optimization of his heart failure as well as to complete the work-up for left ventricular assist device in an expedited fashion.    He underwent a repeat coronary angiogram that showed in-stent restenosis of the ostial RCA and proximal LAD.  His right heart catheterization showed an RA pressure of 16, RV of 60/15, PA pressure of 59/28 with a mean of 35, pulmonary capillary wedge pressure of 20, thermodilution cardiac output of 3.4 with an index of 1.8, PA saturation of 56.3, and PVR of 4.4 Wood units.    His echocardiogram showed severely reduced left ventricular systolic function with an estimated ejection fraction of 10 to 15%.  His left ventricle was moderate to severely dilated with a left ventricle end-diastolic diameter of 6.4 cm.  His right ventricle was normal in size and function. He had moderate tricuspid regurgitation.  He had no other significant valvular abnormalities.    He underwent successful percutaneous revascularization  with reestablishment of KAY-3 flow.  He was aggressively diuresed.  He did not require inotropic support.  He was subsequently discharged home on high-dose of Bumex instead of Lasix.  His Coreg dose was decreased to 6.25 mg twice a day and his Entresto dose was increased to 49/51 mg twice a day.    He also has completed the work-up for left ventricular assist device while he was in the hospital.    He states that he is feeling significantly better after his revascularization and diuresis.  He walked a mile at the [facility] twice last week.  He is able to climb a flight of stairs easily now.  His weight has been stable at home.  He has not had any lower extremity swelling or abdominal distention.  His appetite is better.    Past Medical History:  #1 insulin-dependent type 2 diabetes mellitus  #2 severe LV systolic dysfunction with an estimated left ventricular ejection fraction of 20 to 25% in January 2020  #3 two-vessel coronary artery disease in the LAD and RCA status post percutaneous coronary intervention.  His most recent nuclear stress test in May 2019 showed fixed defect in the inferior and inferoseptal region consistent with scar.  He had no reversible ischemia.  #4 atrial fibrillation status post AV marianna ablation.  #5 cardiac resynchronization therapy  #6 mild interstitial lung disease with a total lung capacity of 72%.    Past Surgical History:  #1 Bilateral knee replacement  #2 Right shoulder replacement  #3 Appendectomy    Medications:  Current Outpatient Medications   Medication Sig     atorvastatin (LIPITOR) 40 MG tablet TAKE 1 TABLET BY MOUTH AT BEDTIME     BASAGLAR KWIKPEN U-100 INSULIN 100 unit/mL (3 mL) pen Inject 65 Units under the skin at bedtime. Prescribed for 65 units at HS, patient adjusting based on blood sugars     bumetanide (BUMEX) 1 MG tablet Take 3 mg by mouth 2 (two) times a day at 9am and 6pm.      carvedilol (COREG) 6.25 MG tablet Take 2 tablets (12.5 mg total) by mouth 2 (two)  times a day with meals. Take with 12.5 mg tablet by mouth BID (Patient taking differently: Take 6.25 mg by mouth 2 (two) times a day with meals. Take with 12.5 mg tablet by mouth BID )     coenzyme Q10 (COQ-10) 100 mg capsule Take 1 capsule (100 mg total) by mouth daily.     diphenhydrAMINE-acetaminophen (TYLENOL PM EXTRA STRENGTH)  mg Tab Take 2 tablets by mouth at bedtime as needed.      ELIQUIS 5 mg Tab tablet TAKE 1 TABLET BY MOUTH TWICE DAILY     glucosamine-chondroitin 500-400 mg cap Take 1 capsule by mouth daily. 1500mg/1200mg once daily     metFORMIN (GLUCOPHAGE) 1000 MG tablet Take 1,000 mg by mouth 2 (two) times a day with meals.     multivitamin therapeutic (THERAGRAN) tablet Take 1 tablet by mouth daily.     potassium chloride (K-DUR,KLOR-CON) 20 MEQ tablet Take 1 tablet (20 mEq total) by mouth daily.     sacubitriL-valsartan (ENTRESTO) 49-51 mg Tab tablet Take 1 tablet by mouth 2 (two) times a day.     ticagrelor (BRILINTA) 90 mg Tab Take 90 mg by mouth.     valACYclovir (VALTREX) 1000 MG tablet Take 1,000 mg by mouth 3 (three) times a day.     Review of system    Detailed 10 point review of system obtained as described in history of present illness all other systems reviewed and are negative.    Personal and social history    He is  and living with his wife.  He has 2 grown kids who are healthy.  He does tommy job.  He has significantly reduced his work due to his health related issues.  He occasionally smokes cigars.  He drinks alcohol socially.  He does not use any recreational drugs.    Family history  He has a significant family history of premature coronary artery disease on his maternal side.  His mom  of coronary artery disease at the age of 55.    Examination  He was comfortable.  He was in no apparent distress.  He was awake, alert, oriented x3.  Vitals:    03/10/20 1416   BP: 104/64   Patient Site: Right Arm   Patient Position: Sitting   Cuff Size: Adult Large   Pulse:  "76   Resp: 16   Weight: 158 lb (71.7 kg)   Height: 5' 9\" (1.753 m)     He had no pallor, cyanosis or jaundice.  He had no jugular venous distention.  His carotids were 1+ bilaterally.  He had no lymphadenopathy.  He had no palpable P2 or parasternal heave.  Cardiac auscultation revealed a normal S1 and a normal S2 with no murmur, rub, or gallop.  Auscultation of his lungs revealed normal vesicular breath sounds bilaterally with equal air entry on both sides.  He had bilateral basilar end inspiratory Velcro crackles consistent with his history of mild interstitial lung disease.  He had no wheezing.  His abdomen was soft with normal bowel sounds.  He had no hepatomegaly or splenomegaly.  He had no rigidity or guarding.  He had no focal neurological deficit.  His extremities were relatively warm and well-perfused.  He had no edema.  His skin had no rashes.  Mood and affect were normal.    Investigations:    He had chemistry done yesterday which showed a sodium of 137 potassium 3.4 chloride of 100 bicarb of 30 BUN of 37 creatinine of 0.82 and a calcium of 9.6.  His liver function tests showed mildly elevated bilirubin of 2.4.  His hemoglobin A1c was 7.8.    Assessment and Plan:    74-year-old male with past medical history significant for insulin-dependent type 2 diabetes mellitus, two-vessel coronary artery disease with percutaneous coronary intervention to mid LAD and proximal RCA, severe LV systolic dysfunction with an estimated ejection fraction of 20 to 20%, atrial fibrillation status post AV marianna ablation, status post cardiac resynchronization therapy, and mild interstitial lung disease who was referred to us for consideration of left ventricular assist device as a destination therapy.    He is feeling significantly better after revascularization of his in-stent restenosis of his ostial RCA and proximal LAD.  I suspect that his symptomatic improvement is also from aggressive diuresis.  He is currently on Bumex 3 " mg twice a day.    He has completed the work-up for left ventricular assist device.  He is interested in undergoing this if needed.  However, he would like to delay this if he continues to feel better.    Since he is feeling better after the revascularization and diuresis, we will continue to monitor him closely.  If he were to deteriorate, then we will proceed with a left ventricular assist device as a destination therapy.  He has completed the work-up.  I do not see any prohibitive factor for him to undergo an LVAD implantation.  He does have interstitial lung disease.  However he is not oxygen dependent.  His lung volumes are not significantly reduced.    I did not make any changes to his heart failure medical therapy today.  He will continue on low-dose of Coreg at 6.125 twice daily, higher dose of Entresto at 49/51 mg twice a day, and Bumex 4 mg twice a day.  He is on Eliquis for his anticoagulation for atrial fibrillation.    Recommend him to return to see us in a month.  He will call us in the interim if you have any further questions.    It was a pleasure meeting Mr. Rocha son in our [facility] heart failure outreach clinic at [facility].  We thank you for involving us in his care.  Please do not hesitate to call us if you have any further questions in the interim.    Sincerely,  Tata Payton MD  [facility]  [place], [place]  Pager [phone]

## 2021-06-06 NOTE — PATIENT INSTRUCTIONS - HE
"You were seen today in the Advanced Heart Failure Clinic at [facility].       Cardiology Providers you saw during your visit: Dr. Payton      Medication Changes:   1. No medication changes today.       Follow up Appointment Information:  1. Return to see Dr. Payton in 1 month with labs prior.      Thank you for allowing us to be a part of your care here at the [facility]      If you have questions or concerns please contact us at:      Cici Hernandez RN BSN CHFN  Cardiology Care Coordinator  [facility]   Questions and [phone]   First press #1 for the [place] and then press #4 for \"Medical Advice\" to reach us Cardiology Nurses.        " Scribe Attestation (For Scribes USE Only)... Scribe Attestation (For Scribes USE Only).../Attending Attestation (For Attendings USE Only)...

## 2021-06-06 NOTE — PROGRESS NOTES
"Ken Doss is a 74 y.o. male who is being evaluated via a billable telephone visit.      The patient has been notified of following:     \"This telephone visit will be conducted via a call between you and your physician/provider. We have found that certain health care needs can be provided without the need for a physical exam.  This service lets us provide the care you need with a short phone conversation.  If a prescription is necessary we can send it directly to your pharmacy.  If lab work is needed we can place an order for that and you can then stop by our lab to have the test done at a later time.    If during the course of the call the physician/provider feels a telephone visit is not appropriate, you will not be charged for this service.\"    Ken Doss complains of  No chief complaint on file.      I have reviewed and updated the patient's Past Medical History, Social History, Family History and Medication List.    ALLERGIES  Patient has no known allergies.        Additional provider notes:   I last saw Ken on 1/24/2020. Since that time, he's been to the  for severe advanced heart failure management. He follows with Dr. Tata Payton. He is being considered for LVAD as destination therapy. He had revascularization procedure for in-stent restenosis at the .   He feels much better after his hospitalization. More energy, less shortness of breath with exertion.  He is taking all his pills   No cough. Stable shortness of breath with exertion.  No hemoptysis, fevers, chills.     Cath data from the , per cardiology office note:  He underwent a repeat coronary angiogram that showed in-stent restenosis of the ostial RCA and proximal LAD.  His right heart catheterization showed an RA pressure of 16, RV of 60/15, PA pressure of 59/28 with a mean of 35, pulmonary capillary wedge pressure of 20, thermodilution cardiac output of 3.4 with an index of 1.8, PA saturation of 56.3, and PVR of 4.4 " Wood units.    CT from [place] 3/9/2020  IMPRESSION:   1. Enlarged mediastinal lymph nodes are unchanged when compared to  10/9/2019.  2. Bilateral pulmonary peripheral reticulation with mild associated  traction bronchiolectasis and scattered sub-3 mm nodules in a  peribronchovascular distribution. No significant air trapping.  Differential includes pulmonary sarcoidosis, fibrotic nonspecific  interstitial pneumonitis, and other interstitial lung diseases.  3. Stable cardiomegaly with heavy coronary calcium and right coronary  artery stent.  4. Cholelithiasis.    Labs: negative autoimmune workup July 2019  BNP 1838 down from 2200  Chem panel wnl   Left s/c LN biopsy July 2019  Final Diagnosis    LEFT SUPRACLAVICULAR LYMPH NODE, NEEDLE BIOPSY:     -   REACTIVE PARACORTICAL HYPERPLASIA     -   RARE FOCI OF NONNECROTIZING GRANULOMATOUS INFLAMMATION, ASSOCIATED WITH PIGMENT     -   GMS STAIN FOR FUNGI IS NEGATIVE     -   AURAMINE/RHODAMINE AND KINYOUN STAINS FOR ACID-FAST BACILLI ARE NEGATIVE     -   RARE SCATTERED EOSINOPHILS AND NEUTROPHILS ARE IDENTIFIED     -   NEGATIVE FOR CARCINOMA     -   NO EVIDENCE OF MALIGNANT LYMPHOPROLIFERATIVE DISORDER     -   PLEASE SEE COMMENT   The morphologic findings are most consistent with a reactive/inflammatory process. The rare microgranulomas are nonnecrotizing, and they are associated with foreign pigment, possibly related to anthracosis, processing artifact (e.g., formalin), or other foreign substance. Recommend clinical correlation and follow up.            Assessment/Plan:    No diagnosis found.   Impression: 73 male with history of CAD s/p PCI/stents in Jan 2019, Feb 2020, afib on eliquis, [facility] EF 10-15%, now s/p ICD, DM2, atrial fibrillation on NOAC, admission in July 2019 for afib and CHF exacerbation during which he was found to have peripheral fibrosis and hilar/mediastinal adenopathy. Infectious and autoimmune workup has been negative. FNA of an abnormal left  supraclavicular node showed non-necrotizing granulomas possibly related to sarcoidosis vs. anthracosis/pneumoconiosis vs. Processing artifact vs. Other environmental exposure. PFT's showed very mild restriction and moderate diffusion impairment.    Hospitalized at [facility] for severe decompensated CHF thought due to in-stent restenosis and underwent PCI with stent placement. Significant improvement in symptoms after cardiac optimization during that hospitalization. Question of whether his lungs and heart issues are related, e.g. cardiac sarcoidosis; this would need myocardial biopsy. This is not being pursued due to stability of symptoms.   He has completed LVAD workup through Dr. Payton's team.   He had a markedly abnormal CT chest a few months ago showing findings c/w sarcoidosis and a repeat scan on 3/9 which I will need to review.     In any case he appears to be doing well after his hospitalization.     Plan:  - encouraged him to remain active and continue to exercise as able  - continue CHF meds, f/u with Dr. Payton.  - will review his CT scan from 3/9   - follow up in 6 months with PFT's    I have reviewed the note as documented above.  This accurately captures the substance of my conversation with the patient.    Phone call contact time    18 minutes      Signature:  Luis Manuel Catalan MD (Avi)  [facility]  Pulmonary & Critical Care  Pager [phone]  Clinic [phone]

## 2021-06-06 NOTE — TELEPHONE ENCOUNTER
Tata Payton MD    [address]    [place] [address]    Phone: [phone]    Fax: [phone]      Called and left message for Skyler asking him to return call to Dr. Payton's office for any questions about tests done through the . [facility]

## 2021-06-06 NOTE — PATIENT INSTRUCTIONS - HE
Ken Doss,    It was a pleasure to see you today at the [facility].     My recommendations after this visit include:    - No medications changes made today     - Stay on low sodium diet < 2000 mg/day, monitor daily weight,  and stay physically active as tolerated    -Please call if you experience rapid weight gain 2-3 lbs two days in a row or 5 lbs in a week with worsening shortness of breath, lightheaded, dizziness, abdominal bloating, and leg swelling     - Follow up with Dr. Payton as scheduled on April 14 th     - Please call [phone], if you have any questions or concerns    Arcelia Ring, CNP

## 2021-06-06 NOTE — TELEPHONE ENCOUNTER
Called Skyler for an update. He states he is clear about his Coreg and Lasix dosage. Pt was able to repeat back to caller correct dose of both of these medications. Offered pt an appointment with HF NP for FU he declines scheduling this right now stating that he feels pretty good and doesn't think this is necessary. Also confirmed FU with Dr. Lazaro 3/10. No further questions at this time.

## 2021-06-06 NOTE — TELEPHONE ENCOUNTER
"Type: alert remote CRT-D transmission, emergency leighton pacing active, only limited pacing therapy available at least since March 1, 2020 12:52PM.  Presenting rhythm: not available  Battery status: \"Not OK\", MOL2, 9%. Lead measurements not available.  Arrhythmias: since 1/10/19, not available.  Comments: routed to device RN.  Device/lead alerts: none. prd     Transmission reviewed with [employer] Tech support. Device is currently in Emergency back-up pacing mode of VVI@70 with max outputs. Appears device was interrogated over the weekend at [facility], and per TS the \"emergency back up pacing button on the  wand appears to have been accidentally hit.\" Recommendation given to bring patient in for reprogramming back to original settings. Reviewed with patient, he currently is asymptomatic. Will bring in today at [place] location for reprogramming. [employer] Rep also aware of issue and will be present for device check today.     Zoie Granado RN                      "

## 2021-06-06 NOTE — PROGRESS NOTES
Pemafibrate to Reduce cardiovascular OutcoMes by reducing triglycerides IN patiENts with diabeTes (PROMINENT) clinical trial.    This visit was conducted in person during the Covid-19 pandemic per the PI and director of research instruction.    Research nurse discussed updated [facility] consent form and new [facility] HIPAA form as below.     The study discussion included the following:     Changes made to IRB of record which is now [facility] IRB    New HIPAA form to include [facility] language     Subject asked questions and agreed that he received answers that satisfied him.     Consent form [Version Date: 4.0 17Oct2019] [approved for use and effective on 1/10/2020] signed.   HIPAA form Revision Date 09.14.18 also signed.     A signed copy was given to the subject & a copy was forwarded to medical records.    Subject seen in clinic today for study Visit 19.      Subject seen by provider Arcelia Ring for study related physical exam.  See provider note and flow sheets for vital signs.  Subject normally runs hypotensive and denies associated symptoms of dizziness or weakness.  Vitals:    03/18/20 1512   BP: 94/56   Patient Site: Right Arm   Patient Position: Sitting   Cuff Size: Adult Regular   Pulse: 64   Resp: 18   Weight: 154 lb (69.9 kg)     Waist measures 95 cm    Labs drawn per protocol.  Results will be scanned into 'media'.   Skyler believed Dr. Payton may want additional labs, but there are no orders.  After discussion with Arcelia Ring, we will defer to Dr. Payton's instruction with his upcoming [id number] visit.    EQ-5D-5L questionnaire completed per event.    Study efficacy, endpoints and adverse events reviewed with subject.  Cardiovascular risk discussed.     Study alcohol consumption stipulations and education reviewed with subject:    Subject reports  [] Never [x] Current [] Former.   1 drinks per [] Day [] Week [] Month [x] Occasional.  1 maximum drinks per sitting.     Study medication box #  [id number] with 0 tabs  Study medication box # [id number] with 0 tabs  Study medication box # [id number] with 0 tabs  Study medication box # [id number] with 70 tabs  returned by subject.  Total tablet count of 70 for 84 % compliance.   Study medication box #'s [id number], [id number], [id number], [id number] dispensed to subject.  Education provided on medication compliance and administration.  Subject stated he may have missed some doses when hospitalized recently and relays he will be better about taking meds.    Plan: Study Visit 20 telephone call with subject in 2 months.  Will schedule study Visit 21 with subject in 4 months back at [facility].    Eduard Tang RN  Clinical Trials Nurse    Dr. Vigil: Please assign causality of AE below:    Adverse Event: Serious Adverse event   Heart failure exacerbation Adverse event   Gout flair Adverse event   mild community-acquired pneumonia    Description: Subject experienced increasing shortness of breath and went to ED.  Exam noted peripheral edema, rales and a murmur. ECG showed no acute changes. CXR shows likely pulmonary edema. BNP is 5924. Patient was given furosemide and admitted.  Had already planned angio for LVAD workup.  Angio showed stent restenosis with PCI of LAD and RCA.  Subject improved significantly after intervention. Reports he had a gout flair last week and saw PCP.  Given prednisone which resolved it without reoccurrence. Was seen by Dr. Catalan in pulmonology and noted pneumonia on CT.  Given Abx and steroids to treat.  Appeared resolved at Dr. Payton's visit 2/11/2020   Start Date: 2/25/2020 3/12/2020                           1/24/2020   End Date: 3/1/2020 3/12/2020 2/11/2020   Date of Awareness: 3/18/2020 3/18/2020 3/18/2020   Severity (mild/moderate/severe): severe moderate moderate   Reportable to IRB:  Yes  []     No  [x]  Yes  []     No  [x]  Yes  []     No  [x]   Serious?  Yes  [x]     No  []  Yes  []     No  [x]  Yes  []     No  [x]     Related to  study drug?    Yes  []     No  [x]    Yes  []     No  [x]    Yes  []     No  [x]     Action taken with study treatment: [x] Dose Not Changed   [x] Dose Not Changed   [x] Dose Not Changed           Outcome:  []Not Recovered/Not Resolved  [x]Recovered/Resolved  []Recovered/Resolved with Sequelae   []Recovering/Resolving  []Unknown   []Fatal []Not Recovered/Not Resolved  [x]Recovered/Resolved  []Recovered/Resolved with Sequelae   []Recovering/Resolving  []Unknown   []Fatal []Not Recovered/Not Resolved  [x]Recovered/Resolved  []Recovered/Resolved with Sequelae   []Recovering/Resolving  []Unknown   []Fatal   Medication or therapies taken:  Yes  [x]     No  []  Yes  [x]     No  []  Yes  [x]     No  []

## 2021-06-06 NOTE — PROGRESS NOTES
In clinic device check.  Please see link for full device report.  Patient was informed of results and next follow up during today's visit.     I spoke with Frannie JOSHUA from  [facility] device clinic. She reports a Fellow, while patient was admitted, called Frannie for assistance to change the LRL on the device since the Fellow was not familiar with the . The fellow planned to change the LRL from 60 to 70bpm. However, after the interrogation a button was inadvertently pressed, the VVI red button on the wand itself which changes the mode to VVI 70 with the outputs at 7.5V@1.5ms. Pt was brought into clinic today and was reprogrammed back to his original parameters by [employer] Rep with a LRL of 70 per the Fellow at the [facility].     Emerald Yusuf RN BSN PHN  Device Nurse   [facility]

## 2021-06-06 NOTE — TELEPHONE ENCOUNTER
----- Message from Valeria Grigsby sent at 3/6/2020  8:41 AM CST -----  I have no idea who that would be at this time  ----- Message -----  From: Nany Forrester, RN  Sent: 2/24/2020   9:17 AM CST  To: Valeria Grigsby    Amy Valeria!  This should go to Dr. Young and his nurse. I am not sure but do we have contact information for them or a number we can direct him to?  Thanks,  Mal   ----- Message -----  From: Valeria Grigsby  Sent: 2/24/2020   9:05 AM CST  To: Nany Forrester, RN      Caller: Patient  Primary cardiologist: Dr. Young  Detailed reason for call: Patient has some questions on some upcoming tests that he is having at the [facility]. Please advise    Best phone number: [phone]  Best time to contact: today  Ok to leave a detailed message? yes  Device? yes

## 2021-06-06 NOTE — TELEPHONE ENCOUNTER
"----- Message from Valeria Grigsby sent at 2/17/2020  2:11 PM CST -----      Caller: Patient  Primary cardiologist: Dr. Vigil  Detailed reason for call: Patient stated that he needs clarification on the directions on his carvedilol (COREG) 6.25 MG tablet prescription. Please advise    Best phone number: [phone]  Best time to contact: today  Ok to leave a detailed message? yes  Device? yes             Called back Skyler to address his concerns. Despite having clarified medications in the past month, he calls in to see what his dose of Coreg is supposed to be at. He is not home to look at his pill bottle, but he will call me tomorrow with what it is that he is taking his pills from tomorrow. He is supposed to be on 6.25 mg tablets and to take 2 tablets twice a day to equal 12.5 mg twice a day. He thinks he may only be taking 1 tablet, but again, he is unsure of what dose that tablet is and can only say that \"it is a really small pill\". Skyler takes multiple medications throughout the day so he will call back tomorrow with what he finds out from his pill bottle. He then went on to discuss his insulin and adjustments made there by his primary and splitting his dose up. He also states that he is still having issues with shortness of breath. He denies weight gain but reports that he has had some bloating to his abdomen lately and is taking an extra furosemide in the afternoon to help with this. Writer asked how long this has been going on and he says about a week. Inquired if he discussed with Dr. Payton and he \"thinks he did\". He states that his energy level is low, which is has been for some time. His weight today was 170 lbs and it is usually around 168-170 lbs. His appetite is very poor, which is what it has been for some time so he does not endorse any salt indiscretion. He does not have any edema. He has not been sleeping well and this is not new for him as well. Will update LBF.       Dr. Vigil,  Skyler called " today with questions about his coreg dose- he will call me tomorrow to confirm what dose of the pill he is taking on his bottle.. he is supposed to be on 12.5 mg twice a day per our list but he does not know if he is taking 6.25 mg or 12.5 mg. Also, he has been on 40 mg of Lasix daily and he has been taking an additional 40 mg in the afternoon the last few days due to abdominal bloating. Weight is stable at 170 lbs.  Any recommendations for him? His BP this AM was 100/52 and averages around this. He will see Dr. Payton in March- I am wondering if it might be beneficial for him to see HF NP prior and also utilize this to check his meds again.   Thanks,  Kaveh

## 2021-06-06 NOTE — PROGRESS NOTES
Dear Dr. Vigil,    We had the pleasure of seeing Mr. Ken Lau in our advanced heart failure outreach clinic at [facility].  Although you are familiar with his history please allow me to summarize it for the purpose of her records.    Mr. Sanchez is a 74-year-old male with past medical history significant for insulin-dependent type 2 diabetes mellitus, severe LV systolic dysfunction, two-vessel coronary artery disease with status post stenting in the proximal LAD and proximal RCA, atrial fibrillation status post AV marianna ablation, and mild interstitial lung disease.  He was referred to us for consideration of advanced heart failure therapies.    He was apparently healthy up until 10 years ago when he started noticing exertional shortness of breath.  On further evaluation he was found to have reduced left ventricular systolic function.  This was thought to be secondary to a silent myocardial infarction.  He did not have any coronary interventions at that time.  He was subsequently also diagnosed with type 2 diabetes mellitus.  He was followed by [facility] cardiology for the last 10 years for his LV systolic dysfunction.  He had a biventricular pacemaker (cardiac resynchronization  therapy) approximately 6 to 8 years.    He was doing reasonably well up until 2 years ago when he started noticing worsening exertional shortness of breath.  Has been progressively getting worse.  He had a coronary angiogram in January 2019 because of his worsening exertional shortness of breath.  This showed significant epicardial coronary artery disease involving the proximal LAD and proximal right coronary artery.  He underwent percutaneous coronary intervention with reestablishment of KAY-3 flow.  His shortness of breath improved immediately after his coronary revascularization however started to get worse within few weeks and has been progressively getting worse since then.  He has had 2  hospitalizations in the last year for fluid retention and heart failure exacerbations.  His most recent hospitalization was in July.  He also had atrial fibrillation with rapid ventricular response.  He underwent an AV marianna ablation in September 2019, however, this has not made a significant impact on his symptoms.    He is currently having no exertional shortness of breath at rest or with minimal activity.  However anything more than minimal activity he gets exertional shortness of breath.  He is not able to do his household activities now which he was able to do a year ago.  He is now able to walk only less than half a mile on a flat surface however a year ago he could walk up to a mile.  He is getting winded when he climbs a flight of stairs.  He feels tired and exhausted most of the time.  He has been having fluid retention on and off.  He mainly accumulates fluid in his abdomen than in his legs.  He self adjusts his diuretics when he is fluid overloaded.  He does have PND or orthopnea when he is fluid overloaded.  He has not had lightheadedness, dizziness or syncope.  He has no chest pain or chest pressure.  He has not had an ICD shock.  His neurohormonal therapy have to be cut down recently due to low blood pressure.  Her systolic blood pressure has been running in the 100s lately.  I would currently characterize him as NYHA functional class IIIb.    He also has been having a dry hacking cough.  This is more at nighttime than during the daytime.   high-resolution CT scan of his chest showed mild interstitial lung disease consistent with UIP.  His pulmonary function test showed mild restrictive disease.  There was a question whether he had sarcoidosis given mediastinal and hilar lymphadenopathy on the CT scan.  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  He also had a PET scan on which is mediastinal and hilar lymphadenopathy  along with lower lobe lung infiltrates light up.  He is currently being followed by pulmonary here at [facility].  He completed a course of antibiotics in January after his high-resolution CT chest showed possible pneumonia on the left upper lobe.    Past Medical History:  #1 insulin-dependent type 2 diabetes mellitus  #2 severe LV systolic dysfunction with an estimated left ventricular ejection fraction of 20 to 25% in January 2020  #3 two-vessel coronary artery disease in the LAD and RCA status post percutaneous coronary intervention.  His most recent nuclear stress test in May 2019 showed fixed defect in the inferior and inferoseptal region consistent with scar.  He had no reversible ischemia.  #4 atrial fibrillation status post AV marianna ablation.  #5 cardiac resynchronization therapy  #6 mild interstitial lung disease with a total lung capacity of 72%.    Past Surgical History:  #1 Bilateral knee replacement  #2 Right shoulder replacement  #3 Appendectomy    Medications:  Current Outpatient Medications   Medication Sig     ACCU-CHEK SMARTVIEW TEST STRIP strips      acetaminophen (TYLENOL) 500 MG tablet Take 500 mg by mouth every 6 (six) hours as needed for pain.     amoxicillin (AMOXIL) 500 MG capsule Take 500 mg by mouth 3 (three) times a day.     apixaban (ELIQUIS) 5 mg Tab tablet Take 1 tablet (5 mg total) by mouth 2 (two) times a day.     atorvastatin (LIPITOR) 40 MG tablet TAKE 1 TABLET BY MOUTH AT BEDTIME     BASAGLAR KWIKPEN U-100 INSULIN 100 unit/mL (3 mL) pen Inject 65 Units under the skin at bedtime. Prescribed for 65 units at HS, patient adjusting based on blood sugars     benzonatate (TESSALON) 100 MG capsule Take 1 capsule (100 mg total) by mouth 3 (three) times a day as needed for cough.     carvedilol (COREG) 6.25 MG tablet Take 2 tablets (12.5 mg total) by mouth 2 (two) times a day with meals. Take with 12.5 mg tablet by mouth BID     clopidogrel (PLAVIX) 75 mg tablet TAKE 1 TABLET BY MOUTH ONCE  "DAILY     coenzyme Q10 (COQ-10) 100 mg capsule Take 1 capsule (100 mg total) by mouth daily.     diphenhydrAMINE-acetaminophen (TYLENOL PM EXTRA STRENGTH)  mg Tab Take 2 tablets by mouth at bedtime as needed.      ENTRESTO 24-26 mg Tab tablet TAKE 1 TABLET BY MOUTH TWICE DAILY     furosemide (LASIX) 40 MG tablet TAKE 1 TABLET BY MOUTH TWICE DAILY     glucosamine-chondroitin 500-400 mg cap Take 1 capsule by mouth daily. 1500mg/1200mg once daily     metFORMIN (GLUCOPHAGE) 1000 MG tablet Take 1,000 mg by mouth 2 (two) times a day with meals.     multivitamin therapeutic (THERAGRAN) tablet Take 1 tablet by mouth daily.     potassium chloride (K-DUR,KLOR-CON) 20 MEQ tablet Take 1 tablet (20 mEq total) by mouth daily.     valACYclovir (VALTREX) 1000 MG tablet Take 1,000 mg by mouth 3 (three) times a day.     Review of system    Detailed 10 point review of system obtained as described in history of present illness all other systems reviewed and are negative.    Personal and social history    He is  and living with his wife.  He has 2 grown kids who are healthy.  He does tommy job.  He has significantly reduced his work due to his health related issues.  He occasionally smokes cigars.  He drinks alcohol socially.  He does not use any recreational drugs.    Family history  He has a significant family history of premature coronary artery disease on his maternal side.  His mom  of coronary artery disease at the age of 55.    Examination  He was comfortable.  He was in no apparent distress.  He was awake, alert, oriented x3.  Vitals:    20 1359   BP: 114/60   Patient Site: Left Arm   Patient Position: Sitting   Cuff Size: Adult Regular   Pulse: 64   Resp: 16   Weight: 173 lb (78.5 kg)   Height: 5' 9\" (1.753 m)     He had no pallor, cyanosis or jaundice.  Neck exam revealed elevated jugular venous distention visible at 10 cm above manubrium sternal angle.  His carotids were 1+ bilaterally.  He had no " lymphadenopathy.  He had no palpable P2 or parasternal heave.  Cardiac auscultation revealed a normal S1 and a normal S2 with no murmur, rub, or gallop.  Auscultation of his lungs revealed normal vesicular breath sounds bilaterally with equal air entry on both sides.  He had bilateral basilar end inspiratory Velcro crackles consistent with his history of mild interstitial lung disease.  He had no wheezing.  His abdomen was soft with normal bowel sounds.  He had no hepatomegaly or splenomegaly.  He had no rigidity or guarding.  He had no focal neurological deficit.  His extremities were relatively warm and well-perfused.  He had 1+ edema bilaterally up to his mid shin.  His skin had no rashes.  Mood and affect were normal.    Investigations:    His most recent EKG dated September 19, 2019 showed ventricular paced rhythm with frequent PVCs.    His most recent echocardiogram was on January 22, 2020.  I personally reviewed his echocardiogram.  His left ventricle was moderately dilated with a left ventricular end-diastolic diameter of 6.2 cm.  His left ventricular systolic function was severely reduced with an estimated ejection fraction of 20 to 25%.  He had wall motion abnormality involving the inferior wall and the apex of the left ventricle.  He had moderate functional mitral regurgitation.  His left atrium was dilated.  His right ventricle was normal in size and function.  His right atrium was normal.  He had moderate to severe pacemaker associated tricuspid regurgitation.  His estimated right ventricular systolic pressure was 28 mmHg.  He had no significant valvular abnormalities.    His most recent angiogram was from January 2019.  I personally reviewed his angiogram images.  His left main had a 20% ostial lesion.  His mid LAD had 80% long lesion.  His LAD was a small caliber vessel throughout.  His left circumflex did not have any significant epicardial coronary artery disease.  His right coronary artery had an  ostial/proximal 99% stenosis with collaterals from left to right.  He underwent successful percutaneous coronary intervention of the mid LAD.  He had 2 drug eluding stents placed in the proximal/ostial RCA and 1 drug-eluting stent placed in the mid LAD.    His most recent myocardial perfusion imaging was from May 2019.  This showed a medium sized nontransmural infarction in the inferior and inferoseptal region with no evidence of reversible ischemia.    He has not had a recent right heart catheterization or cardiopulmonary exercise testing.    His most recent high-resolution CT scan from January 2020 showed mild basilar predominant fibrotic changes with reticulation and traction bronchiectasis consistent with UIP.  He also had a left upper lobe Concilio consolidative opacity.  He had hilar and mediastinal lymphadenopathy.     Most recent pulmonary function test was from July 2019 which showed an FEV1 of 2.74 L (91% per predicted), FVC of 3.26 L (82% predicted), FEV1 by FVC of 84%, total lung capacity of 4.99 L (72% predicted) and DLCO of 62% predicted.  This was consistent with mild restriction given his reduction in TLC.    His most recent NT proBNP was significantly elevated at 2212.    Last chemistry in January 2020 showed a sodium of 139, potassium of 4.4, chloride of 102, bicarb of 26, BUN of 12 and creatinine of 1.77.    Most recent CBC showed a hemoglobin of 12.7, hematocrit of 39.3, WBC of 11.1 and platelet of 182.      Assessment and Plan:    Mr. Doss is a pleasant 74-year-old male with past medical history significant for insulin-dependent type 2 diabetes mellitus, two-vessel coronary artery disease with percutaneous coronary intervention to mid LAD and proximal RCA, severe LV systolic dysfunction with an estimated ejection fraction of 20 to 20%, atrial fibrillation status post AV marianna ablation, status post cardiac resynchronization therapy, and mild interstitial lung disease who was referred to us  for consideration of left ventricular assist device as a destination therapy.    Clearly, he is having several indicators of end-stage cardiomyopathy.  He has had 2 hospitalizations for heart failure exacerbation in the last year.  His blood pressure is low requiring reduction in his neurohormonal therapy.  His exercise capacity have significantly decreased.  He is currently functional class IIIb.  His diuretic requirements are gradually increasing. He is already on maximal medical therapy for his heart failure.  He has also had cardiac resynchronization therapy for the last several years.  Thus I think it is very appropriate to consider left ventricular assist device as a destination therapy for him.    To this extent, I have recommended him to come to the [place] to complete the work-up for left ventricular assist device.  This would involve right heart catheterization, repeat coronary angiogram, cardiopulmonary excess testing, repeating his high-resolution CT scan of the chest to evaluate his interstitial lung disease, meeting with the cardiothoracic surgeon, meeting with the  and neuropsychologist, and the LVAD coordinator to have a show and tell. We will hold his apixaban for 2 days prior to his invasive testing.  The risk associated with coronary angiogram and right heart catheterization is minimal including bleeding, stroke, myocardial infarction, and/or renal failure.    The only concerning factor for left ventricular assist device placement in him is his interstitial lung disease.  However, fortunately, this is only mild.  He is currently not oxygen dependent.  His lung volumes are decent.  It is very likely that majority of his symptoms are due to his heart failure rather than his interstitial lung disease.    I briefly discussed the risks and benefits of left ventricular assist device therapy including improvement in survival, functional capacity, and quality of life with LVAD therapy.   The surgical mortality with LVAD implantation device is 1 to 3%.  Patient's on destination left ventricular assist device are at ~ 10-20% risk of developing gastrointestinal bleeding, driveline infection, stroke, pump thrombosis, and right heart failure.      I did not change any of his heart failure therapy at present.  We will defer management of his diuretics to his primary team at [facility] for now.    He and his wife verbalized understanding of the plan.  He is willing to undergo the evaluation for left ventricular assist device and make a decision after completing the testings.  I have recommended him to return to see me in March when I return to [facility] outreach clinic after completing the work up.  He will call us in the interim if he has any further worsening symptoms.    It was a pleasure meeting Mr. Rocha son in our [facility] heart failure outreach clinic at [facility].  We thank you for involving us in his care.  We will keep you apprised of his evaluation and treatment decision.  Please do not hesitate to call us if you have any further questions in the interim.    Sincerely,  Tata Payton MD  [facility]  [place], [place]  Pager [phone]

## 2021-06-06 NOTE — TELEPHONE ENCOUNTER
Skyler is calling the Heart Failure Line today but is asking for a return call from Dr. Catalan's nurse at the Pulmonology Clinic. Message taken and sent to appropriate provider. sk/RN

## 2021-06-06 NOTE — TELEPHONE ENCOUNTER
----- Message from Valeria Grigsby sent at 2/24/2020  9:05 AM CST -----    Caller: Patient  Primary cardiologist: Dr. Young  Detailed reason for call: Patient has some questions on some upcoming tests that he is having at the [facility]. Please advise    Best phone number: [phone]  Best time to contact: today  Ok to leave a detailed message? yes  Device? yes    Called Skyler to address his concerns. He states to call his wife to update on testing and maps of where things are. Writer called Karen and left a detailed voicemail with instruction to call the [facility] and Dr. Payton's office at [phone] option #1 then option #4 for medical advice. They can speak with Cici whom is Dr. Payton's RN coordinator. Left my call back number should they have any questions. -[facility] [place]

## 2021-06-07 ENCOUNTER — RECORDS - HEALTHEAST (OUTPATIENT)
Dept: LAB | Facility: CLINIC | Age: 76
End: 2021-06-07

## 2021-06-07 ENCOUNTER — TRANSFERRED RECORDS (OUTPATIENT)
Dept: HEALTH INFORMATION MANAGEMENT | Facility: CLINIC | Age: 76
End: 2021-06-07

## 2021-06-07 LAB
ALBUMIN SERPL-MCNC: 4.4 G/DL (ref 3.5–5)
ALP SERPL-CCNC: 102 U/L (ref 45–120)
ALT SERPL W P-5'-P-CCNC: 17 U/L (ref 0–45)
ANION GAP SERPL CALCULATED.3IONS-SCNC: 17 MMOL/L (ref 5–18)
AST SERPL W P-5'-P-CCNC: 19 U/L (ref 0–40)
BILIRUB SERPL-MCNC: 1.6 MG/DL (ref 0–1)
BUN SERPL-MCNC: 68 MG/DL (ref 8–28)
CALCIUM SERPL-MCNC: 9.5 MG/DL (ref 8.5–10.5)
CHLORIDE BLD-SCNC: 102 MMOL/L (ref 98–107)
CO2 SERPL-SCNC: 17 MMOL/L (ref 22–31)
CREAT SERPL-MCNC: 1.93 MG/DL (ref 0.7–1.3)
GFR SERPL CREATININE-BSD FRML MDRD: 34 ML/MIN/1.73M2
GLUCOSE BLD-MCNC: 158 MG/DL (ref 70–125)
POTASSIUM BLD-SCNC: 4.4 MMOL/L (ref 3.5–5)
PROT SERPL-MCNC: 7.9 G/DL (ref 6–8)
SODIUM SERPL-SCNC: 136 MMOL/L (ref 136–145)

## 2021-06-07 NOTE — TELEPHONE ENCOUNTER
----- Message from Tosin Ken sent at 3/26/2020  3:17 PM CDT -----  General phone call:  PATIENT IS CONFUSED ABOUT MEDICATIONS  PLEASE CALL    Caller: PATIENT  Primary cardiologist: DR GARVIN  Detailed reason for call: SEE ABOVE  New or active symptoms? NO  Best phone number: [phone]  Best time to contact: ANY TIME  Ok to leave a detailed message? YES  Device? YES    Additional Info:       Pt called to help with clarifying some of his medications. We went over each medication in detail and doses seem correct with what he is taking. He had questions about some of his pills about color and shape. Encouraged patient to discuss with local pharmacist or bring the pill if he has a question about pill identifying and then to only set up a week of his medications at a time due to frequent appointments and dose changes. He gets mixed up with his medications often and they are changed frequently between , heart failure, advanced heart failure and his most recent hospitalization at the [facility]. He had multiple pill sizes of his coreg. It was confirmed that he should ne on 6.25 mg twice daily per last note with Dr. Payton on 3/10/2020    3/10/2020 office visit note:   I did not make any changes to his heart failure medical therapy today.  He will continue on low-dose of Coreg at 6.125 twice daily, higher dose of Entresto at 49/51 mg twice a day, and Bumex 4 mg twice a day.  He is on Eliquis for his anticoagulation for atrial fibrillation.          Medication list updated with highlighted in yellow that he is taking 6.25 mg twice a day of coreg. He will get rid of the 12.5 mg tablets that he has as the instructions state for him to take 3 of those twice a day and writer stressed that this could confuse him in the future. He stated he will dispose of them at local pharmacy. All questions answered- pt will call back if he needs anythign else - all his remaining medications were discussed and seemed accurate. [place] [place]

## 2021-06-07 NOTE — PROGRESS NOTES
"Ken Doss is a 74 y.o. male who is being evaluated via a billable telephone visit.       The patient has been notified of following:      \"This telephone visit will be conducted via a call between you and your physician/provider. We have found that certain health care needs can be provided without the need for a physical exam.  This service lets us provide the care you need with a short phone conversation.  If a prescription is necessary we can send it directly to your pharmacy.  If lab work is needed we can place an order for that and you can then stop by our lab to have the test done at a later time.     If during the course of the call the physician/provider feels a telephone visit is not appropriate, you will not be charged for this service.\"      Dear Dr. Vigil,    We had the pleasure of seeing Mr. Ken Doss in our advanced heart failure outreach clinic at [facility].      As you know, he is a pleasant 74-year-old male with past medical history significant for insulin-dependent type 2 diabetes mellitus, two-vessel coronary artery disease with percutaneous coronary intervention to mid LAD and proximal RCA, severe LV systolic dysfunction with an estimated ejection fraction of 20 to 20%, atrial fibrillation status post AV marianna ablation, status post cardiac resynchronization therapy, and mild interstitial lung disease who was referred to us for consideration of left ventricular assist device as a destination therapy.    This is his one month follow up visit. He is doing well. Feeling much better after the percutaneous coronary revascularization and aggressive diuresis. He walks 1-2 miles every other day. He notices shortness of breath only when he walks on a slope. FC II. No PND or orthopnea. He denies having chest pain or pressure. He has no presyncope or syncope. No lower extremity edema or abdominal distension. His weight has been stable. No ICD shocks. Has been having " loose stools. He has also been having low blood sugar values and has reduced his long acting insulin to 45 units daily.     Past Medical History:  #1 insulin-dependent type 2 diabetes mellitus  #2 severe LV systolic dysfunction with an estimated left ventricular ejection fraction of 20 to 25% in January 2020  #3 two-vessel coronary artery disease in the LAD and RCA status post percutaneous coronary intervention.  His most recent nuclear stress test in May 2019 showed fixed defect in the inferior and inferoseptal region consistent with scar.  He had no reversible ischemia.  #4 atrial fibrillation status post AV marianna ablation.  #5 cardiac resynchronization therapy  #6 mild interstitial lung disease with a total lung capacity of 72%.    Past Surgical History:  #1 Bilateral knee replacement  #2 Right shoulder replacement  #3 Appendectomy    Medications:  Current Outpatient Medications   Medication Sig     atorvastatin (LIPITOR) 40 MG tablet TAKE 1 TABLET BY MOUTH AT BEDTIME     BASAGLAR KWIKPEN U-100 INSULIN 100 unit/mL (3 mL) pen Inject 40 Units under the skin at bedtime. Pt takes 40 units at bedtime     bumetanide (BUMEX) 1 MG tablet Take 3 mg by mouth 2 (two) times a day at 9am and 6pm.      carvedilol (COREG) 6.25 MG tablet Take 2 tablets (12.5 mg total) by mouth 2 (two) times a day with meals. Take with 12.5 mg tablet by mouth BID     coenzyme Q10 (COQ-10) 100 mg capsule Take 1 capsule (100 mg total) by mouth daily.     diphenhydrAMINE-acetaminophen (TYLENOL PM EXTRA STRENGTH)  mg Tab Take 2 tablets by mouth at bedtime as needed.      ELIQUIS 5 mg Tab tablet TAKE 1 TABLET BY MOUTH TWICE DAILY     glucosamine-chondroitin 500-400 mg cap Take 1 capsule by mouth daily. 1500mg/1200mg once daily     metFORMIN (GLUCOPHAGE) 1000 MG tablet Take 1,000 mg by mouth 2 (two) times a day with meals.     multivitamin therapeutic (THERAGRAN) tablet Take 1 tablet by mouth daily.     potassium chloride (K-DUR,KLOR-CON) 20 MEQ  "tablet Take 1 tablet (20 mEq total) by mouth daily.     sacubitriL-valsartan (ENTRESTO) 49-51 mg Tab tablet Take 1 tablet by mouth 2 (two) times a day.     ticagrelor (BRILINTA) 90 mg Tab Take 90 mg by mouth 2 (two) times a day.      valACYclovir (VALTREX) 1000 MG tablet Take 1,000 mg by mouth daily.      predniSONE (DELTASONE) 10 mg tablet Take 10 mg by mouth daily.     Review of system    Detailed 10 point review of system obtained as described in history of present illness all other systems reviewed and are negative.    Personal and social history    He is  and living with his wife.  He has 2 grown kids who are healthy.  He does tommy job.  He has significantly reduced his work due to his health related issues.  He occasionally smokes cigars.  He drinks alcohol socially.  He does not use any recreational drugs.    Family history  He has a significant family history of premature coronary artery disease on his maternal side.  His mom  of coronary artery disease at the age of 55.    Examination  He was comfortable.  He was in no apparent distress.  He was awake, alert, oriented x3.  Vitals:    20 1317   BP: 92/68   Pulse: 70   Weight: 152 lb (68.9 kg)   Height: 5' 9\" (1.753 m)     Not done due to telephone visit.       Investigations:    Mildly elevated BUN at 33 with normal creatinine and electrolytes.     Coronary angiogram (2020)  He underwent a repeat coronary angiogram that showed in-stent restenosis of the ostial RCA and proximal LAD.      RHC (2020)  His right heart catheterization showed an RA pressure of 16, RV of 60/15, PA pressure of 59/28 with a mean of 35, pulmonary capillary wedge pressure of 20, thermodilution cardiac output of 3.4 with an index of 1.8, PA saturation of 56.3, and PVR of 4.4 Wood units.    Echo (2020)  His echocardiogram showed severely reduced left ventricular systolic function with an estimated ejection fraction of 10 to 15%.  His left ventricle was " moderate to severely dilated with a left ventricle end-diastolic diameter of 6.4 cm.  His right ventricle was normal in size and function. He had moderate tricuspid regurgitation.  He had no other significant valvular abnormalities.      Assessment and Plan:    74-year-old male with past medical history significant for insulin-dependent type 2 diabetes mellitus, two-vessel coronary artery disease with percutaneous coronary intervention to mid LAD and proximal RCA, severe LV systolic dysfunction with an estimated ejection fraction of 20 to 20%, atrial fibrillation status post AV marianna ablation, status post cardiac resynchronization therapy, and mild interstitial lung disease who was referred to us for consideration of left ventricular assist device as a destination therapy.    He is feeling significantly better after revascularization of his in-stent restenosis of his ostial RCA and proximal LAD.  I suspect that his symptomatic improvement is also from aggressive diuresis.      Will plan for DT VAD if he deteriorates, otherwise continue current treatment.      He has been presented in our multidisciplinary meeting and has been approved for DT LVAD as long as his life expectancy is not limited by his ILD and the investigation for the dilated pancreatic duct is unremarkable. I will reach out to Dr. Luis Encarnacion to discuss his ILD. We will schedule him for a MRI of the abdomen as soon as the COVID 19 pandemic settles.     I did not make any changes to his heart failure medical therapy today.  He will continue on low-dose of Coreg at 6.125 twice daily, higher dose of Entresto at 49/51 mg twice a day, and Bumex 4 mg twice a day.  He is on Eliquis for his anticoagulation for atrial fibrillation.    Recommend him to return to see us in 2 months and labs in a month.  He will call us in the interim if you have any further questions.    It was a pleasure meeting Mr. Rocha son in our [facility] heart failure outreach clinic at  [facility].  We thank you for involving us in his care.  Please do not hesitate to call us if you have any further questions in the interim.    This was a telephonic visit of 25 minutes duration with 50% of the time in counseling.     Sincerely,  Tata Payton MD  [facility]  [place], [place]  Pager [phone]

## 2021-06-07 NOTE — PROGRESS NOTES
Pemafibrate to Reduce cardiovascular OutcoMes by reducing triglycerides IN patiENts with diabeTes (PROMINENT) clinical trial.    ADVERSE EVENT Description: Subject [id number] Skyler Doss was hospitalized at [facility] on Feb 25th due to acute heart failure exacerbation.  He had an angio and right heart cath which found 2 vessel CAD with in stent restenosis of the ostial RCA stent 90% and the mid LAD stent 98%.  PCI of the mid LAD and ostial RCA with 2 DE stents on Feb 27.  Discharged on Mar 1.  This was originally reported with the MACE event for heart failure hospitalization, but today the sponsor has asked we add the stent restenosis as another MACE and INDERJIT.    Adverse Event:  Coronary instent restenosis  Serious:  [x] Yes   [] No   Reportable to IRB:   [] Yes   [x] No  Severity (mild/moderate/severe): severe    Date of Awareness: 4/22/2020 (per sponsor review and request to report as additional INDERJIT to original CHF event)  Start Date: 2/27/2020  End Date: 2/27/2020  Action taken with study treatment:  [x] Dose Not Changed     Outcome:   []Not Recovered/Not Resolved  [x]Recovered/Resolved  []Recovered/Resolved with Sequelae - specify   []Recovering/Resolving  []Unknown   []Fatal  Medication or therapies taken:  [x] Yes   [] No , heart cath and PCI, maegan.    Dr. Vigil: Please assign causality of above AE  Related to study drug?     [] Yes   [x] No

## 2021-06-07 NOTE — PATIENT INSTRUCTIONS - HE
"You were seen today in the Advanced Heart Failure Clinic at [facility].       Cardiology Providers you saw during your visit: Dr. Payton      Medication Changes:   1. No medication changes today.       Patient Instructions:  1. We will get you scheduled for an MRI of your abdomen once it is safe to schedule testing again in light of covid 19.   2. Repeat labwork in 1 month. I have entered the orders so you can do this at your local [facility] clinic.    Follow up Appointment Information:  1. Return to see Dr. Payton in 2020. Please call if you are not feeling well in the interim.       Thank you for allowing us to be a part of your care here at the [facility]      If you have questions or concerns please contact us at:      Cici Hernandez RN BSN CHFN  Cardiology Care Coordinator  [facility]   Questions and [phone]   First press #1 for the [employer] and then press #4 for \"Medical Advice\" to reach us Cardiology Nurses.        "

## 2021-06-08 LAB
SARS-COV-2 PCR COMMENT: NORMAL
SARS-COV-2 RNA SPEC QL NAA+PROBE: NEGATIVE
SARS-COV-2 VIRUS SPECIMEN SOURCE: NORMAL

## 2021-06-08 NOTE — TELEPHONE ENCOUNTER
Wellness Screening Tool  Symptom Screening:  Do you have one of the following new symptoms:    Fever or reported chills?  No    A new cough (started within the past 14 days)?  No    Shortness of breath (started within the past 14 days) ?  No     Nausea, vomiting, or diarrhea?  No    Within the past 3 weeks, have you been exposed to someone with a known positive illness below:    COVID-19 (known or suspected)?  No    Chicken pox?  No    Mealses?  No    Pertussis?  No    Patient notified of visitor restrictions: Yes  If pt asymptomatic, please remind patient to bring in and wear their own mask if they have one available to their appointment: Yes  Patient's appointment status: Patient will be seen in clinic as scheduled on 5/14/20

## 2021-06-08 NOTE — TELEPHONE ENCOUNTER
Pemafibrate to Reduce cardiovascular OutcoMes by reducing triglycerides IN patiENts with diabeTes (PROMINENT) clinical trial.    Study telephone visit form:    Subject Number:   [id number]                                     Dr. Vigil- PI      CONCOMITANT MEDICATIONS  ? Investigator to report if participant needs treatment with prohibited medications (Fibrates or other agents with PPAR-? agonist activity (e.g., saroglitazar); See protocol for exclusionary medications and actions to be taken.    VISIT SCHEDULING AND CONTACT CONFIRMATION  ? Confirm date of next study visit   ? Confirm information on Subject Information Form or update as needed      Visit Number:_V20______  Visit Date: 2020      Was the subject, relatives or health care provider (as per consent) contacted by phone?    [x] Yes  [] No    Yes [x]  No []   If Yes, Date of Contact:   Date: __ ____ ____   [facility] YYYY  If No, please document attempts to contact subject (include date and type of contact)   Date: ____ ____ ____   Type: [] Tel _ [] Other___  [] 2 Date:      Name of Person: Contacted:   _____Self/Subject_______________   Relationship to Subject:   ______________________  Type:[]  Tel[x]  Other _______   Date: _ ____ ____   Type: [] Tel [] Other _______    Subject Status on the day assessed     [x] Subject alive  []  Subject    [] Unknown at present Please complete  Death Details Form in InForm and submit source documents to [facility]        Review Subject Contact Information Form and update as needed   [x]  Review concomitant medication use   [x]  Query participant and record any reported AEs, reports feeling a lot better but activity intolerance still there.  [x] Query participant and record any CV efficacy events   [x] Confirm next study visit: ______/july___will be on vacation so will call us with dates____   [x] Instruct participants to bring all study supplies with them to the next in-person visit    Instruct  participants to bring all study supplies with them to the next in-person visit   Instruct participants to fast for ? 8 hours immediately prior to next in-person visit (i.e nothing by mouth, except water and essential medications).     Eduard Tang RN

## 2021-06-09 NOTE — PROGRESS NOTES
"06/24/2020      Mr. Ken Doss is a 74 y.o. male who is being evaluated via a billable telephone visit.       The patient has been notified of following:      \"This telephone visit will be conducted via a call between you and your physician/provider. We have found that certain health care needs can be provided without the need for a physical exam.  This service lets us provide the care you need with a short phone conversation.  If a prescription is necessary we can send it directly to your pharmacy.  If lab work is needed we can place an order for that and you can then stop by our lab to have the test done at a later time.     If during the course of the call the physician/provider feels a telephone visit is not appropriate, you will not be charged for this service.\"      Dear Dr. Vigil,    We had the pleasure of seeing Mr. Ken Doss in our advanced heart failure outreach clinic at [facility].      As you know, he is a pleasant 74-year-old male with past medical history significant for insulin-dependent type 2 diabetes mellitus, two-vessel coronary artery disease with percutaneous coronary intervention to mid LAD and proximal RCA, severe LV systolic dysfunction with an estimated ejection fraction of 20 to 20%, atrial fibrillation status post AV marianna ablation, status post cardiac resynchronization therapy, and mild interstitial lung disease who was referred to us for consideration of left ventricular assist device as a destination therapy.    This is his two month follow up visit. He is doing well. He has not noticed any worsening of his exertional shortness of breath. No shortness of breath at rest or with minimal exertion. Notices shortness of breath when he climbs more than a flight of stairs or walks uphill. FC III. No PND or orthopnea. He denies having chest pain or pressure. He has no presyncope or syncope. No lower extremity edema or abdominal distension.  His weight " has been stable between 148-154 lbs. No ICD shocks.     He had an acute gout episode, which is now resolving with prednisone.     Past Medical History:  #1 insulin-dependent type 2 diabetes mellitus  #2 severe LV systolic dysfunction with an estimated left ventricular ejection fraction of 20 to 25% in January 2020  #3 two-vessel coronary artery disease in the LAD and RCA status post percutaneous coronary intervention.  His most recent nuclear stress test in May 2019 showed fixed defect in the inferior and inferoseptal region consistent with scar.  He had no reversible ischemia.  #4 atrial fibrillation status post AV marianna ablation.  #5 cardiac resynchronization therapy  #6 mild interstitial lung disease with a total lung capacity of 72%.    Past Surgical History:  #1 Bilateral knee replacement  #2 Right shoulder replacement  #3 Appendectomy    Medications:  Current Outpatient Medications   Medication Sig     atorvastatin (LIPITOR) 40 MG tablet TAKE 1 TABLET BY MOUTH AT BEDTIME     BASAGLAR KWIKPEN U-100 INSULIN 100 unit/mL (3 mL) pen Inject 40 Units under the skin at bedtime. Pt takes 40 units at bedtime     bumetanide (BUMEX) 1 MG tablet Take 3 mg by mouth 2 (two) times a day at 9am and 6pm.      carvedilol (COREG) 6.25 MG tablet Take 2 tablets (12.5 mg total) by mouth 2 (two) times a day with meals. Take with 12.5 mg tablet by mouth BID     coenzyme Q10 (COQ-10) 100 mg capsule Take 1 capsule (100 mg total) by mouth daily.     diphenhydrAMINE-acetaminophen (TYLENOL PM EXTRA STRENGTH)  mg Tab Take 2 tablets by mouth at bedtime as needed.      ELIQUIS 5 mg Tab tablet TAKE 1 TABLET BY MOUTH TWICE DAILY     fish oil-omega-3 fatty acids (FISH OIL) 300-1,000 mg capsule Take 2 g by mouth daily.     glucosamine-chondroitin 500-400 mg cap Take 1 capsule by mouth daily. 1500mg/1200mg once daily     metFORMIN (GLUCOPHAGE) 1000 MG tablet Take 1,000 mg by mouth 2 (two) times a day with meals.     multivitamin therapeutic  (THERAGRAN) tablet Take 1 tablet by mouth daily.     predniSONE (DELTASONE) 10 mg tablet TAKE 3 TABLETS BY MOUTH ONCE DAILY FOR 3 DAYS THEN 2 TABS ONCE DAILY FOR 3 DAYS THEN 1 TAB ONCE DAILY FOR 3 DAYS     sacubitriL-valsartan (ENTRESTO) 49-51 mg Tab tablet Take 1 tablet by mouth 2 (two) times a day.     ticagrelor (BRILINTA) 90 mg Tab Take 90 mg by mouth 2 (two) times a day.      spironolactone (ALDACTONE) 25 MG tablet Take 0.5 tablets (12.5 mg total) by mouth daily.     Review of system    Detailed 10 point review of system obtained as described in history of present illness all other systems reviewed and are negative.    Personal and social history    He is  and living with his wife.  He has 2 grown kids who are healthy.  He does tommy job.  He has significantly reduced his work due to his health related issues.  He occasionally smokes cigars.  He drinks alcohol socially.  He does not use any recreational drugs.    Family history  He has a significant family history of premature coronary artery disease on his maternal side.  His mom  of coronary artery disease at the age of 55.    Examination  He was comfortable.  He was in no apparent distress.  He was awake, alert, oriented x3.  Vitals:    20 1257   BP: (!) 108/102   Pulse: (!) 53   Weight: 151 lb (68.5 kg)     Not done due to telephone visit.       Investigations:    Normal renal function and electrolytes in late May 2020. NT-proBNP is still elevated    Coronary angiogram (2020)  He underwent a repeat coronary angiogram that showed in-stent restenosis of the ostial RCA and proximal LAD.      RHC (2020)  His right heart catheterization showed an RA pressure of 16, RV of 60/15, PA pressure of 59/28 with a mean of 35, pulmonary capillary wedge pressure of 20, thermodilution cardiac output of 3.4 with an index of 1.8, PA saturation of 56.3, and PVR of 4.4 Wood units.    Echo (2020)  His echocardiogram showed severely reduced left  ventricular systolic function with an estimated ejection fraction of 10 to 15%.  His left ventricle was moderate to severely dilated with a left ventricle end-diastolic diameter of 6.4 cm.  His right ventricle was normal in size and function. He had moderate tricuspid regurgitation.  He had no other significant valvular abnormalities.      Assessment and Plan:    74-year-old male with past medical history significant for insulin-dependent type 2 diabetes mellitus, two-vessel coronary artery disease with percutaneous coronary intervention to mid LAD and proximal RCA, severe LV systolic dysfunction with an estimated ejection fraction of 20%, atrial fibrillation status post AV marianna ablation, status post cardiac resynchronization therapy, and mild interstitial lung disease who was referred to us for consideration of left ventricular assist device as a destination therapy.    He is feeling better after revascularization of his in-stent restenosis of his ostial RCA and proximal LAD.  I suspect that his symptomatic improvement is also from aggressive diuresis.  He appears to be stable based on his symptoms, but to be certain, I have recommended him to have a repeat RHC and CPEX. He would like to schedule this in mid July.    Will decide on the need for DT VAD if he deteriorates or his repeat testing is concerning, otherwise continue current treatment.      In interim, I took the liberty and started him on Aldactone 12.5 mg daily and stopped his potassium supplements.    He has been presented in our multidisciplinary meeting and has been approved for DT LVAD as long as his life expectancy is not limited by his ILD and the investigation for the dilated pancreatic duct is unremarkable. I have discussed hiss case with Dr. Luis Encarnacion and he feels that his ILD is stable and less likely to progress rather quickly in the next 1-2 years.     We will discuss with our GI colleagues regarding how best to evaluate his dilated main  pancreatic duct. He cannot have a dedicated MRI because of his ICD. He has already had an ultrasound and a CT abdomen.     He will continue on low-dose of Coreg at 12.5 twice daily, Entresto at 49/51 mg twice a day, and Bumex 3 mg twice a day.  He is on Eliquis for his anticoagulation for atrial fibrillation.    Recommend him to return to see us in 1 month after his repeat CPEX and RHC. He will call us in the interim if you have any further questions.    It was a pleasure meeting Mr. Rocha son in our [facility] heart failure outreach clinic at [facility].  We thank you for involving us in his care.  Please do not hesitate to call us if you have any further questions in the interim.    This was a telephonic visit of 25 minutes duration with 50% of the time in counseling.     Sincerely,  Tata Payton MD  [facility]  [place], [place]  Pager [phone]

## 2021-06-09 NOTE — PROGRESS NOTES
Pemafibrate to Reduce cardiovascular OutcoMes by reducing triglycerides IN patiENts with diabeTes (PROMINENT) clinical trial.    Subject contacted by telephone for study Visit 21 due to Covid 19 pandemic and site guidelines.    Explained the current Covid 19 measures at the site and modifications to the study plan with subject.   Subject denies fever, cough, shortness of breath, or contact with known positive or person under investigation with Covid 19.     There were no vitals filed for this visit per telephone visit.  Waist measurement not done per telephone visit.  Labs not drawn per telephone visit.    Study efficacy, endpoints and adverse events reviewed with subject.  Cardiovascular risk discussed.     Study alcohol consumption stipulations and education reviewed with subject:    Subject reports  [] Never [x] Current [] Former.   1 drinks per [] Day [] Week [] Month [x] Occasional.  1 maximum drinks per sitting.     Subject called by provider Arcelia Ring for study related telephone exam.    Subject was met at site entrance for study medication exchange.    Study medication box # [id number] with 5 tabs  Study medication box # [id number] with 0 tabs  Study medication box # [id number] with 0 tabs  Study medication box # [id number] with 14 tabs  returned by subject.  Total tablet count of 19 for 99 % compliance.  Study medication box #'s [id number], [id number], [id number], [id number] dispensed to subject.  Education provided on medication compliance and administration    Plan: Study Visit 22 telephone call with subject in 2 months.  Will schedule study  Visit 23 with subject in 4 months back at [facility].    Eduard Tang RN   Clinical Trials Nurse    Dr. Vigil: Please assign causality of AE below:    Adverse Event: Adverse event   abrasion Left elbow Adverse event   Diabetic peripheral neuropathy - bilateral feet   Description: On eliquis and brilinta. Was doing work outside and scraped elbow, wouldn't stop bleeding  enough, went to ED for bandage, no further issues Reports bilateral numbness and tingling starting in feet, was given gabapentin 100mg x3 daily and reports that's working well.   Start Date: 7/7/2020 7/2/2020   End Date: 7/7/2020 ongoing   Date of Awareness: 7/22/2020 7/22/2020   Severity (mild/moderate/severe): mild moderate   Reportable to IRB:  Yes  []     No  [x]  Yes  []     No  [x]   Serious?  Yes  []     No  [x]  Yes  []     No  [x]     Related to study drug?    Yes  []     No  [x]    Yes  []     No  [x]     Action taken with study treatment: [x] Dose Not Changed   [x] Dose Not Changed           Outcome:  []Not Recovered/Not Resolved  [x]Recovered/Resolved   [x]Not Recovered/Not Resolved  []Recovered/Resolved  []Recovered/Resolved with Sequelae   []Recovering/Resolving   Medication or therapies taken:  Yes  []     No  [x]  Yes  [x]     No  []

## 2021-06-09 NOTE — PATIENT INSTRUCTIONS - HE
1. Stop potassium   2. Start Aldactone 12.5 mg daily  3. Chem 7 in a week  4. CPEX, RHC, and labs mid-third week of July  5. July [facility] Follow up      You were seen today in the Advanced Heart Failure Clinic at [facility].       Cardiology Providers you saw during your visit: Dr. Payton   Medication Changes:   1. STOP potassium.  2. Start spironolactone (aldactone) 12.5mg (half tablet) daily.      Patient Instructions:  1. Repeat labs in 1 week.    Follow up Appointment Information:  1. Return to the [facility] in mid July for testing. We will complete a right heat cath and a cardiopulmonary stress test. Please see below for information on how to prepare for this test. I will call you with the exact dates/times.   2. Follow up with Dr. Payton at [facility] in late July.    Right Heart Catheterization  A Right Heart Cath is a test that measures how well your heart is pumping blood to your body and will assess the volume and pressures in your heart.   You are scheduled for a Right Heart Catheterization [place] [address]. You are to check in at the Gold Waiting Area.   When you arrive you will be escorted back to the pre procedure area called 2A. Here they will insert an IV, draw labs, and obtain a short medical history. Please bring an updated list of your current medications.  A physician will come and talk with you about the procedure and obtain consent.  A nurse from the Cardiac Catheterization Lab will then escort you to the procedure area. You will receive a shot to numb the site where the catheter (tube) will enter your body.  This may be in the neck or groin - but likely your neck.  You will not receive sedation or anesthesia.   After the procedure you will recover for a short period and then be discharged.  Pre procedure instructions:   1.  Do not eat any solid food or milk products for 6 hours prior to the exam. You may  "drink clear liquids until 2 hours prior to the exam. Clear liquids include the following: water, Jell-O, clear broth, apple juice or any non-carbonated drink that you can see through (no pop!).   2. Do not drink alcohol or smoke 24 hours prior to test.  3. Hold these meds:  Do not take your oral diabetic medication or short acting insulin the day of the procedure.    Hold your warfarin, pradaxa/xarelto/eliquis 2 days prior.   4. You may take your other morning medications as prescribed with a sip of water. You may hold your diuretic that morning.      Thank you for allowing us to be a part of your care here at the [facility]      If you have questions or concerns please contact us at:      Cici Hernandez RN BSN CHFN  Cardiology Care Coordinator  [facility]   Questions and [phone]   First press #1 for the University and then press #4 for \"Medical Advice\" to reach us Cardiology Nurses.        "

## 2021-06-09 NOTE — TELEPHONE ENCOUNTER
Wellness Screening Tool  Symptom Screening:  Do you have one of the following NEW symptoms:    Fever (subjective or >100.0)?  No    A new cough?  No    Shortness of breath?  No     Chills? No     New loss of taste or smell? No     Generalized body aches? No     New persistent headache? No     New sore throat? No     Nausea, vomiting, or diarrhea?  sight diarrhea    Within the past 3 weeks, have you been exposed to someone with a known positive illness below:    COVID-19 (known or suspected)?  No    Chicken pox?  No    Mealses?  No    Pertussis?  No    Patient notified of visitor policy- They may have one person accompany them to their appointment, but they will need to wear a mask and will be screened upon arrival for symptoms: Yes  Pt informed to wear a mask: Yes  Pt notified if they develop any symptoms listed above, prior to their appointment, they are to call the clinic directly at [phone] for further instructions.  Yes  Patient's appointment status: Patient will be seen in clinic as scheduled on 7/1/20

## 2021-06-09 NOTE — PROGRESS NOTES
Review of Systems - Respiratory ROS: positive for - shortness of breath     ALL OTHERS WNL    NO OTHERS CONCERNS    PHONE VISIT    IN [place]    Frieda Rivera CMA

## 2021-06-09 NOTE — PROGRESS NOTES
Review of Systems - History obtained from the patient  General ROS: positive for  - weight gain and weight loss  Psychological ROS: negative  Ophthalmic ROS: negative  ENT ROS: negative  Allergy and Immunology ROS: negative  Hematological and Lymphatic ROS: positive for - bruising  Endocrine ROS: negative  Respiratory ROS: negative  Cardiovascular ROS: positive for - dyspnea on exertion  Gastrointestinal ROS: positive for - diarrhea  Genito-Urinary ROS: positive for - nocturia  Musculoskeletal ROS: negative  Neurological ROS: negative  Dermatological ROS: negative

## 2021-06-09 NOTE — PROGRESS NOTES
Pemafibrate to Reduce cardiovascular OutcoMes by reducing triglycerides IN patiENts with diabeTes (PROMINENT) clinical trial.    ADVERSE EVENT Description: Subject had left ankle pain and was assessed by PCP to be acute gout.  Had recently increase lasix and was on mostly meat diet to lose weight which PCP thought contributed to uric acid of 13.5 at the time.  Was successfully treated with prednisone 20mg daily for 10 days.  Did have a reoccurrence of acute gout on 12mar2020 and successful treatment with prednisone.    Adverse Event:  Acute gout of left ankle  Serious:  [] Yes   [x] No  Reportable to IRB:   [] Yes   [x] No  Severity (mild/moderate/severe): moderate   Date of Awareness: 6/25/2020  Start Date: 10/7/2019  End Date: 10/17/2019  Action taken with study treatment:  [x] Dose Not Changed  Outcome:   [x]Recovered/Resolved  Medication or therapies taken:  [x] Yes   Prednisone 20mg daily    Dr. Vigil: Please assign causality of above AE  Related to study drug?     [] Yes   [x] No

## 2021-06-10 NOTE — PROGRESS NOTES
In clinic device check. By [employer] Kit Please see link for full device report.  Patient was informed of results and next follow up during today's visit.    Type: In clinic CRT-D check for LV lead issues by [employer] Kit.   Presenting: Atrial fibrillation RVP, no LV capture on presenting.   Lead/Battery Status: 3% left on battery. LV ring configurations not capturing. LV tip to LVring no capture at 6V   Atrial Arrhythmias: Chronic AF, programmed VVIR.   Vent Arrhythmias: none detected.   Anticoagulant: Eliquis.   Comments: Normal device function. 92% / 97% CRT. Reprogrammed LV configuration from LVtip to LVring to LVtip to RV ring. LV ring appears to be the issue with no capture. Routed to Dr. Gamez. BK    Pt to get CXR today per Dr. Gamez.   Emerald Yusuf RN BSN PHN  Device Nurse   [facility]

## 2021-06-10 NOTE — PROGRESS NOTES
07/31/2020      Dear Dr. Vigil,    We had the pleasure of seeing Mr. Ken Doss in our advanced heart failure outreach clinic at [facility].      As you know, he is a pleasant 74-year-old male with past medical history significant for insulin-dependent type 2 diabetes mellitus, two-vessel coronary artery disease with percutaneous coronary intervention to mid LAD and proximal RCA, severe LV systolic dysfunction with an estimated ejection fraction of 20 to 20%, atrial fibrillation status post AV marianna ablation, status post cardiac resynchronization therapy, and mild interstitial lung disease who was referred to us for consideration of left ventricular assist device as a destination therapy.    During his last clinic visit I recommended him to have a right heart catheterization and cardiopulmonary exercise testing to follow-up on the status of his heart failure.  He has completed this testing and returns today for follow-up.    Overall, he feels well.  He visited his son in [place] at 5000 feet.  He had no significant difficulty breathing at high altitude.  He is walking now 1 to 2 miles couple times a week.  He has no limitations with his activities of daily living.  I will currently characterize him as functional class III.  He has no lower extremity swelling or abdominal distention.  He has gained weight.  He has no lightheadedness dizziness or syncope.  No ICD shocks.    He has the LV lead had a high impedance.  He had a chest x-ray today that showed no fracture.  His configuration was changed with a better capture threshold.    He is scheduled to see gastroenterology at the [facility] for evaluation of his dilated pancreatic duct.    Past Medical History:  #1 insulin-dependent type 2 diabetes mellitus  #2 severe LV systolic dysfunction with an estimated left ventricular ejection fraction of 20 to 25% in January 2020  #3 two-vessel coronary artery disease in the LAD and RCA status  post percutaneous coronary intervention.  His most recent nuclear stress test in May 2019 showed fixed defect in the inferior and inferoseptal region consistent with scar.  He had no reversible ischemia.  #4 atrial fibrillation status post AV marianna ablation.  #5 cardiac resynchronization therapy  #6 mild interstitial lung disease with a total lung capacity of 72%.    Past Surgical History:  #1 Bilateral knee replacement  #2 Right shoulder replacement  #3 Appendectomy    Medications:  Current Outpatient Medications   Medication Sig     atorvastatin (LIPITOR) 40 MG tablet TAKE 1 TABLET BY MOUTH AT BEDTIME     BASAGLAR KWIKPEN U-100 INSULIN 100 unit/mL (3 mL) pen Inject 40 Units under the skin at bedtime. Pt takes 40 units at bedtime     carvedilol (COREG) 6.25 MG tablet Take 2 tablets (12.5 mg total) by mouth 2 (two) times a day with meals. Take with 12.5 mg tablet by mouth BID     coenzyme Q10 (COQ-10) 100 mg capsule Take 1 capsule (100 mg total) by mouth daily.     diphenhydrAMINE-acetaminophen (TYLENOL PM EXTRA STRENGTH)  mg Tab Take 2 tablets by mouth at bedtime as needed.      ELIQUIS 5 mg Tab tablet TAKE 1 TABLET BY MOUTH TWICE DAILY     fish oil-omega-3 fatty acids (FISH OIL) 300-1,000 mg capsule Take 2 g by mouth daily.     gabapentin (NEURONTIN) 100 MG capsule Take 100 mg by mouth 3 (three) times a day.     glucosamine-chondroitin 500-400 mg cap Take 1 capsule by mouth daily. 1500mg/1200mg once daily     metFORMIN (GLUCOPHAGE) 1000 MG tablet Take 1,000 mg by mouth 2 (two) times a day with meals.     multivitamin therapeutic (THERAGRAN) tablet Take 1 tablet by mouth daily.     predniSONE (DELTASONE) 10 mg tablet TAKE 3 TABLETS BY MOUTH ONCE DAILY FOR 3 DAYS THEN 2 TABS ONCE DAILY FOR 3 DAYS THEN 1 TAB ONCE DAILY FOR 3 DAYS     sacubitriL-valsartan (ENTRESTO) 49-51 mg Tab tablet Take 1 tablet by mouth 2 (two) times a day.     spironolactone (ALDACTONE) 25 MG tablet Take 0.5 tablets (12.5 mg total) by  "mouth daily.     ticagrelor (BRILINTA) 90 mg Tab Take 90 mg by mouth 2 (two) times a day.      bumetanide (BUMEX) 1 MG tablet Take 3 mg by mouth 2 (two) times a day at 9am and 6pm.      Review of system    Detailed 10 point review of system obtained as described in history of present illness all other systems reviewed and are negative.    Personal and social history    He is  and living with his wife.  He has 2 grown kids who are healthy.  He does tommy job.  He has significantly reduced his work due to his health related issues.  He occasionally smokes cigars.  He drinks alcohol socially.  He does not use any recreational drugs.    Family history  He has a significant family history of premature coronary artery disease on his maternal side.  His mom  of coronary artery disease at the age of 55.    Examination  He was comfortable.  He was in no apparent distress.  He was awake, alert, oriented x3.  Vitals:    20 1332   BP: 92/52   Patient Site: Left Arm   Patient Position: Sitting   Cuff Size: Adult Regular   Pulse: 68   Resp: 20   Weight: 162 lb 3.2 oz (73.6 kg)   Height: 5' 9.02\" (1.753 m)     He had no pallor, cyanosis, or jaundice.  His neck exam revealed no jugular venous distention.  He had no lower extremity swelling.  His cardiac auscultation revealed normal S1 and S2 with no murmur rub or gallop.  Auscultation of his lungs revealed equal air entry on both sides with bilateral basilar end inspiratory cardiac crackles consistent with interstitial lung disease  His abdomen was soft with normal motion orders no rebound guarding  He has no focal neurological deficit    Investigations:    His most recent CBC showed a hemoglobin of 12.3 hematocrit of 37, WBC of 10.7, and a platelet of 182.    His most recent chemistry showed a sodium of 137, potassium 4.2, chloride of 105, bicarb of 23, BUN of 40, creatinine 1.28, calcium of 8.8 and a glucose of 153.    His most recent LFTs showed a total of " 7.7 albumin 3.6, bilirubin of 1.4, alk phos of 88, AST of 84, and ALT of 23.      Echo (03/2020)  His echocardiogram showed severely reduced left ventricular systolic function with an estimated ejection fraction of 10 to 15%.  His left ventricle was moderate to severely dilated with a left ventricle end-diastolic diameter of 6.4 cm.  His right ventricle was normal in size and function. He had moderate tricuspid regurgitation.  He had no other significant valvular abnormalities.    CPEX (07/2020)  On his cardiopulmonary exercise testing, he exercised for 5 minutes and 15 seconds.  He achieved anaerobic threshold with an RER of 1.3.  His VO2 max was 14.5 mL/kg/min which was 50% age and gender predicted.  His blood pressure did not drop with exercise.  His VE VCO2 slope was significantly elevated at 55.49.    Coronary angiogram (03/2020)  He underwent a repeat coronary angiogram that showed in-stent restenosis of the ostial RCA and proximal LAD.      RHC (07/2020)  His right heart catheterization showed an RA pressure of 10, RV of 43/10 PA of 45/20 with a mean of 33, mechanical respiration of 20, PA saturation of 51%,Thermodilution cardiac output was 3.1 with an index of 1.7.  His estimated Lorie cardiac output was 2.8 with an index of 1.5.  His PVR calculated was 2.9 Wood units.    Assessment and Plan:    74-year-old male with past medical history significant for insulin-dependent type 2 diabetes mellitus, two-vessel coronary artery disease with percutaneous coronary intervention to mid LAD and proximal RCA, severe LV systolic dysfunction with an estimated ejection fraction of 20%, atrial fibrillation status post AV marianna ablation, status post cardiac resynchronization therapy, and mild interstitial lung disease who was referred to us for consideration of left ventricular assist device as a destination therapy.    He is feeling better since February after his revascularization and aggressive diuresis.  He is currently  functional class III.  He has not noticed any deterioration in the last several months.      On objective testing, his exercise capacity has improved albeit still quite limited.  His repeat hemodynamic assessment shows improvement of biventricular filling pressure but his cardiac output continues to run low.      At this time in point blood progress is advised we will not give him any survival advantage based on the roadmap trial.  However it can give him more symptomatic benefit.  I discussed this candidly with him.  As he is feeling relatively okay and has decent exercise capacity with good quality of life, he would like to hold off on DT LVAD for now and consider if his symptoms were to deteriorate further.      He has been presented in our multidisciplinary meeting and has been approved for DT LVAD as long as his life expectancy is not limited by his ILD and the investigation for the dilated pancreatic duct is unremarkable. I have discussed hiss case with Dr. Luis Encarnacion and he feels that his ILD is stable and less likely to progress rather quickly in the next 1-2 years.     He will continue for now on the current medical therapy with Entresto 49 x 57 twice a day, Coreg 12.5 mg twice a day, spironolactone 12.5 mg daily, Bumex 3 mg twice a day, Eliquis, ticagrelor, and the Lipitor.    He is scheduled to see gastroenterology in the [place] for further evaluation of his dilated pancreatic duct.    His pacemaker battery has only 3%.  I recommended him to call the pacemaker clinic to have this replaced as soon as possible as he is pacemaker dependent.    We will have him repeat chemistry in a month to make sure his creatinine is normalizing.  I have encouraged him to increase his oral fluid intake.  He will return to see us in 3 months.  He will call us in the interim with any further worsening symptoms.    It was a pleasure meeting Mr. Rocha son in our [facility] heart failure outreach clinic at [facility].  We thank you for involving us in his care.  Please do not hesitate to call us if you have any further questions in the interim.    Sincerely,  Tata Payton MD  [facility]  [place], [place]  Pager [phone]

## 2021-06-10 NOTE — TELEPHONE ENCOUNTER
Wellness Screening Tool  Symptom Screening:  Do you have one of the following NEW symptoms:    Fever (subjective or >100.0)?  No    A new cough?  No    Shortness of breath?  No     Chills? No     New loss of taste or smell? No     Generalized body aches? No     New persistent headache? No     New sore throat? No     Nausea, vomiting, or diarrhea?  No    Within the past 3 weeks, have you been exposed to someone with a known positive illness below:    COVID-19 (known or suspected)?  No    Chicken pox?  No    Mealses?  No    Pertussis?  No    Patient notified of visitor policy- They may have one person accompany them to their appointment, but they will need to wear a mask and will be screened upon arrival for symptoms: Yes  Pt informed to wear a mask: Yes  Pt notified if they develop any symptoms listed above, prior to their appointment, they are to call the clinic directly at [phone] for further instructions.  Yes  Patient's appointment status: Patient will be seen in clinic as scheduled on 7/31/20

## 2021-06-10 NOTE — PROGRESS NOTES
Abnormal pacemaker evaluation  Sudden change in LV impedance occurred at 84 months of service no greater than 3000 ohms  Has a [employer] quick flex bipolar lead  Plan  Obtain chest x-ray PA and lateral  Have him come to clinic for device evaluation possible that we could program off of the  ring to or tip to  reduce impedance

## 2021-06-10 NOTE — TELEPHONE ENCOUNTER
Kofi Gamez MD at 7/27/2020  4:45 PM     Status: Signed       Abnormal pacemaker evaluation  Sudden change in LV impedance occurred at 84 months of service no greater than 3000 ohms  Has a [employer] quick flex bipolar lead  Plan  Obtain chest x-ray PA and lateral  Have him come to clinic for device evaluation possible that we could program off of the  ring to or tip to  reduce impedance        Above noted, patient is already scheduled for this Friday 7/31/20 at 9:20 with [employer] Rep/Device RN. Will get chest xray as well, orders placed.   Zoie Granado RN

## 2021-06-10 NOTE — PATIENT INSTRUCTIONS - HE
"You were seen today in the Advanced Heart Failure Clinic at [facility].       Cardiology Providers you saw during your visit: Dr. Payton      Medication Changes:   1. No medication changes.     Drink a little more fluids.        *your appointment with Dr. Ayon in the GI clinic at the [place] is scheduled for a telephone visit on  at 10am.    Follow up Appointment Information:  1. Recheck your labs in 1 month.  2. Return to see Dr. Payton in 3 months (late October or 2020).      Thank you for allowing us to be a part of your care here at the [facility]      If you have questions or concerns please contact us at:      Cici Hernandez RN BSN CHFN  Cardiology Care Coordinator  [facility]   Questions and [phone]   First press #1 for the [place] and then press #4 for \"Medical Advice\" to reach us Cardiology Nurses.        "

## 2021-06-10 NOTE — PROGRESS NOTES
when Signing Visit       In clinic device check. By Bio Rep Kit Please see link for full device report.  Patient was informed of results and next follow up during today's visit.     Type: In clinic CRT-D check for LV lead issues by Deolan Kit.   Presenting: Atrial fibrillation RVP, no LV capture on presenting.   Lead/Battery Status: 3% left on battery. LV ring configurations not capturing. LV tip to LVring no capture at 6V   Atrial Arrhythmias: Chronic AF, programmed VVIR.   Vent Arrhythmias: none detected.   Anticoagulant: Eliquis.   Comments: Normal device function. 92% / 97% CRT. Reprogrammed LV configuration from LVtip to LVring to LVtip to RV ring. LV ring appears to be the issue with no capture. Routed to Dr. Gamez. BK     Pt to get CXR today per Dr. Gamez.   Emerald Yusuf RN BSN PHN  Device Nurse   [facility]        Very high impedance from the ring electrode of the bipolar LV lead  Able to l get good capture using LV  tip to RV-  device was programmed LV tip to the RV ring  Sent for  x-ray to evaluate integrity of lead  Prior CXRAY showed excellent LV bipolar lead position; quite posterior   Near ANGEL  Prior AV node ablation  RV lead is satisfactory

## 2021-06-10 NOTE — TELEPHONE ENCOUNTER
Patient called with questions concerning CRTD gen change due to battery at 3% at last office visit. Informed patient that the remaining battery voltage only determines replacement time and will not completely give out. Patient confirms his home monitor is plugged in at all time. Informed him that monitor will also notify clinic of replacement. Patient verbalized understanding and denied further questions/concerns.     Bill Cm RN BSN  Device Nurse

## 2021-06-10 NOTE — PROGRESS NOTES
[facility] Follow-up Note    Assessment & Plan        1. Coronary atherosclerosis due to lipid rich plaque -angiography December 12, 2016 showed normal left main, mid to distal left and descending 30% lesion with the first diagonal 75% lesion.  Circumflex had a proximal to mid 30% lesion in the right coronary artery had a proximal 70% lesion with a mid to distal 40% lesion.  None of these lesions were significant by flow wire and work on prevention with statin and aspirin.     2. Cardiomyopathy -presumed nonischemic based on workup at [facility]. Prior ejection fraction is 20% but currently 25%. On appropriate Aldactone, Vasotec, and carvedilol, and also BiVICD. Unfortunately he doesn't feel as well as he would like.  I tried to change him over to entresto but cost was prohibitive. He is relatively active and would continue the current medications. He is using the biventricular device about 93% of the time.  It is possible that his fatigue is due to lower blood pressure and I have asked him to cut his Aldactone, carvedilol, and enalapril down and one half for 2 weeks at a time to see if this gives her more energy.     3. Chronic combined systolic and diastolic heart failure -no signs or symptoms on exam currently.     4. Type 2 diabetes mellitus -now on insulin.     5. Biventricular ICD, in situ -he has a Biotronik device with a Biotronik right atrial lead, Medtronic right ventricular lead, and [employer] left ventricular lead which he has biventricular pacing 93% of the time.  Due for device check June 1.     6. Paroxysmal atrial fibrillation -he had some palpitations about a week or so ago with a vague discomfort near the pocket.  He is due for device check June 1 and I would like to see what his arrhythmia log shows.       Plan  1.  Check echo to substantiate ejection fraction.  2.  Device check June 1 and see if major arrhythmias and if need be adjust medicines.  3.  Try cutting dose of  Aldactone, carvedilol, and enalapril and one half to see if this improves his energy level given his fatigue.  4.  Follow-up with me in 6-12 months or sooner if needed.    Subjective  CC: 71-year-old white gentleman being seen in follow-up today.  Since I seen him he has had angiography showing insignificant lesion involving the right coronary artery.  He is still fatigued, he is still not have the energy he wants although he is able to do his general tommy.  He had an episode a week or so ago with a client giving him a lot of stress which may have caused some palpitations.  Since then however he has some discomfort involving the pacer site.  There is no significant shortness of breath, PND, orthopnea or peripheral edema.    Medications  Current Outpatient Prescriptions   Medication Sig Note     amoxicillin (AMOXIL) 500 MG capsule PRIOR TO DENTAL APPOINTMENT      aspirin 81 MG EC tablet Take 81 mg by mouth daily.      carvedilol (COREG) 25 MG tablet Take 25 mg by mouth 2 (two) times a day with meals.      DEXTROSE (GLUCOSE) Chew Chew 1 tablet as needed.      DOCOSAHEXANOIC ACID/EPA (FISH OIL ORAL) 1 capsule 2 (two) times a day.       enalapril (VASOTEC) 20 MG tablet Take 20 mg by mouth daily.       glucosamine-chondroitin 500-400 mg cap Take 1 capsule by mouth daily.       ibuprofen (ADVIL,MOTRIN) 600 MG tablet Take 600 mg by mouth every 6 (six) hours as needed for pain.      ibuprofen-diphenhydramine HCl (IBUPROFEN PM) 200-25 mg cap Take 1 capsule by mouth bedtime as needed.      metFORMIN (GLUCOPHAGE) 1000 MG tablet Take 1,000 mg by mouth 2 (two) times a day with meals.      multivitamin therapeutic (THERAGRAN) tablet Take 1 tablet by mouth daily.      simvastatin (ZOCOR) 40 MG tablet Take 40 mg by mouth bedtime.      spironolactone (ALDACTONE) 25 MG tablet Take 50 mg by mouth 2 (two) times a day at 9am and 6pm.       TOUJEO SOLOSTAR 300 unit/mL (1.5 mL) injection Inject under the skin daily. 50 units at  "bedtime. 12/12/2016: 25 [id number]       Objective  BP 96/52 (Patient Site: Left Arm, Patient Position: Sitting, Cuff Size: Adult Regular)  Pulse 68  Resp 16  Ht 5' 9.5\" (1.765 m)  Wt 177 lb (80.3 kg)  BMI 25.76 kg/m2    General Appearance:    Alert, cooperative, no distress, appears stated age   Head:    Normocephalic, without obvious abnormality, atraumatic   Throat:   Lips, mucosa, and tongue normal; teeth and gums normal   Neck:   Supple, symmetrical, trachea midline, no adenopathy;        thyroid:  No enlargement/tenderness/nodules; no carotid    bruit or JVD   Back:     Symmetric, no curvature, ROM normal, no CVA tenderness   Lungs:     Clear to auscultation bilaterally, respirations unlabored   Chest wall:    No tenderness, left-sided ICD    Heart:    Regular rate and rhythm, S1 and S2 normal, no murmur, rub   or gallop   Abdomen:     Soft, non-tender, bowel sounds active all four quadrants,     no masses, no organomegaly   Extremities:   Normal, atraumatic, no cyanosis or edema   Pulses:   2+ and symmetric all extremities   Skin:   Skin color, texture, turgor normal, no rashes or lesions     Results    Lab Results personally reviewed   Lab Results   Component Value Date    CHOL 166 12/16/2016    CHOL 208 (H) 08/30/2016     Lab Results   Component Value Date    HDL 33 (L) 12/16/2016    HDL 30 (L) 08/30/2016     Lab Results   Component Value Date    LDLCALC 60 12/16/2016    LDLCALC  08/30/2016      Comment:      Invalid, Triglycerides >400     Lab Results   Component Value Date    TRIG 366 (H) 12/16/2016    TRIG 1090 (H) 08/30/2016     Lab Results   Component Value Date    WBC 8.3 12/12/2016    HGB 12.6 (L) 12/12/2016    HCT 35.3 (L) 12/12/2016     12/12/2016     Lab Results   Component Value Date    CREATININE 0.79 12/16/2016    BUN 18 12/16/2016     12/16/2016    K 4.9 12/16/2016    CO2 21 (L) 12/16/2016     Review of Systems:   General: WNL  Eyes: WNL  Ears/Nose/Throat: WNL  Lungs: " WNL  Heart: WNL  Stomach: WNL  Bladder: Frequent Urination at Night  Muscle/Joints: WNL  Skin: WNL  Nervous System: WNL  Mental Health: WNL     Blood: WNL

## 2021-06-10 NOTE — TELEPHONE ENCOUNTER
Device interrogation reviewed.  Abnormal left ventricular lead performance.  Proximal electrode with elevation of impedance and loss of capture.  Device reprogrammed to LV tip.  LV lead with known performance issues.  Patient will be scheduled to see me back in device clinic in approximately 6-8 weeks.

## 2021-06-11 NOTE — PROGRESS NOTES
H&P PMD Teach  I Order X P Order X Letter    COVID  Anticoag Eliquis- cont Meds  Hold metformin AM of     1945  Home:[phone] (home) Cell:[phone] (mobile)  Emergency Contact: Karen Doss     +++Important patient information for CSC/Cath Lab staff : None+++    [facility] EP Cath Lab Procedure Order     Device Implant/Revision:  Procedure: Generator Change Out  Device Type: BIV ICD  Device Company/Device Rep Needed for Procedure: Medtronic    Diagnosis:  Generator Change- Device at ANGEL  Anticipated Case Duration:  Standard  Scheduling Needs/Timeframe:  COVID Scheduling- urgent within 1-2 weeks  Pre-Procedural Testing needed: COVID 19 nasal/lab test within 48hrs of procedure  Anesthesia:  Conscious Sedation  Research Protocol:  No    [facility] EP Cath Lab Prep   Ordering Provider: Dr Gamez  Ordering Date: 9/4/2020  Orders Status: Intial order placed and Order set placed    H&P:  Pt to schedule with PMD to complete  PCP: Lewis Reeves MD, [phone]    Pre-op Labs: Ordered AM of procedure    Medical Records Pertinent for Procedure:  Echo 1/22/2020 ef 20-25% and Device Implant Record implanted 1/23/13    Patient Education:    Teach with Patient: Will be completed via phone prior to procedure, and letter was also sent to pt via mail/everyArt with written pre-procedural instructions.    Risks Reviewed:     Biventricular Generator change  In addition to standard risks for ICD or pacemaker implant, there is:    90% success of LV lead placement.    10%  dislodgment (LV lead)    <3% risk venous rupture, cardiac tamponade    Patient counseled re: options if LV lead placement is not feasible transvenously.      Pre-Procedure Instructions that were Reviewed with Patient:  NPO after midnight, Remove all jewelry prior to coming in for procedure, Shower prior to arrival, Notified patient of time and date of procedure by CV , Transportation arrangements needed s/p procedure, Post-procedure follow up  process, Sedation plan/orders and Pre-procedure letter was sent to pt by CV     Pre-Procedure Medication Instructions:  Instructions given to pt regarding anticoagulants: Eliquis- instructed to continue anticoagulation uninterrupted through their procedure  Instructions given to pt regarding antiarrhythmic medication: N/A for this type of procedure; Pt instructed to continue medication prior to procedure  Instructions for medication, other than anticoagulants/antiarrhythmics listed above, given to pt: to take all morning medications with small sips of water, with the exception of OTC supplements and MVI  Allergies: Reviewed allergies, no concerns regarding orders for procedure    No Known Allergies    Current Outpatient Medications:      atorvastatin (LIPITOR) 40 MG tablet, TAKE 1 TABLET BY MOUTH AT BEDTIME, Disp: 90 tablet, Rfl: 1     BASAGLAR KWIKPEN U-100 INSULIN 100 unit/mL (3 mL) pen, Inject 40 Units under the skin at bedtime. Pt takes 40 units at bedtime, Disp: , Rfl:      bumetanide (BUMEX) 1 MG tablet, Take 3 mg by mouth 2 (two) times a day at 9am and 6pm. , Disp: , Rfl:      carvedilol (COREG) 6.25 MG tablet, Take 2 tablets (12.5 mg total) by mouth 2 (two) times a day with meals. Take with 12.5 mg tablet by mouth BID, Disp: 360 tablet, Rfl: 3     coenzyme Q10 (COQ-10) 100 mg capsule, Take 1 capsule (100 mg total) by mouth daily., Disp: , Rfl: 0     diphenhydrAMINE-acetaminophen (TYLENOL PM EXTRA STRENGTH)  mg Tab, Take 2 tablets by mouth at bedtime as needed. , Disp: , Rfl:      ELIQUIS 5 mg Tab tablet, TAKE 1 TABLET BY MOUTH TWICE DAILY, Disp: 180 tablet, Rfl: 1     fish oil-omega-3 fatty acids (FISH OIL) 300-1,000 mg capsule, Take 2 g by mouth daily., Disp: , Rfl:      gabapentin (NEURONTIN) 100 MG capsule, Take 100 mg by mouth 3 (three) times a day., Disp: , Rfl:      glucosamine-chondroitin 500-400 mg cap, Take 1 capsule by mouth daily. 1500mg/1200mg once daily, Disp: , Rfl:       metFORMIN (GLUCOPHAGE) 1000 MG tablet, Take 1,000 mg by mouth 2 (two) times a day with meals., Disp: , Rfl:      multivitamin therapeutic (THERAGRAN) tablet, Take 1 tablet by mouth daily., Disp: , Rfl:      predniSONE (DELTASONE) 10 mg tablet, TAKE 3 TABLETS BY MOUTH ONCE DAILY FOR 3 DAYS THEN 2 TABS ONCE DAILY FOR 3 DAYS THEN 1 TAB ONCE DAILY FOR 3 DAYS, Disp: , Rfl:      sacubitriL-valsartan (ENTRESTO) 49-51 mg Tab tablet, Take 1 tablet by mouth 2 (two) times a day., Disp: 180 tablet, Rfl: 2     spironolactone (ALDACTONE) 25 MG tablet, Take 0.5 tablets (12.5 mg total) by mouth daily., Disp: 45 tablet, Rfl: 3     ticagrelor (BRILINTA) 90 mg Tab, Take 90 mg by mouth 2 (two) times a day. , Disp: , Rfl:     Current Facility-Administered Medications:      Study Drug pemafibrate 0.2 mg/placebo tablet 0.2 mg (PROMINENT), 0.2 mg, Oral, BID, Eduard Tang RN    Documentation Date:9/4/2020 12:10 PM  Jenny Mclean RN

## 2021-06-11 NOTE — PROGRESS NOTES
Pemafibrate to Reduce cardiovascular OutcoMes by reducing triglycerides IN patiENts with diabeTes (PROMINENT) clinical trial.    Subject scheduled for generator replacement, [place] [facility] [place] per sponsor request of making up annual safety labs V21 missed during covid19 restrictions this summer.    ADVERSE EVENT Description: Subject had outpatient battery change for his CRT-D.  Adverse Event: EP Implanted Coronary Device Generator Change   Serious:  [] Yes   [x] No  Reportable to IRB:   [] Yes   [x] No  Severity (mild/moderate/severe): moderate  Date of Awareness: 9/17/20  Start Date: 9/17/20  End Date: 9/17/20  Action taken with study treatment:  [x] Dose Not Changed  Outcome:   []Not Recovered/Not Resolved  [x]Recovered/Resolved    Medication or therapies taken:  [x] Yes,  Versed, sublimaze, vancomycin    Dr. Vigil: Please assign causality of above AE  Related to study drug?     [] Yes   [x] No        Eduard Tang, RN   Clinical Trials Nurse

## 2021-06-11 NOTE — PATIENT INSTRUCTIONS - HE
Implantable Cardiac Defibrillator (ICD) Post-operative Checklist      The Device Registered Nurse (RN) reviewed the ICD function.      The Device RN did a wound assessment and wound care teaching.    Please call the Device Nurses with any signs of infection or questions regarding wound healing. Device Nurse Line: [phone], Option #3.      The Device RN demonstrated and displayed the specific remote monitoring system for your ICD.      The Device RN reviewed the Partnership Agreement Form.    Patient Instructions      To reduce the risk of infection, try to avoid any dental procedures for the first 6 weeks after your ICD implant. If you have an emergent or urgent dental need during this time, contact the device clinic for a prescription for an antibiotic.      You will receive a device identification (ID) card in the mail from the device  within 6 weeks to replace the temporary ID card you were given in the hospital.      You may travel by any mode of transportation; just show your ICD ID card. You may be subject to a hand search or use of a handheld wand, but official should not keep the wand over the implant site for greater than 5-10 seconds.       For any surgery, let your doctor know you have an ICD.      Your ICD is Not MRI safe       Most household appliances, including a microwave, will not interfere with your ICD function. If you suspect interference, simply move away from the source. Cell phones do not cause a problem. Please refer to the device booklet from the  or their website under the section on electromagnetic interference (NATALIE) for further guidelines on things that may interfere with your ICD.       If you receive a shock from your device:  1. Keep a diary of your symptoms and activities at the time of the shock.  2. If you receive 1 shock, your symptoms subside and you feel well, call the Device Clinic. If this occurs after hours call the next morning.   3. If you receive  1 shock and your symptoms do not subside, or you receive multiple shocks: Call 911.      Device Clinic Contact Information  Device Nurses: [phone], Option #3.   Device Remote Specialists: [phone], Option #2. For questions about your Remote Transmission or Transmission Schedule  Device Schedulers: [phone], Option #1

## 2021-06-11 NOTE — PROGRESS NOTES
In clinic device check with Allen FELIX and follow-up with Dr. Pool.  Please see link for full device report.  Patient was informed of results and next follow up during today's visit.

## 2021-06-11 NOTE — PROGRESS NOTES
Pemafibrate to Reduce cardiovascular OutcoMes by reducing triglycerides IN patiENts with diabeTes (PROMINENT) clinical trial.     Subject seen in clinic today for study screening visit.      See provider notes and flow sheet for vitals.    Labs drawn per protocol.  Results will be scanned into 'media'.     Subject seen by provider Dr. Vigil for study related physical exam.    Inclusion & Exclusion criteria reviewed again with subject and they remain eligible to participate.     Medical history, concomitant medications, endpoints and adverse events reviewed with subject.  Cardiovascular risk discussed. Study alcohol consumption stipulations and education reviewed with subject.    Study medication Box # [id number]  dispensed to subject. This is a placebo run in period for 3 to 5 weeks.    Education provided on medication compliance and administration.    Plan: Will schedule study randomization visit with subject in 3-5 weeks back at [facility].    Eduard Tang RN  Clinical Trials Nurse

## 2021-06-11 NOTE — TELEPHONE ENCOUNTER
" Patient called device clinic this morning with reports of feeling like his heart was pounding on/off last night and states it was \"quite startling and loud.\" States it woke him up and when he got up to use restroom it keep pounding in his chest. Does not appear that patient has a monitor set up yet, so cannot have him send transmission.     Patient states he does not want to wait until next week to figure this out. Discussed that we might be able to squeeze him in at [facility] schedule this afternoon and may have to use a Medtronic Rep to do a check and see if this is PNS causing his symptoms. Patient appreciates this plan. Will talk to schedulers/Medtronic to see what we can do and update Dr. Gamez as well since he is at [facility] location today as well.     Zoie Granado RN    "

## 2021-06-11 NOTE — PROGRESS NOTES
Pre-Intra-Post education and instructions as listed below were reviewed with Patient via phone  9/10/2020 3:42 PM  Jenny Mclean RN

## 2021-06-11 NOTE — PROGRESS NOTES
Heart Care Device Change-Out Checklist (ANGEL Checklist)    Device Data  ICD and Biventricular    :  Biotronik  Model: [id number] 740 HF-T  Serial Number:  [id number]  Implant location: Left Chest (Dr. ENRIKE Gonzalez)    Implant Date: 1/23/2013  ANGEL Date:  9/4/2020  Device Diagnosis:  non-ischemic cardiomyopathy, Primary Prevention, [facility]      Lead Data  (Print Device History and attach to this document. Enter each lead  and model number.)  Last Interrogation Date: 7/31/2020      Atrium: Medtronic 5076 CapSureFix Novus   Lead Imp:  390 Ohms, Pacing Threshold:  n/a and Sensing Threshold:  4mV      Right Ventricle: Biotronik 350 054 Linox SD  65/18   Lead Imp:  462Ohms, Pacing threshold 0.6V@0.4ms, Sensing threshold: N/A   Shock Imp: 75 Ohms      Left Ventricle: [employer] 1258T QuickFlex ?   Lead Imp:  651Ohms, Pacing Threshold:  0.6V@0.4ms and Sensing Threshold:  7.5mV    Old Leads Present/Abandoned: No   LV ring no capture    Lead Alert(s):  No    Diagnostic Information  Intrinsic Rhythm:  AF, no Rs at VVI 30    Atrial Fibrillation:  Chronic Atrial-Fib  Takes Anticoagulant? Yes  Description:  Eliquis    Pacing Percentages  Atrial Pacing 0% and Ventricle Pacing 92%  Pacemaker Dependent? Yes    History of VT/VF therapy    ATP: No  Appropriate Shocks:  n/a    Ejection Fraction  Last EF Date:  1/22/2020    By Echocardiogram  Last EF Measurement:  20-25%      Special Instructions/Timeframe for change-out:  Routine, ANGEL 9/4/2020, device to be changed out by 12/4/2020. LVring has no capture. Pt has a busy fall and would like to get it done as soon as possible.     Routed to EP:  Yes: Dr. Kofi Gamez      Device RN: Emerald Yusuf RN BSN PHN  Device Nurse   [facility]

## 2021-06-11 NOTE — PROGRESS NOTES
[facility] HEART Formerly [facility]  Arrhythmia Clinic  Markus Pool    Referring:      Assessment:         Chronic systolic heart failure: The patient is New York Heart Association class II.  His most recent LVEF is 26%.  This is essentially unchanged.  He has a CRT-D in place which is functioning normally.  Biventricular pacing is 98%.  The patient is not requiring any diuretic therapy other than his spironolactone.  He shows no evidence of volume overload on today's exam.    Paroxysmal atrial fibrillation: The patient's device shows no evidence of sustained atrial fibrillation.    Native vessel coronary artery disease: The patient has chronic stable angina.  Coronary angiogram in 2016 demonstrated 70% proximal RCA stenosis which had a normal FFR.  Medical management has been recommended.      Recommendations:    No change in current medical therapy    No acute change in device prescription today    Routine device clinic follow-up and with me in 1 year.      Patient Active Problem List   Diagnosis     Type 2 diabetes mellitus     Depression     Coronary artery disease involving native coronary artery     Paroxysmal Atrial Fibrillation     Biventricular ICD, in situ     Cardiomyopathy     CHF (congestive heart failure), NYHA class IV, chronic, systolic       Subjective:  Ken Doss (71 y.o. male) returns to the arrhythmia clinic for follow-up of his systolic heart failure, atrial dysrhythmias, and ICD function.  The patient notes no tachycardia palpitations lightheadedness presyncope or syncope.  He has had no ICD discharge.  In regards to his heart failure symptoms he notes that he is about the same.  He does not walk in the summer as much as he does in the winter.  Overall however his activity level appears to be about the same.  He does occasionally develop some mild chest aching with walking up a slope or climbing more than one flight of stairs.  This resolves with decreasing his pace.  He reports no  orthopnea PND or ankle edema.    Current Outpatient Prescriptions   Medication Sig Dispense Refill     amoxicillin (AMOXIL) 500 MG capsule PRIOR TO DENTAL APPOINTMENT       aspirin 81 MG EC tablet Take 81 mg by mouth daily.       carvedilol (COREG) 25 MG tablet Take 25 mg by mouth 2 (two) times a day with meals.       DOCOSAHEXANOIC ACID/EPA (FISH OIL ORAL) 1 capsule 2 (two) times a day.        enalapril (VASOTEC) 20 MG tablet Take 20 mg by mouth daily.        glucosamine-chondroitin 500-400 mg cap Take 1 capsule by mouth daily.        ibuprofen (ADVIL,MOTRIN) 600 MG tablet Take 600 mg by mouth every 6 (six) hours as needed for pain.       ibuprofen-diphenhydramine HCl (IBUPROFEN PM) 200-25 mg cap Take 1 capsule by mouth bedtime as needed.       metFORMIN (GLUCOPHAGE) 1000 MG tablet Take 1,000 mg by mouth 2 (two) times a day with meals.       multivitamin therapeutic (THERAGRAN) tablet Take 1 tablet by mouth daily.       simvastatin (ZOCOR) 40 MG tablet Take 40 mg by mouth bedtime.       spironolactone (ALDACTONE) 25 MG tablet Take 50 mg by mouth 2 (two) times a day at 9am and 6pm.        TOUJEO SOLOSTAR 300 unit/mL (1.5 mL) injection Inject under the skin daily. 50 units at bedtime.       DEXTROSE (GLUCOSE) Chew Chew 1 tablet as needed.       No current facility-administered medications for this visit.        Review of Systems:   General: WNL  Eyes: WNL  Ears/Nose/Throat: WNL  Lungs: WNL  Heart: WNL  Stomach: WNL  Bladder: WNL  Muscle/Joints: WNL  Skin: WNL  Nervous System: WNL  Mental Health: WNL     Blood: WNL    Family History  Family History   Problem Relation Age of Onset     Sudden death Mother      unexpected death in her sleep in her 50s       Social History   reports that he has never smoked. He does not have any smokeless tobacco history on file. He reports that he drinks alcohol. He reports that he does not use illicit drugs.    Objective:   Vital signs in last 24 hours:  /58 (Patient Site: Left  "Arm, Patient Position: Sitting, Cuff Size: Adult Regular)  Pulse 63  Resp 16  Ht 5' 9.5\" (1.765 m)  Wt 179 lb 3.2 oz (81.3 kg)  BMI 26.08 kg/m2  Weight: Weight: 179 lb 3.2 oz (81.3 kg)     Physical Exam:  General: The patient is alert oriented to person place and situation.  The patient is in no acute distress at the time of my evaluation.  Eyes: Pupils are equal, round, and reactive to light.  Conjunctiva and sclera are clear.  ENT: Oral mucosa is moist and without redness. No evident nasal discharge.  Pulmonary: Lungs are clear bilaterally with no rales, rhonchi, or wheezes.    Cardiovascular exam: Rhythm is regular. S1 and S2 are normal. No significant murmur is present. JVP is normal. Lower extremities demonstrate no significant edema. Distal pulses are intact bilaterally.  Abdomen is flat, soft, and nontender.  Musculoskeletal: Spine is straight. Extremities without deformity.  Neuro: Gait is normal.   Skin is warm, dry, and otherwise intact.      Cardiographics:   Interrogation of the patient's CRT-D reveals normal battery moderate voltage.  The right atrial, right ventricular left ventricular lead status are all stable and normal.  Device diagnostics show no right atrial pacing and 98% biventricular pacing.  No atrial fibrillation was detected.  No ventricular tachycardia is detected.    Lab Results:   Lab Results   Component Value Date     12/16/2016    K 4.9 12/16/2016     12/16/2016    CO2 21 (L) 12/16/2016    BUN 18 12/16/2016    CREATININE 0.79 12/16/2016    CALCIUM 10.1 12/16/2016     Lab Results   Component Value Date    WBC 8.3 12/12/2016    WBC 6.9 11/26/2014    HGB 12.6 (L) 12/12/2016    HCT 35.3 (L) 12/12/2016    MCV 95 12/12/2016     12/12/2016     Lab Results   Component Value Date    INR 1.04 12/19/2012         Outside record review:        "

## 2021-06-11 NOTE — PROGRESS NOTES
In clinic device check with Dr. Pool.  Please see link for full device report.  Patient was informed of results and next follow up during today's visit.

## 2021-06-11 NOTE — PROGRESS NOTES
Pemafibrate to Reduce cardiovascular OutcoMes by reducing triglycerides IN patiENts with diabeTes (PROMINENT) clinical trial.     Subject seen in clinic today for study prescreening visit.      Consent process:     Research nurse met with subject to discuss participation in the above noted study.    The study discussion included the following:     Study purpose    Qualifications for participation    Length of study participation    Study procedures    Risks and side effects    Benefits (if any)    Voluntary nature of participation    Alternatives to participation    Confidentiality of records    Financial considerations     Subject asked questions and agreed that he received answers that satisfied him.    Consent form (version [id number]) signed.     A copy was offered to the subject & a copy was forwarded to medical records.    No study procedures were done prior to obtaining informed consent.     Inclusion & Exclusion criteria reviewed again with subject; he remains eligible to participate.     Type 2 Diabetes History    Dx  1/28/2013   Triglycerides    366         12/16/2016   Statin   Simvastatin 40mg   CVD Event   Angio 12/12/16   LDL-C    33            12/16/2016   HDL-C    60             12/16/2016       Plan: Will schedule study screening visit with subject within next 3 weeks (fasting labs and physical exam).    Ken Doss agrees with this plan.    Eduard Tang, RN  Clinical Trials Nurse

## 2021-06-11 NOTE — PROGRESS NOTES
Patient with Biotronik CRT-D  Initial implant January 23, 2013  Chronic atrial lead  [facility] medical quick flex LV lead  Biotronic  single coil ICD lead  ICD lead was removed and new ICD, single coil placed November 26, 2014  Pacer dependent with AV node ablation September 27, 2019  LV lead problems; high resistance on LV ring without capture; has good parameters pacing from the LV tip to the RV coil  Chest x-ray was satisfactory  Chronic atrial fibrillation  Chronic anticoagulation with Eliquis 5 mg twice daily may also be taken Brilinta 90 mg twice daily  Has been followed up with   at [facility] advanced heart failure clinic  I discussed by telephone with hayes;    plan  Schedule patient for a ICD generator replacement  Medtronic; I discussed with [employer] and they have a generator that  would be compatible with the leads  No DFT  Continue Eliquis without interruption  Anticipate  this could be done as an outpatient

## 2021-06-11 NOTE — TELEPHONE ENCOUNTER
Wellness Screening Tool  Symptom Screening:  Do you have one of the following NEW symptoms:    Fever (subjective or >100.0)?  No    A new cough?  No    Shortness of breath?  No     Chills? No     New loss of taste or smell? No     Generalized body aches? No     New persistent headache? No     New sore throat? No     Nausea, vomiting, or diarrhea?  No    Within the past 2 weeks, have you been exposed to someone with a known positive illness below:    COVID-19 (known or suspected)?  No    Chicken pox?  No    Mealses?  No    Pertussis?  No    Patient notified of visitor policy- They may have one person accompany them to their appointment, but they will need to wear a mask and will be screened upon arrival for symptoms: Yes  Pt informed to wear a mask: Yes  Pt notified if they develop any symptoms listed above, prior to their appointment, they are to call the clinic directly at [phone] for further instructions.  Yes  Patient's appointment status: 9/2

## 2021-06-11 NOTE — TELEPHONE ENCOUNTER
Pemafibrate to Reduce cardiovascular OutcoMes by reducing triglycerides IN patiENts with diabeTes (PROMINENT) clinical trial.    Study telephone visit form:    Subject Number:   [id number]                                     Dr. Vigil- PI      CONCOMITANT MEDICATIONS  ? Investigator to report if participant needs treatment with prohibited medications (Fibrates or other agents with PPAR-? agonist activity (e.g., saroglitazar); See protocol for exclusionary medications and actions to be taken.    VISIT SCHEDULING AND CONTACT CONFIRMATION  ? Confirm date of next study visit   ? Confirm information on Subject Information Form or update as needed      Visit Number:___V22____  Visit Date: 2020_      Was the subject, relatives or health care provider (as per consent) contacted by phone?    [x] Yes  [] No    Yes [x]  No []   If Yes, Date of Contact:   Date: ___2020__ ____ ____   DD [facility] YYYY  If No, please document attempts to contact subject (include date and type of contact)   Date: ____ ____ ____   Type: [] Tel _ [] Other___  [] 2 Date:      Name of Person: Contacted:   _____Self/Subject_______________   Relationship to Subject:   ______________________  Type:[]  Tel[x]  Other _______   Date: __2020__ ____ ____   Type: [] Tel [] Other _______    Subject Status on the day assessed     [x] Subject alive  []  Subject    [] Unknown at present Please complete  Death Details Form in InForm and submit source documents to [facility]        Review Subject Contact Information Form and update as needed   [x]  Review concomitant medication use   [x]  Query participant and record any reported AEs   [x] Query participant and record any CV efficacy events   [x] Confirm next study visit: ___tba - lab draw next week maybe__________   [x] Instruct participants to bring all study supplies with them to the next in-person visit    Instruct participants to bring all study supplies with them to the next in-person visit   Instruct  participants to fast for ? 8 hours immediately prior to next in-person visit (i.e nothing by mouth, except water and essential medications).     Eduard Tang RN

## 2021-06-11 NOTE — PROGRESS NOTES
[facility] Heart Care Clinic Follow-up Note    Assessment & Plan        1. Coronary artery disease involving native coronary artery of native heart without angina pectoris -angiography December 12, 2016 showed normal left main, mid to distal left anterior descending 30% lesion with the first diagonal 75% lesion.  Circumflex had a proximal to mid 30% lesion and the right coronary artery had a proximal 70% lesion with a mid to distal 40% lesion.  None of these lesions were significant by flow wire and work on prevention with statin and aspirin.  This angiogram was based on a CT angiogram suggesting significant disease.  He is now having more shortness of breath and more chest discomfort and I will perform stress testing looking for significant ischemia.  If present will need to pursue repeat angiography.     2. Cardiomyopathy -presumed nonischemic based on workup at . Prior ejection fraction is 20% but currently 25%. On appropriate Aldactone, Vasotec, and carvedilol, and also BiVICD. Unfortunately he doesn't feel as well as he would like.  I tried to change him over to entresto but cost was prohibitive. He is relatively active and would continue the current medications. He is using the biventricular device about 93% of the time.  It is possible that his fatigue is due to lower blood pressure and I have asked him to cut his Aldactone, carvedilol, and enalapril down and one half for 2 weeks at a time to see if this gives her more energy.   really has not but continue medicines at current doses.     3. Biventricular ICD, in situ -Biotronik device with Biotronik right atrial lead, Medtronic right ventricular lead, and [facility] left ventricular lead and most recent device check shows no major arrhythmias.     4. CHF (congestive heart failure), NYHA class IV, chronic, systolic -I suspect he is in heart failure currently will do stress test.  If no ischemia will check BNP and then put him on furosemide.      5. Paroxysmal atrial fibrillation -as above no arrhythmias seen.     6. Type 2 diabetes mellitus -defer to primary.     7.  Hypercholesterolemia-cholesterol 166 with triglycerides of 366 and he is being evaluated for the prominent study.    Plan  1.  Stress testing with pharmaceutical agent.  Significant ischemia angiography.  2.  If no ischemia proceed with BNP testing in diuretic.  3.  Continue screening for the prominent study.    Subjective  CC: 71-year-old white gentleman being seen at the screening visit for the prominent study.  He tells me he is having some shortness of breath, gets more fatigue when he tries to move his garbage can up the hill which is new for him over the last several months.  He mentions he has a discomfort in his chest, near the ICD insertion site which comes on with activity as well.  He feels generally more fatigued.  There is no PND, orthopnea, peripheral edema, syncope or dizziness.    Medications  Current Outpatient Prescriptions   Medication Sig Note     amoxicillin (AMOXIL) 500 MG capsule PRIOR TO DENTAL APPOINTMENT      aspirin 81 MG EC tablet Take 81 mg by mouth daily.      carvedilol (COREG) 25 MG tablet Take 25 mg by mouth 2 (two) times a day with meals.      DEXTROSE (GLUCOSE) Chew Chew 1 tablet as needed.      DOCOSAHEXANOIC ACID/EPA (FISH OIL ORAL) 1 capsule 2 (two) times a day.       enalapril (VASOTEC) 20 MG tablet Take 20 mg by mouth daily.       glucosamine-chondroitin 500-400 mg cap Take 1 capsule by mouth daily.       ibuprofen (ADVIL,MOTRIN) 600 MG tablet Take 600 mg by mouth every 6 (six) hours as needed for pain.      ibuprofen-diphenhydramine HCl (IBUPROFEN PM) 200-25 mg cap Take 1 capsule by mouth bedtime as needed.      metFORMIN (GLUCOPHAGE) 1000 MG tablet Take 1,000 mg by mouth 2 (two) times a day with meals.      multivitamin therapeutic (THERAGRAN) tablet Take 1 tablet by mouth daily.      simvastatin (ZOCOR) 40 MG tablet Take 40 mg by mouth bedtime.       "spironolactone (ALDACTONE) 25 MG tablet Take 50 mg by mouth 2 (two) times a day at 9am and 6pm.       TOUJEO SOLOSTAR 300 unit/mL (1.5 mL) injection Inject under the skin daily. 50 units at bedtime. 12/12/2016: 25 [id number]       Objective  /68 (Patient Site: Right Arm, Patient Position: Sitting, Cuff Size: Adult Regular)  Pulse (!) 104  Resp 18  Ht 5' 9.5\" (1.765 m)  Wt 177 lb (80.3 kg)  BMI 25.76 kg/m2    General Appearance:    Alert, cooperative, no distress, appears stated age   Head:    Normocephalic, without obvious abnormality, atraumatic   Throat:   Lips, mucosa, and tongue normal; teeth and gums normal   Neck:   Supple, symmetrical, trachea midline, no adenopathy;        thyroid:  No enlargement/tenderness/nodules; no carotid    bruit, JVD to jaw 30     Back:     Symmetric, no curvature, ROM normal, no CVA tenderness   Lungs:     Clear to auscultation bilaterally, respirations unlabored   Chest wall:    No tenderness, left-sided ICD noted    Heart:    Regular rate and rhythm, S1 and S2 normal, no murmur, rub   or gallop   Abdomen:     Soft, non-tender, bowel sounds active all four quadrants,     no masses, no organomegaly   Extremities:   Normal, atraumatic, no cyanosis or edema   Pulses:   2+ and symmetric all extremities   Skin:   Skin color, texture, turgor normal, no rashes or lesions     Results    Lab Results personally reviewed   Lab Results   Component Value Date    CHOL 166 12/16/2016    CHOL 208 (H) 08/30/2016     Lab Results   Component Value Date    HDL 33 (L) 12/16/2016    HDL 30 (L) 08/30/2016     Lab Results   Component Value Date    LDLCALC 60 12/16/2016    LDLCALC  08/30/2016      Comment:      Invalid, Triglycerides >400     Lab Results   Component Value Date    TRIG 366 (H) 12/16/2016    TRIG 1090 (H) 08/30/2016     Lab Results   Component Value Date    WBC 8.3 12/12/2016    HGB 12.6 (L) 12/12/2016    HCT 35.3 (L) 12/12/2016     12/12/2016     Lab Results "   Component Value Date    CREATININE 0.79 12/16/2016    BUN 18 12/16/2016     12/16/2016    K 4.9 12/16/2016    CO2 21 (L) 12/16/2016     Review of Systems:

## 2021-06-12 NOTE — PROGRESS NOTES
Vitals - Patient Reported  Weight (Patient Reported): 154 lb (69.9 kg)    Unable to get vitals     Review of Systems - History obtained from the patient    NO CHANGES     Patient leaving to [place] this Friday for about 3 weeks.     Vianney Horton, CMA

## 2021-06-12 NOTE — PROGRESS NOTES
[facility] Heart Care Clinic Follow-up Note    Assessment & Plan        1. Coronary artery disease involving native coronary artery of native heart without angina pectoris -angiography December 12, 2016 showed normal left main, mid to distal left anterior descending 30% lesion with the first diagonal 75% lesion.  Circumflex had a proximal to mid 30% lesion and the right coronary artery had a proximal 70% lesion with a mid to distal 40% lesion.  None of these lesions were significant by flow wire and work on prevention with statin and aspirin.  This angiogram was based on a CT angiogram suggesting significant disease. Stress test suggests no significant ischemia but ejection fraction is further diminished.  Continue to work on prevention.    2. Cardiomyopathy -presumed nonischemic based on workup at [facility]. Prior ejection fraction is 35 % on nuclear stress test in 2007 and was 25 a nuclear stress test in 2015 and now 19% on stress test 2017.  Echo suggests ejection fractions of 36% down to 20% and MRI suggested ejection fraction 25%.  On appropriate Aldactone, Vasotec, and carvedilol, and also BiVICD. Unfortunately he doesn't feel as well as he would like.  I tried to change him over to entresto but cost was prohibitive. He is relatively active and would continue the current medications.  He is New York Heart Association classification 1 or 2 with the underlying ejection fraction 19% and he is using the biventricular device about 93% of the time.  It is possible that his fatigue is due to lower blood pressure and I have asked him to cut his Aldactone, carvedilol, and enalapril down and one half.     3. CHF (congestive heart failure), NYHA class IV, chronic, systolic -currently only class I or class II.     4. Paroxysmal atrial fibrillation -no arrhythmias seen on device.     5. Biventricular ICD, in situ -Biotronik device with Biotronik right atrial lead, [employer]tronic right ventricular lead and [facility] left  ventricular lead.     6. Type 2 diabetes mellitus -defer to primary.     7.  Hypercholesterolemia-triglycerides elevated and being randomized into the prominent study.    Plan  1.  Randomizing to the prominent study.  2.  Watch for signs or symptoms of heart failure.    Subjective  CC: 71-year-old white gentleman being seen for randomization into the prominent study.  He currently admits to shortness of breath and very heavy activity but denies any PND, orthopnea, chest discomfort, palpitations or peripheral edema.    Medications  Current Outpatient Prescriptions   Medication Sig Note     amoxicillin (AMOXIL) 500 MG capsule PRIOR TO DENTAL APPOINTMENT      aspirin 81 MG EC tablet Take 81 mg by mouth daily.      carvedilol (COREG) 25 MG tablet Take 25 mg by mouth 2 (two) times a day with meals.      DEXTROSE (GLUCOSE) Chew Chew 1 tablet as needed.      DOCOSAHEXANOIC ACID/EPA (FISH OIL ORAL) 1 capsule 2 (two) times a day.       enalapril (VASOTEC) 20 MG tablet Take 20 mg by mouth daily. Currently taking 1/2 tablet once daily      furosemide (LASIX) 40 MG tablet Take 1 tablet (40 mg total) by mouth 2 (two) times a day. (Patient taking differently: Take 40 mg by mouth daily. )      glucosamine-chondroitin 500-400 mg cap Take 1 capsule by mouth daily. 1500mg/1200mg once daily      ibuprofen (ADVIL,MOTRIN) 600 MG tablet Take 600 mg by mouth every 6 (six) hours as needed for pain.      ibuprofen-diphenhydramine HCl (IBUPROFEN PM) 200-25 mg cap Take 1 capsule by mouth bedtime as needed.      metFORMIN (GLUCOPHAGE) 1000 MG tablet Take 1,000 mg by mouth 2 (two) times a day with meals.      multivitamin therapeutic (THERAGRAN) tablet Take 1 tablet by mouth daily.      simvastatin (ZOCOR) 40 MG tablet Take 40 mg by mouth at bedtime. Currently taking 1/2 tablet once daily      spironolactone (ALDACTONE) 25 MG tablet Take 50 mg by mouth 2 (two) times a day at 9am and 6pm. Currently taking 25mg once daily      TOUJEO SOLOSTAR 300  "unit/mL (1.5 mL) injection Inject under the skin daily. 61  units at bedtime. 12/12/2016: 25 [id number]       Objective  /60 (Patient Site: Right Arm, Patient Position: Sitting, Cuff Size: Adult Large)  Pulse 64  Resp 18  Ht 5' 9.5\" (1.765 m)  Wt 174 lb (78.9 kg)  BMI 25.33 kg/m2    General Appearance:    Alert, cooperative, no distress, appears stated age   Head:    Normocephalic, without obvious abnormality, atraumatic   Throat:   Lips, mucosa, and tongue normal; teeth and gums normal   Neck:   Supple, symmetrical, trachea midline, no adenopathy;        thyroid:  No enlargement/tenderness/nodules; no carotid    bruit or JVD   Back:     Symmetric, no curvature, ROM normal, no CVA tenderness   Lungs:     Clear to auscultation bilaterally, respirations unlabored   Chest wall:    No tenderness or deformity   Heart:    Regular rate and rhythm, S1 and S2 normal, no murmur, rub   possible S3    Abdomen:     Soft, non-tender, bowel sounds active all four quadrants,     no masses, no organomegaly   Extremities:   Normal, atraumatic, no cyanosis or edema   Pulses:   2+ and symmetric all extremities   Skin:   Skin color, texture, turgor normal, no rashes or lesions     Results    Lab Results personally reviewed   Lab Results   Component Value Date    CHOL 174 06/27/2017    CHOL 166 12/16/2016     Lab Results   Component Value Date    HDL 31 (L) 06/27/2017    HDL 33 (L) 12/16/2016     Lab Results   Component Value Date    LDLCALC 80 06/27/2017    LDLCALC 60 12/16/2016     Lab Results   Component Value Date    TRIG 316 (H) 06/27/2017    TRIG 366 (H) 12/16/2016     Lab Results   Component Value Date    WBC 8.3 12/12/2016    HGB 12.6 (L) 12/12/2016    HCT 35.3 (L) 12/12/2016     12/12/2016     Lab Results   Component Value Date    CREATININE 0.79 06/27/2017    BUN 17 06/27/2017     06/27/2017    K 4.6 06/27/2017    CO2 22 06/27/2017     Review of Systems:                                        "

## 2021-06-12 NOTE — PROGRESS NOTES
Assessment/Plan:     1. Cardiomyopathy with systolic dysfunction, NYHA class II: Ken Doss appears well compensated.  His weight has been stable at home with no signs of fluid retention.  He has been staying active with occasional swimming, [place], [place].  Occasionally, he gets mild shortness of breath if he walks too fast without taking a break for too long. He has been trying to follow low-sodium diet at home.  He mostly cooks at home and eat fresh frozen food except goes out to eat 1 to twice a week.  We discussed in length about his heart failure symptoms, medication adherence, lifestyle management including diet and exercise.   He met with a heart failure nurse clinician to discuss heart failure management.  No medication changes made today given patient was having fatigue and low blood pressure.  Patient reported improvement in his blood pressure and fatigue since last medication changes done by Dr. Vigil in June.      Heart failure treatment includes:  - Beta blocker therapy with carvedilol 25 milligrams twice daily  - ACEI therapy with enalapril 10 mg daily  - Aldosterone blocker therapy with spironolactone 25 mg daily.  Last BMP was done on 6/27/17 and it was stable.  - Diuretic therapy with furosemide 40 twice daily    Follow-up with Dr. Vigil per his recommendation and follow-up in heart failure clinic as needed    Subjective:     Ken Doss is a 71 years old male who is seen at [facility] heart failure clinic today for continued follow-up.  He has PMH of CAD, cardiomyopathy with recent EF of 19% and the stress test, BIV ICD placed in 2013, paroxysmal A. Fib, diabetes type 2 and hypercholesterolemia and hypertriglyceridemia.      Patient underwent coronary angiogram in December 2016 showed 30% lesion in mid to distal LAD, 95% lesion in diagonal, 30% lesion in proximal to mid circumflex, and 70% lesion in proximal RCA. This recent stress test on  June 30, 2017 showed small to medium sized mild intensity fixed defect in the inferior wall and a small mild intensity fixed defect in the mid to apical anterior wall representing areas of infarction.  Otherwise to stress test was negative for inducible ischemia.  He was last seen by Dr. Vigil on June 27, 2017 and seen again yesterday for the prominent study.  Patient was referred to a heart failure clinic for follow-up.    He reports his blood pressure and his fatigue improved after his medication dosages were adjusted by Dr. Vigil back in June.  He continues to have some fatigue.  He has been doing swimming, walking around the lake and also working.  He reported he has been more busy with the work and has not been able to get much time to go for walk [place].    He denies shortness of breath, orthopnea, PND, palpitations, chest pain, abdominal fullness/bloating and lower extremity edema.  Occasionally he will notice having mild shortness of breath if he walks too fast and for too long without taking a break.     He is monitoring home weights which are stable between 172-176  pounds. He mostly cooks at home and tries to follow low-sodium diet with no added salt except he goes out to eat 1 to twice a week.  He participates in regular physical activity including occasional swimming, [place] and [place].     Review of Systems:   General: WNL  Eyes: WNL  Ears/Nose/Throat: WNL  Lungs: WNL  Heart: WNL  Stomach: WNL  Bladder: WNL  Muscle/Joints: WNL  Skin: WNL  Nervous System: WNL  Mental Health: WNL     Blood: WNL     Patient Active Problem List   Diagnosis     Type 2 diabetes mellitus     Depression     Coronary artery disease involving native coronary artery     Paroxysmal Atrial Fibrillation     Biventricular ICD, in situ     Cardiomyopathy     CHF (congestive heart failure), NYHA class IV, chronic, systolic       Past Medical History:   Diagnosis Date     Atrial fibrillation       Chronic systolic heart failure Dec 2012     Congestive heart failure      Coronary artery disease      Coronary artery disease involving native coronary artery     Non obstructive disease by cath in  Cor angio 2016: RCA 70% (FFR 0.89), and OM1 75%        Coronary atherosclerosis of unspecified type of vessel, native or graft      Depressive disorder, not elsewhere classified      Diabetes mellitus      Diabetes mellitus, type II      Family history of myocardial infarction     mom  at 54 of cardiac issues     Fractures     ankle, leg and arm     High cholesterol      ICD (implantable cardioverter-defibrillator) lead failure 2014    High voltage lead tip inserted in RV free wall RV lead extraction and implantation of the new septal RV lead 2014      Infectious mononucleosis      Kidney stone      Myocardial infarction      S/P ICD (internal cardiac defibrillator) procedure 2014     Type II or unspecified type diabetes mellitus without mention of complication, not stated as uncontrolled      Unspecified systolic heart failure        Past Surgical History:   Procedure Laterality Date     APPENDECTOMY       APPENDECTOMY       CARDIAC CATHETERIZATION N/A 2016    Procedure: Coronary Angiogram;  Surgeon: Delgado Ramirez MD;  Location: [facility] Cath Lab;  Service:      CARDIAC PACEMAKER PLACEMENT       EP ICD INSERT       INSERT / REPLACE / REMOVE PACEMAKER       OH APPENDECTOMY      Description: Appendectomy;  Recorded: 06/10/2014;     OH L HRT CATH W/NJX L VENTRICULOGRAPHY IMG S&I Left 2016    Procedure: Left Heart Catheterization with Left Ventriculogram;  Surgeon: Delgado Ramirez MD;  Location: [facility] Cath Lab;  Service: Cardiology     OH REMOVE TONSILS/ADENOIDS,<11 Y/O      Description: Tonsillectomy With Adenoidectomy;  Recorded: 06/10/2014;     OH SHLDR ARTHROSCOP,DIAGNOSTIC      Description: Arthroscopy Shoulder;  Recorded: 06/10/2014;     REPLACEMENT TOTAL  KNEE      bilat     ROTATOR CUFF REPAIR      right       Family History   Problem Relation Age of Onset     Sudden death Mother      unexpected death in her sleep in her 50s       Social History     Social History     Marital status:      Spouse name: N/A     Number of children: N/A     Years of education: N/A     Occupational History     Not on file.     Social History Main Topics     Smoking status: Never Smoker     Smokeless tobacco: Not on file      Comment: cigars few times a year only     Alcohol use Yes      Comment: 3-4/month     Drug use: No     Sexual activity: Not on file     Other Topics Concern     Not on file     Social History Narrative       Current Outpatient Prescriptions   Medication Sig Dispense Refill     amoxicillin (AMOXIL) 500 MG capsule PRIOR TO DENTAL APPOINTMENT       aspirin 81 MG EC tablet Take 81 mg by mouth daily.       carvedilol (COREG) 25 MG tablet Take 25 mg by mouth 2 (two) times a day with meals.       DEXTROSE (GLUCOSE) Chew Chew 1 tablet as needed.       DOCOSAHEXANOIC ACID/EPA (FISH OIL ORAL) 1 capsule 2 (two) times a day.        enalapril (VASOTEC) 20 MG tablet Take 20 mg by mouth daily. Currently taking 1/2 tablet once daily       furosemide (LASIX) 40 MG tablet Take 1 tablet (40 mg total) by mouth 2 (two) times a day. (Patient taking differently: Take 40 mg by mouth daily. ) 180 tablet 3     glucosamine-chondroitin 500-400 mg cap Take 1 capsule by mouth daily. 1500mg/1200mg once daily       ibuprofen (ADVIL,MOTRIN) 600 MG tablet Take 600 mg by mouth every 6 (six) hours as needed for pain.       ibuprofen-diphenhydramine HCl (IBUPROFEN PM) 200-25 mg cap Take 1 capsule by mouth bedtime as needed.       metFORMIN (GLUCOPHAGE) 1000 MG tablet Take 1,000 mg by mouth 2 (two) times a day with meals.       multivitamin therapeutic (THERAGRAN) tablet Take 1 tablet by mouth daily.       simvastatin (ZOCOR) 40 MG tablet Take 40 mg by mouth at bedtime. Currently taking 1/2 tablet  once daily       spironolactone (ALDACTONE) 25 MG tablet Take 50 mg by mouth 2 (two) times a day at 9am and 6pm. Currently taking 25mg once daily       TOUJEO SOLOSTAR 300 unit/mL (1.5 mL) injection Inject under the skin daily. 61  units at bedtime.       Current Facility-Administered Medications   Medication Dose Route Frequency Provider Last Rate Last Dose     Study Drug pemafibrate 0.2 mg/placebo tablet 0.2 mg (PROMINENT)  0.2 mg Oral BID Mary Jane Vigil MD         Study Drug pemafibrate 0.2 mg/placebo tablet 0.2 mg (PROMINENT)  0.2 mg Oral BID Mary Jane Vigil MD           No Known Allergies    Objective:     Vitals:    07/26/17 1547   BP: 122/72   Pulse: 70   Resp: 18     Wt Readings from Last 3 Encounters:   07/26/17 175 lb (79.4 kg)   07/25/17 174 lb (78.9 kg)   06/27/17 177 lb (80.3 kg)       General Appearance:   Alert, cooperative and in no acute distress.   HEENT:  No scleral icterus; the mucous membranes were pink and moist.   Neck: JVP normal and  No HJR.   Chest: The spine was straight. The chest was symmetric.   Lungs:   Respirations unlabored; the lungs are clear to auscultation.   Cardiovascular:   Regular rhythm. S1 and S2 without murmur, clicks or rubs. Radial and posterior tibial pulses are intact and symmetrical.    Abdomen:  Soft, nontender, nondistended, bowel sounds present   Extremities: No cyanosis, clubbing, or edema.   Skin: No xanthelasma.   Neurologic: Mood and affect are appropriate.         Lab Review   Lab Results   Component Value Date    CREATININE 0.79 06/27/2017    BUN 17 06/27/2017     06/27/2017    K 4.6 06/27/2017     06/27/2017    CO2 22 06/27/2017     Lab Results   Component Value Date     (H) 04/22/2015     BNP (pg/mL)   Date Value   04/22/2015 120 (H)   12/20/2012 309 (H)   12/19/2012 669 (H)     Creatinine (mg/dL)   Date Value   06/27/2017 0.79   12/16/2016 0.79   12/12/2016 0.89   11/23/2016 1.11       Cardiographics    NM pharmacology stress test  done on 6/30/17  Conclusion     When compared to the images of 5/6/2015, there is now evidence of a mild fixed defect in the inferior wall. Cannot exclude diaphragmatic attenuation artifact.    The left ventricular ejection fraction is 19%.    The pharmacologic nuclear stress test is abnormal. There is a small to medium sized mild intensity fixed defect in the inferior wall and a small mild intensity fixed defect in the mid to apical anterior wall which may represent areas of infarction. Cannot exclude diaphragmatic attenuation causing the inferior defect.    Nuclear stress test was negative for inducible ischemia.           Echocardiogram: Done on 5/23/17  Summary     Moderate to severe left atrial enlargement    Left ventricle ejection fraction is severely decreased. The calculated left ventricular ejection fraction is 26%. Severe global hypokinesis    Grade 2 diastolic dysfunction    Mild mitral and tricuspid regurgitation    When compared to the previous study dated 2/9/2016, no significant change.             45 minutes were spent face to face with the patient with greater than 50% spent on education and counseling.      Arcelia Ring, [facility]   Heart Failure Clinic

## 2021-06-12 NOTE — PROGRESS NOTES
Pemafibrate to Reduce cardiovascular OutcoMes by reducing triglycerides IN patiENts with diabeTes (PROMINENT) clinical trial.    Subject seen in clinic today to return study drug, randomize on study, and dispense new study drug.      Study medication box # [id number] returned by subject.  Tablet count of 15 returned for 100 % compliance.  Study medication dispensed to subject: Kit #  [id number]             Kit #  [id number]            Kit #  [id number]            Kit #  [id number]    Education provided on medication compliance and administration.    Plan: Will schedule Visit 3 telephone call and visit 4 in clinic labs visit with subject.    Eduard Tang RN  Clinical Trials Nurse

## 2021-06-12 NOTE — PROGRESS NOTES
10/27/2020      Dear Dr. Vigil,    We had the pleasure of seeing Mr. Ken Doss in our advanced heart failure outreach clinic at [facility].    As you know, he is a pleasant 74-year-old male with past medical history significant for insulin-dependent type 2 diabetes mellitus, two-vessel coronary artery disease with percutaneous coronary intervention to mid LAD and proximal RCA, severe LV systolic dysfunction with an estimated ejection fraction of 20 to 20%, atrial fibrillation status post AV marianna ablation, status post cardiac resynchronization therapy, and mild interstitial lung disease who was referred to us for consideration of left ventricular assist device as a destination therapy.    He returns today for his 3 months follow-up visit.  He had a generator change for a CRT-D which was uneventful.  He has not had any heart failure hospitalizations or emergency room visits related to his heart failure.  He has not noticed any worsening or improvement in his exertional shortness of breath.  He is currently functional class III.  He can walk more than 2 blocks.  He is independent for his activities of daily living.  He denies having any PND orthopnea.  He has not had any lightheadedness dizziness or syncope.  No ICD shocks.  His weight has been stable, in fact slightly on the lower side.  It ranges anywhere between 152 to 156 pounds.  He has not had any lower extremity swelling or abdominal distention.    Past Medical History:  #1 insulin-dependent type 2 diabetes mellitus  #2 severe LV systolic dysfunction with an estimated left ventricular ejection fraction of 20 to 25% in January 2020  #3 two-vessel coronary artery disease in the LAD and RCA status post percutaneous coronary intervention.  His most recent nuclear stress test in May 2019 showed fixed defect in the inferior and inferoseptal region consistent with scar.  He had no reversible ischemia.  #4 atrial fibrillation status post  AV marianna ablation.  #5 cardiac resynchronization therapy  #6 mild interstitial lung disease with a total lung capacity of 72%.    Past Surgical History:  #1 Bilateral knee replacement  #2 Right shoulder replacement  #3 Appendectomy    Medications:  Current Outpatient Medications   Medication Sig     atorvastatin (LIPITOR) 40 MG tablet TAKE 1 TABLET BY MOUTH AT BEDTIME     BASAGLJOSE MANUEL BENÍTEZPEN U-100 INSULIN 100 unit/mL (3 mL) pen Inject 40 Units under the skin at bedtime. Pt takes 40 units at bedtime     carvedilol (COREG) 6.25 MG tablet Take 2 tablets (12.5 mg total) by mouth 2 (two) times a day with meals. Take with 12.5 mg tablet by mouth BID     coenzyme Q10 (COQ-10) 100 mg capsule Take 1 capsule (100 mg total) by mouth daily.     diphenhydrAMINE-acetaminophen (TYLENOL PM EXTRA STRENGTH)  mg Tab Take 2 tablets by mouth at bedtime as needed.      ELIQUIS 5 mg Tab tablet TAKE 1 TABLET BY MOUTH TWICE DAILY     fish oil-omega-3 fatty acids (FISH OIL) 300-1,000 mg capsule Take 2 g by mouth daily.     gabapentin (NEURONTIN) 100 MG capsule Take 100 mg by mouth 3 (three) times a day.     glucosamine-chondroitin 500-400 mg cap Take 1 capsule by mouth daily. 1500mg/1200mg once daily     metFORMIN (GLUCOPHAGE) 1000 MG tablet Take 1,000 mg by mouth 2 (two) times a day with meals.     multivitamin therapeutic (THERAGRAN) tablet Take 1 tablet by mouth daily.     predniSONE (DELTASONE) 10 mg tablet TAKE 3 TABLETS BY MOUTH ONCE DAILY FOR 3 DAYS THEN 2 TABS ONCE DAILY FOR 3 DAYS THEN 1 TAB ONCE DAILY FOR 3 DAYS     sacubitriL-valsartan (ENTRESTO) 49-51 mg Tab tablet Take 1 tablet by mouth 2 (two) times a day.     spironolactone (ALDACTONE) 25 MG tablet Take 0.5 tablets (12.5 mg total) by mouth daily.     ticagrelor (BRILINTA) 90 mg Tab Take 90 mg by mouth 2 (two) times a day.      bumetanide (BUMEX) 1 MG tablet Take 3 mg by mouth 2 (two) times a day at 9am and 6pm.      Review of system    Detailed 10 point review of system  obtained as described in history of present illness all other systems reviewed and are negative.    Personal and social history    He is  and living with his wife.  He has 2 grown kids who are healthy.  He does tommy job.  He has significantly reduced his work due to his health related issues.  He occasionally smokes cigars.  He drinks alcohol socially.  He does not use any recreational drugs.    Family history  He has a significant family history of premature coronary artery disease on his maternal side.  His mom  of coronary artery disease at the age of 55.    Examination  Not done due to the telephonic nature of the visit.    Investigations:    His most recent CBC showed a hemoglobin of 10.6, hematocrit of 31.1, WBC of 8.8 and a platelet count of 158.    His most recent chemistry showed a sodium of 134, potassium 4.5 g, chloride of 98, BUN of 37, creatinine of 1.5, glucose of 272, and calcium of 9.2.    His most recent NT proBNP was stable at 591.     Echo (2020)  His echocardiogram showed severely reduced left ventricular systolic function with an estimated ejection fraction of 10 to 15%.  His left ventricle was moderate to severely dilated with a left ventricle end-diastolic diameter of 6.4 cm.  His right ventricle was normal in size and function. He had moderate tricuspid regurgitation.  He had no other significant valvular abnormalities.    CPEX (2020)  On his cardiopulmonary exercise testing, he exercised for 5 minutes and 15 seconds.  He achieved anaerobic threshold with an RER of 1.3.  His VO2 max was 14.5 mL/kg/min which was 50% age and gender predicted.  His blood pressure did not drop with exercise.  His VE VCO2 slope was significantly elevated at 55.49.    Coronary angiogram (2020)  He underwent a repeat coronary angiogram that showed in-stent restenosis of the ostial RCA and proximal LAD.      RHC (2020)  His right heart catheterization showed an RA pressure of 10, RV of  43/10 PA of 45/20 with a mean of 33, mechanical respiration of 20, PA saturation of 51%,Thermodilution cardiac output was 3.1 with an index of 1.7.  His estimated Lorie cardiac output was 2.8 with an index of 1.5.  His PVR calculated was 2.9 Wood units.    Assessment and Plan:    74-year-old male with past medical history significant for insulin-dependent type 2 diabetes mellitus, two-vessel coronary artery disease with percutaneous coronary intervention to mid LAD and proximal RCA, severe LV systolic dysfunction with an estimated ejection fraction of 20%, atrial fibrillation status post AV marianna ablation, status post cardiac resynchronization therapy, and mild interstitial lung disease who was referred to us for consideration of left ventricular assist device as a destination therapy.    He is feeling better since February after his revascularization and aggressive diuresis.  He is currently functional class III.  He has not noticed any deterioration in the last several months.      On objective testing, his exercise capacity has improved albeit still quite limited.  His repeat hemodynamic assessment shows improvement of biventricular filling pressure but his cardiac output continues to run low.  His serum creatinine is slightly worse but his weight is on the lower side suggesting this may be due to over diuresis.  He is currently on Bumex 3 mg twice a day.  However, this could also be due to low output.     I again discussed with him the option of DT LVAD.  He currently would like to hold off as he is feeling reasonably okay and would like to do this only if he were to deteriorate further.  On his last cardiopulmonary exercise testing his VO2 max was 15.4 mL/kg/min, which is not critically low.    Thus, we will continue to monitor him closely.  He will continue on Entresto 49/50 7 mg dose twice a day, Coreg 612.5 mg twice a day, spironolactone 12.5 mg daily, and Bumex 3 mg twice a day.  He also has CRT-D.    I  recommend him to return to see me in 3 months with repeat cardiopulmonary stress testing and labs.  He is aware to call us in the interim if you have any further worsening symptoms.    He has been presented in our multidisciplinary meeting and has been approved for DT LVAD as long as his life expectancy is not limited by his ILD and the investigation for the dilated pancreatic duct is unremarkable. I have discussed hiss case with Dr. Luis Encarnacion and he feels that his ILD is stable and less likely to progress rather quickly in the next 1-2 years.    He was seen by gastroenterology at the [facility] for his dilated pancreatic duct.  He had a repeat CT abdomen and pelvis which showed only a very mild dilatation.  This was considered as benign.    It was a pleasure meeting Mr. Rocha son in our [facility] heart failure outreach clinic at [facility].  We thank you for involving us in his care.  Please do not hesitate to call us if you have any further questions in the interim.    Sincerely,  Tata Payton MD  [facility]  [place], [place]  Pager [phone]

## 2021-06-12 NOTE — PROGRESS NOTES
Pemafibrate to Reduce cardiovascular OutcoMes by reducing triglycerides IN patiENts with diabeTes (PROMINENT) clinical trial.    Subject seen in clinic today for study randomization visit.      Inclusion & Exclusion criteria reviewed again with subject and they remain eligible to participate.     Subject seen by provider Dr. Vigil for study related physical exam.  Waist circumference not measured today.  See provider note and flow sheets for vital signs.    Non-fasting labs drawn per protocol.    Urine sample collected.  Results will be scanned into 'media'.       EQ-[id number] questionnaire completed    Years of schooling subject has completed: highschool, 2 years graduate school    How many times/week physical activity that works up a sweat: 2    Hx of Diabetic Neuropathy: rare tingling on extremities, but from physical exams negative.    Hx of Diabetic Nephropathy: No    Hx of Diabetic Retinopathy: has been a few years since last exam    Hx of Myalgias: Subject denies    Claudication: No    Medical history, concomitant medications, endpoints and adverse events reviewed with subject.  Cardiovascular risk discussed. Study alcohol consumption stipulations and education reviewed with subject.    Subject forgot study medication at home today and will return tomorrow to complete randomization process after drug accountability check.    Plan: Study Visit 3 telephone call with subject in 2 weeks.  Will schedule study Month 2 Visit 4 with subject in 8 weeks back at [facility].    Eduard Tang RN  Clinical Trials Nurse

## 2021-06-12 NOTE — PROGRESS NOTES
Pulmonary Clinic Follow-up Visit    Impression: 73 male with history of CAD s/p PCI/stents in Jan 2019, afib on eliquis, NICM EF 15%, DM2, atrial fibrillation on NOAC, admission in July 2019 for afib and CHF exacerbation during which he was found to have peripheral fibrosis and hilar/mediastinal adenopathy. Infectious and autoimmune workup has been negative. FNA of an abnormal left supraclavicular node showed non-necrotizing granulomas possibly related to anthracosis/pneumoconiosis vs. Processing artifact vs. Other environmental exposure.    More recently he has been undergoing cardiac workup for possible LVAD. 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 due to left-sided heart disease). PVR was actually decreased from prior RHC at 2.[address].     Recent CT scan showed stable findings of mediastinal adenopathy and peripheral reticulations with fibrosis and traction bronchiectasis.   Overall he seems to be feeling quite well and in fact has declined LVAD due to stability of his symptoms. PFT's today showed slightly reduced DLco compared to last year and normal spirometry. Unfortunately we could not do a TLC today due to covid-19 workflow.     As discussed previously surgical lung biopsy would be high risk due to his significant cardiac disease, and he wants to hold off on that for now.      Plan:  - encouraged him to remain active and continue to exercise as able  - continue CHF follow up with Dr. Avendaño.      Follow up in 6 months.     CCx: hospital discharge follow up    HPI: Interim history: I last saw Skyler on 3/18/2020 on a virtual visit. Since that time, his LVAD workup has been put on hold by Dr. Payton due to stable cardiac symptoms.  Today, he reports he's doing OK. Minimal cough. No hemoptysis.  Was on prednisone briefly for gout attack. He did not note any change in his breathing but notes that he does not really have breathing  issues.   He continues to have dyspnea on exertion, but he does not seem bothered by it. Able to walk a mile before he gets shortness of breath. 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 Overall feels quite good.       ROS:  A 12-system review was obtained and was negative with the exception of the symptoms endorsed in the history of present illness.    PMH:  Past Medical History:   Diagnosis Date     Abdominal pain      Acute renal failure (H)      Anemia      Arthritis     osteoarthritis     CHF (congestive heart failure) (H)      Chronic systolic heart failure (H) Dec 2012     Coronary artery disease involving native coronary artery     Non obstructive disease by cath in 2007 Cor angio Nov 2016: RCA 70% (FFR 0.89), and OM1 75%        Depressive disorder, not elsewhere classified 10/24/2014     Diabetes mellitus, type II (H) 28/Jan/2013     Diabetic neuropathy (H)      Dyslipidemia 2007     Fractures     ankle, leg and arm     Hypertension      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     ICD (implantable cardioverter-defibrillator) lead failure 11/26/2014    High voltage lead tip inserted in RV free wall RV lead extraction and implantation of the new septal RV lead November 2014      ILD (interstitial lung disease) (H)      Infectious mononucleosis      Ischemic cardiomyopathy 4/22/2015    Chronic: LVEF 25- 30% LVEF 26% echo May 2017     Kidney stone      LBBB (left bundle branch block)     noted on Dec 19, 2012     Lymphadenopathy, mediastinal      Myocardial infarction (H)      Osteoarthritis      Pulmonary anthracosis (H)      Recurrent kidney stones      Shortness of breath     with exertion       PSH:  Past Surgical History:   Procedure Laterality Date     APPENDECTOMY       BIV ICD  11/26/2014    biotronic     CARDIAC CATHETERIZATION N/A 12/12/2016    Procedure: Coronary Angiogram;  Surgeon: Delgado Ramirez MD;  Location: [facility] Cath Lab;  Service:       COLONOSCOPY N/A 12/19/2019    Procedure: COLONOSCOPY with polypectomy;  Surgeon: Delgado Price MD;  Location: [facility] OR;  Service: Gastroenterology     CV CORONARY ANGIOGRAM N/A 1/15/2019    Procedure: Coronary Angiogram;  Surgeon: Mason Correa MD;  Location: [facility] Cath Lab;  Service: Cardiology     CV LEFT HEART CATHETERIZATION WO LEFT VETRICULOGRAM Left 1/15/2019    Procedure: Left Heart Catheterization Without Left Ventriculogram;  Surgeon: Mason Correa MD;  Location: [facility] Cath Lab;  Service: Cardiology     EP ABLATION AV NODE N/A 9/27/2019    Procedure: EP Ablation AV Node;  Surgeon: Markus Pool MD;  Location: [facility] Cath Lab;  Service: Cardiology     EP ICD INSERT      ICD REIMPLANTATION OF A NEW RV LEAD 11/26/2014     INSERT / REPLACE / REMOVE PACEMAKER       JOINT REPLACEMENT      bilateral TKA     KNEE ARTHROSCOPY Bilateral      CT L HRT CATH W/NJX L VENTRICULOGRAPHY IMG S&I Left 12/12/2016    Procedure: Left Heart Catheterization with Left Ventriculogram;  Surgeon: Delgado Ramirez MD;  Location: [facility] Cath Lab;  Service: Cardiology     CT REMOVE TONSILS/ADENOIDS,<11 Y/O      Description: Tonsillectomy With Adenoidectomy;  Recorded: 06/10/2014;     CT SHLDR ARTHROSCOP,DIAGNOSTIC      Description: Arthroscopy Shoulder;  Recorded: 06/10/2014;     REPLACEMENT TOTAL KNEE      bilat     ROTATOR CUFF REPAIR      right     TONSILLECTOMY AND ADENOIDECTOMY       US LYMPH NODE BIOPSY  7/10/2019       Allergies:  No Known Allergies    Family HX:  Family History   Problem Relation Age of Onset     Sudden death Mother         unexpected death in her sleep in her 50s       Social Hx:  Social History     Socioeconomic History     Marital status:      Spouse name: Not on file     Number of children: Not on file     Years of education: Not on file     Highest education level: Not on file   Occupational History     Not on file   Social Needs      Financial resource strain: Not on file     Food insecurity     Worry: Not on file     Inability: Not on file     Transportation needs     Medical: Not on file     Non-medical: Not on file   Tobacco Use     Smoking status: Never Smoker     Smokeless tobacco: Never Used     Tobacco comment: cigars few times a year only   Substance and Sexual Activity     Alcohol use: Yes     Comment: occasional     Drug use: No     Sexual activity: Not on file   Lifestyle     Physical activity     Days per week: Not on file     Minutes per session: Not on file     Stress: Not on file   Relationships     Social connections     Talks on phone: Not on file     Gets together: Not on file     Attends Yazidism service: Not on file     Active member of club or organization: Not on file     Attends meetings of clubs or organizations: Not on file     Relationship status: Not on file     Intimate partner violence     Fear of current or ex partner: Not on file     Emotionally abused: Not on file     Physically abused: Not on file     Forced sexual activity: Not on file   Other Topics Concern     Not on file   Social History Narrative     Not on file       Current Meds:  Current Outpatient Medications   Medication Sig Dispense Refill     atorvastatin (LIPITOR) 40 MG tablet TAKE 1 TABLET BY MOUTH AT BEDTIME 90 tablet 1     BASAGLAR KWIKPEN U-100 INSULIN 100 unit/mL (3 mL) pen Inject 40 Units under the skin at bedtime. Pt takes 40 units at bedtime       carvedilol (COREG) 6.25 MG tablet Take 2 tablets (12.5 mg total) by mouth 2 (two) times a day with meals. Take with 12.5 mg tablet by mouth  tablet 3     coenzyme Q10 (COQ-10) 100 mg capsule Take 1 capsule (100 mg total) by mouth daily.  0     diphenhydrAMINE-acetaminophen (TYLENOL PM EXTRA STRENGTH)  mg Tab Take 2 tablets by mouth at bedtime as needed.        ELIQUIS 5 mg Tab tablet TAKE 1 TABLET BY MOUTH TWICE DAILY 180 tablet 1     fish oil-omega-3 fatty acids (FISH OIL) 300-1,000  "mg capsule Take 2 g by mouth daily.       gabapentin (NEURONTIN) 100 MG capsule Take 100 mg by mouth 3 (three) times a day.       glucosamine-chondroitin 500-400 mg cap Take 1 capsule by mouth daily. 1500mg/1200mg once daily       metFORMIN (GLUCOPHAGE) 1000 MG tablet Take 1,000 mg by mouth 2 (two) times a day with meals.       multivitamin therapeutic (THERAGRAN) tablet Take 1 tablet by mouth daily.       predniSONE (DELTASONE) 10 mg tablet TAKE 3 TABLETS BY MOUTH ONCE DAILY FOR 3 DAYS THEN 2 TABS ONCE DAILY FOR 3 DAYS THEN 1 TAB ONCE DAILY FOR 3 DAYS       sacubitriL-valsartan (ENTRESTO) 49-51 mg Tab tablet Take 1 tablet by mouth 2 (two) times a day. 180 tablet 2     spironolactone (ALDACTONE) 25 MG tablet Take 0.5 tablets (12.5 mg total) by mouth daily. 45 tablet 3     ticagrelor (BRILINTA) 90 mg Tab Take 90 mg by mouth 2 (two) times a day.        bumetanide (BUMEX) 1 MG tablet Take 3 mg by mouth 2 (two) times a day at 9am and 6pm.        Current Facility-Administered Medications   Medication Dose Route Frequency Provider Last Rate Last Dose     Study Drug pemafibrate 0.2 mg/placebo tablet 0.2 mg (PROMINENT)  0.2 mg Oral BID Eduard Tang, RN           Physical Exam:  /68   Pulse 70   Resp 12   Ht 5' 9\" (1.753 m)   Wt 162 lb (73.5 kg)   SpO2 98%   BMI 23.92 kg/m    Gen: awake, alert, oriented, no distress  HEENT: nasal turbinates are unremarkable, no oropharyngeal lesions, no cervical or supraclavicular lymphadenopathy  CV: RRR, no M/G/R  Resp: mild right sided inspiratory crackles. Good air movement. Left lung is clear.    Abd: soft, nontender, no palpable organomegaly  Skin: no apparent rashes  Ext: no cyanosis, clubbing or edema  Neuro: alert, nonfocal    Labs:  Reviewed  Lorie-1, SSB/SSA, CARA< CCP, RF, Scl-70 all negative  hypersens pneumonitis panel 1 neg  ACE level 36  Aldolase wnl     (03 in July)  SCr slightly worse 1.53 up from 1.24 last month.   CO2 25    Left supraclavicular node " bx  LEFT SUPRACLAVICULAR LYMPH NODE, NEEDLE BIOPSY:     -   REACTIVE PARACORTICAL HYPERPLASIA     -   RARE FOCI OF NONNECROTIZING GRANULOMATOUS INFLAMMATION, ASSOCIATED WITH PIGMENT     -   GMS STAIN FOR FUNGI IS NEGATIVE     -   AURAMINE/RHODAMINE AND KINYOUN STAINS FOR ACID-FAST BACILLI ARE NEGATIVE     -   RARE SCATTERED EOSINOPHILS AND NEUTROPHILS ARE IDENTIFIED     -   NEGATIVE FOR CARCINOMA     -   NO EVIDENCE OF MALIGNANT LYMPHOPROLIFERATIVE DISORDER     -   PLEASE SEE COMMENT   Electronically signed by Franki Merritt MD on 7/11/2019 at 1905   Comment     The morphologic findings are most consistent with a reactive/inflammatory process. The rare microgranulomas are nonnecrotizing, and they are associated with foreign pigment, possibly related to anthracosis, processing artifact (e.g., formalin), or other foreign substance. Recommend clinical correlation and follow up.      LN flow negative for lymphoproliferative disorder.    Imaging studies:  CTA chest July 2019  IMPRESSION:   CONCLUSION:  1.  No aortic dissection or acute vascular pathology.  2.  New mediastinal and hilar lymphadenopathy may relate to lymphoproliferative disease or metastatic nodes. Sarcoid also possible. There are small nodes in the left supraclavicular region, as well. Recommend follow-up PET/CT is indicated.  3.  Diffuse reticular interstitial prominence within lung parenchyma most suggestive of developing pulmonary fibrosis.    PET/CT   IMPRESSION:   CONCLUSION:  1.  Mildly FDG avid bilateral symmetric hilar and mediastinal lymphadenopathy most likely reactive and could represent sarcoidosis.  2.  Minimally FDG avid peripheral reticular opacities in the lung should be inflammatory, possibly fibrotic.    HRCT   IMPRESSION:      1.  Pulmonary edema and small pleural effusions compromise evaluation of fibrotic interstitial lung disease.     2.  However, mild basilar predominant fibrotic changes are present, best seen on prone imaging.  Mild reticulation and traction bronchiectasis seen. Minimal subpleural cystic change, though cannot definitively confirm honeycombing. Findings meet criteria   for probable UIP pattern.     3.  New left upper lobe slightly consolidative opacity with differentials of asymmetric edema or infectious / inflammatory.     4.  Overall, mostly stable adenopathy. Prevascular node shows slight increased size. Consider continued follow-up.     5.  Cardiomegaly. Trace pericardial effusion. Severe coronary calcifications.     6.  Other findings in the report.    Chest March 2020  IMPRESSION:   1. Enlarged mediastinal lymph nodes are unchanged when compared to  10/9/2019.  2. Bilateral pulmonary peripheral reticulation with mild associated  traction bronchiolectasis and scattered sub-3 mm nodules in a  peribronchovascular distribution. No significant air trapping.  Differential includes pulmonary sarcoidosis, fibrotic nonspecific  interstitial pneumonitis, and other interstitial lung diseases.  3. Stable cardiomegaly with heavy coronary calcium and right coronary  artery stent.  4. Cholelithiasis.    Echo March 2019    When compared to the previous study dated 5/23/2017, no significant change.    Left ventricle ejection fraction is severely decreased. The estimated left ventricular ejection fraction is 20-25%.    Normal right ventricular size and systolic function.    Mild to moderate mitral regurgitation    Device lead in right heart chambers      RHC (07/2020)  His right heart catheterization showed an RA pressure of 10, RV of 43/10 PA of 45/20 with a mean of 33, mechanical respiration of 20, PA saturation of 51%,Thermodilution cardiac output was 3.1 with an index of 1.7.  His estimated Lorie cardiac output was 2.8 with an index of 1.5.  His PVR calculated was 2.9 Wood units.    Previous RHC data from the U:  He underwent a repeat coronary angiogram that showed in-stent restenosis of the ostial RCA and proximal LAD.  His right  heart catheterization showed an RA pressure of 16, RV of 60/15, PA pressure of 59/28 with a mean of 35, pulmonary capillary wedge pressure of 20, thermodilution cardiac output of 3.4 with an index of 1.8, PA saturation of 56.3, and PVR of 4.4 Wood units.    PFT's  7/23/2019  The spirometry was performed with adequate reproducibility.  The flow volume loop is essentially normal.     FEV1 is 2.74 L (91% predicted) and is normal.  FVC is 3.26 L (82% predicted) and is normal.  FEV1/FVC is 84% and is normal.     There was no improvement in spirometry after a single inhaled dose of bronchodilator.  Of note, patient did cough during the FVC maneuver following bronchodilator administration, which may affect the true values of this study.       TLC is 4.99 L (72% predicted) and is mildly reduced.  RV is 1.83 L (68% predicted) and is normal.     DLCO is 15.59 mL/min/mmHg (62% predicted) and is normal when it is corrected for hemoglobin.     Impression:  This pulmonary function study is mildly abnormal.  Spirometry is normal and there is no bronchodilator response.  Total lung capacity is mildly reduced.  The diffusion capacity, when corrected for hemoglobin, is moderately reduced.      PFTs 10/5/2020  Normal spirometry.  DLco 56% karmen for hgb (decr from 62% in 2019)  No lung volumes done due to covid-19 workflow.     Luis Manuel (Sarah Catalan MD  [facility]  Pager [phone]  Clinic [phone]

## 2021-06-12 NOTE — PROGRESS NOTES
Patient seen in clinic for HF education.  Reviewed HF Binder that includes the  HF Sx Awareness & Action plan  handout and  A Stronger Pump  booklet and Weight log booklet highlighting :  __X_patient s type of heart failure _X__Na management in diet  __X_importance of daily wts  _X__Fluid Guidelines, if applicable  __X_medication review and importance of compliance     Instructed patient in signs and sx of heart failure, reiterated when to call clinic - reviewed HF hotline # [phone] and after hours call # [phone].  Majority of time was spent reviewing: heart failure line and maintaining a low sodium diet.  Patient verbalized understanding of HF discussion.  No formal f/u scheduled with nurse clinician for continued education - will continue to reinforce HF management education.

## 2021-06-13 NOTE — TELEPHONE ENCOUNTER
Received an alert on one of the new Cobalt, [employer] devices for  device tone sounded on device . Call made to tech services and a second call to [employer] rep, Toni Crowell, to discuss alert. Informed that in the new devices there is an option to have OptiVol device tones triggered and/or wireless alert turned on or off. If the device tone is off and the wireless alert is on, the device will try for 3 days to connect with the monitor, if it is unable to connect with the patient's device, tones will start alarming. The only way to turn them off is by having them come into the office to do so. I have informed Toni that we do not want these alarms turned on in the future. He said he would notify Tim, the other rep as well. Please let me know if you have any questions.     Call made to patient to notify him of why his device is alarming and to have him come in to do a device check. N/A, left message to call back. -AJM

## 2021-06-13 NOTE — PROGRESS NOTES
Pemafibrate to Reduce cardiovascular OutcoMes by reducing triglycerides IN patiENts with diabeTes (PROMINENT) clinical trial.    Subject seen in clinic today for study visit 4 (month 2).      Re-Consent process:     Research nurse met with subject to review updated consent form for the above noted study.    The study discussion included the following:     Study purpose    Qualifications for participation    Length of study participation    Study procedures    Risks and side effects    Benefits (if any)    Voluntary nature of participation    Alternatives to participation    Confidentiality of records    Financial considerations     Subject asked questions and agreed that he received answers that satisfied him.    Consent form (version 2.0  [id number]) signed.   A copy was offered to the subject & a copy was forwarded to medical records.    No study procedures were done prior to obtaining the updated informed consent.     Fasting safety labs drawn per protocol.  Results will be scanned into 'media'.     Concomitant medications, endpoints and adverse events reviewed with subject.  Subject reports no adverse events or endpoints.  Reinforced study medication compliance as subject reports he accidently threw away his empty study drug container.  Subject will ensure to save empty containers and bring back all study IP for visits.  Subject reports he is consistently taking the study medication as directed.    Plan: Will schedule study Month 4 Visit 5 with subject in 8 weeks back at [facility].    Eduard Tang RN  Clinical Trials Nurse

## 2021-06-13 NOTE — PATIENT INSTRUCTIONS - HE
It was great to see you again today for the Prominent triglyceride study.  We will call you in 2 months for a study telephone visit.    We will also see you back in 4 months for your Visit 25.  Please remember to bring back your study drug and packages (both empty or full) to every study visit.  Inform us of any changes in your health and call with any questions.  Thanks!    Research Hotline: [phone]  Eduard Tang RN  Clinical Trials Nurse

## 2021-06-13 NOTE — TELEPHONE ENCOUNTER
LVM on dariusz's cell detailing plan for tomorrow and covid19 information with my contact information to reach of if any concerns or positive symptoms ect.    Eduard Tang RN

## 2021-06-14 NOTE — PROGRESS NOTES
Pemafibrate to Reduce cardiovascular OutcoMes by reducing triglycerides IN patiENts with diabeTes (PROMINENT) clinical trial.    Subject seen in clinic today for study Visit 5.      Vitals:    11/14/17 0857 11/14/17 0900   BP: (!) 74/43 (!) 72/42   Patient Site: Right Arm Right Arm   Patient Position: Sitting Sitting   Cuff Size: Adult Regular Adult Small   Waist circumference 96.0 cm.  See provider note and flow sheets for additional vitals.     Labs drawn per protocol.  Urine sample collected.  Results will be scanned into 'media'.     Subject seen by provider Linda Marmolejo for study related physical exam.      Study efficacy, endpoints and adverse events reviewed with subject.  Cardiovascular risk discussed. Study alcohol consumption stipulations and education reviewed with subject.    Study medication box # [id number] returned by subject with a tablet count of 14.  Subject reports he discarded the other 3 drug boxes, but did save one blister pack (which does not list the kit#)  Unable to calculate study drug compliance based on returned product - subject reports taking study IP each day as directed  .  Study medication box #'s [id number], [id number], [id number], [id number] dispensed to subject.  Education provided on medication compliance and administration with reinforcement of keeping and returning all study drug components - subject verbalized understanding.    Plan: Will schedule study Month 6 Visit  with subject in 8 weeks back at [facility].    Eduard Tang RN  Clinical Trials Nurse    ADVERSE EVENT: Subject presents to clinic today fasting for research labs.  He has taken all of his morning medications.  Initial blood pressure is 72/42 with a recheck of 78/44 and 80/46 - subject does home checks and reports 120/80 yesterday.  He is asymptomatic.  However, over the past month he has noticed increased lightheadedness.  He denies any syncope or near syncope.  He has also noted increased fatigue.  He has  no symptoms of fluid retention.  His weight has remained stable and he has no lower extremity edema or shortness of breath.  He denies chest pain.  Subject states he had a small meal yesterday at 3:30pm.  He is scheduled for follow up in 2 weeks with Linda Marmolejo and today she reduced his Aspironolactone to 25 mg daily and furosemide to 40 mg daily. After lab draw, the subject was given 4 diabetic glucose tablets containing 4g carbs for possible hypoglycemia.     Adverse Event: Hypotension  Serious: No  Severity (mild/moderate/severe): mild  Start Date: 14Nov2017  End Date: ongoing  Dr. Vigil: Please assign causality of above AE  Relationship: Is there a reasonable possibility that the event may have been caused by Investigational Medicinal Product?   NO

## 2021-06-14 NOTE — TELEPHONE ENCOUNTER
LVM regarding telephone visit follow-up for the Prominent study.  Also called last week too.  Scheduling will likely be reaching out as well to plan in person visit in a few weeks.  Gave callback #.    Eduard Tang RN

## 2021-06-14 NOTE — TELEPHONE ENCOUNTER
Pemafibrate to Reduce cardiovascular OutcoMes by reducing triglycerides IN patiENts with diabeTes (PROMINENT) clinical trial.    Study telephone visit form:    Subject Number:   [id number]                                     Dr. Vigil- PI      CONCOMITANT MEDICATIONS  ? Investigator to report if participant needs treatment with prohibited medications (Fibrates or other agents with PPAR-? agonist activity (e.g., saroglitazar); See protocol for exclusionary medications and actions to be taken.    VISIT SCHEDULING AND CONTACT CONFIRMATION  ? Confirm date of next study visit   ? Confirm information on Subject Information Form or update as needed      Visit Number:__[id number]_____  Visit Date: 2021      Was the subject, relatives or health care provider (as per consent) contacted by phone?    [x] Yes  [] No    Yes [x]  No []   If Yes, Date of Contact:   Date: ___ ____ ____   [facility] Y[facility]Y  If No, please document attempts to contact subject (include date and type of contact)   Date: ____ ____ ____   Type: [] Tel _ [] Other___  [] 2 Date:      Name of Person: Contacted:   _____Self/Subject_______________   Relationship to Subject:   ______________________  Type:[]  Tel[x]  Other _______   Date: ____ ___ ____   Type: [] Tel [] Other _______    Subject Status on the day assessed     [x] Subject alive  []  Subject    [] Unknown at present Please complete  Death Details Form in InForm and submit source documents to [facility]        Review Subject Contact Information Form and update as needed   [x]  Review concomitant medication use   [x]  Query participant and record any reported AEs   [x] Query participant and record any CV efficacy events   [x] Confirm next study visit: ___feb__________   [x] Instruct participants to bring all study supplies with them to the next in-person visit    Instruct participants to bring all study supplies with them to the next in-person visit   Instruct participants to fast for ? 8  hours immediately prior to next in-person visit (i.e nothing by mouth, except water and essential medications).     Eduard Tang RN

## 2021-06-15 NOTE — PROGRESS NOTES
Pemafibrate to Reduce cardiovascular OutcoMes by reducing triglycerides IN patiENts with diabeTes (PROMINENT) clinical trial.    Subject seen in clinic today for study Visit 6 (month 6) .      Subject seen by provider Arcelia Ring for study related physical exam.  See provider note and flow sheets for vital signs.  Waist 99cm.    Labs drawn per protocol.  Results will be scanned into 'media'.        Study efficacy, endpoints and adverse events reviewed with subject.  Cardiovascular risk discussed. Study alcohol consumption stipulations and education reviewed with subject.  Education provided on medication compliance and administration.     Subject reports inconsistently  taking over the counter Tylenol PM (acetaminphen 500mg/Diphenhydramine HCL 25mg) and Naproxen Sodium 220mg PRN for his knee pain.    Plan: Will schedule study Visit 7 (month 8) with subject in 8 weeks back at [facility].    Eduard Tang RN  Clinical Trials Nurse    ADVERSE EVENT Description: Subject underwent tooth extraction in 1st week of December and skipped prophylactic antibiotic. Subject started experienced right knee pain thereafter and believes this may the cause of his pain. He denies having right knee swelling, redness, fever or chills per physical exam with provider today.  Subject reports the pain has been gradually getting better.    Adverse Event: Knee Pain (right)  Serious:  [] Yes   [x] No  Severity  (mild/moderate/severe): moderate   Start Date: 05Dec2017  End Date: ongoing  Action taken with study treatment:  []Drug Interrupted  []Drug Withdrawal  []Not Applicable   []Unknown              [x]No Action   Outcome:   []Not Recovered/Not Resolved  []Recovered/Resolved  []Recovered/Resolved with Sequelae   [x]Recovering/Resolving  []Unknown   []Fatal  Medication or therapies taken:  [x] Yes   [] No    Dr. Vigil: Please assign causality of above AE  Relationship: Is there a reasonable possibility that the event may  have been caused by: Answer No or Yes  1. Investigational Medicinal Product?  [] Yes   [x] No

## 2021-06-15 NOTE — TELEPHONE ENCOUNTER
LVM for Skyler detailing covid19 prescreen and precautions, study plan, ect for Prominent study visit tomorrow with Dr. Vigil, no labs.    Eduard Tang RN

## 2021-06-15 NOTE — PROGRESS NOTES
"Assessment/Plan:     1. Cardiomyopathy with systolic dysfunction, NYHA class III: Ken Doss appears well compensated. He has no signs and symptoms of fluid retention except he reports 5 lbs weight gain over the last couple months. He has stopped exercising due to right knee pain issue. He underwent tooth extraction in 1st week of December and skipped prophylactic antibiotic. He thinks this has caused the right knee pain. He denies having right knee swelling, redness, fever or chills. He thinks his weight gain is d/t lack of exercise and recent holiday season with over indulgence.     Recommended to stay on low salt diet <2g/day, monitor daily weight, and stay physically active as tolerated. Further encouraged to call if experiencing persistent weight gain with HF symptoms. Patient was recommended to f/u with PCP regarding right knee pain managment.      Heart failure treatment includes:  - Beta blocker therapy with carvedilol 25 mg BID  - ACEI therapy with enalapril 10 mg daily  -  Last BMP was done on 11/25/17 and was stable except bun 35 and Cr 1.33- improved from previous readings.  - Diuretic therapy with furosemide 40 mg daily.     2. Hypotension: 90/56,  [id number] and patient is asymptomatic.  Offered to reduce Furosemide dose but patient was hesitant with fear of fluid retention. Encouraged to call if symptomatic with low BP.    3.  Paroxysmal Atrial Fibrillation: rate controlled in 60's. On ASA 81mg daily and Coreg. Device check on 12/5/17 : \" since 6/1/17, 15 atrial monitoring episodes, 3 SVT, 2 AF; no EGMs, durations, or rates available.  Ventricular rates appear controlled per trend. No VT/VF detected.normal ICD function. BiV pacing 94%\".     Follow up with Linda Hardin in 1st week of March and Dr. Vigil in June, 2018 or sooner if needed    Subjective:     Ken Doss is a 71 years old male who is seen at [facility] heart failure clinic today for Prominent Research " follow up.His last TGL is 316 in June 2017 and A1c is 8.2. His November lipid panel result is pending.  He has PMH of CAD, cardiomyopathy with recent EF of 19% and the stress test, BIV ICD placed in 2013, paroxysmal A. Fib, diabetes type 2 and hypercholesterolemia and hypertriglyceridemia.  Patient underwent coronary angiogram in December 2016 showed 30% lesion in mid to distal LAD, 95% lesion in diagonal, 30% lesion in proximal to mid circumflex, and 70% lesion in proximal RCA. This recent stress test on June 30, 2017 was negative for inducible ischemia.  He was last seen by Dr. Vigil on July, 2017. He has been following up regularly in HF clinic  and last seen in December. His BP has improved after adjusting his Enalapril dose and discontinuing the Spironolactone.     Today, patient reports 5 lbs weight gain over the last couple months. He has stopped exercising due to right knee pain issue. He underwent tooth extraction in 1st week of December and skipped prophylactic antibiotic. He thinks this has caused the right knee pain. He denies having right knee swelling, redness, fever or chills. He thinks his weight gain is d/t lack of exercise and recent holiday.  He further denies fatigue, lightheadedness, shortness of breath, orthopnea, PND, palpitations, chest pain, abdominal fullness/bloating and lower extremity edema. He gets mild SOB on exertion but has not changed from the baseline.  His BP today is 90/56 but asymptomatic.    He is monitoring home weights and his weight is up 5 lbs above baseline (usually runs in 172's)pounds.  He is following a low sodium diet.  He is not participating in exercise d/t right knee pain.    Review of Systems:    Negative unless noted in HPI    Patient Active Problem List   Diagnosis     Type 2 diabetes mellitus     Depression     Coronary artery disease involving native coronary artery     Paroxysmal Atrial Fibrillation     Biventricular ICD, in situ     Cardiomyopathy     Heart  failure with reduced ejection fraction     Dyslipidemia       Past Medical History:   Diagnosis Date     Atrial fibrillation      Chronic systolic heart failure Dec 2012     Congestive heart failure      Coronary artery disease      Coronary artery disease involving native coronary artery     Non obstructive disease by cath in  Cor angio 2016: RCA 70% (FFR 0.89), and OM1 75%        Coronary atherosclerosis of unspecified type of vessel, native or graft      Depressive disorder, not elsewhere classified      Diabetes mellitus      Diabetes mellitus, type II      Family history of myocardial infarction     mom  at 54 of cardiac issues     Fractures     ankle, leg and arm     High cholesterol      ICD (implantable cardioverter-defibrillator) lead failure 2014    High voltage lead tip inserted in RV free wall RV lead extraction and implantation of the new septal RV lead 2014      Infectious mononucleosis      Kidney stone      Myocardial infarction      S/P ICD (internal cardiac defibrillator) procedure 2014     Type II or unspecified type diabetes mellitus without mention of complication, not stated as uncontrolled      Unspecified systolic heart failure        Past Surgical History:   Procedure Laterality Date     APPENDECTOMY       APPENDECTOMY       CARDIAC CATHETERIZATION N/A 2016    Procedure: Coronary Angiogram;  Surgeon: Delgado Ramirez MD;  Location: [facility];  Service:      CARDIAC PACEMAKER PLACEMENT       EP ICD INSERT       INSERT / REPLACE / REMOVE PACEMAKER       KY APPENDECTOMY      Description: Appendectomy;  Recorded: 06/10/2014;     KY L HRT CATH W/NJX L VENTRICULOGRAPHY IMG S&I Left 2016    Procedure: Left Heart Catheterization with Left Ventriculogram;  Surgeon: Delgado Ramirez MD;  Location: [facility];  Service: Cardiology     KY REMOVE TONSILS/ADENOIDS,<11 Y/O      Description: Tonsillectomy With Adenoidectomy;  Recorded:  06/10/2014;     HARRIET CROWE ARTHROSCOP,DIAGNOSTIC      Description: Arthroscopy Shoulder;  Recorded: 06/10/2014;     REPLACEMENT TOTAL KNEE      bilat     ROTATOR CUFF REPAIR      right       Family History   Problem Relation Age of Onset     Sudden death Mother      unexpected death in her sleep in her 50s       Social History     Social History     Marital status:      Spouse name: N/A     Number of children: N/A     Years of education: N/A     Occupational History     Not on file.     Social History Main Topics     Smoking status: Never Smoker     Smokeless tobacco: Not on file      Comment: cigars few times a year only     Alcohol use Yes      Comment: 3-4/month     Drug use: No     Sexual activity: Not on file     Other Topics Concern     Not on file     Social History Narrative       Current Outpatient Prescriptions   Medication Sig Dispense Refill     ACCU-CHEK SMARTVIEW TEST STRIP strips        acetaminophen (TYLENOL) 500 MG tablet Take 500 mg by mouth every 6 (six) hours as needed for pain.       amoxicillin (AMOXIL) 500 MG capsule PRIOR TO DENTAL APPOINTMENT       aspirin 81 MG EC tablet Take 81 mg by mouth daily.       carvedilol (COREG) 25 MG tablet Take 25 mg by mouth 2 (two) times a day with meals.       DEXTROSE (GLUCOSE) Chew Chew 1 tablet as needed.       diphenhydrAMINE-acetaminophen (TYLENOL PM)  mg Tab Take 1 tablet by mouth at bedtime as needed.       DOCOSAHEXANOIC ACID/EPA (FISH OIL ORAL) 1-2 capsules daily.        enalapril (VASOTEC) 10 MG tablet Take 1 tablet (10 mg total) by mouth daily. 90 tablet 3     furosemide (LASIX) 40 MG tablet Take 1 tablet (40 mg total) by mouth daily.       glucosamine-chondroitin 500-400 mg cap Take 1 capsule by mouth daily. 1500mg/1200mg once daily       ibuprofen (ADVIL,MOTRIN) 200 MG tablet Take 200 mg by mouth every 6 (six) hours as needed for pain.       metFORMIN (GLUCOPHAGE) 1000 MG tablet Take 1,000 mg by mouth 2 (two) times a day with meals.        multivitamin therapeutic (THERAGRAN) tablet Take 1 tablet by mouth daily.       simvastatin (ZOCOR) 40 MG tablet Take 40 mg by mouth at bedtime.        TOUJEO SOLOSTAR 300 unit/mL (1.5 mL) injection Inject under the skin daily. 63  units at bedtime.       Current Facility-Administered Medications   Medication Dose Route Frequency Provider Last Rate Last Dose     Study Drug pemafibrate 0.2 mg/placebo tablet 0.2 mg (PROMINENT)  0.2 mg Oral BID Mary Jane Vigil MD         Study Drug pemafibrate 0.2 mg/placebo tablet 0.2 mg (PROMINENT)  0.2 mg Oral BID Mary Jane Vigil MD         Study Drug pemafibrate 0.2 mg/placebo tablet 0.2 mg (PROMINENT)  0.2 mg Oral BID Eduard Tang RN           No Known Allergies    Objective:     Vitals:    01/09/18 0823   BP: 90/56   Pulse: 60   Resp: 16     Wt Readings from Last 3 Encounters:   01/09/18 177 lb (80.3 kg)   12/04/17 169 lb (76.7 kg)   11/22/17 169 lb (76.7 kg)       General Appearance:   Alert, cooperative and in no acute distress.   HEENT:  No scleral icterus; the mucous membranes were pink and moist.   Neck: JVP is difficult to assess due to the patient's physical limitation to get on exam chair.    Chest: The spine was straight. The chest was symmetric.   Lungs:   Respirations unlabored; the lungs are clear to auscultation.   Cardiovascular:   Regular rhythm. S1 and S2 without murmur, clicks or rubs. Radial and posterior tibial pulses are intact and symmetrical.    Abdomen:  Soft, nontender, nondistended, bowel sounds present   Extremities: No cyanosis, clubbing, or edema.   Skin: No xanthelasma.   Neurologic: Mood and affect are appropriate.         Lab Review   Lab Results   Component Value Date    CREATININE 1.33 (H) 11/22/2017    BUN 35 (H) 11/22/2017     11/22/2017    K 4.7 11/22/2017     11/22/2017    CO2 21 (L) 11/22/2017     Lab Results   Component Value Date     (H) 04/22/2015     BNP (pg/mL)   Date Value   04/22/2015 120 (H)   12/20/2012  309 (H)   12/19/2012 669 (H)     Creatinine (mg/dL)   Date Value   11/22/2017 1.33 (H)   11/13/2017 1.60 (H)   09/05/2017 1.45 (H)   06/27/2017 0.79       Cardiographics     NM pharmacology stress test done on 6/30/17  Conclusion     When compared to the images of 5/6/2015, there is now evidence of a mild fixed defect in the inferior wall. Cannot exclude diaphragmatic attenuation artifact.    The left ventricular ejection fraction is 19%.    The pharmacologic nuclear stress test is abnormal. There is a small to medium sized mild intensity fixed defect in the inferior wall and a small mild intensity fixed defect in the mid to apical anterior wall which may represent areas of infarction. Cannot exclude diaphragmatic attenuation causing the inferior defect.    Nuclear stress test was negative for inducible ischemia.             Echocardiogram: Done on 5/23/17  Summary     Moderate to severe left atrial enlargement    Left ventricle ejection fraction is severely decreased. The calculated left ventricular ejection fraction is 26%. Severe global hypokinesis    Grade 2 diastolic dysfunction    Mild mitral and tricuspid regurgitation    When compared to the previous study dated 2/9/2016, no significant change.       25  minutes were spent face to face with the patient with greater than 50% spent on education and counseling.      Arcelia Ring, [facility] [place]   Heart Failure Clinic

## 2021-06-15 NOTE — PROGRESS NOTES
"Pemafibrate to Reduce cardiovascular OutcoMes by reducing triglycerides IN patiENts with diabeTes (PROMINENT) clinical trial.    Subject seen in clinic today for study Visit 25 (month 44).      Subject seen by provider Dr. Vigil for study related physical exam.  See provider note and flow sheets for vital signs.    Vitals:    03/02/21 0931   BP: 93/60   Patient Site: Right Arm   Patient Position: Sitting   Cuff Size: Adult Regular   Pulse: 73   Weight: 167 lb 6.4 oz (75.9 kg)   Height: 5' 9\" (1.753 m)     Waist measures 93 cm    Study efficacy, endpoints and adverse events reviewed with subject.  Cardiovascular risk discussed.     Study alcohol consumption stipulations and education reviewed with subject:    Subject reports  [] Never [x] Current [] Former.   5 drinks per [] Day [x] Week [] Month [] Occasional.  1 maximum drinks per sitting.     Study medication box # [id number] with 0 tabs  Study medication box # [id number] with 0 tabs  Study medication box # [id number] with 20 tabs   Study medication box # [id number] with 70 tabs  returned by subject.  Total tablet count of 90 for 97 % compliance.  Study medication box #'s [id number], [id number], [id number], [id number] dispensed to subject.  Education provided on medication compliance and administration    Plan: Study Visit 26 telephone call with subject in 2 months.  Will schedule study Visit  27 with subject in 4 months back in clinic.    Eduard Tang RN  Clinical Trials Nurse      Current Outpatient Medications:      atorvastatin (LIPITOR) 40 MG tablet, TAKE 1 TABLET BY MOUTH EVERY DAY AT BEDTIME, Disp: 90 tablet, Rfl: 1     BASAGLAR KWIKPEN U-100 INSULIN 100 unit/mL (3 mL) pen, Inject 40 Units under the skin at bedtime. Pt takes 40 units at bedtime, Disp: , Rfl:      carvediloL (COREG) 6.25 MG tablet, TAKE 2 TABLETS BY MOUTH TWICE DAILY (  DAILY  DOSE  IS  12.5MG  TWICE  DAILY  ), Disp: 360 tablet, Rfl: 1     coenzyme Q10 (COQ-10) 100 mg capsule, Take 1 capsule (100 mg total) by " mouth daily., Disp: , Rfl: 0     diphenhydrAMINE-acetaminophen (TYLENOL PM EXTRA STRENGTH)  mg Tab, Take 2 tablets by mouth at bedtime as needed. , Disp: , Rfl:      ELIQUIS 5 mg Tab tablet, TAKE 1 TABLET BY MOUTH TWICE DAILY, Disp: 180 tablet, Rfl: 1     fish oil-omega-3 fatty acids (FISH OIL) 300-1,000 mg capsule, Take 2 g by mouth daily., Disp: , Rfl:      gabapentin (NEURONTIN) 100 MG capsule, Take 100 mg by mouth 3 (three) times a day., Disp: , Rfl:      glucosamine-chondroitin 500-400 mg cap, Take 1 capsule by mouth daily. 1500mg/1200mg once daily, Disp: , Rfl:      metFORMIN (GLUCOPHAGE) 1000 MG tablet, Take 1,000 mg by mouth 2 (two) times a day with meals., Disp: , Rfl:      multivitamin therapeutic (THERAGRAN) tablet, Take 1 tablet by mouth daily., Disp: , Rfl:      potassium chloride 20 mEq TbER, Take 20 mEq by mouth daily., Disp: , Rfl:      sacubitriL-valsartan (ENTRESTO) 49-51 mg Tab tablet, Take 1 tablet by mouth 2 (two) times a day., Disp: 180 tablet, Rfl: 2     spironolactone (ALDACTONE) 25 MG tablet, Take 0.5 tablets (12.5 mg total) by mouth daily., Disp: 45 tablet, Rfl: 3     ticagrelor (BRILINTA) 90 mg Tab, Take 90 mg by mouth 2 (two) times a day. , Disp: , Rfl:      bumetanide (BUMEX) 1 MG tablet, Take 3 mg by mouth 2 (two) times a day at 9am and 6pm. , Disp: , Rfl:     Current Facility-Administered Medications:      Study Drug pemafibrate 0.2 mg/placebo tablet 0.2 mg (PROMINENT), 0.2 mg, Oral, BID, Eduard Tang RN

## 2021-06-15 NOTE — PATIENT INSTRUCTIONS - HE
Mr Ken Doss,  I enjoyed visiting with you again today.  I am glad to hear you are doing well.  Per our conversation sit tight with the research meds going.  I will plan on seeing you per the study.  Alex Vigil

## 2021-06-16 NOTE — TELEPHONE ENCOUNTER
Telephone Encounter by Nany Forrester RN at 8/13/2019  4:09 PM     Author: Nany Forrester RN Service: -- Author Type: Registered Nurse    Filed: 8/13/2019  4:36 PM Encounter Date: 8/13/2019 Status: Signed    : Nany Forrester RN (Registered Nurse)       Arcelia Ring NP Caswell, Mallory J, RN Cc: Mary Jane Vigil MD; Cha Arambula,   His SBP was in 80s this morning during the visit.  He has been taking spironolactone 25 mg twice a day.  I instructed him to cut it down to half a tablet twice a day.     Given his low blood pressure, let's decrease Coreg from 25 mg twice a day down to 18.75 mg twice a day     Message already sent to Cha to help him set up to see EP/NP to discuss about AV node ablation and  treatment options.     Thanks   CY    Previous Messages      ----- Message -----   From: Nany Forrester RN   Sent: 8/13/2019   1:51 PM   To: Mary Jane Vigil MD, Arcelia Ring NP     Got it- no aspirin for Skyler. Are we keeping coreg and digoxin doses the same with adding sotalol? Skyler is easily overwhelmed/confused with medication changes and it took me multiple phone conversations to get him somewhat on track. Just making sure I can do this in one call. He also is overdue to see an Afib NP to discuss ablation/pvi   Thanks Mal   ----- Message -----   From: Arcelia Ring NP   Sent: 8/13/2019   1:23 PM   To: Mary Jane Vigil MD, ELVIRA Iverson,   Could you please f/u with rec from Dr. Vigil.   Thank you!   CY     ----- Message -----   From: Mary Jane Vigil MD   Sent: 8/13/2019   1:19 PM   To: Arcelia Ring NP     Back down to no asa and also would get sotalol 40 two times a day for a fib and recheck ecg for qt in a week.                Called patient back to discuss medication changes. He is willing to do so but wishes to do so over the phone tomorrow AM when he is in front of his list. He will need to add sotalol 40 mg two times a  day and decrease his carvedilol per CY. Also EKG after 5 doses. Again, pt very confused by his medications but still wishes to do over the phone to prevent having to come back in. Clarification sent to LBF on if medication changes should occur over the phone and if digoxin also needs adjusting. Will send to Farnaz to call patient int he AM to go over.      Farnaz,  See message from CY and LBF. Skyler needs to start sotalol due to persistent afib on device. He is overdue to see Afib Np. Needs to see Patricia zhang. He gets easily confused on his medications. He was instructed to stop his aspirin today by myself. He needs to decrease his carvedilol due to low BP.   Thanks,  Kaveh

## 2021-06-16 NOTE — TELEPHONE ENCOUNTER
Telephone Encounter by Zoie Granado RN at 7/3/2019  2:58 PM     Author: Zoie Granado RN Service: -- Author Type: Registered Nurse    Filed: 7/3/2019  2:58 PM Encounter Date: 7/1/2019 Status: Signed    : Zoie Granado RN (Registered Nurse)       Mary Jane Vigil MD Gebel, Mandy L, RN; Jeferson Atkinson LPN   Cc: Nany Forrester RN; Karen Medrano RN   Caller: Unspecified (2 days ago,  5:04 PM)             Thanks to all, I will see him in 2 weeks.   LF
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
## 2021-06-16 NOTE — TELEPHONE ENCOUNTER
Telephone Encounter by Zoie Granado RN at 2/4/2020  4:31 PM     Author: Zoie Granado RN Service: -- Author Type: Registered Nurse    Filed: 2/4/2020  4:37 PM Encounter Date: 2/4/2020 Status: Signed    : Zoie Granado RN (Registered Nurse)       Has Tech[employer]elroniMantara device, cannot do add on remotes but does have daily updates available on website. Leads/battery stable.    Chronic AF, s/p AVNA, HRs controlled/paced. CRT pacing 92-94%.  See graphs below.  Zoie Granado RN

## 2021-06-16 NOTE — PROGRESS NOTES
Pemafibrate to Reduce cardiovascular OutcoMes by reducing triglycerides IN patiENts with diabeTes (PROMINENT) clinical trial.    Subject seen in clinic today for study Visit 7 month 8.      Re-consent process:     Research nurse met with subject to discuss reconsent in the above noted study.    The study discussion included the following:     Study purpose    Qualifications for participation    Length of study participation    Study procedures    Risks and side effects    Benefits (if any)    Voluntary nature of participation    Alternatives to participation    Confidentiality of records    Financial considerations     Subject asked questions and agreed that he received answers that satisfied him.    Consent form (version 3.0  [id number]) signed.   A copy was offered to the subject & a copy was forwarded to medical records.      Current Outpatient Prescriptions:      Study Drug pemafibrate 0.2 mg/placebo (PROMINENT) tablet, Take 0.2 mg by mouth 2 (two) times a day., Disp: , Rfl:      ACCU-CHEK SMARTVIEW TEST STRIP strips, , Disp: , Rfl:      acetaminophen (TYLENOL) 500 MG tablet, Take 500 mg by mouth every 6 (six) hours as needed for pain., Disp: , Rfl:      amoxicillin (AMOXIL) 500 MG capsule, PRIOR TO DENTAL APPOINTMENT, Disp: , Rfl:      aspirin 81 MG EC tablet, Take 81 mg by mouth daily., Disp: , Rfl:      carvedilol (COREG) 25 MG tablet, Take 1 tablet (25 mg total) by mouth 2 (two) times a day with meals., Disp: 180 tablet, Rfl: 3     DEXTROSE (GLUCOSE) Chew, Chew 1 tablet as needed., Disp: , Rfl:      diphenhydrAMINE-acetaminophen (TYLENOL PM)  mg Tab, Take 1 tablet by mouth at bedtime as needed., Disp: , Rfl:      DOCOSAHEXANOIC ACID/EPA (FISH OIL ORAL), 1-2 capsules daily. , Disp: , Rfl:      enalapril (VASOTEC) 10 MG tablet, Take 1 tablet (10 mg total) by mouth daily., Disp: 90 tablet, Rfl: 3     furosemide (LASIX) 40 MG tablet, Take 1 tablet (40 mg total) by mouth daily., Disp: , Rfl:       glucosamine-chondroitin 500-400 mg cap, Take 1 capsule by mouth daily. 1500mg/1200mg once daily, Disp: , Rfl:      ibuprofen (ADVIL,MOTRIN) 200 MG tablet, Take 200 mg by mouth every 6 (six) hours as needed for pain., Disp: , Rfl:      metFORMIN (GLUCOPHAGE) 1000 MG tablet, Take 1,000 mg by mouth 2 (two) times a day with meals., Disp: , Rfl:      multivitamin therapeutic (THERAGRAN) tablet, Take 1 tablet by mouth daily., Disp: , Rfl:      naproxen sodium (ALEVE) 220 MG tablet, Take 220 mg by mouth every 12 (twelve) hours as needed for pain (knee pain)., Disp: , Rfl:      simvastatin (ZOCOR) 40 MG tablet, Take 40 mg by mouth at bedtime. , Disp: , Rfl:      TOUJEO SOLOSTAR 300 unit/mL (1.5 mL) injection, Inject under the skin daily. 63  units at bedtime., Disp: , Rfl:     Current Facility-Administered Medications:      Study Drug pemafibrate 0.2 mg/placebo tablet 0.2 mg (PROMINENT), 0.2 mg, Oral, BID, Eduard Tang RN    Subject seen by provider Linda Marmolejo for study related physical exam.  See provider notes and flow sheets for vital signs.  Waist circumference 98cm    No labs required for this visit.     Study efficacy, endpoints and adverse events reviewed with subject.  Cardiovascular risk discussed. Study alcohol consumption stipulations and education reviewed with subject.    Study medication box #'s and tablet counts returned by subject: [id number], [id number]#0tabs, [id number] #51tabs, [id number] #32tabs   Tablet count of 83 for 80 % compliance.  Reinforced medication compliance.      Study medication box #'s [id number], [id number], [id number], [id number] dispensed to subject.  Education provided on medication compliance and administration.    Adverse event of hypotension noted [id number] resolved to baseline [id number].  Also reports adverse event of knee pain noted [id number] resolved [id number].    Subject reports alcohol use is still minimal since start of study at 4 drinks or less per month and 1 drink in a sitting.    Plan:  Will schedule study Visit 8 telephone call with subject in 8 weeks and  Visit 9 back in clinic in 4 months .    Eduard Tang RN   Clinical Trials Nurse

## 2021-06-16 NOTE — TELEPHONE ENCOUNTER
"Telephone Encounter by Nany Forrester, RN at 2/18/2020 10:06 AM     Author: Nany Forrester, RN Service: -- Author Type: Registered Nurse    Filed: 2/18/2020 10:13 AM Encounter Date: 2/17/2020 Status: Signed    : Nany Forrester, RN (Registered Nurse)       Mary Jane JOSHUA MD  You 16 hours ago (5:14 PM)      He was also seen by tt and tt and I spoke and plan is for eval at [facility] and [facility] and must let patient know asap.he needs to pursue and not wait.       Routing comment       Mary Jane Vigil MD  You 16 hours ago (5:12 PM)      Yes despite going over meds upteen times he is still confused, so chf np great.   Would endorse coreg 12.5 two times a day and lasix 40 two times a day and have np check bp.   He needs [facility] as[facility].               Called [place], he was on the other line with [facility] scheduling. He hung up on them and stated that he no longer feels bloated or feels like he is retaining fluid. He refuses to come in to review his medications again and states \"he has them all straightened out now\". He was getting a call back from the  and states he will call me back. [place] [place]       "

## 2021-06-17 NOTE — PATIENT INSTRUCTIONS - HE
Patient Instructions by Luis Manuel Catalan MD at 7/25/2019  1:00 PM     Author: Luis Manuel Catalan MD Service: -- Author Type: Physician    Filed: 7/25/2019  1:21 PM Encounter Date: 7/25/2019 Status: Signed    : Luis Manuel Catalan MD (Physician)       Patient Education     Interstitial Lung Disease  Interstitial lung disease is a group of conditions that cause inflammation and scarring around the tiny air sacs (alveoli) in the lungs. The changes make it hard to take in oxygen.     The diaphragm is a muscle below the lungs. It flattens to draw air in as you inhale, then rises as you exhale.   Inside your lungs  When you breathe, air travels in and out of your lungs through the windpipe (trachea), airways (bronchi), and branching airways (bronchioles). Oxygen (O2) and carbon dioxide (CO2) are exchanged in the tiny air sacs (alveoli). Oxygen passes from the alveoli to the blood vessels through the tissue called interstitium. The blood vessels then carry oxygen-rich blood to the rest of your body. Carbon dioxide moves back from the blood vessels to the alveoli. You then breathe it out.     Alveoli are air sacs at the ends of bronchioles.      Damaged alveoli supply less oxygen to the body.   How lungs become damaged  With interstitial lung disease, the lungs have inflammation and scarring around the alveoli. The changes make it hard to take in oxygen.        Causes of interstitial lung disease  In most cases, interstitial lung disease has no known cause. Some known causes include:    Dust from asbestos or silica, gases, fumes, or poisons    Some medicines    Radiation therapy    Certain lung infections    Connective tissue disease. These include scleroderma, lupus, and rheumatoid arthritis.  Treatment and healthcare providers for interstitial lung disease  Treatment may include medicine, breathing techniques, exercise, and stress management. In some cases, you may need a lung transplant. Your healthcare team may  include:    Primary care provider. This could be your family doctor or internist.    Pulmonologist. This is doctor who specializes in treating lung problems.    Respiratory therapist. This person gives treatment and support for people with lung disease.    . This person helps with your daily needs and family life, accessing community resources, counseling services, and stress management.  Date Last Reviewed: 3/1/2017    [id number] [employer]. [address] [place] [address], [place] [address]. All rights reserved. This information is not intended as a substitute for professional medical care. Always follow your healthcare professional's instructions.

## 2021-06-17 NOTE — TELEPHONE ENCOUNTER
Telephone Encounter by Zoie Granado RN at 7/27/2020  9:21 AM     Author: Zoie Granado RN Service: -- Author Type: Registered Nurse    Filed: 7/27/2020 10:12 AM Encounter Date: 7/27/2020 Status: Addendum    : Zoie Granado RN (Registered Nurse)    Related Notes: Original Note by Zoie Granado RN (Registered Nurse) filed at 7/27/2020 10:10 AM       ----- Message from Eulalia Stone RDCS sent at 7/27/2020  6:06 AM CDT -----  Regarding: device RN review  Biotronik  Alert for LV lead impedance out of range. No presenting available. Pt dependent.         Type: alert remote CRT-D transmission for LV lead impedance out of range.  Presenting rhythm: Not available.  Battery/lead status: battery nearing ANGEL, Stable RA and RV measurements. LV impedance >3000 ohms. LV auto output up to 3.5V @ 0.4ms.  Arrhythmias: since 7/2/20, AF burden remains 100%. No VT/VF detected.  Anticoagulant: Eliquis  Comments: BiV pacing 92% RV, 96% LV. Past 24 hours 91% RV, 66% LV. Routed to device RN.  Device/lead alerts: none. prd     Transmission reviewed, agree with above. Abrupt spike in LV lead impedance seen over last 2 days. LV Pacing also down and LV auto-cap elevated. Patient is dependent with RV lead for pacing support. Reviewed with patient need to come in asa for LV lead troubleshooting. Patient states he is going out of town, will not be able to come in until Thursday. Scheduled him for 9:20 at [facility] location for LV lead troubleshooting. Will request Bi[employer]ron[employer] rep as well to assist. Device nearing ANGEL as well.        Currently denies any symptoms, advised to call promptly with any changes before we can get him in to be seen, specifically any s/s of HF with LV lead issues.  Verbalized understanding.     Of note, appears is to have R Heart Cath with [facility] [place]. Upon questioning of this with patient states he has no awareness of any procedures. I provided him with the RN Care Coordinator # listed for [facility] and  asked him to call right away to sort this out and let me know if we need to change his device appointment.     Zoie Granado RN

## 2021-06-17 NOTE — TELEPHONE ENCOUNTER
Pemafibrate to Reduce cardiovascular OutcoMes by reducing triglycerides IN patiENts with diabeTes (PROMINENT) clinical trial.    Study telephone visit form:    Subject Number:   [id number]                                     Dr. Vigil- PI      CONCOMITANT MEDICATIONS  ? Investigator to report if participant needs treatment with prohibited medications (Fibrates or other agents with PPAR-? agonist activity (e.g., saroglitazar); See protocol for exclusionary medications and actions to be taken.    VISIT SCHEDULING AND CONTACT CONFIRMATION  ? Confirm date of next study visit   ? Confirm information on Subject Information Form or update as needed      Visit Number:__[id number]_____  Visit Date: 21      Was the subject, relatives or health care provider (as per consent) contacted by phone?    [x] Yes  [] No    Yes [x]  No []   If Yes, Date of Contact:   Date: __21__ ____ ____   [facility]  If No, please document attempts to contact subject (include date and type of contact)   Date: ____ ____ ____   Type: [] Tel _ [] Other___  [] 2 Date:      Name of Person: Contacted:   _____Self/Subject_______________   Relationship to Subject:   ______________________  Type:[]  Tel[x]  Other _______   Date: ____ 21____ ____   Type: [] Tel [] Other _______    Subject Status on the day assessed     [x] Subject alive  []  Subject    [] Unknown at present Please complete  Death Details Form in InForm and submit source documents to [facility]        Review Subject Contact Information Form and update as needed   [x]  Review concomitant medication use   [x]  Query participant and record any reported AEs  - ongoing diabetic foot, doing well in [place] for few weeks.  [x] Query participant and record any CV efficacy events   [x] Confirm next study visit: ___TBA__________   [x] Instruct participants to bring all study supplies with them to the next in-person visit    Instruct participants to bring all study supplies with them to the next  in-person visit   Instruct participants to fast for ? 8 hours immediately prior to next in-person visit (i.e nothing by mouth, except water and essential medications).     Eduard Tang RN

## 2021-06-18 NOTE — PROGRESS NOTES
[facility] Follow-up Note    Assessment & Plan        1. Coronary artery disease involving native coronary artery of native heart without angina pectoris  -angiography December 12, 2016 showed normal left main, mid to distal left anterior descending 30% lesion with the first diagonal 75% lesion.  Circumflex had a proximal to mid 30% lesion and the right coronary artery had a proximal 70% lesion with a mid to distal 40% lesion.  None of these lesions were significant by flow wire and work on prevention with statin and aspirin.  This angiogram was based on a CT angiogram suggesting significant disease. Stress test suggests no significant ischemia but ejection fraction is further diminished.  Continue to work on prevention.    2. Ischemic cardiomyopathy -suspect this is most likely nonischemic cardiomyopathy with ejection fraction of 26% on echo from last year.  Will recheck echo this year for evaluation.  Will continue current Vasotec and carvedilol.  We will see if I can switch him over to Entresto.   3. Paroxysmal atrial fibrillation (H) -no episodes seen in the last 12 months and given this not on anticoagulation.  Should episode be seen will place him on anticoagulation given his advanced CHADS 2 VASC score of 4.   4. Type 2 diabetes mellitus (H) -on insulin and metformin defer to primary.   5. Chronic systolic heart failure (H) -on Lasix twice a day and can increase in the evening.  Continue sodium and fluid restriction.   6. Dyslipidemia -LDL 76 with a cholesterol 159 from May of this year which is excellent.   7. Biventricular ICD, in situ -he has a Biotronik's ICD CRT device with a Biotronik right ventricular lead, Medtronic right atrial lead and a [employer] left ventricular lead.  He has biventricular pacing 98% of the time and doing well.     Plan  1.  Attempt conversion to Entresto.  2.  Recheck echocardiogram.  3.  Follow-up with me in 1 year with device check or sooner if needed, not  necessary to see EP and less urgencies.    Subjective  CC: 72-year-old white gentleman being seen in yearly follow-up today.  He is active doing general tommy on a cabin in a house[place] is getting ready to take a river cruise for his 50th anniversary.  He admits to some shortness of breath and heavy activity walking up a hill with bundles.  Otherwise there is no chest pain, palpitations, PND, orthopnea or peripheral edema.    Medications  Current Outpatient Prescriptions   Medication Sig     ACCU-CHEK SMARTVIEW TEST STRIP strips      acetaminophen (TYLENOL) 500 MG tablet Take 500 mg by mouth every 6 (six) hours as needed for pain.     amoxicillin (AMOXIL) 500 MG capsule PRIOR TO DENTAL APPOINTMENT     aspirin 81 MG EC tablet Take 81 mg by mouth daily.     BASAGLAR KWIKPEN U-100 INSULIN 100 unit/mL (3 mL) pen 65 Units.      carvedilol (COREG) 25 MG tablet Take 1 tablet (25 mg total) by mouth 2 (two) times a day with meals.     DEXTROSE (GLUCOSE) Chew Chew 1 tablet as needed.     diphenhydrAMINE-acetaminophen (TYLENOL PM)  mg Tab Take 1 tablet by mouth at bedtime as needed.     DOCOSAHEXANOIC ACID/EPA (FISH OIL ORAL) 1-2 capsules daily.      enalapril (VASOTEC) 10 MG tablet Take 1 tablet (10 mg total) by mouth daily.     furosemide (LASIX) 40 MG tablet Take 1 tablet (40 mg total) by mouth 2 (two) times a day.     glucosamine-chondroitin 500-400 mg cap Take 1 capsule by mouth daily. 1500mg/1200mg once daily     ibuprofen (ADVIL,MOTRIN) 200 MG tablet Take 200 mg by mouth every 6 (six) hours as needed for pain.     metFORMIN (GLUCOPHAGE) 1000 MG tablet Take 1,000 mg by mouth 2 (two) times a day with meals.     multivitamin therapeutic (THERAGRAN) tablet Take 1 tablet by mouth daily.     naproxen sodium (ALEVE) 220 MG tablet Take 220 mg by mouth every 12 (twelve) hours as needed for pain (knee pain).     simvastatin (ZOCOR) 40 MG tablet Take 40 mg by mouth at bedtime.   "      Objective  /62 (Patient Site: Right Arm, Patient Position: Sitting, Cuff Size: Adult Regular)  Pulse 76  Resp 16  Ht 5' 9.5\" (1.765 m)  Wt 176 lb (79.8 kg)  BMI 25.62 kg/m2    General Appearance:    Alert, cooperative, no distress, appears stated age   Head:    Normocephalic, without obvious abnormality, atraumatic   Throat:   Lips, mucosa, and tongue normal; teeth and gums normal   Neck:   Supple, symmetrical, trachea midline, no adenopathy;        thyroid:  No enlargement/tenderness/nodules; no carotid    bruit or JVD   Back:     Symmetric, no curvature, ROM normal, no CVA tenderness   Lungs:     Clear to auscultation bilaterally, respirations unlabored   Chest wall:    No tenderness, left-sided ICD   Heart:    Regular rate and rhythm, S1 and S2 normal, no murmur, rub   there is an S3   Abdomen:     Soft, non-tender, bowel sounds active all four quadrants,     no masses, no organomegaly   Extremities:   Normal, atraumatic, no cyanosis or edema   Pulses:   2+ and symmetric all extremities   Skin:   Skin color, texture, turgor normal, no rashes or lesions     Results    Lab Results personally reviewed   Lab Results   Component Value Date    CHOL 159 05/01/2018    CHOL 174 06/27/2017     Lab Results   Component Value Date    HDL 30 (L) 05/01/2018    HDL 31 (L) 06/27/2017     Lab Results   Component Value Date    LDLCALC 76 05/01/2018    LDLCALC 80 06/27/2017     Lab Results   Component Value Date    TRIG 265 (H) 05/01/2018    TRIG 316 (H) 06/27/2017     Lab Results   Component Value Date    WBC 8.3 12/12/2016    HGB 12.6 (L) 12/12/2016    HCT 35.3 (L) 12/12/2016     12/12/2016     Lab Results   Component Value Date    CREATININE 1.02 05/01/2018    BUN 34 (H) 05/01/2018     05/01/2018    K 4.4 05/01/2018    CO2 25 05/01/2018     Review of Systems:   General: WNL  Eyes: WNL  Ears/Nose/Throat: WNL  Lungs: WNL  Heart: WNL  Stomach: WNL  Bladder: WNL  Muscle/Joints: WNL  Skin: WNL  Nervous " System: WNL  Mental Health: WNL     Blood: WNL

## 2021-06-18 NOTE — PROGRESS NOTES
[facility] HEART [facility]  Arrhythmia Clinic  Markus Pool    Referring:      Assessment:         Ischemic cardiomyopathy: The patient continues to have New York Heart Association class II symptoms.  He continues to work some and exercise some but recognizes that he is limited.  His CRT-D (biventricular ICD) continues to function normally.  Overall he thinks that he feels slightly better than last winter.  He reports no exertional chest pressure or pain.  On further conversation the patient does not follow a sodium restricted diet particularly well.  I reviewed the underlying pathophysiology regarding his chronic systolic heart failure and reinforced the need to read labels and restrict his sodium significantly further.    Paroxysmal atrial for ablation: The patient is had previously twice documented atrial fibrillation but none in the past 12 months.  The patient is not currently on oral anticoagulant therapy.  Should he have demonstration of a further significant burden of atrial fibrillation he would certainly qualify for therapy with an oral anticoagulant.        Recommendations:    No change in the patient's device prescription today.    No change in the patient's current medication    Strongly encourage the patient to reduce his sodium intake.    Follow-up in the heart failure clinic with the nurse practitioner in 3 months (anticipate echocardiogram being ordered at that time.)    Routine device clinic follow-up.    Follow-up with me in the device clinic in 1 year.      Patient Active Problem List   Diagnosis     Type 2 diabetes mellitus (H)     Depression     Coronary artery disease involving native coronary artery     Paroxysmal Atrial Fibrillation     Biventricular ICD, in situ     Ischemic cardiomyopathy     Heart failure with reduced ejection fraction (H)     Dyslipidemia       Subjective:  Ken Doss (72 y.o. male) returns to the arrhythmia clinic for interval reevaluation of his heart  "failure and device status.  The patient reports that overall he feels about the same as a year ago.  He thinks that he may feel \"slightly better\" than he did this past winter.  He does get fatigue and breathlessness with more significant exertion but can still perform most routine activities of daily living.  On further discussion about his heart failure symptoms he admits to using some salt and not reading labels.  His food choices are fairly suspect in regards to sodium content.  He reports some mild \"wheezing\" during the winter months when supine but this has resolved.  He reports no exertional chest pain or pressure.  He has not had any tachypalpitations lightheadedness presyncope or syncope.  He has had no ICD shock.    Current Outpatient Prescriptions   Medication Sig Dispense Refill     ACCU-CHEK SMARTVIEW TEST STRIP strips        acetaminophen (TYLENOL) 500 MG tablet Take 500 mg by mouth every 6 (six) hours as needed for pain.       amoxicillin (AMOXIL) 500 MG capsule PRIOR TO DENTAL APPOINTMENT       aspirin 81 MG EC tablet Take 81 mg by mouth daily.       BASAGLAR KWIKPEN U-100 INSULIN 100 unit/mL (3 mL) pen 65 Units.        carvedilol (COREG) 25 MG tablet Take 1 tablet (25 mg total) by mouth 2 (two) times a day with meals. 180 tablet 3     DEXTROSE (GLUCOSE) Chew Chew 1 tablet as needed.       diphenhydrAMINE-acetaminophen (TYLENOL PM)  mg Tab Take 1 tablet by mouth at bedtime as needed.       DOCOSAHEXANOIC ACID/EPA (FISH OIL ORAL) 1-2 capsules daily.        enalapril (VASOTEC) 10 MG tablet Take 1 tablet (10 mg total) by mouth daily. 90 tablet 3     furosemide (LASIX) 40 MG tablet Take 1 tablet (40 mg total) by mouth 2 (two) times a day.  0     glucosamine-chondroitin 500-400 mg cap Take 1 capsule by mouth daily. 1500mg/1200mg once daily       ibuprofen (ADVIL,MOTRIN) 200 MG tablet Take 200 mg by mouth every 6 (six) hours as needed for pain.       metFORMIN (GLUCOPHAGE) 1000 MG tablet Take 1,000 mg " "by mouth 2 (two) times a day with meals.       multivitamin therapeutic (THERAGRAN) tablet Take 1 tablet by mouth daily.       naproxen sodium (ALEVE) 220 MG tablet Take 220 mg by mouth every 12 (twelve) hours as needed for pain (knee pain).       simvastatin (ZOCOR) 40 MG tablet Take 40 mg by mouth at bedtime.        Study Drug pemafibrate 0.2 mg/placebo (PROMINENT) tablet Take 0.2 mg by mouth 2 (two) times a day.       Current Facility-Administered Medications   Medication Dose Route Frequency Provider Last Rate Last Dose     Study Drug pemafibrate 0.2 mg/placebo tablet 0.2 mg (PROMINENT)  0.2 mg Oral BID Eduard Tang RN           Review of Systems:   General: WNL  Eyes: WNL  Ears/Nose/Throat: WNL  Lungs: WNL  Heart: WNL  Stomach: WNL  Bladder: WNL  Muscle/Joints: WNL  Skin: WNL  Nervous System: WNL  Mental Health: WNL     Blood: WNL    Family History  Family History   Problem Relation Age of Onset     Sudden death Mother      unexpected death in her sleep in her 50s       Social History   reports that he has never smoked. He has never used smokeless tobacco. He reports that he drinks alcohol. He reports that he does not use illicit drugs.    Objective:   Vital signs in last 24 hours:  /54 (Patient Site: Right Arm, Patient Position: Sitting, Cuff Size: Adult Regular)  Pulse 68  Resp 16  Ht 5' 9.5\" (1.765 m)  Wt 174 lb (78.9 kg)  BMI 25.33 kg/m2  Weight: Weight: 174 lb (78.9 kg)     Physical Exam:  General: The patient is alert oriented to person place and situation.  The patient is in no acute distress at the time of my evaluation.  Eyes: Pupils are equal, round, and reactive to light.  Conjunctiva and sclera are clear.  ENT: Oral mucosa is moist and without redness. No evident nasal discharge.  Pulmonary: Lungs are clear bilaterally with no rales, rhonchi, or wheezes.    Cardiovascular exam: Rhythm is regular. S1 and S2 are normal. No significant murmur is present. JVP is normal. Lower extremities " demonstrate no significant edema. Distal pulses are intact bilaterally.  Abdomen is flat, soft, and nontender.  Musculoskeletal: Spine is straight. Extremities without deformity.  Neuro: Gait is normal.   Skin is warm, dry, and otherwise intact.      Cardiographics:   Interrogation of the patient's CRT-D demonstrates normal battery monitoring voltage.  Patient's right atrial, right ventricular left ventricular lead status are all stable and normal.  Device diagnostics show 0% atrial pacing and 98% biventricular pacing.  The patient had some short runs of ectopic atrial tachycardia but no atrial fibrillation.  No sustained ventricular arrhythmia.    Lab Results:   Lab Results   Component Value Date     05/01/2018    K 4.4 05/01/2018     05/01/2018    CO2 25 05/01/2018    BUN 34 (H) 05/01/2018    CREATININE 1.02 05/01/2018    CALCIUM 9.9 05/01/2018     Lab Results   Component Value Date    WBC 8.3 12/12/2016    WBC 6.9 11/26/2014    HGB 12.6 (L) 12/12/2016    HCT 35.3 (L) 12/12/2016    MCV 95 12/12/2016     12/12/2016     Lab Results   Component Value Date    INR 1.04 12/19/2012         Outside record review:

## 2021-06-19 NOTE — LETTER
Letter by Concetta Galvan EPS at      Author: Concetta Galvan EPS Service: -- Author Type: --    Filed:  Encounter Date: 3/25/2019 Status: (Other)         Ken Doss  [address] [place] [id number]      March 25, 2019      Dear Mr. Doss,    RE: Remote Results    We are writing to you regarding your recent Remote ICD check from home. Your transmission was received successfully. Battery status is satisfactory at this time.     Your results are within normal limits.    Your next device appointment will be a clinic visit.  Please call in April  to schedule.      To schedule or reschedule, please call [phone] and press 1.    NOTE: If you would like to do an extra transmission, please call [phone] and press 3 to speak to a nurse BEFORE transmitting. This ensures that the Device Clinic staff is aware of the reason you are sending a transmission, and can follow-up with you after it has been reviewed.    We will be checking your implanted device from home (remotely) every three months unless otherwise instructed. We will need to see you in the clinic at least once a year. You may need to be seen in the clinic sooner depending on the results of your check.    Please be aware:    The follow-up schedule is like a Physician prescription.    Your remote monitor is paired to your specific implanted device.      Sincerely,    [facility] Device Clinic

## 2021-06-19 NOTE — LETTER
Letter by Asha Mathew EPS at      Author: Asha Mathew EPS Service: -- Author Type: --    Filed:  Encounter Date: 6/23/2019 Status: (Other)         Ken Doss  [address] [place] [id number]      June 24, 2019      Dear Mr. Doss    RE: Remote Results    We are writing to you regarding your recent Remote ICD check from home. Your transmission was received successfully. Battery status is satisfactory at this time.     Your results are being reviewed.  You will be contacted if further information is necessary.     You are overdue for your clinic visit.  Please call now to schedule.    To schedule or reschedule, please call [phone] and press 1.    NOTE: If you would like to do an extra transmission, please call [phone] and press 3 to speak to a nurse BEFORE transmitting. This ensures that the Device Clinic staff is aware of the reason you are sending a transmission, and can follow-up with you after it has been reviewed.    We will be checking your implanted device from home (remotely) every three months unless otherwise instructed. We will need to see you in the clinic at least once a year. You may need to be seen in the clinic sooner depending on the results of your check.    Please be aware:    The follow-up schedule is like a Physician prescription.    Your remote monitor is paired to your specific implanted device.      Sincerely,    [facility] Care Device Clinic

## 2021-06-19 NOTE — PROGRESS NOTES
"Pemafibrate to Reduce cardiovascular OutcoMes by reducing triglycerides IN patiENts with diabeTes (PROMINENT) clinical trial.    Subject seen in clinic today for study Visit 9.      Subject seen by provider Linda Hardin for study related physical exam.  See provider note and flow sheets for vital signs.    Vitals:    07/10/18 0810   BP: 92/60   Patient Site: Left Arm   Patient Position: Sitting   Cuff Size: Adult Large   Pulse: 64   Resp: 18   Weight: 169 lb (76.7 kg)   Height: 5' 9.5\" (1.765 m)     Waist measures 96 cm    Labs drawn per protocol.  Urine sample collected.  Results will be scanned into 'media'.     EQ-5D-5L questionnaire completed     Study efficacy, endpoints and adverse events reviewed with subject.  Cardiovascular risk discussed.     Study alcohol consumption stipulations and education reviewed with subject:    Subject reports  [] Never [x] Current [] Former, stop date.   2 drinks per [] Day [x] Week [] Month [] Occasional.  2 maximum drinks per sitting.     Study medication box # [id number] with 0 tabs  Study medication box # [id number] with 0 tabs  Study medication box # [id number] with 0 tabs  Study medication box # [id number] with  47 tabs  returned by subject.  Total tablet count of 47 for 100 % compliance.  Study medication box #'s [id number], [id number], [id number], [id number] dispensed to subject.  Education provided on medication compliance and administration    Plan: Study Visit 10 telephone call with subject in 2 months.  Will schedule study  Visit 11  with subject in 16 weeks back at [facility].    Eduard Tang RN   Clinical Trials Nurse      "

## 2021-06-19 NOTE — LETTER
Letter by Jenny Mclean RN at      Author: Jenny Mclean RN Service: -- Author Type: --    Filed:  Encounter Date: 9/17/2019 Status: (Other)         Ken CUEVAS Baltazarbeth  [address] [address]                          Dear Ken Doss,    Important Information for Your Procedure    You are having a Ablation Procedure:    Your procedure is scheduled for Friday 9/27/19    Please arrive at 7:30 am for registration and preporation for your procedure.   Getting Ready for Your Procedure:    Your recent office visit with the Cardiologist qualifies as your pre-operative history and physical prior to your procedure.    You will need to bring a current list of your medications with you for our admissions process the day of your procedure, please do not bring any of your own medications  with you to the hospital    The hospital cannot store your valuables, so please leave them at home    You will need to take off your jewelry prior to your procedure, we advise leaving your jewelry at home, as we cannot store your valuables    Please shower the morning of your procedure, or the night before    This is a same day procedure, in which you can expect to go home the same day. In any unforseen circumstanced this plan can change, but is unlikely.    Please make arrange for transportation home, as you will not be able to drive following your procedure  The Night Prior to Your Procedure:    Please do not have anything to eat or drink after Midnight (12:00am) prior to your procedure    It is important to stay well hydrated, and consume balanced meals the day prior to your procedure  Arriving for Your Procedure:    Please arrive at [facility], located at: [address] [place] [address]      parking is available after 6:00am for your convenience, parking is also available in the parking ramp located off of [address] attached to the hospital    If you park in the ramp located on [address],  take the elevator to level one. Enter the doors to the atrium/lobby area and check in at the main reception desk.     Please check in at the main reception desk in the lobby    You will be assisted to Cardiac Special Care on the third floor.  Going Home:    The physician and/or nurse will review any restrictions, follow-up requirements, and medication instructions prior to going home from the hospital in detail    Listed below are the restrictions that you can expect after your procedure:  o For 3 days after your procedure you will have the following restirictions:    No driving    Avoid hard work or tiring activities    No aggressive exercise    No yard work such as raking, mowing the lawn, shoveling snow or snowplowing    No jogging, biking, or sexual activity    No heaving lifting greater than 10 pounds  o You may shower the day after your procedure  o You may use a hot tub or swim 5 days after your procedure  Clinic Contact Information:    You can contact our main clinic line at any time with questions, concerns, or if you need further information: [facility] [phone]    If you have further questions in regards to the scheduling of you procedure, please contact The Cardiovascular Procedural Schedulers at [phone]    Medication prior to your procedure:      Please hold your metformin and any multivitamins or supplements the morning of your procedure, it is ok to take the rest of your medication with small sips of water.     Please take only half of your insulin dose the night prior to your procedure.    Continue taking you anticoagulation medication Eliquis as prescribed, we are not going to stop your anticoagulation prior to your procedure and it is important for you to remain on this medication. and If you have any questions regarding your medication prior to your procedure please contact me at the number listed below.        Sincerely,  Jenny Mclean RN  Please call with any  questions or concerns regarding the procedure at : [phone]

## 2021-06-19 NOTE — LETTER
Letter by Luis Manuel Catalan MD at      Author: Luis Manuel Catalan MD Service: -- Author Type: --    Filed:  Encounter Date: 7/25/2019 Status: (Other)         Lewis Reeves MD  [address] [facility] [address]                                  July 25, 2019    Patient: Ken Doss   MR Number: [id number]   YOB: 1945   Date of Visit: 7/25/2019     Dear Dr. Leandro MD:    Thank you for referring Ken Doss to me for evaluation. Below are the relevant portions of my assessment and plan of care.    If you have questions, please do not hesitate to call me. I look forward to following Ken along with you.    Sincerely,        Luis Manuel Catalan MD          CC  No Recipients  Luis Manuel Catalan MD  7/25/2019  1:34 PM  Sign at close encounter  Pulmonary Clinic Follow-up Visit    Impression: 73 male with history of CAD s/p PCI/stents in Jan 2019, afib on eliquis, NICM EF 25%, DM2, atrial fibrillation on NOAC, recent admission for afib and CHF exacerbation. Found to have peripheral fibrosis and hilar/mediastinal adenopathy. infectious and autoimmune workup has been negative. FNA of an abnormal left supraclavicular node showed non-necrotizing granulomas possibly related to anthracosis/pneumoconiosis vs. Processing artifact vs. Other environmental exposure. PFT's showed very mild restriction and moderate diffusion impairment. 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 Symptomatically he is doing better since his hospitalization and really doesn't have significant breathing limitations. PET/CT and biopsy findings are not consistent with lymphoproliferative disease or other malignancy. I do not think additional invasive procedure such as bronchoscopy with EBUS is going to yield much more information at this point and in the absence of significant  symptoms would not recommend any specific treatment.     Plan:  - encouraged him to remain active and continue to exercise as able  - follow up in 6 months with repeat CT of the chest  - he'll call if he has worsening shortness of breath, cough or hemoptysis. Consideration for trial of oral steroids if develops any of the aforementioned symptoms.  - advised him to avoid toxic fumes/inhalational exposures especially with regards to the fire put and asbestos exposures.      All questions answered. Follow up in 6 months as above.     CCx: hospital discharge follow up    HPI: Interim history: I last saw Skyler on 7/11 while he was in the hospital. He was hospitalized at [facility] from 7/9 - 7/12 for chest pain and atrial fibrillation with RVR as well as CHF exacerbation. He was seen by cardiology and cardiac medications were optimized.  A CT chest revealed worsening ILD and hilar/mediastinal adenopathy. We did a biopsy of an abnormal cervical lymph node which was essentially non-diagnostic but did show some rare foci of non-necrotizing gramulomatous inflammation which could be due to anthracosis/envirionmental exposures. Since that time, he reports he's generally been doing well. He is really not having much shortness of breath since his heart issues were addressed in the hospital.  He is able to do all his ADL's without breathing limitation.   He is able to walk up the steep hill to his house in [facility]. Without shortness of breath.   Denies fevers, chills, chest pressure, palpitations, peripheral edema, unintentional weight loss.  He does have a wood-burning stove and has a fire pit that he uses regularly outside.   He did work in construction.    ROS:  A 12-system review was obtained and was negative with the exception of the symptoms endorsed in the history of present illness.    PMH:  Past Medical History:   Diagnosis Date   ? Atrial fibrillation (H) 10/29/2014   ? Chronic systolic heart failure (H) Dec 2012   ? Coronary  artery disease involving native coronary artery     Non obstructive disease by cath in 2007 Cor angio Nov 2016: RCA 70% (FFR 0.89), and OM1 75%      ? Depressive disorder, not elsewhere classified 10/24/2014   ? Diabetes mellitus, type II (H) 28/Jan/2013   ? Dyslipidemia 2007   ? Fractures     ankle, leg and arm   ? ICD (implantable cardioverter-defibrillator) lead failure 11/26/2014    High voltage lead tip inserted in RV free wall RV lead extraction and implantation of the new septal RV lead November 2014    ? Infectious mononucleosis    ? Ischemic cardiomyopathy 4/22/2015    Chronic: LVEF 25- 30% LVEF 26% echo May 2017   ? Kidney stone    ? LBBB (left bundle branch block)     noted on Dec 19, 2012   ? Myocardial infarction (H)    ? S/P ICD (internal cardiac defibrillator) procedure 11/26/2014       PSH:  Past Surgical History:   Procedure Laterality Date   ? APPENDECTOMY     ? CARDIAC CATHETERIZATION N/A 12/12/2016    Procedure: Coronary Angiogram;  Surgeon: Delgado Ramirez MD;  Location: [facility] Cath Lab;  Service:    ? CV CORONARY ANGIOGRAM N/A 1/15/2019    Procedure: Coronary Angiogram;  Surgeon: Mason Correa MD;  Location: [facility] Cath Lab;  Service: Cardiology   ? CV LEFT HEART CATHETERIZATION WO LEFT VETRICULOGRAM Left 1/15/2019    Procedure: Left Heart Catheterization Without Left Ventriculogram;  Surgeon: Mason Correa MD;  Location: [facility] Cath Lab;  Service: Cardiology   ? EP ICD INSERT     ? CA L HRT CATH W/NJX L VENTRICULOGRAPHY IMG S&I Left 12/12/2016    Procedure: Left Heart Catheterization with Left Ventriculogram;  Surgeon: Delgado Ramirez MD;  Location: [facility] Cath Lab;  Service: Cardiology   ? CA REMOVE TONSILS/ADENOIDS,<13 Y/O      Description: Tonsillectomy With Adenoidectomy;  Recorded: 06/10/2014;   ? CA SHLDR ARTHROSCOP,DIAGNOSTIC      Description: Arthroscopy Shoulder;  Recorded: 06/10/2014;   ? REPLACEMENT TOTAL KNEE      bilat   ? ROTATOR CUFF  REPAIR      right   ? US LYMPH NODE BIOPSY  7/10/2019       Allergies:  No Known Allergies    Family HX:  Family History   Problem Relation Age of Onset   ? Sudden death Mother         unexpected death in her sleep in her 50s       Social Hx:  Social History     Socioeconomic History   ? Marital status:      Spouse name: Not on file   ? Number of children: Not on file   ? Years of education: Not on file   ? Highest education level: Not on file   Occupational History   ? Not on file   Social Needs   ? Financial resource strain: Not on file   ? Food insecurity:     Worry: Not on file     Inability: Not on file   ? Transportation needs:     Medical: Not on file     Non-medical: Not on file   Tobacco Use   ? Smoking status: Light Tobacco Smoker     Types: Cigars   ? Smokeless tobacco: Never Used   ? Tobacco comment: cigars few times a year only   Substance and Sexual Activity   ? Alcohol use: Yes     Comment: 3-4/month   ? Drug use: No   ? Sexual activity: Not on file   Lifestyle   ? Physical activity:     Days per week: Not on file     Minutes per session: Not on file   ? Stress: Not on file   Relationships   ? Social connections:     Talks on phone: Not on file     Gets together: Not on file     Attends Shinto service: Not on file     Active member of club or organization: Not on file     Attends meetings of clubs or organizations: Not on file     Relationship status: Not on file   ? Intimate partner violence:     Fear of current or ex partner: Not on file     Emotionally abused: Not on file     Physically abused: Not on file     Forced sexual activity: Not on file   Other Topics Concern   ? Not on file   Social History Narrative   ? Not on file       Current Meds:  Current Outpatient Medications   Medication Sig Dispense Refill   ? ACCU-CHEK SMARTVIEW TEST STRIP strips      ? acetaminophen (TYLENOL) 500 MG tablet Take 500 mg by mouth every 6 (six) hours as needed for pain.     ? apixaban (ELIQUIS) 5 mg Tab  "tablet Take 1 tablet (5 mg total) by mouth 2 (two) times a day. 120 tablet 2   ? aspirin 81 MG EC tablet Take 81 mg by mouth daily.     ? atorvastatin (LIPITOR) 40 MG tablet TAKE 1 TABLET BY MOUTH AT BEDTIME 90 tablet 1   ? BASAGLAR KWIKPEN U-100 INSULIN 100 unit/mL (3 mL) pen Inject 65 Units under the skin at bedtime.            ? carvedilol (COREG) 12.5 MG tablet Take 2 tablets (25 mg total) by mouth 2 (two) times a day with meals. 120 tablet 0   ? clopidogrel (PLAVIX) 75 mg tablet TAKE 1 TABLET BY MOUTH ONCE DAILY 90 tablet 1   ? coenzyme Q10 (COQ-10) 100 mg capsule Take 1 capsule (100 mg total) by mouth daily.  0   ? digoxin (LANOXIN) 125 mcg tablet Take 1 tablet (125 mcg total) by mouth daily with supper. 60 tablet 0   ? edoxaban tosylate (STUDY DRUG EDOXABAN 30 MG <ENVISAGE-AMANDA AF>) tablet Take by mouth daily.     ? furosemide (LASIX) 40 MG tablet Take 1 tablet (40 mg total) by mouth daily. (Patient taking differently: Take 40 mg by mouth 2 (two) times a day at 9am and 6pm.       ) 190 tablet 1   ? glucosamine-chondroitin 500-400 mg cap Take 1 capsule by mouth daily. 1500mg/1200mg once daily     ? metFORMIN (GLUCOPHAGE) 1000 MG tablet Take 1,000 mg by mouth 2 (two) times a day with meals.     ? multivitamin therapeutic (THERAGRAN) tablet Take 1 tablet by mouth daily.     ? sacubitril-valsartan (ENTRESTO) 24-26 mg Tab tablet Take 1 tablet by mouth 2 (two) times a day. 180 tablet 0   ? spironolactone (ALDACTONE) 25 MG tablet Take 0.5 tablets (12.5 mg total) by mouth 2 (two) times a day at 9am and 6pm. 90 tablet 3   ? Study Drug pemafibrate 0.2 mg/placebo (PROMINENT) tablet Take 0.2 mg by mouth 2 (two) times a day.       Current Facility-Administered Medications   Medication Dose Route Frequency Provider Last Rate Last Dose   ? Study Drug pemafibrate 0.2 mg/placebo tablet 0.2 mg (PROMINENT)  0.2 mg Oral BID Eduard Tang RN           Physical Exam:  /68   Pulse 76   Resp 20   Ht 5' 10\" (1.778 m)   " Wt 168 lb 1.6 oz (76.2 kg)   SpO2 96% Comment: RA  BMI 24.12 kg/m     Gen: awake, alert, oriented, no distress  HEENT: nasal turbinates are unremarkable, no oropharyngeal lesions, no cervical or supraclavicular lymphadenopathy  CV: RRR, no M/G/R  Resp: very slight bibasilar crackles, good air movement no wheezing or rhonchi.   Abd: soft, nontender, no palpable organomegaly  Skin: no apparent rashes  Ext: no cyanosis, clubbing or edema  Neuro: alert, nonfocal    Labs:  Reviewed  Lorie-1, SSB/SSA, CARA< CCP, RF, Scl-70 all negative  hypersens pneumonitis panel 1 neg  ACE level 36  Aldolase wnl    Left supraclavicular node bx  LEFT SUPRACLAVICULAR LYMPH NODE, NEEDLE BIOPSY:     -   REACTIVE PARACORTICAL HYPERPLASIA     -   RARE FOCI OF NONNECROTIZING GRANULOMATOUS INFLAMMATION, ASSOCIATED WITH PIGMENT     -   GMS STAIN FOR FUNGI IS NEGATIVE     -   AURAMINE/RHODAMINE AND KINYOUN STAINS FOR ACID-FAST BACILLI ARE NEGATIVE     -   RARE SCATTERED EOSINOPHILS AND NEUTROPHILS ARE IDENTIFIED     -   NEGATIVE FOR CARCINOMA     -   NO EVIDENCE OF MALIGNANT LYMPHOPROLIFERATIVE DISORDER     -   PLEASE SEE COMMENT   Electronically signed by Franki Merritt MD on 7/11/2019 at 1905   Comment     The morphologic findings are most consistent with a reactive/inflammatory process. The rare microgranulomas are nonnecrotizing, and they are associated with foreign pigment, possibly related to anthracosis, processing artifact (e.g., formalin), or other foreign substance. Recommend clinical correlation and follow up.      LN flow negative for lymphoproliferative disorder.    Imaging studies:  CTA chest July 2019  IMPRESSION:   CONCLUSION:  1.  No aortic dissection or acute vascular pathology.  2.  New mediastinal and hilar lymphadenopathy may relate to lymphoproliferative disease or metastatic nodes. Sarcoid also possible. There are small nodes in the left supraclavicular region, as well. Recommend follow-up PET/CT is indicated.  3.   Diffuse reticular interstitial prominence within lung parenchyma most suggestive of developing pulmonary fibrosis.    PET/CT   IMPRESSION:   CONCLUSION:  1.  Mildly FDG avid bilateral symmetric hilar and mediastinal lymphadenopathy most likely reactive and could represent sarcoidosis.  2.  Minimally FDG avid peripheral reticular opacities in the lung should be inflammatory, possibly fibrotic.    Echo March 2019    When compared to the previous study dated 5/23/2017, no significant change.    Left ventricle ejection fraction is severely decreased. The estimated left ventricular ejection fraction is 20-25%.    Normal right ventricular size and systolic function.    Mild to moderate mitral regurgitation    Device lead in right heart chambers    PFT's  7/23/2019  The spirometry was performed with adequate reproducibility.  The flow volume loop is essentially normal.     FEV1 is 2.74 L (91% predicted) and is normal.  FVC is 3.26 L (82% predicted) and is normal.  FEV1/FVC is 84% and is normal.     There was no improvement in spirometry after a single inhaled dose of bronchodilator.  Of note, patient did cough during the FVC maneuver following bronchodilator administration, which may affect the true values of this study.       TLC is 4.99 L (72% predicted) and is mildly reduced.  RV is 1.83 L (68% predicted) and is normal.     DLCO is 15.59 mL/min/mmHg (62% predicted) and is normal when it is corrected for hemoglobin.     Impression:  This pulmonary function study is mildly abnormal.  Spirometry is normal and there is no bronchodilator response.  Total lung capacity is mildly reduced.  The diffusion capacity, when corrected for hemoglobin, is normal.      Luis Manuel Catalan MD ([place]vi)  [facility]  Pager [phone]  Clinic [phone]

## 2021-06-20 NOTE — LETTER
Letter by Luis Manuel Catalan MD at      Author: Luis Manuel Catalan MD Service: -- Author Type: --    Filed:  Encounter Date: 10/7/2020 Status: (Other)         Lewis Reeves MD  [address] [facility] [address]                                  October 7, 2020    Patient: Ken Doss   MR Number: [id number]   YOB: 1945   Date of Visit: 10/7/2020     Dear Dr. Leandro MD:    Thank you for referring Ken Doss to me for evaluation. Below are the relevant portions of my assessment and plan of care.    If you have questions, please do not hesitate to call me. I look forward to following Ken along with you.    Sincerely,        Luis Manuel Catalan MD            MD Hossein Ernst Avraham, MD  10/7/2020  9:31 AM  Sign when Signing Visit  Pulmonary Clinic Follow-up Visit    Impression: 73 male with history of CAD s/p PCI/stents in Jan 2019, afib on eliquis, NICM EF 15%, DM2, atrial fibrillation on NOAC, admission in July 2019 for afib and CHF exacerbation during which he was found to have peripheral fibrosis and hilar/mediastinal adenopathy. Infectious and autoimmune workup has been negative. FNA of an abnormal left supraclavicular node showed non-necrotizing granulomas possibly related to anthracosis/pneumoconiosis vs. Processing artifact vs. Other environmental exposure.    More recently he has been undergoing cardiac workup for possible LVAD. 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 due to left-sided heart disease). PVR was actually decreased from prior RHC at 2.[address].     Recent CT scan showed stable findings of mediastinal adenopathy and peripheral reticulations with fibrosis and traction bronchiectasis.   Overall he seems to be feeling quite well and in fact has declined LVAD due to stability of his symptoms. PFT's today showed slightly reduced DLco compared to last year and normal  spirometry. Unfortunately we could not do a TLC today due to covid-19 workflow.     As discussed previously surgical lung biopsy would be high risk due to his significant cardiac disease, and he wants to hold off on that for now.      Plan:  - encouraged him to remain active and continue to exercise as able  - continue CHF follow up with Dr. Avendaño.      Follow up in 6 months.     CCx: hospital discharge follow up    HPI: Interim history: I last saw Skyler on 3/18/2020 on a virtual visit. Since that time, his LVAD workup has been put on hold by Dr. Payton due to stable cardiac symptoms.  Today, he reports he's doing OK. Minimal cough. No hemoptysis.  Was on prednisone briefly for gout attack. He did not note any change in his breathing but notes that he does not really have breathing issues.   He continues to have dyspnea on exertion, but he does not seem bothered by it. Able to walk a mile before he gets shortness of breath. 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 Overall feels quite good.       ROS:  A 12-system review was obtained and was negative with the exception of the symptoms endorsed in the history of present illness.    PMH:  Past Medical History:   Diagnosis Date   ? Abdominal pain    ? Acute renal failure (H)    ? Anemia    ? Arthritis     osteoarthritis   ? CHF (congestive heart failure) (H)    ? Chronic systolic heart failure (H) Dec 2012   ? Coronary artery disease involving native coronary artery     Non obstructive disease by cath in 2007 Cor angio Nov 2016: RCA 70% (FFR 0.89), and OM1 75%      ? Depressive disorder, not elsewhere classified 10/24/2014   ? Diabetes mellitus, type II (H) 28/Jan/2013   ? Diabetic neuropathy (H)    ? Dyslipidemia 2007   ? Fractures     ankle, leg and arm   ? Hypertension    ? 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   ? ICD (implantable cardioverter-defibrillator) lead failure 11/26/2014    High voltage lead  tip inserted in RV free wall RV lead extraction and implantation of the new septal RV lead November 2014    ? ILD (interstitial lung disease) (H)    ? Infectious mononucleosis    ? Ischemic cardiomyopathy 4/22/2015    Chronic: LVEF 25- 30% LVEF 26% echo May 2017   ? Kidney stone    ? LBBB (left bundle branch block)     noted on Dec 19, 2012   ? Lymphadenopathy, mediastinal    ? Myocardial infarction (H)    ? Osteoarthritis    ? Pulmonary anthracosis (H)    ? Recurrent kidney stones    ? Shortness of breath     with exertion       PSH:  Past Surgical History:   Procedure Laterality Date   ? APPENDECTOMY     ? BIV ICD  11/26/2014    biotronic   ? CARDIAC CATHETERIZATION N/A 12/12/2016    Procedure: Coronary Angiogram;  Surgeon: Delgado Ramirez MD;  Location: [facility] Cath Lab;  Service:    ? COLONOSCOPY N/A 12/19/2019    Procedure: COLONOSCOPY with polypectomy;  Surgeon: Delgado Price MD;  Location: [facility];  Service: Gastroenterology   ? CV CORONARY ANGIOGRAM N/A 1/15/2019    Procedure: Coronary Angiogram;  Surgeon: Mason Correa MD;  Location: [facility] Cath Lab;  Service: Cardiology   ? CV LEFT HEART CATHETERIZATION WO LEFT VETRICULOGRAM Left 1/15/2019    Procedure: Left Heart Catheterization Without Left Ventriculogram;  Surgeon: Mason Correa MD;  Location: [facility] Lab;  Service: Cardiology   ? EP ABLATION AV NODE N/A 9/27/2019    Procedure: EP Ablation AV Node;  Surgeon: Markus Pool MD;  Location: [facility] Cath Lab;  Service: Cardiology   ? EP ICD INSERT      ICD REIMPLANTATION OF A NEW RV LEAD 11/26/2014   ? INSERT / REPLACE / REMOVE PACEMAKER     ? JOINT REPLACEMENT      bilateral TKA   ? KNEE ARTHROSCOPY Bilateral    ? IL L HRT CATH W/NJX L VENTRICULOGRAPHY IMG S&I Left 12/12/2016    Procedure: Left Heart Catheterization with Left Ventriculogram;  Surgeon: Delgado Ramirez MD;  Location: [facility] Lab;  Service: Cardiology   ? IL REMOVE  TONSILS/ADENOIDS,<11 Y/O      Description: Tonsillectomy With Adenoidectomy;  Recorded: 06/10/2014;   ? UT SHLDR ARTHROSCOP,DIAGNOSTIC      Description: Arthroscopy Shoulder;  Recorded: 06/10/2014;   ? REPLACEMENT TOTAL KNEE      bilat   ? ROTATOR CUFF REPAIR      right   ? TONSILLECTOMY AND ADENOIDECTOMY     ? US LYMPH NODE BIOPSY  7/10/2019       Allergies:  No Known Allergies    Family HX:  Family History   Problem Relation Age of Onset   ? Sudden death Mother         unexpected death in her sleep in her 50s       Social Hx:  Social History     Socioeconomic History   ? Marital status:      Spouse name: Not on file   ? Number of children: Not on file   ? Years of education: Not on file   ? Highest education level: Not on file   Occupational History   ? Not on file   Social Needs   ? Financial resource strain: Not on file   ? Food insecurity     Worry: Not on file     Inability: Not on file   ? Transportation needs     Medical: Not on file     Non-medical: Not on file   Tobacco Use   ? Smoking status: Never Smoker   ? Smokeless tobacco: Never Used   ? Tobacco comment: cigars few times a year only   Substance and Sexual Activity   ? Alcohol use: Yes     Comment: occasional   ? Drug use: No   ? Sexual activity: Not on file   Lifestyle   ? Physical activity     Days per week: Not on file     Minutes per session: Not on file   ? Stress: Not on file   Relationships   ? Social connections     Talks on phone: Not on file     Gets together: Not on file     Attends Sikh service: Not on file     Active member of club or organization: Not on file     Attends meetings of clubs or organizations: Not on file     Relationship status: Not on file   ? Intimate partner violence     Fear of current or ex partner: Not on file     Emotionally abused: Not on file     Physically abused: Not on file     Forced sexual activity: Not on file   Other Topics Concern   ? Not on file   Social History Narrative   ? Not on file        Current Meds:  Current Outpatient Medications   Medication Sig Dispense Refill   ? atorvastatin (LIPITOR) 40 MG tablet TAKE 1 TABLET BY MOUTH AT BEDTIME 90 tablet 1   ? BASAGLAR KWIKPEN U-100 INSULIN 100 unit/mL (3 mL) pen Inject 40 Units under the skin at bedtime. Pt takes 40 units at bedtime     ? carvedilol (COREG) 6.25 MG tablet Take 2 tablets (12.5 mg total) by mouth 2 (two) times a day with meals. Take with 12.5 mg tablet by mouth  tablet 3   ? coenzyme Q10 (COQ-10) 100 mg capsule Take 1 capsule (100 mg total) by mouth daily.  0   ? diphenhydrAMINE-acetaminophen (TYLENOL PM EXTRA STRENGTH)  mg Tab Take 2 tablets by mouth at bedtime as needed.      ? ELIQUIS 5 mg Tab tablet TAKE 1 TABLET BY MOUTH TWICE DAILY 180 tablet 1   ? fish oil-omega-3 fatty acids (FISH OIL) 300-1,000 mg capsule Take 2 g by mouth daily.     ? gabapentin (NEURONTIN) 100 MG capsule Take 100 mg by mouth 3 (three) times a day.     ? glucosamine-chondroitin 500-400 mg cap Take 1 capsule by mouth daily. 1500mg/1200mg once daily     ? metFORMIN (GLUCOPHAGE) 1000 MG tablet Take 1,000 mg by mouth 2 (two) times a day with meals.     ? multivitamin therapeutic (THERAGRAN) tablet Take 1 tablet by mouth daily.     ? predniSONE (DELTASONE) 10 mg tablet TAKE 3 TABLETS BY MOUTH ONCE DAILY FOR 3 DAYS THEN 2 TABS ONCE DAILY FOR 3 DAYS THEN 1 TAB ONCE DAILY FOR 3 DAYS     ? sacubitriL-valsartan (ENTRESTO) 49-51 mg Tab tablet Take 1 tablet by mouth 2 (two) times a day. 180 tablet 2   ? spironolactone (ALDACTONE) 25 MG tablet Take 0.5 tablets (12.5 mg total) by mouth daily. 45 tablet 3   ? ticagrelor (BRILINTA) 90 mg Tab Take 90 mg by mouth 2 (two) times a day.      ? bumetanide (BUMEX) 1 MG tablet Take 3 mg by mouth 2 (two) times a day at 9am and 6pm.        Current Facility-Administered Medications   Medication Dose Route Frequency Provider Last Rate Last Dose   ? Study Drug pemafibrate 0.2 mg/placebo tablet 0.2 mg (PROMINENT)  0.2 mg  "Oral BID Eduard Tang RN           Physical Exam:  /68   Pulse 70   Resp 12   Ht 5' 9\" (1.753 m)   Wt 162 lb (73.5 kg)   SpO2 98%   BMI 23.92 kg/m    Gen: awake, alert, oriented, no distress  HEENT: nasal turbinates are unremarkable, no oropharyngeal lesions, no cervical or supraclavicular lymphadenopathy  CV: RRR, no M/G/R  Resp: mild right sided inspiratory crackles. Good air movement. Left lung is clear.    Abd: soft, nontender, no palpable organomegaly  Skin: no apparent rashes  Ext: no cyanosis, clubbing or edema  Neuro: alert, nonfocal    Labs:  Reviewed  Lorie-1, SSB/SSA, CARA< CCP, RF, Scl-70 all negative  hypersens pneumonitis panel 1 neg  ACE level 36  Aldolase wnl     (03 in July)  SCr slightly worse 1.53 up from 1.24 last month.   CO2 25    Left supraclavicular node bx  LEFT SUPRACLAVICULAR LYMPH NODE, NEEDLE BIOPSY:     -   REACTIVE PARACORTICAL HYPERPLASIA     -   RARE FOCI OF NONNECROTIZING GRANULOMATOUS INFLAMMATION, ASSOCIATED WITH PIGMENT     -   GMS STAIN FOR FUNGI IS NEGATIVE     -   AURAMINE/RHODAMINE AND KINYOUN STAINS FOR ACID-FAST BACILLI ARE NEGATIVE     -   RARE SCATTERED EOSINOPHILS AND NEUTROPHILS ARE IDENTIFIED     -   NEGATIVE FOR CARCINOMA     -   NO EVIDENCE OF MALIGNANT LYMPHOPROLIFERATIVE DISORDER     -   PLEASE SEE COMMENT   Electronically signed by Franki Merritt MD on 7/11/2019 at 1905   Comment     The morphologic findings are most consistent with a reactive/inflammatory process. The rare microgranulomas are nonnecrotizing, and they are associated with foreign pigment, possibly related to anthracosis, processing artifact (e.g., formalin), or other foreign substance. Recommend clinical correlation and follow up.      LN flow negative for lymphoproliferative disorder.    Imaging studies:  CTA chest July 2019  IMPRESSION:   CONCLUSION:  1.  No aortic dissection or acute vascular pathology.  2.  New mediastinal and hilar lymphadenopathy may relate to " lymphoproliferative disease or metastatic nodes. Sarcoid also possible. There are small nodes in the left supraclavicular region, as well. Recommend follow-up PET/CT is indicated.  3.  Diffuse reticular interstitial prominence within lung parenchyma most suggestive of developing pulmonary fibrosis.    PET/CT   IMPRESSION:   CONCLUSION:  1.  Mildly FDG avid bilateral symmetric hilar and mediastinal lymphadenopathy most likely reactive and could represent sarcoidosis.  2.  Minimally FDG avid peripheral reticular opacities in the lung should be inflammatory, possibly fibrotic.    HRCT   IMPRESSION:      1.  Pulmonary edema and small pleural effusions compromise evaluation of fibrotic interstitial lung disease.     2.  However, mild basilar predominant fibrotic changes are present, best seen on prone imaging. Mild reticulation and traction bronchiectasis seen. Minimal subpleural cystic change, though cannot definitively confirm honeycombing. Findings meet criteria   for probable UIP pattern.     3.  New left upper lobe slightly consolidative opacity with differentials of asymmetric edema or infectious / inflammatory.     4.  Overall, mostly stable adenopathy. Prevascular node shows slight increased size. Consider continued follow-up.     5.  Cardiomegaly. Trace pericardial effusion. Severe coronary calcifications.     6.  Other findings in the report.    Chest March 2020  IMPRESSION:   1. Enlarged mediastinal lymph nodes are unchanged when compared to  10/9/2019.  2. Bilateral pulmonary peripheral reticulation with mild associated  traction bronchiolectasis and scattered sub-3 mm nodules in a  peribronchovascular distribution. No significant air trapping.  Differential includes pulmonary sarcoidosis, fibrotic nonspecific  interstitial pneumonitis, and other interstitial lung diseases.  3. Stable cardiomegaly with heavy coronary calcium and right coronary  artery stent.  4. Cholelithiasis.    Echo March 2019    When  compared to the previous study dated 5/23/2017, no significant change.    Left ventricle ejection fraction is severely decreased. The estimated left ventricular ejection fraction is 20-25%.    Normal right ventricular size and systolic function.    Mild to moderate mitral regurgitation    Device lead in right heart chambers      RHC (07/2020)  His right heart catheterization showed an RA pressure of 10, RV of 43/10 PA of 45/20 with a mean of 33, mechanical respiration of 20, PA saturation of 51%,Thermodilution cardiac output was 3.1 with an index of 1.7.  His estimated Lorie cardiac output was 2.8 with an index of 1.5.  His PVR calculated was 2.9 Wood units.    Previous RHC data from the U:  He underwent a repeat coronary angiogram that showed in-stent restenosis of the ostial RCA and proximal LAD.  His right heart catheterization showed an RA pressure of 16, RV of 60/15, PA pressure of 59/28 with a mean of 35, pulmonary capillary wedge pressure of 20, thermodilution cardiac output of 3.4 with an index of 1.8, PA saturation of 56.3, and PVR of 4.4 Wood units.    PFT's  7/23/2019  The spirometry was performed with adequate reproducibility.  The flow volume loop is essentially normal.     FEV1 is 2.74 L (91% predicted) and is normal.  FVC is 3.26 L (82% predicted) and is normal.  FEV1/FVC is 84% and is normal.     There was no improvement in spirometry after a single inhaled dose of bronchodilator.  Of note, patient did cough during the FVC maneuver following bronchodilator administration, which may affect the true values of this study.       TLC is 4.99 L (72% predicted) and is mildly reduced.  RV is 1.83 L (68% predicted) and is normal.     DLCO is 15.59 mL/min/mmHg (62% predicted) and is normal when it is corrected for hemoglobin.     Impression:  This pulmonary function study is mildly abnormal.  Spirometry is normal and there is no bronchodilator response.  Total lung capacity is mildly reduced.  The diffusion  capacity, when corrected for hemoglobin, is moderately reduced.      PFTs 10/5/2020  Normal spirometry.  DLco 56% karmen for hgb (decr from 62% in 2019)  No lung volumes done due to covid-19 workflow.     Luis Manuel (Luis) MD Hossein  [facility]monary [facility]  Pager [phone]  Clinic [phone]

## 2021-06-20 NOTE — LETTER
Letter by Eulalia Stone RDCS at      Author: Eulalia Stone RDCS Service: -- Author Type: --    Filed:  Encounter Date: 7/2/2020 Status: (Other)         Ken Doss  [address] [place] [id number]      July 2, 2020      Dear Mr. Doss,    RE: Remote Results    We are writing to you regarding your recent Remote ICD check from home. Your transmission was received successfully. Battery status is satisfactory at this time.     Your results are showing no significant changes. Your battery is nearing replacement time.    Your next device appointment will be a remote check on or near September 1, 2020 (or sooner if your monitor sends detected information); this will occur automatically.    To schedule or reschedule, please call [phone] and press 1.    NOTE: If you would like to do an extra transmission, please call [phone] and press 3 to speak to a nurse BEFORE transmitting. This ensures that the Device Clinic staff is aware of the reason you are sending a transmission, and can follow-up with you after it has been reviewed.    We will be checking your implanted device from home (remotely) every three months unless otherwise instructed. We will need to see you in the clinic at least once a year. You may need to be seen in the clinic sooner depending on the results of your check.    Please be aware:    The follow-up schedule is like a Physician prescription.    Your remote monitor is paired to your specific implanted device.      Sincerely,    [facility]

## 2021-06-20 NOTE — LETTER
Letter by Padmini Panchal RN at      Author: Padmini Panchal RN Service: -- Author Type: --    Filed:  Encounter Date: 9/8/2020 Status: (Other)         Ken Doss  [address] [place] [id number]                          Dear Ken Doss,    Important Information for Your Procedure    You are having a Implantable Cardioverter Defibrillator Implant Battery Change Procedure:    Your procedure is scheduled for Thursday 9/17/20    Please arrive at 7:30 am for registration and preporation for your procedure.   Getting Ready for Your Procedure:    You will need to contact your primary doctor Lewis Reeves MD ([phone]), and schedule a pre-operative history within 30 days of your procedure.    You will need to bring a current list of your medications with you for our admissions process the day of your procedure, please do not bring any of your own medications  with you to the hospital    The hospital cannot store your valuables, so please leave them at home    You will need to take off your jewelry prior to your procedure, we advise leaving your jewelry at home, as we cannot store your valuables    Please shower the morning of your procedure, or the night before    This is a same day procedure, in which you can expect to go home the same day. In any unforseen circumstances this plan can change, but is unlikely.    Please make arrangements for transportation home, as you will not be able to drive following your procedure  The Night Prior to Your Procedure:    Please do not have anything to eat or drink after Midnight (12:00am) prior to your procedure    It is important to stay well hydrated, and consume balanced meals the day prior to your procedure  Arriving for Your Procedure:    Please arrive at [facility], located at: [address] [address]     Due to our current COVID 19 restriction,  is no longer available to park your car. We apologize for this  unconvinced, we are taking all steps necessary to keep patients and staff safe during this time    If you park in the ramp located on [address], take the elevator to level one. Enter the doors to the atrium/lobby area and check in at the main reception desk.     Please check in at the main reception desk in the lobby    You will be assisted to Cardiac Special Care on the third floor.  Going Home:    The physician and/or nurse will review any restrictions, follow-up requirements, and medication instructions prior to going home from the hospital in detail    Listed below are the restrictions that you can expect after your procedure:  o You will not be able to shower for 3 days after your procedure, to reduce the risk for infection and disrupting your incision site.  o You will not be allowed to drive for 24 hours after your procedure.  Clinic Contact Information:    You can contact our main clinic line at any time with questions, concerns, or if you need further information: [facility] [phone]    If you have further questions in regards to the scheduling of you procedure, please contact The [facility] Procedural Schedulers at [phone]    Medication prior to your procedure:    Please hold your metformin and any multivitamins or supplements the morning of your procedure, it is ok to take the rest of your medication with small sips of water.    Continue taking you anticoagulation medication eliquis as prescribed, we are not going to stop your anticoagulation prior to your procedure and it is important for you to remain on this medication. and If you have any questions regarding your medication prior to your procedure please contact me at the number listed below.        Sincerely,  Padmini Panchal RN  Please call with any questions or concerns regarding the procedure at : [phone]    Instructions for Patients Scheduled for Cardiac Procedures During the   COVID-19  Pandemic        Your Provider has determined that your condition warrants going ahead with your procedure at this time.    You will need to be tested for COVID-19 48-72 hours (2-3 days) prior to your procedure.    We highly encourage you to get tested for COVID-19 at one of our designated [facility] testing sites. We process the tests within our lab, which allows us to get the results back quickly.     If you choose to get tested at a non-[facility] location, you will need to contact your primary care provider to make those arrangements to ensure the results are available to your cardiologist before you arrive for your procedure. If we DO NOT receive the results in time, your procedure may be postponed or canceled. The results will need to get faxed to [phone].    After testing, you will need to remain in self-quarantine until your procedure.    If you are notified of a positive COVID-19 result; you will need to call your provider at [phone] for further recommendations.      If the test is positive; PLEASE DO NOT PRESENT FOR YOUR PROCEDURE until you have been given further instructions.     You will not be called with negative results, so arrive as instructed unless otherwise notified.    Don't:    Don't invite visitors or friends into your home.    Don't leave your home unless absolutely necessary.    Don't share utensils and other household items with others in the home.  Do:    Wash your hands regularly with soap and water and use hand  with at least 60% alcohol if you don't have easy access to soap and water.     Disinfect surface areas daily, including doorknobs, electronics - especially phones, laptops and other devices.     Wash utensils and other items thoroughly.     Separate yourself from others in the household as best you can, including pets.    Keep your hands away from your face.     Practice all other prevention tips the CDC recommends, including covering your coughs  and sneezes with a tissue or your sleeve and immediately throwing the tissue into the trash and washing your hands.    Call the clinic if you develop any of the following symptoms before your procedure:    Fever     Cough    Shortness of breath    Sore throat    Runny or stuffy nose    Muscle or body aches    Headaches    Fatigue    Vomiting and diarrhea    These steps will help keep you and your family, other patients and hospital staff safe from COVID-19

## 2021-06-20 NOTE — LETTER
Letter by Luis Manuel Catalan MD at      Author: Luis Manuel Catalan MD Service: -- Author Type: --    Filed:  Encounter Date: 1/24/2020 Status: (Other)         Lewis Reeves MD  [address] [facility] [address]                                  January 24, 2020    Patient: Ken Doss   MR Number: [id number]   YOB: 1945   Date of Visit: 1/24/2020     Dear Dr. Leandro MD:    Thank you for referring Ken Doss to me for evaluation. Below are the relevant portions of my assessment and plan of care.    If you have questions, please do not hesitate to call me. I look forward to following Ken along with you.    Sincerely,        Luis Manuel Catalan MD            MD Hossein Horta Avraham, MD  1/24/2020 11:03 AM  Sign when Signing Visit  Pulmonary Clinic Follow-up Visit    Impression: 73 male with history of CAD s/p PCI/stents in Jan 2019, afib on eliquis, NICM EF 20-25%, DM2, atrial fibrillation on NOAC, admission in July 2019 for afib and CHF exacerbation during which he was found to have peripheral fibrosis and hilar/mediastinal adenopathy. Infectious and autoimmune workup has been negative. FNA of an abnormal left supraclavicular node showed non-necrotizing granulomas possibly related to anthracosis/pneumoconiosis vs. Processing artifact vs. Other environmental exposure. PFT's showed very mild restriction and moderate diffusion impairment. He does not seem to be very symptomatic or limited by his breathing and remains quite active. BNP was markedly elevated and he has orthopnea suggesting more of a CHF picture.    A high-resolution CT scan performed 2 days ago did show evidence of pulmonary edema, bilateral pleural effusions and some subpleural fibrosis without definite honeycombing suggestive of a probable UIP pattern. There was also a new BRIONNA consolidative opacity concerning for acute infectious vs. Inflammatory process. Given his symptoms of productive cough over  the last few days I would like to treat him for a mild community-acquired pneumonia and repeat the CT scan in the near future. We discussed the possibility of bronchoscopy with lung biopsies if there is still uncertainty to see if there is a steroid-responsive process occurring. Surgical lung biopsy may need to be considered however this would be higher risk given his significant heart disease.     Plan:  - ceftin 500mg two times a day for 7 days  - azithromycin 500mg day 1 then 250mg days 2-5 total 5 days/  - repeat CT chest non-contrast in 4-6 weeks.  - encouraged him to remain active and continue to exercise as able  - consideration from bronchoscopy with biopsies vs. cryobiopsies (would need to be done at the ) vs. Surgical lung biopsy - will need to discuss this with cardiology.     All questions answered. Follow up in 6 weeks with repeat CT chest.    CCx: hospital discharge follow up    HPI: Interim history: I last saw Skyler on 7/25/2019. Since that time he has been followed by cardiology for NICM and is being referred to the  for LVAD therapy. However he is unsure if the  is in his insurance network so he is not sure if he will be seen there.  Today, he reports he's generally feeling OK.  He developed a productive cough with thick greenish phlegm over the last day or so. No fevers, chills or hemoptysis.   He continues to work as a . He is able to climb stairs and walk up hills.  His main complaint is poor sleep and difficulty breathing at night, while laying flat. He does have to prop himself up on a few pillows at night. No significant peripheral edema.   He is taking extra diuretic pills to control his fluid status.     ROS:  A 12-system review was obtained and was negative with the exception of the symptoms endorsed in the history of present illness.    PMH:  Past Medical History:   Diagnosis Date   ? Abdominal pain    ? Acute renal failure (H)    ? Anemia    ? Arthritis      osteoarthritis   ? CHF (congestive heart failure) (H)    ? Chronic systolic heart failure (H) Dec 2012   ? Coronary artery disease involving native coronary artery     Non obstructive disease by cath in 2007 Cor angio Nov 2016: RCA 70% (FFR 0.89), and OM1 75%      ? Depressive disorder, not elsewhere classified 10/24/2014   ? Diabetes mellitus, type II (H) 28/Jan/2013   ? Diabetic neuropathy (H)    ? Dyslipidemia 2007   ? Fractures     ankle, leg and arm   ? Hypertension    ? 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   ? ICD (implantable cardioverter-defibrillator) lead failure 11/26/2014    High voltage lead tip inserted in RV free wall RV lead extraction and implantation of the new septal RV lead November 2014    ? ILD (interstitial lung disease) (H)    ? Infectious mononucleosis    ? Ischemic cardiomyopathy 4/22/2015    Chronic: LVEF 25- 30% LVEF 26% echo May 2017   ? Kidney stone    ? LBBB (left bundle branch block)     noted on Dec 19, 2012   ? Lymphadenopathy, mediastinal    ? Myocardial infarction (H)    ? Osteoarthritis    ? Pulmonary anthracosis (H)    ? Recurrent kidney stones    ? Shortness of breath     with exertion       PSH:  Past Surgical History:   Procedure Laterality Date   ? APPENDECTOMY     ? BIV ICD  11/26/2014    biotronic   ? CARDIAC CATHETERIZATION N/A 12/12/2016    Procedure: Coronary Angiogram;  Surgeon: Delgado Ramirez MD;  Location: [facility];  Service:    ? COLONOSCOPY N/A 12/19/2019    Procedure: COLONOSCOPY with polypectomy;  Surgeon: Delgado Price MD;  Location: [facility];  Service: Gastroenterology   ? CV CORONARY ANGIOGRAM N/A 1/15/2019    Procedure: Coronary Angiogram;  Surgeon: Mason Correa MD;  Location: [facility] Cath Lab;  Service: Cardiology   ? CV LEFT HEART CATHETERIZATION WO LEFT VETRICULOGRAM Left 1/15/2019    Procedure: Left Heart Catheterization Without Left Ventriculogram;  Surgeon: Mason Correa  MD Godwin;  Location: [facility];  Service: Cardiology   ? EP ABLATION AV NODE N/A 9/27/2019    Procedure: EP Ablation AV Node;  Surgeon: Markus Pool MD;  Location: [facility];  Service: Cardiology   ? EP ICD INSERT      ICD REIMPLANTATION OF A NEW RV LEAD 11/26/2014   ? INSERT / REPLACE / REMOVE PACEMAKER     ? JOINT REPLACEMENT      bilateral TKA   ? KNEE ARTHROSCOPY Bilateral    ? IN L HRT CATH W/NJX L VENTRICULOGRAPHY IMG S&I Left 12/12/2016    Procedure: Left Heart Catheterization with Left Ventriculogram;  Surgeon: Delgado Ramirez MD;  Location: [facility];  Service: Cardiology   ? IN REMOVE TONSILS/ADENOIDS,<13 Y/O      Description: Tonsillectomy With Adenoidectomy;  Recorded: 06/10/2014;   ? IN SHLDR ARTHROSCOP,DIAGNOSTIC      Description: Arthroscopy Shoulder;  Recorded: 06/10/2014;   ? REPLACEMENT TOTAL KNEE      bilat   ? ROTATOR CUFF REPAIR      right   ? TONSILLECTOMY AND ADENOIDECTOMY     ? US LYMPH NODE BIOPSY  7/10/2019       Allergies:  No Known Allergies    Family HX:  Family History   Problem Relation Age of Onset   ? Sudden death Mother         unexpected death in her sleep in her 50s       Social Hx:  Social History     Socioeconomic History   ? Marital status:      Spouse name: Not on file   ? Number of children: Not on file   ? Years of education: Not on file   ? Highest education level: Not on file   Occupational History   ? Not on file   Social Needs   ? Financial resource strain: Not on file   ? Food insecurity:     Worry: Not on file     Inability: Not on file   ? Transportation needs:     Medical: Not on file     Non-medical: Not on file   Tobacco Use   ? Smoking status: Never Smoker   ? Smokeless tobacco: Never Used   ? Tobacco comment: cigars few times a year only   Substance and Sexual Activity   ? Alcohol use: Yes     Comment: occasional   ? Drug use: No   ? Sexual activity: Not on file   Lifestyle   ? Physical activity:     Days per week:  Not on file     Minutes per session: Not on file   ? Stress: Not on file   Relationships   ? Social connections:     Talks on phone: Not on file     Gets together: Not on file     Attends [employer] service: Not on file     Active member of club or organization: Not on file     Attends meetings of clubs or organizations: Not on file     Relationship status: Not on file   ? Intimate partner violence:     Fear of current or ex partner: Not on file     Emotionally abused: Not on file     Physically abused: Not on file     Forced sexual activity: Not on file   Other Topics Concern   ? Not on file   Social History Narrative   ? Not on file       Current Meds:  Current Outpatient Medications   Medication Sig Dispense Refill   ? ACCU-CHEK SMARTVIEW TEST STRIP strips      ? acetaminophen (TYLENOL) 500 MG tablet Take 500 mg by mouth every 6 (six) hours as needed for pain.     ? apixaban (ELIQUIS) 5 mg Tab tablet Take 1 tablet (5 mg total) by mouth 2 (two) times a day. 180 tablet 0   ? atorvastatin (LIPITOR) 40 MG tablet TAKE 1 TABLET BY MOUTH AT BEDTIME 90 tablet 0   ? BASAGLAR KWIKPEN U-100 INSULIN 100 unit/mL (3 mL) pen Inject 65 Units under the skin at bedtime. Prescribed for 65 units at HS, patient adjusting based on blood sugars     ? carvedilol (COREG) 6.25 MG tablet Take 2 tablets (12.5 mg total) by mouth 2 (two) times a day with meals. Take with 12.5 mg tablet by mouth  tablet 3   ? clopidogrel (PLAVIX) 75 mg tablet TAKE 1 TABLET BY MOUTH ONCE DAILY 90 tablet 0   ? coenzyme Q10 (COQ-10) 100 mg capsule Take 1 capsule (100 mg total) by mouth daily.  0   ? diphenhydrAMINE-acetaminophen (TYLENOL PM EXTRA STRENGTH)  mg Tab Take 2 tablets by mouth at bedtime as needed.      ? ENTRESTO 24-26 mg Tab tablet TAKE 1 TABLET BY MOUTH TWICE DAILY 180 tablet 1   ? furosemide (LASIX) 40 MG tablet TAKE 1 TABLET BY MOUTH TWICE DAILY 190 tablet 0   ? glucosamine-chondroitin 500-400 mg cap Take 1 capsule by mouth daily.  "1500mg/1200mg once daily     ? metFORMIN (GLUCOPHAGE) 1000 MG tablet Take 1,000 mg by mouth 2 (two) times a day with meals.     ? multivitamin therapeutic (THERAGRAN) tablet Take 1 tablet by mouth daily.     ? potassium chloride (K-DUR,KLOR-CON) 20 MEQ tablet Take 1 tablet (20 mEq total) by mouth daily. 90 tablet 3     Current Facility-Administered Medications   Medication Dose Route Frequency Provider Last Rate Last Dose   ? Study Drug pemafibrate 0.2 mg/placebo tablet 0.2 mg (PROMINENT)  0.2 mg Oral BID Eduard Tang, RN           Physical Exam:  BP 98/62   Pulse 60   Resp 12   Ht 5' 9\" (1.753 m)   Wt 165 lb (74.8 kg)   SpO2 100%   BMI 24.37 kg/m     Gen: awake, alert, oriented, no distress  HEENT: nasal turbinates are unremarkable, no oropharyngeal lesions, no cervical or supraclavicular lymphadenopathy  CV: RRR, no M/G/R  Resp: very slight bibasilar crackles, good air movement no wheezing or rhonchi.   Abd: soft, nontender, no palpable organomegaly  Skin: no apparent rashes  Ext: no cyanosis, clubbing or edema  Neuro: alert, nonfocal    Labs:  Reviewed  Lorie-1, SSB/SSA, CARA< CCP, RF, Scl-70 all negative  hypersens pneumonitis panel 1 neg  ACE level 36  Aldolase wnl    Left supraclavicular node bx  LEFT SUPRACLAVICULAR LYMPH NODE, NEEDLE BIOPSY:     -   REACTIVE PARACORTICAL HYPERPLASIA     -   RARE FOCI OF NONNECROTIZING GRANULOMATOUS INFLAMMATION, ASSOCIATED WITH PIGMENT     -   GMS STAIN FOR FUNGI IS NEGATIVE     -   AURAMINE/RHODAMINE AND KINYOUN STAINS FOR ACID-FAST BACILLI ARE NEGATIVE     -   RARE SCATTERED EOSINOPHILS AND NEUTROPHILS ARE IDENTIFIED     -   NEGATIVE FOR CARCINOMA     -   NO EVIDENCE OF MALIGNANT LYMPHOPROLIFERATIVE DISORDER     -   PLEASE SEE COMMENT   Electronically signed by Franki Merritt MD on 7/11/2019 at 1905   Comment     The morphologic findings are most consistent with a reactive/inflammatory process. The rare microgranulomas are nonnecrotizing, and they are associated " with foreign pigment, possibly related to anthracosis, processing artifact (e.g., formalin), or other foreign substance. Recommend clinical correlation and follow up.      LN flow negative for lymphoproliferative disorder.    Imaging studies:  CTA chest July 2019  IMPRESSION:   CONCLUSION:  1.  No aortic dissection or acute vascular pathology.  2.  New mediastinal and hilar lymphadenopathy may relate to lymphoproliferative disease or metastatic nodes. Sarcoid also possible. There are small nodes in the left supraclavicular region, as well. Recommend follow-up PET/CT is indicated.  3.  Diffuse reticular interstitial prominence within lung parenchyma most suggestive of developing pulmonary fibrosis.    PET/CT   IMPRESSION:   CONCLUSION:  1.  Mildly FDG avid bilateral symmetric hilar and mediastinal lymphadenopathy most likely reactive and could represent sarcoidosis.  2.  Minimally FDG avid peripheral reticular opacities in the lung should be inflammatory, possibly fibrotic.    HRCT   IMPRESSION:      1.  Pulmonary edema and small pleural effusions compromise evaluation of fibrotic interstitial lung disease.     2.  However, mild basilar predominant fibrotic changes are present, best seen on prone imaging. Mild reticulation and traction bronchiectasis seen. Minimal subpleural cystic change, though cannot definitively confirm honeycombing. Findings meet criteria   for probable UIP pattern.     3.  New left upper lobe slightly consolidative opacity with differentials of asymmetric edema or infectious / inflammatory.     4.  Overall, mostly stable adenopathy. Prevascular node shows slight increased size. Consider continued follow-up.     5.  Cardiomegaly. Trace pericardial effusion. Severe coronary calcifications.     6.  Other findings in the report.    Echo March 2019    When compared to the previous study dated 5/23/2017, no significant change.    Left ventricle ejection fraction is severely decreased. The estimated  left ventricular ejection fraction is 20-25%.    Normal right ventricular size and systolic function.    Mild to moderate mitral regurgitation    Device lead in right heart chambers    PFT's  7/23/2019  The spirometry was performed with adequate reproducibility.  The flow volume loop is essentially normal.     FEV1 is 2.74 L (91% predicted) and is normal.  FVC is 3.26 L (82% predicted) and is normal.  FEV1/FVC is 84% and is normal.     There was no improvement in spirometry after a single inhaled dose of bronchodilator.  Of note, patient did cough during the FVC maneuver following bronchodilator administration, which may affect the true values of this study.       TLC is 4.99 L (72% predicted) and is mildly reduced.  RV is 1.83 L (68% predicted) and is normal.     DLCO is 15.59 mL/min/mmHg (62% predicted) and is normal when it is corrected for hemoglobin.     Impression:  This pulmonary function study is mildly abnormal.  Spirometry is normal and there is no bronchodilator response.  Total lung capacity is mildly reduced.  The diffusion capacity, when corrected for hemoglobin, is moderately reduced.      Luis Manuel Catalan MD (Avi)  [facility] Pulmonary [facility] Critical Care  Pager [phone]  Clinic [phone]

## 2021-06-21 NOTE — PROGRESS NOTES
Pemafibrate to Reduce cardiovascular OutcoMes by reducing triglycerides IN patiENts with diabeTes (PROMINENT) clinical trial.    Subject seen in clinic today for study Visit 11.      Subject seen by provider Linda Marmolejo for study related physical exam.  See provider note and flow sheets for vital signs.    Waist measures 96 cm    Labs drawn per protocol.  Results will be scanned into 'media'.     Study efficacy, endpoints and adverse events reviewed with subject.  Cardiovascular risk discussed.     Study alcohol consumption stipulations and education reviewed with subject:    Subject reports  [] Never [x] Current [] Former   1 drinks per [] Day [] Week [] Month [x] Occasional.  1 maximum drinks per sitting.     Subject forgot study medication return today but will bring back next week.  We discussed drug compliance at length today.  Study medication box #'s [id number], [id number], [id number], [id number] dispensed to subject.  Education provided on medication compliance and administration    Plan: Study Visit 12 telephone call with subject in 2 months.  Will schedule study Visit 13 with subject in 4 months back at [facility].    Eduard Tang RN  Clinical Trials Nurse    Update [id number]: subject returned study drug boxes today  Study medication box # [id number] with 0 tabs  Study medication box # [id number] with 0 tabs  Study medication box # [id number] with 0 tabs  Study medication box # [id number] with 46 tabs  returned by subject.  Total tablet count of 46 for 97.5 % compliance.

## 2021-06-21 NOTE — LETTER
Letter by Elvi Tovar at      Author: Elvi Tovar Service: -- Author Type: --    Filed:  Encounter Date: 5/14/2021 Status: (Other)         Ken Doss  [address] [place] [id number]      May 14, 2021      Dear Mr. Doss,    RE: Remote Results    We are writing to you regarding your recent Remote ICD check from home. Your transmission was received successfully. Battery status is satisfactory at this time.     Your results are showing no significant changes.    Your next device appointment will be a remote check on August 24, 2021; this will occur automatically.    To schedule or reschedule, please call [phone] and press 1.    NOTE: If you would like to do an extra transmission, please call [phone] and press 3 to speak to a nurse BEFORE transmitting. This ensures that the Device Clinic staff is aware of the reason you are sending a transmission, and can follow-up with you after it has been reviewed.    We will be checking your implanted device from home (remotely) every three months unless otherwise instructed. We will need to see you in the clinic at least once a year. You may need to be seen in the clinic sooner depending on the results of your check.    Please be aware:    The follow-up schedule is like a Physician prescription.    Your remote monitor is paired to your specific implanted device.      Sincerely,    [facility]

## 2021-06-21 NOTE — PROGRESS NOTES
Assessment/Plan:     1.  Dyslipidemia: Continue with PROMINENT research study.    2.  Diabetes: He states that his blood sugars have been better controlled over the last 1-2 months.  He occasionally has blood sugars around 70 in the morning.  I recommended discussing with Dr. Buck if he continues to have low blood sugars and is symptomatic.    3.  Heart failure with reduced ejection fraction: He has no symptoms of acute heart failure.  His chronic shortness of breath with strenuous activity remained stable.  He is no longer able to afford Entresto.  Once his Entresto prescription runs out, he will changed to enalapril.  We discussed the 36-hour washout period again today.  He plans to see his primary care provider in November or December.  I recommended rechecking BMP at that time with changing to enalapril.    Follow-up in the heart failure clinic in March 2019 or sooner if needed.    Subjective:     Ken Doss is seen at [facility] [place] today for PROMINENT research study.  He has a history of heart failure with reduced ejection fraction, coronary artery disease, ICD, diabetes, and dyslipidemia.  Stress test from June 2017 showed ejection fraction of 19% and echocardiogram from May 2017 showed ejection fraction 26%.      He has no symptoms of acute heart failure.  He has occasional shortness of breath with strenuous activity such as walking up a hill.  Shortness of breath has remained stable.  He denies fatigue, lightheadedness, orthopnea, chest pain and lower extremity edema.      The cost of Entresto is too much at this time.  Once his Entresto prescription runs out, he will be switching back to enalapril.    Patient Active Problem List   Diagnosis     Type 2 diabetes mellitus (H)     Depression     Coronary artery disease involving native coronary artery     Paroxysmal Atrial Fibrillation     Biventricular ICD, in situ     Ischemic cardiomyopathy     Heart failure with reduced ejection  fraction (H)     Dyslipidemia     Hypertriglyceridemia       Past Medical History:   Diagnosis Date     Atrial fibrillation (H) 10/29/2014     Chronic systolic heart failure (H) Dec 2012     Congestive heart failure (H)      Coronary artery disease involving native coronary artery     Non obstructive disease by cath in  Cor angio 2016: RCA 70% (FFR 0.89), and OM1 75%        Coronary atherosclerosis of unspecified type of vessel, native or graft      Depressive disorder, not elsewhere classified 10/24/2014     Diabetes mellitus, type II (H)      Dyslipidemia      Family history of myocardial infarction     mom  at 54 of cardiac issues     Fractures     ankle, leg and arm     ICD (implantable cardioverter-defibrillator) lead failure 2014    High voltage lead tip inserted in RV free wall RV lead extraction and implantation of the new septal RV lead 2014      Infectious mononucleosis      Kidney stone      Myocardial infarction (H)      S/P ICD (internal cardiac defibrillator) procedure 2014     Unspecified systolic heart failure        Past Surgical History:   Procedure Laterality Date     APPENDECTOMY       CARDIAC CATHETERIZATION N/A 2016    Procedure: Coronary Angiogram;  Surgeon: Delgado Ramirez MD;  Location: [facility];  Service:      CARDIAC PACEMAKER PLACEMENT       EP ICD INSERT       INSERT / REPLACE / REMOVE PACEMAKER       NM APPENDECTOMY      Description: Appendectomy;  Recorded: 06/10/2014;     NM L HRT CATH W/NJX L VENTRICULOGRAPHY IMG S&I Left 2016    Procedure: Left Heart Catheterization with Left Ventriculogram;  Surgeon: Delgado Ramirez MD;  Location: [facility];  Service: Cardiology     NM REMOVE TONSILS/ADENOIDS,<13 Y/O      Description: Tonsillectomy With Adenoidectomy;  Recorded: 06/10/2014;     NM SHLDR ARTHROSCOP,DIAGNOSTIC      Description: Arthroscopy Shoulder;  Recorded: 06/10/2014;     REPLACEMENT TOTAL  KNEE      bilat     ROTATOR CUFF REPAIR      right       Family History   Problem Relation Age of Onset     Sudden death Mother      unexpected death in her sleep in her 50s       Social History     Social History     Marital status:      Spouse name: N/A     Number of children: N/A     Years of education: N/A     Occupational History     Not on file.     Social History Main Topics     Smoking status: Never Smoker     Smokeless tobacco: Never Used      Comment: cigars few times a year only     Alcohol use Yes      Comment: 3-4/month     Drug use: No     Sexual activity: Not on file     Other Topics Concern     Not on file     Social History Narrative       Current Outpatient Prescriptions   Medication Sig Dispense Refill     ACCU-CHEK SMARTVIEW TEST STRIP strips        acetaminophen (TYLENOL) 500 MG tablet Take 500 mg by mouth every 6 (six) hours as needed for pain.       amoxicillin (AMOXIL) 500 MG capsule PRIOR TO DENTAL APPOINTMENT       aspirin 81 MG EC tablet Take 81 mg by mouth daily.       BASAGLAR KWIKPEN U-100 INSULIN 100 unit/mL (3 mL) pen 65 Units.        carvedilol (COREG) 25 MG tablet Take 1 tablet (25 mg total) by mouth 2 (two) times a day with meals. 180 tablet 3     enalapril (VASOTEC) 5 MG tablet Take 1 tablet (5 mg total) by mouth 2 (two) times a day. 180 tablet 3     furosemide (LASIX) 40 MG tablet TAKE 1 TABLET BY MOUTH TWICE DAILY 190 tablet 1     glucosamine-chondroitin 500-400 mg cap Take 1 capsule by mouth daily. 1500mg/1200mg once daily       metFORMIN (GLUCOPHAGE) 1000 MG tablet Take 1,000 mg by mouth 2 (two) times a day with meals.       multivitamin therapeutic (THERAGRAN) tablet Take 1 tablet by mouth daily.       naproxen sodium (ALEVE) 220 MG tablet Take 220 mg by mouth every 12 (twelve) hours as needed for pain (knee pain).       simvastatin (ZOCOR) 40 MG tablet Take 40 mg by mouth at bedtime.        Current Facility-Administered Medications   Medication Dose Route Frequency  Provider Last Rate Last Dose     Study Drug pemafibrate 0.2 mg/placebo tablet 0.2 mg (PROMINENT)  0.2 mg Oral BID Eduard Tang RN         Study Drug pemafibrate 0.2 mg/placebo tablet 0.2 mg (PROMINENT)  0.2 mg Oral BID Eduard Tang RN           No Known Allergies    Objective:     Vitals:    11/08/18 0740   BP: 104/56   Pulse: 68   Resp: 16     Wt Readings from Last 3 Encounters:   11/08/18 175 lb (79.4 kg)   09/12/18 175 lb (79.4 kg)   08/10/18 171 lb (77.6 kg)       General Appearance:   Alert, cooperative and in no acute distress.   HEENT:  No scleral icterus; the mucous membranes were pink and moist. Cervical lymph nodes nontender and nonpalpable.   Neck: JVP normal. No HJR.   Chest: The spine was straight. The chest was symmetric.   Lungs:   Respirations unlabored; the lungs are clear to auscultation.   Cardiovascular:   Regular rhythm. S1 and S2 without murmur, clicks or rubs. Radial, carotid and posterior tibial pulses are intact and symmetrical.   Abdomen:  Soft, nontender, nondistended, bowel sounds present   Extremities: No cyanosis, clubbing, or edema.   Skin: No bruising or wounds   Neurologic: Mood and affect are appropriate.             Linda Hardin, CNP

## 2021-06-22 NOTE — TELEPHONE ENCOUNTER
Called patient back to discuss concerns. Patient informed me that over the weekend that he went to [place] urgent care 12/28/18 as recommended by DEISY Mckeon. However, he states they weren't able to find anything wrong and sent him home. Patient continues to experience extreme fatigue in addition to burning CP with activity. He has been unable to exercise or attend PT appointments for the past week and a half. Advised patient to seek ED services if CP persists. Patient was transferred to  to arrange next available RAC appointment for 1/4/2019.

## 2021-06-22 NOTE — TELEPHONE ENCOUNTER
----- Message from Atilio Dang sent at 1/2/2019  8:41 AM CST -----  Contact: Pt  General phone call:    Caller: Pt    Primary cardiologist: Dr Vigil / Linda    Detailed reason for call: a follow-up from his call/message on 12/28 - Pt did go to an urgent care over the weekend - He states he is still experiencing Extreme fatigue - Chest hurting/burning - increasing over the last month--- he wanted to report these symptoms and asked for a call back to discuss    New or active symptoms? New    Best phone number: home    Best time to contact: Any    Ok to leave a detailed message? Yes    Device? yes    Additional Info: thank you

## 2021-06-22 NOTE — PATIENT INSTRUCTIONS - HE
Ken Doss,    It was a pleasure to see you today at the [facility].     My recommendations after this visit include:  - Please follow up with Dr Vigil in June   - Please follow up with Linda Hardin in mid February  - I have checked some labs and will contact you with the results  - No changes to your medications    Vera Zavala, CNP

## 2021-06-23 NOTE — TELEPHONE ENCOUNTER
Called back patient to address his concerns. Pt was inquiring if it was OK that he took his usual AM medications today. Confirmed with him that this was OK and we went over his medications to take prior to his procedure tomorrow AM as laid out by Farnaz with whom he spoke to last week. He verbalized understanding. He knows to take half his usual long acting dose of insulin tonight and will check his BG in the AM. He reported that his wife's handwriting was difficult for him to interpret from education done last week. He has my direct line should he have any further questions. -[facility] [place]   English

## 2021-06-23 NOTE — PATIENT INSTRUCTIONS - HE
Ken Doss,    It was a pleasure to see you today at the [facility].     My recommendations after this visit include:  - No changes to your medications.    - Follow up in August.     Linda Hardin CNP

## 2021-06-23 NOTE — TELEPHONE ENCOUNTER
----- Message from Minda Nash sent at 1/14/2019  9:15 AM CST -----  Contact: Patient  Caller: Skyler    Primary cardiologist: Dr. Vigil    Detailed reason for call: Skyler states he reviewed his Rx(s) with RN (Farnaz) and the instructions prior to surgery tomorrow 1/15/19, he wants to go over the instructions / Rx(s) again. Please return call.     New or active symptoms? No    Best phone number: [phone]  Best time to contact: Today  Ok to leave a detailed message? No    Device? Yes

## 2021-06-23 NOTE — TELEPHONE ENCOUNTER
Pemafibrate to Reduce cardiovascular OutcoMes by reducing triglycerides IN patiENts with diabeTes (PROMINENT) clinical trial.    Study telephone visit form:    Subject Number:   [id number]                                     Dr. Vigil- PI      CONCOMITANT MEDICATIONS  ? Investigator to report if participant needs treatment with prohibited medications (Fibrates or other agents with PPAR-? agonist activity (e.g., saroglitazar); See protocol for exclusionary medications and actions to be taken.    VISIT SCHEDULING AND CONTACT CONFIRMATION  ? Confirm date of next study visit   ? Confirm information on Subject Information Form or update as needed      Visit Number:__V12_____  Visit Date: 19      Was the subject, relatives or health care provider (as per consent) contacted by phone?    [x] Yes  [] No    Yes [x]  No []   If Yes, Date of Contact:   Date: __19__ ____ ____   [facility] YYYY  If No, please document attempts to contact subject (include date and type of contact)   1 Date: ____ ____ ____   Type: [] Tel _ [] Other___  [] 2 Date:      Name of Person: Contacted:   _____Self/Subject_______________   Relationship to Subject:   ______________________  Type:[]  Tel[x]  Other _______   3 Date: ____ ____ ____   Type: [] Tel [] Other _______    Subject Status on the day assessed     [x] Subject alive  []  Subject    [] Unknown at present Please complete  Death Details Form in InForm and submit source documents to [facility]        Review Subject Contact Information Form and update as needed   [x]  Review concomitant medication use   [x]  Query participant and record any reported AEs   [x] Query participant and record any CV efficacy events   [x] Confirm next study visit: _3/12/19____________   [x] Instruct participants to bring all study supplies with them to the next in-person visit    Instruct participants to bring all study supplies with them to the next in-person visit     Skyler is doing well after the recent cardiac  events and cleared blockages.  Reviewed study details, requirements, and status update ect.    Eduard Tang RN

## 2021-06-23 NOTE — PROGRESS NOTES
Pemafibrate to Reduce cardiovascular OutcoMes by reducing triglycerides IN patiENts with diabeTes (PROMINENT) clinical trial.    SERIOUS ADVERSE EVENT/MACE Description: Skyler was seen for shortness of breath then had a stress test ordered which had abnormal results.  He was schedule for an angiogram the following week in which PCI was indicated.  This was reported to the Prominent study team as a potential MACE today.    Adverse Event: Coronary artery disease (Percutaneous coronary intervention)  Serious:  [x] Yes   [] No  Severity: severe  Start Date: 15Jan2019   End Date: 16Jan2019  Action taken with study treatment:  []Drug Interrupted  []Drug Withdrawal  []Not Applicable   []Unknown              [x]No Action  Outcome:   []Not Recovered/Not Resolved  [x]Recovered/Resolved  []Recovered/Resolved with Sequelae   []Recovering/Resolving  []Unknown   []Fatal  Medication or therapies taken:  [x] Yes   [] No     Dr. Vigil: Please assign causality of above INDERJIT/MACE  Relationship: Is there a reasonable possibility that the event may have been caused by:   1. Investigational Medicinal Product?  [] Yes   [x] No       Eduard Tang RN  Clinical Trials Nurse

## 2021-06-23 NOTE — TELEPHONE ENCOUNTER
Ken Doss  [id number] [place] [address]  [phone] (home)     Primary cardiologist:  Dr. Vigil  PCP:  Lewis Reeves MD  Procedure:  Cor angiogram poss PCI  H&P completed by:  DEISY Gamez 1/4/2019  Case MD:  ANTOINE or PTK  Admit date and time:  1/15  Case start time:  0600  Ordering MD:  Dr. Vigil  Diagnosis:  Abnormal Stress test  Anticoagulation: None  CPAP: No  Bypass Grafts: No  Renal Issues: Yes  Allergies: NKA  Diabetic?: Yes  Device?: Yes  Type:  ICD    Angiogram Teaching    Reason for Visit:  Telephone call to discuss pre-procedure education in preparation for: cor angio poss PCI    Procedure Prep:  EKG results obtained, dated: on admission  Pertinent test results obtained - Viewable in Epic, dated: Cor angio no intervention 11/29/2016  Hemogram results obtained: on admission  Basic Metabolic Panel results obtained: on admission  Lipid Profile results obtained: 9/11/2018    Patient Education  Explained indications/risks for diagnostic evaluation, including one or more of the following:  coronary angiogram  Explained indications/risks for therapeutic interventions, including one or more of the following: PTCA, artherectomy, stent, intravascular ultrasound, valvuloplasty, intraaortic balloon pump, 2% or less risk of MI, less than 1% risk of CVA, emergency heart surgery, death, less than 4 % risk of vascular injury requiring surgical repair or blood transfusion, 20-30% risk of restonosis with a bare metal stent, less than 10% risk of restonosis with a drug-eluting stent, 0.5-1% chance of stent thrombosis and may need clopidogrel (Plavix) form greater than 1 year.  These risks are in addition to baseline risks associated with a Diagnostic Evaluation.  Patient states understanding of procedure and risks and agrees to proceed.    Pre-procedure instructions  Patient instructed to be NPO after midnight.  Patient instructed to arrange for transportation home following procedure.  Patient  instructed to have a responsible adult with them for 24 hours post-procedure.  Post-procedure follow up process.  Conscious sedation discussed.    Pre-procedure medication instructions  Patient instructed on antiplatelet medication.  Continue medications as scheduled, with a small amount of water on the day of the procedure unless indicated.  Patient instructed to take 325 mg of Aspirin am of procedure: Yes  Other medication: instructed to take 325 mg aspirin, carvedilol, and enalapril the morning of the procedure. Instructed to take 32 units of Basaglar long acting insulin (half of normal dose) the night before procedure.    *PATIENTS RECORDS AVAILABLE IN [facility] UNLESS OTHERWISE INDICATED*    *Order set was entered on this date: 1/10/2019      Patient Active Problem List   Diagnosis     Type 2 diabetes mellitus (H)     Depression     Coronary artery disease involving native coronary artery     Paroxysmal Atrial Fibrillation     Biventricular ICD, in situ     Ischemic cardiomyopathy     Heart failure with reduced ejection fraction (H)     Dyslipidemia     Hypertriglyceridemia     Abnormal stress test       Current Outpatient Medications   Medication Sig Dispense Refill     ACCU-CHEK SMARTVIEW TEST STRIP strips        acetaminophen (TYLENOL) 500 MG tablet Take 500 mg by mouth every 6 (six) hours as needed for pain.       amoxicillin (AMOXIL) 500 MG capsule PRIOR TO DENTAL APPOINTMENT       aspirin 81 MG EC tablet Take 81 mg by mouth daily.       BASAGLAR KWIKPEN U-100 INSULIN 100 unit/mL (3 mL) pen 65 Units.        carvedilol (COREG) 25 MG tablet Take 1 tablet (25 mg total) by mouth 2 (two) times a day with meals. 180 tablet 3     enalapril (VASOTEC) 5 MG tablet Take 1 tablet (5 mg total) by mouth 2 (two) times a day. 180 tablet 3     furosemide (LASIX) 40 MG tablet TAKE 1 TABLET BY MOUTH TWICE DAILY 190 tablet 1     glucosamine-chondroitin 500-400 mg cap Take 1 capsule by mouth daily. 1500mg/1200mg once daily        ibuprofen (ADVIL,MOTRIN) 600 MG tablet Take 600 mg by mouth every 6 (six) hours as needed for pain.       metFORMIN (GLUCOPHAGE) 1000 MG tablet Take 1,000 mg by mouth 2 (two) times a day with meals.       multivitamin therapeutic (THERAGRAN) tablet Take 1 tablet by mouth daily.       naproxen sodium (ALEVE) 220 MG tablet Take 220 mg by mouth every 12 (twelve) hours as needed for pain (knee pain).       simvastatin (ZOCOR) 40 MG tablet Take 40 mg by mouth at bedtime.        Current Facility-Administered Medications   Medication Dose Route Frequency Provider Last Rate Last Dose     Study Drug pemafibrate 0.2 mg/placebo tablet 0.2 mg (PROMINENT)  0.2 mg Oral BID Eduard Tang RN           No Known Allergies    Plan  Patient ready for procedure    ELVIRA Grayson

## 2021-06-23 NOTE — TELEPHONE ENCOUNTER
----- Message from Minda Nash sent at 1/17/2019  8:25 AM CST -----  Contact: Patient  Caller: Skyler    Primary cardiologist: Dr. Vigil    Detailed reason for call: Skyler states he has new medications post angio and he needs clarification on what he is supposed to start taking and which Rx(s) to stop taking. Please return call.     New or active symptoms? No    Best phone number: [phone]   Best time to contact: Today  Ok to leave a detailed message? No    Device?: Yes

## 2021-06-23 NOTE — TELEPHONE ENCOUNTER
Returned patient's call to discuss medication changes. Went through med list and informed him of all the changes that were made at discharge. Patient verbalized understanding. Informed him to call back with any further medication questions.

## 2021-06-24 NOTE — PROGRESS NOTES
Assessment/Plan:     Continue with PROMINENT research study.  Follow up in heart failure clinic in 6 months.     Subjective:     Ken Doss is seen at [facility] today for PROMINENT research study.  He has a history of heart failure with reduced ejection fraction, coronary artery disease, ICD, diabetes, and dyslipidemia.  Coronary angiogram in 2019 with drug-eluting stents to RCA and LAD.  He feels much better since PCI.  He denies any acute heart failure symptoms.  He denies lightheadedness, shortness of breath, dyspnea on exertion, chest pain and lower extremity edema.  He has occasional fatigue with strenuous activity but denies any worsening.    Patient Active Problem List   Diagnosis     Type 2 diabetes mellitus (H)     Depression     Coronary artery disease involving native coronary artery     Paroxysmal Atrial Fibrillation     Biventricular ICD, in situ     Ischemic cardiomyopathy     Heart failure with reduced ejection fraction (H)     Dyslipidemia     Hypertriglyceridemia       Past Medical History:   Diagnosis Date     Atrial fibrillation (H) 10/29/2014     Chronic systolic heart failure (H) Dec 2012     Congestive heart failure (H)      Coronary artery disease involving native coronary artery     Non obstructive disease by cath in  Cor angio 2016: RCA 70% (FFR 0.89), and OM1 75%        Coronary atherosclerosis of unspecified type of vessel, native or graft      Depressive disorder, not elsewhere classified 10/24/2014     Diabetes mellitus, type II (H)      Dyslipidemia      Family history of myocardial infarction     mom  at 54 of cardiac issues     Fractures     ankle, leg and arm     ICD (implantable cardioverter-defibrillator) lead failure 2014    High voltage lead tip inserted in RV free wall RV lead extraction and implantation of the new septal RV lead 2014      Infectious mononucleosis      Kidney stone      Myocardial  infarction (H)      S/P ICD (internal cardiac defibrillator) procedure 11/26/2014     Unspecified systolic heart failure        Past Surgical History:   Procedure Laterality Date     APPENDECTOMY       CARDIAC CATHETERIZATION N/A 12/12/2016    Procedure: Coronary Angiogram;  Surgeon: Delgado Ramirez MD;  Location: [facility];  Service:      CV CORONARY ANGIOGRAM N/A 1/15/2019    Procedure: Coronary Angiogram;  Surgeon: Mason Correa MD;  Location: [facility] Lab;  Service: Cardiology     CV LEFT HEART CATHETERIZATION WO LEFT VETRICULOGRAM Left 1/15/2019    Procedure: Left Heart Catheterization Without Left Ventriculogram;  Surgeon: Mason Correa MD;  Location: [facility] Lab;  Service: Cardiology     EP ICD INSERT       WV L HRT CATH W/NJX L VENTRICULOGRAPHY IMG S&I Left 12/12/2016    Procedure: Left Heart Catheterization with Left Ventriculogram;  Surgeon: Delgado Ramirez MD;  Location: [facility] Lab;  Service: Cardiology     WV REMOVE TONSILS/ADENOIDS,<13 Y/O      Description: Tonsillectomy With Adenoidectomy;  Recorded: 06/10/2014;     WV SHLDR ARTHROSCOP,DIAGNOSTIC      Description: Arthroscopy Shoulder;  Recorded: 06/10/2014;     REPLACEMENT TOTAL KNEE      bilat     ROTATOR CUFF REPAIR      right       Family History   Problem Relation Age of Onset     Sudden death Mother         unexpected death in her sleep in her 50s       Social History     Socioeconomic History     Marital status:      Spouse name: Not on file     Number of children: Not on file     Years of education: Not on file     Highest education level: Not on file   Occupational History     Not on file   Social Needs     Financial resource strain: Not on file     Food insecurity:     Worry: Not on file     Inability: Not on file     Transportation needs:     Medical: Not on file     Non-medical: Not on file   Tobacco Use     Smoking status: Light Tobacco Smoker     Types: Cigars      Smokeless tobacco: Never Used     Tobacco comment: cigars few times a year only   Substance and Sexual Activity     Alcohol use: Yes     Comment: 3-4/month     Drug use: No     Sexual activity: Not on file   Lifestyle     Physical activity:     Days per week: Not on file     Minutes per session: Not on file     Stress: Not on file   Relationships     Social connections:     Talks on phone: Not on file     Gets together: Not on file     Attends Nondenominational service: Not on file     Active member of club or organization: Not on file     Attends meetings of clubs or organizations: Not on file     Relationship status: Not on file     Intimate partner violence:     Fear of current or ex partner: Not on file     Emotionally abused: Not on file     Physically abused: Not on file     Forced sexual activity: Not on file   Other Topics Concern     Not on file   Social History Narrative     Not on file       Current Outpatient Medications   Medication Sig Dispense Refill     ACCU-CHEK SMARTVIEW TEST STRIP strips        acetaminophen (TYLENOL) 500 MG tablet Take 500 mg by mouth every 6 (six) hours as needed for pain.       aspirin 81 MG EC tablet Take 81 mg by mouth daily.       atorvastatin (LIPITOR) 40 MG tablet Take 1 tablet (40 mg total) by mouth at bedtime. 90 tablet 1     BASAGLAR KWIKPEN U-100 INSULIN 100 unit/mL (3 mL) pen Inject 65 Units under the skin at bedtime.              carvedilol (COREG) 25 MG tablet Take 1 tablet (25 mg total) by mouth 2 (two) times a day with meals. 180 tablet 3     clopidogrel (PLAVIX) 75 mg tablet Take 1 tablet (75 mg total) by mouth daily. 90 tablet 1     coenzyme Q10 (COQ-10) 100 mg capsule Take 1 capsule (100 mg total) by mouth daily.  0     enalapril (VASOTEC) 5 MG tablet Take 1 tablet (5 mg total) by mouth 2 (two) times a day. 180 tablet 3     furosemide (LASIX) 40 MG tablet TAKE 1 TABLET BY MOUTH TWICE DAILY 190 tablet 1     glucosamine-chondroitin 500-400 mg cap Take 1 capsule by mouth  daily. 1500mg/1200mg once daily       ibuprofen-diphenhydramine cit (IBUPROFEN PM) 200-38 mg Tab Take 2 tablets by mouth at bedtime as needed (sleep).       magnesium chloride (SLOW-MAG) 64 mg TbEC delayed-release tablet Take 64 mg by mouth daily.       metFORMIN (GLUCOPHAGE) 1000 MG tablet Take 1,000 mg by mouth 2 (two) times a day with meals.       multivitamin therapeutic (THERAGRAN) tablet Take 1 tablet by mouth daily.       Current Facility-Administered Medications   Medication Dose Route Frequency Provider Last Rate Last Dose     Study Drug pemafibrate 0.2 mg/placebo tablet 0.2 mg (PROMINENT)  0.2 mg Oral BID Eduard Tang, RN         Study Drug pemafibrate 0.2 mg/placebo tablet 0.2 mg (PROMINENT)  0.2 mg Oral BID Eduard Tang, RN           No Known Allergies    Objective:     Vitals:    03/12/19 0942   BP: (!) 88/58   Pulse: 64   Resp: 16     Wt Readings from Last 3 Encounters:   03/12/19 171 lb 6.4 oz (77.7 kg)   03/12/19 171 lb 6.4 oz (77.7 kg)   03/12/19 172 lb (78 kg)       General Appearance:   Alert, cooperative and in no acute distress.   HEENT:  No scleral icterus; the mucous membranes were pink and moist. Cervical lymph nodes nontender and nonpalpable.   Chest: The spine was straight. The chest was symmetric.   Lungs:   Respirations unlabored; the lungs are clear to auscultation.   Cardiovascular:   Regular rhythm. S1 and S2 without murmur, clicks or rubs. Radial and posterior tibial pulses are intact and symmetrical.   Abdomen:  Soft, nontender, nondistended, bowel sounds present   Extremities: No cyanosis, clubbing, or edema.   Skin: No bruising or wounds   Neurologic: Mood and affect are appropriate.                 Linda Hardin, CNP

## 2021-06-24 NOTE — PROGRESS NOTES
[facility] Follow-up Note    Assessment & Plan        1. Ischemic cardiomyopathy -ejection fraction 16-19% based on nuclear and echo.  Has been revascularized.  We will recheck echocardiogram.  On appropriate ACE inhibitor and beta-blocker with biventricular ICD in place.  Dependent and echo results and symptoms we will discuss switching back to Entresto.  Might also add Aldactone if blood pressure permits and symptoms persist.   2. Coronary artery disease involving native coronary artery of native heart without angina pectoris -coronary angiography January 2019 showed 20% left main disease, mid LAD 80% lesion that received rotational atherectomy as well as drug-coated stent, circumflex was normal, right coronary artery had a proximal 99% lesion that received a drug-coated stent.  Reevaluate echocardiogram as above.  Plavix until January 2020 and then indefinite aspirin.   3. Biventricular ICD, in situ -he has a BiotroniTurnStar's ICD CRT device with a Biotronik right ventricular lead, Medtronic right atrial lead and a [employer] left ventricular lead.  He has biventricular pacing 98% of the time and doing well.   4. Paroxysmal atrial fibrillation (H) -none seen on device check, that would be none in the last 18 months and even her CHADS 2 VASC score is 4 not on anticoagulation currently.   5. Type 2 diabetes mellitus with complication, with long-term current use of insulin (H) -hemoglobin 8.6 and defer to primary with him on insulin.   6. Dyslipidemia -in the prominent trial and we are blinded to his lipid results.   7. Acute on chronic systolic heart failure (H) -no signs or symptoms currently and he takes diuretic twice a day.     Plan  1.  Reviewed medications and would magnesium chloride runs out do not necessarily need to refill.  2.  Follow-up with me in July about 6 months out from stent.  If still symptomatic consider switching over to Entresto or adding Aldactone.  3.  Repetitive echo to  determine ejection fraction.    Subjective  CC: 73-year-old white gentleman being seen in hospital discharge follow-up today.  Since I seen him he had some chest discomfort and underwent stress testing showing significant inferior ischemia.  This prompted angiography where he received Rotablator and stent to the LAD as well as stent to the right coronary artery.  Since then he tells me his energy is extremely improved, only fatigue and shortness breath and very heavy activity.  He was in fact snowblowing a fair amount of February.  There is no significant chest discomfort, palpitations, PND, orthopnea, syncope, dizziness or peripheral edema.    Medications  Current Outpatient Medications   Medication Sig     ACCU-CHEK SMARTVIEW TEST STRIP strips      acetaminophen (TYLENOL) 500 MG tablet Take 500 mg by mouth every 6 (six) hours as needed for pain.     aspirin 81 MG EC tablet Take 81 mg by mouth daily.     atorvastatin (LIPITOR) 40 MG tablet Take 1 tablet (40 mg total) by mouth at bedtime.     BASAGLAR KWIKPEN U-100 INSULIN 100 unit/mL (3 mL) pen Inject 65 Units under the skin at bedtime.            carvedilol (COREG) 25 MG tablet Take 1 tablet (25 mg total) by mouth 2 (two) times a day with meals.     clopidogrel (PLAVIX) 75 mg tablet Take 1 tablet (75 mg total) by mouth daily.     coenzyme Q10 (COQ-10) 100 mg capsule Take 1 capsule (100 mg total) by mouth daily.     enalapril (VASOTEC) 5 MG tablet Take 1 tablet (5 mg total) by mouth 2 (two) times a day.     furosemide (LASIX) 40 MG tablet TAKE 1 TABLET BY MOUTH TWICE DAILY     glucosamine-chondroitin 500-400 mg cap Take 1 capsule by mouth daily. 1500mg/1200mg once daily     ibuprofen-diphenhydramine cit (IBUPROFEN PM) 200-38 mg Tab Take 2 tablets by mouth at bedtime as needed (sleep).     magnesium chloride (SLOW-MAG) 64 mg TbEC delayed-release tablet Take 64 mg by mouth daily.     metFORMIN (GLUCOPHAGE) 1000 MG tablet Take 1,000 mg by mouth 2 (two) times a day  "with meals.     multivitamin therapeutic (THERAGRAN) tablet Take 1 tablet by mouth daily.       Objective  BP 94/62 (Patient Site: Right Arm, Patient Position: Sitting, Cuff Size: Adult Regular)   Pulse 76   Resp 16   Ht 5' 9\" (1.753 m)   Wt 172 lb (78 kg)   BMI 25.40 kg/m      General Appearance:    Alert, cooperative, no distress, appears stated age   Head:    Normocephalic, without obvious abnormality, atraumatic   Throat:   Lips, mucosa, and tongue normal; teeth and gums normal   Neck:   Supple, symmetrical, trachea midline, no adenopathy;        thyroid:  No enlargement/tenderness/nodules; no carotid    bruit or JVD   Back:     Symmetric, no curvature, ROM normal, no CVA tenderness   Lungs:     Clear to auscultation bilaterally, respirations unlabored   Chest wall:    No tenderness or deformity   Heart:    Regular rate and rhythm, S1 and S2 normal, no murmur, rub   or gallop   Abdomen:     Soft, non-tender, bowel sounds active all four quadrants,     no masses, no organomegaly   Extremities:   Normal, atraumatic, no cyanosis or edema   Pulses:   2+ and symmetric all extremities   Skin:   Skin color, texture, turgor normal, no rashes or lesions     Results    Lab Results personally reviewed   Lab Results   Component Value Date    CHOL 140 02/14/2019    CHOL 110 01/15/2019     Lab Results   Component Value Date    HDL 27 (L) 02/14/2019    HDL 30 (L) 01/15/2019     Lab Results   Component Value Date    LDLCALC 69 02/14/2019    LDLCALC 45 01/15/2019     Lab Results   Component Value Date    TRIG 222 (H) 02/14/2019    TRIG 173 (H) 01/15/2019     Lab Results   Component Value Date    WBC 6.2 01/15/2019    HGB 11.2 (L) 01/16/2019    HCT 35.0 (L) 01/15/2019     01/15/2019     Lab Results   Component Value Date    CREATININE 0.99 02/14/2019    BUN 22 02/14/2019     02/14/2019    K 4.5 02/14/2019    CO2 24 02/14/2019     Review of Systems:   General: WNL  Eyes: WNL  Ears/Nose/Throat: WNL  Lungs: " WNL  Heart: WNL  Stomach: WNL  Bladder: WNL  Muscle/Joints: WNL  Skin: WNL  Nervous System: WNL  Mental Health: WNL     Blood: WNL

## 2021-06-24 NOTE — PROGRESS NOTES
"Pemafibrate to Reduce cardiovascular OutcoMes by reducing triglycerides IN patiENts with diabeTes (PROMINENT) clinical trial.    Subject seen in clinic today for study Visit 13.      Subject seen by provider Linda Marmolejo for study related physical exam.  See provider note and flow sheets for vital signs.  Vitals:    03/12/19 0945   BP: (!) 88/58   Patient Site: Right Arm   Patient Position: Sitting   Cuff Size: Adult Regular   Pulse: 64   Resp: 20   Weight: 171 lb 6.4 oz (77.7 kg)   Height: 5' 9\" (1.753 m)     Waist measures 99 cm  Labs drawn per protocol.  Results will be scanned into 'media'.     EQ-5D-5L questionnaire completed if applicable.  Must complete annually (Month 12, etc.)    Study efficacy, endpoints and adverse events reviewed with subject.  Cardiovascular risk discussed.     Study alcohol consumption stipulations and education reviewed with subject:    Subject reports  [] Never [x] Current [] Former.   1 drinks per [] Day [] Week [] Month [x] Occasional.  1 maximum drinks per sitting.     Study medication box # [id number] with 0 tabs  Study medication box # [id number] with 0 tabs  Study medication box # [id number] with 0 tabs  Study medication box # [id number] with 0 tabs  returned by subject.  Total tablet count of 0 for 112 % compliance.  Subject reports he may have 2 or 3 weeks left of drug in his pill box at home and will bring back, thus the high compliance percent.  We discussed drug accountability at length  yet again today.  Study medication box #'s [id number], [id number], [id number], [id number] dispensed to subject.  Education provided on medication compliance and administration    Plan: Study Visit 14 telephone call with subject in 2 months.  Will schedule study Month 24 Visit 15 with subject in 4 months back at [facility].    Eduard Tang RN  Clinical Trials Nurse          "

## 2021-06-24 NOTE — PATIENT INSTRUCTIONS - HE
Mr Ken Doss,  I enjoyed visiting with you again today.  I am glad to hear you are doing well.  Per our conversation feel free to stop the Magnesium and do not double up on the SIMVASTATIN.   I will plan on seeing you around July.  Alex Vigil

## 2021-06-24 NOTE — PATIENT INSTRUCTIONS - HE
It was great to see you again today for the Prominent triglyceride study.  We will call you in 2 months for a study telephone visit.    We will also see you back in 4 months for your Visit 15 which is a Fasting visit with urine sample.  Please remember to bring back your study drug and packages (both empty or full) to every study visit.  Inform us of any changes in your health and call with any questions.  Thanks!    Research Hotline: [phone]  Eduard Tang RN  Clinical Trials Nurse

## 2021-06-25 NOTE — PROGRESS NOTES
Progress Notes by Linda Hardin CNP at 11/22/2017  7:50 AM     Author: Linda Hardin CNP Service: -- Author Type: Nurse Practitioner    Filed: 11/22/2017 10:48 AM Encounter Date: 11/22/2017 Status: Signed    : Linda Hardin CNP (Nurse Practitioner)           Click to link to [facility] Heart Care     [facility] HEART CARE NOTE      Assessment/Recommendations   Assessment:    1.  Heart failure with reduced ejection fraction, ejection fraction 19% on stress test and 26% on echo: He denies any symptoms of a fluid retention today.  His weight has increased 2 pounds since last week.  Lasix and spironolactone are decreased last week.    2.  Hypotension: His blood pressure has improved only slightly since last week when his spironolactone and Lasix were decreased.  Initial check was 70/42 but rechecks of 80/46, 86/40, and 88/44.  He continues to feel fatigued which has been ongoing for the past few months.  He initially had some lightheadedness when he came into clinic but this resolved.  He has no other symptoms with hypotension.    Plan:  1.   Heart failure medications:  - Beta blocker therapy with carvedilol 25 mg twice daily  - ACEI therapy with enalapril decreased to 10 mg daily  - Aldosterone blocker therapy with spironolactone discontinued.    - Diuretic therapy with furosemide 40 mg daily  2.  He was asked to check his blood pressure daily.  3.  He will continue to monitor for signs of fluid retention and call with any concerns.  4.  BMP pending.  Sodium was low last week with research lab results.  Research has requested a recheck of his sodium today.  5.  Skyler would like to start exercising more.  We discussed cardiac rehab and he will check with his insurance company.  He was advised to limit exercise at this time until blood pressure is better managed.    Ken FAITH Selam will follow up in the heart failure clinic in 2 weeks.     History of Present Illness     Ken FAITH  Selam is a 71 y.o. male seen at [facility] heart failure clinic today for continued follow-up.  He has a history of heart failure with reduced ejection fraction, coronary artery disease, ICD, paroxysmal atrial fibrillation, diabetes, and dyslipidemia.  Stress test from June 2017 showed ejection fraction of 19% and echocardiogram from May 2017 showed ejection fraction of 26%.    He was seen in clinic last week for [employer]INENT research study and his blood pressure was low.  Spironolactone and Lasix doses were decreased.    His blood pressure remains low today.  Initial check was 70/42 but rechecks of 80/46, 86/40, and 88/44.  He noticed mild lightheadedness when he initially came to his appointment today.  During clinic visit, he denies any lightheadedness or dizziness.  He has no confusion.  He continues to feel fatigued which has been ongoing for the last few months.  He has no symptoms of acute fluid retention.  He denies shortness of breath, dyspnea on exertion, orthopnea, chest pain and lower extremity edema.      His clinic weight is up 2 pounds from last week.  He does not consistently weigh himself at home.  His home blood pressure cuff is not working.     Physical Examination Review of Systems   Vitals:    11/22/17 0813   BP: (!) 88/44   Pulse:    Resp:      Body mass index is 24.78 kg/(m^2).  Wt Readings from Last 3 Encounters:   11/22/17 169 lb (76.7 kg)   11/14/17 167 lb 12.8 oz (76.1 kg)   09/19/17 170 lb (77.1 kg)       General Appearance:     Alert, cooperative and in no acute distress.   ENT/Mouth: membranes moist, no oral lesions or bleeding gums.      EYES:  no scleral icterus, normal conjunctivae   Chest/Lungs:   lungs are clear to auscultation, no rales or wheezing, respirations unlabored   Cardiovascular:   Regular. Normal first and second heart sounds with no murmurs, rubs, or gallops; the radial and posterior tibial pulses are intact, no edema bilateral lower extremities     Abdomen:  Soft, nontender, nondistended, bowel sounds present   Extremities: no cyanosis or clubbing   Skin: warm, dry.    Neurologic: mood and affect are appropriate, alert and oriented x3      General: WNL  Eyes: WNL  Ears/Nose/Throat: WNL  Lungs: WNL  Heart: WNL  Stomach: WNL  Bladder: WNL  Muscle/Joints: WNL  Skin: WNL  Nervous System: WNL  Mental Health: WNL     Blood: WNL     Medical History  Surgical History Family History Social History   Past Medical History:   Diagnosis Date   ? Atrial fibrillation    ? Chronic systolic heart failure Dec 2012   ? Congestive heart failure    ? Coronary artery disease    ? Coronary artery disease involving native coronary artery     Non obstructive disease by cath in  Cor angio 2016: RCA 70% (FFR 0.89), and OM1 75%      ? Coronary atherosclerosis of unspecified type of vessel, native or graft    ? Depressive disorder, not elsewhere classified    ? Diabetes mellitus    ? Diabetes mellitus, type II    ? Family history of myocardial infarction     mom  at 54 of cardiac issues   ? Fractures     ankle, leg and arm   ? High cholesterol    ? ICD (implantable cardioverter-defibrillator) lead failure 2014    High voltage lead tip inserted in RV free wall RV lead extraction and implantation of the new septal RV lead 2014    ? Infectious mononucleosis    ? Kidney stone    ? Myocardial infarction    ? S/P ICD (internal cardiac defibrillator) procedure 2014   ? Type II or unspecified type diabetes mellitus without mention of complication, not stated as uncontrolled    ? Unspecified systolic heart failure     Past Surgical History:   Procedure Laterality Date   ? APPENDECTOMY     ? APPENDECTOMY     ? CARDIAC CATHETERIZATION N/A 2016    Procedure: Coronary Angiogram;  Surgeon: Delgado Ramirez MD;  Location: [facility] Cath Lab;  Service:    ? CARDIAC PACEMAKER PLACEMENT     ? EP ICD INSERT     ? INSERT / REPLACE / REMOVE PACEMAKER     ? CA  APPENDECTOMY      Description: Appendectomy;  Recorded: 06/10/2014;   ? ME L HRT CATH W/NJX L VENTRICULOGRAPHY IMG S&I Left 12/12/2016    Procedure: Left Heart Catheterization with Left Ventriculogram;  Surgeon: Delgado Ramirez MD;  Location: [facility] Cath Lab;  Service: Cardiology   ? ME REMOVE TONSILS/ADENOIDS,<11 Y/O      Description: Tonsillectomy With Adenoidectomy;  Recorded: 06/10/2014;   ? ME SHLDR ARTHROSCOP,DIAGNOSTIC      Description: Arthroscopy Shoulder;  Recorded: 06/10/2014;   ? REPLACEMENT TOTAL KNEE      bilat   ? ROTATOR CUFF REPAIR      right    Family History   Problem Relation Age of Onset   ? Sudden death Mother      unexpected death in her sleep in her 50s    Social History     Social History   ? Marital status:      Spouse name: N/A   ? Number of children: N/A   ? Years of education: N/A     Occupational History   ? Not on file.     Social History Main Topics   ? Smoking status: Never Smoker   ? Smokeless tobacco: Not on file      Comment: cigars few times a year only   ? Alcohol use Yes      Comment: 3-4/month   ? Drug use: No   ? Sexual activity: Not on file     Other Topics Concern   ? Not on file     Social History Narrative          Medications  Allergies   Current Outpatient Prescriptions   Medication Sig Dispense Refill   ? ACCU-CHEK SMARTVIEW TEST STRIP strips      ? amoxicillin (AMOXIL) 500 MG capsule PRIOR TO DENTAL APPOINTMENT     ? aspirin 81 MG EC tablet Take 81 mg by mouth daily.     ? carvedilol (COREG) 25 MG tablet Take 25 mg by mouth 2 (two) times a day with meals.     ? DEXTROSE (GLUCOSE) Chew Chew 1 tablet as needed.     ? DOCOSAHEXANOIC ACID/EPA (FISH OIL ORAL) 1-2 capsules daily.      ? enalapril (VASOTEC) 10 MG tablet Take 1 tablet (10 mg total) by mouth daily. 90 tablet 3   ? furosemide (LASIX) 40 MG tablet Take 1 tablet (40 mg total) by mouth daily.     ? glucosamine-chondroitin 500-400 mg cap Take 1 capsule by mouth daily. 1500mg/1200mg once daily     ?  metFORMIN (GLUCOPHAGE) 1000 MG tablet Take 1,000 mg by mouth 2 (two) times a day with meals.     ? multivitamin therapeutic (THERAGRAN) tablet Take 1 tablet by mouth daily.     ? simvastatin (ZOCOR) 40 MG tablet Take 40 mg by mouth at bedtime.      ? TOUJEO SOLOSTAR 300 unit/mL (1.5 mL) injection Inject under the skin daily. 63  units at bedtime.       Current Facility-Administered Medications   Medication Dose Route Frequency Provider Last Rate Last Dose   ? Study Drug pemafibrate 0.2 mg/placebo tablet 0.2 mg (PROMINENT)  0.2 mg Oral BID Mary Jane Vigil MD       ? Study Drug pemafibrate 0.2 mg/placebo tablet 0.2 mg (PROMINENT)  0.2 mg Oral BID Mary Jane Vigil MD       ? Study Drug pemafibrate 0.2 mg/placebo tablet 0.2 mg (PROMINENT)  0.2 mg Oral BID Eduard Tang, RN          No Known Allergies      Lab Results    Chemistry CBC BNP   Lab Results   Component Value Date    CREATININE 1.60 (H) 11/13/2017    BUN 50 (H) 11/13/2017     (L) 11/13/2017    K 4.6 11/13/2017    CL 98 11/13/2017    CO2 22 11/13/2017     Creatinine (mg/dL)   Date Value   11/13/2017 1.60 (H)   09/05/2017 1.45 (H)   06/27/2017 0.79   12/16/2016 0.79    Lab Results   Component Value Date    WBC 8.3 12/12/2016    HGB 12.6 (L) 12/12/2016    HCT 35.3 (L) 12/12/2016    MCV 95 12/12/2016     12/12/2016    Lab Results   Component Value Date     (H) 04/22/2015     BNP (pg/mL)   Date Value   04/22/2015 120 (H)   12/20/2012 309 (H)   12/19/2012 669 (H)          35 minutes were spent with the patient with greater than 50% spent on education and counseling.      Linda Hardin, [facility] Heart [facility]   Heart Failure Clinic

## 2021-06-25 NOTE — PROGRESS NOTES
Progress Notes by Linda Hardin CNP at 12/4/2017  8:30 AM     Author: Linda Hardin CNP Service: -- Author Type: Nurse Practitioner    Filed: 12/4/2017  9:14 AM Encounter Date: 12/4/2017 Status: Signed    : Linda Hardin CNP (Nurse Practitioner)           Click to link to [facility] HEART [facility] NOTE      Assessment/Recommendations   Assessment:    1.  Heart failure with reduced ejection fraction, ejection fraction 19% on stress test and 26% on echo: He has no symptoms of acute heart failure.    2.  Hypotension: His heart failure medications have been decreased over the past 2 months due to symptomatic hypotension with fatigue and lightheadedness.  His blood pressure is now normal at 116/56.  His lightheadedness and fatigue have improved over the past 2 weeks.    Plan:  1.   Heart failure medications:  - Beta blocker therapy with carvedilol 25 mg twice daily  - ACEI therapy with enalapril 10 mg daily  - Diuretic therapy with furosemide 40 mg daily  2.  No medication changes today  3.  He is interested in cardiac rehab but would like to check with his insurance company before scheduling.    Ken Doss will follow up for research on January 9 and in the heart failure clinic in March 2018.     History of Present Illness    Mr. Ken Doss is a 71 y.o. male seen at [facility] heart failure clinic today for continued follow-up.  He has a history of heart failure with reduced ejection fraction, coronary artery disease, ICD, diabetes, and dyslipidemia.  Stress test from June 2017 showed ejection fraction 19% and echocardiogram from May 2017 showed ejection fraction of 26%.    He is participating in [employer] research study.    His heart failure medications have been decreased over the past month due to hypotension.  His blood pressure is stable today at 116/56.  He states that his fatigue and lightheadedness have improved over the past  2 weeks.  He denies any acute heart failure symptoms.  He denies shortness of breath, palpitations, chest pain and lower extremity edema.      His clinic weight is stable.     Physical Examination Review of Systems   Vitals:    17 0906   BP:    Pulse: 66   Resp:      Body mass index is 24.96 kg/(m^2).  Wt Readings from Last 3 Encounters:   17 169 lb (76.7 kg)   17 169 lb (76.7 kg)   17 167 lb 12.8 oz (76.1 kg)       General Appearance:     Alert, cooperative and in no acute distress.   ENT/Mouth: membranes moist, no oral lesions or bleeding gums.      EYES:  no scleral icterus, normal conjunctivae   Chest/Lungs:   lungs are clear to auscultation, no rales or wheezing, respirations unlabored   Cardiovascular:   Regular. Normal first and second heart sounds; no edema bilateral lower extremities    Abdomen:  Soft, nontender, nondistended, bowel sounds present   Extremities: no cyanosis or clubbing   Skin: warm, dry.    Neurologic: mood and affect are appropriate, alert and oriented x3      General: WNL  Eyes: WNL  Ears/Nose/Throat: WNL  Lungs: WNL  Heart: WNL  Stomach: WNL  Bladder: WNL  Muscle/Joints: WNL  Skin: WNL  Nervous System: WNL  Mental Health: WNL     Blood: WNL     Medical History  Surgical History Family History Social History   Past Medical History:   Diagnosis Date   ? Atrial fibrillation    ? Chronic systolic heart failure Dec 2012   ? Congestive heart failure    ? Coronary artery disease    ? Coronary artery disease involving native coronary artery     Non obstructive disease by cath in  Cor angio 2016: RCA 70% (FFR 0.89), and OM1 75%      ? Coronary atherosclerosis of unspecified type of vessel, native or graft    ? Depressive disorder, not elsewhere classified    ? Diabetes mellitus    ? Diabetes mellitus, type II    ? Family history of myocardial infarction     mom  at 54 of cardiac issues   ? Fractures     ankle, leg and arm   ? High cholesterol    ? ICD  (implantable cardioverter-defibrillator) lead failure 11/26/2014    High voltage lead tip inserted in RV free wall RV lead extraction and implantation of the new septal RV lead November 2014    ? Infectious mononucleosis    ? Kidney stone    ? Myocardial infarction    ? S/P ICD (internal cardiac defibrillator) procedure 11/26/2014   ? Type II or unspecified type diabetes mellitus without mention of complication, not stated as uncontrolled    ? Unspecified systolic heart failure     Past Surgical History:   Procedure Laterality Date   ? APPENDECTOMY     ? APPENDECTOMY     ? CARDIAC CATHETERIZATION N/A 12/12/2016    Procedure: Coronary Angiogram;  Surgeon: Delgado Ramirez MD;  Location: [facility] Cath Lab;  Service:    ? CARDIAC PACEMAKER PLACEMENT     ? EP ICD INSERT     ? INSERT / REPLACE / REMOVE PACEMAKER     ? MI APPENDECTOMY      Description: Appendectomy;  Recorded: 06/10/2014;   ? MI L HRT CATH W/NJX L VENTRICULOGRAPHY IMG S&I Left 12/12/2016    Procedure: Left Heart Catheterization with Left Ventriculogram;  Surgeon: Delgado Ramirez MD;  Location: [facility] Cath Lab;  Service: Cardiology   ? MI REMOVE TONSILS/ADENOIDS,<11 Y/O      Description: Tonsillectomy With Adenoidectomy;  Recorded: 06/10/2014;   ? MI SHLDR ARTHROSCOP,DIAGNOSTIC      Description: Arthroscopy Shoulder;  Recorded: 06/10/2014;   ? REPLACEMENT TOTAL KNEE      bilat   ? ROTATOR CUFF REPAIR      right    Family History   Problem Relation Age of Onset   ? Sudden death Mother      unexpected death in her sleep in her 50s    Social History     Social History   ? Marital status:      Spouse name: N/A   ? Number of children: N/A   ? Years of education: N/A     Occupational History   ? Not on file.     Social History Main Topics   ? Smoking status: Never Smoker   ? Smokeless tobacco: Not on file      Comment: cigars few times a year only   ? Alcohol use Yes      Comment: 3-4/month   ? Drug use: No   ? Sexual activity: Not on file      Other Topics Concern   ? Not on file     Social History Narrative          Medications  Allergies   Current Outpatient Prescriptions   Medication Sig Dispense Refill   ? ACCU-CHEK SMARTVIEW TEST STRIP strips      ? amoxicillin (AMOXIL) 500 MG capsule PRIOR TO DENTAL APPOINTMENT     ? aspirin 81 MG EC tablet Take 81 mg by mouth daily.     ? carvedilol (COREG) 25 MG tablet Take 25 mg by mouth 2 (two) times a day with meals.     ? DEXTROSE (GLUCOSE) Chew Chew 1 tablet as needed.     ? DOCOSAHEXANOIC ACID/EPA (FISH OIL ORAL) 1-2 capsules daily.      ? enalapril (VASOTEC) 10 MG tablet Take 1 tablet (10 mg total) by mouth daily. 90 tablet 3   ? furosemide (LASIX) 40 MG tablet Take 1 tablet (40 mg total) by mouth daily.     ? glucosamine-chondroitin 500-400 mg cap Take 1 capsule by mouth daily. 1500mg/1200mg once daily     ? metFORMIN (GLUCOPHAGE) 1000 MG tablet Take 1,000 mg by mouth 2 (two) times a day with meals.     ? multivitamin therapeutic (THERAGRAN) tablet Take 1 tablet by mouth daily.     ? simvastatin (ZOCOR) 40 MG tablet Take 40 mg by mouth at bedtime.      ? TOUJEO SOLOSTAR 300 unit/mL (1.5 mL) injection Inject under the skin daily. 63  units at bedtime.       Current Facility-Administered Medications   Medication Dose Route Frequency Provider Last Rate Last Dose   ? Study Drug pemafibrate 0.2 mg/placebo tablet 0.2 mg (PROMINENT)  0.2 mg Oral BID Mary Jane Vigil MD       ? Study Drug pemafibrate 0.2 mg/placebo tablet 0.2 mg (PROMINENT)  0.2 mg Oral BID Mary Jane Vigil MD       ? Study Drug pemafibrate 0.2 mg/placebo tablet 0.2 mg (PROMINENT)  0.2 mg Oral BID Eduard Tang, RN          No Known Allergies      Lab Results    Chemistry CBC BNP   Lab Results   Component Value Date    CREATININE 1.33 (H) 11/22/2017    BUN 35 (H) 11/22/2017     11/22/2017    K 4.7 11/22/2017     11/22/2017    CO2 21 (L) 11/22/2017     Creatinine (mg/dL)   Date Value   11/22/2017 1.33 (H)   11/13/2017 1.60 (H)    09/05/2017 1.45 (H)   06/27/2017 0.79    Lab Results   Component Value Date    WBC 8.3 12/12/2016    HGB 12.6 (L) 12/12/2016    HCT 35.3 (L) 12/12/2016    MCV 95 12/12/2016     12/12/2016    Lab Results   Component Value Date     (H) 04/22/2015     BNP (pg/mL)   Date Value   04/22/2015 120 (H)   12/20/2012 309 (H)   12/19/2012 669 (H)          20 minutes were spent with the patient with greater than 50% spent on education and counseling.      Linda Hardin, [facility] Heart [facility]   Heart Failure Clinic

## 2021-06-25 NOTE — PROGRESS NOTES
Progress Notes by Linda Hardni CNP at 11/14/2017  9:10 AM     Author: Linda Hardin CNP Service: -- Author Type: Nurse Practitioner    Filed: 11/14/2017 10:40 AM Encounter Date: 11/14/2017 Status: Signed    : Linda Hardin CNP (Nurse Practitioner)           Click to link to [facility] HEART CARE NOTE      Assessment/Recommendations   Assessment:    1.  Heart failure with reduced ejection fraction, ejection fraction 19% on stress test in 26% on echo: He has no symptoms of acute heart failure today.  He is hypotensive today and has noted increased lightheadedness and fatigue over the past month.  I am concerned he may be dehydrated.  He is fasting this morning for research labs.    2.  Paroxysmal atrial fibrillation: He has had no episodes of atrial fibrillation on his device for the past year and a half. [id number] score of 4. Dr. Vigil has not prescribed anticoagulation.    Plan:  1.   Heart failure medications:  - Beta blocker therapy with carvedilol 25 mg twice daily  - ACEI therapy with enalapril 20 mg daily  - Aldosterone blocker therapy with spironolactone decreased to 25 mg daily.    - Diuretic therapy with furosemide decreased to 40 mg daily  2.  Spironolactone and furosemide decreased due to hypotension today.  3.  Check blood pressure daily.  He will call my nurse later this week or early next week to report blood pressures.  4.  We discussed symptoms of fluid retention to monitor for, and he will call with any concerns.    Skyler will follow up in the heart failure clinic in 1-2 weeks.     History of Present Illness    Mr. Ken Doss is a 71 y.o. male seen at [facility] heart failure clinic today for PROMINENT research study.  He has a history of heart failure with reduced ejection fraction, coronary artery disease, ICD, paroxysmal atrial fibrillation, diabetes, and dyslipidemia.  Stress test from June 2017 showed ejection  fraction of 19% and echocardiogram from May 2017 showed ejection fraction of 26%.    He presents to clinic today fasting for research labs.  He has taken all of his morning medications.  Initial blood pressure is 72/42 with a recheck of 78/44 and 80/46.  He is asymptomatic.  However, over the past month he has noticed increased lightheadedness.  He denies any syncope or near syncope.  He has also noted increased fatigue.  He has no symptoms of fluid retention.  His weight has remained stable and he has no lower extremity edema or shortness of breath.  He denies chest pain.       Physical Examination Review of Systems   Vitals:    11/14/17 0930   BP: (!) 80/46   Pulse:    Resp:      Body mass index is 24.42 kg/(m^2).  Wt Readings from Last 3 Encounters:   11/14/17 167 lb 12.8 oz (76.1 kg)   09/19/17 170 lb (77.1 kg)   07/26/17 175 lb (79.4 kg)       General Appearance:     Alert, cooperative and in no acute distress.   ENT/Mouth: membranes moist, no oral lesions or bleeding gums.      EYES:  no scleral icterus, normal conjunctivae   Neck: no carotid bruits or thyromegaly   Chest/Lungs:   lungs are clear to auscultation, no rales or wheezing, respirations unlabored   Cardiovascular:   Regular. Normal first and second heart sounds with no murmurs, rubs, or gallops; the carotid, radial and posterior tibial pulses are intact, Jugular venous pressure normal, no edema bilateral lower extremities    Abdomen:  Soft, nontender, nondistended, bowel sounds present   Extremities: no cyanosis or clubbing   Skin: warm, dry.    Neurologic: mood and affect are appropriate, alert and oriented x3      General: WNL  Eyes: WNL  Ears/Nose/Throat: WNL  Lungs: WNL  Heart: WNL  Stomach: WNL  Bladder: WNL  Muscle/Joints: WNL  Skin: WNL  Nervous System: WNL  Mental Health: WNL     Blood: WNL     Medical History  Surgical History Family History Social History   Past Medical History:   Diagnosis Date   ? Atrial fibrillation    ? Chronic  systolic heart failure Dec 2012   ? Congestive heart failure    ? Coronary artery disease    ? Coronary artery disease involving native coronary artery     Non obstructive disease by cath in  Cor angio 2016: RCA 70% (FFR 0.89), and OM1 75%      ? Coronary atherosclerosis of unspecified type of vessel, native or graft    ? Depressive disorder, not elsewhere classified    ? Diabetes mellitus    ? Diabetes mellitus, type II    ? Family history of myocardial infarction     mom  at 54 of cardiac issues   ? Fractures     ankle, leg and arm   ? High cholesterol    ? ICD (implantable cardioverter-defibrillator) lead failure 2014    High voltage lead tip inserted in RV free wall RV lead extraction and implantation of the new septal RV lead 2014    ? Infectious mononucleosis    ? Kidney stone    ? Myocardial infarction    ? S/P ICD (internal cardiac defibrillator) procedure 2014   ? Type II or unspecified type diabetes mellitus without mention of complication, not stated as uncontrolled    ? Unspecified systolic heart failure     Past Surgical History:   Procedure Laterality Date   ? APPENDECTOMY     ? APPENDECTOMY     ? CARDIAC CATHETERIZATION N/A 2016    Procedure: Coronary Angiogram;  Surgeon: Delgado Ramirez MD;  Location: [facility] Cath Lab;  Service:    ? CARDIAC PACEMAKER PLACEMENT     ? EP ICD INSERT     ? INSERT / REPLACE / REMOVE PACEMAKER     ? TN APPENDECTOMY      Description: Appendectomy;  Recorded: 06/10/2014;   ? TN L HRT CATH W/NJX L VENTRICULOGRAPHY IMG S&I Left 2016    Procedure: Left Heart Catheterization with Left Ventriculogram;  Surgeon: Delgado Ramirez MD;  Location: [facility] Cath Lab;  Service: Cardiology   ? TN REMOVE TONSILS/ADENOIDS,<13 Y/O      Description: Tonsillectomy With Adenoidectomy;  Recorded: 06/10/2014;   ? TN SHLDR ARTHROSCOP,DIAGNOSTIC      Description: Arthroscopy Shoulder;  Recorded: 06/10/2014;   ? REPLACEMENT TOTAL KNEE       bilat   ? ROTATOR CUFF REPAIR      right    Family History   Problem Relation Age of Onset   ? Sudden death Mother      unexpected death in her sleep in her 50s    Social History     Social History   ? Marital status:      Spouse name: N/A   ? Number of children: N/A   ? Years of education: N/A     Occupational History   ? Not on file.     Social History Main Topics   ? Smoking status: Never Smoker   ? Smokeless tobacco: Not on file      Comment: cigars few times a year only   ? Alcohol use Yes      Comment: 3-4/month   ? Drug use: No   ? Sexual activity: Not on file     Other Topics Concern   ? Not on file     Social History Narrative          Medications  Allergies   Current Outpatient Prescriptions   Medication Sig Dispense Refill   ? amoxicillin (AMOXIL) 500 MG capsule PRIOR TO DENTAL APPOINTMENT     ? aspirin 81 MG EC tablet Take 81 mg by mouth daily.     ? carvedilol (COREG) 25 MG tablet Take 25 mg by mouth 2 (two) times a day with meals.     ? DEXTROSE (GLUCOSE) Chew Chew 1 tablet as needed.     ? DOCOSAHEXANOIC ACID/EPA (FISH OIL ORAL) 1-2 capsules daily.      ? enalapril (VASOTEC) 20 MG tablet Take 20 mg by mouth daily.      ? furosemide (LASIX) 40 MG tablet Take 40 mg twice a day 180 tablet 3   ? glucosamine-chondroitin 500-400 mg cap Take 1 capsule by mouth daily. 1500mg/1200mg once daily     ? metFORMIN (GLUCOPHAGE) 1000 MG tablet Take 1,000 mg by mouth 2 (two) times a day with meals.     ? multivitamin therapeutic (THERAGRAN) tablet Take 1 tablet by mouth daily.     ? simvastatin (ZOCOR) 40 MG tablet Take 40 mg by mouth at bedtime.      ? spironolactone (ALDACTONE) 25 MG tablet Take 50 mg by mouth 2 (two) times a day at 9am and 6pm. Currently taking 25mg once daily     ? TOUJEO SOLOSTAR 300 unit/mL (1.5 mL) injection Inject under the skin daily. 63  units at bedtime.     ? ACCU-CHEK SMARTVIEW TEST STRIP strips        Current Facility-Administered Medications   Medication Dose Route Frequency  Provider Last Rate Last Dose   ? Study Drug pemafibrate 0.2 mg/placebo tablet 0.2 mg (PROMINENT)  0.2 mg Oral BID Mary Jane Vigil MD       ? Study Drug pemafibrate 0.2 mg/placebo tablet 0.2 mg (PROMINENT)  0.2 mg Oral BID Mary Jane Vigil MD       ? Study Drug pemafibrate 0.2 mg/placebo tablet 0.2 mg (PROMINENT)  0.2 mg Oral BID Eduard Tang RN          No Known Allergies      Lab Results    Chemistry CBC BNP   Lab Results   Component Value Date    CREATININE 1.60 (H) 11/13/2017    BUN 50 (H) 11/13/2017     (L) 11/13/2017    K 4.6 11/13/2017    CL 98 11/13/2017    CO2 22 11/13/2017     Creatinine (mg/dL)   Date Value   11/13/2017 1.60 (H)   09/05/2017 1.45 (H)   06/27/2017 0.79   12/16/2016 0.79    Lab Results   Component Value Date    WBC 8.3 12/12/2016    HGB 12.6 (L) 12/12/2016    HCT 35.3 (L) 12/12/2016    MCV 95 12/12/2016     12/12/2016    Lab Results   Component Value Date     (H) 04/22/2015     BNP (pg/mL)   Date Value   04/22/2015 120 (H)   12/20/2012 309 (H)   12/19/2012 669 (H)          30 minutes were spent with the patient with greater than 50% spent on education and counseling.      Linda Hardin, [facility] [place]   Heart Failure Clinic

## 2021-06-26 NOTE — PROGRESS NOTES
Progress Notes by Linda Hardin CNP at 3/20/2018  9:10 AM     Author: Linda Hardin CNP Service: -- Author Type: Nurse Practitioner    Filed: 3/20/2018 10:16 AM Encounter Date: 3/20/2018 Status: Signed    : Linda Hardin CNP (Nurse Practitioner)           Click to link to [facility] HEART CARE NOTE      Assessment/Recommendations   Assessment:    1.  Heart failure with reduced ejection fraction, ejection fraction 19% on stress test and 26% and echo: Over the past few weeks, he has noticed increased shortness of breath with activity, orthopnea, and weight gain.  He has crackles in his lungs.  He has no edema but typically does not have swelling.    2.  Hypotension: His blood pressure is stable today at 100/64.  His enalapril was decreased and spironolactone discontinued in 2017 due to symptomatic hypotension.    Plan:  1.   Heart failure medications:  - Beta blocker therapy with carvedilol 25 mg twice daily  - ACEI therapy with enalapril 10 mg daily  - Diuretic therapy with furosemide 40 mg daily.  Increase to 40 mg twice daily for 3 days  2.  Lasix increased for 3 days due to symptoms of fluid retention.  He was asked to call my nurse on Friday to update her on symptoms.  3.  Low-sodium diet and daily weights  4.  Start a gradual walking program.    Ken Doss will follow up in the heart failure clinic in 3 weeks.  Continue with PROMINENT research study.     History of Present Illness    Mr. Ken Doss is a 72 y.o. male seen at [facility] heart failure clinic today for continued follow-up.  He has a history of heart failure with reduced ejection fraction, coronary artery disease, ICD, diabetes, and dyslipidemia.  Stress test from June 2017 showed ejection fraction of 19% and echocardiogram from May 2017 showed ejection fraction of 26%.  He is also participating in PROMINENT research study.    He notes a recent 3-4 pound weight  gain.  He describes orthopnea over the past few weeks.  He was in [place] at a higher altitude and noticed worsening of his breathing.  He denies lightheadedness, chest pain and lower extremity edema.      He has not been weighing himself at home.  His clinic weight is up 9 pounds since December.  He works to follow a low-sodium diet.  He has not been exercising for the past 3-4 months.  Previously he had been walking most days but he has been busy with his work schedule.     Physical Examination Review of Systems   Vitals:    03/20/18 0824   BP: 100/64   Pulse: 68   Resp: 16     Body mass index is 26.29 kg/(m^2).  Wt Readings from Last 3 Encounters:   03/20/18 178 lb (80.7 kg)   01/09/18 177 lb (80.3 kg)   12/04/17 169 lb (76.7 kg)       General Appearance:     Alert, cooperative and in no acute distress.   ENT/Mouth: membranes moist, no oral lesions or bleeding gums.      EYES:  no scleral icterus, normal conjunctivae   Neck: no carotid bruits or thyromegaly   Chest/Lungs:    Crackles right lower lobe   Cardiovascular:   Regular. Normal first and second heart sounds with no murmurs, rubs, or gallops; the carotid, radial and posterior tibial pulses are intact, Jugular venous pressure normal, no edema bilateral lower extremities    Abdomen:  Soft, nontender, nondistended, bowel sounds present   Extremities: no cyanosis or clubbing   Skin: warm, dry.    Neurologic: mood and affect are appropriate, alert and oriented x3      General: WNL  Eyes: WNL  Ears/Nose/Throat: WNL  Lungs: WNL  Heart: WNL  Stomach: WNL  Bladder: WNL  Muscle/Joints: WNL  Skin: WNL  Nervous System: WNL  Mental Health: WNL     Blood: WNL     Medical History  Surgical History Family History Social History   Past Medical History:   Diagnosis Date   ? Atrial fibrillation    ? Chronic systolic heart failure Dec 2012   ? Congestive heart failure    ? Coronary artery disease    ? Coronary artery disease involving native coronary artery     Non  obstructive disease by cath in  Cor angio 2016: RCA 70% (FFR 0.89), and OM1 75%      ? Coronary atherosclerosis of unspecified type of vessel, native or graft    ? Depressive disorder, not elsewhere classified    ? Diabetes mellitus    ? Diabetes mellitus, type II    ? Family history of myocardial infarction     mom  at 54 of cardiac issues   ? Fractures     ankle, leg and arm   ? High cholesterol    ? ICD (implantable cardioverter-defibrillator) lead failure 2014    High voltage lead tip inserted in RV free wall RV lead extraction and implantation of the new septal RV lead 2014    ? Infectious mononucleosis    ? Kidney stone    ? Myocardial infarction    ? S/P ICD (internal cardiac defibrillator) procedure 2014   ? Type II or unspecified type diabetes mellitus without mention of complication, not stated as uncontrolled    ? Unspecified systolic heart failure     Past Surgical History:   Procedure Laterality Date   ? APPENDECTOMY     ? APPENDECTOMY     ? CARDIAC CATHETERIZATION N/A 2016    Procedure: Coronary Angiogram;  Surgeon: Delgado Ramirez MD;  Location: [facility] Cath Lab;  Service:    ? CARDIAC PACEMAKER PLACEMENT     ? EP ICD INSERT     ? INSERT / REPLACE / REMOVE PACEMAKER     ? MD APPENDECTOMY      Description: Appendectomy;  Recorded: 06/10/2014;   ? MD L HRT CATH W/NJX L VENTRICULOGRAPHY IMG S&I Left 2016    Procedure: Left Heart Catheterization with Left Ventriculogram;  Surgeon: Delgado Ramirez MD;  Location: [facility] Cath Lab;  Service: Cardiology   ? MD REMOVE TONSILS/ADENOIDS,<11 Y/O      Description: Tonsillectomy With Adenoidectomy;  Recorded: 06/10/2014;   ? MD SHLDR ARTHROSCOP,DIAGNOSTIC      Description: Arthroscopy Shoulder;  Recorded: 06/10/2014;   ? REPLACEMENT TOTAL KNEE      bilat   ? ROTATOR CUFF REPAIR      right    Family History   Problem Relation Age of Onset   ? Sudden death Mother      unexpected death in her sleep in her 50s     Social History     Social History   ? Marital status:      Spouse name: N/A   ? Number of children: N/A   ? Years of education: N/A     Occupational History   ? Not on file.     Social History Main Topics   ? Smoking status: Never Smoker   ? Smokeless tobacco: Never Used      Comment: cigars few times a year only   ? Alcohol use Yes      Comment: 3-4/month   ? Drug use: No   ? Sexual activity: Not on file     Other Topics Concern   ? Not on file     Social History Narrative          Medications  Allergies   Current Outpatient Prescriptions   Medication Sig Dispense Refill   ? ACCU-CHEK SMARTVIEW TEST STRIP strips      ? acetaminophen (TYLENOL) 500 MG tablet Take 500 mg by mouth every 6 (six) hours as needed for pain.     ? amoxicillin (AMOXIL) 500 MG capsule PRIOR TO DENTAL APPOINTMENT     ? aspirin 81 MG EC tablet Take 81 mg by mouth daily.     ? carvedilol (COREG) 25 MG tablet Take 1 tablet (25 mg total) by mouth 2 (two) times a day with meals. 180 tablet 3   ? DEXTROSE (GLUCOSE) Chew Chew 1 tablet as needed.     ? diphenhydrAMINE-acetaminophen (TYLENOL PM)  mg Tab Take 1 tablet by mouth at bedtime as needed.     ? DOCOSAHEXANOIC ACID/EPA (FISH OIL ORAL) 1-2 capsules daily.      ? enalapril (VASOTEC) 10 MG tablet Take 1 tablet (10 mg total) by mouth daily. 90 tablet 3   ? furosemide (LASIX) 40 MG tablet Take 1 tablet (40 mg total) by mouth daily.     ? glucosamine-chondroitin 500-400 mg cap Take 1 capsule by mouth daily. 1500mg/1200mg once daily     ? ibuprofen (ADVIL,MOTRIN) 200 MG tablet Take 200 mg by mouth every 6 (six) hours as needed for pain.     ? metFORMIN (GLUCOPHAGE) 1000 MG tablet Take 1,000 mg by mouth 2 (two) times a day with meals.     ? multivitamin therapeutic (THERAGRAN) tablet Take 1 tablet by mouth daily.     ? naproxen sodium (ALEVE) 220 MG tablet Take 220 mg by mouth every 12 (twelve) hours as needed for pain (knee pain).     ? simvastatin (ZOCOR) 40 MG tablet Take 40 mg by mouth  at bedtime.      ? TOUJEO SOLOSTAR 300 unit/mL (1.5 mL) injection Inject under the skin daily. 63  units at bedtime.       Current Facility-Administered Medications   Medication Dose Route Frequency Provider Last Rate Last Dose   ? Study Drug pemafibrate 0.2 mg/placebo tablet 0.2 mg (PROMINENT)  0.2 mg Oral BID Mary Jane Vigil MD       ? Study Drug pemafibrate 0.2 mg/placebo tablet 0.2 mg (PROMINENT)  0.2 mg Oral BID Mary Jane Vigil MD       ? Study Drug pemafibrate 0.2 mg/placebo tablet 0.2 mg (PROMINENT)  0.2 mg Oral BID Eduard Tang, ELVIRA       ? Study Drug pemafibrate 0.2 mg/placebo tablet 0.2 mg (PROMINENT)  0.2 mg Oral BID Eduard Tang, RN          No Known Allergies      Lab Results    Chemistry CBC BNP   Lab Results   Component Value Date    CREATININE 1.33 (H) 11/22/2017    BUN 35 (H) 11/22/2017     11/22/2017    K 4.7 11/22/2017     11/22/2017    CO2 21 (L) 11/22/2017     Creatinine (mg/dL)   Date Value   11/22/2017 1.33 (H)   11/13/2017 1.60 (H)   09/05/2017 1.45 (H)   06/27/2017 0.79    Lab Results   Component Value Date    WBC 8.3 12/12/2016    HGB 12.6 (L) 12/12/2016    HCT 35.3 (L) 12/12/2016    MCV 95 12/12/2016     12/12/2016    Lab Results   Component Value Date     (H) 04/22/2015     BNP (pg/mL)   Date Value   04/22/2015 120 (H)   12/20/2012 309 (H)   12/19/2012 669 (H)              Linda Hardin, CNP  [facility] Heart [facility]   Heart Failure Clinic

## 2021-06-26 NOTE — PROGRESS NOTES
Progress Notes by Linda Hardin CNP at 9/12/2018  7:50 AM     Author: Linda Hardin CNP Service: -- Author Type: Nurse Practitioner    Filed: 9/12/2018  8:34 AM Encounter Date: 9/12/2018 Status: Signed    : Linda Hardin CNP (Nurse Practitioner)           Click to link to [facility] HEART CARE NOTE      Assessment/Recommendations   Assessment:    1.  Heart failure with reduced ejection fraction, ejection fraction 19% on stress test and 26% on echo, NYHA class II: He has no symptoms of acute heart failure.  He has chronic shortness of breath with strenuous activity but denies any worsening.  He brought in all of his medication bottles today and he is correctly taking Entresto, carvedilol, and Lasix.      Plan:  1.   Heart failure medications:  - Beta blocker therapy with carvedilol 25 mg twice daily  - ARNI therapy with Entresto 24/26 mg twice daily  - Diuretic therapy with furosemide 40 mg twice daily  2.  BMP was checked at his last clinic appointment and labs were stable.   3.  No further titration of his heart failure medications due to borderline blood pressure    I recommended follow-up in the heart failure clinic in 3 months but time declined.  Follow-up in heart failure clinic in 6 months.  He will see Dr. Pool and Dr. Vigil in Spring 2019.     History of Present Illness    Mr. Ken Doss is a 72 y.o. male seen at [facility] heart failure clinic today for continued follow-up.  He has a history of heart failure with reduced ejection fraction, coronary artery disease, ICD, diabetes, and dyslipidemia.  Stress test from June 2017 showed ejection fraction of 19% and echocardiogram from May 2017 showed ejection fraction of 26%.    He had some confusion about his enalapril, Entresto, and Lasix in August.  He was seen on August 10 and medications were clarified.  Please see previous note for specific details.  He is now taking  Entresto, carvedilol, and Lasix.  Home blood pressures have been stable around 100 systolically.  He denies any symptoms of acute fluid retention.  He has occasional shortness of breath with strenuous activity but denies any worsening.  He denies fatigue, lightheadedness, orthopnea and lower extremity edema.      He is monitoring home weights which are stable around 170 pounds.  He is following a low sodium diet.      Physical Examination Review of Systems   Vitals:    09/12/18 0805   BP: 100/60   Pulse: 60   Resp: 16     Body mass index is 25.11 kg/(m^2).  Wt Readings from Last 3 Encounters:   09/12/18 175 lb (79.4 kg)   08/10/18 171 lb (77.6 kg)   07/10/18 169 lb (76.7 kg)       General Appearance:     Alert, cooperative and in no acute distress.   ENT/Mouth: membranes moist, no oral lesions or bleeding gums.      EYES:  no scleral icterus, normal conjunctivae   Chest/Lungs:   lungs are clear to auscultation, no rales or wheezing, respirations unlabored   Cardiovascular:   Regular. Normal first and second heart sounds with no murmurs, rubs, or gallops; the radial pulses are intact, no edema bilateral lower extremities    Abdomen:  Soft, nontender, nondistended, bowel sounds present   Extremities: no cyanosis or clubbing   Skin: warm, dry.    Neurologic: mood and affect are appropriate, alert and oriented x3      General: WNL  Eyes: WNL  Ears/Nose/Throat: WNL  Lungs: WNL  Heart: WNL  Stomach: WNL  Bladder: WNL  Muscle/Joints: WNL  Skin: WNL  Nervous System: WNL  Mental Health: WNL     Blood: WNL     Medical History  Surgical History Family History Social History   Past Medical History:   Diagnosis Date   ? Atrial fibrillation (H) 10/29/2014   ? Chronic systolic heart failure (H) Dec 2012   ? Congestive heart failure (H) 2007   ? Coronary artery disease involving native coronary artery     Non obstructive disease by cath in 2007 Cor angio Nov 2016: RCA 70% (FFR 0.89), and OM1 75%      ? Coronary atherosclerosis of  unspecified type of vessel, native or graft    ? Depressive disorder, not elsewhere classified 10/24/2014   ? Diabetes mellitus, type II (H)    ? Dyslipidemia 2007   ? Family history of myocardial infarction     mom  at 54 of cardiac issues   ? Fractures     ankle, leg and arm   ? ICD (implantable cardioverter-defibrillator) lead failure 2014    High voltage lead tip inserted in RV free wall RV lead extraction and implantation of the new septal RV lead 2014    ? Infectious mononucleosis    ? Kidney stone    ? Myocardial infarction    ? S/P ICD (internal cardiac defibrillator) procedure 2014   ? Unspecified systolic heart failure     Past Surgical History:   Procedure Laterality Date   ? APPENDECTOMY     ? CARDIAC CATHETERIZATION N/A 2016    Procedure: Coronary Angiogram;  Surgeon: Delgado Ramirez MD;  Location: [facility] Cath Lab;  Service:    ? CARDIAC PACEMAKER PLACEMENT     ? EP ICD INSERT     ? INSERT / REPLACE / REMOVE PACEMAKER     ? IA APPENDECTOMY      Description: Appendectomy;  Recorded: 06/10/2014;   ? IA L HRT CATH W/NJX L VENTRICULOGRAPHY IMG S&I Left 2016    Procedure: Left Heart Catheterization with Left Ventriculogram;  Surgeon: Delgado Ramirez MD;  Location: [facility] Cath Lab;  Service: Cardiology   ? IA REMOVE TONSILS/ADENOIDS,<13 Y/O      Description: Tonsillectomy With Adenoidectomy;  Recorded: 06/10/2014;   ? IA SHLDR ARTHROSCOP,DIAGNOSTIC      Description: Arthroscopy Shoulder;  Recorded: 06/10/2014;   ? REPLACEMENT TOTAL KNEE      bilat   ? ROTATOR CUFF REPAIR      right    Family History   Problem Relation Age of Onset   ? Sudden death Mother      unexpected death in her sleep in her 50s    Social History     Social History   ? Marital status:      Spouse name: N/A   ? Number of children: N/A   ? Years of education: N/A     Occupational History   ? Not on file.     Social History Main Topics   ? Smoking status: Never Smoker   ?  Smokeless tobacco: Never Used      Comment: cigars few times a year only   ? Alcohol use Yes      Comment: 3-4/month   ? Drug use: No   ? Sexual activity: Not on file     Other Topics Concern   ? Not on file     Social History Narrative          Medications  Allergies   Current Outpatient Prescriptions   Medication Sig Dispense Refill   ? ACCU-CHEK SMARTVIEW TEST STRIP strips      ? acetaminophen (TYLENOL) 500 MG tablet Take 500 mg by mouth every 6 (six) hours as needed for pain.     ? amoxicillin (AMOXIL) 500 MG capsule PRIOR TO DENTAL APPOINTMENT     ? aspirin 81 MG EC tablet Take 81 mg by mouth daily.     ? BASAGLAR KWIKPEN U-100 INSULIN 100 unit/mL (3 mL) pen 65 Units.      ? carvedilol (COREG) 25 MG tablet Take 1 tablet (25 mg total) by mouth 2 (two) times a day with meals. 180 tablet 3   ? DEXTROSE (GLUCOSE) Chew Chew 1 tablet as needed.     ? diphenhydrAMINE-acetaminophen (TYLENOL PM)  mg Tab Take 1 tablet by mouth at bedtime as needed.     ? DOCOSAHEXANOIC ACID/EPA (FISH OIL ORAL) 1-2 capsules daily.      ? furosemide (LASIX) 40 MG tablet TAKE 1 TABLET BY MOUTH TWICE DAILY 190 tablet 1   ? glucosamine-chondroitin 500-400 mg cap Take 1 capsule by mouth daily. 1500mg/1200mg once daily     ? ibuprofen (ADVIL,MOTRIN) 200 MG tablet Take 200 mg by mouth every 6 (six) hours as needed for pain.     ? metFORMIN (GLUCOPHAGE) 1000 MG tablet Take 1,000 mg by mouth 2 (two) times a day with meals.     ? multivitamin therapeutic (THERAGRAN) tablet Take 1 tablet by mouth daily.     ? naproxen sodium (ALEVE) 220 MG tablet Take 220 mg by mouth every 12 (twelve) hours as needed for pain (knee pain).     ? sacubitril-valsartan (ENTRESTO) 24-26 mg Tab tablet Take 1 tablet by mouth 2 (two) times a day. 180 tablet 2   ? simvastatin (ZOCOR) 40 MG tablet Take 40 mg by mouth at bedtime.        Current Facility-Administered Medications   Medication Dose Route Frequency Provider Last Rate Last Dose   ? Study Drug pemafibrate 0.2  mg/placebo tablet 0.2 mg (PROMINENT)  0.2 mg Oral BID Eduard Tang, RN          No Known Allergies      Lab Results    Chemistry CBC BNP   Lab Results   Component Value Date    CREATININE 0.99 08/10/2018    BUN 26 08/10/2018     08/10/2018    K 4.6 08/10/2018     08/10/2018    CO2 26 08/10/2018     Creatinine (mg/dL)   Date Value   08/10/2018 0.99   05/01/2018 1.02   04/16/2018 1.21   03/29/2018 0.88    Lab Results   Component Value Date    WBC 8.3 12/12/2016    HGB 12.6 (L) 12/12/2016    HCT 35.3 (L) 12/12/2016    MCV 95 12/12/2016     12/12/2016    Lab Results   Component Value Date     (H) 04/22/2015     BNP (pg/mL)   Date Value   04/22/2015 120 (H)   12/20/2012 309 (H)   12/19/2012 669 (H)              Linda Hardin CNP  [facility]   Heart Failure Clinic

## 2021-06-26 NOTE — PROGRESS NOTES
Progress Notes by Linda Hardin CNP at 4/11/2018  7:50 AM     Author: Linda Hardin CNP Service: -- Author Type: Nurse Practitioner    Filed: 4/11/2018  8:54 AM Encounter Date: 4/11/2018 Status: Signed    : Linda Hardin CNP (Nurse Practitioner)           Click to link to [facility] HEART CARE NOTE      Assessment/Recommendations   Assessment:    1.  Heart failure with reduced ejection fraction, ejection fraction 19% on stress test and a 26% on echo: He had symptom improvement when being on a higher dose of Lasix for 3 days in March.  When he returned to his normal dose of Lasix 40 mg daily, he noticed fluid retention.  He had orthopnea last night and has increased shortness of breath with activity.  His weight is up about 2 pounds.    2.  Hypotension: His blood pressure today was 84/44 and 90/50.  He has mild lightheadedness.  He states that when he checks his blood pressure at home it is typically over 100 systolically.  He had breakfast this morning and is hydrated.    Plan:  1.   Heart failure medications:  - Beta blocker therapy with carvedilol 25 mg twice daily  - ACEI therapy with enalapril 10 mg daily  - Diuretic therapy with furosemide increased to 40 mg twice daily  2.  I recommended decreasing his carvedilol or enalapril due to hypotension.  He states that his blood pressure is usually better when he checks at home home.  When he saw his primary care provider last week it was 98 systolically.  He agreed to check his blood pressure every morning before taking his medications.  If his systolic blood pressures less than 100, he will decrease enalapril to 5 mg.  3.  Low-sodium diet and daily weights  4.  I asked my nurse to call on Monday to get an update on his symptoms.     History of Present Illness    Mr. Ken Doss is a 72 y.o. male seen at [facility] heart failure clinic today for continued follow-up.  He has a history of  heart failure with reduced ejection fraction, coronary artery disease, ICD, diabetes, and dyslipidemia.  Stress test from June 2017 showed ejection fraction of 19% and echocardiogram from May 2017 showed ejection fraction of 26%.  He is also participating in [employer] research study.    During the last clinic visit, he had symptoms of fluid retention with increased shortness of breath and crackles in his lungs.  His Lasix was increased for 3 days.  He had improvement of symptoms and lost 4 pounds.  However, after returning to his normal dose of Lasix 40 mg daily, he started retaining fluid again.  Today, he has shortness of breath.  He had mild orthopnea last night.  His blood pressure is low today at 84/44 with recheck of 90/50.  He had mild lightheadedness this morning.  He denies chest pain, abdominal fullness/bloating and lower extremity edema.      His weight is up about 2 pounds.  He works to follow a low-sodium diet.  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     Physical Examination Review of Systems   Vitals:    04/11/18 0802   BP: 90/50   Pulse:    Resp:      Body mass index is 26.58 kg/(m^2).  Wt Readings from Last 3 Encounters:   04/11/18 180 lb (81.6 kg)   03/20/18 178 lb (80.7 kg)   01/09/18 177 lb (80.3 kg)       General Appearance:     Alert, cooperative and in no acute distress.   ENT/Mouth: membranes moist, no oral lesions or bleeding gums.      EYES:  no scleral icterus, normal conjunctivae   Chest/Lungs:   lungs are clear to auscultation, few wheezes   Cardiovascular:   Regular. Normal first and second heart sounds with no murmurs, rubs, or gallops; no edema bilateral lower extremities    Abdomen:  Soft, nontender, nondistended, bowel sounds present   Extremities: no cyanosis or clubbing   Skin: warm, dry.    Neurologic: mood and affect are appropriate, alert and oriented x3      General: WNL  Eyes: WNL  Ears/Nose/Throat: WNL  Lungs: WNL  Heart: WNL  Stomach: WNL  Bladder:  WNL  Muscle/Joints: WNL  Skin: WNL  Nervous System: WNL  Mental Health: WNL     Blood: WNL     Medical History  Surgical History Family History Social History   Past Medical History:   Diagnosis Date   ? Atrial fibrillation    ? Chronic systolic heart failure Dec 2012   ? Congestive heart failure    ? Coronary artery disease    ? Coronary artery disease involving native coronary artery     Non obstructive disease by cath in  Cor angio 2016: RCA 70% (FFR 0.89), and OM1 75%      ? Coronary atherosclerosis of unspecified type of vessel, native or graft    ? Depressive disorder, not elsewhere classified    ? Diabetes mellitus    ? Diabetes mellitus, type II    ? Family history of myocardial infarction     mom  at 54 of cardiac issues   ? Fractures     ankle, leg and arm   ? High cholesterol    ? ICD (implantable cardioverter-defibrillator) lead failure 2014    High voltage lead tip inserted in RV free wall RV lead extraction and implantation of the new septal RV lead 2014    ? Infectious mononucleosis    ? Kidney stone    ? Myocardial infarction    ? S/P ICD (internal cardiac defibrillator) procedure 2014   ? Type II or unspecified type diabetes mellitus without mention of complication, not stated as uncontrolled    ? Unspecified systolic heart failure     Past Surgical History:   Procedure Laterality Date   ? APPENDECTOMY     ? APPENDECTOMY     ? CARDIAC CATHETERIZATION N/A 2016    Procedure: Coronary Angiogram;  Surgeon: Delgado Ramirez MD;  Location: [facility] Cath Lab;  Service:    ? CARDIAC PACEMAKER PLACEMENT     ? EP ICD INSERT     ? INSERT / REPLACE / REMOVE PACEMAKER     ? NV APPENDECTOMY      Description: Appendectomy;  Recorded: 06/10/2014;   ? NV L HRT CATH W/NJX L VENTRICULOGRAPHY IMG S&I Left 2016    Procedure: Left Heart Catheterization with Left Ventriculogram;  Surgeon: Delgado Ramirez MD;  Location: [facility] Cath Lab;  Service: Cardiology   ? NV  REMOVE TONSILS/ADENOIDS,<11 Y/O      Description: Tonsillectomy With Adenoidectomy;  Recorded: 06/10/2014;   ? [place] SHLDR ARTHROSCOP,DIAGNOSTIC      Description: Arthroscopy Shoulder;  Recorded: 06/10/2014;   ? REPLACEMENT TOTAL KNEE      bilat   ? ROTATOR CUFF REPAIR      right    Family History   Problem Relation Age of Onset   ? Sudden death Mother      unexpected death in her sleep in her 50s    Social History     Social History   ? Marital status:      Spouse name: N/A   ? Number of children: N/A   ? Years of education: N/A     Occupational History   ? Not on file.     Social History Main Topics   ? Smoking status: Never Smoker   ? Smokeless tobacco: Never Used      Comment: cigars few times a year only   ? Alcohol use Yes      Comment: 3-4/month   ? Drug use: No   ? Sexual activity: Not on file     Other Topics Concern   ? Not on file     Social History Narrative          Medications  Allergies   Current Outpatient Prescriptions   Medication Sig Dispense Refill   ? ACCU-CHEK SMARTVIEW TEST STRIP strips      ? acetaminophen (TYLENOL) 500 MG tablet Take 500 mg by mouth every 6 (six) hours as needed for pain.     ? amoxicillin (AMOXIL) 500 MG capsule PRIOR TO DENTAL APPOINTMENT     ? aspirin 81 MG EC tablet Take 81 mg by mouth daily.     ? BASAGLAR KWIKPEN U-100 INSULIN 100 unit/mL (3 mL) pen 65 Units.      ? carvedilol (COREG) 25 MG tablet Take 1 tablet (25 mg total) by mouth 2 (two) times a day with meals. 180 tablet 3   ? DEXTROSE (GLUCOSE) Chew Chew 1 tablet as needed.     ? diphenhydrAMINE-acetaminophen (TYLENOL PM)  mg Tab Take 1 tablet by mouth at bedtime as needed.     ? DOCOSAHEXANOIC ACID/EPA (FISH OIL ORAL) 1-2 capsules daily.      ? enalapril (VASOTEC) 10 MG tablet Take 1 tablet (10 mg total) by mouth daily. 90 tablet 3   ? furosemide (LASIX) 40 MG tablet Take 1 tablet (40 mg total) by mouth 2 (two) times a day.  0   ? glucosamine-chondroitin 500-400 mg cap Take 1 capsule by mouth daily.  1500mg/1200mg once daily     ? ibuprofen (ADVIL,MOTRIN) 200 MG tablet Take 200 mg by mouth every 6 (six) hours as needed for pain.     ? metFORMIN (GLUCOPHAGE) 1000 MG tablet Take 1,000 mg by mouth 2 (two) times a day with meals.     ? multivitamin therapeutic (THERAGRAN) tablet Take 1 tablet by mouth daily.     ? naproxen sodium (ALEVE) 220 MG tablet Take 220 mg by mouth every 12 (twelve) hours as needed for pain (knee pain).     ? simvastatin (ZOCOR) 40 MG tablet Take 40 mg by mouth at bedtime.      ? Study Drug pemafibrate 0.2 mg/placebo (PROMINENT) tablet Take 0.2 mg by mouth 2 (two) times a day.       Current Facility-Administered Medications   Medication Dose Route Frequency Provider Last Rate Last Dose   ? Study Drug pemafibrate 0.2 mg/placebo tablet 0.2 mg (PROMINENT)  0.2 mg Oral BID Eduard Tang, RN          No Known Allergies      Lab Results    Chemistry CBC BNP   Lab Results   Component Value Date    CREATININE 0.88 03/29/2018    BUN 23 03/29/2018     03/29/2018    K 4.1 03/29/2018     03/29/2018    CO2 22 03/29/2018     Creatinine (mg/dL)   Date Value   03/29/2018 0.88   11/22/2017 1.33 (H)   11/13/2017 1.60 (H)   09/05/2017 1.45 (H)    Lab Results   Component Value Date    WBC 8.3 12/12/2016    HGB 12.6 (L) 12/12/2016    HCT 35.3 (L) 12/12/2016    MCV 95 12/12/2016     12/12/2016    Lab Results   Component Value Date     (H) 04/22/2015     BNP (pg/mL)   Date Value   04/22/2015 120 (H)   12/20/2012 309 (H)   12/19/2012 669 (H)              Linda Hardin, CNP  [facility]   Heart Failure Clinic

## 2021-06-26 NOTE — PROGRESS NOTES
Progress Notes by Linda Hardin CNP at 8/10/2018  8:30 AM     Author: Linda Hardin CNP Service: -- Author Type: Nurse Practitioner    Filed: 8/10/2018 10:42 AM Encounter Date: 8/10/2018 Status: Signed    : Linda Hardin CNP (Nurse Practitioner)           Click to link to [facility] HEART CARE NOTE      Assessment/Recommendations   Assessment:    1.  Heart failure with reduced ejection fraction, ejection fraction 19% on stress test and 26% on echo, NYHA class II: Last week, he inadvertently continued to take enalapril when he started Entresto.  He stopped Lasix instead of stopping enalapril with the start of Entresto.  He developed acute heart failure and lightheadedness.  Lasix was restarted 2 days ago and enalapril stopped.  He has chronic shortness of breath with activity but is back at baseline.  BMP today shows stable renal function and potassium.  Magnesium was normal.  Blood pressure is low today.  He had no lightheadedness during clinic visit.  Orthostatic blood pressures normal.    Plan:  1.   Heart failure medications:  - Beta blocker therapy with carvedilol 25 mg twice daily  - ARNI therapy with Entresto 24/26 mg twice daily  - Diuretic therapy with furosemide decreased to 40 mg daily  2.  We reviewed his medications in detail today and he is now taking them appropriately.  3.  He is back to his baseline weight.  Lasix was decreased back to 40 mg daily.  4.  BMP and magnesium drawn today which are normal.    Skyler was asked to follow-up in the heart failure clinic next week but he declined.  I expressed the importance of monitoring his blood pressure.  He will check his blood pressure at home and our clinic will call him on Monday.  Follow-up in the heart failure clinic in 1 month.     History of Present Illness    Mr. Ken Doss is a 72 y.o. male seen at [facility] heart failure clinic today for continued follow-up.  He has a  history of heart failure with reduced ejection fraction, coronary artery disease, ICD, diabetes, and lipidemia.  Stress test from June 2017 showed ejection fraction of 19% and echocardiogram from May 2017 showed ejection fraction of 26%.    Dr. Vigil prescribed Entresto.  Skyler was confused about stopping enalapril with starting Entresto.  He continued to take enalapril with Entresto and stopped furosemide by mistake.  He called our clinic earlier this week with acute fluid retention and hypotension.  Lasix was restarted at 40 mg twice daily and enalapril discontinued.    Since restarting Lasix 2 days ago, he has lost 13 pounds and is back at his dry weight.  He has chronic shortness of breath but is back at baseline.  Initial blood pressure was low today at 74/40.  He had mild lightheadedness this morning.  He denies any problems driving into clinic today.  He drank 2 glasses of water during clinic visit.  Blood pressures improved to low 80s/60s.  Orthostatic blood pressure normal.  He denies chest pain and lower extremity edema.      His home weight is now 171 pounds.  He is following a low-sodium diet.     Physical Examination Review of Systems   Vitals:    08/10/18 0902   BP: (!) 82/60   Pulse:    Resp:      Body mass index is 24.89 kg/(m^2).  Wt Readings from Last 3 Encounters:   08/10/18 171 lb (77.6 kg)   07/10/18 169 lb (76.7 kg)   06/27/18 176 lb (79.8 kg)       General Appearance:     Alert, cooperative and in no acute distress.   ENT/Mouth: membranes moist, no oral lesions or bleeding gums.      EYES:  no scleral icterus, normal conjunctivae   Chest/Lungs:   lungs are clear to auscultation, no rales or wheezing, respirations unlabored   Cardiovascular:   Regular. Normal first and second heart sounds; the radial and posterior tibial pulses are intact, no edema bilateral lower extremities    Abdomen:  Soft, nontender, nondistended, bowel sounds present   Extremities: no cyanosis or clubbing   Skin: warm,  dry.    Neurologic: mood and affect are appropriate, alert and oriented x3      General: WNL  Eyes: WNL  Ears/Nose/Throat: WNL  Lungs: WNL  Heart: WNL  Stomach: WNL  Bladder: WNL  Muscle/Joints: WNL  Skin: WNL  Nervous System: WNL  Mental Health: WNL     Blood: WNL     Medical History  Surgical History Family History Social History   Past Medical History:   Diagnosis Date   ? Atrial fibrillation (H) 10/29/2014   ? Chronic systolic heart failure (H) Dec 2012   ? Congestive heart failure (H)    ? Coronary artery disease involving native coronary artery     Non obstructive disease by cath in  Cor angio 2016: RCA 70% (FFR 0.89), and OM1 75%      ? Coronary atherosclerosis of unspecified type of vessel, native or graft    ? Depressive disorder, not elsewhere classified 10/24/2014   ? Diabetes mellitus, type II (H)    ? Dyslipidemia    ? Family history of myocardial infarction     mom  at 54 of cardiac issues   ? Fractures     ankle, leg and arm   ? ICD (implantable cardioverter-defibrillator) lead failure 2014    High voltage lead tip inserted in RV free wall RV lead extraction and implantation of the new septal RV lead 2014    ? Infectious mononucleosis    ? Kidney stone    ? Myocardial infarction    ? S/P ICD (internal cardiac defibrillator) procedure 2014   ? Unspecified systolic heart failure     Past Surgical History:   Procedure Laterality Date   ? APPENDECTOMY     ? CARDIAC CATHETERIZATION N/A 2016    Procedure: Coronary Angiogram;  Surgeon: Delgado Ramirez MD;  Location: [facility] Cath Lab;  Service:    ? CARDIAC PACEMAKER PLACEMENT     ? EP ICD INSERT     ? INSERT / REPLACE / REMOVE PACEMAKER     ? GA APPENDECTOMY      Description: Appendectomy;  Recorded: 06/10/2014;   ? GA L HRT CATH W/NJX L VENTRICULOGRAPHY IMG S&I Left 2016    Procedure: Left Heart Catheterization with Left Ventriculogram;  Surgeon: Delgado Ramirez MD;  Location:   [facility];  Service: Cardiology   ? OR REMOVE TONSILS/ADENOIDS,<11 Y/O      Description: Tonsillectomy With Adenoidectomy;  Recorded: 06/10/2014;   ? OR SHLDR ARTHROSCOP,DIAGNOSTIC      Description: Arthroscopy Shoulder;  Recorded: 06/10/2014;   ? REPLACEMENT TOTAL KNEE      bilat   ? ROTATOR CUFF REPAIR      right    Family History   Problem Relation Age of Onset   ? Sudden death Mother      unexpected death in her sleep in her 50s    Social History     Social History   ? Marital status:      Spouse name: N/A   ? Number of children: N/A   ? Years of education: N/A     Occupational History   ? Not on file.     Social History Main Topics   ? Smoking status: Never Smoker   ? Smokeless tobacco: Never Used      Comment: cigars few times a year only   ? Alcohol use Yes      Comment: 3-4/month   ? Drug use: No   ? Sexual activity: Not on file     Other Topics Concern   ? Not on file     Social History Narrative          Medications  Allergies   Current Outpatient Prescriptions   Medication Sig Dispense Refill   ? ACCU-CHEK SMARTVIEW TEST STRIP strips      ? acetaminophen (TYLENOL) 500 MG tablet Take 500 mg by mouth every 6 (six) hours as needed for pain.     ? amoxicillin (AMOXIL) 500 MG capsule PRIOR TO DENTAL APPOINTMENT     ? aspirin 81 MG EC tablet Take 81 mg by mouth daily.     ? BASAGLAR KWIKPEN U-100 INSULIN 100 unit/mL (3 mL) pen 65 Units.      ? carvedilol (COREG) 25 MG tablet Take 1 tablet (25 mg total) by mouth 2 (two) times a day with meals. 180 tablet 3   ? DEXTROSE (GLUCOSE) Chew Chew 1 tablet as needed.     ? diphenhydrAMINE-acetaminophen (TYLENOL PM)  mg Tab Take 1 tablet by mouth at bedtime as needed.     ? DOCOSAHEXANOIC ACID/EPA (FISH OIL ORAL) 1-2 capsules daily.      ? furosemide (LASIX) 40 MG tablet Take 1 tablet (40 mg total) by mouth daily. 180 tablet 3   ? glucosamine-chondroitin 500-400 mg cap Take 1 capsule by mouth daily. 1500mg/1200mg once daily     ? ibuprofen  (ADVIL,MOTRIN) 200 MG tablet Take 200 mg by mouth every 6 (six) hours as needed for pain.     ? metFORMIN (GLUCOPHAGE) 1000 MG tablet Take 1,000 mg by mouth 2 (two) times a day with meals.     ? multivitamin therapeutic (THERAGRAN) tablet Take 1 tablet by mouth daily.     ? naproxen sodium (ALEVE) 220 MG tablet Take 220 mg by mouth every 12 (twelve) hours as needed for pain (knee pain).     ? sacubitril-valsartan (ENTRESTO) 24-26 mg Tab tablet Take 1 tablet by mouth 2 (two) times a day. 180 tablet 2   ? simvastatin (ZOCOR) 40 MG tablet Take 40 mg by mouth at bedtime.        Current Facility-Administered Medications   Medication Dose Route Frequency Provider Last Rate Last Dose   ? Study Drug pemafibrate 0.2 mg/placebo tablet 0.2 mg (PROMINENT)  0.2 mg Oral BID Eduard Tang, RN          No Known Allergies      Lab Results    Chemistry CBC BNP   Lab Results   Component Value Date    CREATININE 0.99 08/10/2018    BUN 26 08/10/2018     08/10/2018    K 4.6 08/10/2018     08/10/2018    CO2 26 08/10/2018     Creatinine (mg/dL)   Date Value   08/10/2018 0.99   05/01/2018 1.02   04/16/2018 1.21   03/29/2018 0.88    Lab Results   Component Value Date    WBC 8.3 12/12/2016    HGB 12.6 (L) 12/12/2016    HCT 35.3 (L) 12/12/2016    MCV 95 12/12/2016     12/12/2016    Lab Results   Component Value Date     (H) 04/22/2015     BNP (pg/mL)   Date Value   04/22/2015 120 (H)   12/20/2012 309 (H)   12/19/2012 669 (H)          40 minutes were spent with the patient with greater than 50% spent on education and counseling.      Linda Hardin, [facility] Care   Heart Failure Clinic
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
## 2021-06-27 NOTE — PROGRESS NOTES
Progress Notes by Vera Zavala CNP at 1/4/2019  8:30 AM     Author: Vera Zavala CNP Service: -- Author Type: Nurse Practitioner    Filed: 1/4/2019  9:33 AM Encounter Date: 1/4/2019 Status: Signed    : Vera Zavala CNP (Nurse Practitioner)           Click to link to [facility] HEART [facility] NOTE      Assessment/Recommendations   Assessment:    1. Ischemic cardiomyopathy with systolic dysfunction, NYHA class III: Decompensated.  He states his symptoms of dyspnea on exertion, fatigue, and abdominal bloating have worsened over the last 2-3 weeks.  He states his weight is up about 5 pounds.  He is been trying to follow a low-sodium diet through the holiday.  He states his exercise tolerance has decreased. He does not feel like he needs to be in the hospital.    2.  Chest pain: He states this occurs while walking.  It can last 10-15 minutes and resolves with rest.  We ambulated through the [place] in the clinic and he had a little sensation of achiness in his chest.  His last stress test was done in June 2017 which did not show any ischemia.    3. Hypotension: Blood pressure 92/48 with recheck of 98/58. He has occasional lightheadedness when standing too quickly.    Plan:  1.  Continue current medications   2. BMP and BNP pending  3. Continue daily weights and low sodium diet  4. I will inform Dr. Vigil of symptoms of angina and his recommendations    Ken Doss will follow up with Dr. Vigil in June and with Linda Hardin in mid February.     History of Present Illness    Mr. Ken Doss is a 73 y.o. male seen at [facility] heart failure clinic today for continued follow-up.  He follows up for ischemic cardiomyopathy with systolic dysfunction.  His most recent echocardiogram was done May 2017 which showed ejection fraction of 26%.  He has a past medical history significant for coronary artery disease, hyperlipidemia, diabetes,  paroxysmal atrial fibrillation.  He has a CRT-D that was placed January 2013.    Today, he comes in with complaints of increased dyspnea on exertion, fatigue, abdominal bloating, and chest burning, achiness in his chest with activity for the last 2-3 weeks.  He states his chest ache lasts 10-15 minutes and resolves with rest..  He denies lightheadedness, shortness of breath, orthopnea, PND, palpitations, abdominal fullness/bloating and lower extremity edema.      He is monitoring home weights which are stable between 170-171 pounds.  He is following a low sodium diet.      ECHO 5/23/2017:   Summary       Moderate to severe left atrial enlargement    Left ventricle ejection fraction is severely decreased. The calculated left ventricular ejection fraction is 26%. Severe global hypokinesis    Grade 2 diastolic dysfunction    Mild mitral and tricuspid regurgitation    When compared to the previous study dated 2/9/2016, no significant change.          Physical Examination Review of Systems   Vitals:    01/04/19 0906   BP: 98/58   Pulse:    Resp:      Body mass index is 25.25 kg/m .  Wt Readings from Last 3 Encounters:   01/04/19 176 lb (79.8 kg)   11/08/18 175 lb (79.4 kg)   09/12/18 175 lb (79.4 kg)       General Appearance:     Alert, cooperative and in no acute distress.   ENT/Mouth: membranes moist, no oral lesions or bleeding gums.      EYES:  no scleral icterus, normal conjunctivae   Neck: no carotid bruits or thyromegaly   Chest/Lungs:   lungs are clear to auscultation, no rales or wheezing, respirations unlabored   Cardiovascular:   Regular. Normal first and second heart sounds with no murmurs, rubs, or gallops; the carotid, radial and posterior tibial pulses are intact, Jugular venous pressure normal, no edema     Abdomen:  Soft, nontender, nondistended, bowel sounds present   Extremities: no cyanosis or clubbing   Skin: warm, dry.    Neurologic: mood and affect are appropriate, alert and oriented x3       General: WNL  Eyes: WNL  Ears/Nose/Throat: WNL  Lungs: Shortness of Breath  Heart: Shortness of Breath with activity  Stomach: WNL  Bladder: WNL  Muscle/Joints: WNL  Skin: WNL  Nervous System: WNL  Mental Health: WNL           Medical History  Surgical History Family History Social History   Past Medical History:   Diagnosis Date   ? Atrial fibrillation (H) 10/29/2014   ? Chronic systolic heart failure (H) Dec 2012   ? Congestive heart failure (H)    ? Coronary artery disease involving native coronary artery     Non obstructive disease by cath in  Cor angio 2016: RCA 70% (FFR 0.89), and OM1 75%      ? Coronary atherosclerosis of unspecified type of vessel, native or graft    ? Depressive disorder, not elsewhere classified 10/24/2014   ? Diabetes mellitus, type II (H)    ? Dyslipidemia    ? Family history of myocardial infarction     mom  at 54 of cardiac issues   ? Fractures     ankle, leg and arm   ? ICD (implantable cardioverter-defibrillator) lead failure 2014    High voltage lead tip inserted in RV free wall RV lead extraction and implantation of the new septal RV lead 2014    ? Infectious mononucleosis    ? Kidney stone    ? Myocardial infarction (H)    ? S/P ICD (internal cardiac defibrillator) procedure 2014   ? Unspecified systolic heart failure     Past Surgical History:   Procedure Laterality Date   ? APPENDECTOMY     ? CARDIAC CATHETERIZATION N/A 2016    Procedure: Coronary Angiogram;  Surgeon: Delgado Ramirez MD;  Location: [facility] Cath Lab;  Service:    ? CARDIAC PACEMAKER PLACEMENT     ? EP ICD INSERT     ? INSERT / REPLACE / REMOVE PACEMAKER     ? IA APPENDECTOMY      Description: Appendectomy;  Recorded: 06/10/2014;   ? IA L HRT CATH W/NJX L VENTRICULOGRAPHY IMG S&I Left 2016    Procedure: Left Heart Catheterization with Left Ventriculogram;  Surgeon: Delgado Ramirez MD;  Location: [facility] Cath Lab;  Service: Cardiology   ?  DC REMOVE TONSILS/ADENOIDS,<13 Y/O      Description: Tonsillectomy With Adenoidectomy;  Recorded: 06/10/2014;   ? DC SHLDR ARTHROSCOP,DIAGNOSTIC      Description: Arthroscopy Shoulder;  Recorded: 06/10/2014;   ? REPLACEMENT TOTAL KNEE      bilat   ? ROTATOR CUFF REPAIR      right    Family History   Problem Relation Age of Onset   ? Sudden death Mother         unexpected death in her sleep in her 50s    Social History     Socioeconomic History   ? Marital status:      Spouse name: Not on file   ? Number of children: Not on file   ? Years of education: Not on file   ? Highest education level: Not on file   Social Needs   ? Financial resource strain: Not on file   ? Food insecurity - worry: Not on file   ? Food insecurity - inability: Not on file   ? Transportation needs - medical: Not on file   ? Transportation needs - non-medical: Not on file   Occupational History   ? Not on file   Tobacco Use   ? Smoking status: Never Smoker   ? Smokeless tobacco: Never Used   ? Tobacco comment: cigars few times a year only   Substance and Sexual Activity   ? Alcohol use: Yes     Comment: 3-4/month   ? Drug use: No   ? Sexual activity: Not on file   Other Topics Concern   ? Not on file   Social History Narrative   ? Not on file          Medications  Allergies   Current Outpatient Medications   Medication Sig Dispense Refill   ? ACCU-CHEK SMARTVIEW TEST STRIP strips      ? aspirin 81 MG EC tablet Take 81 mg by mouth daily.     ? BASAGLAR KWIKPEN U-100 INSULIN 100 unit/mL (3 mL) pen 65 Units.      ? carvedilol (COREG) 25 MG tablet Take 1 tablet (25 mg total) by mouth 2 (two) times a day with meals. 180 tablet 3   ? enalapril (VASOTEC) 5 MG tablet Take 1 tablet (5 mg total) by mouth 2 (two) times a day. 180 tablet 3   ? furosemide (LASIX) 40 MG tablet TAKE 1 TABLET BY MOUTH TWICE DAILY 190 tablet 1   ? glucosamine-chondroitin 500-400 mg cap Take 1 capsule by mouth daily. 1500mg/1200mg once daily     ? ibuprofen (ADVIL,MOTRIN)  600 MG tablet Take 600 mg by mouth every 6 (six) hours as needed for pain.     ? metFORMIN (GLUCOPHAGE) 1000 MG tablet Take 1,000 mg by mouth 2 (two) times a day with meals.     ? multivitamin therapeutic (THERAGRAN) tablet Take 1 tablet by mouth daily.     ? naproxen sodium (ALEVE) 220 MG tablet Take 220 mg by mouth every 12 (twelve) hours as needed for pain (knee pain).     ? simvastatin (ZOCOR) 40 MG tablet Take 40 mg by mouth at bedtime.      ? acetaminophen (TYLENOL) 500 MG tablet Take 500 mg by mouth every 6 (six) hours as needed for pain.     ? amoxicillin (AMOXIL) 500 MG capsule PRIOR TO DENTAL APPOINTMENT       Current Facility-Administered Medications   Medication Dose Route Frequency Provider Last Rate Last Dose   ? Study Drug pemafibrate 0.2 mg/placebo tablet 0.2 mg (PROMINENT)  0.2 mg Oral BID Eduard Tang, RN          No Known Allergies      Lab Results    Chemistry CBC BNP   Lab Results   Component Value Date    CREATININE 1.02 11/27/2018    BUN 39 (H) 11/27/2018     11/27/2018    K 4.4 11/27/2018     11/27/2018    CO2 25 11/27/2018     Creatinine (mg/dL)   Date Value   11/27/2018 1.02   08/10/2018 0.99   05/01/2018 1.02   04/16/2018 1.21    Lab Results   Component Value Date    WBC 8.3 12/12/2016    HGB 12.6 (L) 12/12/2016    HCT 35.3 (L) 12/12/2016    MCV 95 12/12/2016     12/12/2016    Lab Results   Component Value Date     (H) 04/22/2015     BNP (pg/mL)   Date Value   04/22/2015 120 (H)   12/20/2012 309 (H)   12/19/2012 669 (H)          30 minutes were spent with the patient with greater than 50% spent on education and counseling.      Vera Zavala, [facility] Heart Care   Heart Failure Clinic

## 2021-06-27 NOTE — PROGRESS NOTES
Progress Notes by Linda Hardin CNP at 1/29/2019  9:10 AM     Author: Linda Hardin CNP Service: -- Author Type: Nurse Practitioner    Filed: 1/29/2019 11:06 AM Encounter Date: 1/29/2019 Status: Signed    : Linda Hardin CNP (Nurse Practitioner)           Click to link to [facility] Heart Care     [facility] HEART CARE NOTE      Assessment/Recommendations   Assessment:    1.  Coronary artery disease: Coronary angiogram in January 15, 2019 which showed 99% stenosis proximal/ostial RCA and 80% mid LAD.  He received drug-eluting stents to both areas.  He denies any chest pain or shortness of breath since PCI.  He feels much better.    2.  Ischemic cardiomyopathy, heart failure with reduced ejection fraction.  Stress test on January 9, 2019 showed ejection fraction of 15%.  Ejection fraction has remained unchanged over the past 2 years.  He denies any symptoms of acute heart failure.  He was previously on Entresto but needed to switch back to enalapril due to cost.    3.  Dyslipidemia: His simvastatin was changed to atorvastatin following PCI.  He is currently participating in PROMINENT research study.    4.  Diabetes: He states that overall his blood sugars have been well controlled.  He did have a blood sugar of 72 in the middle the night last night.  He states that he did not have dinner the night before.  He will continue to monitor closely.    Plan:  1.   Heart failure medications:  - Beta blocker therapy with carvedilol 25 mg twice daily  - ACEI therapy with enalapril 5 mg twice daily  - Diuretic therapy with furosemide 40 mg twice daily  2.  No titration of heart failure medications at this time.  Blood pressure is better today at 110/60 but he typically has lower blood pressures.  If blood pressures continue to be stable, could increase enalapril.  3.  Heart healthy diet was discussed today.  4.  Continue exercise routine.  We discussed cardiac rehab but he currently has a  good routine on his own at the gym.    Skyler will follow up with Dr. Vigil on March 12.  He is due to see Dr. Pool in June and will follow-up in the heart failure clinic in August.     History of Present Illness    Mr. Ken Doss is a 73 y.o. male seen at [facility] heart failure clinic today for continued follow-up.  He has a history of heart failure with reduced ejection fraction, coronary artery disease, ICD, diabetes, and dyslipidemia.  He developed chest pain and had an abnormal stress test on January 9.  Coronary angiogram on January 15, 2019 with drug-eluting stents to RCA and LAD.    Skyler states that he has been feeling much better since PCI.  He denies any chest pain, shortness of breath, or fatigue.  He feels that he has more energy than he has had in the past few months.  He is tolerating his exercise routine more than he has in the past few months.  He denies any symptoms of acute heart failure.  He denies fatigue, lightheadedness, shortness of breath, dyspnea on exertion, orthopnea, chest pain and lower extremity edema.      His weight has remained stable.  He continues to follow a low-sodium diet.    He will be going on a [place] cruise with his family at the end of this week.  He does have travel medical insurance.      ECHO:   Results for orders placed during the hospital encounter of 05/23/17   Echo Complete [ECH10] 05/23/2017    Narrative   Moderate to severe left atrial enlargement    Left ventricle ejection fraction is severely decreased. The calculated   left ventricular ejection fraction is 26%. Severe global hypokinesis    Grade 2 diastolic dysfunction    Mild mitral and tricuspid regurgitation    When compared to the previous study dated 2/9/2016, no significant   change.           Physical Examination Review of Systems   Vitals:    01/29/19 0904   BP: 110/60   Pulse: 64   Resp: 16     Body mass index is 25.84 kg/m .  Wt Readings from Last 3 Encounters:   01/29/19  175 lb (79.4 kg)   19 179 lb 9.6 oz (81.5 kg)   19 170 lb (77.1 kg)       General Appearance:     Alert, cooperative and in no acute distress.   ENT/Mouth: membranes moist, no oral lesions or bleeding gums.      EYES:  no scleral icterus, normal conjunctivae   Chest/Lungs:   lungs are clear to auscultation, no rales or wheezing, respirations unlabored   Cardiovascular:   Regular. Normal first and second heart sounds with no murmurs, rubs, or gallops; the carotid, radial and posterior tibial pulses are intact, no edema bilateral lower extremities    Abdomen:  Soft, nontender, nondistended, bowel sounds present   Extremities: no cyanosis or clubbing   Skin: warm, dry.    Neurologic: mood and affect are appropriate, alert and oriented x3    Radial puncture site soft with no hematoma    General: WNL  Eyes: WNL  Ears/Nose/Throat: WNL  Lungs: WNL  Heart: WNL  Stomach: WNL  Bladder: WNL  Muscle/Joints: WNL  Skin: WNL  Nervous System: WNL  Mental Health: WNL     Blood: WNL     Medical History  Surgical History Family History Social History   Past Medical History:   Diagnosis Date   ? Atrial fibrillation (H) 10/29/2014   ? Chronic systolic heart failure (H) Dec 2012   ? Congestive heart failure (H)    ? Coronary artery disease involving native coronary artery     Non obstructive disease by cath in  Cor angio 2016: RCA 70% (FFR 0.89), and OM1 75%      ? Coronary atherosclerosis of unspecified type of vessel, native or graft    ? Depressive disorder, not elsewhere classified 10/24/2014   ? Diabetes mellitus, type II (H)    ? Dyslipidemia    ? Family history of myocardial infarction     mom  at 54 of cardiac issues   ? Fractures     ankle, leg and arm   ? ICD (implantable cardioverter-defibrillator) lead failure 2014    High voltage lead tip inserted in RV free wall RV lead extraction and implantation of the new septal RV lead 2014    ? Infectious mononucleosis    ?  Kidney stone    ? Myocardial infarction (H)    ? S/P ICD (internal cardiac defibrillator) procedure 11/26/2014   ? Unspecified systolic heart failure     Past Surgical History:   Procedure Laterality Date   ? APPENDECTOMY     ? CARDIAC CATHETERIZATION N/A 12/12/2016    Procedure: Coronary Angiogram;  Surgeon: Delgado Ramirez MD;  Location: [facility] Cath Lab;  Service:    ? CV CORONARY ANGIOGRAM N/A 1/15/2019    Procedure: Coronary Angiogram;  Surgeon: Mason Correa MD;  Location: [facility] Cath Lab;  Service: Cardiology   ? CV LEFT HEART CATHETERIZATION WO LEFT VETRICULOGRAM Left 1/15/2019    Procedure: Left Heart Catheterization Without Left Ventriculogram;  Surgeon: Mason Correa MD;  Location: [facility] Cath Lab;  Service: Cardiology   ? EP ICD INSERT     ? NJ L HRT CATH W/NJX L VENTRICULOGRAPHY IMG S&I Left 12/12/2016    Procedure: Left Heart Catheterization with Left Ventriculogram;  Surgeon: Delgado Ramirez MD;  Location: [facility] Cath Lab;  Service: Cardiology   ? NJ REMOVE TONSILS/ADENOIDS,<11 Y/O      Description: Tonsillectomy With Adenoidectomy;  Recorded: 06/10/2014;   ? NJ SHLDR ARTHROSCOP,DIAGNOSTIC      Description: Arthroscopy Shoulder;  Recorded: 06/10/2014;   ? REPLACEMENT TOTAL KNEE      bilat   ? ROTATOR CUFF REPAIR      right    Family History   Problem Relation Age of Onset   ? Sudden death Mother         unexpected death in her sleep in her 50s    Social History     Socioeconomic History   ? Marital status:      Spouse name: Not on file   ? Number of children: Not on file   ? Years of education: Not on file   ? Highest education level: Not on file   Social Needs   ? Financial resource strain: Not on file   ? Food insecurity - worry: Not on file   ? Food insecurity - inability: Not on file   ? Transportation needs - medical: Not on file   ? Transportation needs - non-medical: Not on file   Occupational History   ? Not on file   Tobacco Use   ? Smoking  status: Light Tobacco Smoker     Types: Cigars   ? Smokeless tobacco: Never Used   ? Tobacco comment: cigars few times a year only   Substance and Sexual Activity   ? Alcohol use: Yes     Comment: 3-4/month   ? Drug use: No   ? Sexual activity: Not on file   Other Topics Concern   ? Not on file   Social History Narrative   ? Not on file          Medications  Allergies   Current Outpatient Medications   Medication Sig Dispense Refill   ? ACCU-CHEK SMARTVIEW TEST STRIP strips      ? acetaminophen (TYLENOL) 500 MG tablet Take 500 mg by mouth every 6 (six) hours as needed for pain.     ? aspirin 81 MG EC tablet Take 81 mg by mouth daily.     ? atorvastatin (LIPITOR) 40 MG tablet Take 1 tablet (40 mg total) by mouth at bedtime. 90 tablet 1   ? BASAGLAR KWIKPEN U-100 INSULIN 100 unit/mL (3 mL) pen Inject 65 Units under the skin at bedtime.            ? carvedilol (COREG) 25 MG tablet Take 1 tablet (25 mg total) by mouth 2 (two) times a day with meals. 180 tablet 3   ? clopidogrel (PLAVIX) 75 mg tablet Take 1 tablet (75 mg total) by mouth daily. 90 tablet 1   ? coenzyme Q10 (COQ-10) 100 mg capsule Take 1 capsule (100 mg total) by mouth daily.  0   ? enalapril (VASOTEC) 5 MG tablet Take 1 tablet (5 mg total) by mouth 2 (two) times a day. 180 tablet 3   ? furosemide (LASIX) 40 MG tablet TAKE 1 TABLET BY MOUTH TWICE DAILY 190 tablet 1   ? glucosamine-chondroitin 500-400 mg cap Take 1 capsule by mouth daily. 1500mg/1200mg once daily     ? ibuprofen-diphenhydramine cit (IBUPROFEN PM) 200-38 mg Tab Take 2 tablets by mouth at bedtime as needed (sleep).     ? magnesium chloride (SLOW-MAG) 64 mg TbEC delayed-release tablet Take 64 mg by mouth daily.     ? metFORMIN (GLUCOPHAGE) 1000 MG tablet Take 1,000 mg by mouth 2 (two) times a day with meals.     ? multivitamin therapeutic (THERAGRAN) tablet Take 1 tablet by mouth daily.       Current Facility-Administered Medications   Medication Dose Route Frequency Provider Last Rate Last  Dose   ? Study Drug pemafibrate 0.2 mg/placebo tablet 0.2 mg (PROMINENT)  0.2 mg Oral BID Eduard Tang RN          No Known Allergies      Lab Results    Chemistry CBC BNP   Lab Results   Component Value Date    CREATININE 0.98 01/16/2019    BUN 25 01/16/2019     (L) 01/16/2019    K 4.1 01/16/2019     01/16/2019    CO2 23 01/16/2019     Creatinine (mg/dL)   Date Value   01/16/2019 0.98   01/15/2019 1.07   01/04/2019 1.08   11/27/2018 1.02    Lab Results   Component Value Date    WBC 6.2 01/15/2019    HGB 11.2 (L) 01/16/2019    HCT 35.0 (L) 01/15/2019    MCV 96 01/15/2019     01/15/2019    Lab Results   Component Value Date     (H) 01/04/2019     BNP (pg/mL)   Date Value   01/04/2019 398 (H)   04/22/2015 120 (H)   12/20/2012 309 (H)            Linda Hardin, [facility]   Heart Failure Clinic

## 2021-06-27 NOTE — PROGRESS NOTES
Progress Notes by Arcelia Ring NP at 8/13/2019  7:50 AM     Author: Arcelia Ring NP Service: -- Author Type: Nurse Practitioner    Filed: 8/13/2019  9:28 AM Encounter Date: 8/13/2019 Status: Signed    : Arcelia Ring NP (Nurse Practitioner)           Click to link to [facility] Heart Care     [facility] HEART CARE NOTE      Assessment/Recommendations   Assessment:    1. Nonischemic cardiomyopathy with systolic dysfunction, NYHA class III with EF of 20-25%: Well compensated with no acute signs and symptoms of heart failure.  He reports shortness of breath when he walks up hill or any overexertion.  He also feels more short of breath on the days that he missed his second dose of furosemide.  He reports mild lightheaded today with blood pressure systolic in 80s.  He also stated that he is on liquid diet for his upcoming colonoscopy tomorrow.  He has been taking spironolactone 25 mg twice a day instead of 12.5 mg twice a day.    2.  History of coronary artery disease with status post PCI to mid LAD and RCA in January 2019: He is currently on triple therapy with aspirin 81 mg daily, Plavix 75 mg daily and he is on Eliquis for his atrial fibrillation.  He denies bleeding complications.    3.  History of paroxysmal atrial fibrillation: Device check today showed persistent atrial fibrillation with biventricular pacing down to 89% and arrhythmia burden is 99.3%.  Patient was recommended to have a consult with EP nurse practitioner to discuss about AV node ablation per Dr. Pool.    Plan:  1.  Instructed patient to cut back on spironolactone from 25 mg twice a day to 12.5 mg twice a day.  He was encouraged to call back if no improvement in his symptoms of lightheadedness and dizziness.      Heart failure medications:  - Beta blocker therapy with carvedilol 25 mg twice a day  -Entresto 24-26 mg twice a day  - Aldosterone blocker therapy with spironolactone 12.5 mg twice a day  - Diuretic therapy with furosemide  40 mg twice a day    2.  We will clarify with Dr. Vigil about triple therapy with aspirin/Plavix/Eliquis    3.  Follow-up with EP nurse practitioner to discuss about treatment options    Follow up with me in the heart failure clinic in 4 to 6 weeks     History of Present Illness    Ken Doss is 73 years old male with significant past medical history of coronary artery disease with status post PCI to mid LAD and RCA in January 2019, medical noncompliance, hypertriglyceridemia, diabetes type 2, pulmonary fibrosis, paroxysmal atrial fibrillation, and nonischemic cardiomyopathy with chronic systolic heart failure who is seen at [facility] today for  continued heart failure follow-up.    Patient was last seen in the research clinic on 7/17/2019 by me.  His blood pressure at that time was running systolic in the 90s.  He had some confusion about his heart failure medications which were clarified.  He also received a phone call from Dr. Vigil nurse who also went over his medication list.    He recently had a device interrogation persistent atrial fibrillation with biventricular (RV & LV) down to 4% and LV trigger pacing at 96%.  Per Dr. Pool, he would be a potential candidate for AV node ablation to provide complete rate control.  He was suggested to meet with EP nurse practitioner to discuss treatment options.    Today, patient reports that he had couple episodes of heart racing that lasts for 16 to 18 seconds.  He felt more fatigue and short of breath during the episode.  Today his blood pressures running systolic in 80s and he is experienced some lightheadedness.  He has been on a liquid diet for his upcoming colonoscopy.  He also has been taking spironolactone 25 mg twice a day instead of 12.5 mg twice a day.  He continues to short of breath on exertion such as walking up incline or walking short distance. He denies shortness of breath, dyspnea on exertion, orthopnea, PND, palpitations, chest  pain, abdominal fullness/bloating and lower extremity edema are denies any bleeding complications    His home weight is stable between 166 to 172 pounds.     ECHO-Reviewed:   Results for orders placed during the hospital encounter of 03/19/19   Echo Complete [ECH10] 03/19/2019    Narrative   When compared to the previous study dated 5/23/2017, no significant   change.    Left ventricle ejection fraction is severely decreased. The estimated   left ventricular ejection fraction is 20-25%.    Normal right ventricular size and systolic function.    Mild to moderate mitral regurgitation    Device lead in right heart chambers           Physical Examination Review of Systems   Vitals:    08/13/19 0725   BP: (!) 80/58   Pulse: 60   Resp: 16     Body mass index is 25.11 kg/m .  Wt Readings from Last 3 Encounters:   08/13/19 175 lb (79.4 kg)   07/25/19 168 lb 1.6 oz (76.2 kg)   07/24/19 166 lb (75.3 kg)       General Appearance:     Alert, cooperative and in no acute distress.   ENT/Mouth: membranes moist, no oral lesions or bleeding gums.      EYES:  no scleral icterus, normal conjunctivae   Neck: no carotid bruits or thyromegaly   Chest/Lungs:   lungs are clear to auscultation, no rales or wheezing, respirations unlabored   Cardiovascular:   . Normal first and second heart sounds with no murmurs, rubs, or gallops; the carotid, radial and posterior tibial pulses are intact, Jugular venous pressure flat with no HJR, no edema bilateral lower extremities    Abdomen:  Soft, nontender, nondistended, bowel sounds present   Extremities: no cyanosis or clubbing   Skin: warm, dry.    Neurologic: mood and affect are appropriate, alert and oriented x3      General: WNL  Eyes: WNL  Ears/Nose/Throat: WNL  Lungs: WNL  Heart: WNL  Stomach: WNL  Bladder: WNL  Muscle/Joints: WNL  Skin: WNL  Nervous System: WNL  Mental Health: WNL     Blood: WNL     Medical History  Surgical History Family History Social History   Past Medical History:    Diagnosis Date   ? Arthritis     osteoarthritis   ? Atrial fibrillation (H) 10/29/2014   ? Chronic systolic heart failure (H) Dec 2012   ? Coronary artery disease involving native coronary artery     Non obstructive disease by cath in 2007 Cor angio Nov 2016: RCA 70% (FFR 0.89), and OM1 75%      ? Depressive disorder, not elsewhere classified 10/24/2014   ? Diabetes mellitus, type II (H) 28/Jan/2013   ? Dyslipidemia 2007   ? Fractures     ankle, leg and arm   ? Hypertension    ? ICD (implantable cardioverter-defibrillator) lead failure 11/26/2014    High voltage lead tip inserted in RV free wall RV lead extraction and implantation of the new septal RV lead November 2014    ? ILD (interstitial lung disease) (H)    ? Infectious mononucleosis    ? Ischemic cardiomyopathy 4/22/2015    Chronic: LVEF 25- 30% LVEF 26% echo May 2017   ? Kidney stone    ? LBBB (left bundle branch block)     noted on Dec 19, 2012   ? Lymphadenopathy, mediastinal    ? Myocardial infarction (H)    ? Pulmonary anthracosis (H)    ? Recurrent kidney stones    ? S/P ICD (internal cardiac defibrillator) procedure 11/26/2014    Past Surgical History:   Procedure Laterality Date   ? APPENDECTOMY     ? CARDIAC CATHETERIZATION N/A 12/12/2016    Procedure: Coronary Angiogram;  Surgeon: Delgado Ramirez MD;  Location: [facility] Cath Lab;  Service:    ? CV CORONARY ANGIOGRAM N/A 1/15/2019    Procedure: Coronary Angiogram;  Surgeon: Mason Correa MD;  Location: [facility] Cath Lab;  Service: Cardiology   ? CV LEFT HEART CATHETERIZATION WO LEFT VETRICULOGRAM Left 1/15/2019    Procedure: Left Heart Catheterization Without Left Ventriculogram;  Surgeon: Mason Correa MD;  Location: [facility] Cath Lab;  Service: Cardiology   ? EP ICD INSERT      ICD REIMPLANTATION OF A NEW RV LEAD 11/26/2014   ? [place] L HRT CATH W/NJX L VENTRICULOGRAPHY IMG S&I Left 12/12/2016    Procedure: Left Heart Catheterization with Left Ventriculogram;  Surgeon:  Delgado Ramirez MD;  Location: [facility];  Service: Cardiology   ? CA REMOVE TONSILS/ADENOIDS,<11 Y/O      Description: Tonsillectomy With Adenoidectomy;  Recorded: 06/10/2014;   ? CA SHLDR ARTHROSCOP,DIAGNOSTIC      Description: Arthroscopy Shoulder;  Recorded: 06/10/2014;   ? REPLACEMENT TOTAL KNEE      bilat   ? ROTATOR CUFF REPAIR      right   ? US LYMPH NODE BIOPSY  7/10/2019    Family History   Problem Relation Age of Onset   ? Sudden death Mother         unexpected death in her sleep in her 50s    Social History     Socioeconomic History   ? Marital status:      Spouse name: Not on file   ? Number of children: Not on file   ? Years of education: Not on file   ? Highest education level: Not on file   Occupational History   ? Not on file   Social Needs   ? Financial resource strain: Not on file   ? Food insecurity:     Worry: Not on file     Inability: Not on file   ? Transportation needs:     Medical: Not on file     Non-medical: Not on file   Tobacco Use   ? Smoking status: Never Smoker   ? Smokeless tobacco: Never Used   ? Tobacco comment: cigars few times a year only   Substance and Sexual Activity   ? Alcohol use: Yes     Comment: RARE   ? Drug use: No   ? Sexual activity: Not on file   Lifestyle   ? Physical activity:     Days per week: Not on file     Minutes per session: Not on file   ? Stress: Not on file   Relationships   ? Social connections:     Talks on phone: Not on file     Gets together: Not on file     Attends Alevism service: Not on file     Active member of club or organization: Not on file     Attends meetings of clubs or organizations: Not on file     Relationship status: Not on file   ? Intimate partner violence:     Fear of current or ex partner: Not on file     Emotionally abused: Not on file     Physically abused: Not on file     Forced sexual activity: Not on file   Other Topics Concern   ? Not on file   Social History Narrative   ? Not on file           Medications  Allergies   Current Outpatient Medications   Medication Sig Dispense Refill   ? ACCU-CHEK SMARTVIEW TEST STRIP strips      ? acetaminophen (TYLENOL) 500 MG tablet Take 500 mg by mouth every 6 (six) hours as needed for pain.     ? apixaban (ELIQUIS) 5 mg Tab tablet Take 1 tablet (5 mg total) by mouth 2 (two) times a day. 120 tablet 2   ? aspirin 81 MG EC tablet Take 81 mg by mouth daily.     ? atorvastatin (LIPITOR) 40 MG tablet TAKE 1 TABLET BY MOUTH AT BEDTIME 90 tablet 1   ? BASAGLAR KWIKPEN U-100 INSULIN 100 unit/mL (3 mL) pen Inject 65 Units under the skin at bedtime.            ? carvedilol (COREG) 12.5 MG tablet Take 2 tablets (25 mg total) by mouth 2 (two) times a day with meals. 120 tablet 0   ? clopidogrel (PLAVIX) 75 mg tablet TAKE 1 TABLET BY MOUTH ONCE DAILY 90 tablet 1   ? coenzyme Q10 (COQ-10) 100 mg capsule Take 1 capsule (100 mg total) by mouth daily.  0   ? digoxin (LANOXIN) 125 mcg tablet Take 1 tablet (125 mcg total) by mouth daily with supper. 60 tablet 0   ? edoxaban tosylate (STUDY DRUG EDOXABAN 30 MG <ENVISAGE-AMANDA AF>) tablet Take by mouth daily.     ? furosemide (LASIX) 40 MG tablet Take 1 tablet (40 mg total) by mouth daily. (Patient taking differently: Take 40 mg by mouth 2 (two) times a day at 9am and 6pm.       ) 190 tablet 1   ? glucosamine-chondroitin 500-400 mg cap Take 1 capsule by mouth daily. 1500mg/1200mg once daily     ? metFORMIN (GLUCOPHAGE) 1000 MG tablet Take 1,000 mg by mouth 2 (two) times a day with meals.     ? multivitamin therapeutic (THERAGRAN) tablet Take 1 tablet by mouth daily.     ? sacubitril-valsartan (ENTRESTO) 24-26 mg Tab tablet Take 1 tablet by mouth 2 (two) times a day. 180 tablet 0   ? spironolactone (ALDACTONE) 25 MG tablet Take 0.5 tablets (12.5 mg total) by mouth 2 (two) times a day at 9am and 6pm. 90 tablet 3   ? Study Drug pemafibrate 0.2 mg/placebo (PROMINENT) tablet Take 0.2 mg by mouth 2 (two) times a day.       Current  Facility-Administered Medications   Medication Dose Route Frequency Provider Last Rate Last Dose   ? Study Drug pemafibrate 0.2 mg/placebo tablet 0.2 mg (PROMINENT)  0.2 mg Oral BID Eduard Tang RN          No Known Allergies      Lab Results    Chemistry CBC BNP   Lab Results   Component Value Date    CREATININE 1.23 08/01/2019    BUN 33 (H) 08/01/2019     08/01/2019    K 3.9 08/01/2019     08/01/2019    CO2 22 08/01/2019     Creatinine (mg/dL)   Date Value   08/01/2019 1.23   07/11/2019 0.97   07/10/2019 1.09   07/09/2019 1.62 (H)    Lab Results   Component Value Date    WBC 9.1 07/11/2019    HGB 10.4 (L) 07/11/2019    HCT 30.5 (L) 07/11/2019    MCV 95 07/11/2019     07/11/2019    Lab Results   Component Value Date    BNP 1,007 (H) 07/09/2019     BNP (pg/mL)   Date Value   07/09/2019 1,007 (H)   01/04/2019 398 (H)   04/22/2015 120 (H)        Arcelia Ring, [facility]   Heart Failure Clinic         This note has been dictated using voice recognition software. Any grammatical, typographical, or context distortions are unintentional and inherent to the software

## 2021-06-28 NOTE — PROGRESS NOTES
Progress Notes by Shavon Sánchez, RN at 9/16/2019  2:26 PM     Author: Shavon Sánchez RN Service: -- Author Type: Registered Nurse    Filed: 9/16/2019  2:26 PM Encounter Date: 9/16/2019 Status: Signed    : Shavon Sánchez RN (Registered Nurse)       Noted.  Chart prepped, orders placed and patient will be contacted to schedule procedure.          Patricia Girard, CNP  P [facility] Ep Rn Support Pool             Patient needs to be schedule for AV node ablation per plan with Dr. Pool from 7/24/19 note. CRT already in place.     Can you guys let me know when this is going to be scheduled. As soon as possible as he is decompensating due to AF burden and not using his pacer. If its > 2 weeks out, lmk and I will adjust more meds.

## 2021-06-28 NOTE — PROGRESS NOTES
Progress Notes by Tahira Moralez, RN at 9/20/2019  2:21 PM     Author: Tahira Moralez RN Service: -- Author Type: Registered Nurse    Filed: 9/20/2019  2:25 PM Encounter Date: 9/20/2019 Status: Signed    : Tahira Moralez RN (Registered Nurse)       Patient does not have a MRI conditional device.

## 2021-06-28 NOTE — PROGRESS NOTES
Progress Notes by Arcelia Ring NP at 9/19/2019  7:50 AM     Author: Arcelia Ring NP Service: -- Author Type: Nurse Practitioner    Filed: 9/19/2019  8:47 AM Encounter Date: 9/19/2019 Status: Signed    : Arcelia Ring NP (Nurse Practitioner)           Click to link to [facility]     [facility] HEART CARE NOTE      Assessment/Recommendations   Assessment:    1. Nonischemic cardiomyopathy with acute on chronic systolic dysfunction, NYHA class III-IV with EF of 20-25%: Decompensated.  He reports shortness of breath with mild exertion, PND, orthopnea, abdominal bloating, fatigue and lower extremity edema.  His home weight is still up 4 pounds above baseline.  Reports recent salt indiscretion in his diet.    He is currently taking furosemide 80 mg twice a day and potassium chloride 20 mg daily.  He is no longer taking spironolactone which he thinks is discontinued by Dr. Vigil.    2. History of paroxysmal atrial fibrillation: Recent device check showed persistent atrial fibrillation with biventricular pacing down to 89% and arrhythmia burden is 99.3%.  Sotalol was discontinued and can continued on digoxin and carvedilol.  He is on Eliquis 5 mg twice a day for stroke prevention.  He was seen in EP clinic recently and discussed about AV node ablation and PVI.    Plan:  1.  Recommended ED visit for heart failure management but patient declined.  Will obtain electrolytes and BNP level- will adjust diuretic therapy once results available.      Heart failure medications:  - Beta blocker therapy with carvedilol 15.625 twice a day  -Entresto 24-26 mg twice a day  - Diuretic therapy with furosemide 80 mg twice a day  -Potassium chloride 20 mEq daily    2.  Continue carvedilol, digoxin and Eliquis.  AV node ablation as scheduled on 9/24/2019    3.  Limit salt intake to <1500m g/day, fluid intake to <48-50 ounces/day, hold ETOH intake, and daily weight    4.  He was highly encouraged to go to the emergency  department if worsening heart failure symptoms     History of Present Illness    Ken Doss is 73 years old male with significant past medical history of coronary artery disease with status post PCI to mid LAD and RCA in January 2019, medical noncompliance, hypertriglyceridemia, diabetes type 2, pulmonary fibrosis, paroxysmal atrial fibrillation, and nonischemic cardiomyopathy with chronic systolic heart failure who is seen at [facility] today for  continued heart failure follow-up.    During last heart failure clinic visit, found that he was still taking triple therapy with aspirin, Plavix and Eliquis.  This was discussed with Dr. Vigil and his aspirin was discontinued.  He has been having lots of confusion about his medication and found that he has been taking spironolactone 25 mg twice a day.  He was recommended to cut back the dose to 12.5 mg twice a day.  Due to persistent atrial fibrillation, he was initiated on sotalol but was ineffective and therefore was discontinued.  He has been experiencing low blood pressure and therefore his carvedilol dose was reduced to 18.75 mg twice a day.    Patient was recently seen in the EP clinic to discuss about AV node ablation versus PVI and they agreed upon having an AV node ablation which is scheduled 9/24/2019.  He was noted to have fluid retention and therefore his furosemide dose was increased and potassium supplement was added.    Today, patient reports that he continues to have shortness of breath with mild exertion, abdominal bloating, fatigue, PND, orthopnea and lower extremity edema.  He declined to go to the emergency department for further heart failure management.  He has stopped taking spironolactone.  He denies chest pain, heart palpitation, lightheaded or dizziness.  He reports salt indiscretion in his diet recently.    His baseline weight is 166 to 172 pounds and this morning he reports his home weight is 176 pounds.  He has lost 2  pounds since his been on a higher dose of furosemide.  He has taken 80 mg furosemide twice a day for 2 days.     ECHO-Reviewed:   Results for orders placed during the hospital encounter of 03/19/19   Echo Complete [ECH10] 03/19/2019    Narrative   When compared to the previous study dated 5/23/2017, no significant   change.    Left ventricle ejection fraction is severely decreased. The estimated   left ventricular ejection fraction is 20-25%.    Normal right ventricular size and systolic function.    Mild to moderate mitral regurgitation    Device lead in right heart chambers        Physical Examination Review of Systems   Vitals:    09/19/19 0752   BP: 92/60   Pulse: (!) 56   Resp: 18     Body mass index is 25.91 kg/m .  Wt Readings from Last 3 Encounters:   09/19/19 178 lb (80.7 kg)   09/16/19 182 lb (82.6 kg)   08/20/19 173 lb (78.5 kg)       General Appearance:     Alert, cooperative and in no acute distress.   ENT/Mouth: membranes moist, no oral lesions or bleeding gums.  (face appear puffy)   EYES:  no scleral icterus, normal conjunctivae   Neck: no carotid bruits or thyromegaly   Chest/Lungs:   lungs are clear to auscultation, no rales or wheezing, respirations unlabored except crackles in bilateral bases   Cardiovascular:   Normal first and second heart sounds with no murmurs, rubs, or gallops; the carotid, radial and posterior tibial pulses are intact, Jugular venous pressure mildly elevated with no HJR, 1+ edema on RLE and 2+ or LLE    Abdomen:  Large and mildly distended, nontender, nondistended, bowel sounds present   Extremities: no cyanosis or clubbing   Skin: warm, dry.    Neurologic: mood and affect are appropriate, alert and oriented x3      General: WNL  Eyes: WNL  Ears/Nose/Throat: WNL  Lungs: WNL  Heart: WNL  Stomach: WNL  Bladder: WNL  Muscle/Joints: WNL  Skin: WNL  Nervous System: WNL  Mental Health: WNL     Blood: WNL     Medical History  Surgical History Family History Social History   Past  Medical History:   Diagnosis Date   ? Abdominal pain    ? Acute renal failure (H)    ? Arthritis     osteoarthritis   ? Atrial fibrillation (H) 10/29/2014   ? Chronic systolic heart failure (H) Dec 2012   ? Coronary artery disease involving native coronary artery     Non obstructive disease by cath in 2007 Cor angio Nov 2016: RCA 70% (FFR 0.89), and OM1 75%      ? Depressive disorder, not elsewhere classified 10/24/2014   ? Diabetes mellitus, type II (H) 28/Jan/2013   ? Diabetic neuropathy (H)    ? Dyslipidemia 2007   ? Fractures     ankle, leg and arm   ? Hypertension    ? 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   ? ICD (implantable cardioverter-defibrillator) lead failure 11/26/2014    High voltage lead tip inserted in RV free wall RV lead extraction and implantation of the new septal RV lead November 2014    ? ILD (interstitial lung disease) (H)    ? Infectious mononucleosis    ? Ischemic cardiomyopathy 4/22/2015    Chronic: LVEF 25- 30% LVEF 26% echo May 2017   ? Kidney stone    ? LBBB (left bundle branch block)     noted on Dec 19, 2012   ? Lymphadenopathy, mediastinal    ? Myocardial infarction (H)    ? Osteoarthritis    ? Pulmonary anthracosis (H)    ? Recurrent kidney stones     Past Surgical History:   Procedure Laterality Date   ? APPENDECTOMY     ? CARDIAC CATHETERIZATION N/A 12/12/2016    Procedure: Coronary Angiogram;  Surgeon: Delgado Ramirez MD;  Location: [facility] Cath Lab;  Service:    ? CV CORONARY ANGIOGRAM N/A 1/15/2019    Procedure: Coronary Angiogram;  Surgeon: Mason Correa MD;  Location: [facility] Cath Lab;  Service: Cardiology   ? CV LEFT HEART CATHETERIZATION WO LEFT VETRICULOGRAM Left 1/15/2019    Procedure: Left Heart Catheterization Without Left Ventriculogram;  Surgeon: Mason Correa MD;  Location: [facility] Cath Lab;  Service: Cardiology   ? EP ICD INSERT      ICD REIMPLANTATION OF A NEW RV LEAD 11/26/2014   ? KNEE ARTHROSCOPY  Bilateral    ? SC L HRT CATH W/NJX L VENTRICULOGRAPHY IMG S&I Left 12/12/2016    Procedure: Left Heart Catheterization with Left Ventriculogram;  Surgeon: Delgado Ramirez MD;  Location: [facility] Cath Lab;  Service: Cardiology   ? SC REMOVE TONSILS/ADENOIDS,<13 Y/O      Description: Tonsillectomy With Adenoidectomy;  Recorded: 06/10/2014;   ? SC SHLDR ARTHROSCOP,DIAGNOSTIC      Description: Arthroscopy Shoulder;  Recorded: 06/10/2014;   ? REPLACEMENT TOTAL KNEE      bilat   ? ROTATOR CUFF REPAIR      right   ? TONSILLECTOMY AND ADENOIDECTOMY     ? US LYMPH NODE BIOPSY  7/10/2019    Family History   Problem Relation Age of Onset   ? Sudden death Mother         unexpected death in her sleep in her 50s    Social History     Socioeconomic History   ? Marital status:      Spouse name: Not on file   ? Number of children: Not on file   ? Years of education: Not on file   ? Highest education level: Not on file   Occupational History   ? Not on file   Social Needs   ? Financial resource strain: Not on file   ? Food insecurity:     Worry: Not on file     Inability: Not on file   ? Transportation needs:     Medical: Not on file     Non-medical: Not on file   Tobacco Use   ? Smoking status: Never Smoker   ? Smokeless tobacco: Never Used   ? Tobacco comment: cigars few times a year only   Substance and Sexual Activity   ? Alcohol use: Yes     Comment: RARE   ? Drug use: No   ? Sexual activity: Not on file   Lifestyle   ? Physical activity:     Days per week: Not on file     Minutes per session: Not on file   ? Stress: Not on file   Relationships   ? Social connections:     Talks on phone: Not on file     Gets together: Not on file     Attends Islam service: Not on file     Active member of club or organization: Not on file     Attends meetings of clubs or organizations: Not on file     Relationship status: Not on file   ? Intimate partner violence:     Fear of current or ex partner: Not on file     Emotionally  abused: Not on file     Physically abused: Not on file     Forced sexual activity: Not on file   Other Topics Concern   ? Not on file   Social History Narrative   ? Not on file          Medications  Allergies   Current Outpatient Medications   Medication Sig Dispense Refill   ? ACCU-CHEK SMARTVIEW TEST STRIP strips      ? acetaminophen (TYLENOL) 500 MG tablet Take 500 mg by mouth every 6 (six) hours as needed for pain.     ? apixaban (ELIQUIS) 5 mg Tab tablet Take 1 tablet (5 mg total) by mouth 2 (two) times a day. 180 tablet 0   ? atorvastatin (LIPITOR) 40 MG tablet TAKE 1 TABLET BY MOUTH AT BEDTIME 90 tablet 1   ? BASAGLAR KWIKPEN U-100 INSULIN 100 unit/mL (3 mL) pen Inject 65 Units under the skin at bedtime.            ? carvedilol (COREG) 6.25 MG tablet Take 1 tablet (6.25 mg total) by mouth 2 (two) times a day with meals. Take with 12.5 mg tablet by mouth BID 60 tablet 3   ? clopidogrel (PLAVIX) 75 mg tablet TAKE 1 TABLET BY MOUTH ONCE DAILY 90 tablet 1   ? coenzyme Q10 (COQ-10) 100 mg capsule Take 1 capsule (100 mg total) by mouth daily.  0   ? digoxin (LANOXIN) 125 mcg tablet Take 1 tablet (125 mcg total) by mouth daily with supper. 60 tablet 0   ? diphenhydrAMINE-acetaminophen (TYLENOL PM EXTRA STRENGTH)  mg Tab Take 1 tablet by mouth at bedtime as needed.     ? furosemide (LASIX) 40 MG tablet Take 1 tablet (40 mg total) by mouth daily. (Patient taking differently: Take 80 mg by mouth 2 (two) times a day at 9am and 6pm.       ) 190 tablet 1   ? glucosamine-chondroitin 500-400 mg cap Take 1 capsule by mouth daily. 1500mg/1200mg once daily     ? metFORMIN (GLUCOPHAGE) 1000 MG tablet Take 1,000 mg by mouth 2 (two) times a day with meals.     ? multivitamin therapeutic (THERAGRAN) tablet Take 1 tablet by mouth daily.     ? sacubitril-valsartan (ENTRESTO) 24-26 mg Tab tablet Take 1 tablet by mouth 2 (two) times a day. 180 tablet 0   ? Study Drug pemafibrate 0.2 mg/placebo (PROMINENT) tablet Take 0.2 mg by  mouth 2 (two) times a day.     ? potassium chloride (K-DUR,KLOR-CON) 20 MEQ tablet Take 1 tablet (20 mEq total) by mouth daily. 30 tablet 1     Current Facility-Administered Medications   Medication Dose Route Frequency Provider Last Rate Last Dose   ? Study Drug pemafibrate 0.2 mg/placebo tablet 0.2 mg (PROMINENT)  0.2 mg Oral BID Eduard Tang, RN          No Known Allergies      Lab Results    Chemistry CBC BNP   Lab Results   Component Value Date    CREATININE 1.23 08/01/2019    BUN 33 (H) 08/01/2019     08/01/2019    K 3.9 08/01/2019     08/01/2019    CO2 22 08/01/2019     Creatinine (mg/dL)   Date Value   08/01/2019 1.23   07/11/2019 0.97   07/10/2019 1.09   07/09/2019 1.62 (H)    Lab Results   Component Value Date    WBC 9.1 07/11/2019    HGB 10.4 (L) 07/11/2019    HCT 30.5 (L) 07/11/2019    MCV 95 07/11/2019     07/11/2019    Lab Results   Component Value Date    BNP 1,007 (H) 07/09/2019     BNP (pg/mL)   Date Value   07/09/2019 1,007 (H)   01/04/2019 398 (H)   04/22/2015 120 (H)        25 minutes were spent with the patient with greater than 50% spent on education and counseling.    Arcelia Ring, [facility]   Heart Failure Clinic         This note has been dictated using voice recognition software. Any grammatical, typographical, or context distortions are unintentional and inherent to the software

## 2021-06-28 NOTE — PROGRESS NOTES
Progress Notes by Arcelia Ring NP at 3/18/2020  3:30 PM     Author: Arcelia Ring NP Service: -- Author Type: Nurse Practitioner    Filed: 3/18/2020  4:43 PM Encounter Date: 3/18/2020 Status: Signed    : Arcelia Ring NP (Nurse Practitioner)       He is well compensated with no evidence of fluid retention on assessment.      Assessment/Recommendations   Assessment:   No changes to his medications today. He will continue to follow-up with research per protocol for Prominent study follow.      He is well compensated with no evidence of fluid retention on assessment today.  He denies having any chest pain, shortness of breath, PND or orthopnea.   Blood pressure today is 94/56 and heart rate 64.  He is asymptomatic.    Reviewed most recent BMP and BNP level with patient    Follow up with Dr. Payton in a month as scheduled in April     History of Present Illness/Subjective    Ken Doss is 73 years old male with significant past medical history of coronary artery disease with status post PCI to mid LAD and RCA in January 2019, medical noncompliance, hypertriglyceridemia, diabetes type 2, pulmonary fibrosis, paroxysmal atrial fibrillation, and nonischemic cardiomyopathy with chronic systolic heart failure who is seen at [facility] for Prominent study follow up.    He was recently seen by Dr. Payton in [facility]. He was found stable and continued on same heart failure regimen and recommended repeat blood working which is getting drawn today.    Patient today reported that he continues to do well without any heart failure symptoms.  He has been exercising regularly and tolerating without any cardiac symptoms.  He wanted to make sure that he got his lab checked that was recommended by Dr. Payton.  He had his BMP and BNP drawn on 3/12/2020.  He has CBC lab as a future order to be done prior to follow-up visit with Dr. Jostin Del Castillo.    He denies any  chest pain, shortness of breath, heart palpitation, lightheaded, dizziness, PND, orthopnea or lower extremity edema.  He also denies bleeding complications.  He reports compliant with taking all his heart medications as prescribed.  He was recently seen by PCP and put him on prednisone for his gout flare.     Physical Examination Review of Systems   Vitals:    03/18/20 1640   BP: 94/56   Pulse: 64     Body mass index is 22.74 kg/m .  Wt Readings from Last 3 Encounters:   03/18/20 154 lb (69.9 kg)   03/18/20 154 lb (69.9 kg)   03/10/20 158 lb (71.7 kg)       General Appearance:   no distress, normal body habitus   ENT/Mouth: membranes moist, no oral lesions or bleeding gums.      EYES:  no scleral icterus, normal conjunctivae   Neck: no carotid bruits or thyromegaly   Chest/Lungs:   lungs are clear to auscultation, no rales or wheezing,  equal chest wall expansion    Cardiovascular:    Normal first and second heart sounds with no murmurs, rubs, or gallops; the carotid, radial and posterior tibial pulses are intact, Jugular venous pressure flat with no HJR,no edema bilaterally    Abdomen:  no organomegaly, masses, bruits, or tenderness; bowel sounds are present   Extremities: no cyanosis or clubbing   Skin: no xanthelasma, warm.    Neurologic: normal  bilateral, no tremors     Psychiatric: alert and oriented x3, calm                                               Medical History  Surgical History Family History Social History   Past Medical History:   Diagnosis Date   ? Abdominal pain    ? Acute renal failure (H)    ? Anemia    ? Arthritis     osteoarthritis   ? CHF (congestive heart failure) (H)    ? Chronic systolic heart failure (H) Dec 2012   ? Coronary artery disease involving native coronary artery     Non obstructive disease by cath in 2007 Cor angio Nov 2016: RCA 70% (FFR 0.89), and OM1 75%      ? Depressive disorder, not elsewhere classified 10/24/2014   ? Diabetes mellitus, type II (H) 28/Jan/2013   ?  Diabetic neuropathy (H)    ? Dyslipidemia 2007   ? Fractures     ankle, leg and arm   ? Hypertension    ? 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   ? ICD (implantable cardioverter-defibrillator) lead failure 11/26/2014    High voltage lead tip inserted in RV free wall RV lead extraction and implantation of the new septal RV lead November 2014    ? ILD (interstitial lung disease) (H)    ? Infectious mononucleosis    ? Ischemic cardiomyopathy 4/22/2015    Chronic: LVEF 25- 30% LVEF 26% echo May 2017   ? Kidney stone    ? LBBB (left bundle branch block)     noted on Dec 19, 2012   ? Lymphadenopathy, mediastinal    ? Myocardial infarction (H)    ? Osteoarthritis    ? Pulmonary anthracosis (H)    ? Recurrent kidney stones    ? Shortness of breath     with exertion    Past Surgical History:   Procedure Laterality Date   ? APPENDECTOMY     ? BIV ICD  11/26/2014    biotronic   ? CARDIAC CATHETERIZATION N/A 12/12/2016    Procedure: Coronary Angiogram;  Surgeon: Delgado Ramirez MD;  Location: [facility] Cath Lab;  Service:    ? COLONOSCOPY N/A 12/19/2019    Procedure: COLONOSCOPY with polypectomy;  Surgeon: Delgado Price MD;  Location: [facility] OR;  Service: Gastroenterology   ? CV CORONARY ANGIOGRAM N/A 1/15/2019    Procedure: Coronary Angiogram;  Surgeon: Mason Correa MD;  Location: [facility] Cath Lab;  Service: Cardiology   ? CV LEFT HEART CATHETERIZATION WO LEFT VETRICULOGRAM Left 1/15/2019    Procedure: Left Heart Catheterization Without Left Ventriculogram;  Surgeon: Mason Correa MD;  Location: [facility] Cath Lab;  Service: Cardiology   ? EP ABLATION AV NODE N/A 9/27/2019    Procedure: EP Ablation AV Node;  Surgeon: Markus Pool MD;  Location: [facility] Cath Lab;  Service: Cardiology   ? EP ICD INSERT      ICD REIMPLANTATION OF A NEW RV LEAD 11/26/2014   ? INSERT / REPLACE / REMOVE PACEMAKER     ? JOINT REPLACEMENT      bilateral TKA   ? KNEE  ARTHROSCOPY Bilateral    ? NE L HRT CATH W/NJX L VENTRICULOGRAPHY IMG S&I Left 12/12/2016    Procedure: Left Heart Catheterization with Left Ventriculogram;  Surgeon: Delgado Ramirez MD;  Location: [facility] Cath Lab;  Service: Cardiology   ? NE REMOVE TONSILS/ADENOIDS,<11 Y/O      Description: Tonsillectomy With Adenoidectomy;  Recorded: 06/10/2014;   ? NE SHLDR ARTHROSCOP,DIAGNOSTIC      Description: Arthroscopy Shoulder;  Recorded: 06/10/2014;   ? REPLACEMENT TOTAL KNEE      bilat   ? ROTATOR CUFF REPAIR      right   ? TONSILLECTOMY AND ADENOIDECTOMY     ? US LYMPH NODE BIOPSY  7/10/2019    Family History   Problem Relation Age of Onset   ? Sudden death Mother         unexpected death in her sleep in her 50s    Social History     Socioeconomic History   ? Marital status:      Spouse name: Not on file   ? Number of children: Not on file   ? Years of education: Not on file   ? Highest education level: Not on file   Occupational History   ? Not on file   Social Needs   ? Financial resource strain: Not on file   ? Food insecurity     Worry: Not on file     Inability: Not on file   ? Transportation needs     Medical: Not on file     Non-medical: Not on file   Tobacco Use   ? Smoking status: Never Smoker   ? Smokeless tobacco: Never Used   ? Tobacco comment: cigars few times a year only   Substance and Sexual Activity   ? Alcohol use: Yes     Comment: occasional   ? Drug use: No   ? Sexual activity: Not on file   Lifestyle   ? Physical activity     Days per week: Not on file     Minutes per session: Not on file   ? Stress: Not on file   Relationships   ? Social connections     Talks on phone: Not on file     Gets together: Not on file     Attends Spiritism service: Not on file     Active member of club or organization: Not on file     Attends meetings of clubs or organizations: Not on file     Relationship status: Not on file   ? Intimate partner violence     Fear of current or ex partner: Not on file      Emotionally abused: Not on file     Physically abused: Not on file     Forced sexual activity: Not on file   Other Topics Concern   ? Not on file   Social History Narrative   ? Not on file          Medications  Allergies   Current Outpatient Medications   Medication Sig Dispense Refill   ? atorvastatin (LIPITOR) 40 MG tablet TAKE 1 TABLET BY MOUTH AT BEDTIME 90 tablet 0   ? bumetanide (BUMEX) 1 MG tablet Take 3 mg by mouth 2 (two) times a day at 9am and 6pm.      ? carvedilol (COREG) 6.25 MG tablet Take 2 tablets (12.5 mg total) by mouth 2 (two) times a day with meals. Take with 12.5 mg tablet by mouth BID (Patient taking differently: Take 6.25 mg by mouth 2 (two) times a day with meals. Take with 12.5 mg tablet by mouth BID ) 360 tablet 3   ? coenzyme Q10 (COQ-10) 100 mg capsule Take 1 capsule (100 mg total) by mouth daily.  0   ? ELIQUIS 5 mg Tab tablet TAKE 1 TABLET BY MOUTH TWICE DAILY 180 tablet 1   ? glucosamine-chondroitin 500-400 mg cap Take 1 capsule by mouth daily. 1500mg/1200mg once daily     ? metFORMIN (GLUCOPHAGE) 1000 MG tablet Take 1,000 mg by mouth 2 (two) times a day with meals.     ? multivitamin therapeutic (THERAGRAN) tablet Take 1 tablet by mouth daily.     ? potassium chloride (K-DUR,KLOR-CON) 20 MEQ tablet Take 1 tablet (20 mEq total) by mouth daily. 90 tablet 3   ? predniSONE (DELTASONE) 10 mg tablet Take 10 mg by mouth daily.     ? sacubitriL-valsartan (ENTRESTO) 49-51 mg Tab tablet Take 1 tablet by mouth 2 (two) times a day.     ? ticagrelor (BRILINTA) 90 mg Tab Take 90 mg by mouth.     ? BASAGLAR KWIKPEN U-100 INSULIN 100 unit/mL (3 mL) pen Inject 65 Units under the skin at bedtime. Prescribed for 65 units at HS, patient adjusting based on blood sugars     ? diphenhydrAMINE-acetaminophen (TYLENOL PM EXTRA STRENGTH)  mg Tab Take 2 tablets by mouth at bedtime as needed.      ? valACYclovir (VALTREX) 1000 MG tablet Take 1,000 mg by mouth 3 (three) times a day.       Current  Facility-Administered Medications   Medication Dose Route Frequency Provider Last Rate Last Dose   ? Study Drug pemafibrate 0.2 mg/placebo tablet 0.2 mg (PROMINENT)  0.2 mg Oral BID Eduard Tang, RN       ? Study Drug pemafibrate 0.2 mg/placebo tablet 0.2 mg (PROMINENT)  0.2 mg Oral BID Eduard Tang, RN        No Known Allergies      Lab Results    Chemistry/lipid CBC Cardiac Enzymes/BNP/TSH/INR   Lab Results   Component Value Date    CHOL 93 12/12/2019    HDL 27 (L) 12/12/2019    LDLCALC 24 12/12/2019    TRIG 208 (H) 12/12/2019    CREATININE 0.86 03/12/2020    BUN 26 03/12/2020    K 4.0 03/12/2020     03/12/2020     03/12/2020    CO2 25 03/12/2020    Lab Results   Component Value Date    WBC 6.9 09/27/2019    HGB 12.4 (L) 09/27/2019    HCT 37.8 (L) 09/27/2019     09/27/2019     09/27/2019    Lab Results   Component Value Date    CKTOTAL 168 07/10/2019    CKMB 4 12/19/2012    TROPONINI 0.02 09/20/2019    BNP 1,838 (H) 03/12/2020    TSH 1.43 09/19/2019    INR 1.30 (H) 07/09/2019        This note has been dictated using voice recognition software. Any grammatical, typographical, or context distortions are unintentional and inherent to the software

## 2021-06-28 NOTE — PROGRESS NOTES
Progress Notes by Patricia Girard CNP at 9/16/2019  8:30 AM     Author: Patricia Girard CNP Service: -- Author Type: Nurse Practitioner    Filed: 9/16/2019 10:07 AM Encounter Date: 9/16/2019 Status: Signed    : Patricia Girard CNP (Nurse Practitioner)           Click to link to [facility] HEART [facility] ELECTROPHYSIOLOGY NOTE      Assessment/Recommendations   Assessment/Plan:    Diagnoses and all orders for this visit:    Persistent atrial fibrillation (H)  Prior device interrogation was reviewed by Dr. Pool.  50% success rate with PVI quoted to patient.  After discussion of risks and benefits of PVI versus AV node ablation, patient and his wife are agreeable to proceed with AV node ablation as patient already has a biventricular device in place.  As his sotalol is not working, that has been discontinued.    I will check with nursing staff on timing of AV node ablation.  If this is going to be scheduled more than a few weeks out, we will change his carvedilol to metoprolol for help with better rate control.  Continue digoxin.    Acute on chronic systolic heart failure (H)  Increase Lasix to 80 mg twice daily, add potassium for 2 weeks.  If he returns to his baseline weight of 170 to 172 pounds he will return back to 40 mg twice daily.  He will have BMP and BNP in 2 weeks unless we are scheduling AV node ablation.  Continue carvedilol and Entresto.    Coronary artery disease involving native coronary artery of native heart without angina pectoris  Patient is now off of triple antiplatelet therapy, aspirin has been discontinued.  Continue Plavix, statin, and beta blocking therapy    Hypertriglyceridemia  Patient is involved in [employer] research study.  Patient and his wife are questioning if that medication could be causing heart failure or A. fib.  That is not the nature of this class of medications, however I will confirm with research  study.    Orthopnea  Patient has multiple signs of decompensation of heart failure, likely related to atrial fibrillation and only 7% biventricular pacing ability.      [id number] score of 4 and on Eliquis.  Follow up after AV node ablation.     History of Present Illness    Mr. Ken Doss is a very pleasant 73 y.o. male who comes in today with his wife Carrol to discuss now persistent atrial fibrillation.  Ken Doss has a known history of coronary artery disease post PCI most recently in January 2019 to mid LAD and RCA.  He also has ischemic cardiomyopathy, status post ICD implanted 11/26/2014.  He has had his LV lead revised, currently functioning appropriately.    In July, patient was noted to now have persistent atrial fibrillation.  He has been maintained since approximately 2014 on sotalol 40 mg twice daily.  Now that he is in persistent atrial fibrillation his biventricular pacing has decreased from 89% to 7%.  Patient's wife, patient and myself discussed pathophysiology of atrial fibrillation, he is protected from stroke risk with Eliquis.  We also discussed possibility of treatment of A. fib to increase use of biventricular device.  We did discuss PVI, risks and benefits.  Patient was quoted approximately 50% success rate with PVI.  Additionally, this would be done under general anesthesia in a patient with cardiomyopathy and currently in heart failure.  We also discussed AV node ablation.  Patient already has CRT in place.  We also discussed benefit to CRT with a left bundle branch block and dilated cardiomyopathy.    Patient has gained 10 pounds over the last 2 weeks.  He has symptoms of orthopnea and therefore insomnia, shortness of breath with activity, abdominal bloating, lower extremity edema.  Also reports nosebleed, he has been taken off of his aspirin.      He  denies chest pain, syncope or near syncope.  No lightheadedness or dizziness.    Cardiographics (personally  reviewed):  Results for orders placed during the hospital encounter of 03/19/19   Echo Complete [ECH10] 03/19/2019    Narrative   When compared to the previous study dated 5/23/2017, no significant   change.    Left ventricle ejection fraction is severely decreased. The estimated   left ventricular ejection fraction is 20-25%.    Normal right ventricular size and systolic function.    Mild to moderate mitral regurgitation    Device lead in right heart chambers          Results for orders placed during the hospital encounter of 05/17/19   NM Pharmacologic Stress Test    Narrative   The pharmacologic nuclear stress test is abnormal.    There is a medium sized area of nontransmural infarction in the inferior   and inferoseptal segment(s) of the left ventricle; no ischemia is   detected.    Left ventricular enlargement is noted.    The left ventricular ejection fraction is 27% with inferior akinesis and   global hypokinesis out of proportion to the area of infarction.    When compared to the images of 1/9/2019, inferior ischemia is no longer   detected and the ejection fraction has improved from 15% to 27%.          Device check shows leads stable and battery ok.  Device functioning appropriately.   Ventricular pacing reads 7% in the RV, 96% in the LV (however overreading), true BiV pacing 7%.  AT/AFib burden 99.3%, increased since 7/3/19.  AF with RVR on multiple occasion. Histograms show  bpm. No ventr arrhythmias.    Results for orders placed or performed in visit on 08/20/19   ECG 12 lead   Result Value Ref Range    SYSTOLIC BLOOD PRESSURE  mmHg    DIASTOLIC BLOOD PRESSURE  mmHg    VENTRICULAR RATE 59 BPM    ATRIAL RATE  BPM    P-R INTERVAL  ms    QRS DURATION 130 ms    Q-T INTERVAL 420 ms    QTC CALCULATION (BEZET) 415 ms    P Axis  degrees    R AXIS -39 degrees    T AXIS 136 degrees    MUSE DIAGNOSIS       Atrial fibrillation with a demand pacemaker  Abnormal ECG  When compared with ECG of 09-JUL-2019  09:53,  Atrial fibrillation has replaced Sinus tachycardia  Vent. rate has decreased BY  43 BPM  Confirmed by ASAEL CALIXTO MD LOC:[facility] ([id number]) on 8/20/2019 4:16:42 PM            Problem List:  Patient Active Problem List   Diagnosis   ? Type 2 diabetes mellitus (H)   ? Depression   ? Coronary artery disease involving native coronary artery   ? Persistent atrial fibrillation (H)   ? Biventricular ICD, in situ   ? Heart failure with reduced ejection fraction (H)   ? Hypertriglyceridemia   ? Wide-complex tachycardia (H)   ? Nonischemic cardiomyopathy (H)   ? Abnormal chest CT   ? Pulmonary fibrosis, unspecified (H)   ? Mediastinal adenopathy   ? ILD (interstitial lung disease) (H)   ? Lymphadenopathy, mediastinal   ? Pulmonary anthracosis (H)   ? Orthopnea       Physical Examination Review of Systems   Vitals:    09/16/19 0801   BP: 96/60   Pulse: 96   Resp: 24     Body mass index is 26.49 kg/m .  Wt Readings from Last 3 Encounters:   09/16/19 182 lb (82.6 kg)   08/20/19 173 lb (78.5 kg)   08/13/19 175 lb (79.4 kg)     General Appearance:   Alert, well-appearing and in no acute distress.   HEENT: Atraumatic, normocephalic.  No scleral icterus, normal conjunctivae; mucous membranes pink and moist.     Chest: Chest symmetric   Lungs:   Respirations increased labor with talking Lungs are clear to auscultation, diminished RLL.   Cardiovascular:   Normal first and second heart sounds with soft ejection murmur.  Irregularly irregular.  Mild JVD, bilateral lower extremity edema, nonpitting both feet up through ankles.   Abdomen:  Soft, nontender, nondistended   Extremities: No cyanosis or clubbing   Musculoskeletal: Moves all extremities   Neurologic: Mood and affect are appropriate, alert and oriented to person, place, time, and situation    General: WNL  Eyes: WNL  Ears/Nose/Throat: WNL  Lungs: Shortness of Breath  Heart: Shortness of Breath with activity  Stomach: WNL  Bladder: WNL  Muscle/Joints: WNL  Skin: WNL  Nervous  System: WNL  Mental Health: WNL     Blood: WNL       Medical History  Surgical History Family History Social History   Past Medical History:   Diagnosis Date   ? Abdominal pain    ? Acute renal failure (H)    ? Arthritis     osteoarthritis   ? Atrial fibrillation (H) 10/29/2014   ? Chronic systolic heart failure (H) Dec 2012   ? Coronary artery disease involving native coronary artery     Non obstructive disease by cath in 2007 Cor angio Nov 2016: RCA 70% (FFR 0.89), and OM1 75%      ? Depressive disorder, not elsewhere classified 10/24/2014   ? Diabetes mellitus, type II (H) 28/Jan/2013   ? Diabetic neuropathy (H)    ? Dyslipidemia 2007   ? Fractures     ankle, leg and arm   ? Hypertension    ? 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   ? ICD (implantable cardioverter-defibrillator) lead failure 11/26/2014    High voltage lead tip inserted in RV free wall RV lead extraction and implantation of the new septal RV lead November 2014    ? ILD (interstitial lung disease) (H)    ? Infectious mononucleosis    ? Ischemic cardiomyopathy 4/22/2015    Chronic: LVEF 25- 30% LVEF 26% echo May 2017   ? Kidney stone    ? LBBB (left bundle branch block)     noted on Dec 19, 2012   ? Lymphadenopathy, mediastinal    ? Myocardial infarction (H)    ? Osteoarthritis    ? Pulmonary anthracosis (H)    ? Recurrent kidney stones     Past Surgical History:   Procedure Laterality Date   ? APPENDECTOMY     ? CARDIAC CATHETERIZATION N/A 12/12/2016    Procedure: Coronary Angiogram;  Surgeon: Delgado Ramirez MD;  Location: [facility] Cath Lab;  Service:    ? CV CORONARY ANGIOGRAM N/A 1/15/2019    Procedure: Coronary Angiogram;  Surgeon: Mason Correa MD;  Location: [facility] Cath Lab;  Service: Cardiology   ? CV LEFT HEART CATHETERIZATION WO LEFT VETRICULOGRAM Left 1/15/2019    Procedure: Left Heart Catheterization Without Left Ventriculogram;  Surgeon: Mason Correa MD;  Location: [facility]  Cath Lab;  Service: Cardiology   ? EP ICD INSERT      ICD REIMPLANTATION OF A NEW RV LEAD 11/26/2014   ? KNEE ARTHROSCOPY Bilateral    ? CA L HRT CATH W/NJX L VENTRICULOGRAPHY IMG S&I Left 12/12/2016    Procedure: Left Heart Catheterization with Left Ventriculogram;  Surgeon: Delgado Ramirez MD;  Location: [facility] Cath Lab;  Service: Cardiology   ? CA REMOVE TONSILS/ADENOIDS,<11 Y/O      Description: Tonsillectomy With Adenoidectomy;  Recorded: 06/10/2014;   ? CA SHLDR ARTHROSCOP,DIAGNOSTIC      Description: Arthroscopy Shoulder;  Recorded: 06/10/2014;   ? REPLACEMENT TOTAL KNEE      bilat   ? ROTATOR CUFF REPAIR      right   ? TONSILLECTOMY AND ADENOIDECTOMY     ? US LYMPH NODE BIOPSY  7/10/2019    Family History   Problem Relation Age of Onset   ? Sudden death Mother         unexpected death in her sleep in her 50s    Social History     Socioeconomic History   ? Marital status:      Spouse name: Not on file   ? Number of children: Not on file   ? Years of education: Not on file   ? Highest education level: Not on file   Occupational History   ? Not on file   Social Needs   ? Financial resource strain: Not on file   ? Food insecurity:     Worry: Not on file     Inability: Not on file   ? Transportation needs:     Medical: Not on file     Non-medical: Not on file   Tobacco Use   ? Smoking status: Never Smoker   ? Smokeless tobacco: Never Used   ? Tobacco comment: cigars few times a year only   Substance and Sexual Activity   ? Alcohol use: Yes     Comment: RARE   ? Drug use: No   ? Sexual activity: Not on file   Lifestyle   ? Physical activity:     Days per week: Not on file     Minutes per session: Not on file   ? Stress: Not on file   Relationships   ? Social connections:     Talks on phone: Not on file     Gets together: Not on file     Attends [employer] service: Not on file     Active member of club or organization: Not on file     Attends meetings of clubs or organizations: Not on file      Relationship status: Not on file   ? Intimate partner violence:     Fear of current or ex partner: Not on file     Emotionally abused: Not on file     Physically abused: Not on file     Forced sexual activity: Not on file   Other Topics Concern   ? Not on file   Social History Narrative   ? Not on file          Medications  Allergies   Current Outpatient Medications   Medication Sig Dispense Refill   ? ACCU-CHEK SMARTVIEW TEST STRIP strips      ? acetaminophen (TYLENOL) 500 MG tablet Take 500 mg by mouth every 6 (six) hours as needed for pain.     ? apixaban (ELIQUIS) 5 mg Tab tablet Take 1 tablet (5 mg total) by mouth 2 (two) times a day. 180 tablet 0   ? atorvastatin (LIPITOR) 40 MG tablet TAKE 1 TABLET BY MOUTH AT BEDTIME 90 tablet 1   ? BASAGLAR KWIKPEN U-100 INSULIN 100 unit/mL (3 mL) pen Inject 65 Units under the skin at bedtime.            ? carvedilol (COREG) 6.25 MG tablet Take 1 tablet (6.25 mg total) by mouth 2 (two) times a day with meals. Take with 12.5 mg tablet by mouth BID 60 tablet 3   ? clopidogrel (PLAVIX) 75 mg tablet TAKE 1 TABLET BY MOUTH ONCE DAILY 90 tablet 1   ? coenzyme Q10 (COQ-10) 100 mg capsule Take 1 capsule (100 mg total) by mouth daily.  0   ? digoxin (LANOXIN) 125 mcg tablet Take 1 tablet (125 mcg total) by mouth daily with supper. 60 tablet 0   ? diphenhydrAMINE-acetaminophen (TYLENOL PM EXTRA STRENGTH)  mg Tab Take 1 tablet by mouth at bedtime as needed.     ? furosemide (LASIX) 40 MG tablet Take 1 tablet (40 mg total) by mouth daily. (Patient taking differently: Take 40 mg by mouth 2 (two) times a day at 9am and 6pm.       ) 190 tablet 1   ? glucosamine-chondroitin 500-400 mg cap Take 1 capsule by mouth daily. 1500mg/1200mg once daily     ? metFORMIN (GLUCOPHAGE) 1000 MG tablet Take 1,000 mg by mouth 2 (two) times a day with meals.     ? multivitamin therapeutic (THERAGRAN) tablet Take 1 tablet by mouth daily.     ? sacubitril-valsartan (ENTRESTO) 24-26 mg Tab tablet Take 1  tablet by mouth 2 (two) times a day. 180 tablet 0   ? Study Drug pemafibrate 0.2 mg/placebo (PROMINENT) tablet Take 0.2 mg by mouth 2 (two) times a day.     ? potassium chloride (K-DUR,KLOR-CON) 20 MEQ tablet Take 1 tablet (20 mEq total) by mouth daily. 30 tablet 1     Current Facility-Administered Medications   Medication Dose Route Frequency Provider Last Rate Last Dose   ? Study Drug pemafibrate 0.2 mg/placebo tablet 0.2 mg (PROMINENT)  0.2 mg Oral BID Eduard Tang RN          No Known Allergies   Medical, surgical, family, social history, and medications were all reviewed and updated as necessary.   Lab Results    Chemistry CBC/INR CHOLESTROL   Lab Results   Component Value Date    CREATININE 1.23 08/01/2019    BUN 33 (H) 08/01/2019     08/01/2019    K 3.9 08/01/2019     08/01/2019    CO2 22 08/01/2019     Creatinine (mg/dL)   Date Value   08/01/2019 1.23   07/11/2019 0.97   07/10/2019 1.09   07/09/2019 1.62 (H)     Lab Results   Component Value Date    BNP 1,007 (H) 07/09/2019    Lab Results   Component Value Date    WBC 9.1 07/11/2019    HGB 10.4 (L) 07/11/2019    HCT 30.5 (L) 07/11/2019    MCV 95 07/11/2019     07/11/2019     Lab Results   Component Value Date    INR 1.30 (H) 07/09/2019      Lab Results   Component Value Date    CHOL 140 02/14/2019    HDL 27 (L) 02/14/2019    LDLCALC 69 02/14/2019    TRIG 222 (H) 02/14/2019          This note has been dictated using voice recognition software. Any grammatical, typographical, or context distortions are unintentional and inherent to the software.    MARCELL Koroma  [facility]   Electrophysiology  [phone]

## 2021-06-28 NOTE — PROGRESS NOTES
Progress Notes by Linda Hardin CNP at 11/6/2019  7:50 AM     Author: Linda Hardin CNP Service: -- Author Type: Nurse Practitioner    Filed: 11/6/2019  8:29 AM Encounter Date: 11/6/2019 Status: Signed    : Linda Hardin CNP (Nurse Practitioner)       .        Assessment/Recommendations   Assessment:    1. Ischemic cardiomyopathy, heart failure with reduced ejection fraction, ejection fraction 20-25%: He has no symptoms of acute heart failure.  We discussed signs of worsening heart failure and when to take extra Lasix and when to call the clinic.  Medication titration has been limited due to hypotension in the past.    2.  Atrial fibrillation: Status post AV node ablation on September 27, 2019 with Dr. Pool.  Skyler states that he feels better since ablation.  He continues to take Eliquis.    3.  Coronary artery disease: PCI in January 2019.  He denies any chest pain.    4.  CRT-P: Device appointment on October 31.  Lower rate reprogrammed to 70 bpm.  Biventricular pacing at 90%.    Plan:  1.   Heart failure medications:  - Beta blocker therapy with carvedilol 12.5 mg twice daily  - ARNI therapy with Entresto 24/26 mg twice daily  - Digoxin 125 mcg daily  - Diuretic therapy with furosemide 40 mg daily.  Extra 40 mg every 2 to 3 days.  2.  Continue daily weights  3.  Low-sodium diet  4.  No titration of heart failure medications at this time.  He has a history of hypotension.    Skyler will follow-up in the heart failure clinic in 6 months.        History of Present Illness/Subjective    Mr. Ken Doss is a 73 y.o. male seen at [facility] heart failure clinic today for continued follow-up.  He has a history of ischemic cardiomyopathy, heart failure with reduced ejection fraction, coronary artery disease, dyslipidemia, CRT-D, atrial fibrillation, pulmonary fibrosis, and diabetes.  He was hospitalized September 19 to September 21, 2019 with acute on chronic heart  failure.  He had increased sodium intake and had not been taking his afternoon Lasix dose.  He then had an AV node ablation on September 27, 2019.  Echocardiogram from March 2019 showed ejection fraction of 20 to 25% and mild to moderate mitral regurgitation.    Skyler continues to do well with no symptoms of acute heart failure.  He is taking Lasix 40 mg daily and an extra 40 mg as needed every 2 to 3 days.  He denies lightheadedness, shortness of breath, dyspnea on exertion, orthopnea, chest pain and lower extremity edema.      His weight has now stabilized at 170 pounds.    ECHO:   Results for orders placed during the hospital encounter of 03/19/19   Echo Complete [ECH10] 03/19/2019    Narrative   When compared to the previous study dated 5/23/2017, no significant   change.    Left ventricle ejection fraction is severely decreased. The estimated   left ventricular ejection fraction is 20-25%.    Normal right ventricular size and systolic function.    Mild to moderate mitral regurgitation    Device lead in right heart chambers           Physical Examination Review of Systems   Vitals:    11/06/19 0802   BP: 118/66   Pulse: 80   Resp: 16     Body mass index is 25.25 kg/m .  Wt Readings from Last 3 Encounters:   11/06/19 171 lb (77.6 kg)   10/31/19 171 lb 6.4 oz (77.7 kg)   10/10/19 164 lb 8 oz (74.6 kg)       General Appearance:     Alert, cooperative and in no acute distress.   ENT/Mouth: membranes moist, no oral lesions or bleeding gums.      EYES:  no scleral icterus, normal conjunctivae   Chest/Lungs:   lungs are clear to auscultation, no rales or wheezing, respirations unlabored   Cardiovascular:   Regular. Normal first and second heart sounds, no edema bilateral lower extremities    Abdomen:  Soft, nontender, nondistended, bowel sounds present   Extremities: no cyanosis or clubbing   Skin: warm, dry.    Neurologic: mood and affect are appropriate, alert and oriented x3      General: WNL  Eyes:  WNL  Ears/Nose/Throat: WNL  Lungs: WNL  Heart: WNL  Stomach: WNL  Bladder: WNL  Muscle/Joints: WNL  Skin: WNL  Nervous System: WNL  Mental Health: WNL     Blood: WNL     Medical History  Surgical History Family History Social History   Past Medical History:   Diagnosis Date   ? Abdominal pain    ? Acute renal failure (H)    ? Arthritis     osteoarthritis   ? CHF (congestive heart failure) (H)    ? Chronic systolic heart failure (H) Dec 2012   ? Coronary artery disease involving native coronary artery     Non obstructive disease by cath in 2007 Cor angio Nov 2016: RCA 70% (FFR 0.89), and OM1 75%      ? Depressive disorder, not elsewhere classified 10/24/2014   ? Diabetes mellitus, type II (H) 28/Jan/2013   ? Diabetic neuropathy (H)    ? Dyslipidemia 2007   ? Fractures     ankle, leg and arm   ? Hypertension    ? 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   ? ICD (implantable cardioverter-defibrillator) lead failure 11/26/2014    High voltage lead tip inserted in RV free wall RV lead extraction and implantation of the new septal RV lead November 2014    ? ILD (interstitial lung disease) (H)    ? Infectious mononucleosis    ? Ischemic cardiomyopathy 4/22/2015    Chronic: LVEF 25- 30% LVEF 26% echo May 2017   ? Kidney stone    ? LBBB (left bundle branch block)     noted on Dec 19, 2012   ? Lymphadenopathy, mediastinal    ? Myocardial infarction (H)    ? Osteoarthritis    ? Pulmonary anthracosis (H)    ? Recurrent kidney stones     Past Surgical History:   Procedure Laterality Date   ? APPENDECTOMY     ? BIV ICD  11/26/2014    biotronic   ? CARDIAC CATHETERIZATION N/A 12/12/2016    Procedure: Coronary Angiogram;  Surgeon: Delgado Ramirez MD;  Location: [facility] Cath Lab;  Service:    ? CV CORONARY ANGIOGRAM N/A 1/15/2019    Procedure: Coronary Angiogram;  Surgeon: Mason Correa MD;  Location: [facility] Cath Lab;  Service: Cardiology   ? CV LEFT HEART CATHETERIZATION WO LEFT  VETRICULOGRAM Left 1/15/2019    Procedure: Left Heart Catheterization Without Left Ventriculogram;  Surgeon: Mason Correa MD;  Location: [facility] Cath Lab;  Service: Cardiology   ? EP ABLATION AV NODE N/A 9/27/2019    Procedure: EP Ablation AV Node;  Surgeon: Markus Pool MD;  Location: [facility] Cath Lab;  Service: Cardiology   ? EP ICD INSERT      ICD REIMPLANTATION OF A NEW RV LEAD 11/26/2014   ? INSERT / REPLACE / REMOVE PACEMAKER     ? KNEE ARTHROSCOPY Bilateral    ? IL L HRT CATH W/NJX L VENTRICULOGRAPHY IMG S&I Left 12/12/2016    Procedure: Left Heart Catheterization with Left Ventriculogram;  Surgeon: Delgado Ramirez MD;  Location: [facility] Cath Lab;  Service: Cardiology   ? IL REMOVE TONSILS/ADENOIDS,<13 Y/O      Description: Tonsillectomy With Adenoidectomy;  Recorded: 06/10/2014;   ? IL SHLDR ARTHROSCOP,DIAGNOSTIC      Description: Arthroscopy Shoulder;  Recorded: 06/10/2014;   ? REPLACEMENT TOTAL KNEE      bilat   ? ROTATOR CUFF REPAIR      right   ? TONSILLECTOMY AND ADENOIDECTOMY     ? US LYMPH NODE BIOPSY  7/10/2019    Family History   Problem Relation Age of Onset   ? Sudden death Mother         unexpected death in her sleep in her 50s    Social History     Socioeconomic History   ? Marital status:      Spouse name: Not on file   ? Number of children: Not on file   ? Years of education: Not on file   ? Highest education level: Not on file   Occupational History   ? Not on file   Social Needs   ? Financial resource strain: Not on file   ? Food insecurity:     Worry: Not on file     Inability: Not on file   ? Transportation needs:     Medical: Not on file     Non-medical: Not on file   Tobacco Use   ? Smoking status: Never Smoker   ? Smokeless tobacco: Never Used   ? Tobacco comment: cigars few times a year only   Substance and Sexual Activity   ? Alcohol use: Yes     Comment: RARE   ? Drug use: No   ? Sexual activity: Not on file   Lifestyle   ? Physical activity:      Days per week: Not on file     Minutes per session: Not on file   ? Stress: Not on file   Relationships   ? Social connections:     Talks on phone: Not on file     Gets together: Not on file     Attends [employer] service: Not on file     Active member of club or organization: Not on file     Attends meetings of clubs or organizations: Not on file     Relationship status: Not on file   ? Intimate partner violence:     Fear of current or ex partner: Not on file     Emotionally abused: Not on file     Physically abused: Not on file     Forced sexual activity: Not on file   Other Topics Concern   ? Not on file   Social History Narrative   ? Not on file          Medications  Allergies   Current Outpatient Medications   Medication Sig Dispense Refill   ? ACCU-CHEK SMARTVIEW TEST STRIP strips      ? acetaminophen (TYLENOL) 500 MG tablet Take 500 mg by mouth every 6 (six) hours as needed for pain.     ? apixaban (ELIQUIS) 5 mg Tab tablet Take 1 tablet (5 mg total) by mouth 2 (two) times a day. 180 tablet 0   ? atorvastatin (LIPITOR) 40 MG tablet TAKE 1 TABLET BY MOUTH AT BEDTIME 90 tablet 1   ? BASAGLAR KWIKPEN U-100 INSULIN 100 unit/mL (3 mL) pen Inject 65 Units under the skin at bedtime.            ? carvedilol (COREG) 6.25 MG tablet Take 2 tablets (12.5 mg total) by mouth 2 (two) times a day with meals. Take with 12.5 mg tablet by mouth  tablet 3   ? clopidogrel (PLAVIX) 75 mg tablet TAKE 1 TABLET BY MOUTH ONCE DAILY 90 tablet 1   ? coenzyme Q10 (COQ-10) 100 mg capsule Take 1 capsule (100 mg total) by mouth daily.  0   ? digoxin (LANOXIN) 125 mcg tablet TAKE 1 TABLET BY MOUTH ONCE DAILY WITH SUPPER 90 tablet 1   ? diphenhydrAMINE-acetaminophen (TYLENOL PM EXTRA STRENGTH)  mg Tab Take 1 tablet by mouth at bedtime as needed.     ? ENTRESTO 24-26 mg Tab tablet TAKE 1 TABLET BY MOUTH TWICE DAILY 180 tablet 1   ? furosemide (LASIX) 40 MG tablet Take 40 mg by mouth daily.     ? glucosamine-chondroitin 500-400 mg  cap Take 1 capsule by mouth daily. 1500mg/1200mg once daily     ? metFORMIN (GLUCOPHAGE) 1000 MG tablet Take 1,000 mg by mouth 2 (two) times a day with meals.     ? multivitamin therapeutic (THERAGRAN) tablet Take 1 tablet by mouth daily.     ? potassium chloride (K-DUR,KLOR-CON) 20 MEQ tablet Take 1 tablet (20 mEq total) by mouth daily. 90 tablet 3   ? predniSONE (DELTASONE) 20 MG tablet Take 20 mg by mouth daily.  0   ? Study Drug pemafibrate 0.2 mg/placebo (PROMINENT) tablet Take 0.2 mg by mouth 2 (two) times a day.       Current Facility-Administered Medications   Medication Dose Route Frequency Provider Last Rate Last Dose   ? Study Drug pemafibrate 0.2 mg/placebo tablet 0.2 mg (PROMINENT)  0.2 mg Oral BID Eduard Tang, RN        No Known Allergies      Lab Results    Chemistry/lipid CBC Cardiac Enzymes/BNP/TSH/INR   Lab Results   Component Value Date    CHOL 140 02/14/2019    HDL 27 (L) 02/14/2019    LDLCALC 69 02/14/2019    TRIG 222 (H) 02/14/2019    CREATININE 1.22 10/10/2019    BUN 58 (H) 10/10/2019    K 4.2 10/10/2019     (L) 10/10/2019     10/10/2019    CO2 20 (L) 10/10/2019    Lab Results   Component Value Date    WBC 6.9 09/27/2019    HGB 12.4 (L) 09/27/2019    HCT 37.8 (L) 09/27/2019     09/27/2019     09/27/2019    Lab Results   Component Value Date    CKTOTAL 168 07/10/2019    CKMB 4 12/19/2012    TROPONINI 0.02 09/20/2019    BNP 1,333 (H) 09/19/2019    TSH 1.43 09/19/2019    INR 1.30 (H) 07/09/2019

## 2021-06-28 NOTE — PROGRESS NOTES
Progress Notes by Mary Jane Vigil MD at 11/12/2019  8:00 AM     Author: Mary Jane Vigil MD Service: -- Author Type: Physician    Filed: 11/12/2019  8:26 AM Encounter Date: 11/12/2019 Status: Signed    : Mary Jane Vigil MD (Physician)           [facility]  Heart Care Clinic Follow-up Note    Assessment & Plan        1. Nonischemic cardiomyopathy (H)  in past ejection fraction 60% and now diminished based on stress testing and echo. Felt to be nonischemic.  On carvedilol and Entresto.  Has biventricular ICD in place with frequent ventricular pacing.  If symptoms do not improve will need to consider upgrading to LVAD.  Could be sarcoid related cardiomyopathy and unable to obtain MRI given ICD.  Plan is for PET scan at the [facility] looking for cardiac sarcoid.  Status post AV marianna ablation with permanent pacing, will recheck limited echo to reevaluate ejection fraction.   2. Coronary artery disease involving native coronary artery of native heart without angina pectoris -angiography January 2019 showed 20% left main, mid 80% LAD lesion that received rotational atherectomy as well as drug-coated stent, circumflex normal and right coronary artery with a proximal 99% stenosis that received a drug-coated stent.  Nuclear stress test May of this year showed diminished ejection fraction with nontransmural inferior and inferoseptal scar with no significant ischemia.  Medical therapy   3. Chronic systolic heart failure (H) -no significant signs or symptoms and watch in fluid and weight and taking Lasix regularly.   4. Biventricular ICD, in situ-Biotronik device placed in 2013 with a Medtronic right atrial lead, Biotronik right ventricular lead and [facility] left ventricular lead. 96% biventricular pacing.    5. Abnormal chest CT -could be cardiac sarcoid and as above consider PET scan to rule out.   6. Persistent atrial fibrillation)-persistent and symptomatic and that it caused him go to  heart failure.  Amiodarone was related to pulmonary fibrosis.  Had AV marianna ablation and as above recheck echo.      7. Type 2 diabetes mellitus (H) -hemoglobin A1c 8.7 and defer to primary.   8.  Pure hypercholesterolemia- he is in the prominent study.    Plan  1.  Discontinue digoxin as he has had an AV marianna ablation.  2.  Follow-up with [facility] study.  3.  Recheck limited echo looking at ejection fraction.  4.  Follow-up to see if we can get the PET scan looking in cardiac sarcoid.  5.  Follow-up with me thereafter.    Subjective  CC: 73-year-old white gentleman being seen as part of the [facility] study.  He comes in with a little bit of shortness of breath and heavy activity such as walking up a hill or going up a flight of steps.  Otherwise no PND, orthopnea, chest discomfort, palpitations, significant bipedal edema, syncope or dizziness.    Medications  Current Outpatient Medications   Medication Sig   ? ACCU-CHEK SMARTVIEW TEST STRIP strips    ? acetaminophen (TYLENOL) 500 MG tablet Take 500 mg by mouth every 6 (six) hours as needed for pain.   ? apixaban (ELIQUIS) 5 mg Tab tablet Take 1 tablet (5 mg total) by mouth 2 (two) times a day.   ? atorvastatin (LIPITOR) 40 MG tablet TAKE 1 TABLET BY MOUTH AT BEDTIME   ? BASAGLAR KWIKPEN U-100 INSULIN 100 unit/mL (3 mL) pen Inject 65 Units under the skin at bedtime.          ? carvedilol (COREG) 6.25 MG tablet Take 2 tablets (12.5 mg total) by mouth 2 (two) times a day with meals. Take with 12.5 mg tablet by mouth BID   ? clopidogrel (PLAVIX) 75 mg tablet TAKE 1 TABLET BY MOUTH ONCE DAILY   ? coenzyme Q10 (COQ-10) 100 mg capsule Take 1 capsule (100 mg total) by mouth daily.   ? digoxin (LANOXIN) 125 mcg tablet TAKE 1 TABLET BY MOUTH ONCE DAILY WITH SUPPER   ? diphenhydrAMINE-acetaminophen (TYLENOL PM EXTRA STRENGTH)  mg Tab Take 1 tablet by mouth at bedtime as needed.   ? ENTRESTO 24-26 mg Tab tablet TAKE 1 TABLET BY MOUTH TWICE DAILY   ? furosemide (LASIX)  "40 MG tablet Take 40 mg by mouth daily.   ? glucosamine-chondroitin 500-400 mg cap Take 1 capsule by mouth daily. 1500mg/1200mg once daily   ? metFORMIN (GLUCOPHAGE) 1000 MG tablet Take 1,000 mg by mouth 2 (two) times a day with meals.   ? multivitamin therapeutic (THERAGRAN) tablet Take 1 tablet by mouth daily.   ? potassium chloride (K-DUR,KLOR-CON) 20 MEQ tablet Take 1 tablet (20 mEq total) by mouth daily.       Objective  /68 (Patient Site: Left Arm, Patient Position: Sitting, Cuff Size: Adult Regular)   Pulse 60   Resp 16   Ht 5' 9\" (1.753 m)   Wt 175 lb (79.4 kg)   BMI 25.84 kg/m      General Appearance:    Alert, cooperative, no distress, appears stated age   Head:    Normocephalic, without obvious abnormality, atraumatic   Throat:   Lips, mucosa, and tongue normal; teeth and gums normal   Neck:   Supple, symmetrical, trachea midline, no adenopathy;        thyroid:  No enlargement/tenderness/nodules; no carotid    bruit or JVD   Back:     Symmetric, no curvature, ROM normal, no CVA tenderness   Lungs:     Clear to auscultation bilaterally, respirations unlabored   Chest wall:    No tenderness, left-sided ICD   Heart:    Regular rate and rhythm, S1 and S2 normal, no murmur, rub   or gallop   Abdomen:     Soft, non-tender, bowel sounds active all four quadrants,     no masses, no organomegaly   Extremities:   Normal, atraumatic, no cyanosis or edema   Pulses:   2+ and symmetric all extremities   Skin:   Skin color, texture, turgor normal, no rashes or lesions     Results    Lab Results personally reviewed   Lab Results   Component Value Date    CHOL 140 02/14/2019    CHOL 110 01/15/2019     Lab Results   Component Value Date    HDL 27 (L) 02/14/2019    HDL 30 (L) 01/15/2019     Lab Results   Component Value Date    LDLCALC 69 02/14/2019    LDLCALC 45 01/15/2019     Lab Results   Component Value Date    TRIG 222 (H) 02/14/2019    TRIG 173 (H) 01/15/2019     Lab Results   Component Value Date    WBC " 6.9 09/27/2019    HGB 12.4 (L) 09/27/2019    HCT 37.8 (L) 09/27/2019     09/27/2019     Lab Results   Component Value Date    CREATININE 1.22 10/10/2019    BUN 58 (H) 10/10/2019     (L) 10/10/2019    K 4.2 10/10/2019    CO2 20 (L) 10/10/2019     Review of Systems:   General: WNL  Eyes: WNL  Ears/Nose/Throat: WNL  Lungs: WNL  Heart: WNL  Stomach: WNL  Bladder: WNL  Muscle/Joints: WNL  Skin: WNL  Nervous System: WNL  Mental Health: WNL     Blood: WNL

## 2021-06-28 NOTE — PROGRESS NOTES
Progress Notes by Mary Jane Vigil MD at 1/10/2020  8:50 AM     Author: Mary Jane Vigil MD Service: -- Author Type: Physician    Filed: 1/10/2020 10:11 AM Encounter Date: 1/10/2020 Status: Signed    : Mary Jane Vigil MD (Physician)           [facility]  Heart Care Clinic Follow-up Note    Assessment & Plan        1. Nonischemic cardiomyopathy (H)  - in past ejection fraction 60% and now diminished based on stress testing and echo. Felt to be nonischemic.  On carvedilol and Entresto.  Has biventricular ICD in place with frequent ventricular pacing.  Symptoms not improved will need to consider upgrading to LVAD, have him see Dr. Jb Muhammad.  No room to add Aldactone.  Not sarcoid related cardiomyopathy based on PET scan and unable to obtain MRI given ICD.  Status post AV marianna ablation with permanent pacing, will recheck limited echo to reevaluate ejection fraction.   2. Coronary artery disease involving native coronary artery of native heart without angina pectoris -angiography January 2019 showed 20% left main, mid 80% LAD lesion that received rotational atherectomy as well as drug-coated stent, circumflex normal and right coronary artery with a proximal 99% stenosis that received a drug-coated stent.  Nuclear stress test May of 2019 showed diminished ejection fraction with nontransmural inferior and inferoseptal scar with no significant ischemia.  Medical therapy   3. Chronic systolic heart failure (H) -significant fluid retention, back on furosemide only once a day, did have altitude sickness in [place], if he should decide to go again we will need to consider Diamox.   4. Biventricular ICD, in situ -Biotronik device placed in 2013 with Medtronic right atrial lead, Biotronik right ventricular lead and [facility] left ventricular lead now with 94% biventricular pacing, no atrial pacing giving recent ablation and will discontinue digoxin.   5. Persistent atrial fibrillation  )-persistent and symptomatic and that it caused him go to heart failure.  Amiodarone was related to pulmonary fibrosis. Had AV marianna ablation and as above recheck echo.  On Eliquis twice daily.   6. Type 2 diabetes mellitus (H) -hemoglobin A1c 8.7 and defer to primary.     Plan  1.  Recheck limited echo.  2.  Given AV marianna ablation discontinue digoxin.  3.  Discontinue clopidogrel since it has been a year since his stenting.  4.  Follow-up with [facility] for possible LVAD.  Will most likely need cardiopulmonary stress testing.  5.  See me thereafter.    Subjective  CC: 74-year-old white gentleman here for follow-up, he is here with his wife.  They tried to go to [facility], he was extremely short of breath, nauseated, had diarrhea, had no appetite.  Since returning to lower altitude the symptoms resolved but he still has some shortness of breath with rare PND and orthopnea.  There is no bipedal edema, chest discomfort, syncope, dizziness or orthopnea.    Medications  Current Outpatient Medications   Medication Sig   ? ACCU-CHEK SMARTVIEW TEST STRIP strips    ? acetaminophen (TYLENOL) 500 MG tablet Take 500 mg by mouth every 6 (six) hours as needed for pain.   ? apixaban (ELIQUIS) 5 mg Tab tablet Take 1 tablet (5 mg total) by mouth 2 (two) times a day.   ? atorvastatin (LIPITOR) 40 MG tablet TAKE 1 TABLET BY MOUTH AT BEDTIME   ? BASAGLAR KWIKPEN U-100 INSULIN 100 unit/mL (3 mL) pen Inject 65 Units under the skin at bedtime. Prescribed for 65 units at , patient adjusting based on blood sugars   ? carvedilol (COREG) 6.25 MG tablet Take 2 tablets (12.5 mg total) by mouth 2 (two) times a day with meals. Take with 12.5 mg tablet by mouth BID   ? clopidogrel (PLAVIX) 75 mg tablet TAKE 1 TABLET BY MOUTH ONCE DAILY   ? coenzyme Q10 (COQ-10) 100 mg capsule Take 1 capsule (100 mg total) by mouth daily.   ? digoxin (LANOXIN) 125 mcg tablet TAKE 1 TABLET BY MOUTH ONCE DAILY WITH SUPPER   ?  "diphenhydrAMINE-acetaminophen (TYLENOL PM EXTRA STRENGTH)  mg Tab Take 2 tablets by mouth at bedtime as needed.    ? ENTRESTO 24-26 mg Tab tablet TAKE 1 TABLET BY MOUTH TWICE DAILY   ? furosemide (LASIX) 40 MG tablet TAKE 1 TABLET BY MOUTH TWICE DAILY   ? glucosamine-chondroitin 500-400 mg cap Take 1 capsule by mouth daily. 1500mg/1200mg once daily   ? metFORMIN (GLUCOPHAGE) 1000 MG tablet Take 1,000 mg by mouth 2 (two) times a day with meals.   ? multivitamin therapeutic (THERAGRAN) tablet Take 1 tablet by mouth daily.   ? potassium chloride (K-DUR,KLOR-CON) 20 MEQ tablet Take 1 tablet (20 mEq total) by mouth daily.       Objective  /60 (Patient Site: Right Arm, Patient Position: Sitting, Cuff Size: Adult Regular)   Pulse 60   Resp 16   Ht 5' 9\" (1.753 m)   Wt 160 lb (72.6 kg)   BMI 23.63 kg/m      General Appearance:    Alert, cooperative, no distress, appears stated age   Head:    Normocephalic, without obvious abnormality, atraumatic   Throat:   Lips, mucosa, and tongue normal; teeth and gums normal   Neck:   Supple, symmetrical, trachea midline, no adenopathy;        thyroid:  No enlargement/tenderness/nodules; no carotid    bruit or JVD   Back:     Symmetric, no curvature, ROM normal, no CVA tenderness   Lungs:     Clear to auscultation bilaterally, respirations unlabored   Chest wall:    No tenderness or deformity   Heart:    Regular rate and rhythm, S1 and S2 normal, no murmur, rub   definite S3 present   Abdomen:     Soft, non-tender, bowel sounds active all four quadrants,     no masses, no organomegaly   Extremities:   Normal, atraumatic, no cyanosis or edema   Pulses:   2+ and symmetric all extremities   Skin:   Skin color, texture, turgor normal, no rashes or lesions     Results    Lab Results personally reviewed   Lab Results   Component Value Date    CHOL 93 12/12/2019    CHOL 140 02/14/2019     Lab Results   Component Value Date    HDL 27 (L) 12/12/2019    HDL 27 (L) 02/14/2019 "     Lab Results   Component Value Date    LDLCALC 24 12/12/2019    LDLCALC 69 02/14/2019     Lab Results   Component Value Date    TRIG 208 (H) 12/12/2019    TRIG 222 (H) 02/14/2019     Lab Results   Component Value Date    WBC 6.9 09/27/2019    HGB 12.4 (L) 09/27/2019    HCT 37.8 (L) 09/27/2019     09/27/2019     Lab Results   Component Value Date    CREATININE 1.01 12/12/2019    BUN 21 12/12/2019     12/12/2019    K 4.6 12/12/2019    CO2 24 12/12/2019     Review of Systems:   General: WNL  Eyes: WNL  Ears/Nose/Throat: WNL  Lungs: Shortness of Breath  Heart: Shortness of Breath with activity  Stomach: WNL  Bladder: WNL  Muscle/Joints: WNL  Skin: WNL  Nervous System: WNL  Mental Health: WNL     Blood: WNL

## 2021-06-28 NOTE — PROGRESS NOTES
Progress Notes by Linda Hardin CNP at 10/10/2019  7:50 AM     Author: Linda Hardin CNP Service: -- Author Type: Nurse Practitioner    Filed: 10/10/2019  8:57 AM Encounter Date: 10/10/2019 Status: Signed    : Linda Hardin CNP (Nurse Practitioner)       .        Assessment/Recommendations   Assessment:    1. Ischemic cardiomyopathy, heart failure with reduced ejection fraction, ejection fraction 20-25%: He has no symptoms of acute heart failure.  He is on a lower dose of Lasix 40 mg daily when previously he was taking 80 mg twice daily.  It is unclear who made this medication change.  I recommended continuing the Lasix 40 mg daily since he has no symptoms of acute heart failure.  We discussed signs of worsening heart failure and when to take extra Lasix and when to call the clinic.    2.  Atrial fibrillation: Status post AV node ablation on September 27, 2019 with Dr. Pool.  Skyler states that he feels better since ablation.    3.  Coronary artery disease: PCI in January 2019.  He denies any chest pain.    Plan:  1.   Heart failure medications:  - Beta blocker therapy with carvedilol 12.5 mg twice daily  - ARNI therapy with Entresto 24/26 mg twice daily  - Digoxin 125 mcg daily  - Diuretic therapy with furosemide 40 mg daily  2.  BMP pending  3.  Low-sodium diet  4.  No titration of heart failure medications at this time.  He has a history of hypotension.    Skyler will follow-up in the heart failure clinic in early November.       History of Present Illness/Subjective    Mr. Ken Doss is a 73 y.o. male seen at [facility] heart failure clinic today for continued follow-up.  He has a history of ischemic cardiomyopathy, heart failure with reduced ejection fraction, coronary artery disease, dyslipidemia, CRT-D, atrial fibrillation, pulmonary fibrosis, and diabetes.  He was hospitalized September 19 to September 21 with acute on chronic heart failure.  He had  increased sodium intake and had not been taking his afternoon Lasix dose.  He then had an AV node ablation on September 27, 2019.  Echocardiogram from March 2019 showed ejection fraction of 20 to 25% and mild to moderate mitral regurgitation.    He states that he has been feeling better since AV node ablation.  His weight is down about 9 pounds.  Home weight is now 156 pounds.  He had a flare of gout last week and is on a prednisone taper.  Gout in left foot is improving.  He has not been taking his Lasix but restarted Lasix 40 mg daily yesterday.  It is unclear who made this change to his Lasix dose.  He has no symptoms of fluid retention.  He denies lightheadedness, shortness of breath, dyspnea on exertion, orthopnea, chest pain and lower extremity edema.        ECHO:   Results for orders placed during the hospital encounter of 03/19/19   Echo Complete [ECH10] 03/19/2019    Narrative   When compared to the previous study dated 5/23/2017, no significant   change.    Left ventricle ejection fraction is severely decreased. The estimated   left ventricular ejection fraction is 20-25%.    Normal right ventricular size and systolic function.    Mild to moderate mitral regurgitation    Device lead in right heart chambers           Physical Examination Review of Systems   Vitals:    10/10/19 0804   BP: 118/58   Pulse: 76   Resp: 16     Body mass index is 24.29 kg/m .  Wt Readings from Last 3 Encounters:   10/10/19 164 lb 8 oz (74.6 kg)   09/27/19 171 lb 6.4 oz (77.7 kg)   09/21/19 173 lb 8 oz (78.7 kg)       General Appearance:     Alert, cooperative and in no acute distress.   ENT/Mouth: membranes moist, no oral lesions or bleeding gums.      EYES:  no scleral icterus, normal conjunctivae   Chest/Lungs:   lungs are clear to auscultation, no rales or wheezing, respirations unlabored   Cardiovascular:   Regular. Normal first and second heart sounds, no edema bilateral lower extremities    Abdomen:  Soft, nontender,  nondistended, bowel sounds present   Extremities: no cyanosis or clubbing   Skin: warm, dry.    Neurologic: mood and affect are appropriate, alert and oriented x3      General: WNL  Eyes: WNL  Ears/Nose/Throat: WNL  Lungs: WNL  Heart: WNL  Stomach: WNL  Bladder: WNL  Muscle/Joints: WNL  Skin: WNL  Nervous System: WNL  Mental Health: WNL     Blood: WNL     Medical History  Surgical History Family History Social History   Past Medical History:   Diagnosis Date   ? Abdominal pain    ? Acute renal failure (H)    ? Arthritis     osteoarthritis   ? CHF (congestive heart failure) (H)    ? Chronic systolic heart failure (H) Dec 2012   ? Coronary artery disease involving native coronary artery     Non obstructive disease by cath in 2007 Cor angio Nov 2016: RCA 70% (FFR 0.89), and OM1 75%      ? Depressive disorder, not elsewhere classified 10/24/2014   ? Diabetes mellitus, type II (H) 28/Jan/2013   ? Diabetic neuropathy (H)    ? Dyslipidemia 2007   ? Fractures     ankle, leg and arm   ? Hypertension    ? 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   ? ICD (implantable cardioverter-defibrillator) lead failure 11/26/2014    High voltage lead tip inserted in RV free wall RV lead extraction and implantation of the new septal RV lead November 2014    ? ILD (interstitial lung disease) (H)    ? Infectious mononucleosis    ? Ischemic cardiomyopathy 4/22/2015    Chronic: LVEF 25- 30% LVEF 26% echo May 2017   ? Kidney stone    ? LBBB (left bundle branch block)     noted on Dec 19, 2012   ? Lymphadenopathy, mediastinal    ? Myocardial infarction (H)    ? Osteoarthritis    ? Pulmonary anthracosis (H)    ? Recurrent kidney stones     Past Surgical History:   Procedure Laterality Date   ? APPENDECTOMY     ? BIV ICD  11/26/2014    biotronic   ? CARDIAC CATHETERIZATION N/A 12/12/2016    Procedure: Coronary Angiogram;  Surgeon: Delgado Ramirez MD;  Location: [facility] Cath Lab;  Service:    ? CV CORONARY ANGIOGRAM  N/A 1/15/2019    Procedure: Coronary Angiogram;  Surgeon: Mason Correa MD;  Location: [facility] Cath Lab;  Service: Cardiology   ? CV LEFT HEART CATHETERIZATION WO LEFT VETRICULOGRAM Left 1/15/2019    Procedure: Left Heart Catheterization Without Left Ventriculogram;  Surgeon: Mason Correa MD;  Location: [facility] Cath Lab;  Service: Cardiology   ? EP ABLATION AV NODE N/A 9/27/2019    Procedure: EP Ablation AV Node;  Surgeon: Markus Pool MD;  Location: [facility] Cath Lab;  Service: Cardiology   ? EP ICD INSERT      ICD REIMPLANTATION OF A NEW RV LEAD 11/26/2014   ? INSERT / REPLACE / REMOVE PACEMAKER     ? KNEE ARTHROSCOPY Bilateral    ? HI L HRT CATH W/NJX L VENTRICULOGRAPHY IMG S&I Left 12/12/2016    Procedure: Left Heart Catheterization with Left Ventriculogram;  Surgeon: Delgado Ramirez MD;  Location: [facility] Cath Lab;  Service: Cardiology   ? HI REMOVE TONSILS/ADENOIDS,<13 Y/O      Description: Tonsillectomy With Adenoidectomy;  Recorded: 06/10/2014;   ? HI SHLDR ARTHROSCOP,DIAGNOSTIC      Description: Arthroscopy Shoulder;  Recorded: 06/10/2014;   ? REPLACEMENT TOTAL KNEE      bilat   ? ROTATOR CUFF REPAIR      right   ? TONSILLECTOMY AND ADENOIDECTOMY     ? US LYMPH NODE BIOPSY  7/10/2019    Family History   Problem Relation Age of Onset   ? Sudden death Mother         unexpected death in her sleep in her 50s    Social History     Socioeconomic History   ? Marital status:      Spouse name: Not on file   ? Number of children: Not on file   ? Years of education: Not on file   ? Highest education level: Not on file   Occupational History   ? Not on file   Social Needs   ? Financial resource strain: Not on file   ? Food insecurity:     Worry: Not on file     Inability: Not on file   ? Transportation needs:     Medical: Not on file     Non-medical: Not on file   Tobacco Use   ? Smoking status: Never Smoker   ? Smokeless tobacco: Never Used   ? Tobacco comment: cigars  few times a year only   Substance and Sexual Activity   ? Alcohol use: Yes     Comment: RARE   ? Drug use: No   ? Sexual activity: Not on file   Lifestyle   ? Physical activity:     Days per week: Not on file     Minutes per session: Not on file   ? Stress: Not on file   Relationships   ? Social connections:     Talks on phone: Not on file     Gets together: Not on file     Attends [employer] service: Not on file     Active member of club or organization: Not on file     Attends meetings of clubs or organizations: Not on file     Relationship status: Not on file   ? Intimate partner violence:     Fear of current or ex partner: Not on file     Emotionally abused: Not on file     Physically abused: Not on file     Forced sexual activity: Not on file   Other Topics Concern   ? Not on file   Social History Narrative   ? Not on file          Medications  Allergies   Current Outpatient Medications   Medication Sig Dispense Refill   ? ACCU-CHEK SMARTVIEW TEST STRIP strips      ? acetaminophen (TYLENOL) 500 MG tablet Take 500 mg by mouth every 6 (six) hours as needed for pain.     ? apixaban (ELIQUIS) 5 mg Tab tablet Take 1 tablet (5 mg total) by mouth 2 (two) times a day. 180 tablet 0   ? atorvastatin (LIPITOR) 40 MG tablet TAKE 1 TABLET BY MOUTH AT BEDTIME 90 tablet 1   ? BASAGLAR KWIKPEN U-100 INSULIN 100 unit/mL (3 mL) pen Inject 65 Units under the skin at bedtime.            ? carvedilol (COREG) 6.25 MG tablet Take 2 tablets (12.5 mg total) by mouth 2 (two) times a day with meals. Take with 12.5 mg tablet by mouth BID     ? clopidogrel (PLAVIX) 75 mg tablet TAKE 1 TABLET BY MOUTH ONCE DAILY 90 tablet 1   ? coenzyme Q10 (COQ-10) 100 mg capsule Take 1 capsule (100 mg total) by mouth daily.  0   ? digoxin (LANOXIN) 125 mcg tablet TAKE 1 TABLET BY MOUTH ONCE DAILY WITH SUPPER 90 tablet 1   ? diphenhydrAMINE-acetaminophen (TYLENOL PM EXTRA STRENGTH)  mg Tab Take 1 tablet by mouth at bedtime as needed.     ? ENTRESTO  24-26 mg Tab tablet TAKE 1 TABLET BY MOUTH TWICE DAILY 180 tablet 1   ? furosemide (LASIX) 40 MG tablet Take 40 mg by mouth daily.     ? glucosamine-chondroitin 500-400 mg cap Take 1 capsule by mouth daily. 1500mg/1200mg once daily     ? metFORMIN (GLUCOPHAGE) 1000 MG tablet Take 1,000 mg by mouth 2 (two) times a day with meals.     ? multivitamin therapeutic (THERAGRAN) tablet Take 1 tablet by mouth daily.     ? potassium chloride (K-DUR,KLOR-CON) 20 MEQ tablet Take 1 tablet (20 mEq total) by mouth daily. 30 tablet 1   ? predniSONE (DELTASONE) 20 MG tablet Take 20 mg by mouth daily.  0   ? Study Drug pemafibrate 0.2 mg/placebo (PROMINENT) tablet Take 0.2 mg by mouth 2 (two) times a day.       Current Facility-Administered Medications   Medication Dose Route Frequency Provider Last Rate Last Dose   ? Study Drug pemafibrate 0.2 mg/placebo tablet 0.2 mg (PROMINENT)  0.2 mg Oral BID Eduard Tang, RN        No Known Allergies      Lab Results    Chemistry/lipid CBC Cardiac Enzymes/BNP/TSH/INR   Lab Results   Component Value Date    CHOL 140 02/14/2019    HDL 27 (L) 02/14/2019    LDLCALC 69 02/14/2019    TRIG 222 (H) 02/14/2019    CREATININE 0.98 09/27/2019    BUN 25 09/27/2019    K 4.1 09/27/2019     09/27/2019     (H) 09/27/2019    CO2 26 09/27/2019    Lab Results   Component Value Date    WBC 6.9 09/27/2019    HGB 12.4 (L) 09/27/2019    HCT 37.8 (L) 09/27/2019     09/27/2019     09/27/2019    Lab Results   Component Value Date    CKTOTAL 168 07/10/2019    CKMB 4 12/19/2012    TROPONINI 0.02 09/20/2019    BNP 1,333 (H) 09/19/2019    TSH 1.43 09/19/2019    INR 1.30 (H) 07/09/2019

## 2021-06-29 NOTE — PROGRESS NOTES
Progress Notes by Markus Pool MD at 10/1/2020 12:50 PM     Author: Markus Pool MD Service: -- Author Type: Physician    Filed: 10/1/2020  1:27 PM Encounter Date: 10/1/2020 Status: Signed    : Markus Pool MD (Physician)         [facility]  Cardiac Electrophysiology     Progress Note: Markus Pool    Primary Care: Lewis Damon MD    Assessment:         Chronic systolic congestive heart failure: The patient underwent ICD generator change out earlier this month.  The patient currently is [place] Heart Association class II-III by symptoms.  He shows no evidence of volume overload on today's physical examination.  The patient continues to be followed by the advanced heart failure group with Dr. Tata Payton.    Permanent atrial fibrillation: Status post AV node ablation.    CRT-D device implant: The patient demonstrates normal chronic right ventricular and left ventricular lead function.  Biventricular pacing is at 95%.  No sustained ventricular arrhythmia.    Active Problems:    * No active hospital problems. *    [unfilled]      Recommendations:    No change in the patient's current device prescription.    No change in current medication regimen.    Laboratories drawn today include BMP and BNP.    Follow-up in device clinic per routine and with me in 1 year.    Follow-up with the advanced heart failure team as previously scheduled.    Subjective:  Ken Doss (74 y.o. male) returns to the arrhythmia clinic for interval reevaluation of his rhythm, device, and heart failure status.  Patient underwent recent ICD generator change out earlier this month.  He notes some mild swelling at his pocket site but no drainage, fluctuance, fever, or other signs of infection.  Overall his energy is good.  He is walking at a slow pace roughly 1 mile per day.  He reports no orthopnea PND or lower extremity edema.  He has not required any extra diuretic therapy.  His weight has  been stable within 2 to 3 pounds.  Patient reports no tachypalpitations, lightheadedness, presyncope or syncope.  He has had no perceived ICD discharge.    Current Outpatient Medications   Medication Sig Dispense Refill   ? atorvastatin (LIPITOR) 40 MG tablet TAKE 1 TABLET BY MOUTH AT BEDTIME 90 tablet 1   ? BASAGLAR KWIKPEN U-100 INSULIN 100 unit/mL (3 mL) pen Inject 40 Units under the skin at bedtime. Pt takes 40 units at bedtime     ? bumetanide (BUMEX) 1 MG tablet Take 3 mg by mouth 2 (two) times a day at 9am and 6pm.      ? carvedilol (COREG) 6.25 MG tablet Take 2 tablets (12.5 mg total) by mouth 2 (two) times a day with meals. Take with 12.5 mg tablet by mouth  tablet 3   ? coenzyme Q10 (COQ-10) 100 mg capsule Take 1 capsule (100 mg total) by mouth daily.  0   ? diphenhydrAMINE-acetaminophen (TYLENOL PM EXTRA STRENGTH)  mg Tab Take 2 tablets by mouth at bedtime as needed.      ? ELIQUIS 5 mg Tab tablet TAKE 1 TABLET BY MOUTH TWICE DAILY 180 tablet 1   ? fish oil-omega-3 fatty acids (FISH OIL) 300-1,000 mg capsule Take 2 g by mouth daily.     ? gabapentin (NEURONTIN) 100 MG capsule Take 100 mg by mouth 3 (three) times a day.     ? glucosamine-chondroitin 500-400 mg cap Take 1 capsule by mouth daily. 1500mg/1200mg once daily     ? metFORMIN (GLUCOPHAGE) 1000 MG tablet Take 1,000 mg by mouth 2 (two) times a day with meals.     ? multivitamin therapeutic (THERAGRAN) tablet Take 1 tablet by mouth daily.     ? predniSONE (DELTASONE) 10 mg tablet TAKE 3 TABLETS BY MOUTH ONCE DAILY FOR 3 DAYS THEN 2 TABS ONCE DAILY FOR 3 DAYS THEN 1 TAB ONCE DAILY FOR 3 DAYS     ? sacubitriL-valsartan (ENTRESTO) 49-51 mg Tab tablet Take 1 tablet by mouth 2 (two) times a day. 180 tablet 2   ? spironolactone (ALDACTONE) 25 MG tablet Take 0.5 tablets (12.5 mg total) by mouth daily. 45 tablet 3   ? ticagrelor (BRILINTA) 90 mg Tab Take 90 mg by mouth 2 (two) times a day.        Current Facility-Administered Medications  "  Medication Dose Route Frequency Provider Last Rate Last Dose   ? Study Drug pemafibrate 0.2 mg/placebo tablet 0.2 mg (PROMINENT)  0.2 mg Oral BID Eduard Tang RN           Review of Systems:   General: WNL  Eyes: WNL  Ears/Nose/Throat: WNL  Lungs: WNL  Heart: Shortness of Breath with activity  Stomach: WNL  Bladder: Frequent Urination at Night  Muscle/Joints: WNL  Skin: WNL  Nervous System: WNL  Mental Health: WNL     Blood: WNL    Family History  Family History   Problem Relation Age of Onset   ? Sudden death Mother         unexpected death in her sleep in her 50s       Social History   reports that he has never smoked. He has never used smokeless tobacco. He reports current alcohol use. He reports that he does not use drugs.    Objective:   Vital signs in last 24 hours:  BP 90/50 (Patient Site: Left Arm, Patient Position: Sitting, Cuff Size: Adult Regular)   Pulse 76   Resp 12   Ht 5' 9\" (1.753 m)   Wt 162 lb 12.8 oz (73.8 kg)   BMI 24.04 kg/m    Weight: Weight: 162 lb 12.8 oz (73.8 kg)     Physical Exam:  General: The patient is alert oriented to person place and situation.  The patient is in no acute distress at the time of my evaluation.  Eyes: Pupils are equal, round, and reactive to light.  Conjunctiva and sclera are clear.  ENT: Oral mucosa is moist and without redness. No evident nasal discharge.  Pulmonary: Lungs are clear bilaterally with no rales, rhonchi, or wheezes.   Chest: The left anterior chest shows a normally healing pocket incision.  There is moderate ecchymosis overlying the pocket and slightly extending to the surrounding tissue.  There is evidence of a small prior hematoma which is now liquefied and soft.  Site is nontender, not overly warm, and without any drainage.  Cardiovascular exam: Rhythm is regular. S1 and S2 are normal. No significant murmur is present. JVP is normal. Lower extremities demonstrate no significant edema. Distal pulses are intact bilaterally.  Abdomen is " flat, soft, and nontender.  Musculoskeletal: Spine is straight. Extremities without deformity.  Neuro: Gait is normal.   Skin is warm, dry, and otherwise intact.      Cardiographics:   Interrogation of the patient's biventricular ICD system demonstrates normal battery monitoring voltage.  The right ventricular and right atrial lead function is normal.  Device diagnostics show 95% biventricular paced with permanent atrial fibrillation.  No sustained ventricular arrhythmia.    Lab Results:   Lab Results   Component Value Date     09/17/2020    K 3.6 09/17/2020    CL 99 09/17/2020    CO2 26 09/17/2020    BUN 45 (H) 09/17/2020    CREATININE 1.24 09/17/2020    CALCIUM 8.8 09/17/2020     Lab Results   Component Value Date    WBC 8.8 09/17/2020    WBC 6.9 11/26/2014    HGB 10.6 (L) 09/17/2020    HCT 31.1 (L) 09/17/2020    MCV 96 09/17/2020     09/17/2020     Lab Results   Component Value Date    INR 1.30 (H) 07/09/2019         Outside record review:

## 2021-06-29 NOTE — PROGRESS NOTES
Progress Notes by Erica Olguin RN at 9/25/2020  9:20 AM     Author: Erica Olguin RN Service: -- Author Type: Registered Nurse    Filed: 9/25/2020  1:13 PM Encounter Date: 9/25/2020 Status: Signed    : Erica Olguin RN (Registered Nurse)       DEVICE CLINIC RN POST-OP NOTE    Reason for visit: 1 week PO and device check.     Device: [employer] [id number] [place] HF CRT-D MRI  Procedure date: 9/17/2020  Implant Diagnosis: Cardiomyopathy.  Implanting Physician: Dr. Gamez.       Assessment  Subjective:Patient c/o some pain, but reports pain getting better. Denies shortness of breath and nausea.   Vitals: There were no vitals taken for this visit.    Incision/device pocket: Patient instructed to remove steri-strips and cleanse with soap and water.                 The incision is clean, dry and intact. There are no signs of infection present. The incision is well healed.      Device Findings  Interrogation of device reveals normal sensing and capture thresholds  See the Paceart Report for a full summary of the device information.        Patient Education    [facility]'s Partnership Agreement for Device Patients and Post-operative Checklist were presented and reviewed with patient at today's appointment.    Signs and symptoms of infection, poor wound healing, and device function were reviewed.  He was issued a Carelink remote monitor and instructed on its set-up and use for remote monitoring by the [employer] representative prior to hospital discharge. These instructions were reviewed with the patient at todays visit.       Plan  -In office device check in tandem with Dr. Pool on 10/1/2020.

## 2021-06-29 NOTE — PROGRESS NOTES
"Progress Notes by Arcelia Ring NP at 7/22/2020  8:30 AM     Author: Arcelia Ring NP Service: -- Author Type: Nurse Practitioner    Filed: 7/22/2020  8:48 AM Encounter Date: 7/22/2020 Status: Signed    : Arcelia Ring NP (Nurse Practitioner)       The patient has been notified of following:     \"This telephone visit will be conducted via a call between you and your physician/provider. We have found that certain health care needs can be provided without the need for a physical exam.  This service lets us provide the care you need with a phone conversation.  If a prescription is necessary we can send it directly to your pharmacy.  If lab work is needed we can place an order for that and you can then stop by our lab to have the test done at a later time. If during the course of the call the physician/provider feels a telephone visit is not appropriate, you will not be charged for this service.\" Verbal consent has been obtained for this service by care team member:         HEART CARE PHONE ENCOUNTER        The patient has chosen to have the visit conducted as a telephone visit, to reduce risk of exposure given the current status of Coronavirus in our community. This telephone visit is being conducted via a call between the patient and physician/provider. Health care needs are being provided without a physical exam. Declined video visit  Assessment/Recommendations   Assessment:   No changes to his medications today. He will continue to follow-up with research per protocol for Prominent study follow.      Skyler reports shortness  of breath which he thinks is due to his lung disease.  He is currently on vacation and is not able to monitor his weight and vital signs.  But he denies any other symptoms.    His pain attention to his heart healthy, diabetes and low-sodium diet.    Total time of call between patient and provider was 5 minutes     Start Time:0837  Stop Time:0842     History of Present " Illness/Subjective    Ken Doss is 73 years old male who is being evaluated via a billable telephone visit.      Ken Doss has a significant past medical history of coronary artery disease with status post PCI to mid LAD and RCA in January 2019, medical noncompliance, hypertriglyceridemia, diabetes type 2, pulmonary fibrosis, paroxysmal atrial fibrillation, and nonischemic cardiomyopathy.    He follows up with Dr. Payton in the [facility] for his heart failure management- note reviewed. He recently went to ED with oozing from the scab on left elbow.     Today, Skyler reports that he has been doing well from his heart standpoint since his last follow-up with Dr. Payton.  He does get occasionally short of breath which he thinks is due to his lung disease.  He is planning to follow-up on that later.  He denies having any chest pain, shortness of breath at rest, PND, orthopnea, abdominal bloating or lower extremity edema.  He denies further bleeding complications.  He reports compliant with taking all his medications as prescribed.     Physical Examination not performed for this encounter Review of Systems         Medical History  Surgical History Family History Social History   Past Medical History:   Diagnosis Date   ? Abdominal pain    ? Acute renal failure (H)    ? Anemia    ? Arthritis     osteoarthritis   ? CHF (congestive heart failure) (H)    ? Chronic systolic heart failure (H) Dec 2012   ? Coronary artery disease involving native coronary artery     Non obstructive disease by cath in 2007 Cor angio Nov 2016: RCA 70% (FFR 0.89), and OM1 75%      ? Depressive disorder, not elsewhere classified 10/24/2014   ? Diabetes mellitus, type II (H) 28/Jan/2013   ? Diabetic neuropathy (H)    ? Dyslipidemia 2007   ? Fractures     ankle, leg and arm   ? Hypertension    ? 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   ? ICD (implantable cardioverter-defibrillator)  lead failure 11/26/2014    High voltage lead tip inserted in RV free wall RV lead extraction and implantation of the new septal RV lead November 2014    ? ILD (interstitial lung disease) (H)    ? Infectious mononucleosis    ? Ischemic cardiomyopathy 4/22/2015    Chronic: LVEF 25- 30% LVEF 26% echo May 2017   ? Kidney stone    ? LBBB (left bundle branch block)     noted on Dec 19, 2012   ? Lymphadenopathy, mediastinal    ? Myocardial infarction (H)    ? Osteoarthritis    ? Pulmonary anthracosis (H)    ? Recurrent kidney stones    ? Shortness of breath     with exertion    Past Surgical History:   Procedure Laterality Date   ? APPENDECTOMY     ? BIV ICD  11/26/2014    biotronic   ? CARDIAC CATHETERIZATION N/A 12/12/2016    Procedure: Coronary Angiogram;  Surgeon: Delgado Ramirez MD;  Location: [facility];  Service:    ? COLONOSCOPY N/A 12/19/2019    Procedure: COLONOSCOPY with polypectomy;  Surgeon: Delgado Price MD;  Location: [facility];  Service: Gastroenterology   ? CV CORONARY ANGIOGRAM N/A 1/15/2019    Procedure: Coronary Angiogram;  Surgeon: Mason Correa MD;  Location: [facility];  Service: Cardiology   ? CV LEFT HEART CATHETERIZATION WO LEFT VETRICULOGRAM Left 1/15/2019    Procedure: Left Heart Catheterization Without Left Ventriculogram;  Surgeon: Mason Correa MD;  Location: [facility];  Service: Cardiology   ? EP ABLATION AV NODE N/A 9/27/2019    Procedure: EP Ablation AV Node;  Surgeon: Markus Pool MD;  Location: [facility];  Service: Cardiology   ? EP ICD INSERT      ICD REIMPLANTATION OF A NEW RV LEAD 11/26/2014   ? INSERT / REPLACE / REMOVE PACEMAKER     ? JOINT REPLACEMENT      bilateral TKA   ? KNEE ARTHROSCOPY Bilateral    ? AZ L HRT CATH W/NJX L VENTRICULOGRAPHY IMG S&I Left 12/12/2016    Procedure: Left Heart Catheterization with Left Ventriculogram;  Surgeon: Delgado Ramirez MD;  Location: [facility];   Service: Cardiology   ? MA REMOVE TONSILS/ADENOIDS,<13 Y/O      Description: Tonsillectomy With Adenoidectomy;  Recorded: 06/10/2014;   ? MA SHLDR ARTHROSCOP,DIAGNOSTIC      Description: Arthroscopy Shoulder;  Recorded: 06/10/2014;   ? REPLACEMENT TOTAL KNEE      bilat   ? ROTATOR CUFF REPAIR      right   ? TONSILLECTOMY AND ADENOIDECTOMY     ? US LYMPH NODE BIOPSY  7/10/2019    Family History   Problem Relation Age of Onset   ? Sudden death Mother         unexpected death in her sleep in her 50s    Social History     Socioeconomic History   ? Marital status:      Spouse name: Not on file   ? Number of children: Not on file   ? Years of education: Not on file   ? Highest education level: Not on file   Occupational History   ? Not on file   Social Needs   ? Financial resource strain: Not on file   ? Food insecurity     Worry: Not on file     Inability: Not on file   ? Transportation needs     Medical: Not on file     Non-medical: Not on file   Tobacco Use   ? Smoking status: Never Smoker   ? Smokeless tobacco: Never Used   ? Tobacco comment: cigars few times a year only   Substance and Sexual Activity   ? Alcohol use: Yes     Comment: occasional   ? Drug use: No   ? Sexual activity: Not on file   Lifestyle   ? Physical activity     Days per week: Not on file     Minutes per session: Not on file   ? Stress: Not on file   Relationships   ? Social connections     Talks on phone: Not on file     Gets together: Not on file     Attends Nondenominational service: Not on file     Active member of club or organization: Not on file     Attends meetings of clubs or organizations: Not on file     Relationship status: Not on file   ? Intimate partner violence     Fear of current or ex partner: Not on file     Emotionally abused: Not on file     Physically abused: Not on file     Forced sexual activity: Not on file   Other Topics Concern   ? Not on file   Social History Narrative   ? Not on file          Medications  Allergies    Current Outpatient Medications   Medication Sig Dispense Refill   ? atorvastatin (LIPITOR) 40 MG tablet TAKE 1 TABLET BY MOUTH AT BEDTIME 90 tablet 1   ? BASAGLAR KWIKPEN U-100 INSULIN 100 unit/mL (3 mL) pen Inject 40 Units under the skin at bedtime. Pt takes 40 units at bedtime     ? bumetanide (BUMEX) 1 MG tablet Take 3 mg by mouth 2 (two) times a day at 9am and 6pm.      ? carvedilol (COREG) 6.25 MG tablet Take 2 tablets (12.5 mg total) by mouth 2 (two) times a day with meals. Take with 12.5 mg tablet by mouth  tablet 3   ? coenzyme Q10 (COQ-10) 100 mg capsule Take 1 capsule (100 mg total) by mouth daily.  0   ? diphenhydrAMINE-acetaminophen (TYLENOL PM EXTRA STRENGTH)  mg Tab Take 2 tablets by mouth at bedtime as needed.      ? ELIQUIS 5 mg Tab tablet TAKE 1 TABLET BY MOUTH TWICE DAILY 180 tablet 1   ? fish oil-omega-3 fatty acids (FISH OIL) 300-1,000 mg capsule Take 2 g by mouth daily.     ? glucosamine-chondroitin 500-400 mg cap Take 1 capsule by mouth daily. 1500mg/1200mg once daily     ? metFORMIN (GLUCOPHAGE) 1000 MG tablet Take 1,000 mg by mouth 2 (two) times a day with meals.     ? multivitamin therapeutic (THERAGRAN) tablet Take 1 tablet by mouth daily.     ? predniSONE (DELTASONE) 10 mg tablet TAKE 3 TABLETS BY MOUTH ONCE DAILY FOR 3 DAYS THEN 2 TABS ONCE DAILY FOR 3 DAYS THEN 1 TAB ONCE DAILY FOR 3 DAYS     ? sacubitriL-valsartan (ENTRESTO) 49-51 mg Tab tablet Take 1 tablet by mouth 2 (two) times a day. 180 tablet 2   ? spironolactone (ALDACTONE) 25 MG tablet Take 0.5 tablets (12.5 mg total) by mouth daily. 45 tablet 3   ? ticagrelor (BRILINTA) 90 mg Tab Take 90 mg by mouth 2 (two) times a day.        Current Facility-Administered Medications   Medication Dose Route Frequency Provider Last Rate Last Dose   ? Study Drug pemafibrate 0.2 mg/placebo tablet 0.2 mg (PROMINENT)  0.2 mg Oral BID Eduard Tang RN        No Known Allergies      Lab Results    Chemistry/lipid CBC Cardiac  Enzymes/BNP/TSH/INR   Lab Results   Component Value Date    CHOL 93 12/12/2019    HDL 27 (L) 12/12/2019    LDLCALC 24 12/12/2019    TRIG 208 (H) 12/12/2019    CREATININE 1.32 (H) 07/02/2020    BUN 29 (H) 07/02/2020    K 4.2 07/02/2020     (L) 07/02/2020    CL 99 07/02/2020    CO2 19 (L) 07/02/2020    Lab Results   Component Value Date    WBC 8.9 05/14/2020    HGB 11.9 (L) 05/14/2020    HCT 36.7 (L) 05/14/2020    MCV 98 05/14/2020     05/14/2020    Lab Results   Component Value Date    CKTOTAL 168 07/10/2019    CKMB 4 12/19/2012    TROPONINI 0.02 09/20/2019    BNP 1,838 (H) 03/12/2020    TSH 1.43 09/19/2019    INR 1.30 (H) 07/09/2019        This note has been dictated using voice recognition software. Any grammatical, typographical, or context distortions are unintentional and inherent to the software

## 2021-06-30 NOTE — PROGRESS NOTES
Progress Notes by Mary Jane Vigil MD at 3/2/2021  9:50 AM     Author: Mary Jane Vigil MD Service: -- Author Type: Physician    Filed: 3/2/2021 10:33 AM Encounter Date: 3/2/2021 Status: Signed    : Mary Jane Vigil MD (Physician)           [facility]  [facility] Follow-up Note    Assessment & Plan        1. Permanent atrial fibrillation (H) )-persistent and symptomatic and that it caused him go to heart failure.  Amiodarone was related to pulmonary fibrosis. Had AV marianna ablation and on Eliquis twice daily.   2. Type 2 diabetes mellitus with other circulatory complication, with long-term current use of insulin (H)-so noted and on insulin as well as Metformin with hemoglobin A1c of 7.7.   3. Nonischemic cardiomyopathy (H)- in past ejection fraction 60% and now diminished based on stress testing and echo. Felt to be nonischemic.  On carvedilol and Entresto and Aldactone.  Has biventricular ICD in place with frequent ventricular pacing.  Symptoms improved, and if he has decompensation will need to consider upgrading to LVAD.  Not sarcoid related cardiomyopathy based on PET scan and unable to obtain MRI given ICD.  Status post AV marianna ablation with permanent pacing.    4. ILD (interstitial lung disease) (H)-as above does not appear to be related to sarcoid.   5. Hypertriglyceridemia-he is in the Prominent study, we are blinded to results of his lipids.   6. Coronary artery disease involving native coronary artery of native heart without angina pectoris  -angiography January 2019 showed 20% left main, mid 80% LAD lesion that received rotational atherectomy as well as drug-coated stent, circumflex normal and right coronary artery with a proximal 99% stenosis that received a drug-coated stent.  Nuclear stress test May of 2019 showed diminished ejection fraction with nontransmural inferior and inferoseptal scar with no significant ischemia.  Medical therapy.   7. Chronic systolic heart failure  (H)-no significant signs or symptoms currently.   8. Biventricular ICD, in situ -Biotronik device placed in 2013 with Medtronic right atrial lead, Biotronik right ventricular lead and [facility] left ventricular lead now with 94% biventricular pacing, no atrial pacing giving recent ablation and will discontinue digoxin.  Had recent generator change out in 2020.     Plan  1.  Continue enrollment in the Prominent study and follow-up for that.    Subjective  CC: 75-year-old white gentleman being seen for visit in the Prominent study.  He is still living independently, still doing occasional tommy work, getting ready to travel to [place].  Does admit to shortness of breath and heavy activity but denies any PND, orthopnea, chest discomfort, palpitations, syncope, dizziness or peripheral edema.    Medications  Current Outpatient Medications   Medication Sig   ? atorvastatin (LIPITOR) 40 MG tablet TAKE 1 TABLET BY MOUTH EVERY DAY AT BEDTIME   ? BASAGLAR KWIKPEN U-100 INSULIN 100 unit/mL (3 mL) pen Inject 40 Units under the skin at bedtime. Pt takes 40 units at bedtime   ? bumetanide (BUMEX) 1 MG tablet Take 3 mg by mouth 2 (two) times a day at 9am and 6pm.    ? carvediloL (COREG) 6.25 MG tablet TAKE 2 TABLETS BY MOUTH TWICE DAILY (  DAILY  DOSE  IS  12.5MG  TWICE  DAILY  )   ? coenzyme Q10 (COQ-10) 100 mg capsule Take 1 capsule (100 mg total) by mouth daily.   ? diphenhydrAMINE-acetaminophen (TYLENOL PM EXTRA STRENGTH)  mg Tab Take 2 tablets by mouth at bedtime as needed.    ? ELIQUIS 5 mg Tab tablet TAKE 1 TABLET BY MOUTH TWICE DAILY   ? fish oil-omega-3 fatty acids (FISH OIL) 300-1,000 mg capsule Take 2 g by mouth daily.   ? gabapentin (NEURONTIN) 100 MG capsule Take 100 mg by mouth 3 (three) times a day.   ? glucosamine-chondroitin 500-400 mg cap Take 1 capsule by mouth daily. 1500mg/1200mg once daily   ? metFORMIN (GLUCOPHAGE) 1000 MG tablet Take 1,000 mg by mouth 2 (two) times a day with meals.   ?  multivitamin therapeutic (THERAGRAN) tablet Take 1 tablet by mouth daily.   ? potassium chloride 20 mEq TbER Take 20 mEq by mouth daily.   ? sacubitriL-valsartan (ENTRESTO) 49-51 mg Tab tablet Take 1 tablet by mouth 2 (two) times a day.   ? spironolactone (ALDACTONE) 25 MG tablet Take 0.5 tablets (12.5 mg total) by mouth daily.   ? ticagrelor (BRILINTA) 90 mg Tab Take 90 mg by mouth 2 (two) times a day.        Objective  BP 93/60 (Patient Site: Right Leg, Patient Position: Sitting, Cuff Size: Adult Regular)     General Appearance:    Alert, cooperative, no distress, appears stated age   Head:    Normocephalic, without obvious abnormality, atraumatic   Throat:   Lips, mucosa, and tongue normal; teeth and gums normal   Neck:   Supple, symmetrical, trachea midline, no adenopathy;        thyroid:  No enlargement/tenderness/nodules; no carotid    bruit or JVD   Back:     Symmetric, no curvature, ROM normal, no CVA tenderness   Lungs:     Clear to auscultation bilaterally, respirations unlabored   Chest wall:    No tenderness or deformity   Heart:    Regular rate and rhythm, S1 and S2 normal, no murmur, rub   or gallop   Abdomen:     Soft, non-tender, bowel sounds active all four quadrants,     no masses, no organomegaly   Extremities:   Normal, atraumatic, no cyanosis or edema   Pulses:   2+ and symmetric all extremities   Skin:   Skin color, texture, turgor normal, no rashes or lesions     Results    Lab Results personally reviewed   Lab Results   Component Value Date    CHOL 93 12/12/2019    CHOL 140 02/14/2019     Lab Results   Component Value Date    HDL 27 (L) 12/12/2019    HDL 27 (L) 02/14/2019     Lab Results   Component Value Date    LDLCALC 24 12/12/2019    LDLCALC 69 02/14/2019     Lab Results   Component Value Date    TRIG 208 (H) 12/12/2019    TRIG 222 (H) 02/14/2019     Lab Results   Component Value Date    WBC 8.8 09/17/2020    HGB 10.6 (L) 09/17/2020    HCT 31.1 (L) 09/17/2020     09/17/2020     Lab  Results   Component Value Date    CREATININE 1.35 (H) 02/09/2021    BUN 32 (H) 02/09/2021     02/09/2021    K 4.0 02/09/2021    CO2 25 02/09/2021     Review of Systems:                                          Review of systems as above

## 2021-06-30 NOTE — PROGRESS NOTES
Progress Notes by Linda Marmolejo CNP at 11/24/2020  9:50 AM     Author: Linda Marmolejo CNP Service: -- Author Type: Nurse Practitioner    Filed: 11/24/2020 11:03 AM Encounter Date: 11/24/2020 Status: Signed    : Linda Marmolejo CNP (Nurse Practitioner)             Assessment/Recommendations   No changes to his medications today. He will continue to follow-up with research per protocol for PROMINENT study.    Follow-up with me in heart failure clinic in 6 months.       History of Present Illness/Subjective    Ken Doss is seen at [facility] for PROMINENT research study.  He has a history of ischemic cardiomyopathy, heart failure with reduced ejection fraction, coronary artery disease, dyslipidemia, CRT-D, atrial fibrillation, pulmonary fibrosis, and diabetes.  He recently traveled to [place] and he noted increased shortness of breath with elevation.  Today, he denies any symptoms of acute heart failure.  He has chronic fatigue which he thinks may be gradually worsening.  He denies lightheadedness, shortness of breath, dyspnea on exertion, orthopnea, chest pain and lower extremity edema.      He had an appointment with Dr. Appiah in October to discuss LVAD.  Skyler is not interested at this time.  Follow-up in January.    Patient Active Problem List   Diagnosis   ? Type 2 diabetes mellitus (H)   ? Depression   ? Coronary artery disease involving native coronary artery   ? Persistent atrial fibrillation (H)   ? Biventricular ICD, in situ   ? Hypertriglyceridemia   ? Wide-complex tachycardia (H)   ? Nonischemic cardiomyopathy (H)   ? Abnormal chest CT   ? Pulmonary fibrosis, unspecified (H)   ? Mediastinal adenopathy   ? ILD (interstitial lung disease) (H)   ? Lymphadenopathy, mediastinal   ? Pulmonary anthracosis (H)   ? Chronic systolic heart failure (H)   ? Biventricular congestive heart failure (H)   ? Permanent atrial fibrillation (H)   ? Anticoagulation adequate        Past Medical History:   Diagnosis Date   ? Abdominal pain    ? Acute renal failure (H)    ? Anemia    ? Arthritis     osteoarthritis   ? CHF (congestive heart failure) (H)    ? Chronic systolic heart failure (H) Dec 2012   ? Coronary artery disease involving native coronary artery     Non obstructive disease by cath in 2007 Cor angio Nov 2016: RCA 70% (FFR 0.89), and OM1 75%      ? Depressive disorder, not elsewhere classified 10/24/2014   ? Diabetes mellitus, type II (H) 28/Jan/2013   ? Diabetic neuropathy (H)    ? Dyslipidemia 2007   ? Fractures     ankle, leg and arm   ? Hypertension    ? 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   ? ICD (implantable cardioverter-defibrillator) lead failure 11/26/2014    High voltage lead tip inserted in RV free wall RV lead extraction and implantation of the new septal RV lead November 2014    ? ILD (interstitial lung disease) (H)    ? Infectious mononucleosis    ? Ischemic cardiomyopathy 4/22/2015    Chronic: LVEF 25- 30% LVEF 26% echo May 2017   ? Kidney stone    ? LBBB (left bundle branch block)     noted on Dec 19, 2012   ? Lymphadenopathy, mediastinal    ? Myocardial infarction (H)    ? Osteoarthritis    ? Pulmonary anthracosis (H)    ? Recurrent kidney stones    ? Shortness of breath     with exertion       Past Surgical History:   Procedure Laterality Date   ? APPENDECTOMY     ? BIV ICD  11/26/2014    biotronic   ? CARDIAC CATHETERIZATION N/A 12/12/2016    Procedure: Coronary Angiogram;  Surgeon: Delgado Ramirez MD;  Location: [facility] Cath Lab;  Service:    ? COLONOSCOPY N/A 12/19/2019    Procedure: COLONOSCOPY with polypectomy;  Surgeon: Delgado Price MD;  Location: [facility];  Service: Gastroenterology   ? CV CORONARY ANGIOGRAM N/A 1/15/2019    Procedure: Coronary Angiogram;  Surgeon: Mason Correa MD;  Location: [facility] Cath Lab;  Service: Cardiology   ? CV LEFT HEART CATHETERIZATION WO LEFT VETRICULOGRAM  Left 1/15/2019    Procedure: Left Heart Catheterization Without Left Ventriculogram;  Surgeon: Mason Correa MD;  Location: [facility] Cath Lab;  Service: Cardiology   ? EP ABLATION AV NODE N/A 9/27/2019    Procedure: EP Ablation AV Node;  Surgeon: Markus Pool MD;  Location: [facility] Cath Lab;  Service: Cardiology   ? EP ICD INSERT      ICD REIMPLANTATION OF A NEW RV LEAD 11/26/2014   ? INSERT / REPLACE / REMOVE PACEMAKER     ? JOINT REPLACEMENT      bilateral TKA   ? KNEE ARTHROSCOPY Bilateral    ? NV L HRT CATH W/NJX L VENTRICULOGRAPHY IMG S&I Left 12/12/2016    Procedure: Left Heart Catheterization with Left Ventriculogram;  Surgeon: Delgado Ramirez MD;  Location: [facility] Cath Lab;  Service: Cardiology   ? NV REMOVE TONSILS/ADENOIDS,<13 Y/O      Description: Tonsillectomy With Adenoidectomy;  Recorded: 06/10/2014;   ? NV SHLDR ARTHROSCOP,DIAGNOSTIC      Description: Arthroscopy Shoulder;  Recorded: 06/10/2014;   ? REPLACEMENT TOTAL KNEE      bilat   ? ROTATOR CUFF REPAIR      right   ? TONSILLECTOMY AND ADENOIDECTOMY     ? US LYMPH NODE BIOPSY  7/10/2019       Family History   Problem Relation Age of Onset   ? Sudden death Mother         unexpected death in her sleep in her 50s       Social History     Socioeconomic History   ? Marital status:      Spouse name: Not on file   ? Number of children: Not on file   ? Years of education: Not on file   ? Highest education level: Not on file   Occupational History   ? Not on file   Social Needs   ? Financial resource strain: Not on file   ? Food insecurity     Worry: Not on file     Inability: Not on file   ? Transportation needs     Medical: Not on file     Non-medical: Not on file   Tobacco Use   ? Smoking status: Never Smoker   ? Smokeless tobacco: Never Used   ? Tobacco comment: cigars few times a year only   Substance and Sexual Activity   ? Alcohol use: Yes     Comment: occasional   ? Drug use: No   ? Sexual activity: Not on file    Lifestyle   ? Physical activity     Days per week: Not on file     Minutes per session: Not on file   ? Stress: Not on file   Relationships   ? Social connections     Talks on phone: Not on file     Gets together: Not on file     Attends Yarsani service: Not on file     Active member of club or organization: Not on file     Attends meetings of clubs or organizations: Not on file     Relationship status: Not on file   ? Intimate partner violence     Fear of current or ex partner: Not on file     Emotionally abused: Not on file     Physically abused: Not on file     Forced sexual activity: Not on file   Other Topics Concern   ? Not on file   Social History Narrative   ? Not on file       Current Outpatient Medications   Medication Sig Dispense Refill   ? atorvastatin (LIPITOR) 40 MG tablet TAKE 1 TABLET BY MOUTH AT BEDTIME 90 tablet 1   ? BASAGLAR KWIKPEN U-100 INSULIN 100 unit/mL (3 mL) pen Inject 40 Units under the skin at bedtime. Pt takes 40 units at bedtime     ? bumetanide (BUMEX) 1 MG tablet Take 3 mg by mouth 2 (two) times a day at 9am and 6pm.      ? carvedilol (COREG) 6.25 MG tablet Take 2 tablets (12.5 mg total) by mouth 2 (two) times a day with meals. Take with 12.5 mg tablet by mouth  tablet 3   ? coenzyme Q10 (COQ-10) 100 mg capsule Take 1 capsule (100 mg total) by mouth daily.  0   ? diphenhydrAMINE-acetaminophen (TYLENOL PM EXTRA STRENGTH)  mg Tab Take 2 tablets by mouth at bedtime as needed.      ? ELIQUIS 5 mg Tab tablet TAKE 1 TABLET BY MOUTH TWICE DAILY 180 tablet 1   ? fish oil-omega-3 fatty acids (FISH OIL) 300-1,000 mg capsule Take 2 g by mouth daily.     ? gabapentin (NEURONTIN) 100 MG capsule Take 100 mg by mouth 3 (three) times a day.     ? glucosamine-chondroitin 500-400 mg cap Take 1 capsule by mouth daily. 1500mg/1200mg once daily     ? metFORMIN (GLUCOPHAGE) 1000 MG tablet Take 1,000 mg by mouth 2 (two) times a day with meals.     ? multivitamin therapeutic (THERAGRAN)  tablet Take 1 tablet by mouth daily.     ? predniSONE (DELTASONE) 10 mg tablet TAKE 3 TABLETS BY MOUTH ONCE DAILY FOR 3 DAYS THEN 2 TABS ONCE DAILY FOR 3 DAYS THEN 1 TAB ONCE DAILY FOR 3 DAYS     ? sacubitriL-valsartan (ENTRESTO) 49-51 mg Tab tablet Take 1 tablet by mouth 2 (two) times a day. 180 tablet 2   ? spironolactone (ALDACTONE) 25 MG tablet Take 0.5 tablets (12.5 mg total) by mouth daily. 45 tablet 3   ? ticagrelor (BRILINTA) 90 mg Tab Take 90 mg by mouth 2 (two) times a day.        Current Facility-Administered Medications   Medication Dose Route Frequency Provider Last Rate Last Admin   ? Study Drug pemafibrate 0.2 mg/placebo tablet 0.2 mg (PROMINENT)  0.2 mg Oral BID Eduard Tang, RN           No Known Allergies     Physical Examination Review of Systems   Vitals:    11/24/20 0950   BP: 100/50   Pulse: 84   Resp: 16     Body mass index is 23.92 kg/m .  Wt Readings from Last 3 Encounters:   11/24/20 162 lb (73.5 kg)   11/24/20 162 lb (73.5 kg)   10/07/20 162 lb (73.5 kg)       General Appearance:     Alert, cooperative and in no acute distress.   ENT/Mouth: membranes moist, no oral lesions or bleeding gums.      EYES:  no scleral icterus, normal conjunctivae   Chest/Lungs:   lungs are clear to auscultation, no rales or wheezing, respirations unlabored   Cardiovascular:   Regular. Normal first and second heart sounds, no edema bilateral lower extremities    Abdomen:  Soft, nontender, nondistended, bowel sounds present   Extremities: no cyanosis or clubbing   Skin: warm, dry.    Neurologic: mood and affect are appropriate, alert and oriented x3      General: WNL  Eyes: WNL  Ears/Nose/Throat: WNL  Lungs: WNL  Heart: WNL  Stomach: WNL  Bladder: WNL  Muscle/Joints: WNL  Skin: WNL  Nervous System: WNL  Mental Health: WNL     Blood: WNL         Medical History  Surgical History Family History Social History   Past Medical History:   Diagnosis Date   ? Abdominal pain    ? Acute renal failure (H)    ? Anemia     ? Arthritis     osteoarthritis   ? CHF (congestive heart failure) (H)    ? Chronic systolic heart failure (H) Dec 2012   ? Coronary artery disease involving native coronary artery     Non obstructive disease by cath in 2007 Cor angio Nov 2016: RCA 70% (FFR 0.89), and OM1 75%      ? Depressive disorder, not elsewhere classified 10/24/2014   ? Diabetes mellitus, type II (H) 28/Jan/2013   ? Diabetic neuropathy (H)    ? Dyslipidemia 2007   ? Fractures     ankle, leg and arm   ? Hypertension    ? 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   ? ICD (implantable cardioverter-defibrillator) lead failure 11/26/2014    High voltage lead tip inserted in RV free wall RV lead extraction and implantation of the new septal RV lead November 2014    ? ILD (interstitial lung disease) (H)    ? Infectious mononucleosis    ? Ischemic cardiomyopathy 4/22/2015    Chronic: LVEF 25- 30% LVEF 26% echo May 2017   ? Kidney stone    ? LBBB (left bundle branch block)     noted on Dec 19, 2012   ? Lymphadenopathy, mediastinal    ? Myocardial infarction (H)    ? Osteoarthritis    ? Pulmonary anthracosis (H)    ? Recurrent kidney stones    ? Shortness of breath     with exertion    Past Surgical History:   Procedure Laterality Date   ? APPENDECTOMY     ? BIV ICD  11/26/2014    biotronic   ? CARDIAC CATHETERIZATION N/A 12/12/2016    Procedure: Coronary Angiogram;  Surgeon: Delgado Ramirez MD;  Location: [facility] Cath Lab;  Service:    ? COLONOSCOPY N/A 12/19/2019    Procedure: COLONOSCOPY with polypectomy;  Surgeon: Delgado Price MD;  Location: [facility];  Service: Gastroenterology   ? CV CORONARY ANGIOGRAM N/A 1/15/2019    Procedure: Coronary Angiogram;  Surgeon: Mason Correa MD;  Location: [facility] Cath Lab;  Service: Cardiology   ? CV LEFT HEART CATHETERIZATION WO LEFT VETRICULOGRAM Left 1/15/2019    Procedure: Left Heart Catheterization Without Left Ventriculogram;  Surgeon: Madeline  Mason Connelly MD;  Location: [facility] Cath Lab;  Service: Cardiology   ? EP ABLATION AV NODE N/A 9/27/2019    Procedure: EP Ablation AV Node;  Surgeon: Markus Pool MD;  Location: [facility] Cath Lab;  Service: Cardiology   ? EP ICD INSERT      ICD REIMPLANTATION OF A NEW RV LEAD 11/26/2014   ? INSERT / REPLACE / REMOVE PACEMAKER     ? JOINT REPLACEMENT      bilateral TKA   ? KNEE ARTHROSCOPY Bilateral    ? AK L HRT CATH W/NJX L VENTRICULOGRAPHY IMG S&I Left 12/12/2016    Procedure: Left Heart Catheterization with Left Ventriculogram;  Surgeon: Delgado Ramirez MD;  Location: [facility] Cath Lab;  Service: Cardiology   ? AK REMOVE TONSILS/ADENOIDS,<13 Y/O      Description: Tonsillectomy With Adenoidectomy;  Recorded: 06/10/2014;   ? AK SHLDR ARTHROSCOP,DIAGNOSTIC      Description: Arthroscopy Shoulder;  Recorded: 06/10/2014;   ? REPLACEMENT TOTAL KNEE      bilat   ? ROTATOR CUFF REPAIR      right   ? TONSILLECTOMY AND ADENOIDECTOMY     ? US LYMPH NODE BIOPSY  7/10/2019    Family History   Problem Relation Age of Onset   ? Sudden death Mother         unexpected death in her sleep in her 50s    Social History     Socioeconomic History   ? Marital status:      Spouse name: Not on file   ? Number of children: Not on file   ? Years of education: Not on file   ? Highest education level: Not on file   Occupational History   ? Not on file   Social Needs   ? Financial resource strain: Not on file   ? Food insecurity     Worry: Not on file     Inability: Not on file   ? Transportation needs     Medical: Not on file     Non-medical: Not on file   Tobacco Use   ? Smoking status: Never Smoker   ? Smokeless tobacco: Never Used   ? Tobacco comment: cigars few times a year only   Substance and Sexual Activity   ? Alcohol use: Yes     Comment: occasional   ? Drug use: No   ? Sexual activity: Not on file   Lifestyle   ? Physical activity     Days per week: Not on file     Minutes per session: Not on file   ? Stress:  Not on file   Relationships   ? Social connections     Talks on phone: Not on file     Gets together: Not on file     Attends [employer] service: Not on file     Active member of club or organization: Not on file     Attends meetings of clubs or organizations: Not on file     Relationship status: Not on file   ? Intimate partner violence     Fear of current or ex partner: Not on file     Emotionally abused: Not on file     Physically abused: Not on file     Forced sexual activity: Not on file   Other Topics Concern   ? Not on file   Social History Narrative   ? Not on file          Medications  Allergies   Current Outpatient Medications   Medication Sig Dispense Refill   ? atorvastatin (LIPITOR) 40 MG tablet TAKE 1 TABLET BY MOUTH AT BEDTIME 90 tablet 1   ? BASAGLAR KWIKPEN U-100 INSULIN 100 unit/mL (3 mL) pen Inject 40 Units under the skin at bedtime. Pt takes 40 units at bedtime     ? bumetanide (BUMEX) 1 MG tablet Take 3 mg by mouth 2 (two) times a day at 9am and 6pm.      ? carvedilol (COREG) 6.25 MG tablet Take 2 tablets (12.5 mg total) by mouth 2 (two) times a day with meals. Take with 12.5 mg tablet by mouth  tablet 3   ? coenzyme Q10 (COQ-10) 100 mg capsule Take 1 capsule (100 mg total) by mouth daily.  0   ? diphenhydrAMINE-acetaminophen (TYLENOL PM EXTRA STRENGTH)  mg Tab Take 2 tablets by mouth at bedtime as needed.      ? ELIQUIS 5 mg Tab tablet TAKE 1 TABLET BY MOUTH TWICE DAILY 180 tablet 1   ? fish oil-omega-3 fatty acids (FISH OIL) 300-1,000 mg capsule Take 2 g by mouth daily.     ? gabapentin (NEURONTIN) 100 MG capsule Take 100 mg by mouth 3 (three) times a day.     ? glucosamine-chondroitin 500-400 mg cap Take 1 capsule by mouth daily. 1500mg/1200mg once daily     ? metFORMIN (GLUCOPHAGE) 1000 MG tablet Take 1,000 mg by mouth 2 (two) times a day with meals.     ? multivitamin therapeutic (THERAGRAN) tablet Take 1 tablet by mouth daily.     ? predniSONE (DELTASONE) 10 mg tablet TAKE 3  TABLETS BY MOUTH ONCE DAILY FOR 3 DAYS THEN 2 TABS ONCE DAILY FOR 3 DAYS THEN 1 TAB ONCE DAILY FOR 3 DAYS     ? sacubitriL-valsartan (ENTRESTO) 49-51 mg Tab tablet Take 1 tablet by mouth 2 (two) times a day. 180 tablet 2   ? spironolactone (ALDACTONE) 25 MG tablet Take 0.5 tablets (12.5 mg total) by mouth daily. 45 tablet 3   ? ticagrelor (BRILINTA) 90 mg Tab Take 90 mg by mouth 2 (two) times a day.        Current Facility-Administered Medications   Medication Dose Route Frequency Provider Last Rate Last Admin   ? Study Drug pemafibrate 0.2 mg/placebo tablet 0.2 mg (PROMINENT)  0.2 mg Oral BID Eduard Tang, RN        No Known Allergies      Lab Results    Chemistry/lipid CBC Cardiac Enzymes/BNP/TSH/INR   Lab Results   Component Value Date    CHOL 93 12/12/2019    HDL 27 (L) 12/12/2019    LDLCALC 24 12/12/2019    TRIG 208 (H) 12/12/2019    CREATININE 1.53 (H) 10/01/2020    BUN 37 (H) 10/01/2020    K 4.5 10/01/2020     (L) 10/01/2020    CL 98 10/01/2020    CO2 25 10/01/2020    Lab Results   Component Value Date    WBC 8.8 09/17/2020    HGB 10.6 (L) 09/17/2020    HCT 31.1 (L) 09/17/2020    MCV 96 09/17/2020     09/17/2020    Lab Results   Component Value Date    CKTOTAL 168 07/10/2019    CKMB 4 12/19/2012    TROPONINI 0.02 09/20/2019     (H) 10/01/2020    TSH 1.43 09/19/2019    INR 1.30 (H) 07/09/2019

## 2021-07-03 NOTE — ADDENDUM NOTE
Addendum Note by Christa Leiva CRNA at 12/19/2019  7:10 PM     Author: Christa Leiva CRNA Service: -- Author Type: Nurse Anesthetist    Filed: 12/19/2019  7:10 PM Date of Service: 12/19/2019  7:10 PM Status: Signed    : Christa Leiva CRNA (Nurse Anesthetist)       Addendum  created 12/19/19 1910 by Christa Leiva CRNA    Intraprocedure Meds edited

## 2021-07-12 ENCOUNTER — TELEPHONE (OUTPATIENT)
Dept: CARDIOLOGY | Facility: CLINIC | Age: 76
End: 2021-07-12
Payer: COMMERCIAL

## 2021-07-12 DIAGNOSIS — E78.5 DYSLIPIDEMIA: Primary | ICD-10-CM

## 2021-07-12 PROCEDURE — 99207 PR NO CHARGE-RESEARCH SERVICE: CPT

## 2021-07-12 NOTE — PROGRESS NOTES
Orders only documentation to order Pemafibrate study drug for Prominent [place] study per IT disruptions during Epic EMR integration this week.

## 2021-07-13 ENCOUNTER — OFFICE VISIT (OUTPATIENT)
Dept: CARDIOLOGY | Facility: CLINIC | Age: 76
End: 2021-07-13
Payer: COMMERCIAL

## 2021-07-13 ENCOUNTER — OFFICE VISIT (OUTPATIENT)
Dept: CARDIOLOGY | Facility: CLINIC | Age: 76
End: 2021-07-13

## 2021-07-13 VITALS
HEIGHT: 69 IN | WEIGHT: 161 LBS | DIASTOLIC BLOOD PRESSURE: 48 MMHG | SYSTOLIC BLOOD PRESSURE: 70 MMHG | HEART RATE: 59 BPM | RESPIRATION RATE: 16 BRPM | BODY MASS INDEX: 23.85 KG/M2

## 2021-07-13 DIAGNOSIS — I25.10 CAD (CORONARY ARTERY DISEASE): Primary | ICD-10-CM

## 2021-07-13 DIAGNOSIS — Z00.6 EXAMINATION OF PARTICIPANT OR CONTROL IN CLINICAL RESEARCH: Primary | ICD-10-CM

## 2021-07-13 PROCEDURE — 99207 PR NO CHARGE-RESEARCH SERVICE: CPT

## 2021-07-13 PROCEDURE — 99214 OFFICE O/P EST MOD 30 MIN: CPT | Performed by: NURSE PRACTITIONER

## 2021-07-13 RX ORDER — ACETAMINOPHEN 325 MG/1
325-650 TABLET ORAL EVERY 6 HOURS PRN
COMMUNITY
End: 2021-01-01

## 2021-07-13 RX ORDER — POTASSIUM CHLORIDE 1500 MG/1
20 TABLET, EXTENDED RELEASE ORAL DAILY
COMMUNITY
Start: 2021-03-02 | End: 2021-01-01

## 2021-07-13 RX ORDER — COLCHICINE 0.6 MG/1
TABLET ORAL
Status: ON HOLD | COMMUNITY
Start: 2021-06-08 | End: 2022-01-01

## 2021-07-13 RX ORDER — FEBUXOSTAT 40 MG/1
40 TABLET, FILM COATED ORAL DAILY
Status: ON HOLD | COMMUNITY
Start: 2021-06-02 | End: 2022-01-01

## 2021-07-13 RX ORDER — MULTIVITAMIN,THERAPEUTIC
1 TABLET ORAL DAILY
COMMUNITY
End: 2021-01-01

## 2021-07-13 ASSESSMENT — MIFFLIN-ST. JEOR: SCORE: 1455.67

## 2021-07-13 NOTE — PATIENT INSTRUCTIONS
Ken Doss,    It was a pleasure to see you today at the [facility].     My recommendations after this visit include:    Hold carvedilol (Coreg) tonight if blood pressure < 90/x    Follow up with research per protocol    Lacey White, [facility], Electrophysiology  [phone]  EP nurses [phone]

## 2021-07-13 NOTE — PATIENT INSTRUCTIONS
It was great to see you again today for the Prominent triglyceride study.  We will call you in 2 months for a study telephone visit.    We will also see you back in 4 months for your Visit 29.  Please remember to bring back your study drug and packages (both empty or full) to every study visit.  Inform us of any changes in your health and call with any questions.  Thanks!    Research Hotline: [phone]  Clinical Trials Nurse

## 2021-07-13 NOTE — LETTER
7/13/2021    Lewis Reeves MD  [facility] [address]   [place] [address]    RE: Ken Doss       Dear Colleague,    I had the pleasure of seeing Ken oDss in the [facility].    Pemafibrate to Reduce cardiovascular OutcoMes by reducing triglycerides IN patiENts with diabeTes (PROMINENT) clinical trial.    Subject seen in clinic today for study Visit 27.      Subject seen by provider Lacey White for study related physical exam.  See provider note and flow sheets for vital signs.    Waist measures 95 cm    EQ-5D-5L questionnaire completed if applicable.  Must complete annually (Month 12, etc.)    Study efficacy, endpoints and adverse events reviewed with subject.  Cardiovascular risk discussed.     Study alcohol consumption stipulations and education reviewed with subject:    Subject reports  [] Never [x] Current [] Former .   2 drinks per [] Day [] Week [] Month [x] Occasional.  2 maximum drinks per sitting.     Study medication box # [id number] with 0 tabs  Study medication box # [id number] with 0 tabs  Study medication box # [id number] with 2 tabs  Study medication box # [id number] with 0 tabs  returned by subject.  Total tablet count of 2 for 100 % compliance.  Study medication box #'s [id number], [id number], [id number], [id number] dispensed to subject.  Education provided on medication compliance and administration    Plan: Study Visit 28 telephone call with subject in 2 months.  Will schedule study Visit  29 with subject in 4 months back in clinic.    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  Consistent with chf and drugs and not related to IP.  Needs follow up with me as have not seen since March.        Thank you for allowing me to participate  in the care of your patient.      Sincerely,     Eduard Tang RN     [facility]  cc:   No referring provider defined for this encounter.

## 2021-07-13 NOTE — PROGRESS NOTES
Pemafibrate to Reduce cardiovascular OutcoMes by reducing triglycerides IN patiENts with diabeTes (PROMINENT) clinical trial.    Subject seen in clinic today for study Visit 27.      Subject seen by provider Lacey White for study related physical exam.  See provider note and flow sheets for vital signs.    Waist measures 95 cm    EQ-5D-5L questionnaire completed if applicable.  Must complete annually (Month 12, etc.)    Study efficacy, endpoints and adverse events reviewed with subject.  Cardiovascular risk discussed.     Study alcohol consumption stipulations and education reviewed with subject:    Subject reports  [] Never [x] Current [] Former .   2 drinks per [] Day [] Week [] Month [x] Occasional.  2 maximum drinks per sitting.     Study medication box # [id number] with 0 tabs  Study medication box # [id number] with 0 tabs  Study medication box # [id number] with 2 tabs  Study medication box # [id number] with 0 tabs  returned by subject.  Total tablet count of 2 for 100 % compliance.  Study medication box #'s [id number], [id number], [id number], [id number] dispensed to subject.  Education provided on medication compliance and administration    Plan: Study Visit 28 telephone call with subject in 2 months.  Will schedule study Visit  29 with subject in 4 months back in clinic.    Eduard Tang, RN  Clinical Research Nurse  [phone]

## 2021-07-13 NOTE — LETTER
"7/13/2021    Lewis Reeves MD  [facility] [address]   [place] [address]    RE: Ken Doss       Dear Colleague,    I had the pleasure of seeing Ken Doss in the [facility].          Assessment/Recommendations   Assessment/Plan:    No changes to his medications today.  However, he is to hold his carvedilol tonight if his SBP is still <90.  He will continue to follow-up with research per protocol for PROMINENT study.       History of Present Illness/Subjective    Ken Doss is seen at [facility] today for PROMINENT research study.  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  He states that he is feeling pretty well except for being tired.  He has baseline dyspnea on exertion, but denies lightheadedness, shortness of breath, orthopnea, PND, palpitations, chest pain, abdominal fullness/bloating and lower extremity edema.  He is hypotensive today, but asymptomatic.  After his labs were drawn, he was given some sugar and fluids.       Physical Examination Review of Systems   BP (!) 70/48 (BP Location: Right arm, Patient Position: Semi-Atwood's, Cuff Size: Adult Regular)   Pulse 59   Resp 16   Ht 1.753 m (5' 9\")   Wt 73 kg (161 lb)   BMI 23.78 kg/m    Body mass index is 23.78 kg/m .  Wt Readings from Last 3 Encounters:   07/13/21 73 kg (161 lb)   03/02/21 75.9 kg (167 lb 6.4 oz)   11/24/20 73.5 kg (162 lb)       General Appearance:   Alert, cooperative and in no acute distress.   HEENT:  No scleral icterus, EOM intact, PERRL; the mucous membranes were pink and moist. Cervical lymph nodes nontender and nonpalpable.   Neck: Supple.  JVP normal.  No HJR.  No obvious thyromegaly.     Chest: The spine was straight. " The chest was symmetric.   Lungs:   Respirations unlabored; the lungs are clear to auscultation.   Cardiovascular:   Regular rhythm. S1 and S2 without murmur, clicks or rubs. Radial, carotid and posterior tibial pulses are intact and symmetrical.  No carotid bruits noted.   Abdomen:  Soft, nontender, nondistended, bowel sounds present   Extremities: No cyanosis, clubbing, or edema.   Musculoskeletal:  Moves all extremities.   Skin: No bruising or wounds.   Neurologic: Mood and affect are appropriate.     ROS: 10 point ROS neg other than the symptoms noted above in the HPI.                                             Medical History  Surgical History Family History Social History   Past Medical History:   Diagnosis Date     Abdominal pain      Abdominal pain      Acute renal failure (H)      Acute renal failure (H)      Anemia      Anemia      Arthritis     osteoarthritis     Arthritis     osteoarthritis     CHF (congestive heart failure) (H)      CHF (congestive heart failure) (H)      Chronic systolic heart failure (H) Dec 2012     Chronic systolic heart failure (H) Dec 2012     Coronary artery disease involving native coronary artery     Non obstructive disease by cath in 2007 Cor angio Nov 2016: RCA 70% (FFR 0.89), and OM1 75%        Coronary artery disease involving native coronary artery     Non obstructive disease by cath in 2007 Cor angio Nov 2016: RCA 70% (FFR 0.89), and OM1 75%        Depressive disorder, not elsewhere classified 10/24/2014     Depressive disorder, not elsewhere classified 10/24/2014     Diabetes mellitus, type 2 (H)      Diabetes mellitus, type II (H) 28/Jan/2013     Diabetes mellitus, type II (H) 28/Jan/2013     Diabetic neuropathy (H)      Diabetic neuropathy (H)      Fractures     ankle, leg and arm     Fractures     ankle, leg and arm     High cholesterol 2007     High cholesterol 2007     Hypertension      Hypertension      ICD (implantable cardioverter-defibrillator) in place  11/26/2014     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     ICD (implantable cardioverter-defibrillator) lead failure 11/26/2014    High voltage lead tip inserted in RV free wall RV lead extraction and implantation of the new septal RV lead November 2014      ICD (implantable cardioverter-defibrillator) lead failure 11/26/2014    High voltage lead tip inserted in RV free wall RV lead extraction and implantation of the new septal RV lead November 2014      ILD (interstitial lung disease) (H)      ILD (interstitial lung disease) (H)      Infectious mononucleosis      Infectious mononucleosis      Ischemic cardiomyopathy 4/22/2015    Chronic: LVEF 25- 30% LVEF 26% echo May 2017     Ischemic cardiomyopathy 4/22/2015    Chronic: LVEF 25- 30% LVEF 26% echo May 2017     Kidney stone      Kidney stone      LBBB (left bundle branch block)     noted on Dec 19, 2012     LBBB (left bundle branch block)     noted on Dec 19, 2012     Lymphadenopathy, mediastinal      Lymphadenopathy, mediastinal      Myocardial infarction (H)      Myocardial infarction (H)      Osteoarthritis      Osteoarthritis      Pulmonary anthracosis (H)      Pulmonary anthracosis (H)      Recurrent kidney stones      Recurrent kidney stones      Shortness of breath     with exertion     Shortness of breath     with exertion    Past Surgical History:   Procedure Laterality Date     APPENDECTOMY       ARTHROSCOPY KNEE Bilateral      ARTHROSCOPY SHOULDER ROTATOR CUFF REPAIR      right     C SHLDR ARTHROSCOP,DIAGNOSTIC      Description: Arthroscopy Shoulder;  Recorded: 06/10/2014;     CARDIAC CATHETERIZATION N/A 12/12/2016    Procedure: Coronary Angiogram;  Surgeon: Delgado Ramirez MD;  Location: [facility] Cath Lab;  Service:      COLONOSCOPY N/A 12/19/2019    Procedure: COLONOSCOPY with polypectomy;  Surgeon: Delgado Price MD;  [place] [facility] [place];  Service: Gastroenterology     CORONARY ANGIOGRAPHY ADULT ORDER        CV CORONARY ANGIOGRAM N/A 2/27/2020    Procedure: CV CORONARY ANGIOGRAM;  Surgeon: Pedro Fontaine MD;  Location: [facility] CARDIAC CATH LAB     CV CORONARY ANGIOGRAM N/A 1/15/2019    Procedure: Coronary Angiogram;  Surgeon: Mason Correa MD;  Location: [facility] Cath Lab;  Service: Cardiology     CV INTRAVASULAR ULTRASOUND N/A 2/27/2020    Procedure: Intravascular Ultrasound;  Surgeon: Pedro Fontaine MD;  Location: [facility] CARDIAC CATH LAB     CV LEFT HEART CATHETERIZATION WITHOUT LEFT VENTRICULOGRAM Left 1/15/2019    Procedure: Left Heart Catheterization Without Left Ventriculogram;  Surgeon: Mason Correa MD;  Location: [facility] Lab;  Service: Cardiology     CV PCI ANGIOPLASTY N/A 2/27/2020    Procedure: Percutaneous Coronary Intervention Angioplasty;  Surgeon: Pedro Fontaine MD;  Location:  HEART CARDIAC CATH LAB     CV RIGHT HEART CATH MEASUREMENTS RECORDED N/A 2/27/2020    Procedure: Right Heart Cath;  Surgeon: Pedro Fontaine MD;  Location: [facility] CARDIAC CATH LAB     CV RIGHT HEART CATH MEASUREMENTS RECORDED N/A 7/30/2020    Procedure: CV RIGHT HEART CATH;  Surgeon: Ronny Geronimo MD;  Location: [facility] CARDIAC CATH LAB     EP ABLATION AV NODE N/A 9/27/2019    Procedure: EP Ablation AV Node;  Surgeon: Markus Pool MD;  Location: [facility] Cath Lab;  Service: Cardiology     EP ICD INSERT      ICD REIMPLANTATION OF A NEW RV LEAD 11/26/2014     HC REMOVE TONSILS/ADENOIDS,<11 Y/O      Description: Tonsillectomy With Adenoidectomy;  Recorded: 06/10/2014;     INSERT / REPLACE / REMOVE PACEMAKER       JOINT REPLACEMENT      bilateral TKA     OTHER SURGICAL HISTORY  11/26/2014    BIV ICDbiotronic     ME L HRT CATH W/NJX L VENTRICULOGRAPHY IMG S&I Left 12/12/2016    Procedure: Left Heart Catheterization with Left Ventriculogram;  Surgeon: Delgado Ramirez MD;  Location: [facility];  Service:  Cardiology     REPLACEMENT TOTAL KNEE      bilat     TONSILLECTOMY & ADENOIDECTOMY       US LYMPH NODE BIOPSY  7/10/2019    Family History   Problem Relation Age of Onset     Sudden Death Mother         unexpected death in her sleep in her 50s    Social History     Tobacco Use     Smoking status: Never Smoker     Smokeless tobacco: Never Used     Tobacco comment: cigars few times a year only   Substance Use Topics     Alcohol use: Yes     Comment: Alcoholic Drinks/day: occasional     Drug use: No          Medications  Allergies   Current Outpatient Medications   Medication Sig Dispense Refill     acetaminophen (TYLENOL) 325 MG tablet Take 325-650 mg by mouth every 6 hours as needed for mild pain       acetaminophen (TYLENOL) 500 MG tablet Take 500 mg by mouth 2 times daily as needed        apixaban ANTICOAGULANT (ELIQUIS ANTICOAGULANT) 5 MG tablet Take 1 tablet (5 mg) by mouth 2 times daily 180 tablet 1     atorvastatin (LIPITOR) 40 MG tablet Take 40 mg by mouth daily       blood glucose (NO BRAND SPECIFIED) test strip 4 times daily       bumetanide (BUMEX) 1 MG tablet Take 3 tablets (3 mg) by mouth 2 times daily 540 tablet 1     carvedilol (COREG) 6.25 MG tablet Take 1 tablet (6.25 mg) by mouth 2 times daily (with meals) 60 tablet 0     Coenzyme Q10 (COQ-10) 100 MG CAPS Take 100 mg by mouth daily       colchicine (COLCYRS) 0.6 MG tablet TAKE 2 TABLETS BY MOUTH THEN 1 TABLET ONE HOUR LATER AS NEEDED FOR GOUT ATTACK AS DIRECTED       diphenhydrAMINE-acetaminophen (TYLENOL PM)  MG tablet Take 2 tablets by mouth nightly as needed for sleep       febuxostat (ULORIC) 40 MG TABS tablet Take 40 mg by mouth daily       gabapentin (NEURONTIN) 100 MG capsule Take 100 mg by mouth 3 times daily       glucosamine-chondroitin (GLUCOSAMINE CHONDR COMPLEX) 500-400 MG CAPS per capsule Take 1 tablet by mouth daily        insulin glargine (BASAGLAR KWIKPEN) 100 UNIT/ML pen Inject 65 Units Subcutaneous At Bedtime       metFORMIN  (GLUCOPHAGE) 1000 MG tablet Take 1,000 mg by mouth 2 times daily (with meals)       Multiple Vitamin (MULTI VITAMIN DAILY PO)        Multiple Vitamins-Minerals (ONCOVITE) TABS Take 1 tablet by mouth daily       Omega-3 Fatty Acids (FISH OIL) 500 MG CAPS Take 500 mg by mouth every other day       potassium chloride ER (KLOR-CON M) 20 MEQ CR tablet Take 20 mEq by mouth daily       predniSONE (DELTASONE) 20 MG tablet        sacubitril-valsartan (ENTRESTO) 49-51 MG per tablet Take 1 tablet by mouth 2 times daily 60 tablet 0     spironolactone (ALDACTONE) 25 MG tablet Take 12.5 mg by mouth daily       ticagrelor (BRILINTA) 90 MG tablet Take 1 tablet (90 mg) by mouth 2 times daily Please schedule lab draw to F/U on creatine; additional refills after 180 tablet 0    No Known Allergies       This note has been dictated using voice recognition software. Any grammatical, typographical, or context distortions are unintentional and inherent to the software.                                       Thank you for allowing me to participate in the care of your patient.      Sincerely,     MIMI Armenta [facility]  cc:   No referring provider defined for this encounter.

## 2021-07-13 NOTE — PROGRESS NOTES
"      Assessment/Recommendations   Assessment/Plan:    No changes to his medications today.  However, he is to hold his carvedilol tonight if his SBP is still <90.  He will continue to follow-up with research per protocol for PROMINENT study.       History of Present Illness/Subjective    Ken Doss is seen at [facility] today for PROMINENT research study.  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  He states that he is feeling pretty well except for being tired.  He has baseline dyspnea on exertion, but denies lightheadedness, shortness of breath, orthopnea, PND, palpitations, chest pain, abdominal fullness/bloating and lower extremity edema.  He is hypotensive today, but asymptomatic.  After his labs were drawn, he was given some sugar and fluids.       Physical Examination Review of Systems   BP (!) 70/48 (BP Location: Right arm, Patient Position: Semi-Atwood's, Cuff Size: Adult Regular)   Pulse 59   Resp 16   Ht 1.753 m (5' 9\")   Wt 73 kg (161 lb)   BMI 23.78 kg/m    Body mass index is 23.78 kg/m .  Wt Readings from Last 3 Encounters:   07/13/21 73 kg (161 lb)   03/02/21 75.9 kg (167 lb 6.4 oz)   11/24/20 73.5 kg (162 lb)       General Appearance:   Alert, cooperative and in no acute distress.   HEENT:  No scleral icterus, EOM intact, PERRL; the mucous membranes were pink and moist. Cervical lymph nodes nontender and nonpalpable.   Neck: Supple.  JVP normal.  No HJR.  No obvious thyromegaly.     Chest: The spine was straight. The chest was symmetric.   Lungs:   Respirations unlabored; the lungs are clear to auscultation.   Cardiovascular:   Regular rhythm. S1 and S2 without murmur, clicks or rubs. Radial, carotid and posterior tibial pulses are intact and symmetrical.  No carotid bruits noted. "   Abdomen:  Soft, nontender, nondistended, bowel sounds present   Extremities: No cyanosis, clubbing, or edema.   Musculoskeletal:  Moves all extremities.   Skin: No bruising or wounds.   Neurologic: Mood and affect are appropriate.     ROS: 10 point ROS neg other than the symptoms noted above in the HPI.                                             Medical History  Surgical History Family History Social History   Past Medical History:   Diagnosis Date     Abdominal pain      Abdominal pain      Acute renal failure (H)      Acute renal failure (H)      Anemia      Anemia      Arthritis     osteoarthritis     Arthritis     osteoarthritis     CHF (congestive heart failure) (H)      CHF (congestive heart failure) (H)      Chronic systolic heart failure (H) Dec 2012     Chronic systolic heart failure (H) Dec 2012     Coronary artery disease involving native coronary artery     Non obstructive disease by cath in 2007 Cor angio Nov 2016: RCA 70% (FFR 0.89), and OM1 75%        Coronary artery disease involving native coronary artery     Non obstructive disease by cath in 2007 Cor angio Nov 2016: RCA 70% (FFR 0.89), and OM1 75%        Depressive disorder, not elsewhere classified 10/24/2014     Depressive disorder, not elsewhere classified 10/24/2014     Diabetes mellitus, type 2 (H)      Diabetes mellitus, type II (H) 28/Jan/2013     Diabetes mellitus, type II (H) 28/Jan/2013     Diabetic neuropathy (H)      Diabetic neuropathy (H)      Fractures     ankle, leg and arm     Fractures     ankle, leg and arm     High cholesterol 2007     High cholesterol 2007     Hypertension      Hypertension      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     ICD (implantable cardioverter-defibrillator) lead failure 11/26/2014    High voltage lead tip inserted in RV free wall RV lead extraction and implantation of the new septal RV lead November 2014       ICD (implantable cardioverter-defibrillator) lead failure 11/26/2014    High voltage lead tip inserted in RV free wall RV lead extraction and implantation of the new septal RV lead November 2014      ILD (interstitial lung disease) (H)      ILD (interstitial lung disease) (H)      Infectious mononucleosis      Infectious mononucleosis      Ischemic cardiomyopathy 4/22/2015    Chronic: LVEF 25- 30% LVEF 26% echo May 2017     Ischemic cardiomyopathy 4/22/2015    Chronic: LVEF 25- 30% LVEF 26% echo May 2017     Kidney stone      Kidney stone      LBBB (left bundle branch block)     noted on Dec 19, 2012     LBBB (left bundle branch block)     noted on Dec 19, 2012     Lymphadenopathy, mediastinal      Lymphadenopathy, mediastinal      Myocardial infarction (H)      Myocardial infarction (H)      Osteoarthritis      Osteoarthritis      Pulmonary anthracosis (H)      Pulmonary anthracosis (H)      Recurrent kidney stones      Recurrent kidney stones      Shortness of breath     with exertion     Shortness of breath     with exertion    Past Surgical History:   Procedure Laterality Date     APPENDECTOMY       ARTHROSCOPY KNEE Bilateral      ARTHROSCOPY SHOULDER ROTATOR CUFF REPAIR      right     C SHLDR ARTHROSCOP,DIAGNOSTIC      Description: Arthroscopy Shoulder;  Recorded: 06/10/2014;     CARDIAC CATHETERIZATION N/A 12/12/2016    Procedure: Coronary Angiogram;  Surgeon: Delgado Ramirez MD;  Location: [facility];  Service:      COLONOSCOPY N/A 12/19/2019    Procedure: COLONOSCOPY with polypectomy;  Surgeon: Delgado Price MD;  Location: [facility];  Service: Gastroenterology     CORONARY ANGIOGRAPHY ADULT ORDER       CV CORONARY ANGIOGRAM N/A 2/27/2020    Procedure: CV CORONARY ANGIOGRAM;  Surgeon: Pedro Fontaine MD;  Location: [facility] CARDIAC CATH LAB     CV CORONARY ANGIOGRAM N/A 1/15/2019    Procedure: Coronary Angiogram;  Surgeon: Mason Correa MD;  Location:  [facility] Cath Lab;  Service: Cardiology     CV INTRAVASULAR ULTRASOUND N/A 2/27/2020    Procedure: Intravascular Ultrasound;  Surgeon: Pedro Fontaine MD;  Location: [facility] CARDIAC CATH LAB     CV LEFT HEART CATHETERIZATION WITHOUT LEFT VENTRICULOGRAM Left 1/15/2019    Procedure: Left Heart Catheterization Without Left Ventriculogram;  Surgeon: Mason Correa MD;  Location: [facility] Cath Lab;  Service: Cardiology     CV PCI ANGIOPLASTY N/A 2/27/2020    Procedure: Percutaneous Coronary Intervention Angioplasty;  Surgeon: Pedro Fontaine MD;  Location: [facility] CARDIAC CATH LAB     CV RIGHT HEART CATH MEASUREMENTS RECORDED N/A 2/27/2020    Procedure: Right Heart Cath;  Surgeon: Pedro Fontaine MD;  Location: [facility] CARDIAC CATH LAB     CV RIGHT HEART CATH MEASUREMENTS RECORDED N/A 7/30/2020    Procedure: CV RIGHT HEART CATH;  Surgeon: Ronny Geronimo MD;  Location: [facility] CARDIAC CATH LAB     EP ABLATION AV NODE N/A 9/27/2019    Procedure: EP Ablation AV Node;  Surgeon: Markus Pool MD;  Location: [facility] Cath Lab;  Service: Cardiology     EP ICD INSERT      ICD REIMPLANTATION OF A NEW RV LEAD 11/26/2014     HC REMOVE TONSILS/ADENOIDS,<11 Y/O      Description: Tonsillectomy With Adenoidectomy;  Recorded: 06/10/2014;     INSERT / REPLACE / REMOVE PACEMAKER       JOINT REPLACEMENT      bilateral TKA     OTHER SURGICAL HISTORY  11/26/2014    BIV ICDbiotronic     FL L HRT CATH W/NJX L VENTRICULOGRAPHY IMG S&I Left 12/12/2016    Procedure: Left Heart Catheterization with Left Ventriculogram;  Surgeon: Delgado Ramirez MD;  Location: [facility] Cath Lab;  Service: Cardiology     REPLACEMENT TOTAL KNEE      bilat     TONSILLECTOMY & ADENOIDECTOMY       US LYMPH NODE BIOPSY  7/10/2019    Family History   Problem Relation Age of Onset     Sudden Death Mother         unexpected death in her sleep in her 50s    Social History     Tobacco Use      Smoking status: Never Smoker     Smokeless tobacco: Never Used     Tobacco comment: cigars few times a year only   Substance Use Topics     Alcohol use: Yes     Comment: Alcoholic Drinks/day: occasional     Drug use: No          Medications  Allergies   Current Outpatient Medications   Medication Sig Dispense Refill     acetaminophen (TYLENOL) 325 MG tablet Take 325-650 mg by mouth every 6 hours as needed for mild pain       acetaminophen (TYLENOL) 500 MG tablet Take 500 mg by mouth 2 times daily as needed        apixaban ANTICOAGULANT (ELIQUIS ANTICOAGULANT) 5 MG tablet Take 1 tablet (5 mg) by mouth 2 times daily 180 tablet 1     atorvastatin (LIPITOR) 40 MG tablet Take 40 mg by mouth daily       blood glucose (NO BRAND SPECIFIED) test strip 4 times daily       bumetanide (BUMEX) 1 MG tablet Take 3 tablets (3 mg) by mouth 2 times daily 540 tablet 1     carvedilol (COREG) 6.25 MG tablet Take 1 tablet (6.25 mg) by mouth 2 times daily (with meals) 60 tablet 0     Coenzyme Q10 (COQ-10) 100 MG CAPS Take 100 mg by mouth daily       colchicine (COLCYRS) 0.6 MG tablet TAKE 2 TABLETS BY MOUTH THEN 1 TABLET ONE HOUR LATER AS NEEDED FOR GOUT ATTACK AS DIRECTED       diphenhydrAMINE-acetaminophen (TYLENOL PM)  MG tablet Take 2 tablets by mouth nightly as needed for sleep       febuxostat (ULORIC) 40 MG TABS tablet Take 40 mg by mouth daily       gabapentin (NEURONTIN) 100 MG capsule Take 100 mg by mouth 3 times daily       glucosamine-chondroitin (GLUCOSAMINE CHONDR COMPLEX) 500-400 MG CAPS per capsule Take 1 tablet by mouth daily        insulin glargine (BASAGLAR KWIKPEN) 100 UNIT/ML pen Inject 65 Units Subcutaneous At Bedtime       metFORMIN (GLUCOPHAGE) 1000 MG tablet Take 1,000 mg by mouth 2 times daily (with meals)       Multiple Vitamin (MULTI VITAMIN DAILY PO)        Multiple Vitamins-Minerals (ONCOVITE) TABS Take 1 tablet by mouth daily       Omega-3 Fatty Acids (FISH OIL) 500 MG CAPS Take 500 mg by mouth every  other day       potassium chloride ER (KLOR-CON M) 20 MEQ CR tablet Take 20 mEq by mouth daily       predniSONE (DELTASONE) 20 MG tablet        sacubitril-valsartan (ENTRESTO) 49-51 MG per tablet Take 1 tablet by mouth 2 times daily 60 tablet 0     spironolactone (ALDACTONE) 25 MG tablet Take 12.5 mg by mouth daily       ticagrelor (BRILINTA) 90 MG tablet Take 1 tablet (90 mg) by mouth 2 times daily Please schedule lab draw to F/U on creatine; additional refills after 180 tablet 0    No Known Allergies       This note has been dictated using voice recognition software. Any grammatical, typographical, or context distortions are unintentional and inherent to the software.

## 2021-07-14 PROBLEM — I50.1: Status: RESOLVED | Noted: 2019-09-19 | Resolved: 2019-09-19

## 2021-07-14 PROBLEM — I50.20 HEART FAILURE WITH REDUCED EJECTION FRACTION (H): Status: RESOLVED | Noted: 2017-11-14 | Resolved: 2019-09-19

## 2021-07-19 DIAGNOSIS — I25.10 CAD (CORONARY ARTERY DISEASE): Primary | ICD-10-CM

## 2021-07-19 RX ORDER — SPIRONOLACTONE 25 MG/1
12.5 TABLET ORAL DAILY
Qty: 45 TABLET | Refills: 1 | Status: SHIPPED | OUTPATIENT
Start: 2021-07-19 | End: 2022-01-01

## 2021-07-29 NOTE — PROGRESS NOTES
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  Consistent with chf and drugs and not related to IP.  Needs follow up with me as have not seen since March.  LF

## 2021-08-19 NOTE — TELEPHONE ENCOUNTER
Bumetanide 1 MG Oral Tablet      Last Written Prescription Date:  9-1-20  Last Fill Quantity: 540,   # refills: 1  Last Office Visit : 3-9-20  Future Office visit:  none    Routing refill request to provider for review/approval because:  Gap in RF  Abnormal Cr  ? RF provider/clinic    7-13-21 clinical research study jd- Jeff

## 2021-08-19 NOTE — TELEPHONE ENCOUNTER
[facility] Call Center    Phone Message    May a detailed message be left on voicemail: yes     Reason for Call: Other: Pharmacist called in, stated that pt must have called in wrong RX number, which prompted the refill request to show last fill date of Septermber 2020. In pharmacy's system, last fill was in March of 2021. Please f/u as you see fit.      Action Taken: Message routed to:  [facility] Cardio    Travel Screening: Not Applicable

## 2021-08-24 NOTE — TELEPHONE ENCOUNTER
[facility] Call Center    Phone Message    May a detailed message be left on voicemail: yes     Reason for Call: Medication Refill Request    Has the patient contacted the pharmacy for the refill? Yes   Name of medication being requested: bumetanide (BUMEX) 1 MG tablet [[id number]]  Provider who prescribed the medication:   Pharmacy: [facility] [address] [place] [address]  Date medication is needed: 08/24/21         Action Taken: Message routed to:  [facility] Cardio    Travel Screening: Not Applicable

## 2021-08-24 NOTE — TELEPHONE ENCOUNTER
Patient overdue for follow up with Dr. Payton.   Message sent to scheduling to schedule follow up appointment.  Refill sent for limited refill due to need for follow up.

## 2021-09-09 NOTE — PROGRESS NOTES
In clinic device check with Linda Marmolejo NP.  Please see link for full device report.  Patient was informed of results and next follow up during today's visit.

## 2021-09-09 NOTE — PATIENT INSTRUCTIONS
Your next device check is 10/25/2021 at 750 am for ICD check and 830 to see Dr Pool. This is at [facility] Cardiology clinic.    Ken Doss,    It was a pleasure to see you today at the [facility].     My recommendations after this visit include:  - No changes to your medications.    - You will be called with the results of your labs.  - I will have Dr. Appiah's nurse call you to coordinate follow up.   - Follow up with Linda Hardin in April 2022.   - If you have any questions or concerns, please call [phone] to talk with my nurse.    Linda Hardin, CNP

## 2021-09-09 NOTE — PROGRESS NOTES
Assessment/Recommendations   Assessment:    1.  Heart failure with reduced ejection fraction, ejection fraction 10 to 20%: He has no symptoms of acute fluid retention.  He has chronic dyspnea on exertion which is stable.  Lung sounds are clear.  No edema.  Weight stable.    2.  CRT-D: See full device check from today.  Biventricular pacing at 91%.    3.  Chronic atrial fibrillation: Heart rate is well controlled.  He continues to take Eliquis.    Plan:  1.   Heart failure medications:  - Beta blocker therapy with carvedilol 12.5 mg twice daily  - ARNI therapy with Entresto 49/51 mg twice daily  - Aldosterone blocker therapy with spironolactone 12.5 mg daily  - Diuretic therapy with bumetanide 3 mg in the morning and 2 mg in the afternoon  2.  BMP pending  3.  Continue current medications    Skyler is due to follow-up with Dr. Appiah and have a cardiopulmonary stress test.  I have communicated with his nurse Cici to help coordinate.  Follow-up with Dr. Pool on October 25 and in the heart failure clinic in April 2022.       History of Present Illness/Subjective    Mr. Ken Doss is a 75 year old male seen at [facility] today for follow-up.  He has a history of ischemic cardiomyopathy, heart failure with reduced ejection fraction, coronary artery disease, CRT-D, dyslipidemia, atrial fibrillation, pulmonary fibrosis, and diabetes.  Ejection fraction 10 to 20%.    He has chronic shortness of breath with activity but is at baseline.  He states that he has felt stable over the past few months with no acute concerns.  He has fallen a few times in the past few months.  He states falls were due to clumsiness and not lightheadedness.  He denies lightheadedness, orthopnea, chest pain and lower extremity edema.      His home weight has remained stable.  He is following a low-sodium diet.     Physical Examination Review of Systems   Vitals: BP 92/60 (BP Location: Left arm, Patient  "Position: Sitting, Cuff Size: Adult Regular)   Pulse 84   Resp 16   Ht 1.753 m (5' 9\")   Wt 71.2 kg (157 lb)   BMI 23.18 kg/m    BMI= Body mass index is 23.18 kg/m .  Wt Readings from Last 3 Encounters:   09/09/21 71.2 kg (157 lb)   07/13/21 73 kg (161 lb)   03/02/21 75.9 kg (167 lb 6.4 oz)       General Appearance:     Alert, cooperative and in no acute distress.   ENT/Mouth: membranes moist, no oral lesions or bleeding gums.      EYES:  no scleral icterus, normal conjunctivae   Chest/Lungs:   lungs are clear to auscultation, no rales or wheezing, respirations unlabored   Cardiovascular:   Regular. Normal first and second heart sounds, no edema bilateral lower extremities    Abdomen:  Soft, nontender, nondistended, bowel sounds present   Extremities: no cyanosis or clubbing   Skin: warm, dry.    Neurologic: mood and affect are appropriate, alert and oriented x3         Please refer above for cardiac ROS details.      Medical History  Surgical History Family History Social History   Past Medical History:   Diagnosis Date     Abdominal pain      Abdominal pain      Abdominal pain      Acute renal failure (H)      Acute renal failure (H)      Acute renal failure (H)      Anemia      Anemia      Anemia      Arthritis     osteoarthritis     Arthritis     osteoarthritis     Arthritis     osteoarthritis     CHF (congestive heart failure) (H)      CHF (congestive heart failure) (H)      CHF (congestive heart failure) (H)      Chronic systolic heart failure (H) Dec 2012     Chronic systolic heart failure (H) Dec 2012     Chronic systolic heart failure (H) Dec 2012     Coronary artery disease involving native coronary artery     Non obstructive disease by cath in 2007 Cor angio Nov 2016: RCA 70% (FFR 0.89), and OM1 75%        Coronary artery disease involving native coronary artery     Non obstructive disease by cath in 2007 Cor angio Nov 2016: RCA 70% (FFR 0.89), and OM1 75%        Coronary artery disease involving " native coronary artery     Non obstructive disease by cath in 2007 Cor angio Nov 2016: RCA 70% (FFR 0.89), and OM1 75%        Depressive disorder, not elsewhere classified 10/24/2014     Depressive disorder, not elsewhere classified 10/24/2014     Depressive disorder, not elsewhere classified 10/24/2014     Diabetes mellitus, type 2 (H)      Diabetes mellitus, type II (H) 28/Jan/2013     Diabetes mellitus, type II (H) 28/Jan/2013     Diabetes mellitus, type II (H) 28/Jan/2013     Diabetic neuropathy (H)      Diabetic neuropathy (H)      Diabetic neuropathy (H)      Fractures     ankle, leg and arm     Fractures     ankle, leg and arm     Fractures     ankle, leg and arm     High cholesterol 2007     High cholesterol 2007     High cholesterol 2007     Hypertension      Hypertension      Hypertension      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     ICD (implantable cardioverter-defibrillator) lead failure 11/26/2014    High voltage lead tip inserted in RV free wall RV lead extraction and implantation of the new septal RV lead November 2014      ICD (implantable cardioverter-defibrillator) lead failure 11/26/2014    High voltage lead tip inserted in RV free wall RV lead extraction and implantation of the new septal RV lead November 2014      ICD (implantable cardioverter-defibrillator) lead failure 11/26/2014    High voltage lead tip inserted in RV free wall RV lead extraction and implantation of the new septal RV lead November 2014      ILD (interstitial lung disease) (H)      ILD (interstitial lung disease) (H)      ILD (interstitial lung disease) (H)      Infectious mononucleosis      Infectious mononucleosis      Infectious mononucleosis      Ischemic cardiomyopathy 4/22/2015    Chronic: LVEF 25- 30% LVEF 26% echo May 2017     Ischemic cardiomyopathy  4/22/2015    Chronic: LVEF 25- 30% LVEF 26% echo May 2017     Ischemic cardiomyopathy 4/22/2015    Chronic: LVEF 25- 30% LVEF 26% echo May 2017     Kidney stone      Kidney stone      Kidney stone      LBBB (left bundle branch block)     noted on Dec 19, 2012     LBBB (left bundle branch block)     noted on Dec 19, 2012     LBBB (left bundle branch block)     noted on Dec 19, 2012     Lymphadenopathy, mediastinal      Lymphadenopathy, mediastinal      Lymphadenopathy, mediastinal      Myocardial infarction (H)      Myocardial infarction (H)      Myocardial infarction (H)      Osteoarthritis      Osteoarthritis      Osteoarthritis      Pulmonary anthracosis (H)      Pulmonary anthracosis (H)      Pulmonary anthracosis (H)      Recurrent kidney stones      Recurrent kidney stones      Recurrent kidney stones      Shortness of breath     with exertion     Shortness of breath     with exertion     Shortness of breath     with exertion     Past Surgical History:   Procedure Laterality Date     APPENDECTOMY       ARTHROSCOPY KNEE Bilateral      ARTHROSCOPY SHOULDER ROTATOR CUFF REPAIR      right     C SHLDR ARTHROSCOP,DIAGNOSTIC      Description: Arthroscopy Shoulder;  Recorded: 06/10/2014;     CARDIAC CATHETERIZATION N/A 12/12/2016    Procedure: Coronary Angiogram;  Surgeon: Delgado Ramirez MD;  Location: [facility] Cath Lab;  Service:      COLONOSCOPY N/A 12/19/2019    Procedure: COLONOSCOPY with polypectomy;  Surgeon: Delgado Price MD;  Location: [facility];  Service: Gastroenterology     CORONARY ANGIOGRAPHY ADULT ORDER       CV CORONARY ANGIOGRAM N/A 2/27/2020    Procedure: CV CORONARY ANGIOGRAM;  Surgeon: Pedro Fontaine MD;  Location:  HEART CARDIAC CATH LAB     CV CORONARY ANGIOGRAM N/A 1/15/2019    Procedure: Coronary Angiogram;  Surgeon: Mason Correa MD;  Location: [facility] Cath Lab;  Service: Cardiology     CV INTRAVASULAR ULTRASOUND N/A 2/27/2020    Procedure:  Intravascular Ultrasound;  Surgeon: Pedro Fontaine MD;  Location: [facility] CARDIAC CATH LAB     CV LEFT HEART CATHETERIZATION WITHOUT LEFT VENTRICULOGRAM Left 1/15/2019    Procedure: Left Heart Catheterization Without Left Ventriculogram;  Surgeon: Mason Correa MD;  Location: [facility] Cath Lab;  Service: Cardiology     CV PCI ANGIOPLASTY N/A 2/27/2020    Procedure: Percutaneous Coronary Intervention Angioplasty;  Surgeon: Pedro Fontaine MD;  Location:  HEART CARDIAC CATH LAB     CV RIGHT HEART CATH MEASUREMENTS RECORDED N/A 2/27/2020    Procedure: Right Heart Cath;  Surgeon: Pedro Fontaine MD;  Location:  HEART CARDIAC CATH LAB     CV RIGHT HEART CATH MEASUREMENTS RECORDED N/A 7/30/2020    Procedure: CV RIGHT HEART CATH;  Surgeon: Ronny Geronimo MD;  Location: [facility] CARDIAC CATH LAB     EP ABLATION AV NODE N/A 9/27/2019    Procedure: EP Ablation AV Node;  Surgeon: Markus Pool MD;  Location: [facility] Cath Lab;  Service: Cardiology     EP ICD INSERT      ICD REIMPLANTATION OF A NEW RV LEAD 11/26/2014     HC REMOVE TONSILS/ADENOIDS,<13 Y/O      Description: Tonsillectomy With Adenoidectomy;  Recorded: 06/10/2014;     INSERT / REPLACE / REMOVE PACEMAKER       JOINT REPLACEMENT      bilateral TKA     OTHER SURGICAL HISTORY  11/26/2014    BIV ICDbiotronic     VT L HRT CATH W/NJX L VENTRICULOGRAPHY IMG S&I Left 12/12/2016    Procedure: Left Heart Catheterization with Left Ventriculogram;  Surgeon: Delgado Ramirez MD;  Location: [facility] Cath Lab;  Service: Cardiology     REPLACEMENT TOTAL KNEE      bilat     TONSILLECTOMY & ADENOIDECTOMY       US LYMPH NODE BIOPSY  7/10/2019     Family History   Problem Relation Age of Onset     Sudden Death Mother         unexpected death in her sleep in her 50s    Social History     Socioeconomic History     Marital status:      Spouse name: Not on file     Number of children: Not on file      Years of education: Not on file     Highest education level: Not on file   Occupational History     Not on file   Tobacco Use     Smoking status: Never Smoker     Smokeless tobacco: Never Used     Tobacco comment: cigars few times a year only   Substance and Sexual Activity     Alcohol use: Yes     Comment: Alcoholic Drinks/day: occasional     Drug use: No     Sexual activity: Not on file   Other Topics Concern     Not on file   Social History Narrative     Not on file     Social Determinants of Health     Financial Resource Strain:      Difficulty of Paying Living Expenses:    Food Insecurity:      Worried About Running Out of Food in the Last Year:      Ran Out of Food in the Last Year:    Transportation Needs:      Lack of Transportation (Medical):      Lack of Transportation (Non-Medical):    Physical Activity:      Days of Exercise per Week:      Minutes of Exercise per Session:    Stress:      Feeling of Stress :    Social Connections:      Frequency of Communication with Friends and Family:      Frequency of Social Gatherings with Friends and Family:      Attends [employer] Services:      Active Member of Clubs or Organizations:      Attends Club or Organization Meetings:      Marital Status:    Intimate Partner Violence:      Fear of Current or Ex-Partner:      Emotionally Abused:      Physically Abused:      Sexually Abused:           Medications  Allergies   Current Outpatient Medications   Medication Sig Dispense Refill     acetaminophen (TYLENOL) 500 MG tablet Take 500 mg by mouth 2 times daily as needed        apixaban ANTICOAGULANT (ELIQUIS ANTICOAGULANT) 5 MG tablet Take 1 tablet (5 mg) by mouth 2 times daily 180 tablet 1     atorvastatin (LIPITOR) 40 MG tablet Take 40 mg by mouth daily       bumetanide (BUMEX) 1 MG tablet Take 1 mg by mouth daily 3 mg in the morning and 2 mg in the afternoon       carvedilol (COREG) 6.25 MG tablet Take 12.5 mg by mouth 2 times daily (with meals)       Coenzyme Q10  (COQ-10) 100 MG CAPS Take 100 mg by mouth daily       colchicine (COLCYRS) 0.6 MG tablet TAKE 2 TABLETS BY MOUTH THEN 1 TABLET ONE HOUR LATER AS NEEDED FOR GOUT ATTACK AS DIRECTED       febuxostat (ULORIC) 40 MG TABS tablet Take 40 mg by mouth daily       gabapentin (NEURONTIN) 100 MG capsule Take 100 mg by mouth 3 times daily       glucosamine-chondroitin (GLUCOSAMINE CHONDR COMPLEX) 500-400 MG CAPS per capsule Take 1 tablet by mouth daily        insulin glargine (BASAGLAR KWIKPEN) 100 UNIT/ML pen Inject 44 Units Subcutaneous At Bedtime        metFORMIN (GLUCOPHAGE) 1000 MG tablet Take 1,000 mg by mouth 2 times daily (with meals)       Multiple Vitamin (MULTI VITAMIN DAILY PO)        Omega-3 Fatty Acids (FISH OIL) 500 MG CAPS Take 500 mg by mouth every other day       potassium chloride ER (KLOR-CON M) 20 MEQ CR tablet Take 20 mEq by mouth daily       sacubitril-valsartan (ENTRESTO) 49-51 MG per tablet Take 1 tablet by mouth 2 times daily 60 tablet 0     spironolactone (ALDACTONE) 25 MG tablet Take 0.5 tablets (12.5 mg) by mouth daily 45 tablet 1     ticagrelor (BRILINTA) 90 MG tablet Take 1 tablet (90 mg) by mouth 2 times daily Please schedule lab draw to F/U on creatine; additional refills after 180 tablet 0    No Known Allergies      Lab Results    Chemistry/lipid CBC Cardiac Enzymes/BNP/TSH/INR   Recent Labs   Lab Test 02/29/20  0248   CHOL 77   HDL 32*   LDL 29   TRIG 79     Recent Labs   Lab Test 02/29/20  0248 12/12/19  1535 02/14/19  0954   LDL 29 24 69     Recent Labs   Lab Test 06/07/21  1630      POTASSIUM 4.4   CHLORIDE 102   CO2 17*   *   BUN 68*   CR 1.93*   GFRESTIMATED 34*   LYNNE 9.5     Recent Labs   Lab Test 06/07/21  1630 02/09/21  1354 10/01/20  1323   CR 1.93* 1.35* 1.53*     Recent Labs   Lab Test 03/09/20  1155 09/20/19  0603 01/16/19  0514   A1C 7.8* 8.7* 8.6*    Recent Labs   Lab Test 09/17/20  0822   WBC 8.8   HGB 10.6*   HCT 31.1*   MCV 96        Recent Labs   Lab Test  09/17/20  0822 07/30/20  0729 05/14/20  0847   HGB 10.6* 12.3* 11.9*    Recent Labs   Lab Test 09/20/19  0603 09/20/19  0002 09/19/19  1742   TROPONINI 0.02 0.02 0.03     Recent Labs   Lab Test 10/01/20  1323 09/02/20  1302 07/30/20  0729 05/14/20  0847 03/12/20  1037 03/05/20  0750 02/25/20  1345   * 603*  --   --  1,838*  --   --    NTBNPI  --   --  3,182*  --   --   --  5,924*   NTBNP  --   --   --  2,791*  --  7,195*  --      Recent Labs   Lab Test 03/09/20  1155   TSH 1.02     Recent Labs   Lab Test 03/09/20  1155 07/09/19  1033   INR 1.29* 1.30*

## 2021-09-09 NOTE — LETTER
9/9/2021    Lewis Reeves MD  [facility] [address]   [place] [address]    RE: Ken Doss       Dear Colleague,    I had the pleasure of seeing Ken Doss in the [facility].          Assessment/Recommendations   Assessment:    1.  Heart failure with reduced ejection fraction, ejection fraction 10 to 20%: He has no symptoms of acute fluid retention.  He has chronic dyspnea on exertion which is stable.  Lung sounds are clear.  No edema.  Weight stable.    2.  CRT-D: See full device check from today.  Biventricular pacing at 91%.    3.  Chronic atrial fibrillation: Heart rate is well controlled.  He continues to take Eliquis.    Plan:  1.   Heart failure medications:  - Beta blocker therapy with carvedilol 12.5 mg twice daily  - ARNI therapy with Entresto 49/51 mg twice daily  - Aldosterone blocker therapy with spironolactone 12.5 mg daily  - Diuretic therapy with bumetanide 3 mg in the morning and 2 mg in the afternoon  2.  BMP pending  3.  Continue current medications    Skyler is due to follow-up with Dr. Appiah and have a cardiopulmonary stress test.  I have communicated with his nurse Cici to help coordinate.  Follow-up with Dr. Pool on October 25 and in the heart failure clinic in April 2022.       History of Present Illness/Subjective    Mr. Ken Doss is a 75 year old male seen at [facility] today for follow-up.  He has a history of ischemic cardiomyopathy, heart failure with reduced ejection fraction, coronary artery disease, CRT-D, dyslipidemia, atrial fibrillation, pulmonary fibrosis, and diabetes.  Ejection fraction 10 to 20%.    He has chronic shortness of breath with activity but is at baseline.  He states that he has felt stable over the past few months with no acute concerns.  He has fallen a few times in the past few months.  He states falls were due to clumsiness  "and not lightheadedness.  He denies lightheadedness, orthopnea, chest pain and lower extremity edema.      His home weight has remained stable.  He is following a low-sodium diet.     Physical Examination Review of Systems   Vitals: BP 92/60 (BP Location: Left arm, Patient Position: Sitting, Cuff Size: Adult Regular)   Pulse 84   Resp 16   Ht 1.753 m (5' 9\")   Wt 71.2 kg (157 lb)   BMI 23.18 kg/m    BMI= Body mass index is 23.18 kg/m .  Wt Readings from Last 3 Encounters:   09/09/21 71.2 kg (157 lb)   07/13/21 73 kg (161 lb)   03/02/21 75.9 kg (167 lb 6.4 oz)       General Appearance:     Alert, cooperative and in no acute distress.   ENT/Mouth: membranes moist, no oral lesions or bleeding gums.      EYES:  no scleral icterus, normal conjunctivae   Chest/Lungs:   lungs are clear to auscultation, no rales or wheezing, respirations unlabored   Cardiovascular:   Regular. Normal first and second heart sounds, no edema bilateral lower extremities    Abdomen:  Soft, nontender, nondistended, bowel sounds present   Extremities: no cyanosis or clubbing   Skin: warm, dry.    Neurologic: mood and affect are appropriate, alert and oriented x3         Please refer above for cardiac ROS details.      Medical History  Surgical History Family History Social History   Past Medical History:   Diagnosis Date     Abdominal pain      Abdominal pain      Abdominal pain      Acute renal failure (H)      Acute renal failure (H)      Acute renal failure (H)      Anemia      Anemia      Anemia      Arthritis     osteoarthritis     Arthritis     osteoarthritis     Arthritis     osteoarthritis     CHF (congestive heart failure) (H)      CHF (congestive heart failure) (H)      CHF (congestive heart failure) (H)      Chronic systolic heart failure (H) Dec 2012     Chronic systolic heart failure (H) Dec 2012     Chronic systolic heart failure (H) Dec 2012     Coronary artery disease involving native coronary artery     Non obstructive " disease by cath in 2007 Cor angio Nov 2016: RCA 70% (FFR 0.89), and OM1 75%        Coronary artery disease involving native coronary artery     Non obstructive disease by cath in 2007 Cor angio Nov 2016: RCA 70% (FFR 0.89), and OM1 75%        Coronary artery disease involving native coronary artery     Non obstructive disease by cath in 2007 Cor angio Nov 2016: RCA 70% (FFR 0.89), and OM1 75%        Depressive disorder, not elsewhere classified 10/24/2014     Depressive disorder, not elsewhere classified 10/24/2014     Depressive disorder, not elsewhere classified 10/24/2014     Diabetes mellitus, type 2 (H)      Diabetes mellitus, type II (H) 28/Jan/2013     Diabetes mellitus, type II (H) 28/Jan/2013     Diabetes mellitus, type II (H) 28/Jan/2013     Diabetic neuropathy (H)      Diabetic neuropathy (H)      Diabetic neuropathy (H)      Fractures     ankle, leg and arm     Fractures     ankle, leg and arm     Fractures     ankle, leg and arm     High cholesterol 2007     High cholesterol 2007     High cholesterol 2007     Hypertension      Hypertension      Hypertension      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     ICD (implantable cardioverter-defibrillator) lead failure 11/26/2014    High voltage lead tip inserted in RV free wall RV lead extraction and implantation of the new septal RV lead November 2014      ICD (implantable cardioverter-defibrillator) lead failure 11/26/2014    High voltage lead tip inserted in RV free wall RV lead extraction and implantation of the new septal RV lead November 2014      ICD (implantable cardioverter-defibrillator) lead failure 11/26/2014    High voltage lead tip inserted in RV free wall RV lead extraction and implantation of the new septal RV lead November 2014      ILD (interstitial lung disease) (H)      ILD  (interstitial lung disease) (H)      ILD (interstitial lung disease) (H)      Infectious mononucleosis      Infectious mononucleosis      Infectious mononucleosis      Ischemic cardiomyopathy 4/22/2015    Chronic: LVEF 25- 30% LVEF 26% echo May 2017     Ischemic cardiomyopathy 4/22/2015    Chronic: LVEF 25- 30% LVEF 26% echo May 2017     Ischemic cardiomyopathy 4/22/2015    Chronic: LVEF 25- 30% LVEF 26% echo May 2017     Kidney stone      Kidney stone      Kidney stone      LBBB (left bundle branch block)     noted on Dec 19, 2012     LBBB (left bundle branch block)     noted on Dec 19, 2012     LBBB (left bundle branch block)     noted on Dec 19, 2012     Lymphadenopathy, mediastinal      Lymphadenopathy, mediastinal      Lymphadenopathy, mediastinal      Myocardial infarction (H)      Myocardial infarction (H)      Myocardial infarction (H)      Osteoarthritis      Osteoarthritis      Osteoarthritis      Pulmonary anthracosis (H)      Pulmonary anthracosis (H)      Pulmonary anthracosis (H)      Recurrent kidney stones      Recurrent kidney stones      Recurrent kidney stones      Shortness of breath     with exertion     Shortness of breath     with exertion     Shortness of breath     with exertion     Past Surgical History:   Procedure Laterality Date     APPENDECTOMY       ARTHROSCOPY KNEE Bilateral      ARTHROSCOPY SHOULDER ROTATOR CUFF REPAIR      right     C SHLDR ARTHROSCOP,DIAGNOSTIC      Description: Arthroscopy Shoulder;  Recorded: 06/10/2014;     CARDIAC CATHETERIZATION N/A 12/12/2016    Procedure: Coronary Angiogram;  Surgeon: Delgado Ramirez MD;  Location: [facility];  Service:      COLONOSCOPY N/A 12/19/2019    Procedure: COLONOSCOPY with polypectomy;  Surgeon: Delgado Price MD;  [place] [facility] [place];  Service: Gastroenterology     CORONARY ANGIOGRAPHY ADULT ORDER       CV CORONARY ANGIOGRAM N/A 2/27/2020    Procedure: CV CORONARY ANGIOGRAM;  Surgeon: Pedro Fontaine  MD Rodo;  Location: [facility] CARDIAC CATH LAB     CV CORONARY ANGIOGRAM N/A 1/15/2019    Procedure: Coronary Angiogram;  Surgeon: Mason Correa MD;  Location: [facility] Cath Lab;  Service: Cardiology     CV INTRAVASULAR ULTRASOUND N/A 2/27/2020    Procedure: Intravascular Ultrasound;  Surgeon: Pedro Fontaine MD;  Location: [facility] CARDIAC CATH LAB     CV LEFT HEART CATHETERIZATION WITHOUT LEFT VENTRICULOGRAM Left 1/15/2019    Procedure: Left Heart Catheterization Without Left Ventriculogram;  Surgeon: Mason Correa MD;  Location: [facility] Cath Lab;  Service: Cardiology     CV PCI ANGIOPLASTY N/A 2/27/2020    Procedure: Percutaneous Coronary Intervention Angioplasty;  Surgeon: Pedro Fontaine MD;  Location: [facility] CARDIAC CATH LAB     CV RIGHT HEART CATH MEASUREMENTS RECORDED N/A 2/27/2020    Procedure: Right Heart Cath;  Surgeon: Pedro Fontaine MD;  Location: [facility] CARDIAC CATH LAB     CV RIGHT HEART CATH MEASUREMENTS RECORDED N/A 7/30/2020    Procedure: CV RIGHT HEART CATH;  Surgeon: Ronny Geronimo MD;  Location: [facility] CARDIAC CATH LAB     EP ABLATION AV NODE N/A 9/27/2019    Procedure: EP Ablation AV Node;  Surgeon: Markus Pool MD;  Location: [facility] Cath Lab;  Service: Cardiology     EP ICD INSERT      ICD REIMPLANTATION OF A NEW RV LEAD 11/26/2014     HC REMOVE TONSILS/ADENOIDS,<11 Y/O      Description: Tonsillectomy With Adenoidectomy;  Recorded: 06/10/2014;     INSERT / REPLACE / REMOVE PACEMAKER       JOINT REPLACEMENT      bilateral TKA     OTHER SURGICAL HISTORY  11/26/2014    BIV ICDbiotronic     OR L HRT CATH W/NJX L VENTRICULOGRAPHY IMG S&I Left 12/12/2016    Procedure: Left Heart Catheterization with Left Ventriculogram;  Surgeon: Delgado Ramirez MD;  Location: [facility] Cath Lab;  Service: Cardiology     REPLACEMENT TOTAL KNEE      bilat     TONSILLECTOMY & ADENOIDECTOMY       US LYMPH NODE  BIOPSY  7/10/2019     Family History   Problem Relation Age of Onset     Sudden Death Mother         unexpected death in her sleep in her 50s    Social History     Socioeconomic History     Marital status:      Spouse name: Not on file     Number of children: Not on file     Years of education: Not on file     Highest education level: Not on file   Occupational History     Not on file   Tobacco Use     Smoking status: Never Smoker     Smokeless tobacco: Never Used     Tobacco comment: cigars few times a year only   Substance and Sexual Activity     Alcohol use: Yes     Comment: Alcoholic Drinks/day: occasional     Drug use: No     Sexual activity: Not on file   Other Topics Concern     Not on file   Social History Narrative     Not on file     Social Determinants of Health     Financial Resource Strain:      Difficulty of Paying Living Expenses:    Food Insecurity:      Worried About Running Out of Food in the Last Year:      Ran Out of Food in the Last Year:    Transportation Needs:      Lack of Transportation (Medical):      Lack of Transportation (Non-Medical):    Physical Activity:      Days of Exercise per Week:      Minutes of Exercise per Session:    Stress:      Feeling of Stress :    Social Connections:      Frequency of Communication with Friends and Family:      Frequency of Social Gatherings with Friends and Family:      Attends Christian Services:      Active Member of Clubs or Organizations:      Attends Club or Organization Meetings:      Marital Status:    Intimate Partner Violence:      Fear of Current or Ex-Partner:      Emotionally Abused:      Physically Abused:      Sexually Abused:           Medications  Allergies   Current Outpatient Medications   Medication Sig Dispense Refill     acetaminophen (TYLENOL) 500 MG tablet Take 500 mg by mouth 2 times daily as needed        apixaban ANTICOAGULANT (ELIQUIS ANTICOAGULANT) 5 MG tablet Take 1 tablet (5 mg) by mouth 2 times daily 180 tablet 1      atorvastatin (LIPITOR) 40 MG tablet Take 40 mg by mouth daily       bumetanide (BUMEX) 1 MG tablet Take 1 mg by mouth daily 3 mg in the morning and 2 mg in the afternoon       carvedilol (COREG) 6.25 MG tablet Take 12.5 mg by mouth 2 times daily (with meals)       Coenzyme Q10 (COQ-10) 100 MG CAPS Take 100 mg by mouth daily       colchicine (COLCYRS) 0.6 MG tablet TAKE 2 TABLETS BY MOUTH THEN 1 TABLET ONE HOUR LATER AS NEEDED FOR GOUT ATTACK AS DIRECTED       febuxostat (ULORIC) 40 MG TABS tablet Take 40 mg by mouth daily       gabapentin (NEURONTIN) 100 MG capsule Take 100 mg by mouth 3 times daily       glucosamine-chondroitin (GLUCOSAMINE CHONDR COMPLEX) 500-400 MG CAPS per capsule Take 1 tablet by mouth daily        insulin glargine (BASAGLAR KWIKPEN) 100 UNIT/ML pen Inject 44 Units Subcutaneous At Bedtime        metFORMIN (GLUCOPHAGE) 1000 MG tablet Take 1,000 mg by mouth 2 times daily (with meals)       Multiple Vitamin (MULTI VITAMIN DAILY PO)        Omega-3 Fatty Acids (FISH OIL) 500 MG CAPS Take 500 mg by mouth every other day       potassium chloride ER (KLOR-CON M) 20 MEQ CR tablet Take 20 mEq by mouth daily       sacubitril-valsartan (ENTRESTO) 49-51 MG per tablet Take 1 tablet by mouth 2 times daily 60 tablet 0     spironolactone (ALDACTONE) 25 MG tablet Take 0.5 tablets (12.5 mg) by mouth daily 45 tablet 1     ticagrelor (BRILINTA) 90 MG tablet Take 1 tablet (90 mg) by mouth 2 times daily Please schedule lab draw to F/U on creatine; additional refills after 180 tablet 0    No Known Allergies      Lab Results    Chemistry/lipid CBC Cardiac Enzymes/BNP/TSH/INR   Recent Labs   Lab Test 02/29/20  0248   CHOL 77   HDL 32*   LDL 29   TRIG 79     Recent Labs   Lab Test 02/29/20  0248 12/12/19  1535 02/14/19  0954   LDL 29 24 69     Recent Labs   Lab Test 06/07/21  1630      POTASSIUM 4.4   CHLORIDE 102   CO2 17*   *   BUN 68*   CR 1.93*   GFRESTIMATED 34*   LYNNE 9.5     Recent Labs   Lab Test  06/07/21  1630 02/09/21  1354 10/01/20  1323   CR 1.93* 1.35* 1.53*     Recent Labs   Lab Test 03/09/20  1155 09/20/19  0603 01/16/19  0514   A1C 7.8* 8.7* 8.6*    Recent Labs   Lab Test 09/17/20  0822   WBC 8.8   HGB 10.6*   HCT 31.1*   MCV 96        Recent Labs   Lab Test 09/17/20  0822 07/30/20  0729 05/14/20  0847   HGB 10.6* 12.3* 11.9*    Recent Labs   Lab Test 09/20/19  0603 09/20/19  0002 09/19/19  1742   TROPONINI 0.02 0.02 0.03     Recent Labs   Lab Test 10/01/20  1323 09/02/20  1302 07/30/20  0729 05/14/20  0847 03/12/20  1037 03/05/20  0750 02/25/20  1345   * 603*  --   --  1,838*  --   --    NTBNPI  --   --  3,182*  --   --   --  5,924*   NTBNP  --   --   --  2,791*  --  7,195*  --      Recent Labs   Lab Test 03/09/20  1155   TSH 1.02     Recent Labs   Lab Test 03/09/20  1155 07/09/19  1033   INR 1.29* 1.30*                                                 Thank you for allowing me to participate in the care of your patient.      Sincerely,     Linda Marmolejo NP     [facility]  cc:   Kofi Gamez MD  [address] [place] [address]

## 2021-09-10 NOTE — TELEPHONE ENCOUNTER
Pemafibrate to Reduce cardiovascular OutcoMes by reducing triglycerides IN patiENts with diabeTes (PROMINENT) clinical trial.    Study telephone visit form:    Subject Number:   [id number]                                     Dr. Vigil- PI      CONCOMITANT MEDICATIONS  ? Investigator to report if participant needs treatment with prohibited medications (Fibrates or other agents with PPAR-? agonist activity (e.g., saroglitazar); See protocol for exclusionary medications and actions to be taken.    VISIT SCHEDULING AND CONTACT CONFIRMATION  ? Confirm date of next study visit   ? Confirm information on Subject Information Form or update as needed      Visit Number:__[id number]_____  Visit Date: 9/10/21      Was the subject, relatives or health care provider (as per consent) contacted by phone?    [x] Yes  [] No    Yes [x]  No []   If Yes, Date of Contact:   Date: ____ 9/10/21____ ____   DD [facility] YYYY  If No, please document attempts to contact subject (include date and type of contact)   Date: ____ ____ ____   Type: [] Tel _ [] Other___  [] 2 Date:      Name of Person: Contacted:   _____Self/Subject_______________   Relationship to Subject:   ______________________  Type:[]  Tel[x]  Other _______   Date: ____ _9/10/21___ ____   Type: [] Tel [] Other _______    Subject Status on the day assessed     [x] Subject alive  []  Subject    [] Unknown at present Please complete  Death Details Form in InForm and [facility]        Review Subject Contact Information Form and update as needed   [x]  Review concomitant medication use .  Updates to the study edc listed drugs.  [x]  Query participant and record any reported AEs. Still ongoing ae's but he reports feeling good overall.   [x] Query participant and record any CV efficacy events   [x] Confirm next study visit: ____tba in oct_________ Subject will be in [place] part of Oct and some travels coming up.  [x] Instruct participants to bring all study supplies with  them to the next in-person visit    Instruct participants to bring all study supplies with them to the next in-person visit   Eduard Tang RN  Clinical Research Nurse  [phone]

## 2021-10-27 NOTE — PATIENT INSTRUCTIONS
It was great to see you again today for the Prominent triglyceride study.  We will call you in 2 months for a study telephone visit.    We will also see you back in 4 months for your Visit 31.  Please remember to bring back your study drug and packages (both empty or full) to every study visit.  Inform us of any changes in your health and call with any questions.  Thanks!    Eduard Tang RN  Clinical Research Nurse  [phone]

## 2021-10-27 NOTE — PROGRESS NOTES
Pemafibrate to Reduce cardiovascular OutcoMes by reducing triglycerides IN patiENts with diabeTes (PROMINENT) clinical trial.    Subject seen in clinic today for study Visit 29.      Subject seen by provider Lacey White for study related physical exam.  See provider note and flow sheets for vital signs.    There were no vitals filed for this visit.  Waist measures 94.0 cm    Study efficacy, endpoints and adverse events reviewed with subject.  Cardiovascular risk discussed.     Study alcohol consumption stipulations and education reviewed with subject:    Subject reports  [] Never [x] Current [] Former .   1 drinks per [] Day [] Week [] Month [x] Occasional.  1 maximum drinks per sitting.     Study medication box # [id number] with 56 tabs  Study medication box # [id number] with 0 tabs  Study medication box # [id number] with 0 tabs  Study medication box # [id number] with 0 tabs  returned by subject.  Total tablet count of 56 for 100 % compliance.  Study medication box #'s [id number], [id number], [id number], [id number] dispensed to subject.  Education provided on medication compliance and administration    Plan: Study Visit 30 telephone call with subject in 2 months.  Will schedule study Visit 31  with subject in 4 months back at [facility].    Eduard Tang RN  Clinical Research Nurse  [phone]

## 2021-10-27 NOTE — PROGRESS NOTES
"      Assessment/Recommendations   Assessment/Plan:    No changes to his medications today.  He will continue to follow-up with research per protocol for PROMINENT study.       History of Present Illness/Subjective    Ken Doss is seen at [facility] [place] [facility] today for PROMINENT research study.  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  He states that he is feeling well except for being tired.  He has baseline dyspnea on exertion, but denies lightheadedness, shortness of breath, orthopnea, PND, palpitations, chest pain, abdominal fullness/bloating and lower extremity edema.       Physical Examination Review of Systems   BP 92/62 (BP Location: Left arm, Patient Position: Sitting, Cuff Size: Adult Regular)   Pulse 72   Resp 16   Ht 1.753 m (5' 9\")   Wt 72.5 kg (159 lb 12.8 oz)   BMI 23.60 kg/m    Body mass index is 23.6 kg/m .  Wt Readings from Last 3 Encounters:   10/27/21 72.5 kg (159 lb 12.8 oz)   10/27/21 72.5 kg (159 lb 12.8 oz)   09/09/21 71.2 kg (157 lb)       General Appearance:   Alert, cooperative and in no acute distress.   HEENT:  No scleral icterus, EOM intact, PERRL; the mucous membranes were pink and moist. Cervical lymph nodes nontender and nonpalpable.   Neck: Supple.  JVP normal.  No HJR.  No obvious thyromegaly.     Chest: The spine was straight. The chest was symmetric.  Left subclavian CRTD site with no sign of infection or tissue thinning.   Lungs:   Respirations unlabored; the lungs are clear to auscultation.   Cardiovascular:   Regular rhythm. S1 and S2 without murmur, clicks or rubs. Radial, carotid and posterior tibial pulses are intact and symmetrical.  No carotid bruits noted.   Abdomen:  Soft, nontender, nondistended, bowel sounds present   Extremities: No cyanosis, " clubbing, or edema.   Musculoskeletal:  Moves all extremities.   Skin: No bruising or wounds.   Neurologic: Mood and affect are appropriate.     ROS: 10 point ROS neg other than the symptoms noted above in the HPI.                                             Medical History  Surgical History Family History Social History   Past Medical History:   Diagnosis Date     Abdominal pain      Abdominal pain      Abdominal pain      Acute renal failure (H)      Acute renal failure (H)      Acute renal failure (H)      Anemia      Anemia      Anemia      Arthritis     osteoarthritis     Arthritis     osteoarthritis     Arthritis     osteoarthritis     CHF (congestive heart failure) (H)      CHF (congestive heart failure) (H)      CHF (congestive heart failure) (H)      Chronic systolic heart failure (H) Dec 2012     Chronic systolic heart failure (H) Dec 2012     Chronic systolic heart failure (H) Dec 2012     Coronary artery disease involving native coronary artery     Non obstructive disease by cath in 2007 Cor angio Nov 2016: RCA 70% (FFR 0.89), and OM1 75%        Coronary artery disease involving native coronary artery     Non obstructive disease by cath in 2007 Cor angio Nov 2016: RCA 70% (FFR 0.89), and OM1 75%        Coronary artery disease involving native coronary artery     Non obstructive disease by cath in 2007 Cor angio Nov 2016: RCA 70% (FFR 0.89), and OM1 75%        Depressive disorder, not elsewhere classified 10/24/2014     Depressive disorder, not elsewhere classified 10/24/2014     Depressive disorder, not elsewhere classified 10/24/2014     Diabetes mellitus, type 2 (H)      Diabetes mellitus, type II (H) 28/Jan/2013     Diabetes mellitus, type II (H) 28/Jan/2013     Diabetes mellitus, type II (H) 28/Jan/2013     Diabetic neuropathy (H)      Diabetic neuropathy (H)      Diabetic neuropathy (H)      Fractures     ankle, leg and arm     Fractures     ankle, leg and arm     Fractures     ankle, leg and arm      High cholesterol 2007     High cholesterol 2007     High cholesterol 2007     Hypertension      Hypertension      Hypertension      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     ICD (implantable cardioverter-defibrillator) lead failure 11/26/2014    High voltage lead tip inserted in RV free wall RV lead extraction and implantation of the new septal RV lead November 2014      ICD (implantable cardioverter-defibrillator) lead failure 11/26/2014    High voltage lead tip inserted in RV free wall RV lead extraction and implantation of the new septal RV lead November 2014      ICD (implantable cardioverter-defibrillator) lead failure 11/26/2014    High voltage lead tip inserted in RV free wall RV lead extraction and implantation of the new septal RV lead November 2014      ILD (interstitial lung disease) (H)      ILD (interstitial lung disease) (H)      ILD (interstitial lung disease) (H)      Infectious mononucleosis      Infectious mononucleosis      Infectious mononucleosis      Ischemic cardiomyopathy 4/22/2015    Chronic: LVEF 25- 30% LVEF 26% echo May 2017     Ischemic cardiomyopathy 4/22/2015    Chronic: LVEF 25- 30% LVEF 26% echo May 2017     Ischemic cardiomyopathy 4/22/2015    Chronic: LVEF 25- 30% LVEF 26% echo May 2017     Kidney stone      Kidney stone      Kidney stone      LBBB (left bundle branch block)     noted on Dec 19, 2012     LBBB (left bundle branch block)     noted on Dec 19, 2012     LBBB (left bundle branch block)     noted on Dec 19, 2012     Lymphadenopathy, mediastinal      Lymphadenopathy, mediastinal      Lymphadenopathy, mediastinal      Myocardial infarction (H)      Myocardial infarction (H)      Myocardial infarction (H)      Osteoarthritis      Osteoarthritis      Osteoarthritis      Pulmonary anthracosis (H)      Pulmonary  anthracosis (H)      Pulmonary anthracosis (H)      Recurrent kidney stones      Recurrent kidney stones      Recurrent kidney stones      Shortness of breath     with exertion     Shortness of breath     with exertion     Shortness of breath     with exertion    Past Surgical History:   Procedure Laterality Date     APPENDECTOMY       ARTHROSCOPY KNEE Bilateral      ARTHROSCOPY SHOULDER ROTATOR CUFF REPAIR      right     C SHLDR ARTHROSCOP,DIAGNOSTIC      Description: Arthroscopy Shoulder;  Recorded: 06/10/2014;     CARDIAC CATHETERIZATION N/A 12/12/2016    Procedure: Coronary Angiogram;  Surgeon: Delgado Ramirez MD;  Location: [facility];  Service:      COLONOSCOPY N/A 12/19/2019    Procedure: COLONOSCOPY with polypectomy;  Surgeon: Delgado Price MD;  Location: [facility];  Service: Gastroenterology     CORONARY ANGIOGRAPHY ADULT ORDER       CV CORONARY ANGIOGRAM N/A 2/27/2020    Procedure: CV CORONARY ANGIOGRAM;  Surgeon: Pedro Fontaine MD;  Location: [facility] CARDIAC CATH LAB     CV CORONARY ANGIOGRAM N/A 1/15/2019    Procedure: Coronary Angiogram;  Surgeon: Mason Correa MD;  Location: [facility] Lab;  Service: Cardiology     CV INTRAVASULAR ULTRASOUND N/A 2/27/2020    Procedure: Intravascular Ultrasound;  Surgeon: Pedro Fontaine MD;  Location: [facility] CARDIAC CATH LAB     CV LEFT HEART CATHETERIZATION WITHOUT LEFT VENTRICULOGRAM Left 1/15/2019    Procedure: Left Heart Catheterization Without Left Ventriculogram;  Surgeon: Mason Correa MD;  Location: [facility] Lab;  Service: Cardiology     CV PCI ANGIOPLASTY N/A 2/27/2020    Procedure: Percutaneous Coronary Intervention Angioplasty;  Surgeon: Pedro Fontaine MD;  Location: [facility] CARDIAC CATH LAB     CV RIGHT HEART CATH MEASUREMENTS RECORDED N/A 2/27/2020    Procedure: Right Heart Cath;  Surgeon: Pedro Fontaine MD;  Location: [facility]  CARDIAC CATH LAB     CV RIGHT HEART CATH MEASUREMENTS RECORDED N/A 7/30/2020    Procedure: CV RIGHT HEART CATH;  Surgeon: Ronny Geronimo MD;  Location:  HEART CARDIAC CATH LAB     EP ABLATION AV NODE N/A 9/27/2019    Procedure: EP Ablation AV Node;  Surgeon: Markus Pool MD;  Location: [facility] Lab;  Service: Cardiology     EP ICD INSERT      ICD REIMPLANTATION OF A NEW RV LEAD 11/26/2014     HC REMOVE TONSILS/ADENOIDS,<13 Y/O      Description: Tonsillectomy With Adenoidectomy;  Recorded: 06/10/2014;     INSERT / REPLACE / REMOVE PACEMAKER       JOINT REPLACEMENT      bilateral TKA     OTHER SURGICAL HISTORY  11/26/2014    BIV ICDbiotronic     CA L HRT CATH W/NJX L VENTRICULOGRAPHY IMG S&I Left 12/12/2016    Procedure: Left Heart Catheterization with Left Ventriculogram;  Surgeon: Delgado Ramirez MD;  Location: [facility] Cath Lab;  Service: Cardiology     REPLACEMENT TOTAL KNEE      bilat     TONSILLECTOMY & ADENOIDECTOMY       US LYMPH NODE BIOPSY  7/10/2019    Family History   Problem Relation Age of Onset     Sudden Death Mother         unexpected death in her sleep in her 50s    Social History     Tobacco Use     Smoking status: Never Smoker     Smokeless tobacco: Never Used     Tobacco comment: cigars few times a year only   Substance Use Topics     Alcohol use: Yes     Comment: Alcoholic Drinks/day: occasional     Drug use: No          Medications  Allergies   Current Outpatient Medications   Medication Sig Dispense Refill     acetaminophen (TYLENOL) 500 MG tablet Take 500 mg by mouth 2 times daily as needed        apixaban ANTICOAGULANT (ELIQUIS ANTICOAGULANT) 5 MG tablet Take 1 tablet (5 mg) by mouth 2 times daily 180 tablet 1     atorvastatin (LIPITOR) 40 MG tablet Take 40 mg by mouth daily       bumetanide (BUMEX) 1 MG tablet Take 1 mg by mouth 2 times daily 3 tablets twice daily       carvedilol (COREG) 6.25 MG tablet Take 12.5 mg by mouth 2 times daily (with meals)       Coenzyme  Q10 (COQ-10) 100 MG CAPS Take 200 mg by mouth daily        colchicine (COLCYRS) 0.6 MG tablet TAKE 2 TABLETS BY MOUTH THEN 1 TABLET ONE HOUR LATER AS NEEDED FOR GOUT ATTACK AS DIRECTED       diphenhydrAMINE (BENADRYL) 25 MG tablet Take 25 mg by mouth At Bedtime       diphenhydrAMINE-acetaminophen (TYLENOL PM)  MG tablet Take 1 tablet by mouth nightly as needed for sleep       febuxostat (ULORIC) 40 MG TABS tablet Take 40 mg by mouth daily       gabapentin (NEURONTIN) 100 MG capsule Take 100 mg by mouth 3 times daily       glucosamine-chondroitin (GLUCOSAMINE CHONDR COMPLEX) 500-400 MG CAPS per capsule Take 1 tablet by mouth daily        insulin glargine (BASAGLAR KWIKPEN) 100 UNIT/ML pen Inject 44 Units Subcutaneous At Bedtime        metFORMIN (GLUCOPHAGE) 1000 MG tablet Take 1,000 mg by mouth 2 times daily (with meals)       Multiple Vitamin (MULTI VITAMIN DAILY PO)        Omega-3 Fatty Acids (FISH OIL) 500 MG CAPS Take 1,200 mg by mouth every other day        potassium chloride ER (KLOR-CON M) 20 MEQ CR tablet Take 20 mEq by mouth daily       sacubitril-valsartan (ENTRESTO) 49-51 MG per tablet Take 1 tablet by mouth 2 times daily 60 tablet 0     spironolactone (ALDACTONE) 25 MG tablet Take 0.5 tablets (12.5 mg) by mouth daily 45 tablet 1     ticagrelor (BRILINTA) 90 MG tablet Take 1 tablet (90 mg) by mouth 2 times daily Please schedule lab draw to F/U on creatine; additional refills after 180 tablet 0    No Known Allergies     This note has been dictated using voice recognition software. Any grammatical, typographical, or context distortions are unintentional and inherent to the software.

## 2021-10-27 NOTE — LETTER
"10/27/2021    Lewis Reeves MD  [facility] [address]   [place] [address]    RE: Ken Doss       Dear Colleague,    I had the pleasure of seeing Ken Doss in the [facility].          Assessment/Recommendations   Assessment/Plan:    No changes to his medications today.  He will continue to follow-up with research per protocol for PROMINENT study.       History of Present Illness/Subjective    Ken Doss is seen at [facility] today for PROMINENT research study.  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  He states that he is feeling well except for being tired.  He has baseline dyspnea on exertion, but denies lightheadedness, shortness of breath, orthopnea, PND, palpitations, chest pain, abdominal fullness/bloating and lower extremity edema.       Physical Examination Review of Systems   BP 92/62 (BP Location: Left arm, Patient Position: Sitting, Cuff Size: Adult Regular)   Pulse 72   Resp 16   Ht 1.753 m (5' 9\")   Wt 72.5 kg (159 lb 12.8 oz)   BMI 23.60 kg/m    Body mass index is 23.6 kg/m .  Wt Readings from Last 3 Encounters:   10/27/21 72.5 kg (159 lb 12.8 oz)   10/27/21 72.5 kg (159 lb 12.8 oz)   09/09/21 71.2 kg (157 lb)       General Appearance:   Alert, cooperative and in no acute distress.   HEENT:  No scleral icterus, EOM intact, PERRL; the mucous membranes were pink and moist. Cervical lymph nodes nontender and nonpalpable.   Neck: Supple.  JVP normal.  No HJR.  No obvious thyromegaly.     Chest: The spine was straight. The chest was symmetric.  Left subclavian CRTD site with no sign of infection or tissue thinning.   Lungs:   Respirations unlabored; the lungs are clear to " auscultation.   Cardiovascular:   Regular rhythm. S1 and S2 without murmur, clicks or rubs. Radial, carotid and posterior tibial pulses are intact and symmetrical.  No carotid bruits noted.   Abdomen:  Soft, nontender, nondistended, bowel sounds present   Extremities: No cyanosis, clubbing, or edema.   Musculoskeletal:  Moves all extremities.   Skin: No bruising or wounds.   Neurologic: Mood and affect are appropriate.     ROS: 10 point ROS neg other than the symptoms noted above in the HPI.                                             Medical History  Surgical History Family History Social History   Past Medical History:   Diagnosis Date     Abdominal pain      Abdominal pain      Abdominal pain      Acute renal failure (H)      Acute renal failure (H)      Acute renal failure (H)      Anemia      Anemia      Anemia      Arthritis     osteoarthritis     Arthritis     osteoarthritis     Arthritis     osteoarthritis     CHF (congestive heart failure) (H)      CHF (congestive heart failure) (H)      CHF (congestive heart failure) (H)      Chronic systolic heart failure (H) Dec 2012     Chronic systolic heart failure (H) Dec 2012     Chronic systolic heart failure (H) Dec 2012     Coronary artery disease involving native coronary artery     Non obstructive disease by cath in 2007 Cor angio Nov 2016: RCA 70% (FFR 0.89), and OM1 75%        Coronary artery disease involving native coronary artery     Non obstructive disease by cath in 2007 Cor angio Nov 2016: RCA 70% (FFR 0.89), and OM1 75%        Coronary artery disease involving native coronary artery     Non obstructive disease by cath in 2007 Cor angio Nov 2016: RCA 70% (FFR 0.89), and OM1 75%        Depressive disorder, not elsewhere classified 10/24/2014     Depressive disorder, not elsewhere classified 10/24/2014     Depressive disorder, not elsewhere classified 10/24/2014     Diabetes mellitus, type 2 (H)      Diabetes mellitus, type II (H) 28/Jan/2013     Diabetes  mellitus, type II (H) 28/Jan/2013     Diabetes mellitus, type II (H) 28/Jan/2013     Diabetic neuropathy (H)      Diabetic neuropathy (H)      Diabetic neuropathy (H)      Fractures     ankle, leg and arm     Fractures     ankle, leg and arm     Fractures     ankle, leg and arm     High cholesterol 2007     High cholesterol 2007     High cholesterol 2007     Hypertension      Hypertension      Hypertension      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     ICD (implantable cardioverter-defibrillator) lead failure 11/26/2014    High voltage lead tip inserted in RV free wall RV lead extraction and implantation of the new septal RV lead November 2014      ICD (implantable cardioverter-defibrillator) lead failure 11/26/2014    High voltage lead tip inserted in RV free wall RV lead extraction and implantation of the new septal RV lead November 2014      ICD (implantable cardioverter-defibrillator) lead failure 11/26/2014    High voltage lead tip inserted in RV free wall RV lead extraction and implantation of the new septal RV lead November 2014      ILD (interstitial lung disease) (H)      ILD (interstitial lung disease) (H)      ILD (interstitial lung disease) (H)      Infectious mononucleosis      Infectious mononucleosis      Infectious mononucleosis      Ischemic cardiomyopathy 4/22/2015    Chronic: LVEF 25- 30% LVEF 26% echo May 2017     Ischemic cardiomyopathy 4/22/2015    Chronic: LVEF 25- 30% LVEF 26% echo May 2017     Ischemic cardiomyopathy 4/22/2015    Chronic: LVEF 25- 30% LVEF 26% echo May 2017     Kidney stone      Kidney stone      Kidney stone      LBBB (left bundle branch block)     noted on Dec 19, 2012     LBBB (left bundle branch block)     noted on Dec 19, 2012     LBBB (left bundle branch block)     noted on Dec 19, 2012     Lymphadenopathy,  mediastinal      Lymphadenopathy, mediastinal      Lymphadenopathy, mediastinal      Myocardial infarction (H)      Myocardial infarction (H)      Myocardial infarction (H)      Osteoarthritis      Osteoarthritis      Osteoarthritis      Pulmonary anthracosis (H)      Pulmonary anthracosis (H)      Pulmonary anthracosis (H)      Recurrent kidney stones      Recurrent kidney stones      Recurrent kidney stones      Shortness of breath     with exertion     Shortness of breath     with exertion     Shortness of breath     with exertion    Past Surgical History:   Procedure Laterality Date     APPENDECTOMY       ARTHROSCOPY KNEE Bilateral      ARTHROSCOPY SHOULDER ROTATOR CUFF REPAIR      right     C SHLDR ARTHROSCOP,DIAGNOSTIC      Description: Arthroscopy Shoulder;  Recorded: 06/10/2014;     CARDIAC CATHETERIZATION N/A 12/12/2016    Procedure: Coronary Angiogram;  Surgeon: Delgado Ramirez MD;  Location: [facility];  Service:      COLONOSCOPY N/A 12/19/2019    Procedure: COLONOSCOPY with polypectomy;  Surgeon: Delgado Price MD;  Location: [facility];  Service: Gastroenterology     CORONARY ANGIOGRAPHY ADULT ORDER       CV CORONARY ANGIOGRAM N/A 2/27/2020    Procedure: CV CORONARY ANGIOGRAM;  Surgeon: Pedro Fontaine MD;  Location: [facility] CARDIAC CATH LAB     CV CORONARY ANGIOGRAM N/A 1/15/2019    Procedure: Coronary Angiogram;  Surgeon: Mason Correa MD;  Location: [facility] Lab;  Service: Cardiology     CV INTRAVASULAR ULTRASOUND N/A 2/27/2020    Procedure: Intravascular Ultrasound;  Surgeon: Pedro Fontaine MD;  Location: [facility] CARDIAC CATH LAB     CV LEFT HEART CATHETERIZATION WITHOUT LEFT VENTRICULOGRAM Left 1/15/2019    Procedure: Left Heart Catheterization Without Left Ventriculogram;  Surgeon: Mason Correa MD;  Location: [facility];  Service: Cardiology     CV PCI ANGIOPLASTY N/A 2/27/2020    Procedure:  Percutaneous Coronary Intervention Angioplasty;  Surgeon: Pedro Fontaine MD;  Location:  HEART CARDIAC CATH LAB     CV RIGHT HEART CATH MEASUREMENTS RECORDED N/A 2/27/2020    Procedure: Right Heart Cath;  Surgeon: Pedro Fontaine MD;  Location: [facility] CARDIAC CATH LAB     CV RIGHT HEART CATH MEASUREMENTS RECORDED N/A 7/30/2020    Procedure: CV RIGHT HEART CATH;  Surgeon: Ronny Geronimo MD;  Location:  HEART CARDIAC CATH LAB     EP ABLATION AV NODE N/A 9/27/2019    Procedure: EP Ablation AV Node;  Surgeon: Markus Pool MD;  Location: [facility] Lab;  Service: Cardiology     EP ICD INSERT      ICD REIMPLANTATION OF A NEW RV LEAD 11/26/2014     HC REMOVE TONSILS/ADENOIDS,<13 Y/O      Description: Tonsillectomy With Adenoidectomy;  Recorded: 06/10/2014;     INSERT / REPLACE / REMOVE PACEMAKER       JOINT REPLACEMENT      bilateral TKA     OTHER SURGICAL HISTORY  11/26/2014    BIV ICDbiotronic     KS L HRT CATH W/NJX L VENTRICULOGRAPHY IMG S&I Left 12/12/2016    Procedure: Left Heart Catheterization with Left Ventriculogram;  Surgeon: Delgado Ramirez MD;  Location: [facility] Cath Lab;  Service: Cardiology     REPLACEMENT TOTAL KNEE      bilat     TONSILLECTOMY & ADENOIDECTOMY       US LYMPH NODE BIOPSY  7/10/2019    Family History   Problem Relation Age of Onset     Sudden Death Mother         unexpected death in her sleep in her 50s    Social History     Tobacco Use     Smoking status: Never Smoker     Smokeless tobacco: Never Used     Tobacco comment: cigars few times a year only   Substance Use Topics     Alcohol use: Yes     Comment: Alcoholic Drinks/day: occasional     Drug use: No          Medications  Allergies   Current Outpatient Medications   Medication Sig Dispense Refill     acetaminophen (TYLENOL) 500 MG tablet Take 500 mg by mouth 2 times daily as needed        apixaban ANTICOAGULANT (ELIQUIS ANTICOAGULANT) 5 MG tablet Take 1 tablet (5 mg) by mouth 2  times daily 180 tablet 1     atorvastatin (LIPITOR) 40 MG tablet Take 40 mg by mouth daily       bumetanide (BUMEX) 1 MG tablet Take 1 mg by mouth 2 times daily 3 tablets twice daily       carvedilol (COREG) 6.25 MG tablet Take 12.5 mg by mouth 2 times daily (with meals)       Coenzyme Q10 (COQ-10) 100 MG CAPS Take 200 mg by mouth daily        colchicine (COLCYRS) 0.6 MG tablet TAKE 2 TABLETS BY MOUTH THEN 1 TABLET ONE HOUR LATER AS NEEDED FOR GOUT ATTACK AS DIRECTED       diphenhydrAMINE (BENADRYL) 25 MG tablet Take 25 mg by mouth At Bedtime       diphenhydrAMINE-acetaminophen (TYLENOL PM)  MG tablet Take 1 tablet by mouth nightly as needed for sleep       febuxostat (ULORIC) 40 MG TABS tablet Take 40 mg by mouth daily       gabapentin (NEURONTIN) 100 MG capsule Take 100 mg by mouth 3 times daily       glucosamine-chondroitin (GLUCOSAMINE CHONDR COMPLEX) 500-400 MG CAPS per capsule Take 1 tablet by mouth daily        insulin glargine (BASAGLAR KWIKPEN) 100 UNIT/ML pen Inject 44 Units Subcutaneous At Bedtime        metFORMIN (GLUCOPHAGE) 1000 MG tablet Take 1,000 mg by mouth 2 times daily (with meals)       Multiple Vitamin (MULTI VITAMIN DAILY PO)        Omega-3 Fatty Acids (FISH OIL) 500 MG CAPS Take 1,200 mg by mouth every other day        potassium chloride ER (KLOR-CON M) 20 MEQ CR tablet Take 20 mEq by mouth daily       sacubitril-valsartan (ENTRESTO) 49-51 MG per tablet Take 1 tablet by mouth 2 times daily 60 tablet 0     spironolactone (ALDACTONE) 25 MG tablet Take 0.5 tablets (12.5 mg) by mouth daily 45 tablet 1     ticagrelor (BRILINTA) 90 MG tablet Take 1 tablet (90 mg) by mouth 2 times daily Please schedule lab draw to F/U on creatine; additional refills after 180 tablet 0    No Known Allergies     This note has been dictated using voice recognition software. Any grammatical, typographical, or context distortions are unintentional and inherent to the software.                                      Thank you for allowing me to participate in the care of your patient.      Sincerely,     MIMI Armenta [facility]  cc:   No referring provider defined for this encounter.

## 2021-10-27 NOTE — PATIENT INSTRUCTIONS
Ken Doss,    It was a pleasure to see you today at the [facility].     My recommendations after this visit include:    Follow up per study protocol    Lacey White, CNP  [facility], Electrophysiology  [phone]  EP nurses [phone]

## 2021-10-27 NOTE — LETTER
10/27/2021    Lewis Reeves MD  [facility] [address] [place] [address]    RE: Ken Doss       Dear Colleague,    I had the pleasure of seeing Ken Doss in the [facility].  Pemafibrate to Reduce cardiovascular OutcoMes by reducing triglycerides IN patiENts with diabeTes (PROMINENT) clinical trial.    Subject seen in clinic today for study Visit 29.      Subject seen by provider Lacey White for study related physical exam.  See provider note and flow sheets for vital signs.    There were no vitals filed for this visit.  Waist measures 94.0 cm    Study efficacy, endpoints and adverse events reviewed with subject.  Cardiovascular risk discussed.     Study alcohol consumption stipulations and education reviewed with subject:    Subject reports  [] Never [x] Current [] Former .   1 drinks per [] Day [] Week [] Month [x] Occasional.  1 maximum drinks per sitting.     Study medication box # [id number] with 56 tabs  Study medication box # [id number] with 0 tabs  Study medication box # [id number] with 0 tabs  Study medication box # [id number] with 0 tabs  returned by subject.  Total tablet count of 56 for 100 % compliance.  Study medication box #'s [id number], [id number], [id number], [id number] dispensed to subject.  Education provided on medication compliance and administration    Plan: Study Visit 30 telephone call with subject in 2 months.  Will schedule study Visit 31  with subject in 4 months back at [facility].      Eduard Tang RN  Clinical Research Nurse  [phone]

## 2021-11-01 NOTE — PROGRESS NOTES
Service Date: 2021    ***    RE:  Ken Doss   MRN:  [id number]  :  1945      Dear  ***:    We had the pleasure of seeing Ken Doss at the [facility]. As you know, ***      PAST MEDICAL HISTORY:  Past Medical History:   Diagnosis Date     Abdominal pain      Abdominal pain      Abdominal pain      Acute renal failure (H)      Acute renal failure (H)      Acute renal failure (H)      Anemia      Anemia      Anemia      Arthritis     osteoarthritis     Arthritis     osteoarthritis     Arthritis     osteoarthritis     CHF (congestive heart failure) (H)      CHF (congestive heart failure) (H)      CHF (congestive heart failure) (H)      Chronic systolic heart failure (H) Dec 2012     Chronic systolic heart failure (H) Dec 2012     Chronic systolic heart failure (H) Dec 2012     Coronary artery disease involving native coronary artery     Non obstructive disease by cath in  Cor angio 2016: RCA 70% (FFR 0.89), and OM1 75%        Coronary artery disease involving native coronary artery     Non obstructive disease by cath in  Cor angio 2016: RCA 70% (FFR 0.89), and OM1 75%        Coronary artery disease involving native coronary artery     Non obstructive disease by cath in  Cor angio 2016: RCA 70% (FFR 0.89), and OM1 75%        Depressive disorder, not elsewhere classified 10/24/2014     Depressive disorder, not elsewhere classified 10/24/2014     Depressive disorder, not elsewhere classified 10/24/2014     Diabetes mellitus, type 2 (H)      Diabetes mellitus, type II (H)      Diabetes mellitus, type II (H)      Diabetes mellitus, type II (H)      Diabetic neuropathy (H)      Diabetic neuropathy (H)      Diabetic neuropathy (H)      Fractures     ankle, leg and arm     Fractures     ankle, leg and arm     Fractures     ankle, leg and arm     High cholesterol      High  cholesterol 2007     High cholesterol 2007     Hypertension      Hypertension      Hypertension      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     ICD (implantable cardioverter-defibrillator) lead failure 11/26/2014    High voltage lead tip inserted in RV free wall RV lead extraction and implantation of the new septal RV lead November 2014      ICD (implantable cardioverter-defibrillator) lead failure 11/26/2014    High voltage lead tip inserted in RV free wall RV lead extraction and implantation of the new septal RV lead November 2014      ICD (implantable cardioverter-defibrillator) lead failure 11/26/2014    High voltage lead tip inserted in RV free wall RV lead extraction and implantation of the new septal RV lead November 2014      ILD (interstitial lung disease) (H)      ILD (interstitial lung disease) (H)      ILD (interstitial lung disease) (H)      Infectious mononucleosis      Infectious mononucleosis      Infectious mononucleosis      Ischemic cardiomyopathy 4/22/2015    Chronic: LVEF 25- 30% LVEF 26% echo May 2017     Ischemic cardiomyopathy 4/22/2015    Chronic: LVEF 25- 30% LVEF 26% echo May 2017     Ischemic cardiomyopathy 4/22/2015    Chronic: LVEF 25- 30% LVEF 26% echo May 2017     Kidney stone      Kidney stone      Kidney stone      LBBB (left bundle branch block)     noted on Dec 19, 2012     LBBB (left bundle branch block)     noted on Dec 19, 2012     LBBB (left bundle branch block)     noted on Dec 19, 2012     Lymphadenopathy, mediastinal      Lymphadenopathy, mediastinal      Lymphadenopathy, mediastinal      Myocardial infarction (H)      Myocardial infarction (H)      Myocardial infarction (H)      Osteoarthritis      Osteoarthritis      Osteoarthritis      Pulmonary anthracosis (H)      Pulmonary anthracosis (H)      Pulmonary  anthracosis (H)      Recurrent kidney stones      Recurrent kidney stones      Recurrent kidney stones      Shortness of breath     with exertion     Shortness of breath     with exertion     Shortness of breath     with exertion       PAST SURGICAL HISTORY:  Past Surgical History:   Procedure Laterality Date     APPENDECTOMY       ARTHROSCOPY KNEE Bilateral      ARTHROSCOPY SHOULDER ROTATOR CUFF REPAIR       C SHLDR ARTHROSCOP,DIAGNOSTIC       CARDIAC CATHETERIZATION N/A 12/12/2016     COLONOSCOPY N/A 12/19/2019     CORONARY ANGIOGRAPHY ADULT ORDER       CV CORONARY ANGIOGRAM N/A 2/27/2020     CV CORONARY ANGIOGRAM N/A 1/15/2019     CV INTRAVASULAR ULTRASOUND N/A 2/27/2020     CV LEFT HEART CATHETERIZATION WITHOUT LEFT VENTRICULOGRAM Left 1/15/2019     CV PCI ANGIOPLASTY N/A 2/27/2020     CV RIGHT HEART CATH MEASUREMENTS RECORDED N/A 2/27/2020     CV RIGHT HEART CATH MEASUREMENTS RECORDED N/A 7/30/2020     EP ABLATION AV NODE N/A 9/27/2019     EP ICD INSERT       HC REMOVE TONSILS/ADENOIDS,<13 Y/O       INSERT / REPLACE / REMOVE PACEMAKER       JOINT REPLACEMENT       OTHER SURGICAL HISTORY  11/26/2014     AK L HRT CATH W/NJX L VENTRICULOGRAPHY IMG S&I Left 12/12/2016     REPLACEMENT TOTAL KNEE       TONSILLECTOMY & ADENOIDECTOMY       US LYMPH NODE BIOPSY  7/10/2019       FAMILY HISTORY:  Family History   Problem Relation Age of Onset     Sudden Death Mother         unexpected death in her sleep in her 50s       SOCIAL HISTORY:  Social History     Socioeconomic History     Marital status:      Spouse name: Not on file     Number of children: Not on file     Years of education: Not on file     Highest education level: Not on file   Occupational History     Not on file   Tobacco Use     Smoking status: Never Smoker     Smokeless tobacco: Never Used     Tobacco comment: cigars few times a year only   Substance and Sexual Activity     Alcohol use: Yes     Comment: Alcoholic Drinks/day: occasional     Drug use:  No     Sexual activity: Not on file   Other Topics Concern     Not on file   Social History Narrative     Not on file     Social Determinants of Health     Financial Resource Strain:      Difficulty of Paying Living Expenses:    Food Insecurity:      Worried About Running Out of Food in the Last Year:      Ran Out of Food in the Last Year:    Transportation Needs:      Lack of Transportation (Medical):      Lack of Transportation (Non-Medical):    Physical Activity:      Days of Exercise per Week:      Minutes of Exercise per Session:    Stress:      Feeling of Stress :    Social Connections:      Frequency of Communication with Friends and Family:      Frequency of Social Gatherings with Friends and Family:      Attends Rastafari Services:      Active Member of Clubs or Organizations:      Attends Club or Organization Meetings:      Marital Status:    Intimate Partner Violence:      Fear of Current or Ex-Partner:      Emotionally Abused:      Physically Abused:      Sexually Abused:        CURRENT MEDICATIONS:  Current Outpatient Medications   Medication Sig     acetaminophen (TYLENOL) 500 MG tablet Take 500 mg by mouth 2 times daily as needed      apixaban ANTICOAGULANT (ELIQUIS ANTICOAGULANT) 5 MG tablet Take 1 tablet (5 mg) by mouth 2 times daily     atorvastatin (LIPITOR) 40 MG tablet Take 40 mg by mouth daily     bumetanide (BUMEX) 1 MG tablet Take 1 mg by mouth 2 times daily 3 tablets twice daily     carvedilol (COREG) 6.25 MG tablet Take 2 tablets (12.5 mg) by mouth 2 times daily (with meals)     Coenzyme Q10 (COQ-10) 100 MG CAPS Take 200 mg by mouth daily      colchicine (COLCYRS) 0.6 MG tablet TAKE 2 TABLETS BY MOUTH THEN 1 TABLET ONE HOUR LATER AS NEEDED FOR GOUT ATTACK AS DIRECTED     diphenhydrAMINE (BENADRYL) 25 MG tablet Take 25 mg by mouth At Bedtime     diphenhydrAMINE-acetaminophen (TYLENOL PM)  MG tablet Take 1 tablet by mouth nightly as needed for sleep     febuxostat (ULORIC) 40 MG TABS  tablet Take 40 mg by mouth daily     gabapentin (NEURONTIN) 100 MG capsule Take 100 mg by mouth 3 times daily     glucosamine-chondroitin (GLUCOSAMINE CHONDR COMPLEX) 500-400 MG CAPS per capsule Take 1 tablet by mouth daily      insulin glargine (BASAGLAR KWIKPEN) 100 UNIT/ML pen Inject 44 Units Subcutaneous At Bedtime      metFORMIN (GLUCOPHAGE) 1000 MG tablet Take 1,000 mg by mouth 2 times daily (with meals)     Multiple Vitamin (MULTI VITAMIN DAILY PO)      Omega-3 Fatty Acids (FISH OIL) 500 MG CAPS Take 1,200 mg by mouth every other day      potassium chloride ER (KLOR-CON M) 20 MEQ CR tablet Take 20 mEq by mouth daily     sacubitril-valsartan (ENTRESTO) 49-51 MG per tablet Take 1 tablet by mouth 2 times daily     spironolactone (ALDACTONE) 25 MG tablet Take 0.5 tablets (12.5 mg) by mouth daily     ticagrelor (BRILINTA) 90 MG tablet Take 1 tablet (90 mg) by mouth 2 times daily Please schedule lab draw to F/U on creatine; additional refills after     No current facility-administered medications for this visit.     Facility-Administered Medications Ordered in Other Visits   Medication     study drug pemafibrate 0.2 mg vs placebo (PROMINENT) tablet 0.2 mg       ROS:   10 point ROS negative except as discussed in above HPI.    EXAM:  There were no vitals taken for this visit.  General: appears comfortable, alert and articulate  Head: normocephalic, atraumatic  Eyes: anicteric sclera, EOMI  Neck: no adenopathy  Orophyarynx: moist mucosa, no lesions, dentition intact  Heart: regular, normal S1/S2, no murmur, gallop, rub, estimated JVP ***, 2+ carotid and radial pulses  Lungs: clear to auscultation bilaterally, no rales or wheezing  Abdomen: soft, non-tender, bowel sounds present, no hepatosplenomegaly  Extremities: no clubbing, cyanosis or edema  Neurological: normal speech and affect, no gross motor deficits    Labs:  No results found for this or any previous visit (from the past 24 hour(s)).      Device  interrogation 9/9/21:  Type: In clinic CRT-D check followed by appointment with Linda Marmolejo NP  Presenting: Bi  74 bpm (chronic AFib)  Lead/Battery Status: Stable impedances, pacing and sensing thresholds.  Atrial Arrhythmias: documented chronic AF  Vent Arrhythmias: 49 NSVT, since remote on 8/23, 1 NSVT with EGM showing 12 beats VT, rate 207 bpm on 8/29/2021 at 11:17 pm. Patient said he might have felt that episode.  Anticoagulant: Eliquis  Comments: Normal device function. No changes made. Bi  90.6%.     Echocardiogram 3/9/20:  Severely (EF 10-20%) reduced left ventricular function is present.  Right ventricular function, chamber size, wall motion, and thickness are  normal.  Moderate tricuspid insufficiency is present.  Right ventricular systolic pressure is 34mmHg above the right atrial pressure.  Previous study not available for comparison.  Contrast images show a septal crypt,but cannot rule out a small VSD.    Cardiac MRI ***:  ***    RHC 7/30/20:  RA: 10  RV: 45/10  PA: 45/23 (30)  PCWP: 21  TD CO/CI: 3.0/1.6  Lorie CO/CI: 2.8/1.5  PVR: 3.0  SVR: 1077    CPX 9/22/21:  Exercised for 10 minutes and 1 second (improved by 5-7 minutes compared to prior). Stopped due to fatigue. HR and BP response attenuated. MVO2 16.4 ml/kg/min (functional class II, improved from prior). Reached anaerobic threshold with RER 1.17. VE/VCO2 43.13.    6MWT ***:      Assessment and Plan:     Ken Doss is a ***    It was a pleasure seeing Ken Doss at the [facility]. Please contact us with any questions or concerns that you may have.      Patient was seen and discussed with staff attending, Dr. Payton.      Sincerely,      Kerline Shea MD, PhD  Cardiology Fellow    And    Tata Payton MD  Associate Professor of Medicine  [facility]  Heart Failure, Transplant, and Mechanical Circulatory Support Cardiology   Cardiovascular  Division  [facility]   [phone]

## 2021-11-01 NOTE — LETTER
11/1/2021      RE: Ken Doss  [address] [place] [id number]       [facility]  DATE: 11/01/21    HPI:   Mr. Doss is a 75 year old male with medical history significant for Type 2 IDDM, two vessel CAD s/p PCI to mLAD and pRCA Feb 2020, ICM with severe LV systolic dysfunction with LVEF 10-20% s/p CRT-D, A fib s/p AVN ablation, mild ILD who presents for follow up visit.    He was initially referred to us for consideration of LVAD as DT in the setting of his advanced HF, seen by Dr. Payton in Feb 2020 at [facility] where he follows as well. First diagnosed with HF and found to have reduced LV function around 2010, thought to be secondary to silent MI/ICM. At that time he did not have any coronary interventions.  He had CRT-D placed Jan 2013.  He was doing well until 2018 when he developed worsening exertional SOB. [facility] January 2019 showed pLAD and pRCA disease, he underwent PCI with 1 REGLA to mLAD and 2 REGLA to o/p RCA.  He felt better immediately after but noticed the SOB come back again. May 2019 MPI showed medium sized nontransmural infarction in the inferior and inferoseptal region with no evidence of reversible ischemia.  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  He also had a PET scan which showed the mediastinal and hilar LAD along with lower lobe infiltrates light up. Cardiac PET 10/2019 for sarcoid showed no suspicious FDG uptake within the heart. He also was found to have A fib with RVR underwent AVN ablation in Sept 2019, which he had reported previously did not help his sxs. In addition, he had a high res CT chest 1/2020 which showed mild ILD consistent with UIP and PFTs 3/2020 showed mild restrictive disease.     After his initial visit we initiated LVAD workup. Feb 2020, [facility]  showed in-stent restenosis of the ostial RCA and proximal LAD, underwent PCI to both.  RHC at that time showed RA pressure of 16, RV of 60/15, PA pressure of 59/28 with a mean of 35, PCWP 20, TD cardiac output of 3.4 with an index of 1.8, PA saturation of 56.3, and PVR of 4.4 STREET.    He was aggressively diuresed and discharged home on high-dose of Bumex instead of Lasix.  His Coreg dose was decreased to 6.25 mg twice a day and his Entresto dose was increased to 49/51 mg twice a day. TTE 3/2020 revealed severely reduced LVEF 10 to 15%.  LVEDD 6.4 cm, RV normal size and function, moderate TR.  Since he felt better since then we have been optimizing his GDMT and been able to hold off on LVAD. Repeat RHC July 2020 showed RA 10, PA 45/23/30, WP 21, F CO/CI 2.8/1.5, TD CO/CI 3.0/1.6, PVR 2.9WU.  In the mean time he has been evaluated by pulmonology Dr. Encarnacion who felt his ILD is stable. He also had a dilated pancreatic duct that was evaluated by GI with repeat CT A/P which showed only mild dilation felt to be benign. He had a generator change 9/2020.     LOV: 10/2020, CPX VO2 max 15.4 mL/kg/min. Continued GDMT.    Since then:   No hospitalizations.   Has had 2 ED visits in the last year for mechanical falls. One tripping while wearing sandals, the other carrying a ladder outside his cabin. No LOC, no device shocks.  Patient denies fever, chills, night sweats,  rashes, lightheadedness, dizziness, headaches, chest pain, palpitations, nausea, vomiting, diarrhea.  No worsening SOB. No LE edema, orthopnea, PND.    He reports he overall feels fine and the same, but does feel tired and not able to be as active as he was when he was younger which mentally makes him feel bad.  He is active in and around his cabin which is in [place].  Recently he climbed 3 flights of stairs up and down at least 2-3 times carrying his summer clothes up and winter clothes down before he gt tired and had to sit down.      Checks BP daily usually SBP  90s-100s. Occasional dizziness more so when standing up too fast.  He is currently on Bumex 3mg AM 3mg PM, but cut it down himself to 2mg in the afternoon because he felt it was too much.     Also having hypoglycemia at night working with PCP to adjust insulin.  Weight stable.   Device check 9/9/21- chronic AF, 49 NSVT episodes, BiVP 90.6%.     He had a CPX 9/22/21 that showed a peak VO2 of 16.35 at RER 1.17, elevated VE/VCO2 slope of 44, attenuated HR and BP response, normal lung function on baseline spirometry. Overall, the patients VO2 increased from 2 previous studies (10.7 2/2020 and 14.5 9/2020) and exercise duration increased as well by 5-7 minutes.     PAST MEDICAL HISTORY:  Past Medical History:  #1 insulin-dependent type 2 diabetes mellitus  #2 severe LV systolic dysfunction with an estimated left ventricular ejection fraction of 20 to 25% in January 2020  #3 two-vessel coronary artery disease in the LAD and RCA status post percutaneous coronary intervention.  His most recent nuclear stress test in May 2019 showed fixed defect in the inferior and inferoseptal region consistent with scar.  He had no reversible ischemia.  #4 atrial fibrillation status post AV marianna ablation.  #5 cardiac resynchronization therapy  #6 mild interstitial lung disease with a total lung capacity of 72%.     Past Surgical History:  #1 Bilateral knee replacement  #2 Right shoulder replacement  #3 Appendectomy    FAMILY HISTORY:  Family History   Problem Relation Age of Onset     Sudden Death Mother         unexpected death in her sleep in her 50s     SOCIAL HISTORY:  Social History     Socioeconomic History     Marital status:      Spouse name: Not on file     Number of children: Not on file     Years of education: Not on file     Highest education level: Not on file   Occupational History     Not on file   Tobacco Use     Smoking status: Never Smoker     Smokeless tobacco: Never Used     Tobacco comment: cigars few times a  year only   Substance and Sexual Activity     Alcohol use: Yes     Comment: Alcoholic Drinks/day: occasional     Drug use: No     Sexual activity: Not on file   Other Topics Concern     Not on file   Social History Narrative     Not on file     CURRENT MEDICATIONS:  acetaminophen (TYLENOL) 500 MG tablet, Take 500 mg by mouth 2 times daily as needed   apixaban ANTICOAGULANT (ELIQUIS ANTICOAGULANT) 5 MG tablet, Take 1 tablet (5 mg) by mouth 2 times daily  atorvastatin (LIPITOR) 40 MG tablet, Take 40 mg by mouth daily  bumetanide (BUMEX) 1 MG tablet, Take 1 mg by mouth 2 times daily 3 tablets twice daily  carvedilol (COREG) 6.25 MG tablet, Take 2 tablets (12.5 mg) by mouth 2 times daily (with meals)  Coenzyme Q10 (COQ-10) 100 MG CAPS, Take 200 mg by mouth daily   colchicine (COLCYRS) 0.6 MG tablet, TAKE 2 TABLETS BY MOUTH THEN 1 TABLET ONE HOUR LATER AS NEEDED FOR GOUT ATTACK AS DIRECTED  diphenhydrAMINE (BENADRYL) 25 MG tablet, Take 25 mg by mouth At Bedtime  diphenhydrAMINE-acetaminophen (TYLENOL PM)  MG tablet, Take 1 tablet by mouth nightly as needed for sleep  febuxostat (ULORIC) 40 MG TABS tablet, Take 40 mg by mouth daily  gabapentin (NEURONTIN) 100 MG capsule, Take 100 mg by mouth 3 times daily  glucosamine-chondroitin (GLUCOSAMINE CHONDR COMPLEX) 500-400 MG CAPS per capsule, Take 1 tablet by mouth daily   insulin glargine (BASAGLAR KWIKPEN) 100 UNIT/ML pen, Inject 44 Units Subcutaneous At Bedtime   metFORMIN (GLUCOPHAGE) 1000 MG tablet, Take 1,000 mg by mouth 2 times daily (with meals)  Multiple Vitamin (MULTI VITAMIN DAILY PO),   Omega-3 Fatty Acids (FISH OIL) 500 MG CAPS, Take 1,200 mg by mouth every other day   potassium chloride ER (KLOR-CON M) 20 MEQ CR tablet, Take 20 mEq by mouth daily  sacubitril-valsartan (ENTRESTO) 49-51 MG per tablet, Take 1 tablet by mouth 2 times daily  spironolactone (ALDACTONE) 25 MG tablet, Take 0.5 tablets (12.5 mg) by mouth daily  ticagrelor (BRILINTA) 90 MG tablet,  Take 1 tablet (90 mg) by mouth 2 times daily Please schedule lab draw to F/U on creatine; additional refills after    study drug pemafibrate 0.2 mg vs placebo (PROMINENT) tablet 0.2 mg      ROS:  See HPI    EXAM:  BP (!) 85/57 (BP Location: Right arm, Patient Position: Chair, Cuff Size: Adult Small)   Pulse 75   Wt 73.5 kg (162 lb)   SpO2 99%   BMI 23.92 kg/m      GENERAL: Appears comfortable, in no acute distress.   HEENT: Eye symmetrical, no discharge or icterus bilaterally. Mucous membranes moist and without lesions. No thrush.   CV: RRR, +S1S2, no murmur, rub, or gallop. JVP normal 8cm.   RESPIRATORY: Respirations regular, even, and unlabored. Lungs CTA throughout.   GI: Soft and non distended with normoactive bowel sounds present in all quadrants. No tenderness, rebound, guarding. No hepatomegaly.   EXTREMITIES: No peripheral edema. 2+ bilateral radial pulses.   NEUROLOGIC: Alert and oriented x 3. No focal deficits.   MUSCULOSKELETAL: No visible joint swelling or tenderness.   SKIN: No jaundice. No rashes or lesions.     Labs - reviewed with patient in clinic today:  CBC RESULTS:  Lab Results   Component Value Date    WBC 8.2 11/01/2021    WBC 10.7 07/30/2020    RBC 3.60 (L) 11/01/2021    RBC 3.74 (L) 07/30/2020    HGB 12.0 (L) 11/01/2021    HGB 12.3 (L) 07/30/2020    HCT 35.7 (L) 11/01/2021    HCT 37.0 (L) 07/30/2020    MCV 99 11/01/2021    MCV 99 07/30/2020    MCH 33.3 (H) 11/01/2021    MCH 32.9 07/30/2020    MCHC 33.6 11/01/2021    MCHC 33.2 07/30/2020    RDW 12.6 11/01/2021    RDW 13.1 07/30/2020     11/01/2021     07/30/2020     CMP RESULTS:  Lab Results   Component Value Date     11/01/2021     07/30/2020    POTASSIUM 4.3 11/01/2021    POTASSIUM 4.2 07/30/2020    CHLORIDE 102 11/01/2021    CHLORIDE 105 07/30/2020    CO2 24 11/01/2021    CO2 23 07/30/2020    ANIONGAP 7 11/01/2021    ANIONGAP 8 07/30/2020     (H) 11/01/2021     (H) 07/30/2020    BUN 64 (H)  11/01/2021    BUN 40 (H) 07/30/2020    CR 1.86 (H) 11/01/2021    CR 1.28 (H) 07/30/2020    GFRESTIMATED 35 (L) 11/01/2021    GFRESTIMATED 34 (L) 06/07/2021    GFRESTIMATED 55 (L) 07/30/2020    GFRESTBLACK 41 (L) 06/07/2021    GFRESTBLACK 63 07/30/2020    LYNNE 9.3 11/01/2021    LYNNE 8.8 07/30/2020    BILITOTAL 1.6 (H) 06/07/2021    BILITOTAL 1.4 (H) 07/30/2020    ALBUMIN 4.4 06/07/2021    ALBUMIN 3.6 07/30/2020    ALKPHOS 102 06/07/2021    ALKPHOS 88 07/30/2020    ALT 17 06/07/2021    ALT 24 07/30/2020    AST 19 06/07/2021    AST 23 07/30/2020      INR RESULTS:  Lab Results   Component Value Date    INR 1.29 (H) 03/09/2020     LIPID RESULTS:  Lab Results   Component Value Date    CHOL 117 10/07/2021    CHOL 77 02/29/2020    HDL 30 (L) 10/07/2021    HDL 32 (L) 02/29/2020    LDL 52 10/07/2021    LDL 29 02/29/2020    TRIG 176 (H) 10/07/2021    TRIG 79 02/29/2020     Lab Results   Component Value Date    MAG 1.5 (L) 07/01/2020    MAG 1.8 03/01/2020     Lab Results   Component Value Date    A1C 7.8 (H) 03/09/2020     Lab Results   Component Value Date    PHOS 3.5 09/11/2020    PHOS 3.8 03/09/2020     Lab Results   Component Value Date    NTBNP 2,791 (H) 05/14/2020    NTBNP 7,195 (H) 03/05/2020     Diagnostic Studies:  TTE 3/9/2020  Severely (EF 10-20%) reduced left ventricular function is present.  Right ventricular function, chamber size, wall motion, and thickness are  normal.  Moderate tricuspid insufficiency is present.  Right ventricular systolic pressure is 34mmHg above the right atrial pressure.  Previous study not available for comparison.  Contrast images show a septal crypt,but cannot rule out a small VSD.    CPX 9/22/21 that showed a peak VO2 of 16.35 at RER 1.17, elevated VE/VCO2 slope of 44, attenuated HR and BP response, normal lung function on baseline spirometry. Overall, the patients VO2 increased from 2 previous studies (10.7 2/2020 and 14.5 9/2020) and exercise duration increased as well by 5-7 minutes.        Assessment/Plan:  Mr. Doss is a 75 year old male with history significant for Type 2 IDDM, 2v CAD s/p PCI to mLAD and pRCA, ICM with severe LV systolic dysfunction with EF about 20% s/p CRT-D, atrial fibrillation status post AV marianna ablation, and mild interstitial lung disease who was referred to us for consideration of left ventricular assist device as a destination therapy initially, but who we have been able to manage well with revascularization, aggressive diuresis, and GDMT optimization with improvement in symptoms, functional status, and objective measures based on CPX improvement in exercise capacity and peak VO2 in September.    Today, he describes functional class III symptoms without significant deterioration but he does appear a little bit dry based on exam, as well as BP and labs- we notice an uptrend in Cr since the summer, overall stable today at 1.86.  He is currently on Bumex 3mg AM 2mg PM, we will cut that down to 2mg bid, may have to cut down further, we will follow up on BMP next week.  He is only on 20meQ potassium daily so since we are decreasing we will have him stop that as well.    We had originally considered adding on Farxiga, but given that he is likely dry and having issues with hypoglycemia (possibly issues with Lantus dose in the sending of mildly worsened renal function which is being adjusted by PCP), we will hold off on doing this until he is on a stable diuretic dose, more euvolemic. We asked him to log his Bps twice a day for 2 weeks and call us with results. If need be if BP is still an issue we could try cutting back on the Coreg if we need to.     We will also get a repeat echocardiogram at this time as well.   We continue to discuss the option of DT LVAD, however clinically he appears to be doing okay and unchanged from last visit.  His CPX as mentioned VO2 max has improved to 16ml/kg/min with improvement in exercise duration as well which is reassuring.     Otherwise, he can follow up at [facility] in 3 months and with Dr. Payton in 6 months with labs.     HFrEF GDMT & Regimen:   ACEi/ARB: entresto  Role for Entresto: yes on entresto 49-51 bid  BB: yes Coreg 12.5mg bid  Aldosterone antagonist: yes aldactone 12.5mg daily  SGLT-2i: hold off  SCD prophylaxis: yes CRT D  CRT?: yes If so, % BiV Pacin    NSAID use: None, counseled/reminded  Smoking/Etoh/Drugs Counseling: none  Diet/Fluid Restriction Counseling: Done/reminded  Cardiac Rehab: not now  Need for advanced therapies/workup: LVAD DT discussed, do not feel he needs it now but need to keep a close eye    On aspirin, statin, Brilinta for CAD s/p PCI in 2020.     Plan/Follow up:  -decreased bumex to 2mg bid, BMP next week  -log Bps for 2 weeks twice a day and let us know  -get TTE  -f/u in 3 months at [facility] with labs, 6 months with Dr. Payton with labs      Patient seen and evaluated with Dr. Payton.  Leia Faust MD  PGY-7, HF Fellow    I examined the patient and agree with the assessment and plan of Dr. Faust    Total time today was 45 minutes reviewing notes, imaging, labs, patient visit, orders and documentation         Tata Payton MD   [facility]  Heart Failure, Transplant, and Mechanical Circulatory Support Cardiology   Cardiovascular Division  [facility]   [phone]      2021          Tata Payton MD

## 2021-11-01 NOTE — PROGRESS NOTES
ADULT HEART TRANSPLANT CLINIC  DATE: 11/01/21    HPI:   Mr. Doss is a 75 year old male with medical history significant for Type 2 IDDM, two vessel CAD s/p PCI to mLAD and pRCA Feb 2020, ICM with severe LV systolic dysfunction with LVEF 10-20% s/p CRT-D, A fib s/p AVN ablation, mild ILD who presents for follow up visit.    He was initially referred to us for consideration of LVAD as DT in the setting of his advanced HF, seen by Dr. Payton in Feb 2020 at [facility] where he follows as well. First diagnosed with HF and found to have reduced LV function around 2010, thought to be secondary to silent MI/ICM. At that time he did not have any coronary interventions.  He had CRT-D placed Jan 2013.  He was doing well until 2018 when he developed worsening exertional SOB. [facility] January 2019 showed pLAD and pRCA disease, he underwent PCI with 1 REGLA to mLAD and 2 REGLA to o/p RCA.  He felt better immediately after but noticed the SOB come back again. May 2019 MPI showed medium sized nontransmural infarction in the inferior and inferoseptal region with no evidence of reversible ischemia.  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  He also had a PET scan which showed the mediastinal and hilar LAD along with lower lobe infiltrates light up. Cardiac PET 10/2019 for sarcoid showed no suspicious FDG uptake within the heart. He also was found to have A fib with RVR underwent AVN ablation in Sept 2019, which he had reported previously did not help his sxs. In addition, he had a high res CT chest 1/2020 which showed mild ILD consistent with UIP and PFTs 3/2020 showed mild restrictive disease.     After his initial visit we initiated LVAD workup. Feb 2020, [facility] showed in-stent restenosis of the ostial RCA and proximal LAD, underwent PCI to  both.  RHC at that time showed RA pressure of 16, RV of 60/15, PA pressure of 59/28 with a mean of 35, PCWP 20, TD cardiac output of 3.4 with an index of 1.8, PA saturation of 56.3, and PVR of 4.4 STREET.    He was aggressively diuresed and discharged home on high-dose of Bumex instead of Lasix.  His Coreg dose was decreased to 6.25 mg twice a day and his Entresto dose was increased to 49/51 mg twice a day. TTE 3/2020 revealed severely reduced LVEF 10 to 15%.  LVEDD 6.4 cm, RV normal size and function, moderate TR.  Since he felt better since then we have been optimizing his GDMT and been able to hold off on LVAD. Repeat RHC July 2020 showed RA 10, PA 45/23/30, WP 21, F CO/CI 2.8/1.5, TD CO/CI 3.0/1.6, PVR 2.9WU.  In the mean time he has been evaluated by pulmonology Dr. Encarnacion who felt his ILD is stable. He also had a dilated pancreatic duct that was evaluated by GI with repeat CT A/P which showed only mild dilation felt to be benign. He had a generator change 9/2020.     LOV: 10/2020, CPX VO2 max 15.4 mL/kg/min. Continued GDMT.    Since then:   No hospitalizations.   Has had 2 ED visits in the last year for mechanical falls. One tripping while wearing sandals, the other carrying a ladder outside his cabin. No LOC, no device shocks.  Patient denies fever, chills, night sweats,  rashes, lightheadedness, dizziness, headaches, chest pain, palpitations, nausea, vomiting, diarrhea.  No worsening SOB. No LE edema, orthopnea, PND.    He reports he overall feels fine and the same, but does feel tired and not able to be as active as he was when he was younger which mentally makes him feel bad.  He is active in and around his cabin which is in [place].  Recently he climbed 3 flights of stairs up and down at least 2-3 times carrying his summer clothes up and winter clothes down before he gt tired and had to sit down.      Checks BP daily usually SBP 90s-100s. Occasional dizziness more so when standing up too fast.  He is  currently on Bumex 3mg AM 3mg PM, but cut it down himself to 2mg in the afternoon because he felt it was too much.     Also having hypoglycemia at night working with PCP to adjust insulin.  Weight stable.   Device check 9/9/21- chronic AF, 49 NSVT episodes, BiVP 90.6%.     He had a CPX 9/22/21 that showed a peak VO2 of 16.35 at RER 1.17, elevated VE/VCO2 slope of 44, attenuated HR and BP response, normal lung function on baseline spirometry. Overall, the patients VO2 increased from 2 previous studies (10.7 2/2020 and 14.5 9/2020) and exercise duration increased as well by 5-7 minutes.     PAST MEDICAL HISTORY:  Past Medical History:  #1 insulin-dependent type 2 diabetes mellitus  #2 severe LV systolic dysfunction with an estimated left ventricular ejection fraction of 20 to 25% in January 2020  #3 two-vessel coronary artery disease in the LAD and RCA status post percutaneous coronary intervention.  His most recent nuclear stress test in May 2019 showed fixed defect in the inferior and inferoseptal region consistent with scar.  He had no reversible ischemia.  #4 atrial fibrillation status post AV marianna ablation.  #5 cardiac resynchronization therapy  #6 mild interstitial lung disease with a total lung capacity of 72%.     Past Surgical History:  #1 Bilateral knee replacement  #2 Right shoulder replacement  #3 Appendectomy    FAMILY HISTORY:  Family History   Problem Relation Age of Onset     Sudden Death Mother         unexpected death in her sleep in her 50s     SOCIAL HISTORY:  Social History     Socioeconomic History     Marital status:      Spouse name: Not on file     Number of children: Not on file     Years of education: Not on file     Highest education level: Not on file   Occupational History     Not on file   Tobacco Use     Smoking status: Never Smoker     Smokeless tobacco: Never Used     Tobacco comment: cigars few times a year only   Substance and Sexual Activity     Alcohol use: Yes      Comment: Alcoholic Drinks/day: occasional     Drug use: No     Sexual activity: Not on file   Other Topics Concern     Not on file   Social History Narrative     Not on file     CURRENT MEDICATIONS:  acetaminophen (TYLENOL) 500 MG tablet, Take 500 mg by mouth 2 times daily as needed   apixaban ANTICOAGULANT (ELIQUIS ANTICOAGULANT) 5 MG tablet, Take 1 tablet (5 mg) by mouth 2 times daily  atorvastatin (LIPITOR) 40 MG tablet, Take 40 mg by mouth daily  bumetanide (BUMEX) 1 MG tablet, Take 1 mg by mouth 2 times daily 3 tablets twice daily  carvedilol (COREG) 6.25 MG tablet, Take 2 tablets (12.5 mg) by mouth 2 times daily (with meals)  Coenzyme Q10 (COQ-10) 100 MG CAPS, Take 200 mg by mouth daily   colchicine (COLCYRS) 0.6 MG tablet, TAKE 2 TABLETS BY MOUTH THEN 1 TABLET ONE HOUR LATER AS NEEDED FOR GOUT ATTACK AS DIRECTED  diphenhydrAMINE (BENADRYL) 25 MG tablet, Take 25 mg by mouth At Bedtime  diphenhydrAMINE-acetaminophen (TYLENOL PM)  MG tablet, Take 1 tablet by mouth nightly as needed for sleep  febuxostat (ULORIC) 40 MG TABS tablet, Take 40 mg by mouth daily  gabapentin (NEURONTIN) 100 MG capsule, Take 100 mg by mouth 3 times daily  glucosamine-chondroitin (GLUCOSAMINE CHONDR COMPLEX) 500-400 MG CAPS per capsule, Take 1 tablet by mouth daily   insulin glargine (BASAGLAR KWIKPEN) 100 UNIT/ML pen, Inject 44 Units Subcutaneous At Bedtime   metFORMIN (GLUCOPHAGE) 1000 MG tablet, Take 1,000 mg by mouth 2 times daily (with meals)  Multiple Vitamin (MULTI VITAMIN DAILY PO),   Omega-3 Fatty Acids (FISH OIL) 500 MG CAPS, Take 1,200 mg by mouth every other day   potassium chloride ER (KLOR-CON M) 20 MEQ CR tablet, Take 20 mEq by mouth daily  sacubitril-valsartan (ENTRESTO) 49-51 MG per tablet, Take 1 tablet by mouth 2 times daily  spironolactone (ALDACTONE) 25 MG tablet, Take 0.5 tablets (12.5 mg) by mouth daily  ticagrelor (BRILINTA) 90 MG tablet, Take 1 tablet (90 mg) by mouth 2 times daily Please schedule lab draw  to F/U on creatine; additional refills after    study drug pemafibrate 0.2 mg vs placebo (PROMINENT) tablet 0.2 mg      ROS:  See HPI    EXAM:  BP (!) 85/57 (BP Location: Right arm, Patient Position: Chair, Cuff Size: Adult Small)   Pulse 75   Wt 73.5 kg (162 lb)   SpO2 99%   BMI 23.92 kg/m      GENERAL: Appears comfortable, in no acute distress.   HEENT: Eye symmetrical, no discharge or icterus bilaterally. Mucous membranes moist and without lesions. No thrush.   CV: RRR, +S1S2, no murmur, rub, or gallop. JVP normal 8cm.   RESPIRATORY: Respirations regular, even, and unlabored. Lungs CTA throughout.   GI: Soft and non distended with normoactive bowel sounds present in all quadrants. No tenderness, rebound, guarding. No hepatomegaly.   EXTREMITIES: No peripheral edema. 2+ bilateral radial pulses.   NEUROLOGIC: Alert and oriented x 3. No focal deficits.   MUSCULOSKELETAL: No visible joint swelling or tenderness.   SKIN: No jaundice. No rashes or lesions.     Labs - reviewed with patient in clinic today:  CBC RESULTS:  Lab Results   Component Value Date    WBC 8.2 11/01/2021    WBC 10.7 07/30/2020    RBC 3.60 (L) 11/01/2021    RBC 3.74 (L) 07/30/2020    HGB 12.0 (L) 11/01/2021    HGB 12.3 (L) 07/30/2020    HCT 35.7 (L) 11/01/2021    HCT 37.0 (L) 07/30/2020    MCV 99 11/01/2021    MCV 99 07/30/2020    MCH 33.3 (H) 11/01/2021    MCH 32.9 07/30/2020    MCHC 33.6 11/01/2021    MCHC 33.2 07/30/2020    RDW 12.6 11/01/2021    RDW 13.1 07/30/2020     11/01/2021     07/30/2020     CMP RESULTS:  Lab Results   Component Value Date     11/01/2021     07/30/2020    POTASSIUM 4.3 11/01/2021    POTASSIUM 4.2 07/30/2020    CHLORIDE 102 11/01/2021    CHLORIDE 105 07/30/2020    CO2 24 11/01/2021    CO2 23 07/30/2020    ANIONGAP 7 11/01/2021    ANIONGAP 8 07/30/2020     (H) 11/01/2021     (H) 07/30/2020    BUN 64 (H) 11/01/2021    BUN 40 (H) 07/30/2020    CR 1.86 (H) 11/01/2021    CR 1.28 (H)  07/30/2020    GFRESTIMATED 35 (L) 11/01/2021    GFRESTIMATED 34 (L) 06/07/2021    GFRESTIMATED 55 (L) 07/30/2020    GFRESTBLACK 41 (L) 06/07/2021    GFRESTBLACK 63 07/30/2020    LYNNE 9.3 11/01/2021    LYNNE 8.8 07/30/2020    BILITOTAL 1.6 (H) 06/07/2021    BILITOTAL 1.4 (H) 07/30/2020    ALBUMIN 4.4 06/07/2021    ALBUMIN 3.6 07/30/2020    ALKPHOS 102 06/07/2021    ALKPHOS 88 07/30/2020    ALT 17 06/07/2021    ALT 24 07/30/2020    AST 19 06/07/2021    AST 23 07/30/2020      INR RESULTS:  Lab Results   Component Value Date    INR 1.29 (H) 03/09/2020     LIPID RESULTS:  Lab Results   Component Value Date    CHOL 117 10/07/2021    CHOL 77 02/29/2020    HDL 30 (L) 10/07/2021    HDL 32 (L) 02/29/2020    LDL 52 10/07/2021    LDL 29 02/29/2020    TRIG 176 (H) 10/07/2021    TRIG 79 02/29/2020     Lab Results   Component Value Date    MAG 1.5 (L) 07/01/2020    MAG 1.8 03/01/2020     Lab Results   Component Value Date    A1C 7.8 (H) 03/09/2020     Lab Results   Component Value Date    PHOS 3.5 09/11/2020    PHOS 3.8 03/09/2020     Lab Results   Component Value Date    NTBNP 2,791 (H) 05/14/2020    NTBNP 7,195 (H) 03/05/2020     Diagnostic Studies:  TTE 3/9/2020  Severely (EF 10-20%) reduced left ventricular function is present.  Right ventricular function, chamber size, wall motion, and thickness are  normal.  Moderate tricuspid insufficiency is present.  Right ventricular systolic pressure is 34mmHg above the right atrial pressure.  Previous study not available for comparison.  Contrast images show a septal crypt,but cannot rule out a small VSD.    CPX 9/22/21 that showed a peak VO2 of 16.35 at RER 1.17, elevated VE/VCO2 slope of 44, attenuated HR and BP response, normal lung function on baseline spirometry. Overall, the patients VO2 increased from 2 previous studies (10.7 2/2020 and 14.5 9/2020) and exercise duration increased as well by 5-7 minutes.       Assessment/Plan:  Mr. Doss is a 75 year old male with history  significant for Type 2 IDDM, 2v CAD s/p PCI to mLAD and pRCA, ICM with severe LV systolic dysfunction with EF about 20% s/p CRT-D, atrial fibrillation status post AV marianna ablation, and mild interstitial lung disease who was referred to us for consideration of left ventricular assist device as a destination therapy initially, but who we have been able to manage well with revascularization, aggressive diuresis, and GDMT optimization with improvement in symptoms, functional status, and objective measures based on CPX improvement in exercise capacity and peak VO2 in September.    Today, he describes functional class III symptoms without significant deterioration but he does appear a little bit dry based on exam, as well as BP and labs- we notice an uptrend in Cr since the summer, overall stable today at 1.86.  He is currently on Bumex 3mg AM 2mg PM, we will cut that down to 2mg bid, may have to cut down further, we will follow up on BMP next week.  He is only on 20meQ potassium daily so since we are decreasing we will have him stop that as well.    We had originally considered adding on Farxiga, but given that he is likely dry and having issues with hypoglycemia (possibly issues with Lantus dose in the sending of mildly worsened renal function which is being adjusted by PCP), we will hold off on doing this until he is on a stable diuretic dose, more euvolemic. We asked him to log his Bps twice a day for 2 weeks and call us with results. If need be if BP is still an issue we could try cutting back on the Coreg if we need to.     We will also get a repeat echocardiogram at this time as well.   We continue to discuss the option of DT LVAD, however clinically he appears to be doing okay and unchanged from last visit.  His CPX as mentioned VO2 max has improved to 16ml/kg/min with improvement in exercise duration as well which is reassuring.    Otherwise, he can follow up at [facility] in 3 months and with Dr. Payton in 6  months with labs.     HFrEF GDMT & Regimen:   ACEi/ARB: entresto  Role for Entresto: yes on entresto 49-51 bid  BB: yes Coreg 12.5mg bid  Aldosterone antagonist: yes aldactone 12.5mg daily  SGLT-2i: hold off  SCD prophylaxis: yes CRT D  CRT?: yes If so, % BiV Pacin    NSAID use: None, counseled/reminded  Smoking/Etoh/Drugs Counseling: none  Diet/Fluid Restriction Counseling: Done/reminded  Cardiac Rehab: not now  Need for advanced therapies/workup: LVAD DT discussed, do not feel he needs it now but need to keep a close eye    On aspirin, statin, Brilinta for CAD s/p PCI in 2020.     Plan/Follow up:  -decreased bumex to 2mg bid, BMP next week  -log Bps for 2 weeks twice a day and let us know  -get TTE  -f/u in 3 months at [facility] with labs, 6 months with Dr. Payton with labs      Patient seen and evaluated with Dr. Payton.  Leia Faust MD  PGY-7, HF Fellow    I examined the patient and agree with the assessment and plan of Dr. Faust    Total time today was 45 minutes reviewing notes, imaging, labs, patient visit, orders and documentation         Tata Payton MD   [facility]  Heart Failure, Transplant, and Mechanical Circulatory Support Cardiology   Cardiovascular Division  [facility]   [phone]      2021

## 2021-11-01 NOTE — NURSING NOTE
Chief Complaint   Patient presents with     Follow Up     November 1, 2021- reason for visit: RTN HF 75 y.o male with HFrEF EF 20-25% with labs prior.      Vitals were taken and medications reconciled.    Inocencio Eaton, DIANA  10:09 AM

## 2021-11-01 NOTE — PATIENT INSTRUCTIONS
"You were seen today in the Cardiovascular Clinic at the [facility].       Cardiology Providers you saw during your visit: Dr. Payton      Medication Changes:   1. DECREASE bumex to 2mg twice daily.  2. STOP potassium.      Follow up Appointment Information:  1. Echocardiogram  2. Labs in 2 weeks  3. Follow up with Linda at [facility] in 3 months  4. Follow up with Dr. Payton in 6 months    *Daily blood pressure checks twice per day for 2 weeks. After 2 weeks ,please send me your blood pressure information. You can either call [phone] option 1 option 4 or email to me at [email or web address]      Results:          [address] [place] [address] [place] [address]        Thank you for allowing us to be a part of your care here at the [facility]      If you have questions or concerns please contact us at:      Cici Hernandez RN BSN   Cardiology Care Coordinator  [facility]   Questions and [phone]   First press #1 for the University and then press #4 to \"send a message to your care team\"     "

## 2021-11-01 NOTE — NURSING NOTE
Diet: Patient instructed regarding a heart failure healthy diet, including discussion of reduced fat and 2000 mg daily sodium restriction, daily weights, medication purpose and compliance, fluid restrictions and resources for patient and family to access for assistance with heart failure management.       Labs: Patient was given results of the laboratory testing obtained today and patient was instructed about when to return for the next laboratory testing. Repeat labs in 2 weeks.    Med Reconcile: Reviewed and verified all current medications with the patient. The updated medication list was printed and given to the patient. DECREASE bumex to 2mg BID. STOP potassium.    *daily BP log twice daily for 2 weeks then check in.    Return Appointment: Patient given instructions regarding scheduling next clinic visit. RTC to see Dr. Payton in 6 months. RTC to see Linda in 3 months.    Patient stated he understood all health information given and agreed to call with further questions or concerns.     Cici Hernandez RN

## 2021-11-08 NOTE — TELEPHONE ENCOUNTER
"Type: alert remote CRT-D transmission, shock delivered for VT/VF. RV defib lead impedance warning also noted, trend is stable.  Presenting rhythm: biV pacing and sensing, rate 92 bpm.  Battery/lead status: stable  Arrhythmias: since 9/9/21, one VF episode on 11/7/21 at 21:56, successfully treated with 27.4J shock. Three NSVT also noted.  Anticoagulant: Eliquis  Comments: normal ICD function. BiV pacing 92%. Routed to device RN.  Device/lead alerts: none.  CRICKET Stone, Device Specialist       Transmission reviewed. ICD shock noted last evening for rapid VT. Successfully converted however, was not a full power shock. Device delivered at 27.4J shock and per Tech support should have been a 35 J shock. Also of note, there is a RV shock impedance alert for shock impedance of <20 Ohms (acutal measurement was 0 Ohms) during time of shock.     Per [employer] Tech support this is very suspicious for an insulation breach, with <20 Ohms impedance and partial shock load delivery. Also, there is concern with the device's safety feature for Charge circuit protection (to protect the can) that the total charge deliver could be reduced to only a 1J shock to protect the can and possible not terminate an arrhythmia.     Per tech support will need to bring patient in for isometric and pocket manipulation in real-time and run  Impedances during manipulation  In both  Tip-->Ring and Tip--> Coil configurations. Also want to see if any noise or oversensing can be recreated oin Tip--> Coil sensing.      Per Tech support: \"May be difficult to recreate any abnormal measurements real-time but the data can still be helpful. At any rate there is a serious concern for insulation breach on this device despite having normal RV lead impedance/threshold trends. \"      Call placed to patient to review/assess. States he was helping his wife carry in some boxes after eating late dinner last night. States he suddenly became very ill and started vomiting. Then " "about 10 mins later started dry-heaving and then remembers feeling very lightheaded/ \"fuzzy\" feeling and was laying down, but does not recall getting a shock or fainting.         States he is feeling ok today, and was able to sleep well last night, other than the alert tones that are sounding every 4 hours keeping him up. Denies any troublesome symptoms today. Advised any changes, repeat lightheaded spells/shocks or chest pain, shortness of breath that he should got to ER, preferably [facility]. Patient verbalized understanding.     Device history copied below, has a Biotronisilkfred RV lead, [employer] Generator/ICD.    Patient states he missed his most recent appointment with Dr. Pool as well due to out of state travel.     Will forward to Dr. Pool for review (text page sent w/ request to review this chart today if possible).   Zoie Granado RN                                  "

## 2021-11-09 NOTE — TELEPHONE ENCOUNTER
Markus Pool MD Gebel, Mandy L, RN  Caller: Unspecified (Yesterday,  8:50 AM)  Remote device check reviewed.  Shock for VT delivered unsuccessful however the shock amplitude was smaller than programmed and the shocking lead impedance was low.  Agree with plan for device interrogation Zarina in the office.  The patient will also need to undergo chest x-ray tomorrow PA and lateral to assess his lead and look for evidence of high-voltage lead disruption.  Further programming and plan to be determined by the data obtained tomorrow.   S   ---------------------------------------------------    Above noted. Left detailed message, callback requested to update patient. CXR ordered.     Zoie Granado, RN

## 2021-11-09 NOTE — PROGRESS NOTES
[facility]  Cardiac Electrophysiology     Progress Note: Markus Pool MD    Primary Care: Lewis Damon    Primary Cardiologist: Tata Payton MD    Primary Electrophysiologist: Markus Pool MD    Assessment:         ICD lead malfunction (initial encounter): Patient demonstrates abnormal right ventricular high-voltage lead behavior.  Patient with appropriate shock for rapid VT, but output 27.4 J (programmed at 35 J) and high-voltage lead impedance was measured at <20 ohms.  Unable to reproduce findings with low voltage lead impedance check and pocket manipulation performed today.  Chest x-ray demonstrates no obvious defect.  The patient is at increased risk for system failure to treat a malignant ventricular arrhythmia.  The underlying problem was discussed with the patient.  Additional work-up and treatment has been recommended as outlined below.    Chronic systolic heart failure: New York Heart Association class III heart failure and followed in the advanced heart failure clinic by Dr. Tata Payton.  On today's exam the patient appears to be fluid balanced in his symptoms are currently at baseline.  He tolerated her recent trip to [place] and was certainly short of breath at altitude for stand typical.  His Opti-Vol measured today is within normal limits.    ASCVD: Status post previous coronary intervention (mLAD and pRCA).  No symptoms to suggest coronary ischemia at this time.  The patient has significant loss of left ventricular systolic performance (LVEF 10-20%).    Time spent: 40 minutes    Recommendations:    I recommended the patient have a Life Vest placed until definitive treatment of his ICD malfunction can be undertaken.    ICD reprogrammed: ATP during VF turned off, and first shock energy reprogrammed to 40 J.    The patient will be scheduled for left subclavian venogram, SVC venogram, and lead assessment to better risk stratify the patient for lead  extraction.    The patient will be scheduled for lead extraction and insertion of a new high-voltage RV lead.  LV lead will be reassessed as well.    Echocardiogram in the next 10-14 days.    No change in current medical therapy.    Subjective:  Ken Doss (75 year old male) returns to the arrhythmia clinic for interval reassessment of the patient's CRT-D ICD system.  The patient had a episode of syncope and ICD shock on November 7.  The patient was not aware of the shock.  Remote device interrogation revealed that the patient's episode of rapid VT was successfully terminated however the delivered shock energy was 27.4 J versus the programmed 35 J and the high-voltage lead impedance was <20 ohms both of which are out of parameters for the patient's RV lead.  The patient recovered uneventfully.  He has had no further recurrent episodes of presyncope or syncope.  He recently was traveling in [place] visiting [facility] and reports no difficulties other than his typical dyspnea on exertion elevation.  He notes no lower extremity edema.  He is compliant with his medical therapy.    Current Outpatient Medications   Medication Sig Dispense Refill     acetaminophen (TYLENOL) 500 MG tablet Take 500 mg by mouth 2 times daily as needed        apixaban ANTICOAGULANT (ELIQUIS ANTICOAGULANT) 5 MG tablet Take 1 tablet (5 mg) by mouth 2 times daily 180 tablet 1     atorvastatin (LIPITOR) 40 MG tablet Take 40 mg by mouth daily       bumetanide (BUMEX) 1 MG tablet Take 2 tablets (2 mg) by mouth 2 times daily 360 tablet 1     carvedilol (COREG) 6.25 MG tablet Take 2 tablets (12.5 mg) by mouth 2 times daily (with meals) 360 tablet 1     Coenzyme Q10 (COQ-10) 100 MG CAPS Take 200 mg by mouth daily        colchicine (COLCYRS) 0.6 MG tablet TAKE 2 TABLETS BY MOUTH THEN 1 TABLET ONE HOUR LATER AS NEEDED FOR GOUT ATTACK AS DIRECTED       diphenhydrAMINE (BENADRYL) 25 MG tablet Take 25 mg by mouth At Bedtime        diphenhydrAMINE-acetaminophen (TYLENOL PM)  MG tablet Take 1 tablet by mouth nightly as needed for sleep       febuxostat (ULORIC) 40 MG TABS tablet Take 40 mg by mouth daily       gabapentin (NEURONTIN) 100 MG capsule Take 100 mg by mouth 3 times daily       glucosamine-chondroitin (GLUCOSAMINE CHONDR COMPLEX) 500-400 MG CAPS per capsule Take 1 tablet by mouth daily        insulin glargine (BASAGLAR KWIKPEN) 100 UNIT/ML pen Inject 44 Units Subcutaneous At Bedtime        metFORMIN (GLUCOPHAGE) 1000 MG tablet Take 1,000 mg by mouth 2 times daily (with meals)       Multiple Vitamin (MULTI VITAMIN DAILY PO)        Omega-3 Fatty Acids (FISH OIL) 500 MG CAPS Take 1,200 mg by mouth every other day        sacubitril-valsartan (ENTRESTO) 49-51 MG per tablet Take 1 tablet by mouth 2 times daily 60 tablet 0     spironolactone (ALDACTONE) 25 MG tablet Take 0.5 tablets (12.5 mg) by mouth daily 45 tablet 1     ticagrelor (BRILINTA) 90 MG tablet Take 1 tablet (90 mg) by mouth 2 times daily Please schedule lab draw to F/U on creatine; additional refills after 180 tablet 0       Review of Systems:   Enc Vitals  BP: (!) 160/80  Pulse: 70  Weight: 73.5 kg (162 lb)                                        Family History  Family History   Problem Relation Age of Onset     Sudden Death Mother         unexpected death in her sleep in her 50s       Social History   reports that he has never smoked. He has never used smokeless tobacco. He reports current alcohol use. He reports that he does not use drugs.    Objective:   Vital signs in last 24 hours:  BP (!) 160/80 (BP Location: Left arm, Patient Position: Sitting, Cuff Size: Adult Regular)   Pulse 70   Wt 73.5 kg (162 lb)   BMI 23.92 kg/m    Weight: @FLOWAMB(14)@     Physical Exam:  General: The patient is alert oriented to person place and situation.  The patient is in no acute distress at the time of my evaluation.  Eyes: Pupils are equal, round, and reactive to light.   Conjunctiva and sclera are clear.  ENT: Oral mucosa is moist and without redness. No evident nasal discharge.  Pulmonary: Lungs are clear bilaterally with no rales, rhonchi, or wheezes.    Cardiovascular exam: Rhythm is regular. S1 and S2 are normal. No significant murmur is present. JVP is normal. Lower extremities demonstrate trace edema bilaterally. Distal pulses are intact bilaterally.  Abdomen is soft, nontender, no organomegaly, and bowel sounds present.  Musculoskeletal: Spine is straight. Extremities without deformity.  Neuro: Gait is normal.   Skin is warm, dry, and otherwise intact.      Cardiographics:   Device interrogation today demonstrates normal battery monitoring voltage.  The patient's pacemaker dependent following previous AV node ablation.  Right ventricular lead demonstrates normal low voltage characteristics including capture threshold and lead impedance.  Testing high-voltage lead impedance with pocket manipulation and change of position did not result in any detectable low impedance measurements.  Left ventricular lead demonstrates normal measurements today.    Lab Results:   Lab Results   Component Value Date     11/01/2021     07/30/2020    CO2 24 11/01/2021    CO2 23 07/30/2020    BUN 64 11/01/2021    BUN 40 07/30/2020     Lab Results   Component Value Date    WBC 8.2 11/01/2021    WBC 10.7 07/30/2020    HGB 12.0 11/01/2021    HGB 12.3 07/30/2020    HCT 35.7 11/01/2021    HCT 37.0 07/30/2020    MCV 99 11/01/2021    MCV 99 07/30/2020     11/01/2021     07/30/2020     Lab Results   Component Value Date    INR 1.29 03/09/2020         Outside record review:         c/o left sided chest pain  pain> on inspiration lungs cl skin warm and dry denies N/V denies SOB

## 2021-11-09 NOTE — LETTER
11/9/2021    Lewis Reeves MD  [facility] [address] [place] [address]    RE: Ken Doss       Dear Colleague,    I had the pleasure of seeing Ken Doss in the [facility].    Formerly [facility]  Cardiac Electrophysiology     Progress Note: Markus Pool MD    Primary Care: Lewis Damon    Primary Cardiologist: Tata Payton MD    Primary Electrophysiologist: Markus Pool MD    Assessment:         ICD lead malfunction (initial encounter): Patient demonstrates abnormal right ventricular high-voltage lead behavior.  Patient with appropriate shock for rapid VT, but output 27.4 J (programmed at 35 J) and high-voltage lead impedance was measured at <20 ohms.  Unable to reproduce findings with low voltage lead impedance check and pocket manipulation performed today.  Chest x-ray demonstrates no obvious defect.  The patient is at increased risk for system failure to treat a malignant ventricular arrhythmia.  The underlying problem was discussed with the patient.  Additional work-up and treatment has been recommended as outlined below.    Chronic systolic heart failure: New York Heart Association class III heart failure and followed in the advanced heart failure clinic by Dr. Tata Payton.  On today's exam the patient appears to be fluid balanced in his symptoms are currently at baseline.  He tolerated her recent trip to [place] and was certainly short of breath at altitude for stand typical.  His Opti-Vol measured today is within normal limits.    ASCVD: Status post previous coronary intervention (mLAD and pRCA).  No symptoms to suggest coronary ischemia at this time.  The patient has significant loss of left ventricular systolic performance (LVEF 10-20%).    Time spent: 40 minutes    Recommendations:    I recommended the patient have a Life Vest placed until definitive treatment of his ICD  malfunction can be undertaken.    ICD reprogrammed: ATP during VF turned off, and first shock energy reprogrammed to 40 J.    The patient will be scheduled for left subclavian venogram, SVC venogram, and lead assessment to better risk stratify the patient for lead extraction.    The patient will be scheduled for lead extraction and insertion of a new high-voltage RV lead.  LV lead will be reassessed as well.    Echocardiogram in the next 10-14 days.    No change in current medical therapy.    Subjective:  Ken Doss (75 year old male) returns to the arrhythmia clinic for interval reassessment of the patient's CRT-D ICD system.  The patient had a episode of syncope and ICD shock on November 7.  The patient was not aware of the shock.  Remote device interrogation revealed that the patient's episode of rapid VT was successfully terminated however the delivered shock energy was 27.4 J versus the programmed 35 J and the high-voltage lead impedance was <20 ohms both of which are out of parameters for the patient's RV lead.  The patient recovered uneventfully.  He has had no further recurrent episodes of presyncope or syncope.  He recently was traveling in [place] visiting [facility] and reports no difficulties other than his typical dyspnea on exertion elevation.  He notes no lower extremity edema.  He is compliant with his medical therapy.    Current Outpatient Medications   Medication Sig Dispense Refill     acetaminophen (TYLENOL) 500 MG tablet Take 500 mg by mouth 2 times daily as needed        apixaban ANTICOAGULANT (ELIQUIS ANTICOAGULANT) 5 MG tablet Take 1 tablet (5 mg) by mouth 2 times daily 180 tablet 1     atorvastatin (LIPITOR) 40 MG tablet Take 40 mg by mouth daily       bumetanide (BUMEX) 1 MG tablet Take 2 tablets (2 mg) by mouth 2 times daily 360 tablet 1     carvedilol (COREG) 6.25 MG tablet Take 2 tablets (12.5 mg) by mouth 2 times daily (with meals) 360 tablet 1     Coenzyme Q10 (COQ-10) 100 MG  CAPS Take 200 mg by mouth daily        colchicine (COLCYRS) 0.6 MG tablet TAKE 2 TABLETS BY MOUTH THEN 1 TABLET ONE HOUR LATER AS NEEDED FOR GOUT ATTACK AS DIRECTED       diphenhydrAMINE (BENADRYL) 25 MG tablet Take 25 mg by mouth At Bedtime       diphenhydrAMINE-acetaminophen (TYLENOL PM)  MG tablet Take 1 tablet by mouth nightly as needed for sleep       febuxostat (ULORIC) 40 MG TABS tablet Take 40 mg by mouth daily       gabapentin (NEURONTIN) 100 MG capsule Take 100 mg by mouth 3 times daily       glucosamine-chondroitin (GLUCOSAMINE CHONDR COMPLEX) 500-400 MG CAPS per capsule Take 1 tablet by mouth daily        insulin glargine (BASAGLAR KWIKPEN) 100 UNIT/ML pen Inject 44 Units Subcutaneous At Bedtime        metFORMIN (GLUCOPHAGE) 1000 MG tablet Take 1,000 mg by mouth 2 times daily (with meals)       Multiple Vitamin (MULTI VITAMIN DAILY PO)        Omega-3 Fatty Acids (FISH OIL) 500 MG CAPS Take 1,200 mg by mouth every other day        sacubitril-valsartan (ENTRESTO) 49-51 MG per tablet Take 1 tablet by mouth 2 times daily 60 tablet 0     spironolactone (ALDACTONE) 25 MG tablet Take 0.5 tablets (12.5 mg) by mouth daily 45 tablet 1     ticagrelor (BRILINTA) 90 MG tablet Take 1 tablet (90 mg) by mouth 2 times daily Please schedule lab draw to F/U on creatine; additional refills after 180 tablet 0       Review of Systems:   Enc Vitals  BP: (!) 160/80  Pulse: 70  Weight: 73.5 kg (162 lb)                                        Family History  Family History   Problem Relation Age of Onset     Sudden Death Mother         unexpected death in her sleep in her 50s       Social History   reports that he has never smoked. He has never used smokeless tobacco. He reports current alcohol use. He reports that he does not use drugs.    Objective:   Vital signs in last 24 hours:  BP (!) 160/80 (BP Location: Left arm, Patient Position: Sitting, Cuff Size: Adult Regular)   Pulse 70   Wt 73.5 kg (162 lb)   BMI 23.92  kg/m    Weight: @FLOWAMB(14)@     Physical Exam:  General: The patient is alert oriented to person place and situation.  The patient is in no acute distress at the time of my evaluation.  Eyes: Pupils are equal, round, and reactive to light.  Conjunctiva and sclera are clear.  ENT: Oral mucosa is moist and without redness. No evident nasal discharge.  Pulmonary: Lungs are clear bilaterally with no rales, rhonchi, or wheezes.    Cardiovascular exam: Rhythm is regular. S1 and S2 are normal. No significant murmur is present. JVP is normal. Lower extremities demonstrate trace edema bilaterally. Distal pulses are intact bilaterally.  Abdomen is soft, nontender, no organomegaly, and bowel sounds present.  Musculoskeletal: Spine is straight. Extremities without deformity.  Neuro: Gait is normal.   Skin is warm, dry, and otherwise intact.      Cardiographics:   Device interrogation today demonstrates normal battery monitoring voltage.  The patient's pacemaker dependent following previous AV node ablation.  Right ventricular lead demonstrates normal low voltage characteristics including capture threshold and lead impedance.  Testing high-voltage lead impedance with pocket manipulation and change of position did not result in any detectable low impedance measurements.  Left ventricular lead demonstrates normal measurements today.    Lab Results:   Lab Results   Component Value Date     11/01/2021     07/30/2020    CO2 24 11/01/2021    CO2 23 07/30/2020    BUN 64 11/01/2021    BUN 40 07/30/2020     Lab Results   Component Value Date    WBC 8.2 11/01/2021    WBC 10.7 07/30/2020    HGB 12.0 11/01/2021    HGB 12.3 07/30/2020    HCT 35.7 11/01/2021    HCT 37.0 07/30/2020    MCV 99 11/01/2021    MCV 99 07/30/2020     11/01/2021     07/30/2020     Lab Results   Component Value Date    INR 1.29 03/09/2020         Outside record review:        Markus Pool MD   [facility]  [facility]

## 2021-11-09 NOTE — H&P (VIEW-ONLY)
[facility]  Cardiac Electrophysiology     Progress Note: Markus Pool MD    Primary Care: Lewis Damon    Primary Cardiologist: Tata Payton MD    Primary Electrophysiologist: Markus Pool MD    Assessment:         ICD lead malfunction (initial encounter): Patient demonstrates abnormal right ventricular high-voltage lead behavior.  Patient with appropriate shock for rapid VT, but output 27.4 J (programmed at 35 J) and high-voltage lead impedance was measured at <20 ohms.  Unable to reproduce findings with low voltage lead impedance check and pocket manipulation performed today.  Chest x-ray demonstrates no obvious defect.  The patient is at increased risk for system failure to treat a malignant ventricular arrhythmia.  The underlying problem was discussed with the patient.  Additional work-up and treatment has been recommended as outlined below.    Chronic systolic heart failure: New York Heart Association class III heart failure and followed in the advanced heart failure clinic by Dr. Tata Payton.  On today's exam the patient appears to be fluid balanced in his symptoms are currently at baseline.  He tolerated her recent trip to [place] and was certainly short of breath at altitude for stand typical.  His Opti-Vol measured today is within normal limits.    ASCVD: Status post previous coronary intervention (mLAD and pRCA).  No symptoms to suggest coronary ischemia at this time.  The patient has significant loss of left ventricular systolic performance (LVEF 10-20%).    Time spent: 40 minutes    Recommendations:    I recommended the patient have a Life Vest placed until definitive treatment of his ICD malfunction can be undertaken.    ICD reprogrammed: ATP during VF turned off, and first shock energy reprogrammed to 40 J.    The patient will be scheduled for left subclavian venogram, SVC venogram, and lead assessment to better risk stratify the patient for lead  extraction.    The patient will be scheduled for lead extraction and insertion of a new high-voltage RV lead.  LV lead will be reassessed as well.    Echocardiogram in the next 10-14 days.    No change in current medical therapy.    Subjective:  Ken Doss (75 year old male) returns to the arrhythmia clinic for interval reassessment of the patient's CRT-D ICD system.  The patient had a episode of syncope and ICD shock on November 7.  The patient was not aware of the shock.  Remote device interrogation revealed that the patient's episode of rapid VT was successfully terminated however the delivered shock energy was 27.4 J versus the programmed 35 J and the high-voltage lead impedance was <20 ohms both of which are out of parameters for the patient's RV lead.  The patient recovered uneventfully.  He has had no further recurrent episodes of presyncope or syncope.  He recently was traveling in [place] visiting [facility] and reports no difficulties other than his typical dyspnea on exertion elevation.  He notes no lower extremity edema.  He is compliant with his medical therapy.    Current Outpatient Medications   Medication Sig Dispense Refill     acetaminophen (TYLENOL) 500 MG tablet Take 500 mg by mouth 2 times daily as needed        apixaban ANTICOAGULANT (ELIQUIS ANTICOAGULANT) 5 MG tablet Take 1 tablet (5 mg) by mouth 2 times daily 180 tablet 1     atorvastatin (LIPITOR) 40 MG tablet Take 40 mg by mouth daily       bumetanide (BUMEX) 1 MG tablet Take 2 tablets (2 mg) by mouth 2 times daily 360 tablet 1     carvedilol (COREG) 6.25 MG tablet Take 2 tablets (12.5 mg) by mouth 2 times daily (with meals) 360 tablet 1     Coenzyme Q10 (COQ-10) 100 MG CAPS Take 200 mg by mouth daily        colchicine (COLCYRS) 0.6 MG tablet TAKE 2 TABLETS BY MOUTH THEN 1 TABLET ONE HOUR LATER AS NEEDED FOR GOUT ATTACK AS DIRECTED       diphenhydrAMINE (BENADRYL) 25 MG tablet Take 25 mg by mouth At Bedtime        diphenhydrAMINE-acetaminophen (TYLENOL PM)  MG tablet Take 1 tablet by mouth nightly as needed for sleep       febuxostat (ULORIC) 40 MG TABS tablet Take 40 mg by mouth daily       gabapentin (NEURONTIN) 100 MG capsule Take 100 mg by mouth 3 times daily       glucosamine-chondroitin (GLUCOSAMINE CHONDR COMPLEX) 500-400 MG CAPS per capsule Take 1 tablet by mouth daily        insulin glargine (BASAGLAR KWIKPEN) 100 UNIT/ML pen Inject 44 Units Subcutaneous At Bedtime        metFORMIN (GLUCOPHAGE) 1000 MG tablet Take 1,000 mg by mouth 2 times daily (with meals)       Multiple Vitamin (MULTI VITAMIN DAILY PO)        Omega-3 Fatty Acids (FISH OIL) 500 MG CAPS Take 1,200 mg by mouth every other day        sacubitril-valsartan (ENTRESTO) 49-51 MG per tablet Take 1 tablet by mouth 2 times daily 60 tablet 0     spironolactone (ALDACTONE) 25 MG tablet Take 0.5 tablets (12.5 mg) by mouth daily 45 tablet 1     ticagrelor (BRILINTA) 90 MG tablet Take 1 tablet (90 mg) by mouth 2 times daily Please schedule lab draw to F/U on creatine; additional refills after 180 tablet 0       Review of Systems:   Enc Vitals  BP: (!) 160/80  Pulse: 70  Weight: 73.5 kg (162 lb)                                        Family History  Family History   Problem Relation Age of Onset     Sudden Death Mother         unexpected death in her sleep in her 50s       Social History   reports that he has never smoked. He has never used smokeless tobacco. He reports current alcohol use. He reports that he does not use drugs.    Objective:   Vital signs in last 24 hours:  BP (!) 160/80 (BP Location: Left arm, Patient Position: Sitting, Cuff Size: Adult Regular)   Pulse 70   Wt 73.5 kg (162 lb)   BMI 23.92 kg/m    Weight: @FLOWAMB(14)@     Physical Exam:  General: The patient is alert oriented to person place and situation.  The patient is in no acute distress at the time of my evaluation.  Eyes: Pupils are equal, round, and reactive to light.   Conjunctiva and sclera are clear.  ENT: Oral mucosa is moist and without redness. No evident nasal discharge.  Pulmonary: Lungs are clear bilaterally with no rales, rhonchi, or wheezes.    Cardiovascular exam: Rhythm is regular. S1 and S2 are normal. No significant murmur is present. JVP is normal. Lower extremities demonstrate trace edema bilaterally. Distal pulses are intact bilaterally.  Abdomen is soft, nontender, no organomegaly, and bowel sounds present.  Musculoskeletal: Spine is straight. Extremities without deformity.  Neuro: Gait is normal.   Skin is warm, dry, and otherwise intact.      Cardiographics:   Device interrogation today demonstrates normal battery monitoring voltage.  The patient's pacemaker dependent following previous AV node ablation.  Right ventricular lead demonstrates normal low voltage characteristics including capture threshold and lead impedance.  Testing high-voltage lead impedance with pocket manipulation and change of position did not result in any detectable low impedance measurements.  Left ventricular lead demonstrates normal measurements today.    Lab Results:   Lab Results   Component Value Date     11/01/2021     07/30/2020    CO2 24 11/01/2021    CO2 23 07/30/2020    BUN 64 11/01/2021    BUN 40 07/30/2020     Lab Results   Component Value Date    WBC 8.2 11/01/2021    WBC 10.7 07/30/2020    HGB 12.0 11/01/2021    HGB 12.3 07/30/2020    HCT 35.7 11/01/2021    HCT 37.0 07/30/2020    MCV 99 11/01/2021    MCV 99 07/30/2020     11/01/2021     07/30/2020     Lab Results   Component Value Date    INR 1.29 03/09/2020         Outside record review:

## 2021-11-10 NOTE — PROGRESS NOTES
H&P   PMD []  OV: [x] SWA  Date: 11-9-21 Teach  []   Orders  I [x] P  [x]  Letter []   COVID   FV/EPIC []  Date:  External  []  Date: AC: Eliquis  Continue  [x] AM Meds: Take all []   Hold:  Metformin     1945  Home:[phone] (home) Cell:[phone] (mobile)  Emergency Contact: Karen Doss     Important patient information for CSC/Cath Lab staff : None    [facility] EP Cath Lab Procedure Order     CV Lab Venogram:  Procedure: CV Lab Venogram  Device Type: BIV ICD  Device Company/Device Rep Needed for Procedure: Med[employer]    Diagnosis:  Lead Failure/Malfunction, RV lead  Anticipated Case Duration:  Standard/Default Time  Scheduling Timeframe:  COVID Scheduling next available or within 1-2 weeks (Tier 1)  Scheduling Restrictions: None  Scheduling Contact: Please contact pt to schedule, if you are unable to schedule date within the next 24 hours please contact pt to update on scheduling process  Pre-Procedural Testing needed: COVID 19 nasal/lab test within 48hrs of procedure  Anesthesia:  Conscious Sedation- CV RN to administer    [facility] EP Cath Lab Prep   Ordering Provider: Dr Pool  Ordering Date: 11/10/2021  Orders Status: Intial order placed and Order set placed    H&P:  Compled by Dr. Polo on 11-10-21 if scheduled within 30 days, pt to schedule with PMD if procedure outside of this timeframe  PCP: Lewis Reeves, [phone]    Pre-op Labs: Ordered AM of procedure    Medical Records Pertinent for Procedure:  Stress Test 9-22-21, Echo 2-13-20 EF 10-20%;  new Echo ordered, EKG 2-28-20 Afb @60 and Device Implant Record Generator change w/ GAG 9-17-20  Most Recent Lab Results: BMP- Within Acceptable Limits for Procedure Heme- Within Acceptable Limits for Procedure    Patient Education:    Teach with Patient: Will be completed via phone prior to procedure, and letter was also sent to pt via mail/[place] with written pre-procedural instructions.    Pre-Procedure Instructions that were Reviewed with  Patient:  NPO after midnight, Remove all jewelry prior to coming in for procedure, Shower prior to arrival, Notified patient of time and date of procedure by CV , Transportation arrangements needed s/p procedure, Post-procedure follow up process, Sedation plan/orders and Pre-procedure letter was sent to pt by CV     Pre-Procedure Medication Instructions:  Instructions given to pt regarding anticoagulants: Eliquis- instructed to continue anticoagulation uninterrupted through their procedure  Instructions given to pt regarding antiarrhythmic medication: N/A for this type of procedure; N/A  Instructions for medication, other than anticoagulants/antiarrhythmics listed above, given to pt: to hold Metformin the morning of procedure, and to take remaining medications with small sips of water  Allergies: Reviewed allergies, no concerns regarding orders for procedure     No Known Allergies    Current Outpatient Medications:      acetaminophen (TYLENOL) 500 MG tablet, Take 500 mg by mouth 2 times daily as needed , Disp: , Rfl:      apixaban ANTICOAGULANT (ELIQUIS ANTICOAGULANT) 5 MG tablet, Take 1 tablet (5 mg) by mouth 2 times daily, Disp: 180 tablet, Rfl: 1     atorvastatin (LIPITOR) 40 MG tablet, Take 40 mg by mouth daily, Disp: , Rfl:      bumetanide (BUMEX) 1 MG tablet, Take 2 tablets (2 mg) by mouth 2 times daily, Disp: 360 tablet, Rfl: 1     carvedilol (COREG) 6.25 MG tablet, Take 2 tablets (12.5 mg) by mouth 2 times daily (with meals), Disp: 360 tablet, Rfl: 1     Coenzyme Q10 (COQ-10) 100 MG CAPS, Take 200 mg by mouth daily , Disp: , Rfl:      colchicine (COLCYRS) 0.6 MG tablet, TAKE 2 TABLETS BY MOUTH THEN 1 TABLET ONE HOUR LATER AS NEEDED FOR GOUT ATTACK AS DIRECTED, Disp: , Rfl:      diphenhydrAMINE (BENADRYL) 25 MG tablet, Take 25 mg by mouth At Bedtime, Disp: , Rfl:      diphenhydrAMINE-acetaminophen (TYLENOL PM)  MG tablet, Take 1 tablet by mouth nightly as needed for sleep, Disp: , Rfl:       febuxostat (ULORIC) 40 MG TABS tablet, Take 40 mg by mouth daily, Disp: , Rfl:      gabapentin (NEURONTIN) 100 MG capsule, Take 100 mg by mouth 3 times daily, Disp: , Rfl:      glucosamine-chondroitin (GLUCOSAMINE CHONDR COMPLEX) 500-400 MG CAPS per capsule, Take 1 tablet by mouth daily , Disp: , Rfl:      insulin glargine (BASAGLAR KWIKPEN) 100 UNIT/ML pen, Inject 44 Units Subcutaneous At Bedtime , Disp: , Rfl:      metFORMIN (GLUCOPHAGE) 1000 MG tablet, Take 1,000 mg by mouth 2 times daily (with meals), Disp: , Rfl:      Multiple Vitamin (MULTI VITAMIN DAILY PO), , Disp: , Rfl:      Omega-3 Fatty Acids (FISH OIL) 500 MG CAPS, Take 1,200 mg by mouth every other day , Disp: , Rfl:      sacubitril-valsartan (ENTRESTO) 49-51 MG per tablet, Take 1 tablet by mouth 2 times daily, Disp: 60 tablet, Rfl: 0     spironolactone (ALDACTONE) 25 MG tablet, Take 0.5 tablets (12.5 mg) by mouth daily, Disp: 45 tablet, Rfl: 1     ticagrelor (BRILINTA) 90 MG tablet, Take 1 tablet (90 mg) by mouth 2 times daily Please schedule lab draw to F/U on creatine; additional refills after, Disp: 180 tablet, Rfl: 0    Documentation Date:11/10/2021 9:13 AM  Shavon Sánchez RN

## 2021-11-10 NOTE — PROGRESS NOTES
Pemafibrate to Reduce cardiovascular OutcoMes by reducing triglycerides IN patiENts with diabeTes (PROMINENT) clinical trial.    Subject seen in clinic today for study Visit 23.      Subject seen by provider Linda Marmolejo for study related physical exam.  See provider note and flow sheets for vital signs.    Waist measures 93 cm    Study efficacy, endpoints and adverse events reviewed with subject.  Cardiovascular risk discussed.     Study alcohol consumption stipulations and education reviewed with subject:    Subject reports  [] Never [x] Current [] Former   1 drinks per [] Day [] Week [] Month [x] Occasional.  1 maximum drinks per sitting.     Study medication box # [id number] with 0 tabs  Study medication box # [id number] with 0 tabs  Study medication box # [id number] with 42 tabs  Study medication box # [id number] with 0 tabs  returned by subject.  Total tablet count of 42 for 95% compliance.  Study medication box #'s [id number], [id number], [id number], [id number] dispensed to subject.  Education provided on medication compliance and administration    Plan: Study Visit 24 telephone call with subject in 2 months.  Will schedule study Visit  25 with subject in 4 months back in clinic.    Eduard Tang RN  Clinical Trials Nurse             no

## 2021-11-10 NOTE — PROGRESS NOTES
Form for life vest printed and completed/signed by Dr. Pool.    Faxed to [facility] with demographics sheet and copy of visit notes from Dr. Pool today.   Faxed at 4:20 pm on 11-9-21.  Shavon Sánchez RN  11/10/2021 9:03 AM

## 2021-11-11 NOTE — TELEPHONE ENCOUNTER
Shlomo Iglesias  [facility] Ep Support Pool - e 18 hours ago (3:30 PM)     PV    BIV ICD VENOGRAM WITH SWA     9AM ADMIT ON 11/12     H&P ON 11/9 WITH SWA     COVID TESTING ON 11/10 AT 4PM IN W     [employer] REPS NOTIFIED ON 11/10     THANK YOU!   -SHLOMO    Message text

## 2021-11-11 NOTE — TELEPHONE ENCOUNTER
Pre-Procedure Education Phone Call    Procedure: Venogram with with Dr Pool on 11-12-21  Education: Reviewed Pre-Intra-Post education and instructions reviewed via phone  COIVD: scheduled on 11-11-21 at a  facility, results will be viewable in EPIC  Pre-Op: completed and in Epic with SWA on 11-10-21  Medications: Pt verbalized understanding of medication instructions pre procedure   No letter sent to patient due to timing and no access to my chart.  Pt states understanding of all instructions and reviewed contact information in my chart.  11/11/2021 10:21 AM  Shavon Sánchez RN

## 2021-11-11 NOTE — PROGRESS NOTES
Spoke with Elvi from [facility], she will reach out to patient to fit for lifevest today.  Her # is [phone].    Reviewed contact information.

## 2021-11-12 NOTE — Clinical Note
Prepped: right groin. Prepped with: ChloraPrep. The patient was draped. .Pre-procedure site marking:Insertion site not predetermined

## 2021-11-12 NOTE — PROGRESS NOTES
Patient came back from procedure @ 1600. He asked when & if Life vest is coming before discharge. When I called the wife, she also asked the same thing. Dr. Pool was called & advised to call Life vest. Spoke to Life vest local rep Elvi & said that they will come to patient's house tomorrow for fitting but they will also call the wife tonight to set this up with them. Dr. Pool was also able to speak to the rep and aware of this.

## 2021-11-12 NOTE — PROGRESS NOTES
Spoke with Elvi from [facility], they were not able to touch base with patient until yesterday afternoon about placing life vest.  Will attempt to place today at [place].

## 2021-11-12 NOTE — Clinical Note
suture utilized for closure of sight.  Mechanical pressure applied applied by scrub person; hemostasis achieved.

## 2021-11-12 NOTE — PRE-PROCEDURE
GENERAL PRE-PROCEDURE:   Procedure:  Venogram  Date/Time:  11/12/2021 11:10 AM    Written consent obtained?: Yes    Risks and benefits: Risks, benefits and alternatives were discussed    Consent given by:  Patient  Patient states understanding of procedure being performed: Yes    Patient's understanding of procedure matches consent: Yes    Procedure consent matches procedure scheduled: Yes    Expected level of sedation:  Moderate  Appropriately NPO:  Yes  ASA Class:  3 (ICD lead malfunction, HFrEF; NYHA class III, CRT-D in situ, ASCVD)  Mallampati  :  Grade 1- soft palate, uvula, tonsillar pillars, and posterior pharyngeal wall visible  Lungs:  Lungs clear with good breath sounds bilaterally  Heart:  Normal heart sounds and rate  History & Physical reviewed:  History and physical reviewed and no updates needed  Statement of review:  I have reviewed the lab findings, diagnostic data, medications, and the plan for sedation

## 2021-11-12 NOTE — PROGRESS NOTES
Updated wife by phone and patient that Dr Pool is still in his 3rd case.  No questions.  Will get update from cath lab around 2-2:30.

## 2021-11-13 NOTE — PLAN OF CARE
Patient was kept comfortable during post-procedure stay.VSS. Denies pain. Femoral groin site remains dry & free from signs of bleeding even after walking post-removal of stopcock. Dr. Pool was able to speak with patient during recovery. Post-op instructions given to patient & spouse. Able to ask questions. Verbalized no concerns. Belongings returned. Discharged in stable condition accompanied by wife.

## 2021-11-13 NOTE — DISCHARGE INSTRUCTIONS
Interventional Cardiology  Coronary Angiogram/Angioplasty/Stent/Atherectomy Discharge Instructions -   Femoral Approach    The instructions below are to help you understand how to take care of yourself. There is also information about when to call the doctor or emergency services    **Do not stop your aspirin or platelet inhibitor unless directed by your Cardiologist.  These medications help to prevent platelets in your blood from sticking together and forming a clot.  Examples of these medications are:  Ticagrelor (Brilinta), Clopidigrel (Plavix), Prasugrel (Effient)          For the first 72 hours after your procedure:    No driving for 24 hours    Do not lift more than 15 pounds.  If you usually lift 50 pounds or more daily, please contact your cardiologist    Avoid any hard work or tiring activities, this includes, yard work, jogging, biking, sexual activity    During the day get up and walk around every 2 hours    You may return to work after 72 hours if you are feeling well and your job does not involve heavy lifting          Groin Site / Wound Care / Bleeding      You may take off the dressing on your groin the day after your procedure    Keep the area dry and clean    It is ok to shower with regular soap.  Pat dry, do not rub    You do not need to use a bandage    No tub/pool or hot tub for 1 week    If your groin starts to bleed or begins to swell suddenly after leaving the hospital, lie flat and apply firm pressure above the site for 15 minutes.  If bleeding continues, call 9-1-1    Bruising around the groin area is normal.  It may take 2-3 weeks for this to go away.  It is normal for the bruised area to turn green and/or yellow as it is healing.  A small lump may also be present and may last 2-3 months.    Your leg may be sore or stiff for a few days.  You may take Tylenol or a pain medicine recommended by your doctor    If you have a fever over 100.4, that lasts more than one day - call your  cardiologist.      Your Procedural Physician was: Dr. Markus Pool  the phone number is: [phone]      [facility]:  [phone]  If you are calling after hours, please listen to the entire voicemail, a live  will answer at the end of the message

## 2021-11-17 NOTE — TELEPHONE ENCOUNTER
PC to [employer]  They report that they have been receiving their daily downloads each day without issues  No alerts on the device, other than for artifact  I have requested that [employer] reach out to pt to reassure the pt and trouble shoot monitor placement to avoid artifact/alerts  [employer] and pt will reach out if they have further concerns  11/17/2021 3:36 PM  Padmini Sawyer RN

## 2021-11-17 NOTE — PROGRESS NOTES
Pemafibrate to Reduce cardiovascular OutcoMes by reducing triglycerides IN patiENts with diabeTes (PROMINENT) clinical trial.    ADVERSE EVENT Description: Skyler's RV Lead was running high voltage, had venogram with Dr. Pool and found some binding.  Will wait and see if plan to remove and replace leads or not.    Adverse Event: Malfunction of ICD RV lead  Serious:  [] Yes   [x] No  Reportable to IRB:   [] Yes   [x] No  Severity (mild/moderate/severe): moderate   Date of Awareness: 11/17/21  Start Date: 11/9/21  End Date: ongoing  Action taken with study treatment:  [x] Dose Not Changed  Outcome:   [x]Not Recovered/Not Resolved  Medication or therapies taken:  [] Yes   [x] No    Dr. Vigil: Please assign causality of above AE  Related to study drug?     [] Yes   [x] No       Eduard Tang RN  Clinical Research Nurse  [phone]

## 2021-11-17 NOTE — TELEPHONE ENCOUNTER
Called Skyler to check in. He reports he is overall feeling well, has been out in the yard working outside today. He now has a zoll lifevest on. Reviewed lab results - creatinine slightly increased.  He confirms he has been taking less bumex and has been doing 2mg BID bumex. Reports weight stable between 158-161lb. Blood pressure stable around 95/62. Advised him I will update Dr. Gordon and call him back with any changes.

## 2021-11-17 NOTE — TELEPHONE ENCOUNTER
"Patient called device clinic with reports of device beeping. After further questioning it was discovered that it was not his ICD beeping but that it was the Life Vest \"lighting up and beeping\" at times. Patient was quite anxious stating that if he didn't adjust or \"get it fixed after 30 seconds that it would go off again.\"      I recommended that he call the Life Vest support line asap to have them assist with troubleshooting as I am not familiar with this type of device. Patient states he has been in contact with them earlier, suggested he call again if he continues to have troubles. Patient agrees.       Patient then goes on to state he is noticing more fatigue/activity intolerance, wonders if the vest is \"working right\". Again suggested to call tech support but also reminded him that his pacing support from ICD has not changed, although BiV pacing% is slightly down from his usual ~90% to 85%. Reviewed last Pace[employer] reports, it does appear there was an alert recently for OptiVol changes. See snapshot below. Patient states his weight fluctuates by a couple pounds but did not seem to notice significant edema or shortness of breath. However, he does sound a bit breathless at times during our phone conversation.     Patient is wondering if Dr. Pool's office is getting any read outs from the vest. I discussed with patient that I would forward this message to EP RN team to see if there is anything else they would recommend. Patient appreciates this.     Zoie Granado RN           "

## 2021-11-18 NOTE — TELEPHONE ENCOUNTER
Date: 11/18/2021    Time of Call: 10:24 AM     Diagnosis:  Heart failure     [ VORB ] Ordering provider: Tata Payton MD    Order: decrease bumex to 1mg BID.      Order received by: Cici Hernandez RN     Follow-up/additional notes: orders placed. Called Skyler to discuss. He states understanding, no additional questions at this time. Encouraged him to continue to monitor his weights/symptoms and he will call with any questions or changes.

## 2021-11-18 NOTE — PROGRESS NOTES
H&P   PMD [x]  OV: []  Date: Teach  []   Orders  I [x] P  [x]  Letter []   COVID   FV/EPIC []  Date:  External  []  Date: AC: Eliquis-check with SWA on hold time  Continue  [] AM Meds: Take all []   Hold:  all meds-take 1/2 long acting insulin night prior     1945  Home:[phone] (home) Cell:[phone] (mobile)  Emergency Contact: Karen Doss     Important patient information for CSC/Cath Lab staff : None    [facility] EP Cath Lab Order  Laser Lead Extraction:  Procedure: Lead Extraction of RV Lead  Device Type: BIV ICD  Device Company: [employer]  Level:  3  Consults Needed: Hospitalist, Perfusion team and CV Surgeon    Diagnosis:  lead malfunction  Anticipated Case Duration:  Standard/Default Time  Scheduling Needs Timeframe:  COVID Scheduling within 30 days (Tier 2)  Scheduling Restrictions: Will review hold times with Dr. Pool  Scheduling Contact: Please contact pt to schedule, if you are unable to schedule date within the next 24 hours please contact pt to update on scheduling process    MD Assist:  No  Specific MD:  Yrn IR 2  Assist Timing:  N/A    Pre-Procedural Testing needed: COVID 19 nasal/lab test  Anesthesia:  General-Whole Case  Research Protocol:  No    [facility] EP Cath Lab Prep   Ordering Provider: Dr Pool  Ordering Date: 11/18/2021  Orders Status: Intial order placed and Order set placed    H&P:  Pt to schedule with PMD to complete  PCP: Lewis Reeves, [phone]    Pre-op Labs: Ordered AM of procedure    Medical Records Pertinent for Procedure:  EP venogram 11-10-21;  Right heart cath 7-30-20; Angigram 1-15-19, Echo 3-9-20 EF 10-20%, EKG 2-28-20 paced, Device Implant Record Generator change 9-17-20 GAG, Ablation Record 9-27-19 AVNA w/ SWA and Surgery Record Lead extraction RV lead 11-26-14    Patient Education:    Teach with Patient: Will be completed via phone prior to procedure, and letter was also sent to pt via mail/[employer] with written pre-procedural instructions.    Risks  Reviewed:   Lead Extraction    5% total risk for acute complications including cardiac tamponade, hemothorax, pulmonary embolism, migrating lead tip, infection, thrombosis, pneumothorax, myocardial avulsion, hypotension, or need for blood transfusion. < 2% risk of death.    5-10% risk for post-procedure complications of arm swelling, pericardial effusion, infection, pocket-related complications, arrythmia, hypotension, air embolism, bleeding, lead dislodgment.    95% successful extraction of entire lead, 5% partial lead extraction, <1% unsuccessful lead extraction.    This is in addition to the usual risk quoted for permanent pacemaker or ICD.      Pre-Procedure Instructions that were Reviewed with Patient:  NPO after midnight, Remove all jewelry prior to coming in for procedure, Shower prior to arrival, Notified patient of time and date of procedure by CV , Transportation arrangements needed s/p procedure, Post-procedure follow up process, Sedation plan/orders and Pre-procedure letter was sent to pt by CV     Pre-Procedure Medication Instructions:  Instructions given to pt regarding anticoagulants: Eliquis- will review hold time with Dr. Pool  Instructions given to pt regarding antiarrhythmic medication: N/A for this type of procedure; N/A  Instructions for medication, other than anticoagulants/antiarrhythmics listed above, given to pt: Hold all am meds  ;take 1/2 Basaglar dose night prior  Allergies: Reviewed allergies, no concerns regarding orders for procedure    No Known Allergies    Current Outpatient Medications:      acetaminophen (TYLENOL) 500 MG tablet, Take 1,000 mg by mouth 2 times daily as needed , Disp: , Rfl:      apixaban ANTICOAGULANT (ELIQUIS ANTICOAGULANT) 5 MG tablet, Take 1 tablet (5 mg) by mouth 2 times daily, Disp: 180 tablet, Rfl: 1     atorvastatin (LIPITOR) 40 MG tablet, Take 40 mg by mouth daily, Disp: , Rfl:      bumetanide (BUMEX) 1 MG tablet, Take 1 tablet (1 mg) by  mouth 2 times daily, Disp: 180 tablet, Rfl: 1     carvedilol (COREG) 6.25 MG tablet, Take 2 tablets (12.5 mg) by mouth 2 times daily (with meals), Disp: 360 tablet, Rfl: 1     Coenzyme Q10 (COQ-10) 100 MG CAPS, Take 200 mg by mouth daily , Disp: , Rfl:      colchicine (COLCYRS) 0.6 MG tablet, TAKE 2 TABLETS BY MOUTH THEN 1 TABLET ONE HOUR LATER AS NEEDED FOR GOUT ATTACK AS DIRECTED, Disp: , Rfl:      diphenhydrAMINE (BENADRYL) 25 MG tablet, Take 25 mg by mouth At Bedtime, Disp: , Rfl:      diphenhydrAMINE-acetaminophen (TYLENOL PM)  MG tablet, Take 1 tablet by mouth nightly as needed for sleep, Disp: , Rfl:      febuxostat (ULORIC) 40 MG TABS tablet, Take 40 mg by mouth daily, Disp: , Rfl:      gabapentin (NEURONTIN) 100 MG capsule, Take 100 mg by mouth 3 times daily, Disp: , Rfl:      glucosamine-chondroitin (GLUCOSAMINE CHONDR COMPLEX) 500-400 MG CAPS per capsule, Take 1 tablet by mouth daily , Disp: , Rfl:      insulin glargine (BASAGLAR KWIKPEN) 100 UNIT/ML pen, Inject 44 Units Subcutaneous At Bedtime , Disp: , Rfl:      metFORMIN (GLUCOPHAGE) 1000 MG tablet, Take 1,000 mg by mouth 2 times daily (with meals), Disp: , Rfl:      Multiple Vitamin (MULTI VITAMIN DAILY PO), Take 1 tablet by mouth daily , Disp: , Rfl:      Omega-3 Fatty Acids (FISH OIL) 500 MG CAPS, Take 1,200 mg by mouth daily , Disp: , Rfl:      sacubitril-valsartan (ENTRESTO) 49-51 MG per tablet, Take 1 tablet by mouth 2 times daily, Disp: 60 tablet, Rfl: 0     spironolactone (ALDACTONE) 25 MG tablet, Take 0.5 tablets (12.5 mg) by mouth daily, Disp: 45 tablet, Rfl: 1     ticagrelor (BRILINTA) 90 MG tablet, Take 1 tablet (90 mg) by mouth 2 times daily Please schedule lab draw to F/U on creatine; additional refills after, Disp: 180 tablet, Rfl: 0    Documentation Date:11/18/2021 2:18 PM  Shavon Sánchez RN

## 2021-11-19 NOTE — PROGRESS NOTES
Dr. Pool,   This patient is being scheduled for level III lead extraction.  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  How long would you recommend holding eliquis prior to procedure?  Thank you,  Shavon

## 2021-12-02 NOTE — PROGRESS NOTES
Return call from patient.  Reviewed instructions from Dr. Pool regarding eliquis hold and lovenox start.  Pt states understanding, he gives himself insulin shots daily and is ok with injections.  Reviewed instructions on giving and when the doses would be due, also included in his education letter.  Pt also has questions regarding his life vest, he states he had some issues with it 2 days ago, he is not sure if he got a shock or not, [employer] is unsure as well and have switched out his vest for a new one.  He would like to know if he had any rhythms that would be treated by a shock.  Suggested he send in a remote transmission. Will ask our device team to reach out to him.     He also would like to make sure that Dr. Payton is aware of his upcoming procedure.  Will forward a copy of Dr. Pool's visit notes to him.    Reviewed contact information for further questions or concerns.

## 2021-12-02 NOTE — PROGRESS NOTES
Noted.  Will call patient and send prescription to his pharmacy for Lovenox.  Letter with instructions also sent to patient.     Will discuss ADW's availability with our Procedure .    Phone call to patient, no answer and message left for return call.

## 2021-12-02 NOTE — PROGRESS NOTES
Markus Pool MD Johnson, Caroline, RN  Caller: Unspecified (1 week ago)  Shavon,  Please hold Eliquis for 2 full days prior to his procedure.   I would recommend bridging with Lovenox, starting the evening of the first day off Eliquis and the last dose on the evening prior to procedure.   On a second note, can you see if it is possible to have Dr. Brantley join me for the procedure (I know that is not simple).  Thanks  Markus

## 2021-12-02 NOTE — PROGRESS NOTES
Call placed to patient to assist in sending, no answer. LVM stating he can go ahead and send if he knows how and we would contact him with report. Left our RN # to callback for assistance.     Zoie Granado RN

## 2021-12-03 NOTE — PROGRESS NOTES
Patient attempted to send remote earlier this morning. Has to unplug and reset monitor and thought transmission came through. Waited about 30 mins and no transmission has populated website. I called back to assist him again in sending but he had to leave for an appointment, will not be back until around 2 pm. He will call back at that time to try and resend.     Reviewed again reason for sending. Patient states 2 days ago he was driving and apparently the [employer] vest that he is wearing was malfunction (per patient), states there is a blue conductive liquid that ejects when device is getting ready to shock. Patient states he noted he had this liquid on his chest but never received and shocks and never had any symptoms.     States they spent  Hours troubleshooting the vest and ended up sending him a new one. They are still trying to recover data from old one but in meantime thought maybe we could do a device check on [facility] to see if any arrhythmias were recorded by his ICD. Will await transmission.    Zoie Granado RN

## 2021-12-03 NOTE — PROGRESS NOTES
Skyler sent a transmission from his home monitor this afternoon 12/3 that revealed no events recorded since 11/15/21. Reviewed findings with patient and he expressed understanding. He is getting tired of wearing the Life Vest but understands the importance of it. I reminded him to contact us if he does experience any symptoms of presyncope, he expressed understanding.     Bettye Roberts, NEWN, RN, CV-BC  Device Nurse  [facility]

## 2021-12-06 NOTE — TELEPHONE ENCOUNTER
"Call received from patient this morning with reports of numerous [employer] Vest Alarms over weekend. States he called [employer] over weekend and hours were spent troubleshooting, new vest placed but still having issues with it. States he has had to divert shocks from the vest on many occasions and feels these are false alarms because he has not had any symptoms. States on Saturday the vest alarmed at least 35 times, and deployed a \"blue gel like it was getting to shock me.\" States he noted at one point the pads were incorrectly placed. He called [employer] again and a different rep came out to try to fix the situation.     However, even after rep visits this weekend it is still alarming. In fact an audible alarm was heard over the phone twice while I was speaking to him today in less than 10 minutes. Skyler states he is getting quite frustrated with this vest, and does not even want to wear it anymore because it is just alarming constantly. States at one point over weekend he took it off because he had a banquet to go to and did not want it to start alarming.     Reviewed with patient that I would try to contact [employer] myself and try to figure out what is going on and what we can do to remedy the situation. Patient very much appreciates this. I did have Skyler send a remote ICD check just to verify there were no actual arrhythmias detected by his device, and episode list was empty, no events recorded since he last sent on Friday 12/3/21.      ------------------------------------------  Call placed to Elvi (Rep with [employer]) and discussed my concerns about false alarms and patient's alarm fatigue causing him to want to take off vest. Also discuss my concern about his frequent need to divert shocks on his own, worried about inappropriate shocks.      Elvi was able to call Tech support from [employer] and reviewed rhythm strips/ current situation. Per Elvi/Tech support it appears that \"the vest is double, and sometimes triple-counting the pacer " "artifact which is causing these false detections/alarms. \" Per Elvi/Tech support there was really no  reprogramming that would fix this situation. States it is really quite rare to see this type of over-counting/artifact with these newer devices, but tech support did not see any suitable solution at this time. Per Elvi \"patient unfortunately appears to not be a good candidate for this vest. States we could try having patient wear more binding clothing to help with the positional artifact, but ultimately the double-counting with his pacer artifact appears to have no good fix. \" Elvi states she reviewed the rhythm strips/alarms herself as well and there is a major concern for inappropriate shocks from this vest with this type of device behavior.     Will review with Dr. Yrn Granado RN          "

## 2021-12-07 NOTE — TELEPHONE ENCOUNTER
"Patient called this afternoon, reviewed my discussion with Elvi from [employer] yesterday with patient. Also discussed that I had sent a message to Dr. Pool alert him to this situation and that we are waiting for further recommendations. Patient states although the vest alarms are bothersome he thinks he can just deal with it until the procedure on 12/29/21.       Discussed there is was a concern by [employer] rep for inappropriate shocks, to which patient states \" I am aware of that and worries about that too. I will just have to stay on top of the alarms and make sure I can deactivate it quickly. It has only gone off 3 times today so it seems a bit better. I just want to know that when/if I do really need a shock that it is going to work.\" States he is comfortable trying to \"deal with it until procedure and would feel quite hesitant if they tried to bump up my procedure. \" Patient state she did just receive all the pre-op information in the mail and has reviewed it. Advised that he can call EP RN office line with any questions regarding that paperwork. Patient understands.      Will await further recommendations from Dr. Pool.   Zoie Granado, ELVIRA    "

## 2021-12-21 NOTE — PROGRESS NOTES
You  [facility] Ep Support Pool - Lhe 2 hours ago (11:00 AM)     CJ    Check with cath lab on keeping his procedure.    Message text       Zoie Granado RN  You; [facility] Ep Support Pool - Lhe; [facility] Device Pool - Lhe Yesterday (10:09 AM)     MG    OK great, mariel Sands! Let me know what I can do to help!    Message text       You  [facility] Ep Support Pool - Lhe; [facility] Device Pool - Lhe Yesterday (9:11 AM)         Hi Device team,   I am going to check with the cath lab tomorrow about his procedure status on 12-29 and see If they think we can proceed or will need to post pone.  If we need to postpone I will let you know and we can figure out how to get him into the clinic with the [employer] rep.   I'll keep you posted!   Shavon Freeman    Message text       Markus Pool MD  [facility] Device Pool - Lhe; [facility] Ep Support Pool - Lhe Yesterday (7:44 AM)     SA    Patient device alerts from his Life Vest are reviewed.  It appears that the patient is having oversensing resulting in the patient having to deactivate his Life Vest to avoid inappropriate shocks.  Current scheduling difficulties due to COVID-19 pandemic have delayed patient's needing ICU bed post procedure.  I would recommend bringing the patient into the office and doing a program check along with a Life Vest rep to try and sort through the oversensing problem and program his ICD to try and avoid this problem.  In the meantime if we are unable to do the patient's procedure at [facility] then we should potentially look to see if we can schedule him in OR 24 at [facility] with level 3 surgical backup.  Thanks  Markus

## 2021-12-21 NOTE — PROGRESS NOTES
Noted.  Reviewed with cath lab, pt is still scheduled and will continue to monitor hospital capacity/ICU bed availability as we get closer to procedure time.  Cath lab leadership alerted today.

## 2021-12-22 NOTE — TELEPHONE ENCOUNTER
Pre-Procedure Education Phone Call    Procedure: LLE with with Dr Pool on 12/29/21 arrival 630am  Education: Attempted to contact pt to review Pre-Intra-Post education and instructions, VM was left with request that pt return call  COIVD: scheduled on 12/27/21 11am at a  facility, results will be [facility]  Pre-Op: [facility] was contacted, faxing H&P over that pt had done today 12/22, received and scanned into chart in EPIC  Medications: Take half long acting Insulin PM and AM prior, and hold short acting AM of.   Pt needs bridging prior to procedure per Dr Pool:  Sunday 12/26- Last dose of Eliquis in PM  Monday 12/27- No Eliquis in AM, Start Lovenox subcutaneous Q 12 hrs at scheduled PM dose time  Tuesday 12/28- Lovenox in AM and PM  Wednesday 12/29 Procedure- No Lovenox AM of  RX for Lovenox was already sent in  12/22/2021 10:02 AM  Padmini Sawyer RN

## 2021-12-23 NOTE — TELEPHONE ENCOUNTER
Return call from patient, reviewed all instructions for procedure including medication instructions noted below.  Pt has been able to  his rx for Lovenox and reviewed instructions.  Reviewed risks of procedure, reviewed ICU bed status due to Covid 19 pandemic.  He states understanding of all information, reviewed contact information for further questions or concerns.

## 2021-12-23 NOTE — TELEPHONE ENCOUNTER
VM back from pt after hours, requesting a CB.  Attempted to contact pt, voicemail message was left with contact information and instructing pt to call back.  12/23/2021 8:26 AM  Padmini Sawyer RN

## 2021-12-28 NOTE — TELEPHONE ENCOUNTER
Pt called with specific questions on how much of the syringe of lovenox to inject.  He is having a hard time viewing the syringe markings due to a sticker.  He was asked to go to the pharmacy for clarification.  RAHUL

## 2021-12-28 NOTE — TELEPHONE ENCOUNTER
PC back from pt  He notes that he was on Brilinta, but has not taken this for some time and cannot remember when he stopped or ran out  He has not been taking it, does not have any at home, and will not take this prior to procedure  Will need to address if pt should still be on this s/p or if this was completed  Pts primary cardiologist is Dr Vigil  Pt verbalized understanding, has no further questions or concerns at this time, and has our contact information if needed.  12/28/2021 2:52 PM  Padmini Sawyer RN

## 2021-12-28 NOTE — TELEPHONE ENCOUNTER
Per Dr. Pool/Laurie CUEVAS patient to hold brilinta today 12/28 and tomorrow 12/29 for Laser lead procedure.  Pc to patient to review medication recommendations.  He verbalizes understanding and agrees to plan.  JW

## 2021-12-29 PROBLEM — I42.8 NON-ISCHEMIC CARDIOMYOPATHY (H): Status: ACTIVE | Noted: 2021-01-01

## 2021-12-29 NOTE — PROGRESS NOTES
Report given to Rogelio FELIX on p3 at approx. 1415.  Patient transported to room 314 accompanied by CVC RN via cart with monitor and oxygen at 3L/NC @ 1438.

## 2021-12-29 NOTE — BRIEF OP NOTE
[facility]    Brief Operative Note    Pre-operative diagnosis: malfunctioning ICD lead  Post-operative diagnosis Same    Procedure:  Right VATS and placement of a chest tube  Surgeon: Surgeon(s) and Role:  Panel 1:     * Markus Pool MD - Primary  Panel 2:     * Brandi Anthony MD - Primary  Anesthesia: General   Estimated Blood Loss: 50 mL  Drains: One 28 Fr left chest  Specimens: None  Findings: Right ventricular lead very adherent to the SVC anteriorly  Complications: None  Implants: None

## 2021-12-29 NOTE — OP NOTE
Procedure Date: 12/29/2021    OPERATING AREA: CV lab/IR 2.    PROCEDURES PERFORMED:  1.  Right video-assisted thoracoscopy.  2.  Placement of a right chest tube.    ATTENDING SURGEON:  Brandi Anthony MD    FIRST ASSISTANT:  ST Taveras SA-C    ANESTHESIA:  Double-lumen endotracheal.    SKIN PREP:  Betadine and DuraPrep.    INCISIONS:  Right inframammary 4 cm incision at approximately the sixth intercostal space.    DRAINS:  One 28-Maori [place] right pleural space.    CULTURES:  None.    SPECIMENS:  None.    CLOSURE:  Routine.    PREOPERATIVE DIAGNOSES:  1.  Abnormal right ventricular high voltage lead behavior.  2.  Chronic systolic heart failure (New York Heart Association class III heart failure).  3.  Atherosclerotic cardiovascular disease.    POSTOPERATIVE DIAGNOSES:  1.  Abnormal right ventricular high voltage lead behavior.  2.  Chronic systolic heart failure (New York Heart Association class III heart failure).  3.  Atherosclerotic cardiovascular disease.    BRIEF HISTORY:  Mr. Doss is a 76-year-old gentleman who has had placement of an ICD. Recently, he has had abnormal right ventricular high voltage lead behavior and was found to have a lead fracture.  The above procedure was planned in the hopes that we could remove his right ventricular lead and replace it.    FINDINGS AT OPERATION:  The right ventricular lead was densely adherent anteriorly to the superior vena cava.  For that reason, it was felt prudent to leave it in place and perhaps remove it in the operating room at a later time.    DESCRIPTION OF PROCEDURE:  The patient arrived in the operating room and an arterial line was placed.  Satisfactory general endotracheal anesthesia was induced.  A double-lumen tube was placed.  The patient's right side was slightly elevated.  His neck, chest, abdomen and both groins were prepped and draped in a standard sterile fashion.  Access of the groin was obtained by Dr. Pool.  When he was ready for  us, a 4 cm inframammary incision was made of the right anterior chest.  The incision was carried down to the sixth interspace and the right chest was entered, this after the right lung was deflated.  The camera was inserted and once the lung was deflated, we had good visualization of the innominate vein as well as the superior vena cava and the pericardial space.  The patient did not tolerate this very well and it was necessary to expand the right lung intermittently. Dr. Pool did his maneuvers and accessed from below. It was clear to him that the right ventricular lead was quite adherent to the anterior surface of the superior vena cava.  For this reason, he felt it was safest to abort his procedure at this time. Hemostasis of the chest wall was obtained.  A second incision was made and a 28-Thai chest tube was inserted into the right pleural space without difficulty.  It was secured in place with a pericardial suture.  The chest wall wound was irrigated with warm saline solution.  It was closed in layers using running 2-0 and 3-0 Stratafix.  Dermabond was applied to the skin.  The sponge, needle and instrument counts were reported as correct.  The estimated blood loss was less than 15 mL. Fluoroscopy was obtained of the right chest and there was no evidence of pneumothorax.  Mr. Frank was awakened and extubated.  He was brought to the PACU in satisfactory condition.    Brandi Anthony MD        D: 2021   T: 2021   MT: glenroy    Name:     STEFF FRANK  MRN:      [id number]        Account:        [id number]   :      1945           Procedure Date: 2021     Document: [id number]

## 2021-12-29 NOTE — Clinical Note
Prepped: groin, chest and abdomen. Prepped with: DuraPrep. The patient was draped. .Pre-procedure site marking:Insertion site not predetermined

## 2021-12-29 NOTE — ANESTHESIA PREPROCEDURE EVALUATION
Anesthesia Pre-Procedure Evaluation    Patient: Ken Doss   MRN: [id number] : 1945        Preoperative Diagnosis: malfunctioning ICD lead    Procedure : Procedure(s):  EP Lead Extractions- Level 3  OR LASER LEAD EXTRACTION - LEVEL 3          Past Medical History:   Diagnosis Date     Acute renal failure (H)      Anemia      Arthritis     osteoarthritis     Arthritis     osteoarthritis     Arthritis     osteoarthritis     CHF (congestive heart failure) (H)      CHF (congestive heart failure) (H)      CHF (congestive heart failure) (H)      Chronic systolic heart failure (H) Dec 2012     Chronic systolic heart failure (H) Dec 2012     Chronic systolic heart failure (H) Dec 2012     Coronary artery disease involving native coronary artery     Non obstructive disease by cath in  Cor angio 2016: RCA 70% (FFR 0.89), and OM1 75%        Coronary artery disease involving native coronary artery     Non obstructive disease by cath in  Cor angio 2016: RCA 70% (FFR 0.89), and OM1 75%        Coronary artery disease involving native coronary artery     Non obstructive disease by cath in  Cor angio 2016: RCA 70% (FFR 0.89), and OM1 75%        Depressive disorder, not elsewhere classified 10/24/2014     Diabetes mellitus, type 2 (H)      Diabetes mellitus, type II (H)      Diabetes mellitus, type II (H)      Diabetes mellitus, type II (H)      Diabetic neuropathy (H)      Diabetic neuropathy (H)      Diabetic neuropathy (H)      Fractures     ankle, leg and arm     High cholesterol      Hypertension      Hypertension      Hypertension      ICD (implantable cardioverter-defibrillator) lead failure 2014    High voltage lead tip inserted in RV free wall RV lead extraction and implantation of the new septal RV lead 2014      ICD (implantable cardioverter-defibrillator) lead failure 2014    High voltage lead tip inserted in RV free wall  RV lead extraction and implantation of the new septal RV lead November 2014      ILD (interstitial lung disease) (H)      Infectious mononucleosis      Ischemic cardiomyopathy 4/22/2015    Chronic: LVEF 25- 30% LVEF 26% echo May 2017     Ischemic cardiomyopathy 4/22/2015    Chronic: LVEF 25- 30% LVEF 26% echo May 2017     Ischemic cardiomyopathy 4/22/2015    Chronic: LVEF 25- 30% LVEF 26% echo May 2017     Kidney stone      LBBB (left bundle branch block)     noted on Dec 19, 2012     LBBB (left bundle branch block)     noted on Dec 19, 2012     LBBB (left bundle branch block)     noted on Dec 19, 2012     Lymphadenopathy, mediastinal      Malfunction of implantable defibrillator ventricular (ICD) lead 11/9/2021    RV lead malfunction c/w insulation breech. Lead extraction and replacement recommended     Myocardial infarction (H)      Myocardial infarction (H)      Myocardial infarction (H)      Osteoarthritis      Osteoarthritis      Osteoarthritis      Pulmonary anthracosis (H)      Pulmonary anthracosis (H)      Recurrent kidney stones       Past Surgical History:   Procedure Laterality Date     APPENDECTOMY       ARTHROSCOPY KNEE Bilateral      ARTHROSCOPY SHOULDER ROTATOR CUFF REPAIR      right     C SHLDR ARTHROSCOP,DIAGNOSTIC      Description: Arthroscopy Shoulder;  Recorded: 06/10/2014;     CARDIAC CATHETERIZATION N/A 12/12/2016    Procedure: Coronary Angiogram;  Surgeon: Delgado Ramirez MD;  Location: [facility] Cath Lab;  Service:      COLONOSCOPY N/A 12/19/2019    Procedure: COLONOSCOPY with polypectomy;  Surgeon: Delgado Price MD;  Location: [facility];  Service: Gastroenterology     CORONARY ANGIOGRAPHY ADULT ORDER       CV CORONARY ANGIOGRAM N/A 2/27/2020    Procedure: CV CORONARY ANGIOGRAM;  Surgeon: Pedro Fontaine MD;  Location: [facility] CARDIAC CATH LAB     CV CORONARY ANGIOGRAM N/A 1/15/2019    Procedure: Coronary Angiogram;  Surgeon: Mason Correa MD;   Location: [facility] Cath Lab;  Service: Cardiology     CV INTRAVASULAR ULTRASOUND N/A 2/27/2020    Procedure: Intravascular Ultrasound;  Surgeon: Pedro Fontaine MD;  Location: [facility] CARDIAC CATH LAB     CV LEFT HEART CATHETERIZATION WITHOUT LEFT VENTRICULOGRAM Left 1/15/2019    Procedure: Left Heart Catheterization Without Left Ventriculogram;  Surgeon: Mason Correa MD;  Location: [facility];  Service: Cardiology     CV PCI ANGIOPLASTY N/A 2/27/2020    Procedure: Percutaneous Coronary Intervention Angioplasty;  Surgeon: Pedro Fontaine MD;  Location: [facility] CARDIAC CATH LAB     CV RIGHT HEART CATH MEASUREMENTS RECORDED N/A 2/27/2020    Procedure: Right Heart Cath;  Surgeon: Pedro Fontaine MD;  Location: [facility] CARDIAC CATH LAB     CV RIGHT HEART CATH MEASUREMENTS RECORDED N/A 7/30/2020    Procedure: CV RIGHT HEART CATH;  Surgeon: Ronny Geronimo MD;  Location: [facility] CARDIAC CATH LAB     EP ABLATION AV NODE N/A 9/27/2019    Procedure: EP Ablation AV Node;  Surgeon: Markus Pool MD;  Location: [facility];  Service: Cardiology     EP ICD INSERT      ICD REIMPLANTATION OF A NEW RV LEAD 11/26/2014     EP VENOGRAM N/A 11/12/2021    Procedure: EP Venogram;  Surgeon: Markus Pool MD;  Location: [facility] CV     HC REMOVE TONSILS/ADENOIDS,<13 Y/O      Description: Tonsillectomy With Adenoidectomy;  Recorded: 06/10/2014;     INSERT / REPLACE / REMOVE PACEMAKER       JOINT REPLACEMENT      bilateral TKA     OTHER SURGICAL HISTORY  11/26/2014    BIV ICDbiotronic     IA L HRT CATH W/NJX L VENTRICULOGRAPHY IMG S&I Left 12/12/2016    Procedure: Left Heart Catheterization with Left Ventriculogram;  Surgeon: Delgado Ramirez MD;  Location: [facility];  Service: Cardiology     REPLACEMENT TOTAL KNEE      bilat     TONSILLECTOMY & ADENOIDECTOMY       US LYMPH NODE BIOPSY  7/10/2019      No Known Allergies   Social  History     Tobacco Use     Smoking status: Never Smoker     Smokeless tobacco: Never Used     Tobacco comment: cigars few times a year only   Substance Use Topics     Alcohol use: Yes     Comment: Alcoholic Drinks/day: occasional      Wt Readings from Last 1 Encounters:   12/29/21 71.5 kg (157 lb 10.1 oz)        Anesthesia Evaluation   Pt has not had prior anesthetic         ROS/MED HX  ENT/Pulmonary: Comment: Pulmonary anthracosis with insterstitial lung disease.      Neurologic:  - neg neurologic ROS     Cardiovascular:     (+) Dyslipidemia hypertension--CAD -past MI INKA-qptbs-ALB Last EF: 15-20 ESTRADA. fainting (syncope). pacemaker, ICD dysrhythmias, a-fib, pulmonary hypertension,     METS/Exercise Tolerance: >4 METS    Hematologic:  - neg hematologic  ROS     Musculoskeletal:  - neg musculoskeletal ROS     GI/Hepatic:  - neg GI/hepatic ROS     Renal/Genitourinary:     (+) renal disease, type: CRI,     Endo:     (+) type II DM, Using insulin, Diabetic complications: nephropathy.  (-) Type I DM   Psychiatric/Substance Use:  - neg psychiatric ROS     Infectious Disease:  - neg infectious disease ROS     Malignancy:  - neg malignancy ROS     Other:  - neg other ROS          Physical Exam    Airway  airway exam normal      Mallampati: II   TM distance: > 3 FB   Neck ROM: full   Mouth opening: > 3 cm    Respiratory Devices and Support         Dental       (+) missing      Cardiovascular   cardiovascular exam normal          Pulmonary   pulmonary exam normal                OUTSIDE LABS:  CBC:   Lab Results   Component Value Date    WBC 8.2 11/01/2021    WBC 8.8 09/17/2020    HGB 9.9 (L) 11/12/2021    HGB 12.0 (L) 11/01/2021    HCT 35.7 (L) 11/01/2021    HCT 31.1 (L) 09/17/2020     11/01/2021     09/17/2020     BMP:   Lab Results   Component Value Date     12/22/2021     11/16/2021    POTASSIUM 4.7 12/22/2021    POTASSIUM 4.4 11/16/2021    CHLORIDE 107 12/22/2021    CHLORIDE 102 11/16/2021     CO2 18 (L) 12/22/2021    CO2 23 11/16/2021    BUN 59 (H) 12/22/2021    BUN 47 (H) 11/16/2021    CR 1.88 (H) 12/22/2021    CR 1.97 (H) 11/16/2021     (H) 12/22/2021     (H) 11/16/2021     COAGS:   Lab Results   Component Value Date    PTT 33 03/09/2020    INR 1.29 (H) 03/09/2020     POC:   Lab Results   Component Value Date     (H) 07/30/2020     HEPATIC:   Lab Results   Component Value Date    ALBUMIN 4.0 12/22/2021    PROTTOTAL 7.9 06/07/2021    ALT 17 06/07/2021    AST 19 06/07/2021    ALKPHOS 102 06/07/2021    BILITOTAL 1.6 (H) 06/07/2021     OTHER:   Lab Results   Component Value Date    LACT 1.6 07/09/2019    A1C 7.8 (H) 03/09/2020    LYNNE 9.2 12/22/2021    PHOS 3.9 12/22/2021    MAG 1.5 (L) 07/01/2020    LIPASE 168 (H) 07/09/2019    TSH 1.02 03/09/2020    CRP 2.3 (H) 07/09/2019    SED 35 (H) 07/09/2019       Anesthesia Plan    ASA Status:  4   NPO Status:  NPO Appropriate    Anesthesia Type: General.     - Airway: ETT   Induction: Intravenous, Propofol.   Maintenance: Balanced.   Techniques and Equipment:     - Airway: Double lumen ETT, Fiberoptic Bronchoscope     - Lines/Monitors: 2nd IV, Arterial Line     - Drips/Meds: Dexmed. infusion, Ketamine, Phenylephrine, Epinephrine, Vasopressin, Nicardipine     Consents    Anesthesia Plan(s) and associated risks, benefits, and realistic alternatives discussed. Questions answered and patient/representative(s) expressed understanding.    - Discussed:     - Discussed with:  Patient, Spouse      - Extended Intubation/Ventilatory Support Discussed: No.      - Patient is DNR/DNI Status: No    Use of blood products discussed: No .     Postoperative Care    Pain management: IV analgesics, Multi-modal analgesia.     - Plan for long acting post-op opioid use   PONV prophylaxis: Ondansetron (or other 5HT-3), Dexamethasone or Solumedrol, Droperidol or Haldol     Comments:    Other Comments: 70 mg ketamine IV on induction.            Terrance Alexander MD

## 2021-12-29 NOTE — PRE-PROCEDURE
Patient was seen and examined by me.  He requires a level III laser lead extraction.  A small incision will be made underneath the right nipple and a VATS procedure will be performed.  He and his wife understand that the risks for this procedure include: bleeding, infection, injury to the heart, the need for emergency sternotomy and placement on central cardiopulmonary bypass, prolonged air leak, pneumonia.and even the possibility of death..  They accept these risks and are agreeable with the plan of proceeding with surgery this morning.  
68.18

## 2021-12-29 NOTE — ANESTHESIA POSTPROCEDURE EVALUATION
Patient: Ken Doss    Procedure: Procedure(s):  VIDEO ASSISTED RIGHT THORACOSCOPY  EP Aborted Procedure       Diagnosis:ICD lead malfunction  Diagnosis Additional Information: No value filed.    Anesthesia Type:  General    Note:  Disposition: Outpatient   Postop Pain Control: Uneventful            Sign Out: Well controlled pain   PONV: No   Neuro/Psych: Uneventful            Sign Out: Acceptable/Baseline neuro status   Airway/Respiratory: Uneventful            Sign Out: Acceptable/Baseline resp. status; O2 supplementation               Oxygen: Nasal Cannula   CV/Hemodynamics: Uneventful            Sign Out: Acceptable CV status; No obvious hypovolemia; No obvious fluid overload   Other NRE: NONE   DID A NON-ROUTINE EVENT OCCUR?            Last vitals:  Vitals Value Taken Time   /57 12/29/21 1315   Temp 36.6  C (97.8  F) 12/29/21 1245   Pulse 69 12/29/21 1322   Resp 19 12/29/21 1322   SpO2 96 % 12/29/21 1322   Vitals shown include unvalidated device data.    Electronically Signed By: Terrance Alexander MD  December 29, 2021  1:24 PM

## 2021-12-29 NOTE — PHARMACY-ADMISSION MEDICATION HISTORY
Pharmacy Note - Admission Medication History    Pertinent Provider Information: has not been able to find his brilinta at home for a little while- so he is not sure when last took it.     ______________________________________________________________________    Prior To Admission (PTA) med list completed and updated in EMR.       PTA Med List   Medication Sig Note Last Dose     acetaminophen (TYLENOL) 500 MG tablet Take 1,000 mg by mouth 2 times daily as needed   12/28/2021 at Unknown time     apixaban ANTICOAGULANT (ELIQUIS ANTICOAGULANT) 5 MG tablet Take 1 tablet (5 mg) by mouth 2 times daily  12/27/2021 at am     atorvastatin (LIPITOR) 40 MG tablet Take 40 mg by mouth daily  12/28/2021 at Unknown time     bumetanide (BUMEX) 1 MG tablet Take 1 tablet (1 mg) by mouth 2 times daily  12/28/2021 at Unknown time     carvedilol (COREG) 6.25 MG tablet Take 2 tablets (12.5 mg) by mouth 2 times daily (with meals)  12/28/2021 at Unknown time     Coenzyme Q10 (COQ-10) 100 MG CAPS Take 200 mg by mouth daily   12/28/2021 at Unknown time     colchicine (COLCYRS) 0.6 MG tablet TAKE 2 TABLETS BY MOUTH THEN 1 TABLET ONE HOUR LATER AS NEEDED FOR GOUT ATTACK AS DIRECTED  More than a month at Unknown time     diphenhydrAMINE (BENADRYL) 25 MG tablet Take 25 mg by mouth At Bedtime  12/28/2021 at Unknown time     diphenhydrAMINE-acetaminophen (TYLENOL PM)  MG tablet Take 1 tablet by mouth nightly as needed for sleep  12/28/2021 at pm     enoxaparin ANTICOAGULANT (LOVENOX ANTICOAGULANT) 80 MG/0.8ML syringe Inject 0.7 mLs (70 mg) Subcutaneous 2 times daily Take 12-27 in the evening;  12-28-21 in the morning and again in the evening, then stop. 12/29/2021: 12/29/21 all 3 doses given- last one 12/28/21pm 12/28/2021 at pm     febuxostat (ULORIC) 40 MG TABS tablet Take 40 mg by mouth daily  12/28/2021 at Unknown time     gabapentin (NEURONTIN) 100 MG capsule Take 100 mg by mouth 3 times daily  12/28/2021 at Unknown time      glucosamine-chondroitin (GLUCOSAMINE CHONDR COMPLEX) 500-400 MG CAPS per capsule Take 1 tablet by mouth daily   12/28/2021 at Unknown time     insulin glargine (BASAGLAR KWIKPEN) 100 UNIT/ML pen Inject 30 Units Subcutaneous At Bedtime  12/28/2021 at Unknown time     metFORMIN (GLUCOPHAGE) 1000 MG tablet Take 1,000 mg by mouth 2 times daily (with meals)  12/28/2021 at Unknown time     Multiple Vitamin (MULTI VITAMIN DAILY PO) Take 1 tablet by mouth daily   12/28/2021 at Unknown time     Omega-3 Fatty Acids (FISH OIL) 500 MG CAPS Take 1,200 mg by mouth daily   12/28/2021 at Unknown time     sacubitril-valsartan (ENTRESTO) 49-51 MG per tablet Take 1 tablet by mouth 2 times daily  12/28/2021 at Unknown time     spironolactone (ALDACTONE) 25 MG tablet Take 0.5 tablets (12.5 mg) by mouth daily  12/28/2021 at Unknown time     ticagrelor (BRILINTA) 90 MG tablet Take 1 tablet (90 mg) by mouth 2 times daily Please schedule lab draw to F/U on creatine; additional refills after 12/29/2021: 12/29 may have run out- was not able to find at home for a little while- not sure when he took his last dose. Unknown at Unknown time       Information source(s): Patient, Family member, Clinic records and CareEverywhere/[facility]  Method of interview communication: in-person    Summary of Changes to PTA Med List  New: n/a  Discontinued: potassium chloride  Changed: bumex 2mg bid to 1mg bid    Patient was asked about OTC/herbal products specifically.  PTA med list reflects this.    In the past week, patient estimated taking medication this percent of the time:  greater than 90%.    Allergies were reviewed, assessed, and updated with the patient.      Patient does not use any multi-dose medications prior to admission.    The information provided in this note is only as accurate as the sources available at the time of the update(s).    Thank you for the opportunity to participate in the care of this patient.    Fercho Lozano,  [facility]  12/29/2021 3:38 PM

## 2021-12-29 NOTE — PROGRESS NOTES
Impression and Plan     Assessment:  1. Cardiac device malfunction with abnormal right ventricular high-voltage lead behavior status post unsuccessful attempt at lead extraction and replacement on 12/29/2021.  2. Chronic congestive heart failure with reduced left ventricular ejection fraction of 15%, due to nonischemic cardiomyopathy (out of proportion to coronary artery disease). NYHA class II-III, appears euvolemic.  3. Coronary artery disease status post atherectomy and drug-eluting stenting to the proximal right coronary artery and left anterior descending artery on 1/15/2019 with subsequent development of in-stent restenoses and repeat drug-eluting stenting to the proximal right coronary artery and left anterior descending artery on 2/27/2020. Denies angina.  4. Status post Medtronic biventricular permanent pacemaker and implantable cardiac defibrillator placement on 1/23/2013 with right ventricular lead extraction and replacement on 11/26/2014 and generator replacement on 9/17/2020. Right ventricular lead malfunction as discussed above. Last noted to be 91.7% biventricular paced.  5. Permanent atrial fibrillation status post atrioventricular marianna ablation on 9/27/2019. [id number] score is at least 6.  6. Benign essential hypertension.  7. Hyperlipidemia.  8. Insulin-dependent diabetes mellitus type 2. Last HbA1c 7.8%.  9. Interstitial lung disease of undetermined etiology.  10. Chronic kidney disease stage III.    Plan:    Continue home heart failure medication including carvedilol 12.5 mg twice daily, sacubitril-valsartan 49-51 mg twice daily, and spironolactone 12.5 mg daily.    Continue oral bumetanide 1 mg twice daily    Resume apixaban 5 mg twice daily tomorrow    It appears he has been kept on ticagrelor 90 mg twice daily for a prolonged course, as his last coronary stents were placed over 12 months ago    Management of chest tube per cardiothoracic surger    Possible open chest hybrid  procedure for lead extraction is being considered for the near future    He will need to have a LifeVest placed prior to discharge    Primary Cardiologist: Dr. Tata Payton    Clinically Significant Risk Factors Present on Admission       # Hypocalcemia: Ca = 8.4 mg/dL (Ref range: 8.5 - 10.5 mg/dL) and/or iCa = N/A on admission, will replace as needed    # Coagulation Defect: home medication list includes an anticoagulant medication  # Platelet Defect: home medication list includes an antiplatelet medication     Cardiovascular: Cardiac Arrhythmia: Ventricular tachycardia   Complication of Cardiac/Vascular Device/Implant/Graft: Other cardiac and vascular devices and implants  Breakdown (mechanical)  End Stage Heart Failure    Nephrology: Stage 3b (GFR 30-44)      Other Pulmonary Conditions: Interstitial lung disease    Systemic: Chronic Fatigue and Other Debilities: Limitation of activities due to disability     History of Present Illness      Mr. Ken Doss is a 76 year old male with a history of HFrEF LVEF 15% due to NIDCM (out of proportion to CAD), CAD s/p atherectomy and REGLA to the prox RCA and mid LAD on 1/15/2019 with subsequent development of in-stent restenosis and repeat REGLA to the prox RCA and mid LAD on 2/27/2020, s/p Medtronic CRT-D placement 1/23/2013 with RV lead extraction and replacement on 11/26/2014 and generator replacement on 9/17/2020, permanent AF s/p AV marianna ablation on 9/27/2019, ILD of unclear etiology admitted after unsuccessful attempt at RV lead extraction and replacement earlier today.    Earlier this year, he received an appropriate shock for VT. However, device check showed abnormal right ventricular high-voltage lead behavior which would put him at risk for system failure to treat a malignant ventricular arrhythmia. The extraction procedure aborted due to the high risk of severe SVC injury with a plan to discuss possible open chest hybrid procedure with extraction of  the failed high-voltage ICD lead.    Overall he feels he has been doing okay. He gets out of breath walking up his driveway, but can get around his split-level home with several staircases without difficulty. His weight has been stable. He denies any chest pain/pressure/tightness, shortness of breath at rest, light headedness/dizziness, pre-syncope, syncope, lower extremity swelling, palpitations, paroxysmal nocturnal dyspnea (PND), or orthopnea. He previously was being considered for LVAD placement but has chosen to defer on this, as he seems to be doing better than he was in the past.    Review of Systems:  Further review of systems is otherwise negative/noncontributory (based on review of medical record (admission H&P) and 13 point review of systems reviewed. Pertinent positives noted).    Cardiac Diagnostics     ECG 12/29/2021 (personally reviewed and interpreted): V-paced with underlying atrial fibrillation    Telemetry (personally reviewed): V-paced    Device check 11/9/2021 (report reviewed):  Type: In clinic CRT-D check for lead testing related to recent shock with [employer] rep Toni Crowell  Presenting: AF  70bpm   Lead/Battery Status: Overall stable trends, further assessment of defib lead today by xray per Dr Pool. Manual thresholds not tested today.   Atrial Arrhythmias: Permanent AF, programmed VVIR, <5% v-rates >/=120bpm.   Vent Arrhythmias: Since 9/9/21, 3 NSVT. 1 VF with therapy on 11/7/21, previously reviewed as a remote alert. EGM shows VT/VF in the VF zone, ~300bpm, terminated by a 27.4J shock, full 35J not delivered. There was also an alert for impedance <20 Ohms at the time of shock.   Anticoagulant: Eliquis.   Comments: Normal device function. 91.7% BiVP. Overall lead trends are stable, impedances normal when measured in clinic, no evidence of lead fracture, pocket manipulation & isometrics done with no noise observed. Medtronic rep suggests insulation breach could be related to partial charge  delivery.  Patient was sent for chest xray & followed up with Dr Pool after in clinic. Per Dr Pool, FVT therapies adjusted so first therapy will be 40J vs burst & lower voltage therapies. Of note patient is going to be fit for a LifeVest and will have a lead study done per Dr Pool. Advised to contact clinic if he hears a return in device tones    Most recent:  Echocardiogram 11/22/2021 (results reviewed):   The left ventricle is severely dilated.  The visual ejection fraction is 15-20%.  There is severe global hypokinesia of the left ventricle.  Normal right ventricle size and systolic function.  There is a catheter/pacemaker lead seen in the right ventricle.  The left atrium is moderate to severely dilated.  The right atrium is moderately dilated.  There is moderate (2+) mitral regurgitation.  There is moderate (2+) tricuspid regurgitation.  The right ventricular systolic pressure is approximated at 50mmHg plus the  right atrial pressure.  IVC diameter >2.1 cm collapsing <50% with sniff suggests a high RA pressure  estimated at 15 mmHg or greater.     When compoared to previous study on 3-9-2020, pulmonary artery pressure is  mildly higher. There is no other significant change.    Right heart cath 7/30/2020 (report reviewed):  RA 10  RV 45/5/10  PA 45/23/30  PCWP 21    Lorie CO/CI 2.8/1.5  TD CO/CI 3.0/1.6    PVR- 2.[address] STREET Right sided filling pressures are mildly elevated.Left sided filling pressures are severely elevated. Mild elevated Pulmonary Hypertension.Reduced cardiac output level.    Cardiac Cath 2/27/2020 (results reviewed):     Right sided filling pressures are moderately elevated.    Moderately elevated pulmonary artery hypertension.    Left sided filling pressures are moderately elevated.    Reduced cardiac output level.    Hemodynamic data has been modified in Epic per physician review.     Two vessel CAD with in stent restenosis of the ostial RCA stent and the mid LAD stent.  IVUS performed to size  the mid LAD vessel.  PCI of the mid LAD and ostial RCA with two drug eluting stents.    Pulmonary function tests 10/7/2020 (report reviewed):  FEV1/FVC is 89 and is normal.  FEV1 is 96% predicted and is normal.  FVC is 81% predicted and is normal.  DLCO is 56% predicted and is reduced when it   is corrected for hemoglobin.     Impression:  Full Pulmonary Function Test shows preserved airflow but a moderate reduction in diffusion capacity.  This is non specific but can be seen in pulmonary edema or pulmonary embolism.    Medical History  Surgical History Family History Social History   Past Medical History:   Diagnosis Date     Acute renal failure (H)      Anemia      Arthritis     osteoarthritis     Arthritis     osteoarthritis     Arthritis     osteoarthritis     CHF (congestive heart failure) (H)      CHF (congestive heart failure) (H)      CHF (congestive heart failure) (H)      Chronic systolic heart failure (H) Dec 2012     Chronic systolic heart failure (H) Dec 2012     Chronic systolic heart failure (H) Dec 2012     Coronary artery disease involving native coronary artery     Non obstructive disease by cath in 2007 Cor angio Nov 2016: RCA 70% (FFR 0.89), and OM1 75%        Coronary artery disease involving native coronary artery     Non obstructive disease by cath in 2007 Cor angio Nov 2016: RCA 70% (FFR 0.89), and OM1 75%        Coronary artery disease involving native coronary artery     Non obstructive disease by cath in 2007 Cor angio Nov 2016: RCA 70% (FFR 0.89), and OM1 75%        Depressive disorder, not elsewhere classified 10/24/2014     Diabetes mellitus, type 2 (H)      Diabetes mellitus, type II (H) 28/Jan/2013     Diabetes mellitus, type II (H) 28/Jan/2013     Diabetes mellitus, type II (H) 28/Jan/2013     Diabetic neuropathy (H)      Diabetic neuropathy (H)      Diabetic neuropathy (H)      Fractures     ankle, leg and arm     High cholesterol 2007     Hypertension      Hypertension       Hypertension      ICD (implantable cardioverter-defibrillator) lead failure 11/26/2014    High voltage lead tip inserted in RV free wall RV lead extraction and implantation of the new septal RV lead November 2014      ICD (implantable cardioverter-defibrillator) lead failure 11/26/2014    High voltage lead tip inserted in RV free wall RV lead extraction and implantation of the new septal RV lead November 2014      ILD (interstitial lung disease) (H)      Infectious mononucleosis      Ischemic cardiomyopathy 4/22/2015    Chronic: LVEF 25- 30% LVEF 26% echo May 2017     Ischemic cardiomyopathy 4/22/2015    Chronic: LVEF 25- 30% LVEF 26% echo May 2017     Ischemic cardiomyopathy 4/22/2015    Chronic: LVEF 25- 30% LVEF 26% echo May 2017     Kidney stone      LBBB (left bundle branch block)     noted on Dec 19, 2012     LBBB (left bundle branch block)     noted on Dec 19, 2012     LBBB (left bundle branch block)     noted on Dec 19, 2012     Lymphadenopathy, mediastinal      Malfunction of implantable defibrillator ventricular (ICD) lead 11/9/2021    RV lead malfunction c/w insulation breech. Lead extraction and replacement recommended     Myocardial infarction (H)      Myocardial infarction (H)      Myocardial infarction (H)      Osteoarthritis      Osteoarthritis      Osteoarthritis      Pulmonary anthracosis (H)      Pulmonary anthracosis (H)      Recurrent kidney stones      Past Surgical History:   Procedure Laterality Date     APPENDECTOMY       ARTHROSCOPY KNEE Bilateral      ARTHROSCOPY SHOULDER ROTATOR CUFF REPAIR      right     C SHLDR ARTHROSCOP,DIAGNOSTIC      Description: Arthroscopy Shoulder;  Recorded: 06/10/2014;     CARDIAC CATHETERIZATION N/A 12/12/2016    Procedure: Coronary Angiogram;  Surgeon: Delgado Ramirez MD;  Location: [facility] Cath Lab;  Service:      COLONOSCOPY N/A 12/19/2019    Procedure: COLONOSCOPY with polypectomy;  Surgeon: Delgado Price MD;  Location: [facility];   Service: Gastroenterology     CORONARY ANGIOGRAPHY ADULT ORDER       CV CORONARY ANGIOGRAM N/A 2/27/2020    Procedure: CV CORONARY ANGIOGRAM;  Surgeon: Pedro Fontaine MD;  Location: [facility] CARDIAC CATH LAB     CV CORONARY ANGIOGRAM N/A 1/15/2019    Procedure: Coronary Angiogram;  Surgeon: Mason Correa MD;  Location: [facility];  Service: Cardiology     CV INTRAVASULAR ULTRASOUND N/A 2/27/2020    Procedure: Intravascular Ultrasound;  Surgeon: Pedro Fontaine MD;  Location: [facility] CARDIAC CATH LAB     CV LEFT HEART CATHETERIZATION WITHOUT LEFT VENTRICULOGRAM Left 1/15/2019    Procedure: Left Heart Catheterization Without Left Ventriculogram;  Surgeon: Mason Correa MD;  Location: [facility];  Service: Cardiology     CV PCI ANGIOPLASTY N/A 2/27/2020    Procedure: Percutaneous Coronary Intervention Angioplasty;  Surgeon: Pedro Fontaine MD;  Location: [facility] CARDIAC CATH LAB     CV RIGHT HEART CATH MEASUREMENTS RECORDED N/A 2/27/2020    Procedure: Right Heart Cath;  Surgeon: Pedro Fontaine MD;  Location: [facility] CARDIAC CATH LAB     CV RIGHT HEART CATH MEASUREMENTS RECORDED N/A 7/30/2020    Procedure: CV RIGHT HEART CATH;  Surgeon: Ronny Geronimo MD;  Location: [facility] CARDIAC CATH LAB     EP ABLATION AV NODE N/A 9/27/2019    Procedure: EP Ablation AV Node;  Surgeon: Markus Pool MD;  Location: [facility];  Service: Cardiology     EP ICD INSERT      ICD REIMPLANTATION OF A NEW RV LEAD 11/26/2014     EP VENOGRAM N/A 11/12/2021    Procedure: EP Venogram;  Surgeon: Markus Pool MD;  Location: [facility] CV     HC REMOVE TONSILS/ADENOIDS,<11 Y/O      Description: Tonsillectomy With Adenoidectomy;  Recorded: 06/10/2014;     INSERT / REPLACE / REMOVE PACEMAKER       JOINT REPLACEMENT      bilateral TKA     OTHER SURGICAL HISTORY  11/26/2014    BIV ICDbiotronic     VT L HRT CATH W/NJX L  "VENTRICULOGRAPHY IMG S&I Left 12/12/2016    Procedure: Left Heart Catheterization with Left Ventriculogram;  Surgeon: Delgado Ramirez MD;  Location: [facility] Cath Lab;  Service: Cardiology     REPLACEMENT TOTAL KNEE      bilat     TONSILLECTOMY & ADENOIDECTOMY       US LYMPH NODE BIOPSY  7/10/2019     Family History   Problem Relation Age of Onset     Sudden Death Mother         unexpected death in her sleep in her 50s           Social History     Socioeconomic History     Marital status:      Spouse name: Not on file     Number of children: Not on file     Years of education: Not on file     Highest education level: Not on file   Occupational History     Not on file   Tobacco Use     Smoking status: Never Smoker     Smokeless tobacco: Never Used     Tobacco comment: cigars few times a year only   Substance and Sexual Activity     Alcohol use: Yes     Comment: Alcoholic Drinks/day: occasional     Drug use: No     Sexual activity: Not on file   Other Topics Concern     Parent/sibling w/ CABG, MI or angioplasty before 65F 55M? Not Asked   Social History Narrative     Not on file     Social Determinants of Health     Financial Resource Strain: Not on file   Food Insecurity: Not on file   Transportation Needs: Not on file   Physical Activity: Not on file   Stress: Not on file   Social Connections: Not on file   Intimate Partner Violence: Not on file   Housing Stability: Not on file             Physical Examination   VITALS: /62 (BP Location: Right arm)   Pulse 69   Temp 98  F (36.7  C) (Oral)   Resp 20   Ht 1.753 m (5' 9\")   Wt 74.6 kg (164 lb 8 oz)   SpO2 91%   BMI 24.29 kg/m    BMI: Body mass index is 24.29 kg/m .  Wt Readings from Last 3 Encounters:   12/29/21 74.6 kg (164 lb 8 oz)   11/09/21 73.5 kg (162 lb)   11/01/21 73.5 kg (162 lb)         Intake/Output Summary (Last 24 hours) at 12/29/2021 1514  Last data filed at 12/29/2021 1230  Gross per 24 hour   Intake 1800 ml   Output 225 ml   Net " 1575 ml       General Appearance:  Alert, cooperative, no distress, appears stated age    Head:  Normocephalic, without obvious abnormality, atraumatic   Eyes:  PERRL, conjunctiva/corneas clear, EOM's intact   Ears:  Normal external ear canals bilaterally   Nose: Nares normal, septum midline, no drainage   Throat: Lips, mucosa, and tongue normal; teeth and gums normal   Neck: Supple, symmetrical, trachea midline, no adenopathy, no carotid bruit or JVD    Back:   Symmetric, no abnormal curvature, ROM normal   Lungs:   Diminished breath sounds, respirations unlabored, chest tube in place   Chest Wall:  No tenderness or deformity   Heart:  Regular rate and rhythm, S1, S2 normal,no murmur, rub or gallop   Abdomen:   Soft, non-tender, bowel sounds active all four quadrants,  no masses, no organomegaly   Extremities: Extremities normal, atraumatic, no cyanosis or edema   Skin: Skin color, texture, turgor normal, no rashes or lesions   Psychiatric: Normal affect, pleasant and cooperative    Neurologic: Alert and oriented X 3, Moves all 4 extremities            Imaging      CT chest/abdomen/pelvis 3/9/2020 (report reviewed):  1. Enlarged mediastinal lymph nodes are unchanged when compared to  10/9/2019.  2. Bilateral pulmonary peripheral reticulation with mild associated  traction bronchiolectasis and scattered sub-3 mm nodules in a  peribronchovascular distribution. No significant air trapping.  Differential includes pulmonary sarcoidosis, fibrotic nonspecific  interstitial pneumonitis, and other interstitial lung diseases.  3. Stable cardiomegaly with heavy coronary calcium and right coronary  artery stent.  4. Cholelithiasis.       Lab Results    Chemistry/lipid CBC Cardiac Enzymes/BNP/TSH/INR   Recent Labs   Lab Test 10/07/21  1355   CHOL 117   HDL 30*   LDL 52   TRIG 176*     Recent Labs   Lab Test 10/07/21  1355 02/29/20  0248 12/12/19  1535   LDL 52 29 24     Recent Labs   Lab Test 12/29/21  0656      POTASSIUM  4.6   CHLORIDE 108*   CO2 20*      BUN 44*   CR 1.78*   GFRESTIMATED 39*   LYNNE 9.5     Recent Labs   Lab Test 12/29/21  0656 12/22/21  0951 11/16/21  1004   CR 1.78* 1.88* 1.97*     Recent Labs   Lab Test 03/09/20  1155 09/20/19  0603 01/16/19  0514   A1C 7.8* 8.7* 8.6*          Recent Labs   Lab Test 12/29/21  1407 12/29/21  0656   WBC  --  8.7   HGB 10.4* 10.3*   HCT  --  31.2*   MCV  --  100   PLT  --  195     Recent Labs   Lab Test 12/29/21  1407 12/29/21  0656 11/12/21  1145   HGB 10.4* 10.3* 9.9*    Recent Labs   Lab Test 09/20/19  0603 09/20/19  0002 09/19/19  1742   TROPONINI 0.02 0.02 0.03     Recent Labs   Lab Test 11/01/21  0906 10/01/20  1323 09/02/20  1302 07/30/20  0729 05/14/20  0847 03/12/20  1037 03/05/20  0750 02/25/20  1345   BNP  --  591* 603*  --   --  1,838*  --   --    NTBNPI  --   --   --  3,182*  --   --   --  5,924*   NTBNP 6,357*  --   --   --  2,791*  --  7,195*  --      Recent Labs   Lab Test 03/09/20  1155   TSH 1.02     Recent Labs   Lab Test 03/09/20  1155 07/09/19  1033   INR 1.29* 1.30*           Current Inpatient Scheduled Medications   Scheduled Meds:    [START ON 12/30/2021] apixaban ANTICOAGULANT  5 mg Oral BID     [START ON 12/30/2021] apixaban ANTICOAGULANT  5 mg Oral BID     atorvastatin  40 mg Oral Daily     bumetanide  1 mg Oral BID     carvedilol  12.5 mg Oral BID w/meals     diphenhydrAMINE  25 mg Oral At Bedtime     febuxostat  40 mg Oral Daily     gabapentin  100 mg Oral TID     insulin glargine  30 Units Subcutaneous At Bedtime     metFORMIN  1,000 mg Oral BID w/meals     Multi Vitamin Daily   Oral Daily     sacubitril-valsartan  1 tablet Oral BID     spironolactone  12.5 mg Oral Daily     ticagrelor  90 mg Oral BID            Medications Prior to Admission   Prior to Admission medications    Medication Sig Start Date End Date Taking? Authorizing Provider   acetaminophen (TYLENOL) 500 MG tablet Take 1,000 mg by mouth 2 times daily as needed    Yes Reported,  Patient   atorvastatin (LIPITOR) 40 MG tablet Take 40 mg by mouth daily 6/24/19  Yes Reported, Patient   bumetanide (BUMEX) 1 MG tablet Take 1 tablet (1 mg) by mouth 2 times daily 11/18/21  Yes Tata Payton MD   carvedilol (COREG) 6.25 MG tablet Take 2 tablets (12.5 mg) by mouth 2 times daily (with meals) 10/29/21  Yes Linda Marmolejo NP   Coenzyme Q10 (COQ-10) 100 MG CAPS Take 200 mg by mouth daily  1/16/19  Yes Reported, Patient   colchicine (COLCYRS) 0.6 MG tablet TAKE 2 TABLETS BY MOUTH THEN 1 TABLET ONE HOUR LATER AS NEEDED FOR GOUT ATTACK AS DIRECTED 6/8/21  Yes Reported, Patient   diphenhydrAMINE (BENADRYL) 25 MG tablet Take 25 mg by mouth At Bedtime   Yes Reported, Patient   febuxostat (ULORIC) 40 MG TABS tablet Take 40 mg by mouth daily 6/2/21  Yes Reported, Patient   gabapentin (NEURONTIN) 100 MG capsule Take 100 mg by mouth 3 times daily   Yes Reported, Patient   glucosamine-chondroitin (GLUCOSAMINE CHONDR COMPLEX) 500-400 MG CAPS per capsule Take 1 tablet by mouth daily  2/27/12  Yes Reported, Patient   insulin glargine (BASAGLAR KWIKPEN) 100 UNIT/ML pen Inject 30 Units Subcutaneous At Bedtime   Yes Reported, Patient   metFORMIN (GLUCOPHAGE) 1000 MG tablet Take 1,000 mg by mouth 2 times daily (with meals) 3/18/13  Yes Reported, Patient   Multiple Vitamin (MULTI VITAMIN DAILY PO) Take 1 tablet by mouth daily    Yes Reported, Patient   Omega-3 Fatty Acids (FISH OIL) 500 MG CAPS Take 1,200 mg by mouth daily    Yes Reported, Patient   sacubitril-valsartan (ENTRESTO) 49-51 MG per tablet Take 1 tablet by mouth 2 times daily 3/1/20  Yes George Johnson MD   spironolactone (ALDACTONE) 25 MG tablet Take 0.5 tablets (12.5 mg) by mouth daily 7/19/21  Yes Alex Vigil MD   apixaban ANTICOAGULANT (ELIQUIS ANTICOAGULANT) 5 MG tablet Take 1 tablet (5 mg) by mouth 2 times daily 5/19/20   Tata Payton MD   diphenhydrAMINE-acetaminophen (TYLENOL PM)  MG tablet Take 1 tablet by mouth  nightly as needed for sleep    Reported, Patient   enoxaparin ANTICOAGULANT (LOVENOX ANTICOAGULANT) 80 MG/0.8ML syringe Inject 0.7 mLs (70 mg) Subcutaneous 2 times daily Take 12-27 in the evening;  12-28-21 in the morning and again in the evening, then stop. 12/2/21   Markus Pool MD   ticagrelor (BRILINTA) 90 MG tablet Take 1 tablet (90 mg) by mouth 2 times daily Please schedule lab draw to F/U on creatine; additional refills after 9/10/20   Tata Payton MD Brent E. White, MD [facility]  Non-Invasive Cardiologist  [facility]  Pager [phone]

## 2021-12-29 NOTE — ANESTHESIA CARE TRANSFER NOTE
Patient: Ken Doss    Procedure: Procedure(s):  VIDEO ASSISTED RIGHT THORACOSCOPY  EP Aborted Procedure       Diagnosis: ICD lead malfunction  Diagnosis Additional Information: No value filed.    Anesthesia Type:   General     Note:    Oropharynx: oropharynx clear of all foreign objects and spontaneously breathing  Level of Consciousness: awake  Oxygen Supplementation: nasal cannula  Level of Supplemental Oxygen (L/min / FiO2): 4  Independent Airway: airway patency satisfactory and stable  Dentition: dentition unchanged  Vital Signs Stable: post-procedure vital signs reviewed and stable  Report to RN Given: handoff report given  Patient transferred to: PACU    Handoff Report: Identifed the Patient, Identified the Reponsible Provider, Reviewed the pertinent medical history, Discussed the surgical course, Reviewed Intra-OP anesthesia mangement and issues during anesthesia, Set expectations for post-procedure period and Allowed opportunity for questions and acknowledgement of understanding      Vitals:  Vitals Value Taken Time   /57 12/29/21 1245   Temp 36.6  C (97.8  F) 12/29/21 1245   Pulse 70 12/29/21 1309   Resp 20 12/29/21 1309   SpO2 95 % 12/29/21 1309   Vitals shown include unvalidated device data.    Electronically Signed By: MIMI Piedra CRNA  December 29, 2021  1:11 PM

## 2021-12-29 NOTE — ANESTHESIA PROCEDURE NOTES
Airway       Patient location during procedure: OR       Procedure Start/Stop Times: 12/29/2021 9:07 AM  Staff -        Anesthesiologist:  Terrance Alexander MD       CRNA: Artie Abraham APRN CRNA       Performed By: CRNA  Consent for Airway        Urgency: elective  Indications and Patient Condition       Indications for airway management: gonzalo-procedural       Induction type:intravenous       Mask difficulty assessment: 2 - vent by mask + OA or adjuvant +/- NMBA    Final Airway Details       Final airway type: endotracheal airway       Successful airway: ETT - double lumen left  Endotracheal Airway Details        Cuffed: yes       Successful intubation technique: direct laryngoscopy       DL Blade Type: Braswell 3       Grade View of Cords: 1       Adjucts: stylet and tooth guard       Position: Center       Measured from: lips       ETT Double lumen (fr): 37    Post intubation assessment        Placement verified by: capnometry, equal breath sounds and chest rise        Number of attempts at approach: 1       Number of other approaches attempted: 0       Secured with: silk tape       Ease of procedure: easy       Dentition: Intact and Unchanged    Additional Comments       Position confirmed using a flexible brochoscope by Dr. ABHIJIT Alexander.

## 2021-12-29 NOTE — PLAN OF CARE
Pt admitted for lead extraction. Pt prepped and ready for procedure. Wife Carrol is at bedside.      Arianna Stallings RN

## 2021-12-29 NOTE — ANESTHESIA PROCEDURE NOTES
Arterial Line Procedure Note    Pre-Procedure   Staff -        Anesthesiologist:  Terrance Alexander MD       Performed By: anesthesiologist       Location: pre-op       Procedure Start/Stop Times: 12/29/2021 8:50 AM and 12/29/2021 8:55 AM       Pre-Anesthestic Checklist: patient identified, IV checked, risks and benefits discussed, informed consent, monitors and equipment checked, pre-op evaluation and at physician/surgeon's request  Timeout:       Correct Patient: Yes        Correct Procedure: Yes        Correct Site: Yes        Correct Position: Yes   Procedure   Procedure: arterial line       Laterality: left       Insertion Site: radial.  Sterile Prep        Standard elements of sterile barrier followed       Skin prep: Chloraprep  Insertion/Injection        Technique: Seldinger Technique and ultrasound guided        1. Ultrasound was used to evaluate the access site.       2. Artery evaluated via ultrasound for patency/adequacy.       3. Using real-time ultrasound the needle/catheter was observed entering the artery/vein.       4. Permanent image was captured and entered into the patient's record.       5. The visualized structures were anatomically normal.       6. There were no apparent abnormal pathologic findings.       Catheter Type/Size: 20 G, 12 cm  Narrative         Secured by: suture       Tegaderm and Biopatch dressing used.       Complications: None apparent,      Arterial waveform: Yes

## 2021-12-30 NOTE — CONSULTS
Brief CV Surgery Consult Note      CV Surgery following for chest tube management s/p unsuccessful level III lead extraction yesterday.     Patient seen and examined. Minimal chest tube output; nearly none since 2300. Incisions C/D/I. Reports feeling good; hopeful for discharge home today.       - Single chest tube removed at bedside this AM with no immediate complication.  - Patient aware to leave dressing on for 24 hours and can remove and shower around this time tomorrow. Can cover chest tube site with bandaids if needed for drainage.  - OK to discharge from our standpoint; no follow-up necessary with our team. We will sign off.  - Remainder of cares per primary team, appreciate.      _______  Marine Retana PA-C  Cardiothoracic Surgery  [phone]

## 2021-12-30 NOTE — PLAN OF CARE
Problem: Arrhythmia/Dysrhythmia (Cardiac Catheterization)  Goal: Stable Heart Rate and Rhythm  Outcome: Improving     Pt remains in V-Paced rhythm. Dried drainage to dressing over pacemaker. CT removed by CV surgery PA, site with pressure dressing. VSS, pt c/o mild pain to incision site. Pt discharged at 1445, all paperwork went over with pt.

## 2021-12-30 NOTE — PLAN OF CARE
Problem: Arrhythmia/Dysrhythmia (Cardiac Catheterization)  Goal: Stable Heart Rate and Rhythm  Outcome: Improving     Problem: Bleeding (Cardiac Catheterization)  Goal: Absence of Bleeding  Outcome: Improving     Problem: Pain (Cardiac Catheterization)  Goal: Acceptable Pain Control  Outcome: Improving  Intervention: Prevent or Manage Pain  Recent Flowsheet Documentation  Taken 12/30/2021 0138 by Kira Black RN  Pain Management Interventions: medication (see MAR)  Taken 12/30/2021 0048 by Kira Blakc RN  Pain Management Interventions:   medication (see MAR)   distraction   emotional support    Pt restless and upset at beginning of shift due to inability to void. Bladder scan shows ~130ml. PO intake encouraged. Pt able to void correction through shift, though voiding is painful after mae removal. Chest tube intact-to suction. Incisions CDI without signs of hematoma. CMS intact. Paced rhythm on tele. Falls precautions in place.

## 2021-12-30 NOTE — DISCHARGE SUMMARY
[facility] MEDICINE  DISCHARGE SUMMARY     Primary Care Physician: Lewis Reeves  Admission Date: 12/29/2021   Discharge Provider: Bertin Foreman MD Discharge Date: 12/30/2021   Diet: 2 g sodium diet   Code Status: Full Code   Activity: As tolerated        Condition at Discharge: Stable discharge to home.     REASON FOR PRESENTATION(See Admission Note for Details)   Attempted removal and replacement of defective defibrillator lead    PRINCIPAL & ACTIVE DISCHARGE DIAGNOSES     Principal Problem:    Malfunction of implantable defibrillator ventricular (ICD) lead  Active Problems:    Chronic systolic heart failure (H)    Implantable cardioverter-defibrillator (ICD) in situ    Persistent atrial fibrillation (H)    Non-ischemic cardiomyopathy (H)      SIGNIFICANT FINDINGS (Imaging, labs):   EXAM: XR CHEST 2 VW  LOCATION: [facility]  DATE/TIME: 12/30/2021 10:15 AM     INDICATION: Post op  COMPARISON: 12/29/2021.                                                                      IMPRESSION: Pacemaker with leads over the RA, RV, and coronary sinus. Right chest tube is been removed. Very tiny sliver of a right apical pneumothorax. Some mild interstitial prominence remains most suggestive of some mild edema.    PENDING LABS     none    PROCEDURES ( this hospitalization only)      Procedure(s):  VIDEO ASSISTED RIGHT THORACOSCOPY  EP Aborted Procedure    RECOMMENDATIONS TO OUTPATIENT PROVIDER FOR F/U VISIT     Will continue to follow-up with cardiology/electrophysiology to determine other options to address defective defibrillator lead. Patient to wear a LifeVest in the interim.    DISPOSITION     Home    SUMMARY OF HOSPITAL COURSE:      Mr. Ken Doss is a 76 year old male with a history of HFrEF LVEF 15% due to NIDCM (out of proportion to CAD), CAD s/p atherectomy and REGLA to the prox RCA and mid LAD on 1/15/2019 with subsequent development of  in-stent restenosis and repeat REGLA to the prox RCA and mid LAD on 2/27/2020, s/p Medtronic CRT-D placement 1/23/2013 with RV lead extraction and replacement on 11/26/2014 and generator replacement on 9/17/2020, permanent AF s/p AV marianna ablation on 9/27/2019, ILD of unclear etiology admitted for attempted removal and replacement of his RV defibrillator lead.    Attempted extraction was unsuccessful. He had a right chest tube placed which was later removed without complication. Follow-up CXR showed no pneumothorax or other acute pathology. He was fitted with a LifeVest prior to discharge with plan for a possible open chest hybrid procedure in the near future.    Discharge Medications with Med changes:        Review of your medicines      CONTINUE these medicines which have NOT CHANGED      Dose / Directions   acetaminophen 500 MG tablet  Commonly known as: TYLENOL      Dose: 1,000 mg  Take 1,000 mg by mouth 2 times daily as needed  Refills: 0     apixaban ANTICOAGULANT 5 MG tablet  Commonly known as: ELIQUIS ANTICOAGULANT  Used for: Chronic systolic heart failure (H), Coronary artery disease involving native coronary artery of native heart without angina pectoris      Dose: 5 mg  Take 1 tablet (5 mg) by mouth 2 times daily  Quantity: 180 tablet  Refills: 1     atorvastatin 40 MG tablet  Commonly known as: LIPITOR      Dose: 40 mg  Take 40 mg by mouth daily  Refills: 0     bumetanide 1 MG tablet  Commonly known as: BUMEX  Used for: Chronic systolic heart failure (H)      Dose: 1 mg  Take 1 tablet (1 mg) by mouth 2 times daily  Quantity: 180 tablet  Refills: 1     carvedilol 6.25 MG tablet  Commonly known as: COREG  Used for: Primary cardiomyopathy (H)      Dose: 12.5 mg  Take 2 tablets (12.5 mg) by mouth 2 times daily (with meals)  Quantity: 360 tablet  Refills: 1     colchicine 0.6 MG tablet  Commonly known as: COLCYRS      TAKE 2 TABLETS BY MOUTH THEN 1 TABLET ONE HOUR LATER AS NEEDED FOR GOUT ATTACK AS  DIRECTED  Refills: 0     CoQ-10 100 MG Caps      Dose: 200 mg  Take 200 mg by mouth daily  Refills: 0     diphenhydrAMINE 25 MG tablet  Commonly known as: BENADRYL      Dose: 25 mg  Take 25 mg by mouth At Bedtime  Refills: 0     diphenhydrAMINE-acetaminophen  MG tablet  Commonly known as: TYLENOL PM      Dose: 1 tablet  Take 1 tablet by mouth nightly as needed for sleep  Refills: 0     febuxostat 40 MG Tabs tablet  Commonly known as: ULORIC      Dose: 40 mg  Take 40 mg by mouth daily  Refills: 0     fish oil-omega-3 fatty acids 500 MG capsule      Dose: 1,200 mg  Take 1,200 mg by mouth daily  Refills: 0     gabapentin 100 MG capsule  Commonly known as: NEURONTIN      Dose: 100 mg  Take 100 mg by mouth 3 times daily  Refills: 0     Glucosamine Chondr Complex 500-400 MG Caps per capsule  Generic drug: glucosamine-chondroitin      Dose: 1 tablet  Take 1 tablet by mouth daily  Refills: 0     insulin glargine 100 UNIT/ML pen      Dose: 30 Units  Inject 30 Units Subcutaneous At Bedtime  Refills: 0     metFORMIN 1000 MG tablet  Commonly known as: GLUCOPHAGE      Dose: 1,000 mg  Take 1,000 mg by mouth 2 times daily (with meals)  Refills: 0     MULTI VITAMIN DAILY PO      Dose: 1 tablet  Take 1 tablet by mouth daily  Refills: 0     sacubitril-valsartan 49-51 MG per tablet  Commonly known as: ENTRESTO  Used for: Acute on chronic systolic heart failure (H)      Dose: 1 tablet  Take 1 tablet by mouth 2 times daily  Quantity: 60 tablet  Refills: 0     spironolactone 25 MG tablet  Commonly known as: ALDACTONE  Used for: CAD (coronary artery disease)      Dose: 12.5 mg  Take 0.5 tablets (12.5 mg) by mouth daily  Quantity: 45 tablet  Refills: 1     ticagrelor 90 MG tablet  Commonly known as: Brilinta  Used for: S/P angioplasty with stent, Coronary artery disease involving native coronary artery of native heart without angina pectoris      Dose: 90 mg  Take 1 tablet (90 mg) by mouth 2 times daily Please schedule lab draw to  "F/U on creatine; additional refills after  Quantity: 180 tablet  Refills: 0        STOP taking    enoxaparin ANTICOAGULANT 80 MG/0.8ML syringe  Commonly known as: LOVENOX ANTICOAGULANT                   Rationale for medication changes:      N/A        Consults   Cardiothoracic surgery  Cardiology      Immunizations given this encounter     none       Anticoagulation Information      Apixaban 5 mg twice daily.      Discharge Orders     Discharge Procedure Orders   Activity   Order Comments: Your activity upon discharge: activity as tolerated     Order Specific Question Answer Comments   Is discharge order? Yes      Reason for your hospital stay   Order Comments: Attempted removal and replacement of your defibrillator lead.     Follow-up and recommended labs and tests    Order Comments: Follow-up with the cardiology clinic as previously planned.     Diet   Order Comments: Follow this diet upon discharge: Orders Placed This Encounter      2 Gram Sodium Diet     Order Specific Question Answer Comments   Is discharge order? Yes      Examination     /54 (BP Location: Right arm)   Pulse 75   Temp 97.6  F (36.4  C) (Oral)   Resp 16   Ht 1.753 m (5' 9\")   Wt 75.5 kg (166 lb 6.4 oz)   SpO2 99%   BMI 24.57 kg/m              Wt Readings from Last 3 Encounters:   12/30/21 75.5 kg (166 lb 6.4 oz)   11/09/21 73.5 kg (162 lb)   11/01/21 73.5 kg (162 lb)      Intake/Output Summary (Last 24 hours) at 12/30/2021 1046  Last data filed at 12/30/2021 1000      Gross per 24 hour   Intake 2510 ml   Output 1205 ml   Net 1305 ml         General: pleasant male. No acute distress.   HENT: external ears normal. Nares patent. Mucous membranes moist.  Eyes: perrla, extraocular muscles intact. No scleral icterus.   Neck: No JVD  Lungs: inspiratory crackles right lung base, otherwise lungs clear  COR:  regular rate and rhythm, No murmurs, rubs, or gallops  Abd: nondistended, BS present  Extrem: No edema    Please see EMR for more " detailed significant labs, imaging, consultant notes etc.  Total time spent on discharge: 30 minutes    Bertin Foreman MD [facility]  Non-Invasive Cardiologist  [facility]  Pager [phone]

## 2021-12-30 NOTE — PLAN OF CARE
Problem: Adult Inpatient Plan of Care  Goal: Plan of Care Review  Outcome: Improving     Problem: Arrhythmia/Dysrhythmia (Cardiac Catheterization)  Goal: Stable Heart Rate and Rhythm  Outcome: Improving  Intervention: Monitor and Manage Cardiac Rhythm Effect  Recent Flowsheet Documentation  Taken 12/29/2021 1645 by Sarah Alvarez RN  Dysrhythmia Management: defibrillation assistance provided     Problem: Bleeding (Cardiac Catheterization)  Goal: Absence of Bleeding  Outcome: Improving     Problem: Embolism (Cardiac Catheterization)  Goal: Absence of Embolism Signs and Symptoms  Outcome: Improving     Problem: Pain (Cardiac Catheterization)  Goal: Acceptable Pain Control  Outcome: Improving     Problem: Adult Inpatient Plan of Care  Goal: Absence of Hospital-Acquired Illness or Injury  Intervention: Identify and Manage Fall Risk  Recent Flowsheet Documentation  Taken 12/29/2021 2155 by Sarah Alvarez RN  Safety Promotion/Fall Prevention:    activity supervised    bed alarm on    nonskid shoes/slippers when out of bed    room door open  Taken 12/29/2021 1645 by Sarah Alvarez RN  Safety Promotion/Fall Prevention:    activity supervised    bed alarm on    nonskid shoes/slippers when out of bed    room door open  Intervention: Prevent Infection  Recent Flowsheet Documentation  Taken 12/29/2021 2155 by Sarah Alvarez RN  Infection Prevention: hand hygiene promoted  Taken 12/29/2021 1645 by Sarah Alvarez RN  Infection Prevention: hand hygiene promoted  Goal: Readiness for Transition of Care  Intervention: Mutually Develop Transition Plan  Recent Flowsheet Documentation  Taken 12/29/2021 1946 by Sarah Alvarez RN  Equipment Currently Used at Home: none     Problem: Ongoing Anesthesia/Sedation Effects (Cardiac Catheterization)  Goal: Anesthesia/Sedation Recovery  Intervention: Optimize Anesthesia Recovery  Recent Flowsheet Documentation  Taken 12/29/2021 2155 by Sarah Alvarez  RN  Safety Promotion/Fall Prevention:    activity supervised    bed alarm on    nonskid shoes/slippers when out of bed    room door open  Taken 12/29/2021 1645 by Sarah Alvarez RN  Safety Promotion/Fall Prevention:    activity supervised    bed alarm on    nonskid shoes/slippers when out of bed    room door open  Reorientation Measures: clock in view     Problem: Vascular Access Protection (Cardiac Catheterization)  Goal: Absence of Vascular Access Complication  Intervention: Prevent Access Site Complications  Recent Flowsheet Documentation  Taken 12/29/2021 2155 by Sarah Alvarez RN  Activity Management:    activity adjusted per tolerance    bedrest  Taken 12/29/2021 1645 by Sarah Alvarez RN  Bleeding Precautions: blood pressure closely monitored  Activity Management:    activity adjusted per tolerance    bedrest     Problem: Risk for Delirium  Goal: Optimal Coping  Intervention: Optimize Psychosocial Adjustment to Delirium  Recent Flowsheet Documentation  Taken 12/29/2021 1645 by Sarah Alvarez RN  Supportive Measures: active listening utilized  Goal: Improved Behavioral Control  Intervention: Prevent and Manage Agitation  Recent Flowsheet Documentation  Taken 12/29/2021 1645 by Sarah Alvarez RN  Environment Familiarity/Consistency: daily routine followed  Goal: Improved Attention and Thought Clarity  Intervention: Maximize Cognitive Function  Recent Flowsheet Documentation  Taken 12/29/2021 1645 by Sarah Alvarez RN  Sensory Stimulation Regulation: care clustered  Reorientation Measures: clock in view   Patient is alert and able to let his needs be known. PRN tylenol was given for soreness to chest incision site/chest tube, this was effective for him. Femoral site in intact, no hematoma, no bleeding noted. Stopcock was removed around 1730 by [facility] nurse. Chest tube dressing was reinforced at this time as well by [facility] nurse as some bleeding noted at site. Output for chest tube:  130ml this shift.  Kamara catheter was also removed at this time per orders. No urine output since removal, patients drinking fluids. Bladder scanned x2, 51ml and 164 the second time. Abdomen does not appear distended but patient report some pressure. Pacemaker site Left chest pressure dressing clean, dry intact. VSS and is Vpaced on the monitor. Will continue to monitor.

## 2021-12-30 NOTE — DISCHARGE INSTRUCTIONS
Electrophysiology  Discharge Instructions for Pacemakers    1.  For four weeks, with your pacemaker arm,  Do not:    Raise your arm above the height of your shoulder    Perform any vigorous arm movements    Swim, golf, wash windows, shovel snow or vacuum    Lift greater than 10-15 pounds    2. Check the implant site daily for the first week.  Contact the [facility] [phone] should you experience any of the following:    Swelling    Redness    Drainage    Fever greater than  100.5 lasting more than 24 hours    3. You may shower in 3 days.  If you have steri strips over your incision, leave them on.  They are glued on and will be removed at your follow up appointment.      4. It is not necessary to cover your incision with a dressing.  Do not put any lotions or creams over the incision site    5. To reduce the risk of infection, avoid dental procedures for the first 6 weeks.  Contact your cardiology clinic for an antibiotic should you need to see the dentist in the first 6 weeks post pacemaker implant    6. You may travel by any mode of transportation; just show your pacemaker identification card.  Follow the recommendation by [facility] staff if you are traveling by air    7.  For any future surgery or colonoscopy let your doctor know you have a pacemaker    8. Most household appliances including a microwave, telephone, cell phone will not interfere with your pacemaker function.  If you suspect interference, simply move away from the source.  Do not carry a cell phone in the shirt pocket directly over your pacemaker     9. Please refer to your pacemaker booklet from the  or their web site under the section on electromagnetic interference (NATALIE) for further guidelines on things that may interfere with your pacemaker  10. No driving for 3 days    11. If the pacemaker site is uncomfortable you may place an ice pack (with a small dish cloth between skin and ice pack) over the site;  alternate on 20 minutes - off 20 minutes      Your Procedural Physician was: Dr. Markus Pool   To reach the Electrophysiology Registered Nurses working with Dr Pool please call [phone]   To reach the Device Registered Nurses regarding questions about your device incision or device function please call [phone] Option #3      [facility]:  [phone]  If you are calling after hours, please listen to the entire voice mail, a live  will answer at the end of the message.

## 2021-12-30 NOTE — PLAN OF CARE
Problem: Adult Inpatient Plan of Care  Goal: Readiness for Transition of Care  Outcome: Improving   Right groin sutures removed per orders at 1700 this evening.  Patient was noted at this time to have a frequent dry, non-productive cough.  A small pressure dressing was applied to the right groin once sutures removed for protection against increased pressure during his coughing spells.

## 2021-12-30 NOTE — PROGRESS NOTES
Impression and Plan     Impression:   1. Cardiac device malfunction with abnormal right ventricular high-voltage lead behavior status post unsuccessful attempt at lead extraction and replacement on 12/29/2021.  2. Chronic congestive heart failure with reduced left ventricular ejection fraction of 15%, due to nonischemic cardiomyopathy (out of proportion to coronary artery disease). NYHA class II-III, may be a bit fluid-up   3. Coronary artery disease status post atherectomy and drug-eluting stenting to the proximal right coronary artery and left anterior descending artery on 1/15/2019 with subsequent development of in-stent restenoses and repeat drug-eluting stenting to the proximal right coronary artery and left anterior descending artery on 2/27/2020. Denies angina.  4. Status post [employer] biventricular permanent pacemaker and implantable cardiac defibrillator placement on 1/23/2013 with right ventricular lead extraction and replacement on 11/26/2014 and generator replacement on 9/17/2020. Right ventricular lead malfunction as discussed above. Last noted to be 91.7% biventricular paced.  5. Permanent atrial fibrillation status post atrioventricular marianna ablation on 9/27/2019. [id number] score is at least 6.  6. Moderate pulmonary hypertension, due to a combination of WHO group II (left heart disease) and III (lung disease) etiologies.  7. Benign essential hypertension.  8. Hyperlipidemia.  9. Insulin-dependent diabetes mellitus type 2. Last HbA1c 7.8%.  10. Interstitial lung disease of undetermined etiology.  11. Chronic kidney disease stage III.    Plan:    From a cardiac perspective, he may discharge from the hospital once his LifeVest is placed    Offered a dose of intravenous diuresis prior to discharge, but he says he watches his weight closely at home and if he is still above his goal weight he will increase his oral diuretics    Continue home heart failure medication including carvedilol 12.5 mg  twice daily, sacubitril-valsartan 49-51 mg twice daily, and spironolactone 12.5 mg daily.    Resume apixaban 5 mg twice daily    It appears he has been kept on ticagrelor 90 mg twice daily for a prolonged course, as his last coronary stents were placed over 12 months ago    Possible open chest hybrid procedure for lead extraction is being considered for the near future    Primary Cardiologist: Dr. Tata Payton    Subjective     - breathing better with chest tube removed  - says his weight is a bit up this morning but otherwise feels well and wants to go home    Cardiac Diagnostics   Telemetry (personally reviewed): V-paced, no arrhythmias    ECG 12/29/2021 (personally reviewed and interpreted): V-paced with underlying atrial fibrillation    Device check 11/9/2021 (report reviewed):  Type: In clinic CRT-D check for lead testing related to recent shock with [employer] rep Toni Crowell  Presenting: AF  70bpm   Lead/Battery Status: Overall stable trends, further assessment of defib lead today by xray per Dr Pool. Manual thresholds not tested today.   Atrial Arrhythmias: Permanent AF, programmed VVIR, <5% v-rates >/=120bpm.   Vent Arrhythmias: Since 9/9/21, 3 NSVT. 1 VF with therapy on 11/7/21, previously reviewed as a remote alert. EGM shows VT/VF in the VF zone, ~300bpm, terminated by a 27.4J shock, full 35J not delivered. There was also an alert for impedance <20 Ohms at the time of shock.   Anticoagulant: Eliquis.   Comments: Normal device function. 91.7% BiVP. Overall lead trends are stable, impedances normal when measured in clinic, no evidence of lead fracture, pocket manipulation & isometrics done with no noise observed. [employer] rep suggests insulation breach could be related to partial charge delivery.  Patient was sent for chest xray & followed up with Dr Pool after in clinic. Per Dr Pool, FVT therapies adjusted so first therapy will be 40J vs burst & lower voltage therapies. Of note patient is going  to be fit for a LifeVest and will have a lead study done per Dr Pool. Advised to contact clinic if he hears a return in device tones     Most recent:  Echocardiogram 11/22/2021 (results reviewed):   The left ventricle is severely dilated.  The visual ejection fraction is 15-20%.  There is severe global hypokinesia of the left ventricle.  Normal right ventricle size and systolic function.  There is a catheter/pacemaker lead seen in the right ventricle.  The left atrium is moderate to severely dilated.  The right atrium is moderately dilated.  There is moderate (2+) mitral regurgitation.  There is moderate (2+) tricuspid regurgitation.  The right ventricular systolic pressure is approximated at 50mmHg plus the  right atrial pressure.  IVC diameter >2.1 cm collapsing <50% with sniff suggests a high RA pressure  estimated at 15 mmHg or greater.     When compoared to previous study on 3-9-2020, pulmonary artery pressure is  mildly higher. There is no other significant change.     Right heart cath 7/30/2020 (report reviewed):  RA 10  RV 45/5/10  PA 45/23/30  PCWP 21    Lorie CO/CI 2.8/1.5  TD CO/CI 3.0/1.6    PVR- 2.[address] STRE[address] Right sided filling pressures are mildly elevated.Left sided filling pressures are severely elevated. Mild elevated Pulmonary Hypertension.Reduced cardiac output level.     Cardiac Cath 2/27/2020 (results reviewed):     Right sided filling pressures are moderately elevated.    Moderately elevated pulmonary artery hypertension.    Left sided filling pressures are moderately elevated.    Reduced cardiac output level.    Hemodynamic data has been modified in Epic per physician review.     Two vessel CAD with in stent restenosis of the ostial RCA stent and the mid LAD stent.  IVUS performed to size the mid LAD vessel.  PCI of the mid LAD and ostial RCA with two drug eluting stents.     Pulmonary function tests 10/7/2020 (report reviewed):  FEV1/FVC is 89 and is normal.  FEV1 is 96% predicted and is  "normal.  FVC is 81% predicted and is normal.  DLCO is 56% predicted and is reduced when it   is corrected for hemoglobin.     Impression:  Full Pulmonary Function Test shows preserved airflow but a moderate reduction in diffusion capacity.  This is non specific but can be seen in pulmonary edema or pulmonary embolism.    Physical Examination       /54 (BP Location: Right arm)   Pulse 75   Temp 97.6  F (36.4  C) (Oral)   Resp 16   Ht 1.753 m (5' 9\")   Wt 75.5 kg (166 lb 6.4 oz)   SpO2 99%   BMI 24.57 kg/m          Wt Readings from Last 3 Encounters:   12/30/21 75.5 kg (166 lb 6.4 oz)   11/09/21 73.5 kg (162 lb)   11/01/21 73.5 kg (162 lb)     Intake/Output Summary (Last 24 hours) at 12/30/2021 1046  Last data filed at 12/30/2021 1000  Gross per 24 hour   Intake 2510 ml   Output 1205 ml   Net 1305 ml       General: pleasant male. No acute distress.   HENT: external ears normal. Nares patent. Mucous membranes moist.  Eyes: perrla, extraocular muscles intact. No scleral icterus.   Neck: No JVD  Lungs: inspiratory crackles right lung base, otherwise lungs clear  COR:  regular rate and rhythm, No murmurs, rubs, or gallops  Abd: nondistended, BS present  Extrem: No edema         Imaging      CXR 12/30/2021 (report reviewed):  EXAM: XR CHEST 2 VW  LOCATION: [facility]  DATE/TIME: 12/30/2021 10:15 AM     INDICATION: Post op  COMPARISON: 12/29/2021.                                                                      IMPRESSION: Pacemaker with leads over the RA, RV, and coronary sinus. Right chest tube is been removed. Very tiny sliver of a right apical pneumothorax. Some mild interstitial prominence remains most suggestive of some mild edema.    Lab Results   Lab Results   Component Value Date     12/30/2021     07/30/2020    CO2 17 12/30/2021    CO2 23 07/30/2020    BUN 44 12/30/2021    BUN 40 07/30/2020     Lab Results   Component Value Date    WBC 8.9 12/30/2021    WBC " 10.7 07/30/2020    HGB 8.7 12/30/2021    HGB 12.3 07/30/2020    HCT 26.8 12/30/2021    HCT 37.0 07/30/2020     12/30/2021    MCV 99 07/30/2020     12/30/2021     07/30/2020     Lab Results   Component Value Date    CHOL 117 10/07/2021    CHOL 77 02/29/2020    TRIG 176 10/07/2021    TRIG 79 02/29/2020    HDL 30 10/07/2021    HDL 32 02/29/2020     Lab Results   Component Value Date    INR 1.29 03/09/2020     Lab Results   Component Value Date     10/01/2020     Lab Results   Component Value Date    TROPONINI 0.02 09/20/2019    TROPONINI 0.02 09/20/2019    TROPONINI 0.03 09/19/2019     Lab Results   Component Value Date    TSH 1.02 03/09/2020           Current Inpatient Scheduled Medications   Scheduled Meds:    apixaban ANTICOAGULANT  5 mg Oral BID     atorvastatin  40 mg Oral Daily     bumetanide  1 mg Oral BID     carvedilol  12.5 mg Oral BID w/meals     diphenhydrAMINE  25 mg Oral At Bedtime     febuxostat  40 mg Oral Daily     gabapentin  100 mg Oral TID     insulin aspart  1-3 Units Subcutaneous TID AC     insulin aspart  1-3 Units Subcutaneous At Bedtime     insulin glargine  30 Units Subcutaneous At Bedtime     metFORMIN  1,000 mg Oral BID w/meals     multivitamin, therapeutic   Oral Daily     sacubitril-valsartan  1 tablet Oral BID     spironolactone  12.5 mg Oral Daily     ticagrelor  90 mg Oral BID            Medications Prior to Admission   Prior to Admission medications    Medication Sig Start Date End Date Taking? Authorizing Provider   acetaminophen (TYLENOL) 500 MG tablet Take 1,000 mg by mouth 2 times daily as needed    Yes Reported, Patient   apixaban ANTICOAGULANT (ELIQUIS ANTICOAGULANT) 5 MG tablet Take 1 tablet (5 mg) by mouth 2 times daily 5/19/20  Yes Tata Payton MD   atorvastatin (LIPITOR) 40 MG tablet Take 40 mg by mouth daily 6/24/19  Yes Reported, Patient   bumetanide (BUMEX) 1 MG tablet Take 1 tablet (1 mg) by mouth 2 times daily 11/18/21  Yes Tata  MD Tata   carvedilol (COREG) 6.25 MG tablet Take 2 tablets (12.5 mg) by mouth 2 times daily (with meals) 10/29/21  Yes Linda Marmolejo NP   Coenzyme Q10 (COQ-10) 100 MG CAPS Take 200 mg by mouth daily  1/16/19  Yes Reported, Patient   colchicine (COLCYRS) 0.6 MG tablet TAKE 2 TABLETS BY MOUTH THEN 1 TABLET ONE HOUR LATER AS NEEDED FOR GOUT ATTACK AS DIRECTED 6/8/21  Yes Reported, Patient   diphenhydrAMINE (BENADRYL) 25 MG tablet Take 25 mg by mouth At Bedtime   Yes Reported, Patient   diphenhydrAMINE-acetaminophen (TYLENOL PM)  MG tablet Take 1 tablet by mouth nightly as needed for sleep   Yes Reported, Patient   enoxaparin ANTICOAGULANT (LOVENOX ANTICOAGULANT) 80 MG/0.8ML syringe Inject 0.7 mLs (70 mg) Subcutaneous 2 times daily Take 12-27 in the evening;  12-28-21 in the morning and again in the evening, then stop. 12/2/21  Yes Markus Pool MD   febuxostat (ULORIC) 40 MG TABS tablet Take 40 mg by mouth daily 6/2/21  Yes Reported, Patient   gabapentin (NEURONTIN) 100 MG capsule Take 100 mg by mouth 3 times daily   Yes Reported, Patient   glucosamine-chondroitin (GLUCOSAMINE CHONDR COMPLEX) 500-400 MG CAPS per capsule Take 1 tablet by mouth daily  2/27/12  Yes Reported, Patient   insulin glargine (BASAGLAR KWIKPEN) 100 UNIT/ML pen Inject 30 Units Subcutaneous At Bedtime   Yes Reported, Patient   metFORMIN (GLUCOPHAGE) 1000 MG tablet Take 1,000 mg by mouth 2 times daily (with meals) 3/18/13  Yes Reported, Patient   Multiple Vitamin (MULTI VITAMIN DAILY PO) Take 1 tablet by mouth daily    Yes Reported, Patient   Omega-3 Fatty Acids (FISH OIL) 500 MG CAPS Take 1,200 mg by mouth daily    Yes Reported, Patient   sacubitril-valsartan (ENTRESTO) 49-51 MG per tablet Take 1 tablet by mouth 2 times daily 3/1/20  Yes George Johnson MD   spironolactone (ALDACTONE) 25 MG tablet Take 0.5 tablets (12.5 mg) by mouth daily 7/19/21  Yes Alex Vigil MD   ticagrelor (BRILINTA) 90 MG tablet Take 1 tablet (90  mg) by mouth 2 times daily Please schedule lab draw to F/U on creatine; additional refills after 9/10/20  Yes Tata Payton MD Brent E. White, MD [facility]  Non-Invasive Cardiologist  [facility]  Pager [phone]

## 2021-12-30 NOTE — PROGRESS NOTES
Progress Note    Date of admission: 12/29/2021    Assessment/Plan  76-year-old male with a history of heart failure with LVEF 15% due to non ischemic cardiomyopathy,  coronary artery disease, status post CRT-D placement with RV lead extraction and replacement on November 26, 2014 and generator replacement in September 2020, permanent atrial fibrillation status post AV marianna ablation on September 27 and ILD of unclear etiology admitted after unsuccessful attempt at RV lead extraction and replacement earlier.  Left-sided chest tube was placed and he was admitted to the hospital.  [facility] [place].       Insulin-dependent diabetes type 2,   - recent A1c : 7.5  -  Home Metformin was resumed,   - GFR is 34.   - Will reduce dose of metformin to 1000 mg once daily  - increase lantus to 35 units daily     Acute hypoxic respiratory failure:   Chest tube placed yesterday now removed .   Breathing room air today. .      Cardiac device malfunction status post unsuccessful attempt at lead extraction and replacement on December 29, 2021 with placement of left-sided chest tube: Management per CTS. Awaiting lifevest before discharge       CHF with reduced left ventricular ejection fraction of 15% due to nonischemic cardiomyopathy NYHA class II-III, appears euvolemic.  Cardiology following.      Coronary artery disease: No evidence of current angina.  History of drug eluting stent with subsequent development of in-stent restenosis and repeat stenting in February 2020.       Essential hypertension: Cardiac meds resumed, per cardiology.       Permanent atrial fibrillation: Home meds resumed by cardiology, s/p AV marianna ablation       Chronic kidney disease stage III:   Creatinine today is 1.98.  Recent baseline over the past couple months has ranged from 1.73-1.97.  Will trend daily.    DVT PPX : on eliquis      Subjective  Patient stated feeling better.  Clean dressing on anterior chest wall.  No distress noted.   Management plan about diabetes was explained to the patient and he has expressed understanding    Objective  Vital signs in last 24 hours  Temp:  [97.6  F (36.4  C)-98.5  F (36.9  C)] 97.6  F (36.4  C)  Pulse:  [62-75] 75  Resp:  [16-25] 16  BP: ()/(50-68) 110/54  MAP:  [73 mmHg-285 mmHg] 73 mmHg  Arterial Line BP: (113-285)/() 117/42  SpO2:  [88 %-99 %] 99 %    Input and Output in 24 hrs     Intake/Output Summary (Last 24 hours) at 12/30/2021 1107  Last data filed at 12/30/2021 1000  Gross per 24 hour   Intake 2510 ml   Output 1205 ml   Net 1305 ml       Physical Exam:  GEN: Alert and oriented. Not in acute distress  HEENT: Atraumatic    Pupils- round and reactive to light bilaterally   Neck- supple, no JVP elevation, no lymphadenopathy or thyromegaly   Sclera- anicteric   Mucous membrane- moist and pink  CHEST: Clear to auscultation bilaterally  HEART: S1S2 regular. No murmurs, rubs or gallops  ABDOMEN: Soft. Non-tender, non-distended. No organomegaly. No guarding or rigidity. Bowel sounds- active  Extremities: No pedal oedema  CNS: No focal neurological deficit. No involuntary movements  SKIN: No skin rash, no cyanosis or clubbing      Pertinent Labs   Lab Results: Personally Reviewed    Recent Results (from the past 24 hour(s))   Lactic acid whole blood    Collection Time: 12/29/21  1:17 PM   Result Value Ref Range    Lactic Acid 0.9 0.7 - 2.0 mmol/L   Hemoglobin    Collection Time: 12/29/21  2:07 PM   Result Value Ref Range    Hemoglobin 10.4 (L) 13.3 - 17.7 g/dL   Extra Green Top (Lithium Heparin) Tube    Collection Time: 12/29/21  2:07 PM   Result Value Ref Range    Hold Specimen [facility]    Basic metabolic panel    Collection Time: 12/29/21  2:07 PM   Result Value Ref Range    Sodium 140 136 - 145 mmol/L    Potassium 5.0 3.5 - 5.0 mmol/L    Chloride 110 (H) 98 - 107 mmol/L    Carbon Dioxide (CO2) 18 (L) 22 - 31 mmol/L    Anion Gap 12 5 - 18 mmol/L    Urea Nitrogen 40 (H) 8 - 28 mg/dL    Creatinine  1.59 (H) 0.70 - 1.30 mg/dL    Calcium 8.4 (L) 8.5 - 10.5 mg/dL    Glucose 180 (H) 70 - 125 mg/dL    GFR Estimate 45 (L) >60 mL/min/1.73m2   ECG 12-lead with [place] - [LHE]    Collection Time: 12/29/21  3:01 PM   Result Value Ref Range    Systolic Blood Pressure  mmHg    Diastolic Blood Pressure  mmHg    Ventricular Rate 71 BPM    Atrial Rate 72 BPM    KS Interval  ms    QRS Duration 168 ms     ms    QTc 558 ms    P Axis  degrees    R AXIS 232 degrees    T Axis 62 degrees    Interpretation ECG       Ventricular-paced rhythm  Abnormal ECG  When compared with ECG of 28-FEB-2020 00:47,  Previous ECG has undetermined rhythm, needs review     Glucose by meter    Collection Time: 12/29/21  5:46 PM   Result Value Ref Range    GLUCOSE BY METER POCT 158 (H) 70 - 99 mg/dL   Hemoglobin A1c    Collection Time: 12/29/21  8:11 PM   Result Value Ref Range    Hemoglobin A1C 7.5 (H) <=5.6 %   Glucose by meter    Collection Time: 12/29/21  9:12 PM   Result Value Ref Range    GLUCOSE BY METER POCT 393 (H) 70 - 99 mg/dL   CBC with platelets    Collection Time: 12/30/21  4:54 AM   Result Value Ref Range    WBC Count 8.9 4.0 - 11.0 10e3/uL    RBC Count 2.60 (L) 4.40 - 5.90 10e6/uL    Hemoglobin 8.7 (L) 13.3 - 17.7 g/dL    Hematocrit 26.8 (L) 40.0 - 53.0 %     (H) 78 - 100 fL    MCH 33.5 (H) 26.5 - 33.0 pg    MCHC 32.5 31.5 - 36.5 g/dL    RDW 13.5 10.0 - 15.0 %    Platelet Count 129 (L) 150 - 450 10e3/uL   Comprehensive metabolic panel    Collection Time: 12/30/21  4:54 AM   Result Value Ref Range    Sodium 132 (L) 136 - 145 mmol/L    Potassium 4.7 3.5 - 5.0 mmol/L    Chloride 103 98 - 107 mmol/L    Carbon Dioxide (CO2) 17 (L) 22 - 31 mmol/L    Anion Gap 12 5 - 18 mmol/L    Urea Nitrogen 44 (H) 8 - 28 mg/dL    Creatinine 1.98 (H) 0.70 - 1.30 mg/dL    Calcium 7.9 (L) 8.5 - 10.5 mg/dL    Glucose 359 (H) 70 - 125 mg/dL    Alkaline Phosphatase 76 45 - 120 U/L    AST 25 0 - 40 U/L    ALT 23 0 - 45 U/L    Protein Total 6.3 6.0 - 8.0  g/dL    Albumin 3.2 (L) 3.5 - 5.0 g/dL    Bilirubin Total 1.3 (H) 0.0 - 1.0 mg/dL    GFR Estimate 34 (L) >60 mL/min/1.73m2   Glucose by meter    Collection Time: 12/30/21  7:35 AM   Result Value Ref Range    GLUCOSE BY METER POCT 249 (H) 70 - 99 mg/dL       Pertinent Radiology   Radiology Results reviewed      EKG Results reviewed       Advanced Care Planning      Jennifer Gibbons MD   [facility]

## 2021-12-30 NOTE — PROGRESS NOTES
Daily Progress Note    Assessment/Plan:  76-year-old male with a history of heart failure with LVEF 15% due to an ID centimeters, coronary artery disease, status post CRT-D placement with RV lead extraction and replacement on November 26, 2014 and generator replacement in September 2020, permanent atrial fibrillation status post AV marianna ablation on September 27 and ILD of unclear etiology admitted after unsuccessful attempt at RV lead extraction and replacement earlier today.  Left-sided chest tube was placed and he was admitted to the hospital.  [facility] consulted for medical management.    1.  Insulin-dependent diabetes type 2, no recent A1c on file.  Home Metformin was resumed, will monitor renal function test.  Diabetic order set initiated, recheck A1c.    2.  Acute hypoxic respiratory failure: Chest tube placed after attempting RV lead extraction.  Requiring 2 L of O2, wean as able.    3.  Cardiac device malfunction status post unsuccessful attempt at lead extraction and replacement on December 29, 2021 with placement of left-sided chest tube: Management per CTS.    4.  CHF with reduced left ventricular ejection fraction of 15% due to nonischemic cardiomyopathy NYHA class II-III, appears euvolemic.  Cardiology following.    5.  Coronary artery disease: No evidence of current angina.  History of drug eluting stent with subsequent development of in-stent restenosis and repeat stenting in February 2020.    6.  Essential hypertension: Cardiac meds resumed, per cardiology.    7.  Permanent atrial fibrillation: Home meds resumed by cardiology    8.  Chronic kidney disease stage III: Creatinine today is 1.59.  Recent baseline over the past couple months has ranged from 1.73-1.97.  Will trend daily.    Code status:Full Code        Barriers to Discharge: Chest tube, hypoxia, postprocedural recovery    Disposition: Per primary service    Subjective:  Skyler has no current complaints, he denies any significant chest pain.  He  has no lightheadedness or dizziness, he is alert and oriented and in no distress.  No fevers or chills, no abdominal pain, he has been drinking fluids with no difficulty.        Current Medications Reviewed via EHR List    Objective:  Vital signs in last 24 hours:  [unfilled]  .prog  Weight:   @THISENCWEIGHTS(1)@  Weight change:   Body mass index is 24.29 kg/m .    Intake/Output last 3 shifts:  I/O last 3 completed shifts:  In: 1800 [I.V.:1550; IV Piggyback:250]  Out: 225 [Urine:200; Blood:25]  Intake/Output this shift:  No intake/output data recorded.    Physical Exam:  General: No apparent distress  CV: Regular rate and rhythm  Lungs: Shallow breath sounds bilaterally  Abdomen: Soft, nontender, occasional bowel sounds        Imaging:  Personally Reviewed.  EP Study    Result Date: 2021  [facility] Cardiac Electrophysiology PROCEDURE NOTE: Aborted ICD Lead Extraction, ICE of Cardiac Leads and Pericardial Space Monitoring, and Fluoroscopic Assessment Of Chronic ICD Leads PROCEDURAL MD: Markus Pool MD Patient name: Ken Doss : 1945 Procedure date: 2021 PCP: Lewis Reeves MD Impression:   Successful venous access x3 with placement of a guidewire in the event the Bridge Balloon was required and a second 9 Croatian short sheath for large bore venous access in the event of urgent/emergent situation.    Utilization of ICE to assess leads and monitor the pericardial space during the course of the patient's procedure.    VATS examination of the patient's SVC and pericardial space.  The patient had limited fat pad on the anterior surface of the SVC only.  EP catheter manipulation to try and determine the exact binding site of the chronic leads during direct visualization of the SVC was unable to specifically localize the region of binding.    Extraction procedure aborted due to the high risk of severe SVC injury with a plan to discuss possible open chest hybrid procedure  with extraction of the failed high-voltage ICD lead.     ICD pocket was closed in 3 layers.    No evidence of acute complication   See Dr. Daily Anthony's note for detailed discussion of the patient's thorascopic procedure. Recommendations:   The patient will be admitted to the PACU and then cardiac telemetry unit to prolonged anesthesia, and complex procedure.   The patient's outpatient medications will be renewed, with reinitiation of OAC (Eliquis) along with Brilinta tomorrow morning.   Portable chest x-ray later today per CV surgery.   CV surgery to manage chest tubes   Discharge planning once the patient's chest tubes are removed and the patient is stable.   The patient will require placement of a Life Vest prior to discharge. Indication: ICD system with high-voltage ICD lead failure with high voltage cable malfunction. History: Ken Doss is a 76 year old male with a history of ICD implantation in 2012.  The patients ICD system and leads have demonstrated ICD shock for appropriate sustained VT but failure to deliver programmed energy due to likely lead fracture.  Lead extraction has been proposed in order to permit removal of chronic nonfunctioning ICD lead and insertion of new high-voltage ICD lead.  The risks benefits and expected outcomes have been explained in detail, and the patient agrees to proceed. Procedure initiation: The patient was taken to cardiac Cath Lab in the fasting nonsedated state.  Timeout was called and appropriateness of procedure was documented.  The patient received general anesthesia with endotracheal intubation. The patient then had the entire chest, bilateral shoulder areas and groins prepped and draped in the usual sterile fashion. The right femoral vein identified using ultrasound imaging.  The vessel appeared to be patent.  Ultrasound guidance was used to directly puncture the vein and a wire was passed without difficulty.  See Dr. Daily Anthony's note for details of  VATS procedure.. Instrumentation: Cardiothoracic surgery was Dr. Daily Anthony. The right femoral vein was punctured on 3 occasions with placement of a short 12 French introducer with guidewire to the right IJ and Bridge Balloon positioned at the sheath for insertion as needed.  A short 9 French sheath for large-bore venous access and a 11 French long sheath used to introduce the ICE catheter. A 5 cm skin incision was made over the previous ICD pocket.  The incision was carried down to the capsule which was opened and the device was removed.  The capsule appeared smooth white and glistening with no evidence of erythema or chronic infection.. The leads were freed back to the suture sleeves.  At that point the CV surgical team was summoned and the VATS procedure was undertaken.  Please see Dr. Daily Anthony's notes for details. Briefly the patient's pericardial space and SVC were visualized.  The anterior surface of the SVC had a good fat pad but the posterior two thirds of the SVC had no fat pad whatsoever.  Deflectable quadripolar catheter was inserted through the right femoral vein and advanced to the SVC.  The catheter was deflected in effort to define the margin of the leads as well as the position of the leads with respect to the fat pad coverage of the SVC, but this could not be clearly delineated. Due to the high risk for injury to the SVC the procedure was aborted with plans to discuss potential hybrid procedure with open chest to permit early intervention of any SVC injury. The pocket was placed back in the pocket after vigorous antibiotic-soaked sponge cleaning and pocket flush.  The pocket was closed in 3 layers.  The 2 deep layers were running 2-0 Vicryl and the skin was re-approximated with a running 4-0 Vicryl suture.  The skin was cleaned and sterile Steri-Strips placed as well as a sterile dressing.  The patient was transported to the PACU in stable condition. Device Implanted:  There was no change in  "the patient's chronically implanted leads and or pulse generator. Device explanted:             Lead extraction was aborted.      Anatomic and technical issues:   Anatomic variants: SVC anatomy is a defined above.   Technical issues: No other technical issues were encountered.     XR Chest Portable    Result Date: 12/29/2021  EXAM: XR CHEST PORT 1 VIEW LOCATION: [facility] DATE/TIME: 12/29/2021 6:37 PM INDICATION: Post VATS, chest tube in place. COMPARISON: Chest x-ray 11/09/2021.     IMPRESSION: New right chest tube. No pneumothorax. No effusions identified of significance. Stable left chest pacer. Increasing cardiomegaly. Increased bilateral vascular congestion that may be developing edema. Mild ill-defined opacities at the left lung base appears increased and could be atelectasis or developing airspace disease.    POC US Guided Vascular Access    Result Date: 12/29/2021  Ultrasound was performed as guidance to an anesthesia procedure.  Click \"PACS images\" hyperlink below to view any stored images.  For specific procedure details, view procedure note authored by anesthesia.      Lab Results:  Personally Reviewed.   Fingerstick Blood Glucose: @BHPLHFF63NOW(POCGLUFGR:10)@    Last Hbg A1C: No results found for: HGBA1C   Lab Results   Component Value Date    INR 1.29 (H) 03/09/2020    INR 1.30 (H) 07/09/2019     Recent Results (from the past 24 hour(s))   Prepare red blood cells (unit)    Collection Time: 12/29/21  6:30 AM   Result Value Ref Range    CROSSMATCH Compatible     UNIT ABO/RH O Pos     Unit Number [id number]     Unit Status Ready     Blood Component Type Red Blood Cells     Product Code M1405V95     CODING SYSTEM [id number]     UNIT TYPE ISBT 5100     ISSUE DATE AND TIME [id number]    Prepare red blood cells (unit)    Collection Time: 12/29/21  6:30 AM   Result Value Ref Range    CROSSMATCH Compatible     UNIT ABO/RH O Pos     Unit Number [id number]     Unit Status Ready     " Blood Component Type Red Blood Cells     Product Code R1794O44     CODING SYSTEM [id number]     UNIT TYPE ISBT 5100     ISSUE DATE AND TIME [id number]    Prepare red blood cells (unit)    Collection Time: 12/29/21  6:30 AM   Result Value Ref Range    CROSSMATCH Compatible     UNIT ABO/RH O Pos     Unit Number [id number]     Unit Status Ready     Blood Component Type Red Blood Cells     Product Code R3570N62     CODING SYSTEM [id number]     UNIT TYPE ISBT 5100     ISSUE DATE AND TIME [id number]    Prepare red blood cells (unit)    Collection Time: 12/29/21  6:30 AM   Result Value Ref Range    CROSSMATCH Compatible     UNIT ABO/RH O Pos     Unit Number [id number]     Unit Status Ready     Blood Component Type Red Blood Cells     Product Code H7913H03     CODING SYSTEM [id number]     UNIT TYPE ISBT 5100     ISSUE DATE AND TIME [id number]    Basic metabolic panel    Collection Time: 12/29/21  6:56 AM   Result Value Ref Range    Sodium 137 136 - 145 mmol/L    Potassium 4.6 3.5 - 5.0 mmol/L    Chloride 108 (H) 98 - 107 mmol/L    Carbon Dioxide (CO2) 20 (L) 22 - 31 mmol/L    Anion Gap 9 5 - 18 mmol/L    Urea Nitrogen 44 (H) 8 - 28 mg/dL    Creatinine 1.78 (H) 0.70 - 1.30 mg/dL    Calcium 9.5 8.5 - 10.5 mg/dL    Glucose 103 70 - 125 mg/dL    GFR Estimate 39 (L) >60 mL/min/1.73m2   CBC with platelets    Collection Time: 12/29/21  6:56 AM   Result Value Ref Range    WBC Count 8.7 4.0 - 11.0 10e3/uL    RBC Count 3.11 (L) 4.40 - 5.90 10e6/uL    Hemoglobin 10.3 (L) 13.3 - 17.7 g/dL    Hematocrit 31.2 (L) 40.0 - 53.0 %     78 - 100 fL    MCH 33.1 (H) 26.5 - 33.0 pg    MCHC 33.0 31.5 - 36.5 g/dL    RDW 13.4 10.0 - 15.0 %    Platelet Count 195 150 - 450 10e3/uL   Adult Type and Screen    Collection Time: 12/29/21  6:56 AM   Result Value Ref Range    ABO/RH(D) O POS     Antibody Screen Negative Negative    SPECIMEN EXPIRATION DATE [id number]    Lactic acid whole blood    Collection Time: 12/29/21  1:17 PM    Result Value Ref Range    Lactic Acid 0.9 0.7 - 2.0 mmol/L   Hemoglobin    Collection Time: 12/29/21  2:07 PM   Result Value Ref Range    Hemoglobin 10.4 (L) 13.3 - 17.7 g/dL   Extra Green Top (Lithium Heparin) Tube    Collection Time: 12/29/21  2:07 PM   Result Value Ref Range    Hold Specimen [facility]    Basic metabolic panel    Collection Time: 12/29/21  2:07 PM   Result Value Ref Range    Sodium 140 136 - 145 mmol/L    Potassium 5.0 3.5 - 5.0 mmol/L    Chloride 110 (H) 98 - 107 mmol/L    Carbon Dioxide (CO2) 18 (L) 22 - 31 mmol/L    Anion Gap 12 5 - 18 mmol/L    Urea Nitrogen 40 (H) 8 - 28 mg/dL    Creatinine 1.59 (H) 0.70 - 1.30 mg/dL    Calcium 8.4 (L) 8.5 - 10.5 mg/dL    Glucose 180 (H) 70 - 125 mg/dL    GFR Estimate 45 (L) >60 mL/min/1.73m2   ECG 12-lead with Hart - [LHE]    Collection Time: 12/29/21  3:01 PM   Result Value Ref Range    Systolic Blood Pressure  mmHg    Diastolic Blood Pressure  mmHg    Ventricular Rate 71 BPM    Atrial Rate 72 BPM    MD Interval  ms    QRS Duration 168 ms     ms    QTc 558 ms    P Axis  degrees    R AXIS 232 degrees    T Axis 62 degrees    Interpretation ECG       Ventricular-paced rhythm  Abnormal ECG  When compared with ECG of 28-FEB-2020 00:47,  Previous ECG has undetermined rhythm, needs review     Glucose by meter    Collection Time: 12/29/21  5:46 PM   Result Value Ref Range    GLUCOSE BY METER POCT 158 (H) 70 - 99 mg/dL           Advanced Care Planning    Time > 35 minutes with greater than 50% of time spent in counseling and coordination of care.    Jake López MD  Date: 12/29/2021  Time: 7:30 PM  [facility]

## 2022-01-01 ENCOUNTER — HOME INFUSION (PRE-WILLOW HOME INFUSION) (OUTPATIENT)
Dept: PHARMACY | Facility: CLINIC | Age: 77
End: 2022-01-01

## 2022-01-01 ENCOUNTER — LAB REQUISITION (OUTPATIENT)
Dept: LAB | Facility: CLINIC | Age: 77
End: 2022-01-01

## 2022-01-01 ENCOUNTER — TELEPHONE (OUTPATIENT)
Dept: CARDIOLOGY | Facility: CLINIC | Age: 77
End: 2022-01-01
Payer: COMMERCIAL

## 2022-01-01 ENCOUNTER — LAB (OUTPATIENT)
Dept: LAB | Facility: CLINIC | Age: 77
End: 2022-01-01
Payer: COMMERCIAL

## 2022-01-01 ENCOUNTER — OFFICE VISIT (OUTPATIENT)
Dept: CARDIOLOGY | Facility: CLINIC | Age: 77
End: 2022-01-01
Payer: COMMERCIAL

## 2022-01-01 ENCOUNTER — APPOINTMENT (OUTPATIENT)
Dept: RADIOLOGY | Facility: HOSPITAL | Age: 77
End: 2022-01-01
Attending: INTERNAL MEDICINE
Payer: COMMERCIAL

## 2022-01-01 ENCOUNTER — HOSPITAL ENCOUNTER (INPATIENT)
Facility: CLINIC | Age: 77
LOS: 6 days | Discharge: HOME HEALTH | DRG: 291 | End: 2022-07-14
Attending: EMERGENCY MEDICINE | Admitting: INTERNAL MEDICINE
Payer: COMMERCIAL

## 2022-01-01 ENCOUNTER — PATIENT OUTREACH (OUTPATIENT)
Dept: CARE COORDINATION | Facility: CLINIC | Age: 77
End: 2022-01-01
Payer: COMMERCIAL

## 2022-01-01 ENCOUNTER — ANESTHESIA EVENT (OUTPATIENT)
Dept: CARDIOLOGY | Facility: HOSPITAL | Age: 77
End: 2022-01-01
Payer: COMMERCIAL

## 2022-01-01 ENCOUNTER — ANCILLARY PROCEDURE (OUTPATIENT)
Dept: CARDIOLOGY | Facility: CLINIC | Age: 77
End: 2022-01-01
Attending: INTERNAL MEDICINE
Payer: COMMERCIAL

## 2022-01-01 ENCOUNTER — HOSPITAL ENCOUNTER (OUTPATIENT)
Facility: HOSPITAL | Age: 77
Discharge: HOME | End: 2022-02-11
Attending: INTERNAL MEDICINE | Admitting: INTERNAL MEDICINE
Payer: COMMERCIAL

## 2022-01-01 ENCOUNTER — TELEPHONE (OUTPATIENT)
Dept: CARDIOLOGY | Facility: CLINIC | Age: 77
End: 2022-01-01

## 2022-01-01 ENCOUNTER — DOCUMENTATION ONLY (OUTPATIENT)
Dept: CARDIOLOGY | Facility: CLINIC | Age: 77
End: 2022-01-01
Payer: COMMERCIAL

## 2022-01-01 ENCOUNTER — OFFICE VISIT (OUTPATIENT)
Dept: CARDIOLOGY | Facility: CLINIC | Age: 77
End: 2022-01-01

## 2022-01-01 ENCOUNTER — OFFICE VISIT (OUTPATIENT)
Dept: CARDIOLOGY | Facility: CLINIC | Age: 77
DRG: 286 | End: 2022-01-01
Attending: INTERNAL MEDICINE
Payer: COMMERCIAL

## 2022-01-01 ENCOUNTER — VIRTUAL VISIT (OUTPATIENT)
Dept: PHARMACY | Facility: PHYSICIAN GROUP | Age: 77
End: 2022-01-01
Payer: COMMERCIAL

## 2022-01-01 ENCOUNTER — APPOINTMENT (OUTPATIENT)
Dept: OCCUPATIONAL THERAPY | Facility: CLINIC | Age: 77
DRG: 291 | End: 2022-01-01
Attending: STUDENT IN AN ORGANIZED HEALTH CARE EDUCATION/TRAINING PROGRAM
Payer: COMMERCIAL

## 2022-01-01 ENCOUNTER — PATIENT OUTREACH (OUTPATIENT)
Dept: CARE COORDINATION | Facility: CLINIC | Age: 77
End: 2022-01-01

## 2022-01-01 ENCOUNTER — APPOINTMENT (OUTPATIENT)
Dept: GENERAL RADIOLOGY | Facility: CLINIC | Age: 77
DRG: 291 | End: 2022-01-01
Attending: EMERGENCY MEDICINE
Payer: COMMERCIAL

## 2022-01-01 ENCOUNTER — HOSPITAL ENCOUNTER (INPATIENT)
Facility: CLINIC | Age: 77
LOS: 6 days | Discharge: HOME | DRG: 286 | End: 2022-06-12
Attending: EMERGENCY MEDICINE | Admitting: INTERNAL MEDICINE
Payer: COMMERCIAL

## 2022-01-01 ENCOUNTER — HOSPITAL ENCOUNTER (EMERGENCY)
Facility: CLINIC | Age: 77
End: 2022-01-01
Payer: COMMERCIAL

## 2022-01-01 ENCOUNTER — LAB (OUTPATIENT)
Dept: LAB | Facility: CLINIC | Age: 77
End: 2022-01-01
Attending: INTERNAL MEDICINE
Payer: COMMERCIAL

## 2022-01-01 ENCOUNTER — LAB REQUISITION (OUTPATIENT)
Dept: LAB | Facility: CLINIC | Age: 77
End: 2022-01-01
Payer: COMMERCIAL

## 2022-01-01 ENCOUNTER — HOSPITAL ENCOUNTER (OUTPATIENT)
Age: 77
End: 2022-01-01
Payer: COMMERCIAL

## 2022-01-01 ENCOUNTER — LAB (OUTPATIENT)
Dept: LAB | Facility: CLINIC | Age: 77
End: 2022-01-01
Attending: INTERNAL MEDICINE

## 2022-01-01 ENCOUNTER — TRANSFERRED RECORDS (OUTPATIENT)
Dept: CARDIOLOGY | Facility: CLINIC | Age: 77
End: 2022-01-01

## 2022-01-01 ENCOUNTER — TRANSCRIBE ORDERS (OUTPATIENT)
Dept: CARDIOLOGY | Facility: CLINIC | Age: 77
End: 2022-01-01
Payer: COMMERCIAL

## 2022-01-01 ENCOUNTER — HOSPITAL ENCOUNTER (OUTPATIENT)
Dept: INTERVENTIONAL RADIOLOGY/VASCULAR | Facility: HOSPITAL | Age: 77
Discharge: HOME | End: 2022-01-13
Attending: INTERNAL MEDICINE | Admitting: NURSE PRACTITIONER
Payer: COMMERCIAL

## 2022-01-01 ENCOUNTER — ANCILLARY PROCEDURE (OUTPATIENT)
Dept: CARDIOLOGY | Facility: CLINIC | Age: 77
DRG: 291 | End: 2022-01-01
Attending: STUDENT IN AN ORGANIZED HEALTH CARE EDUCATION/TRAINING PROGRAM
Payer: COMMERCIAL

## 2022-01-01 ENCOUNTER — APPOINTMENT (OUTPATIENT)
Dept: GENERAL RADIOLOGY | Facility: CLINIC | Age: 77
DRG: 286 | End: 2022-01-01
Attending: EMERGENCY MEDICINE
Payer: COMMERCIAL

## 2022-01-01 ENCOUNTER — HOSPITAL ENCOUNTER (EMERGENCY)
Facility: CLINIC | Age: 77
Discharge: HOME | End: 2022-07-18
Admitting: EMERGENCY MEDICINE
Payer: COMMERCIAL

## 2022-01-01 ENCOUNTER — APPOINTMENT (OUTPATIENT)
Dept: EDUCATION SERVICES | Facility: CLINIC | Age: 77
DRG: 291 | End: 2022-01-01
Attending: STUDENT IN AN ORGANIZED HEALTH CARE EDUCATION/TRAINING PROGRAM
Payer: COMMERCIAL

## 2022-01-01 ENCOUNTER — ANESTHESIA (OUTPATIENT)
Dept: CARDIOLOGY | Facility: HOSPITAL | Age: 77
End: 2022-01-01
Payer: COMMERCIAL

## 2022-01-01 ENCOUNTER — DOCUMENTATION ONLY (OUTPATIENT)
Dept: OTHER | Facility: CLINIC | Age: 77
End: 2022-01-01

## 2022-01-01 ENCOUNTER — APPOINTMENT (OUTPATIENT)
Dept: CARDIOLOGY | Facility: CLINIC | Age: 77
DRG: 286 | End: 2022-01-01
Payer: COMMERCIAL

## 2022-01-01 VITALS
WEIGHT: 156.2 LBS | HEART RATE: 60 BPM | DIASTOLIC BLOOD PRESSURE: 52 MMHG | SYSTOLIC BLOOD PRESSURE: 98 MMHG | HEIGHT: 69 IN | RESPIRATION RATE: 16 BRPM | BODY MASS INDEX: 23.13 KG/M2

## 2022-01-01 VITALS
HEART RATE: 69 BPM | HEIGHT: 69 IN | WEIGHT: 155 LBS | TEMPERATURE: 98 F | BODY MASS INDEX: 22.96 KG/M2 | RESPIRATION RATE: 46 BRPM | DIASTOLIC BLOOD PRESSURE: 55 MMHG | SYSTOLIC BLOOD PRESSURE: 89 MMHG | OXYGEN SATURATION: 95 %

## 2022-01-01 VITALS
SYSTOLIC BLOOD PRESSURE: 78 MMHG | WEIGHT: 150 LBS | BODY MASS INDEX: 22.15 KG/M2 | DIASTOLIC BLOOD PRESSURE: 40 MMHG | RESPIRATION RATE: 14 BRPM | HEART RATE: 72 BPM

## 2022-01-01 VITALS
OXYGEN SATURATION: 95 % | TEMPERATURE: 97.9 F | HEIGHT: 69 IN | DIASTOLIC BLOOD PRESSURE: 66 MMHG | SYSTOLIC BLOOD PRESSURE: 106 MMHG | WEIGHT: 133 LBS | BODY MASS INDEX: 19.7 KG/M2 | RESPIRATION RATE: 16 BRPM | HEART RATE: 54 BPM

## 2022-01-01 VITALS
SYSTOLIC BLOOD PRESSURE: 96 MMHG | RESPIRATION RATE: 16 BRPM | DIASTOLIC BLOOD PRESSURE: 64 MMHG | TEMPERATURE: 97.6 F | HEART RATE: 72 BPM | OXYGEN SATURATION: 98 %

## 2022-01-01 VITALS
HEART RATE: 76 BPM | BODY MASS INDEX: 20.63 KG/M2 | HEIGHT: 68 IN | DIASTOLIC BLOOD PRESSURE: 78 MMHG | SYSTOLIC BLOOD PRESSURE: 120 MMHG | OXYGEN SATURATION: 93 % | WEIGHT: 136.1 LBS

## 2022-01-01 VITALS
RESPIRATION RATE: 18 BRPM | TEMPERATURE: 98.1 F | BODY MASS INDEX: 20.84 KG/M2 | WEIGHT: 141.09 LBS | OXYGEN SATURATION: 96 % | DIASTOLIC BLOOD PRESSURE: 63 MMHG | HEART RATE: 88 BPM | SYSTOLIC BLOOD PRESSURE: 91 MMHG

## 2022-01-01 VITALS
TEMPERATURE: 97.9 F | RESPIRATION RATE: 16 BRPM | OXYGEN SATURATION: 91 % | SYSTOLIC BLOOD PRESSURE: 107 MMHG | HEIGHT: 69 IN | HEART RATE: 81 BPM | DIASTOLIC BLOOD PRESSURE: 75 MMHG | BODY MASS INDEX: 20.31 KG/M2 | WEIGHT: 137.1 LBS

## 2022-01-01 VITALS
DIASTOLIC BLOOD PRESSURE: 57 MMHG | HEART RATE: 78 BPM | SYSTOLIC BLOOD PRESSURE: 88 MMHG | WEIGHT: 153 LBS | OXYGEN SATURATION: 100 % | BODY MASS INDEX: 22.59 KG/M2

## 2022-01-01 VITALS
BODY MASS INDEX: 22.15 KG/M2 | WEIGHT: 150 LBS | HEART RATE: 72 BPM | SYSTOLIC BLOOD PRESSURE: 84 MMHG | RESPIRATION RATE: 14 BRPM | DIASTOLIC BLOOD PRESSURE: 50 MMHG

## 2022-01-01 VITALS
BODY MASS INDEX: 23.13 KG/M2 | WEIGHT: 156.2 LBS | SYSTOLIC BLOOD PRESSURE: 98 MMHG | HEART RATE: 60 BPM | HEIGHT: 69 IN | DIASTOLIC BLOOD PRESSURE: 52 MMHG | RESPIRATION RATE: 16 BRPM

## 2022-01-01 VITALS — TEMPERATURE: 97.6 F

## 2022-01-01 DIAGNOSIS — E11.649 TYPE 2 DIABETES MELLITUS WITH HYPOGLYCEMIA WITHOUT COMA, WITH LONG-TERM CURRENT USE OF INSULIN (H): Primary | ICD-10-CM

## 2022-01-01 DIAGNOSIS — Z95.810 ICD (IMPLANTABLE CARDIOVERTER-DEFIBRILLATOR) IN PLACE: ICD-10-CM

## 2022-01-01 DIAGNOSIS — Z79.4 TYPE 2 DIABETES MELLITUS WITH HYPOGLYCEMIA WITHOUT COMA, WITH LONG-TERM CURRENT USE OF INSULIN (H): Primary | ICD-10-CM

## 2022-01-01 DIAGNOSIS — E78.5 HYPERLIPIDEMIA, UNSPECIFIED HYPERLIPIDEMIA TYPE: Primary | ICD-10-CM

## 2022-01-01 DIAGNOSIS — E78.5 DYSLIPIDEMIA: ICD-10-CM

## 2022-01-01 DIAGNOSIS — I50.22 CHRONIC SYSTOLIC HEART FAILURE (H): ICD-10-CM

## 2022-01-01 DIAGNOSIS — E78.1 HYPERTRIGLYCERIDEMIA: ICD-10-CM

## 2022-01-01 DIAGNOSIS — Z20.822 CONTACT WITH AND (SUSPECTED) EXPOSURE TO COVID-19: ICD-10-CM

## 2022-01-01 DIAGNOSIS — Z95.810 ICD (IMPLANTABLE CARDIOVERTER-DEFIBRILLATOR) IN PLACE: Primary | ICD-10-CM

## 2022-01-01 DIAGNOSIS — E78.5 DYSLIPIDEMIA: Primary | ICD-10-CM

## 2022-01-01 DIAGNOSIS — R53.1 WEAKNESS: ICD-10-CM

## 2022-01-01 DIAGNOSIS — I10 ESSENTIAL (PRIMARY) HYPERTENSION: ICD-10-CM

## 2022-01-01 DIAGNOSIS — Z00.6 EXAMINATION OF PARTICIPANT IN CLINICAL TRIAL: ICD-10-CM

## 2022-01-01 DIAGNOSIS — Z11.59 ENCOUNTER FOR SCREENING FOR OTHER VIRAL DISEASES: Primary | ICD-10-CM

## 2022-01-01 DIAGNOSIS — Z01.818 ENCOUNTER FOR OTHER PREPROCEDURAL EXAMINATION: ICD-10-CM

## 2022-01-01 DIAGNOSIS — I50.22 CHRONIC SYSTOLIC HEART FAILURE (H): Primary | ICD-10-CM

## 2022-01-01 DIAGNOSIS — I47.20 VENTRICULAR TACHYCARDIA (H): ICD-10-CM

## 2022-01-01 DIAGNOSIS — I42.9 PRIMARY CARDIOMYOPATHY (H): ICD-10-CM

## 2022-01-01 DIAGNOSIS — I50.20 UNSPECIFIED SYSTOLIC (CONGESTIVE) HEART FAILURE (H): ICD-10-CM

## 2022-01-01 DIAGNOSIS — M10.472 OTHER SECONDARY ACUTE GOUT OF LEFT FOOT: ICD-10-CM

## 2022-01-01 DIAGNOSIS — E78.2 MIXED HYPERLIPIDEMIA: ICD-10-CM

## 2022-01-01 DIAGNOSIS — N17.9 ACUTE RENAL FAILURE, UNSPECIFIED ACUTE RENAL FAILURE TYPE (H): ICD-10-CM

## 2022-01-01 DIAGNOSIS — I50.9 CHF (CONGESTIVE HEART FAILURE) (H): ICD-10-CM

## 2022-01-01 DIAGNOSIS — D50.8 OTHER IRON DEFICIENCY ANEMIAS: ICD-10-CM

## 2022-01-01 DIAGNOSIS — T82.118A: ICD-10-CM

## 2022-01-01 DIAGNOSIS — I47.29 PAROXYSMAL VENTRICULAR TACHYCARDIA (H): Primary | ICD-10-CM

## 2022-01-01 DIAGNOSIS — I42.8 NON-ISCHEMIC CARDIOMYOPATHY (H): ICD-10-CM

## 2022-01-01 DIAGNOSIS — D50.9 IRON DEFICIENCY ANEMIA, UNSPECIFIED IRON DEFICIENCY ANEMIA TYPE: ICD-10-CM

## 2022-01-01 DIAGNOSIS — I42.8 NICM (NONISCHEMIC CARDIOMYOPATHY) (H): ICD-10-CM

## 2022-01-01 DIAGNOSIS — Z78.9 TAKES DIETARY SUPPLEMENTS: ICD-10-CM

## 2022-01-01 DIAGNOSIS — E11.649 TYPE 2 DIABETES MELLITUS WITH HYPOGLYCEMIA WITHOUT COMA, WITH LONG-TERM CURRENT USE OF INSULIN (H): ICD-10-CM

## 2022-01-01 DIAGNOSIS — R06.02 SHORTNESS OF BREATH: ICD-10-CM

## 2022-01-01 DIAGNOSIS — Z11.59 ENCOUNTER FOR SCREENING FOR OTHER VIRAL DISEASES: ICD-10-CM

## 2022-01-01 DIAGNOSIS — G62.9 NEUROPATHY: ICD-10-CM

## 2022-01-01 DIAGNOSIS — E11.59 TYPE 2 DIABETES MELLITUS WITH OTHER CIRCULATORY COMPLICATION, WITH LONG-TERM CURRENT USE OF INSULIN (H): ICD-10-CM

## 2022-01-01 DIAGNOSIS — Z79.4 TYPE 2 DIABETES MELLITUS WITH HYPOGLYCEMIA WITHOUT COMA, WITH LONG-TERM CURRENT USE OF INSULIN (H): ICD-10-CM

## 2022-01-01 DIAGNOSIS — T82.118A: Primary | ICD-10-CM

## 2022-01-01 DIAGNOSIS — Z79.4 TYPE 2 DIABETES MELLITUS WITH OTHER CIRCULATORY COMPLICATION, WITH LONG-TERM CURRENT USE OF INSULIN (H): ICD-10-CM

## 2022-01-01 DIAGNOSIS — I47.29 PAROXYSMAL VENTRICULAR TACHYCARDIA (H): ICD-10-CM

## 2022-01-01 DIAGNOSIS — I25.10 CORONARY ARTERY DISEASE INVOLVING NATIVE CORONARY ARTERY OF NATIVE HEART WITHOUT ANGINA PECTORIS: ICD-10-CM

## 2022-01-01 DIAGNOSIS — I47.20 VENTRICULAR TACHYCARDIA (H): Primary | ICD-10-CM

## 2022-01-01 DIAGNOSIS — N17.9 ACUTE KIDNEY INJURY (H): ICD-10-CM

## 2022-01-01 DIAGNOSIS — Z71.89 OTHER SPECIFIED COUNSELING: ICD-10-CM

## 2022-01-01 DIAGNOSIS — G47.00 INSOMNIA, UNSPECIFIED TYPE: ICD-10-CM

## 2022-01-01 DIAGNOSIS — I50.32 CHRONIC DIASTOLIC HEART FAILURE (H): ICD-10-CM

## 2022-01-01 DIAGNOSIS — E87.5 SERUM POTASSIUM ELEVATED: Primary | ICD-10-CM

## 2022-01-01 DIAGNOSIS — Z95.810 ICD (IMPLANTABLE CARDIOVERTER-DEFIBRILLATOR), BIVENTRICULAR, IN SITU: ICD-10-CM

## 2022-01-01 DIAGNOSIS — I50.23 ACUTE ON CHRONIC SYSTOLIC CONGESTIVE HEART FAILURE (H): ICD-10-CM

## 2022-01-01 DIAGNOSIS — E87.5 HYPERKALEMIA: ICD-10-CM

## 2022-01-01 DIAGNOSIS — Z91.81 H/O FALL: ICD-10-CM

## 2022-01-01 DIAGNOSIS — I48.19 PERSISTENT ATRIAL FIBRILLATION (H): Primary | ICD-10-CM

## 2022-01-01 DIAGNOSIS — T82.198A MALFUNCTION OF IMPLANTABLE DEFIBRILLATOR VENTRICULAR (ICD) LEAD: ICD-10-CM

## 2022-01-01 DIAGNOSIS — I50.9 CONGESTIVE HEART FAILURE, UNSPECIFIED HF CHRONICITY, UNSPECIFIED HEART FAILURE TYPE (H): ICD-10-CM

## 2022-01-01 LAB
ALBUMIN SERPL BCG-MCNC: 3.5 G/DL (ref 3.5–5.2)
ALBUMIN SERPL BCG-MCNC: 3.5 G/DL (ref 3.5–5.2)
ALBUMIN SERPL BCG-MCNC: 3.6 G/DL (ref 3.5–5.2)
ALBUMIN SERPL-MCNC: 3 G/DL (ref 3.4–5)
ALBUMIN SERPL-MCNC: 3.5 G/DL (ref 3.5–5)
ALBUMIN SERPL-MCNC: 3.7 G/DL (ref 3.4–5)
ALP SERPL-CCNC: 113 U/L (ref 40–150)
ALP SERPL-CCNC: 114 U/L (ref 45–120)
ALP SERPL-CCNC: 166 U/L (ref 40–129)
ALP SERPL-CCNC: 173 U/L (ref 40–129)
ALP SERPL-CCNC: 215 U/L (ref 40–150)
ALP SERPL-CCNC: 222 U/L (ref 40–129)
ALT SERPL W P-5'-P-CCNC: 20 U/L (ref 0–45)
ALT SERPL W P-5'-P-CCNC: 25 U/L (ref 0–70)
ALT SERPL W P-5'-P-CCNC: 40 U/L (ref 10–50)
ALT SERPL W P-5'-P-CCNC: 46 U/L (ref 10–50)
ALT SERPL W P-5'-P-CCNC: 49 U/L (ref 10–50)
ALT SERPL W P-5'-P-CCNC: 82 U/L (ref 0–70)
ANION GAP SERPL CALCULATED.3IONS-SCNC: 10 MMOL/L (ref 3–14)
ANION GAP SERPL CALCULATED.3IONS-SCNC: 11 MMOL/L (ref 3–14)
ANION GAP SERPL CALCULATED.3IONS-SCNC: 12 MMOL/L (ref 5–18)
ANION GAP SERPL CALCULATED.3IONS-SCNC: 12 MMOL/L (ref 5–18)
ANION GAP SERPL CALCULATED.3IONS-SCNC: 12 MMOL/L (ref 7–15)
ANION GAP SERPL CALCULATED.3IONS-SCNC: 13 MMOL/L (ref 3–14)
ANION GAP SERPL CALCULATED.3IONS-SCNC: 13 MMOL/L (ref 7–15)
ANION GAP SERPL CALCULATED.3IONS-SCNC: 14 MMOL/L (ref 5–18)
ANION GAP SERPL CALCULATED.3IONS-SCNC: 14 MMOL/L (ref 7–15)
ANION GAP SERPL CALCULATED.3IONS-SCNC: 14 MMOL/L (ref 7–15)
ANION GAP SERPL CALCULATED.3IONS-SCNC: 15 MMOL/L (ref 7–15)
ANION GAP SERPL CALCULATED.3IONS-SCNC: 17 MMOL/L (ref 5–18)
ANION GAP SERPL CALCULATED.3IONS-SCNC: 7 MMOL/L (ref 3–14)
ANION GAP SERPL CALCULATED.3IONS-SCNC: 7 MMOL/L (ref 3–14)
ANION GAP SERPL CALCULATED.3IONS-SCNC: 8 MMOL/L (ref 3–14)
ANION GAP SERPL CALCULATED.3IONS-SCNC: 9 MMOL/L (ref 3–14)
APTT PPP: 40 SECONDS (ref 22–38)
APTT PPP: 40 SECONDS (ref 22–38)
APTT PPP: 42 SECONDS (ref 22–38)
APTT PPP: 55 SECONDS (ref 22–38)
APTT PPP: 73 SECONDS (ref 22–38)
AST SERPL W P-5'-P-CCNC: 23 U/L (ref 0–40)
AST SERPL W P-5'-P-CCNC: 24 U/L (ref 0–45)
AST SERPL W P-5'-P-CCNC: 35 U/L (ref 10–50)
AST SERPL W P-5'-P-CCNC: 36 U/L (ref 10–50)
AST SERPL W P-5'-P-CCNC: 67 U/L (ref 0–45)
AST SERPL W P-5'-P-CCNC: ABNORMAL U/L
ATRIAL RATE - MUSE: 108 BPM
ATRIAL RATE - MUSE: 441 BPM
ATRIAL RATE - MUSE: 67 BPM
ATRIAL RATE - MUSE: 77 BPM
ATRIAL RATE - MUSE: 78 BPM
BASE EXCESS BLDV CALC-SCNC: -1.7 MMOL/L (ref -7.7–1.9)
BASE EXCESS BLDV CALC-SCNC: -2.1 MMOL/L (ref -7.7–1.9)
BASE EXCESS BLDV CALC-SCNC: 0 MMOL/L (ref -7.7–1.9)
BASE EXCESS BLDV CALC-SCNC: 0.4 MMOL/L (ref -7.7–1.9)
BASE EXCESS BLDV CALC-SCNC: 4.2 MMOL/L (ref -7.7–1.9)
BASE EXCESS BLDV CALC-SCNC: 5.4 MMOL/L (ref -7.7–1.9)
BASE EXCESS BLDV CALC-SCNC: 5.8 MMOL/L (ref -7.7–1.9)
BASE EXCESS BLDV CALC-SCNC: 6.9 MMOL/L (ref -7.7–1.9)
BASE EXCESS BLDV CALC-SCNC: 7.7 MMOL/L (ref -7.7–1.9)
BASE EXCESS BLDV CALC-SCNC: 7.7 MMOL/L (ref -7.7–1.9)
BASE EXCESS BLDV CALC-SCNC: 8.6 MMOL/L (ref -7.7–1.9)
BASOPHILS # BLD AUTO: 0 10E3/UL (ref 0–0.2)
BASOPHILS NFR BLD AUTO: 0 %
BILIRUB SERPL-MCNC: 1.2 MG/DL
BILIRUB SERPL-MCNC: 1.2 MG/DL (ref 0.2–1.3)
BILIRUB SERPL-MCNC: 1.3 MG/DL
BILIRUB SERPL-MCNC: 1.6 MG/DL
BILIRUB SERPL-MCNC: 1.6 MG/DL (ref 0–1)
BILIRUB SERPL-MCNC: 1.7 MG/DL (ref 0.2–1.3)
BUN SERPL-MCNC: 38 MG/DL (ref 8–28)
BUN SERPL-MCNC: 41 MG/DL (ref 8–28)
BUN SERPL-MCNC: 54 MG/DL (ref 7–30)
BUN SERPL-MCNC: 54 MG/DL (ref 7–30)
BUN SERPL-MCNC: 56 MG/DL (ref 7–30)
BUN SERPL-MCNC: 58 MG/DL (ref 7–30)
BUN SERPL-MCNC: 58.7 MG/DL (ref 8–23)
BUN SERPL-MCNC: 58.8 MG/DL (ref 8–23)
BUN SERPL-MCNC: 60 MG/DL (ref 7–30)
BUN SERPL-MCNC: 61.2 MG/DL (ref 8–23)
BUN SERPL-MCNC: 62.2 MG/DL (ref 8–23)
BUN SERPL-MCNC: 63 MG/DL (ref 8–28)
BUN SERPL-MCNC: 63.2 MG/DL (ref 8–23)
BUN SERPL-MCNC: 64.6 MG/DL (ref 8–23)
BUN SERPL-MCNC: 65 MG/DL (ref 7–30)
BUN SERPL-MCNC: 65.2 MG/DL (ref 8–23)
BUN SERPL-MCNC: 66.8 MG/DL (ref 8–23)
BUN SERPL-MCNC: 67 MG/DL (ref 7–30)
BUN SERPL-MCNC: 67.6 MG/DL (ref 8–23)
BUN SERPL-MCNC: 67.8 MG/DL (ref 8–23)
BUN SERPL-MCNC: 68.4 MG/DL (ref 8–23)
BUN SERPL-MCNC: 69.4 MG/DL (ref 8–23)
BUN SERPL-MCNC: 69.7 MG/DL (ref 8–23)
BUN SERPL-MCNC: 70 MG/DL (ref 8–23)
BUN SERPL-MCNC: 74 MG/DL (ref 7–30)
BUN SERPL-MCNC: 75 MG/DL (ref 7–30)
BUN SERPL-MCNC: 76 MG/DL (ref 7–30)
BUN SERPL-MCNC: 77 MG/DL (ref 7–30)
BUN SERPL-MCNC: 82 MG/DL (ref 7–30)
BUN SERPL-MCNC: 85 MG/DL (ref 8–28)
CALCIUM SERPL-MCNC: 8.3 MG/DL (ref 8.5–10.1)
CALCIUM SERPL-MCNC: 8.3 MG/DL (ref 8.8–10.2)
CALCIUM SERPL-MCNC: 8.4 MG/DL (ref 8.5–10.1)
CALCIUM SERPL-MCNC: 8.4 MG/DL (ref 8.5–10.5)
CALCIUM SERPL-MCNC: 8.4 MG/DL (ref 8.8–10.2)
CALCIUM SERPL-MCNC: 8.6 MG/DL (ref 8.5–10.1)
CALCIUM SERPL-MCNC: 8.6 MG/DL (ref 8.8–10.2)
CALCIUM SERPL-MCNC: 8.6 MG/DL (ref 8.8–10.2)
CALCIUM SERPL-MCNC: 8.8 MG/DL (ref 8.5–10.5)
CALCIUM SERPL-MCNC: 8.8 MG/DL (ref 8.5–10.5)
CALCIUM SERPL-MCNC: 8.8 MG/DL (ref 8.8–10.2)
CALCIUM SERPL-MCNC: 8.8 MG/DL (ref 8.8–10.2)
CALCIUM SERPL-MCNC: 8.9 MG/DL (ref 8.8–10.2)
CALCIUM SERPL-MCNC: 8.9 MG/DL (ref 8.8–10.2)
CALCIUM SERPL-MCNC: 9 MG/DL (ref 8.5–10.1)
CALCIUM SERPL-MCNC: 9 MG/DL (ref 8.5–10.5)
CALCIUM SERPL-MCNC: 9 MG/DL (ref 8.8–10.2)
CALCIUM SERPL-MCNC: 9.1 MG/DL (ref 8.5–10.1)
CALCIUM SERPL-MCNC: 9.1 MG/DL (ref 8.8–10.2)
CALCIUM SERPL-MCNC: 9.1 MG/DL (ref 8.8–10.2)
CALCIUM SERPL-MCNC: 9.2 MG/DL (ref 8.8–10.2)
CALCIUM SERPL-MCNC: 9.2 MG/DL (ref 8.8–10.2)
CALCIUM SERPL-MCNC: 9.3 MG/DL (ref 8.8–10.2)
CALCIUM SERPL-MCNC: 9.4 MG/DL (ref 8.5–10.1)
CALCIUM SERPL-MCNC: 9.4 MG/DL (ref 8.5–10.1)
CALCIUM SERPL-MCNC: 9.5 MG/DL (ref 8.5–10.1)
CHLORIDE BLD-SCNC: 102 MMOL/L (ref 94–109)
CHLORIDE BLD-SCNC: 103 MMOL/L (ref 94–109)
CHLORIDE BLD-SCNC: 103 MMOL/L (ref 94–109)
CHLORIDE BLD-SCNC: 103 MMOL/L (ref 98–107)
CHLORIDE BLD-SCNC: 104 MMOL/L (ref 94–109)
CHLORIDE BLD-SCNC: 105 MMOL/L (ref 94–109)
CHLORIDE BLD-SCNC: 105 MMOL/L (ref 94–109)
CHLORIDE BLD-SCNC: 107 MMOL/L (ref 98–107)
CHLORIDE BLD-SCNC: 107 MMOL/L (ref 98–107)
CHLORIDE BLD-SCNC: 108 MMOL/L (ref 98–107)
CHLORIDE SERPL-SCNC: 100 MMOL/L (ref 98–107)
CHLORIDE SERPL-SCNC: 101 MMOL/L (ref 98–107)
CHLORIDE SERPL-SCNC: 103 MMOL/L (ref 98–107)
CHLORIDE SERPL-SCNC: 103 MMOL/L (ref 98–107)
CHLORIDE SERPL-SCNC: 105 MMOL/L (ref 98–107)
CHLORIDE SERPL-SCNC: 105 MMOL/L (ref 98–107)
CHLORIDE SERPL-SCNC: 106 MMOL/L (ref 98–107)
CHLORIDE SERPL-SCNC: 106 MMOL/L (ref 98–107)
CHLORIDE SERPL-SCNC: 89 MMOL/L (ref 98–107)
CHLORIDE SERPL-SCNC: 92 MMOL/L (ref 98–107)
CHLORIDE SERPL-SCNC: 94 MMOL/L (ref 98–107)
CHLORIDE SERPL-SCNC: 95 MMOL/L (ref 98–107)
CHLORIDE SERPL-SCNC: 96 MMOL/L (ref 98–107)
CHLORIDE SERPL-SCNC: 96 MMOL/L (ref 98–107)
CHOLEST SERPL-MCNC: 82 MG/DL
CO2 SERPL-SCNC: 15 MMOL/L (ref 22–31)
CO2 SERPL-SCNC: 18 MMOL/L (ref 22–31)
CO2 SERPL-SCNC: 18 MMOL/L (ref 22–31)
CO2 SERPL-SCNC: 19 MMOL/L (ref 20–32)
CO2 SERPL-SCNC: 21 MMOL/L (ref 22–31)
CO2 SERPL-SCNC: 22 MMOL/L (ref 20–32)
CO2 SERPL-SCNC: 22 MMOL/L (ref 20–32)
CO2 SERPL-SCNC: 23 MMOL/L (ref 20–32)
CO2 SERPL-SCNC: 23 MMOL/L (ref 20–32)
CO2 SERPL-SCNC: 24 MMOL/L (ref 20–32)
CO2 SERPL-SCNC: 25 MMOL/L (ref 20–32)
CO2 SERPL-SCNC: 25 MMOL/L (ref 20–32)
CREAT SERPL-MCNC: 1.61 MG/DL (ref 0.7–1.3)
CREAT SERPL-MCNC: 1.75 MG/DL (ref 0.7–1.3)
CREAT SERPL-MCNC: 1.8 MG/DL (ref 0.66–1.25)
CREAT SERPL-MCNC: 1.84 MG/DL (ref 0.66–1.25)
CREAT SERPL-MCNC: 1.85 MG/DL (ref 0.66–1.25)
CREAT SERPL-MCNC: 1.87 MG/DL (ref 0.66–1.25)
CREAT SERPL-MCNC: 2.02 MG/DL (ref 0.66–1.25)
CREAT SERPL-MCNC: 2.09 MG/DL (ref 0.66–1.25)
CREAT SERPL-MCNC: 2.1 MG/DL (ref 0.66–1.25)
CREAT SERPL-MCNC: 2.19 MG/DL (ref 0.66–1.25)
CREAT SERPL-MCNC: 2.26 MG/DL (ref 0.67–1.17)
CREAT SERPL-MCNC: 2.38 MG/DL (ref 0.67–1.17)
CREAT SERPL-MCNC: 2.4 MG/DL (ref 0.66–1.25)
CREAT SERPL-MCNC: 2.48 MG/DL (ref 0.67–1.17)
CREAT SERPL-MCNC: 2.49 MG/DL (ref 0.66–1.25)
CREAT SERPL-MCNC: 2.49 MG/DL (ref 0.67–1.17)
CREAT SERPL-MCNC: 2.51 MG/DL (ref 0.67–1.17)
CREAT SERPL-MCNC: 2.55 MG/DL (ref 0.67–1.17)
CREAT SERPL-MCNC: 2.57 MG/DL (ref 0.67–1.17)
CREAT SERPL-MCNC: 2.59 MG/DL (ref 0.67–1.17)
CREAT SERPL-MCNC: 2.61 MG/DL (ref 0.67–1.17)
CREAT SERPL-MCNC: 2.65 MG/DL (ref 0.66–1.25)
CREAT SERPL-MCNC: 2.66 MG/DL (ref 0.67–1.17)
CREAT SERPL-MCNC: 2.68 MG/DL (ref 0.67–1.17)
CREAT SERPL-MCNC: 2.71 MG/DL (ref 0.66–1.25)
CREAT SERPL-MCNC: 2.8 MG/DL (ref 0.67–1.17)
CREAT SERPL-MCNC: 3.03 MG/DL (ref 0.7–1.3)
CREAT SERPL-MCNC: 3.16 MG/DL (ref 0.7–1.3)
CYSTATIN C SERPL-MCNC: 2.6 MG/L
DEPRECATED HCO3 PLAS-SCNC: 16 MMOL/L (ref 22–29)
DEPRECATED HCO3 PLAS-SCNC: 18 MMOL/L (ref 22–29)
DEPRECATED HCO3 PLAS-SCNC: 19 MMOL/L (ref 22–29)
DEPRECATED HCO3 PLAS-SCNC: 20 MMOL/L (ref 22–29)
DEPRECATED HCO3 PLAS-SCNC: 20 MMOL/L (ref 22–29)
DEPRECATED HCO3 PLAS-SCNC: 22 MMOL/L (ref 22–29)
DEPRECATED HCO3 PLAS-SCNC: 22 MMOL/L (ref 22–29)
DEPRECATED HCO3 PLAS-SCNC: 26 MMOL/L (ref 22–29)
DEPRECATED HCO3 PLAS-SCNC: 27 MMOL/L (ref 22–29)
DEPRECATED HCO3 PLAS-SCNC: 27 MMOL/L (ref 22–29)
DEPRECATED HCO3 PLAS-SCNC: 28 MMOL/L (ref 22–29)
DEPRECATED HCO3 PLAS-SCNC: 28 MMOL/L (ref 22–29)
DEPRECATED HCO3 PLAS-SCNC: 29 MMOL/L (ref 22–29)
DEPRECATED HCO3 PLAS-SCNC: 30 MMOL/L (ref 22–29)
DIASTOLIC BLOOD PRESSURE - MUSE: NORMAL MMHG
EOSINOPHIL # BLD AUTO: 0.2 10E3/UL (ref 0–0.7)
EOSINOPHIL # BLD AUTO: 0.3 10E3/UL (ref 0–0.7)
EOSINOPHIL NFR BLD AUTO: 2 %
EOSINOPHIL NFR BLD AUTO: 3 %
EOSINOPHIL NFR BLD AUTO: 4 %
EOSINOPHIL NFR BLD AUTO: 4 %
ERYTHROCYTE [DISTWIDTH] IN BLOOD BY AUTOMATED COUNT: 13.7 % (ref 10–15)
ERYTHROCYTE [DISTWIDTH] IN BLOOD BY AUTOMATED COUNT: 15.4 % (ref 10–15)
ERYTHROCYTE [DISTWIDTH] IN BLOOD BY AUTOMATED COUNT: 15.5 % (ref 10–15)
ERYTHROCYTE [DISTWIDTH] IN BLOOD BY AUTOMATED COUNT: 15.6 % (ref 10–15)
ERYTHROCYTE [DISTWIDTH] IN BLOOD BY AUTOMATED COUNT: 15.6 % (ref 10–15)
ERYTHROCYTE [DISTWIDTH] IN BLOOD BY AUTOMATED COUNT: 15.7 % (ref 10–15)
ERYTHROCYTE [DISTWIDTH] IN BLOOD BY AUTOMATED COUNT: 16.5 % (ref 10–15)
ERYTHROCYTE [DISTWIDTH] IN BLOOD BY AUTOMATED COUNT: 16.7 % (ref 10–15)
ERYTHROCYTE [DISTWIDTH] IN BLOOD BY AUTOMATED COUNT: 17.1 % (ref 10–15)
ERYTHROCYTE [DISTWIDTH] IN BLOOD BY AUTOMATED COUNT: 17.2 % (ref 10–15)
ERYTHROCYTE [DISTWIDTH] IN BLOOD BY AUTOMATED COUNT: 17.3 % (ref 10–15)
ERYTHROCYTE [DISTWIDTH] IN BLOOD BY AUTOMATED COUNT: 17.3 % (ref 10–15)
ERYTHROCYTE [DISTWIDTH] IN BLOOD BY AUTOMATED COUNT: 18.7 % (ref 10–15)
FERRITIN SERPL-MCNC: 67 NG/ML (ref 27–300)
FERRITIN SERPL-MCNC: 71 NG/ML (ref 31–409)
FOLATE SERPL-MCNC: 28.5 NG/ML (ref 4.6–34.8)
GFR SERPL CREATININE-BSD FRML MDRD: 20 ML/MIN/1.73M2
GFR SERPL CREATININE-BSD FRML MDRD: 21 ML/MIN/1.73M2
GFR SERPL CREATININE-BSD FRML MDRD: 23 ML/MIN/1.73M2
GFR SERPL CREATININE-BSD FRML MDRD: 24 ML/MIN/1.73M2
GFR SERPL CREATININE-BSD FRML MDRD: 25 ML/MIN/1.73M2
GFR SERPL CREATININE-BSD FRML MDRD: 26 ML/MIN/1.73M2
GFR SERPL CREATININE-BSD FRML MDRD: 27 ML/MIN/1.73M2
GFR SERPL CREATININE-BSD FRML MDRD: 28 ML/MIN/1.73M2
GFR SERPL CREATININE-BSD FRML MDRD: 29 ML/MIN/1.73M2
GFR SERPL CREATININE-BSD FRML MDRD: 30 ML/MIN/1.73M2
GFR SERPL CREATININE-BSD FRML MDRD: 32 ML/MIN/1.73M2
GFR SERPL CREATININE-BSD FRML MDRD: 32 ML/MIN/1.73M2
GFR SERPL CREATININE-BSD FRML MDRD: 34 ML/MIN/1.73M2
GFR SERPL CREATININE-BSD FRML MDRD: 37 ML/MIN/1.73M2
GFR SERPL CREATININE-BSD FRML MDRD: 37 ML/MIN/1.73M2
GFR SERPL CREATININE-BSD FRML MDRD: 38 ML/MIN/1.73M2
GFR SERPL CREATININE-BSD FRML MDRD: 39 ML/MIN/1.73M2
GFR SERPL CREATININE-BSD FRML MDRD: 40 ML/MIN/1.73M2
GFR SERPL CREATININE-BSD FRML MDRD: 44 ML/MIN/1.73M2
GFR/BSA.PRED SERPLBLD CYS-BASED-ARV: 20 ML/MIN/BSA
GLUCOSE BLD-MCNC: 107 MG/DL (ref 70–99)
GLUCOSE BLD-MCNC: 122 MG/DL (ref 70–125)
GLUCOSE BLD-MCNC: 135 MG/DL (ref 70–99)
GLUCOSE BLD-MCNC: 153 MG/DL (ref 70–99)
GLUCOSE BLD-MCNC: 155 MG/DL (ref 70–125)
GLUCOSE BLD-MCNC: 173 MG/DL (ref 70–99)
GLUCOSE BLD-MCNC: 181 MG/DL (ref 70–99)
GLUCOSE BLD-MCNC: 191 MG/DL (ref 70–125)
GLUCOSE BLD-MCNC: 198 MG/DL (ref 70–99)
GLUCOSE BLD-MCNC: 202 MG/DL (ref 70–99)
GLUCOSE BLD-MCNC: 212 MG/DL (ref 70–99)
GLUCOSE BLD-MCNC: 228 MG/DL (ref 70–99)
GLUCOSE BLD-MCNC: 238 MG/DL (ref 70–125)
GLUCOSE BLD-MCNC: 304 MG/DL (ref 70–99)
GLUCOSE BLD-MCNC: 363 MG/DL (ref 70–99)
GLUCOSE BLD-MCNC: 78 MG/DL (ref 70–99)
GLUCOSE BLDC GLUCOMTR-MCNC: 133 MG/DL (ref 70–99)
GLUCOSE BLDC GLUCOMTR-MCNC: 134 MG/DL (ref 70–99)
GLUCOSE BLDC GLUCOMTR-MCNC: 139 MG/DL (ref 70–99)
GLUCOSE BLDC GLUCOMTR-MCNC: 167 MG/DL (ref 70–99)
GLUCOSE BLDC GLUCOMTR-MCNC: 168 MG/DL (ref 70–99)
GLUCOSE BLDC GLUCOMTR-MCNC: 169 MG/DL (ref 70–99)
GLUCOSE BLDC GLUCOMTR-MCNC: 181 MG/DL (ref 70–99)
GLUCOSE BLDC GLUCOMTR-MCNC: 182 MG/DL (ref 70–99)
GLUCOSE BLDC GLUCOMTR-MCNC: 184 MG/DL (ref 70–99)
GLUCOSE BLDC GLUCOMTR-MCNC: 185 MG/DL (ref 70–99)
GLUCOSE BLDC GLUCOMTR-MCNC: 185 MG/DL (ref 70–99)
GLUCOSE BLDC GLUCOMTR-MCNC: 187 MG/DL (ref 70–99)
GLUCOSE BLDC GLUCOMTR-MCNC: 191 MG/DL (ref 70–99)
GLUCOSE BLDC GLUCOMTR-MCNC: 195 MG/DL (ref 70–99)
GLUCOSE BLDC GLUCOMTR-MCNC: 201 MG/DL (ref 70–99)
GLUCOSE BLDC GLUCOMTR-MCNC: 203 MG/DL (ref 70–99)
GLUCOSE BLDC GLUCOMTR-MCNC: 207 MG/DL (ref 70–99)
GLUCOSE BLDC GLUCOMTR-MCNC: 209 MG/DL (ref 70–99)
GLUCOSE BLDC GLUCOMTR-MCNC: 210 MG/DL (ref 70–99)
GLUCOSE BLDC GLUCOMTR-MCNC: 210 MG/DL (ref 70–99)
GLUCOSE BLDC GLUCOMTR-MCNC: 214 MG/DL (ref 70–99)
GLUCOSE BLDC GLUCOMTR-MCNC: 221 MG/DL (ref 70–99)
GLUCOSE BLDC GLUCOMTR-MCNC: 225 MG/DL (ref 70–99)
GLUCOSE BLDC GLUCOMTR-MCNC: 226 MG/DL (ref 70–99)
GLUCOSE BLDC GLUCOMTR-MCNC: 229 MG/DL (ref 70–99)
GLUCOSE BLDC GLUCOMTR-MCNC: 232 MG/DL (ref 70–99)
GLUCOSE BLDC GLUCOMTR-MCNC: 233 MG/DL (ref 70–99)
GLUCOSE BLDC GLUCOMTR-MCNC: 246 MG/DL (ref 70–99)
GLUCOSE BLDC GLUCOMTR-MCNC: 249 MG/DL (ref 70–99)
GLUCOSE BLDC GLUCOMTR-MCNC: 254 MG/DL (ref 70–99)
GLUCOSE BLDC GLUCOMTR-MCNC: 269 MG/DL (ref 70–99)
GLUCOSE BLDC GLUCOMTR-MCNC: 274 MG/DL (ref 70–99)
GLUCOSE BLDC GLUCOMTR-MCNC: 280 MG/DL (ref 70–99)
GLUCOSE BLDC GLUCOMTR-MCNC: 283 MG/DL (ref 70–99)
GLUCOSE BLDC GLUCOMTR-MCNC: 283 MG/DL (ref 70–99)
GLUCOSE BLDC GLUCOMTR-MCNC: 284 MG/DL (ref 70–99)
GLUCOSE BLDC GLUCOMTR-MCNC: 289 MG/DL (ref 70–99)
GLUCOSE BLDC GLUCOMTR-MCNC: 290 MG/DL (ref 70–99)
GLUCOSE BLDC GLUCOMTR-MCNC: 301 MG/DL (ref 70–99)
GLUCOSE BLDC GLUCOMTR-MCNC: 302 MG/DL (ref 70–99)
GLUCOSE BLDC GLUCOMTR-MCNC: 315 MG/DL (ref 70–99)
GLUCOSE BLDC GLUCOMTR-MCNC: 318 MG/DL (ref 70–99)
GLUCOSE BLDC GLUCOMTR-MCNC: 329 MG/DL (ref 70–99)
GLUCOSE BLDC GLUCOMTR-MCNC: 338 MG/DL (ref 70–99)
GLUCOSE BLDC GLUCOMTR-MCNC: 81 MG/DL (ref 70–99)
GLUCOSE SERPL-MCNC: 120 MG/DL (ref 70–99)
GLUCOSE SERPL-MCNC: 132 MG/DL (ref 70–99)
GLUCOSE SERPL-MCNC: 142 MG/DL (ref 70–99)
GLUCOSE SERPL-MCNC: 146 MG/DL (ref 70–99)
GLUCOSE SERPL-MCNC: 150 MG/DL (ref 70–99)
GLUCOSE SERPL-MCNC: 174 MG/DL (ref 70–99)
GLUCOSE SERPL-MCNC: 175 MG/DL (ref 70–99)
GLUCOSE SERPL-MCNC: 184 MG/DL (ref 70–99)
GLUCOSE SERPL-MCNC: 205 MG/DL (ref 70–99)
GLUCOSE SERPL-MCNC: 213 MG/DL (ref 70–99)
GLUCOSE SERPL-MCNC: 250 MG/DL (ref 70–99)
GLUCOSE SERPL-MCNC: 276 MG/DL (ref 70–99)
GLUCOSE SERPL-MCNC: 319 MG/DL (ref 70–99)
GLUCOSE SERPL-MCNC: 335 MG/DL (ref 70–99)
HBA1C MFR BLD: 7.3 % (ref 0–5.6)
HCO3 BLDV-SCNC: 22 MMOL/L (ref 21–28)
HCO3 BLDV-SCNC: 23 MMOL/L (ref 21–28)
HCO3 BLDV-SCNC: 24 MMOL/L (ref 21–28)
HCO3 BLDV-SCNC: 25 MMOL/L (ref 21–28)
HCO3 BLDV-SCNC: 28 MMOL/L (ref 21–28)
HCO3 BLDV-SCNC: 29 MMOL/L (ref 21–28)
HCO3 BLDV-SCNC: 30 MMOL/L (ref 21–28)
HCO3 BLDV-SCNC: 31 MMOL/L (ref 21–28)
HCO3 BLDV-SCNC: 32 MMOL/L (ref 21–28)
HCO3 BLDV-SCNC: 32 MMOL/L (ref 21–28)
HCO3 BLDV-SCNC: 33 MMOL/L (ref 21–28)
HCT VFR BLD AUTO: 25.4 % (ref 40–53)
HCT VFR BLD AUTO: 26.3 % (ref 40–53)
HCT VFR BLD AUTO: 26.9 % (ref 40–53)
HCT VFR BLD AUTO: 27 % (ref 40–53)
HCT VFR BLD AUTO: 27 % (ref 40–53)
HCT VFR BLD AUTO: 27.1 % (ref 40–53)
HCT VFR BLD AUTO: 27.6 % (ref 40–53)
HCT VFR BLD AUTO: 28.1 % (ref 40–53)
HCT VFR BLD AUTO: 28.3 % (ref 40–53)
HCT VFR BLD AUTO: 28.7 % (ref 40–53)
HCT VFR BLD AUTO: 29.2 % (ref 40–53)
HCT VFR BLD AUTO: 29.3 % (ref 40–53)
HCT VFR BLD AUTO: 29.4 % (ref 40–53)
HCT VFR BLD AUTO: 29.8 % (ref 40–53)
HCT VFR BLD AUTO: 30 % (ref 40–53)
HCT VFR BLD AUTO: 30.9 % (ref 40–53)
HCT VFR BLD AUTO: 31.9 % (ref 40–53)
HCT VFR BLD AUTO: 33.8 % (ref 40–53)
HDLC SERPL-MCNC: 32 MG/DL
HGB BLD-MCNC: 10.1 G/DL (ref 13.3–17.7)
HGB BLD-MCNC: 10.3 G/DL (ref 13.3–17.7)
HGB BLD-MCNC: 10.4 G/DL (ref 13.3–17.7)
HGB BLD-MCNC: 11.1 G/DL (ref 13.3–17.7)
HGB BLD-MCNC: 8.1 G/DL (ref 13.3–17.7)
HGB BLD-MCNC: 8.3 G/DL (ref 13.3–17.7)
HGB BLD-MCNC: 8.4 G/DL (ref 13.3–17.7)
HGB BLD-MCNC: 8.5 G/DL (ref 13.3–17.7)
HGB BLD-MCNC: 8.6 G/DL (ref 13.3–17.7)
HGB BLD-MCNC: 8.6 G/DL (ref 13.3–17.7)
HGB BLD-MCNC: 8.7 G/DL (ref 13.3–17.7)
HGB BLD-MCNC: 8.9 G/DL (ref 13.3–17.7)
HGB BLD-MCNC: 9 G/DL (ref 13.3–17.7)
HGB BLD-MCNC: 9 G/DL (ref 13.3–17.7)
HGB BLD-MCNC: 9.2 G/DL (ref 13.3–17.7)
HGB BLD-MCNC: 9.3 G/DL (ref 13.3–17.7)
HGB BLD-MCNC: 9.3 G/DL (ref 13.3–17.7)
HGB BLD-MCNC: 9.5 G/DL (ref 13.3–17.7)
HGB BLD-MCNC: 9.7 G/DL (ref 13.3–17.7)
HGB BLD-MCNC: 9.7 G/DL (ref 13.3–17.7)
HGB BLD-MCNC: 9.9 G/DL (ref 13.3–17.7)
HOLD SPECIMEN: NORMAL
IMM GRANULOCYTES # BLD: 0 10E3/UL
IMM GRANULOCYTES # BLD: 0.1 10E3/UL
IMM GRANULOCYTES # BLD: 0.1 10E3/UL
IMM GRANULOCYTES NFR BLD: 0 %
IMM GRANULOCYTES NFR BLD: 1 %
INR PPP: 1.48 (ref 0.85–1.15)
INR PPP: 1.53 (ref 0.85–1.15)
INR PPP: 1.72 (ref 0.85–1.15)
INR PPP: 1.72 (ref 0.85–1.15)
INR PPP: 2.02 (ref 0.85–1.15)
INR PPP: 2.45 (ref 0.85–1.15)
INR PPP: 2.52 (ref 0.85–1.15)
INTERPRETATION ECG - MUSE: NORMAL
IRON BINDING CAPACITY (ROCHE): 250 UG/DL (ref 240–430)
IRON SATN MFR SERPL: 7 % (ref 15–46)
IRON SERPL-MCNC: 18 UG/DL (ref 61–157)
IRON SERPL-MCNC: 36 UG/DL (ref 42–175)
LACTATE SERPL-SCNC: 0.7 MMOL/L (ref 0.7–2)
LDLC SERPL CALC-MCNC: 28 MG/DL
LVEF ECHO: NORMAL
LYMPHOCYTES # BLD AUTO: 0.3 10E3/UL (ref 0.8–5.3)
LYMPHOCYTES # BLD AUTO: 0.4 10E3/UL (ref 0.8–5.3)
LYMPHOCYTES # BLD AUTO: 0.6 10E3/UL (ref 0.8–5.3)
LYMPHOCYTES # BLD AUTO: 1 10E3/UL (ref 0.8–5.3)
LYMPHOCYTES NFR BLD AUTO: 11 %
LYMPHOCYTES NFR BLD AUTO: 4 %
LYMPHOCYTES NFR BLD AUTO: 5 %
LYMPHOCYTES NFR BLD AUTO: 6 %
MAGNESIUM SERPL-MCNC: 1.8 MG/DL (ref 1.7–2.3)
MAGNESIUM SERPL-MCNC: 1.9 MG/DL (ref 1.7–2.3)
MAGNESIUM SERPL-MCNC: 1.9 MG/DL (ref 1.8–2.6)
MAGNESIUM SERPL-MCNC: 2 MG/DL (ref 1.7–2.3)
MAGNESIUM SERPL-MCNC: 2.1 MG/DL (ref 1.6–2.3)
MAGNESIUM SERPL-MCNC: 2.1 MG/DL (ref 1.7–2.3)
MAGNESIUM SERPL-MCNC: 2.2 MG/DL (ref 1.6–2.3)
MAGNESIUM SERPL-MCNC: 2.2 MG/DL (ref 1.7–2.3)
MAGNESIUM SERPL-MCNC: 2.2 MG/DL (ref 1.7–2.3)
MCH RBC QN AUTO: 30.6 PG (ref 26.5–33)
MCH RBC QN AUTO: 30.7 PG (ref 26.5–33)
MCH RBC QN AUTO: 30.8 PG (ref 26.5–33)
MCH RBC QN AUTO: 30.9 PG (ref 26.5–33)
MCH RBC QN AUTO: 30.9 PG (ref 26.5–33)
MCH RBC QN AUTO: 31 PG (ref 26.5–33)
MCH RBC QN AUTO: 31 PG (ref 26.5–33)
MCH RBC QN AUTO: 31.1 PG (ref 26.5–33)
MCH RBC QN AUTO: 31.1 PG (ref 26.5–33)
MCH RBC QN AUTO: 31.3 PG (ref 26.5–33)
MCH RBC QN AUTO: 31.4 PG (ref 26.5–33)
MCH RBC QN AUTO: 31.5 PG (ref 26.5–33)
MCH RBC QN AUTO: 31.5 PG (ref 26.5–33)
MCH RBC QN AUTO: 31.6 PG (ref 26.5–33)
MCH RBC QN AUTO: 31.7 PG (ref 26.5–33)
MCH RBC QN AUTO: 32.9 PG (ref 26.5–33)
MCHC RBC AUTO-ENTMCNC: 30.4 G/DL (ref 31.5–36.5)
MCHC RBC AUTO-ENTMCNC: 30.7 G/DL (ref 31.5–36.5)
MCHC RBC AUTO-ENTMCNC: 30.7 G/DL (ref 31.5–36.5)
MCHC RBC AUTO-ENTMCNC: 31 G/DL (ref 31.5–36.5)
MCHC RBC AUTO-ENTMCNC: 31.6 G/DL (ref 31.5–36.5)
MCHC RBC AUTO-ENTMCNC: 31.7 G/DL (ref 31.5–36.5)
MCHC RBC AUTO-ENTMCNC: 31.8 G/DL (ref 31.5–36.5)
MCHC RBC AUTO-ENTMCNC: 31.9 G/DL (ref 31.5–36.5)
MCHC RBC AUTO-ENTMCNC: 31.9 G/DL (ref 31.5–36.5)
MCHC RBC AUTO-ENTMCNC: 32.2 G/DL (ref 31.5–36.5)
MCHC RBC AUTO-ENTMCNC: 32.3 G/DL (ref 31.5–36.5)
MCHC RBC AUTO-ENTMCNC: 32.3 G/DL (ref 31.5–36.5)
MCHC RBC AUTO-ENTMCNC: 32.6 G/DL (ref 31.5–36.5)
MCHC RBC AUTO-ENTMCNC: 32.7 G/DL (ref 31.5–36.5)
MCHC RBC AUTO-ENTMCNC: 32.7 G/DL (ref 31.5–36.5)
MCHC RBC AUTO-ENTMCNC: 32.8 G/DL (ref 31.5–36.5)
MCV RBC AUTO: 100 FL (ref 78–100)
MCV RBC AUTO: 101 FL (ref 78–100)
MCV RBC AUTO: 102 FL (ref 78–100)
MCV RBC AUTO: 94 FL (ref 78–100)
MCV RBC AUTO: 95 FL (ref 78–100)
MCV RBC AUTO: 96 FL (ref 78–100)
MCV RBC AUTO: 96 FL (ref 78–100)
MCV RBC AUTO: 97 FL (ref 78–100)
MCV RBC AUTO: 98 FL (ref 78–100)
MCV RBC AUTO: 99 FL (ref 78–100)
MCV RBC AUTO: 99 FL (ref 78–100)
MDC_IDC_EPISODE_DTM: NORMAL
MDC_IDC_EPISODE_DURATION: 1 S
MDC_IDC_EPISODE_DURATION: 2 S
MDC_IDC_EPISODE_DURATION: 3 S
MDC_IDC_EPISODE_DURATION: 4 S
MDC_IDC_EPISODE_ID: 1
MDC_IDC_EPISODE_ID: 10
MDC_IDC_EPISODE_ID: 11
MDC_IDC_EPISODE_ID: 12
MDC_IDC_EPISODE_ID: 13
MDC_IDC_EPISODE_ID: 14
MDC_IDC_EPISODE_ID: 15
MDC_IDC_EPISODE_ID: 16
MDC_IDC_EPISODE_ID: 2
MDC_IDC_EPISODE_ID: 20
MDC_IDC_EPISODE_ID: 21
MDC_IDC_EPISODE_ID: 22
MDC_IDC_EPISODE_ID: 23
MDC_IDC_EPISODE_ID: 24
MDC_IDC_EPISODE_ID: 25
MDC_IDC_EPISODE_ID: 26
MDC_IDC_EPISODE_ID: 27
MDC_IDC_EPISODE_ID: 28
MDC_IDC_EPISODE_ID: 29
MDC_IDC_EPISODE_ID: 3
MDC_IDC_EPISODE_ID: 30
MDC_IDC_EPISODE_ID: 31
MDC_IDC_EPISODE_ID: 32
MDC_IDC_EPISODE_ID: 33
MDC_IDC_EPISODE_ID: 34
MDC_IDC_EPISODE_ID: 4
MDC_IDC_EPISODE_ID: 5
MDC_IDC_EPISODE_ID: 6
MDC_IDC_EPISODE_ID: 7
MDC_IDC_EPISODE_ID: 8
MDC_IDC_EPISODE_ID: 9
MDC_IDC_EPISODE_TYPE: NORMAL
MDC_IDC_LEAD_IMPLANT_DT: NORMAL
MDC_IDC_LEAD_LOCATION: NORMAL
MDC_IDC_LEAD_LOCATION_DETAIL_1: NORMAL
MDC_IDC_LEAD_MFG: NORMAL
MDC_IDC_LEAD_MODEL: NORMAL
MDC_IDC_LEAD_POLARITY_TYPE: NORMAL
MDC_IDC_LEAD_SERIAL: NORMAL
MDC_IDC_LEAD_SPECIAL_FUNCTION: NORMAL
MDC_IDC_MSMT_BATTERY_DTM: NORMAL
MDC_IDC_MSMT_BATTERY_IMPEDANCE: 49 OHM
MDC_IDC_MSMT_BATTERY_REMAINING_LONGEVITY: 110 MO
MDC_IDC_MSMT_BATTERY_REMAINING_LONGEVITY: 118 MO
MDC_IDC_MSMT_BATTERY_REMAINING_LONGEVITY: 122 MO
MDC_IDC_MSMT_BATTERY_REMAINING_LONGEVITY: 123 MO
MDC_IDC_MSMT_BATTERY_REMAINING_LONGEVITY: 94 MO
MDC_IDC_MSMT_BATTERY_RRT_TRIGGER: NORMAL
MDC_IDC_MSMT_BATTERY_STATUS: NORMAL
MDC_IDC_MSMT_BATTERY_STATUS: NORMAL
MDC_IDC_MSMT_BATTERY_VOLTAGE: 2.99 V
MDC_IDC_MSMT_BATTERY_VOLTAGE: 3.06 V
MDC_IDC_MSMT_BATTERY_VOLTAGE: 3.1 V
MDC_IDC_MSMT_BATTERY_VOLTAGE: 3.15 V
MDC_IDC_MSMT_BATTERY_VOLTAGE: 3.16 V
MDC_IDC_MSMT_CAP_CHARGE_DTM: NORMAL
MDC_IDC_MSMT_CAP_CHARGE_ENERGY: 18 J
MDC_IDC_MSMT_CAP_CHARGE_ENERGY: 18 J
MDC_IDC_MSMT_CAP_CHARGE_ENERGY: 35 J
MDC_IDC_MSMT_CAP_CHARGE_ENERGY: 40 J
MDC_IDC_MSMT_CAP_CHARGE_ENERGY: 40 J
MDC_IDC_MSMT_CAP_CHARGE_TIME: 0 S
MDC_IDC_MSMT_CAP_CHARGE_TIME: 0 S
MDC_IDC_MSMT_CAP_CHARGE_TIME: 4 S
MDC_IDC_MSMT_CAP_CHARGE_TIME: 4 S
MDC_IDC_MSMT_CAP_CHARGE_TIME: 8.3 S
MDC_IDC_MSMT_CAP_CHARGE_TYPE: NORMAL
MDC_IDC_MSMT_LEADCHNL_LV_IMPEDANCE_VALUE: 3000 OHM
MDC_IDC_MSMT_LEADCHNL_LV_IMPEDANCE_VALUE: 456 OHM
MDC_IDC_MSMT_LEADCHNL_LV_IMPEDANCE_VALUE: 475 OHM
MDC_IDC_MSMT_LEADCHNL_LV_IMPEDANCE_VALUE: 494 OHM
MDC_IDC_MSMT_LEADCHNL_LV_IMPEDANCE_VALUE: 494 OHM
MDC_IDC_MSMT_LEADCHNL_LV_PACING_THRESHOLD_AMPLITUDE: 0.5 V
MDC_IDC_MSMT_LEADCHNL_LV_PACING_THRESHOLD_AMPLITUDE: 0.62 V
MDC_IDC_MSMT_LEADCHNL_LV_PACING_THRESHOLD_AMPLITUDE: 0.62 V
MDC_IDC_MSMT_LEADCHNL_LV_PACING_THRESHOLD_AMPLITUDE: 0.75 V
MDC_IDC_MSMT_LEADCHNL_LV_PACING_THRESHOLD_PULSEWIDTH: 0.4 MS
MDC_IDC_MSMT_LEADCHNL_RA_IMPEDANCE_VALUE: 361 OHM
MDC_IDC_MSMT_LEADCHNL_RV_IMPEDANCE_VALUE: 285 OHM
MDC_IDC_MSMT_LEADCHNL_RV_IMPEDANCE_VALUE: 323 OHM
MDC_IDC_MSMT_LEADCHNL_RV_IMPEDANCE_VALUE: 418 OHM
MDC_IDC_MSMT_LEADCHNL_RV_IMPEDANCE_VALUE: 437 OHM
MDC_IDC_MSMT_LEADCHNL_RV_IMPEDANCE_VALUE: 437 OHM
MDC_IDC_MSMT_LEADCHNL_RV_IMPEDANCE_VALUE: 456 OHM
MDC_IDC_MSMT_LEADCHNL_RV_PACING_THRESHOLD_AMPLITUDE: 0.5 V
MDC_IDC_MSMT_LEADCHNL_RV_PACING_THRESHOLD_AMPLITUDE: 0.62 V
MDC_IDC_MSMT_LEADCHNL_RV_PACING_THRESHOLD_AMPLITUDE: 0.75 V
MDC_IDC_MSMT_LEADCHNL_RV_PACING_THRESHOLD_PULSEWIDTH: 0.4 MS
MDC_IDC_MSMT_LEADCHNL_RV_SENSING_INTR_AMPL: 2.9 MV
MDC_IDC_MSMT_LEADCHNL_RV_SENSING_INTR_AMPL: 3.3 MV
MDC_IDC_MSMT_LEADCHNL_RV_SENSING_INTR_AMPL: 3.3 MV
MDC_IDC_MSMT_LEADCHNL_RV_SENSING_INTR_AMPL: 4.1 MV
MDC_IDC_MSMT_LEADCHNL_RV_SENSING_INTR_AMPL: 4.4 MV
MDC_IDC_MSMT_LEADCHNL_RV_SENSING_INTR_AMPL: 4.4 MV
MDC_IDC_MSMT_LEADCHNL_RV_SENSING_INTR_AMPL: 5.8 MV
MDC_IDC_PG_IMPLANT_DTM: NORMAL
MDC_IDC_PG_MFG: NORMAL
MDC_IDC_PG_MODEL: NORMAL
MDC_IDC_PG_SERIAL: NORMAL
MDC_IDC_PG_TYPE: NORMAL
MDC_IDC_SESS_CLINIC_NAME: NORMAL
MDC_IDC_SESS_DTM: NORMAL
MDC_IDC_SESS_TYPE: NORMAL
MDC_IDC_SET_BRADY_LOWRATE: 70 {BEATS}/MIN
MDC_IDC_SET_BRADY_MAX_SENSOR_RATE: 120 {BEATS}/MIN
MDC_IDC_SET_BRADY_MODE: NORMAL
MDC_IDC_SET_CRT_LVRV_DELAY: 0 MS
MDC_IDC_SET_CRT_LVRV_DELAY: 30 MS
MDC_IDC_SET_CRT_PACED_CHAMBERS: NORMAL
MDC_IDC_SET_LEADCHNL_LV_PACING_AMPLITUDE: 1 V
MDC_IDC_SET_LEADCHNL_LV_PACING_AMPLITUDE: 1.25 V
MDC_IDC_SET_LEADCHNL_LV_PACING_AMPLITUDE: 2 V
MDC_IDC_SET_LEADCHNL_LV_PACING_AMPLITUDE: NORMAL
MDC_IDC_SET_LEADCHNL_LV_PACING_AMPLITUDE: NORMAL
MDC_IDC_SET_LEADCHNL_LV_PACING_ANODE_ELECTRODE_1: NORMAL
MDC_IDC_SET_LEADCHNL_LV_PACING_ANODE_LOCATION_1: NORMAL
MDC_IDC_SET_LEADCHNL_LV_PACING_CAPTURE_MODE: NORMAL
MDC_IDC_SET_LEADCHNL_LV_PACING_CATHODE_ELECTRODE_1: NORMAL
MDC_IDC_SET_LEADCHNL_LV_PACING_CATHODE_LOCATION_1: NORMAL
MDC_IDC_SET_LEADCHNL_LV_PACING_POLARITY: NORMAL
MDC_IDC_SET_LEADCHNL_LV_PACING_PULSEWIDTH: 0.4 MS
MDC_IDC_SET_LEADCHNL_RA_SENSING_ANODE_ELECTRODE_1: NORMAL
MDC_IDC_SET_LEADCHNL_RA_SENSING_ANODE_LOCATION_1: NORMAL
MDC_IDC_SET_LEADCHNL_RA_SENSING_CATHODE_ELECTRODE_1: NORMAL
MDC_IDC_SET_LEADCHNL_RA_SENSING_CATHODE_LOCATION_1: NORMAL
MDC_IDC_SET_LEADCHNL_RA_SENSING_POLARITY: NORMAL
MDC_IDC_SET_LEADCHNL_RA_SENSING_SENSITIVITY: NORMAL
MDC_IDC_SET_LEADCHNL_RV_PACING_AMPLITUDE: 1.5 V
MDC_IDC_SET_LEADCHNL_RV_PACING_AMPLITUDE: 2 V
MDC_IDC_SET_LEADCHNL_RV_PACING_AMPLITUDE: 2 V
MDC_IDC_SET_LEADCHNL_RV_PACING_AMPLITUDE: NORMAL
MDC_IDC_SET_LEADCHNL_RV_PACING_AMPLITUDE: NORMAL
MDC_IDC_SET_LEADCHNL_RV_PACING_ANODE_ELECTRODE_1: NORMAL
MDC_IDC_SET_LEADCHNL_RV_PACING_ANODE_LOCATION_1: NORMAL
MDC_IDC_SET_LEADCHNL_RV_PACING_CAPTURE_MODE: NORMAL
MDC_IDC_SET_LEADCHNL_RV_PACING_CATHODE_ELECTRODE_1: NORMAL
MDC_IDC_SET_LEADCHNL_RV_PACING_CATHODE_LOCATION_1: NORMAL
MDC_IDC_SET_LEADCHNL_RV_PACING_POLARITY: NORMAL
MDC_IDC_SET_LEADCHNL_RV_PACING_PULSEWIDTH: 0.4 MS
MDC_IDC_SET_LEADCHNL_RV_SENSING_ANODE_ELECTRODE_1: NORMAL
MDC_IDC_SET_LEADCHNL_RV_SENSING_ANODE_LOCATION_1: NORMAL
MDC_IDC_SET_LEADCHNL_RV_SENSING_CATHODE_ELECTRODE_1: NORMAL
MDC_IDC_SET_LEADCHNL_RV_SENSING_CATHODE_LOCATION_1: NORMAL
MDC_IDC_SET_LEADCHNL_RV_SENSING_POLARITY: NORMAL
MDC_IDC_SET_LEADCHNL_RV_SENSING_SENSITIVITY: 0.45 MV
MDC_IDC_SET_ZONE_DETECTION_BEATS_DENOMINATOR: 40 {BEATS}
MDC_IDC_SET_ZONE_DETECTION_BEATS_NUMERATOR: 30 {BEATS}
MDC_IDC_SET_ZONE_DETECTION_INTERVAL: 240 MS
MDC_IDC_SET_ZONE_DETECTION_INTERVAL: 320 MS
MDC_IDC_SET_ZONE_DETECTION_INTERVAL: 370 MS
MDC_IDC_SET_ZONE_DETECTION_INTERVAL: 450 MS
MDC_IDC_SET_ZONE_TYPE: NORMAL
MDC_IDC_STAT_AT_BURDEN_PERCENT: 0 %
MDC_IDC_STAT_AT_DTM_END: NORMAL
MDC_IDC_STAT_AT_DTM_START: NORMAL
MDC_IDC_STAT_AT_MODE_SW_COUNT: 0
MDC_IDC_STAT_BRADY_AP_VP_PERCENT: 0 %
MDC_IDC_STAT_BRADY_AP_VS_PERCENT: 0 %
MDC_IDC_STAT_BRADY_AS_VP_PERCENT: 88.22 %
MDC_IDC_STAT_BRADY_AS_VP_PERCENT: 91.08 %
MDC_IDC_STAT_BRADY_AS_VP_PERCENT: 93.05 %
MDC_IDC_STAT_BRADY_AS_VP_PERCENT: 94.41 %
MDC_IDC_STAT_BRADY_AS_VP_PERCENT: 96.36 %
MDC_IDC_STAT_BRADY_AS_VS_PERCENT: 11.78 %
MDC_IDC_STAT_BRADY_AS_VS_PERCENT: 3.64 %
MDC_IDC_STAT_BRADY_AS_VS_PERCENT: 5.59 %
MDC_IDC_STAT_BRADY_AS_VS_PERCENT: 6.95 %
MDC_IDC_STAT_BRADY_AS_VS_PERCENT: 8.92 %
MDC_IDC_STAT_BRADY_DTM_END: NORMAL
MDC_IDC_STAT_BRADY_DTM_START: NORMAL
MDC_IDC_STAT_BRADY_RA_PERCENT_PACED: NORMAL
MDC_IDC_STAT_BRADY_RV_PERCENT_PACED: 88.21 %
MDC_IDC_STAT_BRADY_RV_PERCENT_PACED: 91.1 %
MDC_IDC_STAT_BRADY_RV_PERCENT_PACED: 93.05 %
MDC_IDC_STAT_BRADY_RV_PERCENT_PACED: 94.41 %
MDC_IDC_STAT_BRADY_RV_PERCENT_PACED: 96.36 %
MDC_IDC_STAT_CRT_DTM_END: NORMAL
MDC_IDC_STAT_CRT_DTM_START: NORMAL
MDC_IDC_STAT_CRT_LV_PERCENT_PACED: 88.19 %
MDC_IDC_STAT_CRT_LV_PERCENT_PACED: 91.05 %
MDC_IDC_STAT_CRT_LV_PERCENT_PACED: 93.02 %
MDC_IDC_STAT_CRT_LV_PERCENT_PACED: 94.39 %
MDC_IDC_STAT_CRT_LV_PERCENT_PACED: 96.34 %
MDC_IDC_STAT_CRT_PERCENT_PACED: 88.19 %
MDC_IDC_STAT_CRT_PERCENT_PACED: 91.04 %
MDC_IDC_STAT_CRT_PERCENT_PACED: 93.02 %
MDC_IDC_STAT_CRT_PERCENT_PACED: 94.38 %
MDC_IDC_STAT_CRT_PERCENT_PACED: 96.33 %
MDC_IDC_STAT_EPISODE_RECENT_COUNT: 0
MDC_IDC_STAT_EPISODE_RECENT_COUNT: 14
MDC_IDC_STAT_EPISODE_RECENT_COUNT: 18
MDC_IDC_STAT_EPISODE_RECENT_COUNT: 2
MDC_IDC_STAT_EPISODE_RECENT_COUNT: 8
MDC_IDC_STAT_EPISODE_RECENT_COUNT_DTM_END: NORMAL
MDC_IDC_STAT_EPISODE_RECENT_COUNT_DTM_START: NORMAL
MDC_IDC_STAT_EPISODE_TOTAL_COUNT: 0
MDC_IDC_STAT_EPISODE_TOTAL_COUNT: 1
MDC_IDC_STAT_EPISODE_TOTAL_COUNT: 10
MDC_IDC_STAT_EPISODE_TOTAL_COUNT: 16
MDC_IDC_STAT_EPISODE_TOTAL_COUNT: 2
MDC_IDC_STAT_EPISODE_TOTAL_COUNT: 34
MDC_IDC_STAT_EPISODE_TOTAL_COUNT: 58
MDC_IDC_STAT_EPISODE_TOTAL_COUNT_DTM_END: NORMAL
MDC_IDC_STAT_EPISODE_TOTAL_COUNT_DTM_START: NORMAL
MDC_IDC_STAT_EPISODE_TYPE: NORMAL
MDC_IDC_STAT_TACHYTHERAPY_ATP_DELIVERED_RECENT: 0
MDC_IDC_STAT_TACHYTHERAPY_ATP_DELIVERED_TOTAL: 0
MDC_IDC_STAT_TACHYTHERAPY_RECENT_DTM_END: NORMAL
MDC_IDC_STAT_TACHYTHERAPY_RECENT_DTM_START: NORMAL
MDC_IDC_STAT_TACHYTHERAPY_SHOCKS_ABORTED_RECENT: 0
MDC_IDC_STAT_TACHYTHERAPY_SHOCKS_ABORTED_TOTAL: 0
MDC_IDC_STAT_TACHYTHERAPY_SHOCKS_DELIVERED_RECENT: 0
MDC_IDC_STAT_TACHYTHERAPY_SHOCKS_DELIVERED_TOTAL: 0
MDC_IDC_STAT_TACHYTHERAPY_SHOCKS_DELIVERED_TOTAL: 1
MDC_IDC_STAT_TACHYTHERAPY_TOTAL_DTM_END: NORMAL
MDC_IDC_STAT_TACHYTHERAPY_TOTAL_DTM_START: NORMAL
MONOCYTES # BLD AUTO: 0.5 10E3/UL (ref 0–1.3)
MONOCYTES # BLD AUTO: 0.5 10E3/UL (ref 0–1.3)
MONOCYTES # BLD AUTO: 0.6 10E3/UL (ref 0–1.3)
MONOCYTES # BLD AUTO: 0.8 10E3/UL (ref 0–1.3)
MONOCYTES # BLD AUTO: 0.9 10E3/UL (ref 0–1.3)
MONOCYTES NFR BLD AUTO: 10 %
MONOCYTES NFR BLD AUTO: 10 %
MONOCYTES NFR BLD AUTO: 6 %
MONOCYTES NFR BLD AUTO: 8 %
MONOCYTES NFR BLD AUTO: 8 %
MONOCYTES NFR BLD AUTO: 9 %
NEUTROPHILS # BLD AUTO: 4.9 10E3/UL (ref 1.6–8.3)
NEUTROPHILS # BLD AUTO: 5.2 10E3/UL (ref 1.6–8.3)
NEUTROPHILS # BLD AUTO: 5.5 10E3/UL (ref 1.6–8.3)
NEUTROPHILS # BLD AUTO: 5.6 10E3/UL (ref 1.6–8.3)
NEUTROPHILS # BLD AUTO: 5.7 10E3/UL (ref 1.6–8.3)
NEUTROPHILS # BLD AUTO: 6.3 10E3/UL (ref 1.6–8.3)
NEUTROPHILS # BLD AUTO: 6.4 10E3/UL (ref 1.6–8.3)
NEUTROPHILS # BLD AUTO: 6.8 10E3/UL (ref 1.6–8.3)
NEUTROPHILS # BLD AUTO: 6.9 10E3/UL (ref 1.6–8.3)
NEUTROPHILS # BLD AUTO: 7 10E3/UL (ref 1.6–8.3)
NEUTROPHILS # BLD AUTO: 7.7 10E3/UL (ref 1.6–8.3)
NEUTROPHILS NFR BLD AUTO: 77 %
NEUTROPHILS NFR BLD AUTO: 81 %
NEUTROPHILS NFR BLD AUTO: 81 %
NEUTROPHILS NFR BLD AUTO: 82 %
NEUTROPHILS NFR BLD AUTO: 83 %
NEUTROPHILS NFR BLD AUTO: 85 %
NONHDLC SERPL-MCNC: 50 MG/DL
NRBC # BLD AUTO: 0 10E3/UL
NRBC BLD AUTO-RTO: 0 /100
NT-PROBNP SERPL-MCNC: 8906 PG/ML (ref 0–1800)
NT-PROBNP SERPL-MCNC: ABNORMAL PG/ML (ref 0–1800)
NT-PROBNP SERPL-MCNC: ABNORMAL PG/ML (ref 0–1800)
O2/TOTAL GAS SETTING VFR VENT: 0 %
O2/TOTAL GAS SETTING VFR VENT: 21 %
O2/TOTAL GAS SETTING VFR VENT: 24 %
O2/TOTAL GAS SETTING VFR VENT: 28 %
OXYHGB MFR BLDA: 56 % (ref 92–100)
OXYHGB MFR BLDA: 97 % (ref 92–100)
OXYHGB MFR BLDV: 36 % (ref 70–75)
OXYHGB MFR BLDV: 36 % (ref 70–75)
OXYHGB MFR BLDV: 39 % (ref 70–75)
OXYHGB MFR BLDV: 40 % (ref 92–100)
OXYHGB MFR BLDV: 42 % (ref 70–75)
OXYHGB MFR BLDV: 43 % (ref 70–75)
OXYHGB MFR BLDV: 43 % (ref 70–75)
OXYHGB MFR BLDV: 45 % (ref 70–75)
OXYHGB MFR BLDV: 45 % (ref 70–75)
OXYHGB MFR BLDV: 48 % (ref 70–75)
OXYHGB MFR BLDV: 49 % (ref 70–75)
OXYHGB MFR BLDV: 55 % (ref 70–75)
P AXIS - MUSE: NORMAL DEGREES
PCO2 BLDV: 36 MM HG (ref 40–50)
PCO2 BLDV: 36 MM HG (ref 40–50)
PCO2 BLDV: 37 MM HG (ref 40–50)
PCO2 BLDV: 37 MM HG (ref 40–50)
PCO2 BLDV: 39 MM HG (ref 40–50)
PCO2 BLDV: 40 MM HG (ref 40–50)
PCO2 BLDV: 41 MM HG (ref 40–50)
PCO2 BLDV: 42 MM HG (ref 40–50)
PCO2 BLDV: 44 MM HG (ref 40–50)
PH BLDV: 7.41 [PH] (ref 7.32–7.43)
PH BLDV: 7.41 [PH] (ref 7.32–7.43)
PH BLDV: 7.43 [PH] (ref 7.32–7.43)
PH BLDV: 7.43 [PH] (ref 7.32–7.43)
PH BLDV: 7.46 [PH] (ref 7.32–7.43)
PH BLDV: 7.46 [PH] (ref 7.32–7.43)
PH BLDV: 7.48 [PH] (ref 7.32–7.43)
PH BLDV: 7.49 [PH] (ref 7.32–7.43)
PLATELET # BLD AUTO: 147 10E3/UL (ref 150–450)
PLATELET # BLD AUTO: 156 10E3/UL (ref 150–450)
PLATELET # BLD AUTO: 158 10E3/UL (ref 150–450)
PLATELET # BLD AUTO: 159 10E3/UL (ref 150–450)
PLATELET # BLD AUTO: 160 10E3/UL (ref 150–450)
PLATELET # BLD AUTO: 161 10E3/UL (ref 150–450)
PLATELET # BLD AUTO: 162 10E3/UL (ref 150–450)
PLATELET # BLD AUTO: 164 10E3/UL (ref 150–450)
PLATELET # BLD AUTO: 165 10E3/UL (ref 150–450)
PLATELET # BLD AUTO: 169 10E3/UL (ref 150–450)
PLATELET # BLD AUTO: 170 10E3/UL (ref 150–450)
PLATELET # BLD AUTO: 185 10E3/UL (ref 150–450)
PLATELET # BLD AUTO: 191 10E3/UL (ref 150–450)
PLATELET # BLD AUTO: 191 10E3/UL (ref 150–450)
PLATELET # BLD AUTO: 202 10E3/UL (ref 150–450)
PLATELET # BLD AUTO: 203 10E3/UL (ref 150–450)
PLATELET # BLD AUTO: 212 10E3/UL (ref 150–450)
PLATELET # BLD AUTO: 238 10E3/UL (ref 150–450)
PO2 BLDV: 23 MM HG (ref 25–47)
PO2 BLDV: 24 MM HG (ref 25–47)
PO2 BLDV: 25 MM HG (ref 25–47)
PO2 BLDV: 26 MM HG (ref 25–47)
PO2 BLDV: 26 MM HG (ref 25–47)
PO2 BLDV: 27 MM HG (ref 25–47)
PO2 BLDV: 28 MM HG (ref 25–47)
PO2 BLDV: 28 MM HG (ref 25–47)
PO2 BLDV: 32 MM HG (ref 25–47)
POTASSIUM BLD-SCNC: 3.5 MMOL/L (ref 3.4–5.3)
POTASSIUM BLD-SCNC: 3.7 MMOL/L (ref 3.4–5.3)
POTASSIUM BLD-SCNC: 3.9 MMOL/L (ref 3.4–5.3)
POTASSIUM BLD-SCNC: 4 MMOL/L (ref 3.4–5.3)
POTASSIUM BLD-SCNC: 4 MMOL/L (ref 3.5–5)
POTASSIUM BLD-SCNC: 4.1 MMOL/L (ref 3.4–5.3)
POTASSIUM BLD-SCNC: 4.1 MMOL/L (ref 3.5–5)
POTASSIUM BLD-SCNC: 4.2 MMOL/L (ref 3.4–5.3)
POTASSIUM BLD-SCNC: 4.3 MMOL/L (ref 3.4–5.3)
POTASSIUM BLD-SCNC: 4.3 MMOL/L (ref 3.5–5)
POTASSIUM BLD-SCNC: 4.8 MMOL/L (ref 3.4–5.3)
POTASSIUM BLD-SCNC: 4.9 MMOL/L (ref 3.5–5)
POTASSIUM BLD-SCNC: 5.4 MMOL/L (ref 3.4–5.3)
POTASSIUM BLD-SCNC: 6.4 MMOL/L (ref 3.4–5.3)
POTASSIUM SERPL-SCNC: 3 MMOL/L (ref 3.4–5.3)
POTASSIUM SERPL-SCNC: 3.2 MMOL/L (ref 3.4–5.3)
POTASSIUM SERPL-SCNC: 3.3 MMOL/L (ref 3.4–5.3)
POTASSIUM SERPL-SCNC: 3.5 MMOL/L (ref 3.4–5.3)
POTASSIUM SERPL-SCNC: 3.6 MMOL/L (ref 3.4–5.3)
POTASSIUM SERPL-SCNC: 3.6 MMOL/L (ref 3.4–5.3)
POTASSIUM SERPL-SCNC: 3.7 MMOL/L (ref 3.4–5.3)
POTASSIUM SERPL-SCNC: 3.8 MMOL/L (ref 3.4–5.3)
POTASSIUM SERPL-SCNC: 3.9 MMOL/L (ref 3.4–5.3)
POTASSIUM SERPL-SCNC: 4 MMOL/L (ref 3.4–5.3)
POTASSIUM SERPL-SCNC: 4 MMOL/L (ref 3.4–5.3)
POTASSIUM SERPL-SCNC: 4.1 MMOL/L (ref 3.4–5.3)
POTASSIUM SERPL-SCNC: 4.4 MMOL/L (ref 3.4–5.3)
POTASSIUM SERPL-SCNC: 4.4 MMOL/L (ref 3.4–5.3)
POTASSIUM SERPL-SCNC: 4.5 MMOL/L (ref 3.4–5.3)
POTASSIUM SERPL-SCNC: 4.7 MMOL/L (ref 3.4–5.3)
PR INTERVAL - MUSE: NORMAL MS
PROT SERPL-MCNC: 6.5 G/DL (ref 6.4–8.3)
PROT SERPL-MCNC: 6.5 G/DL (ref 6.4–8.3)
PROT SERPL-MCNC: 6.7 G/DL (ref 6.4–8.3)
PROT SERPL-MCNC: 7.4 G/DL (ref 6–8)
PROT SERPL-MCNC: 7.7 G/DL (ref 6.8–8.8)
PROT SERPL-MCNC: 8.3 G/DL (ref 6.8–8.8)
QRS DURATION - MUSE: 140 MS
QRS DURATION - MUSE: 150 MS
QRS DURATION - MUSE: 152 MS
QRS DURATION - MUSE: 156 MS
QRS DURATION - MUSE: 158 MS
QT - MUSE: 406 MS
QT - MUSE: 448 MS
QT - MUSE: 456 MS
QT - MUSE: 466 MS
QT - MUSE: 508 MS
QTC - MUSE: 503 MS
QTC - MUSE: 513 MS
QTC - MUSE: 539 MS
QTC - MUSE: 548 MS
QTC - MUSE: 592 MS
R AXIS - MUSE: 147 DEGREES
R AXIS - MUSE: 159 DEGREES
R AXIS - MUSE: 164 DEGREES
R AXIS - MUSE: 165 DEGREES
R AXIS - MUSE: 168 DEGREES
RBC # BLD AUTO: 2.57 10E6/UL (ref 4.4–5.9)
RBC # BLD AUTO: 2.7 10E6/UL (ref 4.4–5.9)
RBC # BLD AUTO: 2.7 10E6/UL (ref 4.4–5.9)
RBC # BLD AUTO: 2.72 10E6/UL (ref 4.4–5.9)
RBC # BLD AUTO: 2.72 10E6/UL (ref 4.4–5.9)
RBC # BLD AUTO: 2.79 10E6/UL (ref 4.4–5.9)
RBC # BLD AUTO: 2.84 10E6/UL (ref 4.4–5.9)
RBC # BLD AUTO: 2.86 10E6/UL (ref 4.4–5.9)
RBC # BLD AUTO: 2.87 10E6/UL (ref 4.4–5.9)
RBC # BLD AUTO: 2.88 10E6/UL (ref 4.4–5.9)
RBC # BLD AUTO: 2.98 10E6/UL (ref 4.4–5.9)
RBC # BLD AUTO: 3.01 10E6/UL (ref 4.4–5.9)
RBC # BLD AUTO: 3.04 10E6/UL (ref 4.4–5.9)
RBC # BLD AUTO: 3.06 10E6/UL (ref 4.4–5.9)
RBC # BLD AUTO: 3.13 10E6/UL (ref 4.4–5.9)
RBC # BLD AUTO: 3.25 10E6/UL (ref 4.4–5.9)
RBC # BLD AUTO: 3.31 10E6/UL (ref 4.4–5.9)
RBC # BLD AUTO: 3.37 10E6/UL (ref 4.4–5.9)
SARS-COV-2 RNA RESP QL NAA+PROBE: NEGATIVE
SARS-COV-2 RNA RESP QL NAA+PROBE: POSITIVE
SODIUM SERPL-SCNC: 129 MMOL/L (ref 136–145)
SODIUM SERPL-SCNC: 133 MMOL/L (ref 136–145)
SODIUM SERPL-SCNC: 134 MMOL/L (ref 133–144)
SODIUM SERPL-SCNC: 134 MMOL/L (ref 133–144)
SODIUM SERPL-SCNC: 135 MMOL/L (ref 133–144)
SODIUM SERPL-SCNC: 135 MMOL/L (ref 133–144)
SODIUM SERPL-SCNC: 135 MMOL/L (ref 136–145)
SODIUM SERPL-SCNC: 136 MMOL/L (ref 133–144)
SODIUM SERPL-SCNC: 136 MMOL/L (ref 136–145)
SODIUM SERPL-SCNC: 136 MMOL/L (ref 136–145)
SODIUM SERPL-SCNC: 137 MMOL/L (ref 133–144)
SODIUM SERPL-SCNC: 137 MMOL/L (ref 133–144)
SODIUM SERPL-SCNC: 137 MMOL/L (ref 136–145)
SODIUM SERPL-SCNC: 138 MMOL/L (ref 133–144)
SODIUM SERPL-SCNC: 138 MMOL/L (ref 136–145)
SODIUM SERPL-SCNC: 139 MMOL/L (ref 136–145)
SODIUM SERPL-SCNC: 140 MMOL/L (ref 136–145)
SYSTOLIC BLOOD PRESSURE - MUSE: NORMAL MMHG
T AXIS - MUSE: 14 DEGREES
T AXIS - MUSE: 37 DEGREES
T AXIS - MUSE: 41 DEGREES
T AXIS - MUSE: 58 DEGREES
T AXIS - MUSE: 6 DEGREES
TRANSFERRIN SERPL-MCNC: 202 MG/DL (ref 200–360)
TRIGL SERPL-MCNC: 112 MG/DL
TROPONIN I SERPL HS-MCNC: 15 NG/L
TROPONIN T SERPL HS-MCNC: 71 NG/L
VENTRICULAR RATE- MUSE: 105 BPM
VENTRICULAR RATE- MUSE: 106 BPM
VENTRICULAR RATE- MUSE: 70 BPM
VENTRICULAR RATE- MUSE: 70 BPM
VENTRICULAR RATE- MUSE: 76 BPM
VIT B12 SERPL-MCNC: 355 PG/ML (ref 213–816)
VIT B12 SERPL-MCNC: 836 PG/ML (ref 232–1245)
WBC # BLD AUTO: 11.3 10E3/UL (ref 4–11)
WBC # BLD AUTO: 6.1 10E3/UL (ref 4–11)
WBC # BLD AUTO: 6.4 10E3/UL (ref 4–11)
WBC # BLD AUTO: 6.6 10E3/UL (ref 4–11)
WBC # BLD AUTO: 6.9 10E3/UL (ref 4–11)
WBC # BLD AUTO: 6.9 10E3/UL (ref 4–11)
WBC # BLD AUTO: 7.6 10E3/UL (ref 4–11)
WBC # BLD AUTO: 7.7 10E3/UL (ref 4–11)
WBC # BLD AUTO: 7.9 10E3/UL (ref 4–11)
WBC # BLD AUTO: 7.9 10E3/UL (ref 4–11)
WBC # BLD AUTO: 8 10E3/UL (ref 4–11)
WBC # BLD AUTO: 8.2 10E3/UL (ref 4–11)
WBC # BLD AUTO: 8.2 10E3/UL (ref 4–11)
WBC # BLD AUTO: 8.9 10E3/UL (ref 4–11)
WBC # BLD AUTO: 9.1 10E3/UL (ref 4–11)
WBC # BLD AUTO: 9.4 10E3/UL (ref 4–11)
WBC # BLD AUTO: 9.7 10E3/UL (ref 4–11)
WBC # BLD AUTO: 9.8 10E3/UL (ref 4–11)

## 2022-01-01 PROCEDURE — 85025 COMPLETE CBC W/AUTO DIFF WBC: CPT | Performed by: EMERGENCY MEDICINE

## 2022-01-01 PROCEDURE — 99607 MTMS BY PHARM ADDL 15 MIN: CPT | Performed by: PHARMACIST

## 2022-01-01 PROCEDURE — 99606 MTMS BY PHARM EST 15 MIN: CPT | Performed by: PHARMACIST

## 2022-01-01 PROCEDURE — 250N000013 HC RX MED GY IP 250 OP 250 PS 637: Performed by: INTERNAL MEDICINE

## 2022-01-01 PROCEDURE — 93283 PRGRMG EVAL IMPLANTABLE DFB: CPT

## 2022-01-01 PROCEDURE — 99207 PR NO CHARGE-RESEARCH SERVICE: CPT

## 2022-01-01 PROCEDURE — 250N000013 HC RX MED GY IP 250 OP 250 PS 637: Performed by: NURSE PRACTITIONER

## 2022-01-01 PROCEDURE — 85027 COMPLETE CBC AUTOMATED: CPT | Performed by: INTERNAL MEDICINE

## 2022-01-01 PROCEDURE — 83735 ASSAY OF MAGNESIUM: CPT | Performed by: PATHOLOGY

## 2022-01-01 PROCEDURE — U0003 INFECTIOUS AGENT DETECTION BY NUCLEIC ACID (DNA OR RNA); SEVERE ACUTE RESPIRATORY SYNDROME CORONAVIRUS 2 (SARS-COV-2) (CORONAVIRUS DISEASE [COVID-19]), AMPLIFIED PROBE TECHNIQUE, MAKING USE OF HIGH THROUGHPUT TECHNOLOGIES AS DESCRIBED BY CMS-2020-01-R: HCPCS

## 2022-01-01 PROCEDURE — 120N000003 HC R&B IMCU UMMC

## 2022-01-01 PROCEDURE — 99233 SBSQ HOSP IP/OBS HIGH 50: CPT | Mod: GC | Performed by: INTERNAL MEDICINE

## 2022-01-01 PROCEDURE — 85610 PROTHROMBIN TIME: CPT

## 2022-01-01 PROCEDURE — 250N000013 HC RX MED GY IP 250 OP 250 PS 637: Performed by: STUDENT IN AN ORGANIZED HEALTH CARE EDUCATION/TRAINING PROGRAM

## 2022-01-01 PROCEDURE — 255N000002 HC RX 255 OP 636: Performed by: RADIOLOGY

## 2022-01-01 PROCEDURE — 36592 COLLECT BLOOD FROM PICC: CPT | Performed by: STUDENT IN AN ORGANIZED HEALTH CARE EDUCATION/TRAINING PROGRAM

## 2022-01-01 PROCEDURE — 250N000013 HC RX MED GY IP 250 OP 250 PS 637

## 2022-01-01 PROCEDURE — 83735 ASSAY OF MAGNESIUM: CPT

## 2022-01-01 PROCEDURE — 83735 ASSAY OF MAGNESIUM: CPT | Performed by: STUDENT IN AN ORGANIZED HEALTH CARE EDUCATION/TRAINING PROGRAM

## 2022-01-01 PROCEDURE — 214N000001 HC R&B CCU UMMC

## 2022-01-01 PROCEDURE — 93284 PRGRMG EVAL IMPLANTABLE DFB: CPT | Performed by: INTERNAL MEDICINE

## 2022-01-01 PROCEDURE — 33241 REMOVE PULSE GENERATOR: CPT | Performed by: INTERNAL MEDICINE

## 2022-01-01 PROCEDURE — 36415 COLL VENOUS BLD VENIPUNCTURE: CPT | Performed by: NURSE PRACTITIONER

## 2022-01-01 PROCEDURE — 99214 OFFICE O/P EST MOD 30 MIN: CPT | Performed by: NURSE PRACTITIONER

## 2022-01-01 PROCEDURE — 99215 OFFICE O/P EST HI 40 MIN: CPT | Mod: GC | Performed by: INTERNAL MEDICINE

## 2022-01-01 PROCEDURE — 82962 GLUCOSE BLOOD TEST: CPT

## 2022-01-01 PROCEDURE — 33249 INSJ/RPLCMT DEFIB W/LEAD(S): CPT | Performed by: INTERNAL MEDICINE

## 2022-01-01 PROCEDURE — 255N000002 HC RX 255 OP 636: Performed by: INTERNAL MEDICINE

## 2022-01-01 PROCEDURE — 272N000451 HC KIT SHRLOCK 5FR POWER PICC DOUBLE LUMEN

## 2022-01-01 PROCEDURE — 99285 EMERGENCY DEPT VISIT HI MDM: CPT | Mod: 25 | Performed by: EMERGENCY MEDICINE

## 2022-01-01 PROCEDURE — U0005 INFEC AGEN DETEC AMPLI PROBE: HCPCS | Performed by: EMERGENCY MEDICINE

## 2022-01-01 PROCEDURE — 272N000001 HC OR GENERAL SUPPLY STERILE: Performed by: INTERNAL MEDICINE

## 2022-01-01 PROCEDURE — 93463 DRUG ADMIN & HEMODYNMIC MEAS: CPT | Performed by: INTERNAL MEDICINE

## 2022-01-01 PROCEDURE — 36415 COLL VENOUS BLD VENIPUNCTURE: CPT | Performed by: INTERNAL MEDICINE

## 2022-01-01 PROCEDURE — 250N000011 HC RX IP 250 OP 636: Performed by: STUDENT IN AN ORGANIZED HEALTH CARE EDUCATION/TRAINING PROGRAM

## 2022-01-01 PROCEDURE — 96375 TX/PRO/DX INJ NEW DRUG ADDON: CPT | Performed by: EMERGENCY MEDICINE

## 2022-01-01 PROCEDURE — C1894 INTRO/SHEATH, NON-LASER: HCPCS | Performed by: INTERNAL MEDICINE

## 2022-01-01 PROCEDURE — 80053 COMPREHEN METABOLIC PANEL: CPT | Performed by: EMERGENCY MEDICINE

## 2022-01-01 PROCEDURE — 85025 COMPLETE CBC W/AUTO DIFF WBC: CPT

## 2022-01-01 PROCEDURE — 82607 VITAMIN B-12: CPT | Performed by: STUDENT IN AN ORGANIZED HEALTH CARE EDUCATION/TRAINING PROGRAM

## 2022-01-01 PROCEDURE — 36569 INSJ PICC 5 YR+ W/O IMAGING: CPT

## 2022-01-01 PROCEDURE — 93306 TTE W/DOPPLER COMPLETE: CPT | Mod: 26 | Performed by: STUDENT IN AN ORGANIZED HEALTH CARE EDUCATION/TRAINING PROGRAM

## 2022-01-01 PROCEDURE — 82746 ASSAY OF FOLIC ACID SERUM: CPT | Performed by: STUDENT IN AN ORGANIZED HEALTH CARE EDUCATION/TRAINING PROGRAM

## 2022-01-01 PROCEDURE — 82610 CYSTATIN C: CPT | Performed by: NURSE PRACTITIONER

## 2022-01-01 PROCEDURE — 82805 BLOOD GASES W/O2 SATURATION: CPT | Performed by: STUDENT IN AN ORGANIZED HEALTH CARE EDUCATION/TRAINING PROGRAM

## 2022-01-01 PROCEDURE — 83880 ASSAY OF NATRIURETIC PEPTIDE: CPT | Performed by: EMERGENCY MEDICINE

## 2022-01-01 PROCEDURE — 71045 X-RAY EXAM CHEST 1 VIEW: CPT | Mod: 26 | Performed by: RADIOLOGY

## 2022-01-01 PROCEDURE — 93005 ELECTROCARDIOGRAM TRACING: CPT

## 2022-01-01 PROCEDURE — U0005 INFEC AGEN DETEC AMPLI PROBE: HCPCS

## 2022-01-01 PROCEDURE — 99232 SBSQ HOSP IP/OBS MODERATE 35: CPT | Mod: 25 | Performed by: NURSE PRACTITIONER

## 2022-01-01 PROCEDURE — 85025 COMPLETE CBC W/AUTO DIFF WBC: CPT | Performed by: INTERNAL MEDICINE

## 2022-01-01 PROCEDURE — 84132 ASSAY OF SERUM POTASSIUM: CPT | Performed by: INTERNAL MEDICINE

## 2022-01-01 PROCEDURE — 80048 BASIC METABOLIC PNL TOTAL CA: CPT | Performed by: NURSE PRACTITIONER

## 2022-01-01 PROCEDURE — 93010 ELECTROCARDIOGRAM REPORT: CPT | Performed by: EMERGENCY MEDICINE

## 2022-01-01 PROCEDURE — 999N000054 HC STATISTIC EKG NON-CHARGEABLE

## 2022-01-01 PROCEDURE — 85730 THROMBOPLASTIN TIME PARTIAL: CPT | Performed by: INTERNAL MEDICINE

## 2022-01-01 PROCEDURE — 83540 ASSAY OF IRON: CPT | Performed by: NURSE PRACTITIONER

## 2022-01-01 PROCEDURE — 99221 1ST HOSP IP/OBS SF/LOW 40: CPT | Mod: GC | Performed by: INTERNAL MEDICINE

## 2022-01-01 PROCEDURE — 99239 HOSP IP/OBS DSCHRG MGMT >30: CPT | Mod: GC | Performed by: INTERNAL MEDICINE

## 2022-01-01 PROCEDURE — 82310 ASSAY OF CALCIUM: CPT | Performed by: STUDENT IN AN ORGANIZED HEALTH CARE EDUCATION/TRAINING PROGRAM

## 2022-01-01 PROCEDURE — 96365 THER/PROPH/DIAG IV INF INIT: CPT | Performed by: EMERGENCY MEDICINE

## 2022-01-01 PROCEDURE — 250N000009 HC RX 250: Performed by: STUDENT IN AN ORGANIZED HEALTH CARE EDUCATION/TRAINING PROGRAM

## 2022-01-01 PROCEDURE — 83880 ASSAY OF NATRIURETIC PEPTIDE: CPT | Performed by: PATHOLOGY

## 2022-01-01 PROCEDURE — 97535 SELF CARE MNGMENT TRAINING: CPT | Mod: GO

## 2022-01-01 PROCEDURE — G0463 HOSPITAL OUTPT CLINIC VISIT: HCPCS

## 2022-01-01 PROCEDURE — 97530 THERAPEUTIC ACTIVITIES: CPT | Mod: GO

## 2022-01-01 PROCEDURE — C1882 AICD, OTHER THAN SING/DUAL: HCPCS | Performed by: INTERNAL MEDICINE

## 2022-01-01 PROCEDURE — 250N000012 HC RX MED GY IP 250 OP 636 PS 637

## 2022-01-01 PROCEDURE — 83550 IRON BINDING TEST: CPT | Performed by: STUDENT IN AN ORGANIZED HEALTH CARE EDUCATION/TRAINING PROGRAM

## 2022-01-01 PROCEDURE — 80048 BASIC METABOLIC PNL TOTAL CA: CPT | Performed by: INTERNAL MEDICINE

## 2022-01-01 PROCEDURE — 258N000003 HC RX IP 258 OP 636: Performed by: STUDENT IN AN ORGANIZED HEALTH CARE EDUCATION/TRAINING PROGRAM

## 2022-01-01 PROCEDURE — 83735 ASSAY OF MAGNESIUM: CPT | Performed by: INTERNAL MEDICINE

## 2022-01-01 PROCEDURE — C1769 GUIDE WIRE: HCPCS | Performed by: INTERNAL MEDICINE

## 2022-01-01 PROCEDURE — 80048 BASIC METABOLIC PNL TOTAL CA: CPT

## 2022-01-01 PROCEDURE — 93296 REM INTERROG EVL PM/IDS: CPT | Performed by: INTERNAL MEDICINE

## 2022-01-01 PROCEDURE — 82607 VITAMIN B-12: CPT | Performed by: NURSE PRACTITIONER

## 2022-01-01 PROCEDURE — 99233 SBSQ HOSP IP/OBS HIGH 50: CPT | Performed by: NURSE PRACTITIONER

## 2022-01-01 PROCEDURE — 36415 COLL VENOUS BLD VENIPUNCTURE: CPT | Performed by: EMERGENCY MEDICINE

## 2022-01-01 PROCEDURE — 250N000011 HC RX IP 250 OP 636

## 2022-01-01 PROCEDURE — 999N000104 HC STATISTIC NO CHARGE: Performed by: EMERGENCY MEDICINE

## 2022-01-01 PROCEDURE — 85018 HEMOGLOBIN: CPT

## 2022-01-01 PROCEDURE — 36415 COLL VENOUS BLD VENIPUNCTURE: CPT | Performed by: STUDENT IN AN ORGANIZED HEALTH CARE EDUCATION/TRAINING PROGRAM

## 2022-01-01 PROCEDURE — 80048 BASIC METABOLIC PNL TOTAL CA: CPT | Performed by: STUDENT IN AN ORGANIZED HEALTH CARE EDUCATION/TRAINING PROGRAM

## 2022-01-01 PROCEDURE — 250N000009 HC RX 250: Performed by: INTERNAL MEDICINE

## 2022-01-01 PROCEDURE — 999N000208 ECHOCARDIOGRAM COMPLETE

## 2022-01-01 PROCEDURE — C1887 CATHETER, GUIDING: HCPCS | Performed by: INTERNAL MEDICINE

## 2022-01-01 PROCEDURE — 99223 1ST HOSP IP/OBS HIGH 75: CPT | Mod: AI | Performed by: INTERNAL MEDICINE

## 2022-01-01 PROCEDURE — C9803 HOPD COVID-19 SPEC COLLECT: HCPCS | Performed by: EMERGENCY MEDICINE

## 2022-01-01 PROCEDURE — 82310 ASSAY OF CALCIUM: CPT

## 2022-01-01 PROCEDURE — 82810 BLOOD GASES O2 SAT ONLY: CPT

## 2022-01-01 PROCEDURE — 93005 ELECTROCARDIOGRAM TRACING: CPT | Performed by: EMERGENCY MEDICINE

## 2022-01-01 PROCEDURE — 99223 1ST HOSP IP/OBS HIGH 75: CPT | Performed by: INTERNAL MEDICINE

## 2022-01-01 PROCEDURE — 96376 TX/PRO/DX INJ SAME DRUG ADON: CPT | Performed by: EMERGENCY MEDICINE

## 2022-01-01 PROCEDURE — 99215 OFFICE O/P EST HI 40 MIN: CPT | Mod: 24 | Performed by: NURSE PRACTITIONER

## 2022-01-01 PROCEDURE — 80053 COMPREHEN METABOLIC PANEL: CPT | Performed by: STUDENT IN AN ORGANIZED HEALTH CARE EDUCATION/TRAINING PROGRAM

## 2022-01-01 PROCEDURE — 85027 COMPLETE CBC AUTOMATED: CPT | Performed by: PATHOLOGY

## 2022-01-01 PROCEDURE — 250N000011 HC RX IP 250 OP 636: Performed by: NURSE PRACTITIONER

## 2022-01-01 PROCEDURE — 71046 X-RAY EXAM CHEST 2 VIEWS: CPT | Mod: 26 | Performed by: STUDENT IN AN ORGANIZED HEALTH CARE EDUCATION/TRAINING PROGRAM

## 2022-01-01 PROCEDURE — 250N000011 HC RX IP 250 OP 636: Performed by: EMERGENCY MEDICINE

## 2022-01-01 PROCEDURE — 85018 HEMOGLOBIN: CPT | Performed by: INTERNAL MEDICINE

## 2022-01-01 PROCEDURE — 83036 HEMOGLOBIN GLYCOSYLATED A1C: CPT | Performed by: STUDENT IN AN ORGANIZED HEALTH CARE EDUCATION/TRAINING PROGRAM

## 2022-01-01 PROCEDURE — 999N000065 XR CHEST PORT 1 VIEW

## 2022-01-01 PROCEDURE — 36415 COLL VENOUS BLD VENIPUNCTURE: CPT | Performed by: PATHOLOGY

## 2022-01-01 PROCEDURE — 36005 INJECTION EXT VENOGRAPHY: CPT

## 2022-01-01 PROCEDURE — 99152 MOD SED SAME PHYS/QHP 5/>YRS: CPT | Performed by: INTERNAL MEDICINE

## 2022-01-01 PROCEDURE — 99232 SBSQ HOSP IP/OBS MODERATE 35: CPT | Performed by: INTERNAL MEDICINE

## 2022-01-01 PROCEDURE — 71046 X-RAY EXAM CHEST 2 VIEWS: CPT

## 2022-01-01 PROCEDURE — 71045 X-RAY EXAM CHEST 1 VIEW: CPT

## 2022-01-01 PROCEDURE — 99239 HOSP IP/OBS DSCHRG MGMT >30: CPT | Mod: 25 | Performed by: INTERNAL MEDICINE

## 2022-01-01 PROCEDURE — 84132 ASSAY OF SERUM POTASSIUM: CPT

## 2022-01-01 PROCEDURE — 75820 VEIN X-RAY ARM/LEG: CPT

## 2022-01-01 PROCEDURE — 85027 COMPLETE CBC AUTOMATED: CPT | Performed by: STUDENT IN AN ORGANIZED HEALTH CARE EDUCATION/TRAINING PROGRAM

## 2022-01-01 PROCEDURE — 93295 DEV INTERROG REMOTE 1/2/MLT: CPT | Performed by: INTERNAL MEDICINE

## 2022-01-01 PROCEDURE — 97165 OT EVAL LOW COMPLEX 30 MIN: CPT | Mod: GO

## 2022-01-01 PROCEDURE — 85610 PROTHROMBIN TIME: CPT | Performed by: EMERGENCY MEDICINE

## 2022-01-01 PROCEDURE — 36415 COLL VENOUS BLD VENIPUNCTURE: CPT

## 2022-01-01 PROCEDURE — 99605 MTMS BY PHARM NP 15 MIN: CPT | Performed by: PHARMACIST

## 2022-01-01 PROCEDURE — 999N000065 XR CHEST 2 VW

## 2022-01-01 PROCEDURE — 80053 COMPREHEN METABOLIC PANEL: CPT | Performed by: PATHOLOGY

## 2022-01-01 PROCEDURE — 85014 HEMATOCRIT: CPT

## 2022-01-01 PROCEDURE — G0463 HOSPITAL OUTPT CLINIC VISIT: HCPCS | Mod: 25

## 2022-01-01 PROCEDURE — 99285 EMERGENCY DEPT VISIT HI MDM: CPT | Performed by: EMERGENCY MEDICINE

## 2022-01-01 PROCEDURE — 97110 THERAPEUTIC EXERCISES: CPT | Mod: GO

## 2022-01-01 PROCEDURE — 82040 ASSAY OF SERUM ALBUMIN: CPT | Performed by: EMERGENCY MEDICINE

## 2022-01-01 PROCEDURE — 93641 EP EVL 1/2CHMB PAC CVDFB TST: CPT | Performed by: INTERNAL MEDICINE

## 2022-01-01 PROCEDURE — 83605 ASSAY OF LACTIC ACID: CPT | Performed by: STUDENT IN AN ORGANIZED HEALTH CARE EDUCATION/TRAINING PROGRAM

## 2022-01-01 PROCEDURE — 93451 RIGHT HEART CATH: CPT | Mod: 26 | Performed by: INTERNAL MEDICINE

## 2022-01-01 PROCEDURE — 250N000009 HC RX 250

## 2022-01-01 PROCEDURE — 82728 ASSAY OF FERRITIN: CPT | Performed by: NURSE PRACTITIONER

## 2022-01-01 PROCEDURE — 99153 MOD SED SAME PHYS/QHP EA: CPT | Performed by: INTERNAL MEDICINE

## 2022-01-01 PROCEDURE — 93641 EP EVL 1/2CHMB PAC CVDFB TST: CPT | Mod: 26 | Performed by: INTERNAL MEDICINE

## 2022-01-01 PROCEDURE — 85018 HEMOGLOBIN: CPT | Performed by: STUDENT IN AN ORGANIZED HEALTH CARE EDUCATION/TRAINING PROGRAM

## 2022-01-01 PROCEDURE — 85730 THROMBOPLASTIN TIME PARTIAL: CPT | Performed by: STUDENT IN AN ORGANIZED HEALTH CARE EDUCATION/TRAINING PROGRAM

## 2022-01-01 PROCEDURE — 99214 OFFICE O/P EST MOD 30 MIN: CPT | Performed by: INTERNAL MEDICINE

## 2022-01-01 PROCEDURE — 96366 THER/PROPH/DIAG IV INF ADDON: CPT | Performed by: EMERGENCY MEDICINE

## 2022-01-01 PROCEDURE — 84132 ASSAY OF SERUM POTASSIUM: CPT | Performed by: STUDENT IN AN ORGANIZED HEALTH CARE EDUCATION/TRAINING PROGRAM

## 2022-01-01 PROCEDURE — 4A023N6 MEASUREMENT OF CARDIAC SAMPLING AND PRESSURE, RIGHT HEART, PERCUTANEOUS APPROACH: ICD-10-PCS | Performed by: INTERNAL MEDICINE

## 2022-01-01 PROCEDURE — 370N000017 HC ANESTHESIA TECHNICAL FEE, PER MIN: Performed by: INTERNAL MEDICINE

## 2022-01-01 PROCEDURE — 84155 ASSAY OF PROTEIN SERUM: CPT

## 2022-01-01 PROCEDURE — C1777 LEAD, AICD, ENDO SINGLE COIL: HCPCS | Performed by: INTERNAL MEDICINE

## 2022-01-01 PROCEDURE — 84466 ASSAY OF TRANSFERRIN: CPT | Performed by: STUDENT IN AN ORGANIZED HEALTH CARE EDUCATION/TRAINING PROGRAM

## 2022-01-01 PROCEDURE — 33264 RMVL & RPLCMT DFB GEN MLT LD: CPT | Performed by: INTERNAL MEDICINE

## 2022-01-01 PROCEDURE — U0003 INFECTIOUS AGENT DETECTION BY NUCLEIC ACID (DNA OR RNA); SEVERE ACUTE RESPIRATORY SYNDROME CORONAVIRUS 2 (SARS-COV-2) (CORONAVIRUS DISEASE [COVID-19]), AMPLIFIED PROBE TECHNIQUE, MAKING USE OF HIGH THROUGHPUT TECHNOLOGIES AS DESCRIBED BY CMS-2020-01-R: HCPCS | Mod: ORL | Performed by: NURSE PRACTITIONER

## 2022-01-01 PROCEDURE — 83735 ASSAY OF MAGNESIUM: CPT | Performed by: NURSE PRACTITIONER

## 2022-01-01 PROCEDURE — 80061 LIPID PANEL: CPT | Performed by: STUDENT IN AN ORGANIZED HEALTH CARE EDUCATION/TRAINING PROGRAM

## 2022-01-01 PROCEDURE — 272N000473 HC KIT, VPS RHYTHM STYLET

## 2022-01-01 PROCEDURE — 93010 ELECTROCARDIOGRAM REPORT: CPT | Performed by: INTERNAL MEDICINE

## 2022-01-01 PROCEDURE — 999N000128 HC STATISTIC PERIPHERAL IV START W/O US GUIDANCE

## 2022-01-01 PROCEDURE — 84484 ASSAY OF TROPONIN QUANT: CPT | Performed by: EMERGENCY MEDICINE

## 2022-01-01 PROCEDURE — 33216 INSERT 1 ELECTRODE PM-DEFIB: CPT | Performed by: INTERNAL MEDICINE

## 2022-01-01 PROCEDURE — 250N000011 HC RX IP 250 OP 636: Performed by: INTERNAL MEDICINE

## 2022-01-01 PROCEDURE — 93283 PRGRMG EVAL IMPLANTABLE DFB: CPT | Mod: 26 | Performed by: INTERNAL MEDICINE

## 2022-01-01 PROCEDURE — 99232 SBSQ HOSP IP/OBS MODERATE 35: CPT | Performed by: NURSE PRACTITIONER

## 2022-01-01 PROCEDURE — 82728 ASSAY OF FERRITIN: CPT | Performed by: STUDENT IN AN ORGANIZED HEALTH CARE EDUCATION/TRAINING PROGRAM

## 2022-01-01 PROCEDURE — 75820 VEIN X-RAY ARM/LEG: CPT | Mod: 26 | Performed by: INTERNAL MEDICINE

## 2022-01-01 PROCEDURE — 93463 DRUG ADMIN & HEMODYNMIC MEAS: CPT | Mod: GC | Performed by: INTERNAL MEDICINE

## 2022-01-01 PROCEDURE — 250N000012 HC RX MED GY IP 250 OP 636 PS 637: Performed by: STUDENT IN AN ORGANIZED HEALTH CARE EDUCATION/TRAINING PROGRAM

## 2022-01-01 PROCEDURE — 93451 RIGHT HEART CATH: CPT | Performed by: INTERNAL MEDICINE

## 2022-01-01 PROCEDURE — U0003 INFECTIOUS AGENT DETECTION BY NUCLEIC ACID (DNA OR RNA); SEVERE ACUTE RESPIRATORY SYNDROME CORONAVIRUS 2 (SARS-COV-2) (CORONAVIRUS DISEASE [COVID-19]), AMPLIFIED PROBE TECHNIQUE, MAKING USE OF HIGH THROUGHPUT TECHNOLOGIES AS DESCRIBED BY CMS-2020-01-R: HCPCS | Performed by: EMERGENCY MEDICINE

## 2022-01-01 DEVICE — CRT-D COBALT XT HF MRI IS1 DF4 SURESCAN DTPA2D4: Type: IMPLANTABLE DEVICE | Site: CHEST  WALL | Status: FUNCTIONAL

## 2022-01-01 DEVICE — LEAD ACTIVE DF4 6935M-62CM: Type: IMPLANTABLE DEVICE | Site: HEART | Status: FUNCTIONAL

## 2022-01-01 RX ORDER — MAGNESIUM OXIDE 400 MG/1
400 TABLET ORAL EVERY 4 HOURS
Status: COMPLETED | OUTPATIENT
Start: 2022-01-01 | End: 2022-01-01

## 2022-01-01 RX ORDER — ACETAMINOPHEN 325 MG/1
650 TABLET ORAL EVERY 6 HOURS PRN
Status: DISCONTINUED | OUTPATIENT
Start: 2022-01-01 | End: 2022-01-01 | Stop reason: HOSPADM

## 2022-01-01 RX ORDER — LORAZEPAM 0.5 MG/1
0.5 TABLET ORAL EVERY 6 HOURS PRN
Qty: 30 TABLET | Refills: 0 | Status: SHIPPED | OUTPATIENT
Start: 2022-01-01

## 2022-01-01 RX ORDER — POTASSIUM CHLORIDE 750 MG/1
40 TABLET, EXTENDED RELEASE ORAL ONCE
Status: COMPLETED | OUTPATIENT
Start: 2022-01-01 | End: 2022-01-01

## 2022-01-01 RX ORDER — CHLOROTHIAZIDE SODIUM 500 MG/1
500 INJECTION INTRAVENOUS ONCE
Status: COMPLETED | OUTPATIENT
Start: 2022-01-01 | End: 2022-01-01

## 2022-01-01 RX ORDER — DEXTROSE MONOHYDRATE 25 G/50ML
25-50 INJECTION, SOLUTION INTRAVENOUS
Status: DISCONTINUED | OUTPATIENT
Start: 2022-01-01 | End: 2022-01-01 | Stop reason: HOSPADM

## 2022-01-01 RX ORDER — HEPARIN SODIUM 10000 [USP'U]/100ML
0-5000 INJECTION, SOLUTION INTRAVENOUS CONTINUOUS
Status: DISCONTINUED | OUTPATIENT
Start: 2022-01-01 | End: 2022-01-01

## 2022-01-01 RX ORDER — ONDANSETRON 4 MG/1
4 TABLET, ORALLY DISINTEGRATING ORAL EVERY 30 MIN PRN
Status: CANCELLED | OUTPATIENT
Start: 2022-01-01

## 2022-01-01 RX ORDER — LORATADINE 10 MG/1
10 TABLET ORAL ONCE
Status: COMPLETED | OUTPATIENT
Start: 2022-01-01 | End: 2022-01-01

## 2022-01-01 RX ORDER — HEPARIN SODIUM 200 [USP'U]/100ML
1 INJECTION, SOLUTION INTRAVENOUS CONTINUOUS PRN
Status: DISCONTINUED | OUTPATIENT
Start: 2022-01-01 | End: 2022-01-01 | Stop reason: HOSPADM

## 2022-01-01 RX ORDER — HYDRALAZINE HYDROCHLORIDE 25 MG/1
25 TABLET, FILM COATED ORAL ONCE
Status: COMPLETED | OUTPATIENT
Start: 2022-01-01 | End: 2022-01-01

## 2022-01-01 RX ORDER — HYDROXYZINE HYDROCHLORIDE 10 MG/1
10-20 TABLET, FILM COATED ORAL EVERY 8 HOURS PRN
Status: ON HOLD | COMMUNITY
Start: 2022-01-01 | End: 2022-01-01

## 2022-01-01 RX ORDER — POTASSIUM CHLORIDE 20MEQ/15ML
60 LIQUID (ML) ORAL DAILY
Status: DISCONTINUED | OUTPATIENT
Start: 2022-01-01 | End: 2022-01-01

## 2022-01-01 RX ORDER — GABAPENTIN 100 MG/1
100 CAPSULE ORAL 3 TIMES DAILY
Status: DISCONTINUED | OUTPATIENT
Start: 2022-01-01 | End: 2022-01-01 | Stop reason: HOSPADM

## 2022-01-01 RX ORDER — POTASSIUM CHLORIDE 750 MG/1
10 TABLET, EXTENDED RELEASE ORAL ONCE
Status: COMPLETED | OUTPATIENT
Start: 2022-01-01 | End: 2022-01-01

## 2022-01-01 RX ORDER — ONDANSETRON 2 MG/ML
4 INJECTION INTRAMUSCULAR; INTRAVENOUS EVERY 6 HOURS PRN
Status: DISCONTINUED | OUTPATIENT
Start: 2022-01-01 | End: 2022-01-01 | Stop reason: HOSPADM

## 2022-01-01 RX ORDER — IODIXANOL 320 MG/ML
INJECTION, SOLUTION INTRAVASCULAR
Status: DISCONTINUED | OUTPATIENT
Start: 2022-01-01 | End: 2022-01-01 | Stop reason: HOSPADM

## 2022-01-01 RX ORDER — NALOXONE HYDROCHLORIDE 0.4 MG/ML
0.4 INJECTION, SOLUTION INTRAMUSCULAR; INTRAVENOUS; SUBCUTANEOUS
Status: DISCONTINUED | OUTPATIENT
Start: 2022-01-01 | End: 2022-01-01 | Stop reason: HOSPADM

## 2022-01-01 RX ORDER — IODIXANOL 320 MG/ML
50 INJECTION, SOLUTION INTRAVASCULAR ONCE
Status: COMPLETED | OUTPATIENT
Start: 2022-01-01 | End: 2022-01-01

## 2022-01-01 RX ORDER — SODIUM CHLORIDE 9 MG/ML
100 INJECTION, SOLUTION INTRAVENOUS CONTINUOUS
Status: CANCELLED | OUTPATIENT
Start: 2022-01-01

## 2022-01-01 RX ORDER — POTASSIUM CHLORIDE 1500 MG/1
20 TABLET, EXTENDED RELEASE ORAL DAILY
COMMUNITY
End: 2022-01-01 | Stop reason: ALTCHOICE

## 2022-01-01 RX ORDER — ACETAMINOPHEN 500 MG
500-1000 TABLET ORAL EVERY 8 HOURS PRN
Status: DISCONTINUED | OUTPATIENT
Start: 2022-01-01 | End: 2022-01-01 | Stop reason: HOSPADM

## 2022-01-01 RX ORDER — FERROUS SULFATE 325(65) MG
325 TABLET ORAL
Status: DISCONTINUED | OUTPATIENT
Start: 2022-01-01 | End: 2022-01-01 | Stop reason: HOSPADM

## 2022-01-01 RX ORDER — HYDROXYZINE HYDROCHLORIDE 10 MG/1
10-20 TABLET, FILM COATED ORAL EVERY 8 HOURS PRN
Status: DISCONTINUED | OUTPATIENT
Start: 2022-01-01 | End: 2022-01-01 | Stop reason: HOSPADM

## 2022-01-01 RX ORDER — ONDANSETRON 4 MG/1
4 TABLET, ORALLY DISINTEGRATING ORAL EVERY 6 HOURS PRN
Qty: 60 TABLET | Refills: 0 | Status: SHIPPED | OUTPATIENT
Start: 2022-01-01

## 2022-01-01 RX ORDER — SODIUM CHLORIDE, SODIUM LACTATE, POTASSIUM CHLORIDE, CALCIUM CHLORIDE 600; 310; 30; 20 MG/100ML; MG/100ML; MG/100ML; MG/100ML
INJECTION, SOLUTION INTRAVENOUS CONTINUOUS
Status: CANCELLED | OUTPATIENT
Start: 2022-01-01

## 2022-01-01 RX ORDER — LIDOCAINE 40 MG/G
CREAM TOPICAL
Status: DISCONTINUED | OUTPATIENT
Start: 2022-01-01 | End: 2022-01-01 | Stop reason: HOSPADM

## 2022-01-01 RX ORDER — VANCOMYCIN HYDROCHLORIDE 1 G/200ML
1000 INJECTION, SOLUTION INTRAVENOUS
Status: DISCONTINUED | OUTPATIENT
Start: 2022-01-01 | End: 2022-01-01 | Stop reason: HOSPADM

## 2022-01-01 RX ORDER — GABAPENTIN 100 MG/1
100 CAPSULE ORAL ONCE
Status: COMPLETED | OUTPATIENT
Start: 2022-01-01 | End: 2022-01-01

## 2022-01-01 RX ORDER — HEPARIN SODIUM,PORCINE 10 UNIT/ML
5-20 VIAL (ML) INTRAVENOUS EVERY 24 HOURS
Status: DISCONTINUED | OUTPATIENT
Start: 2022-01-01 | End: 2022-01-01 | Stop reason: HOSPADM

## 2022-01-01 RX ORDER — NICOTINE POLACRILEX 4 MG
15-30 LOZENGE BUCCAL
Status: DISCONTINUED | OUTPATIENT
Start: 2022-01-01 | End: 2022-01-01 | Stop reason: HOSPADM

## 2022-01-01 RX ORDER — TORSEMIDE 100 MG/1
100 TABLET ORAL
Status: DISCONTINUED | OUTPATIENT
Start: 2022-01-01 | End: 2022-01-01 | Stop reason: HOSPADM

## 2022-01-01 RX ORDER — ONDANSETRON 2 MG/ML
INJECTION INTRAMUSCULAR; INTRAVENOUS
Status: DISCONTINUED | OUTPATIENT
Start: 2022-01-01 | End: 2022-01-01 | Stop reason: HOSPADM

## 2022-01-01 RX ORDER — HEPARIN SODIUM,PORCINE 10 UNIT/ML
5-20 VIAL (ML) INTRAVENOUS
Status: DISCONTINUED | OUTPATIENT
Start: 2022-01-01 | End: 2022-01-01 | Stop reason: HOSPADM

## 2022-01-01 RX ORDER — SODIUM CHLORIDE, SODIUM LACTATE, POTASSIUM CHLORIDE, CALCIUM CHLORIDE 600; 310; 30; 20 MG/100ML; MG/100ML; MG/100ML; MG/100ML
INJECTION, SOLUTION INTRAVENOUS CONTINUOUS
Status: DISCONTINUED | OUTPATIENT
Start: 2022-01-01 | End: 2022-01-01 | Stop reason: HOSPADM

## 2022-01-01 RX ORDER — SODIUM NITROPRUSSIDE 25 MG/ML
INJECTION INTRAVENOUS
Status: DISCONTINUED | OUTPATIENT
Start: 2022-01-01 | End: 2022-01-01 | Stop reason: HOSPADM

## 2022-01-01 RX ORDER — HYDRALAZINE HYDROCHLORIDE 25 MG/1
25 TABLET, FILM COATED ORAL EVERY 8 HOURS SCHEDULED
Status: DISCONTINUED | OUTPATIENT
Start: 2022-01-01 | End: 2022-01-01

## 2022-01-01 RX ORDER — HYDRALAZINE HYDROCHLORIDE 25 MG/1
25 TABLET, FILM COATED ORAL EVERY 8 HOURS SCHEDULED
Status: DISCONTINUED | OUTPATIENT
Start: 2022-01-01 | End: 2022-01-01 | Stop reason: HOSPADM

## 2022-01-01 RX ORDER — HYDROMORPHONE HYDROCHLORIDE 1 MG/ML
0.2 INJECTION, SOLUTION INTRAMUSCULAR; INTRAVENOUS; SUBCUTANEOUS EVERY 5 MIN PRN
Status: CANCELLED | OUTPATIENT
Start: 2022-01-01

## 2022-01-01 RX ORDER — BUMETANIDE 0.25 MG/ML
4 INJECTION INTRAMUSCULAR; INTRAVENOUS ONCE
Status: COMPLETED | OUTPATIENT
Start: 2022-01-01 | End: 2022-01-01

## 2022-01-01 RX ORDER — ATORVASTATIN CALCIUM 40 MG/1
40 TABLET, FILM COATED ORAL DAILY
Status: DISCONTINUED | OUTPATIENT
Start: 2022-01-01 | End: 2022-01-01

## 2022-01-01 RX ORDER — LORAZEPAM 2 MG/ML
0.25 INJECTION INTRAMUSCULAR
Status: COMPLETED | OUTPATIENT
Start: 2022-01-01 | End: 2022-01-01

## 2022-01-01 RX ORDER — HYDRALAZINE HYDROCHLORIDE 25 MG/1
25 TABLET, FILM COATED ORAL 3 TIMES DAILY
Qty: 30 TABLET | Refills: 1 | Status: SHIPPED | OUTPATIENT
Start: 2022-01-01 | End: 2022-01-01

## 2022-01-01 RX ORDER — ATORVASTATIN CALCIUM 40 MG/1
40 TABLET, FILM COATED ORAL DAILY
Status: DISCONTINUED | OUTPATIENT
Start: 2022-01-01 | End: 2022-01-01 | Stop reason: HOSPADM

## 2022-01-01 RX ORDER — SACUBITRIL AND VALSARTAN 24; 26 MG/1; MG/1
1 TABLET, FILM COATED ORAL 2 TIMES DAILY
Qty: 42 TABLET | Refills: 0 | Status: SHIPPED | OUTPATIENT
Start: 2022-01-01 | End: 2022-01-01

## 2022-01-01 RX ORDER — FERROUS SULFATE 325(65) MG
325 TABLET ORAL
Status: ON HOLD | COMMUNITY
End: 2022-01-01

## 2022-01-01 RX ORDER — COLCHICINE 0.6 MG/1
1.2 TABLET ORAL ONCE
Status: COMPLETED | OUTPATIENT
Start: 2022-01-01 | End: 2022-01-01

## 2022-01-01 RX ORDER — BUMETANIDE 0.25 MG/ML
3 INJECTION INTRAMUSCULAR; INTRAVENOUS ONCE
Status: COMPLETED | OUTPATIENT
Start: 2022-01-01 | End: 2022-01-01

## 2022-01-01 RX ORDER — VANCOMYCIN HYDROCHLORIDE 1 G/200ML
1000 INJECTION, SOLUTION INTRAVENOUS
Status: CANCELLED | OUTPATIENT
Start: 2022-01-01

## 2022-01-01 RX ORDER — POTASSIUM CHLORIDE 750 MG/1
20 TABLET, EXTENDED RELEASE ORAL ONCE
Status: COMPLETED | OUTPATIENT
Start: 2022-01-01 | End: 2022-01-01

## 2022-01-01 RX ORDER — VANCOMYCIN HYDROCHLORIDE 1 G/20ML
INJECTION, POWDER, LYOPHILIZED, FOR SOLUTION INTRAVENOUS CONTINUOUS PRN
Status: COMPLETED | OUTPATIENT
Start: 2022-01-01 | End: 2022-01-01

## 2022-01-01 RX ORDER — BUMETANIDE 2 MG/1
4 TABLET ORAL ONCE
Status: COMPLETED | OUTPATIENT
Start: 2022-01-01 | End: 2022-01-01

## 2022-01-01 RX ORDER — FENTANYL CITRATE 50 UG/ML
25 INJECTION, SOLUTION INTRAMUSCULAR; INTRAVENOUS
Status: DISCONTINUED | OUTPATIENT
Start: 2022-01-01 | End: 2022-01-01 | Stop reason: HOSPADM

## 2022-01-01 RX ORDER — DIPHENHYDRAMINE HYDROCHLORIDE 50 MG/ML
50 INJECTION INTRAMUSCULAR; INTRAVENOUS
Status: DISCONTINUED | OUTPATIENT
Start: 2022-01-01 | End: 2022-01-01 | Stop reason: HOSPADM

## 2022-01-01 RX ORDER — ONDANSETRON 2 MG/ML
4 INJECTION INTRAMUSCULAR; INTRAVENOUS EVERY 30 MIN PRN
Status: CANCELLED | OUTPATIENT
Start: 2022-01-01

## 2022-01-01 RX ORDER — POTASSIUM CHLORIDE 1500 MG/1
80 TABLET, EXTENDED RELEASE ORAL ONCE
Status: COMPLETED | OUTPATIENT
Start: 2022-01-01 | End: 2022-01-01

## 2022-01-01 RX ORDER — MEPERIDINE HYDROCHLORIDE 25 MG/ML
12.5 INJECTION INTRAMUSCULAR; INTRAVENOUS; SUBCUTANEOUS
Status: CANCELLED | OUTPATIENT
Start: 2022-01-01

## 2022-01-01 RX ORDER — CHLOROTHIAZIDE SODIUM 500 MG/1
1000 INJECTION INTRAVENOUS ONCE
Status: COMPLETED | OUTPATIENT
Start: 2022-01-01 | End: 2022-01-01

## 2022-01-01 RX ORDER — POTASSIUM CHLORIDE 1500 MG/1
40 TABLET, EXTENDED RELEASE ORAL 2 TIMES DAILY
Qty: 120 TABLET | Refills: 0 | Status: SHIPPED | OUTPATIENT
Start: 2022-01-01 | End: 2022-01-01 | Stop reason: ALTCHOICE

## 2022-01-01 RX ORDER — NALOXONE HYDROCHLORIDE 0.4 MG/ML
0.2 INJECTION, SOLUTION INTRAMUSCULAR; INTRAVENOUS; SUBCUTANEOUS
Status: DISCONTINUED | OUTPATIENT
Start: 2022-01-01 | End: 2022-01-01 | Stop reason: HOSPADM

## 2022-01-01 RX ORDER — FENTANYL CITRATE 50 UG/ML
25 INJECTION, SOLUTION INTRAMUSCULAR; INTRAVENOUS
Status: CANCELLED | OUTPATIENT
Start: 2022-01-01

## 2022-01-01 RX ORDER — LIDOCAINE 40 MG/G
CREAM TOPICAL
Status: ACTIVE | OUTPATIENT
Start: 2022-01-01 | End: 2022-01-01

## 2022-01-01 RX ORDER — DOBUTAMINE HYDROCHLORIDE 200 MG/100ML
5 INJECTION INTRAVENOUS CONTINUOUS
Qty: 250 ML | Refills: 11 | Status: SHIPPED | OUTPATIENT
Start: 2022-01-01

## 2022-01-01 RX ORDER — HYDRALAZINE HYDROCHLORIDE 25 MG/1
25 TABLET, FILM COATED ORAL 4 TIMES DAILY
Status: DISCONTINUED | OUTPATIENT
Start: 2022-01-01 | End: 2022-01-01

## 2022-01-01 RX ORDER — LORAZEPAM 0.5 MG/1
.25-.5 TABLET ORAL EVERY 4 HOURS PRN
Status: DISCONTINUED | OUTPATIENT
Start: 2022-01-01 | End: 2022-01-01 | Stop reason: HOSPADM

## 2022-01-01 RX ORDER — TORSEMIDE 100 MG/1
100 TABLET ORAL 2 TIMES DAILY
Qty: 120 TABLET | Refills: 1 | Status: SHIPPED | OUTPATIENT
Start: 2022-01-01

## 2022-01-01 RX ORDER — SODIUM CHLORIDE 9 MG/ML
100 INJECTION, SOLUTION INTRAVENOUS CONTINUOUS
Status: DISCONTINUED | OUTPATIENT
Start: 2022-01-01 | End: 2022-01-01 | Stop reason: HOSPADM

## 2022-01-01 RX ORDER — CARVEDILOL 6.25 MG/1
6.25 TABLET ORAL 2 TIMES DAILY WITH MEALS
Qty: 360 TABLET | Refills: 0 | Status: SHIPPED | OUTPATIENT
Start: 2022-01-01 | End: 2022-01-01

## 2022-01-01 RX ORDER — BUMETANIDE 1 MG/1
1 TABLET ORAL 2 TIMES DAILY
Qty: 180 TABLET | Refills: 1 | COMMUNITY
Start: 2022-01-01 | End: 2022-01-01

## 2022-01-01 RX ORDER — BUMETANIDE 0.25 MG/ML
2 INJECTION INTRAMUSCULAR; INTRAVENOUS ONCE
Status: COMPLETED | OUTPATIENT
Start: 2022-01-01 | End: 2022-01-01

## 2022-01-01 RX ORDER — SPIRONOLACTONE 25 MG
12.5 TABLET ORAL DAILY
Status: DISCONTINUED | OUTPATIENT
Start: 2022-01-01 | End: 2022-01-01

## 2022-01-01 RX ORDER — DEXMEDETOMIDINE HYDROCHLORIDE 4 UG/ML
.1-1.5 INJECTION, SOLUTION INTRAVENOUS CONTINUOUS
Status: DISCONTINUED | OUTPATIENT
Start: 2022-01-01 | End: 2022-01-01 | Stop reason: HOSPADM

## 2022-01-01 RX ORDER — TRAMADOL HYDROCHLORIDE 50 MG/1
1 TABLET ORAL PRN
Status: ON HOLD | COMMUNITY
Start: 2021-01-01 | End: 2022-01-01

## 2022-01-01 RX ORDER — OXYCODONE HYDROCHLORIDE 5 MG/1
5 TABLET ORAL EVERY 4 HOURS PRN
Status: CANCELLED | OUTPATIENT
Start: 2022-01-01

## 2022-01-01 RX ORDER — OXYCODONE HYDROCHLORIDE 5 MG/1
10 TABLET ORAL EVERY 4 HOURS PRN
Status: DISCONTINUED | OUTPATIENT
Start: 2022-01-01 | End: 2022-01-01 | Stop reason: HOSPADM

## 2022-01-01 RX ORDER — FENTANYL CITRATE 50 UG/ML
25 INJECTION, SOLUTION INTRAMUSCULAR; INTRAVENOUS EVERY 5 MIN PRN
Status: CANCELLED | OUTPATIENT
Start: 2022-01-01

## 2022-01-01 RX ORDER — CALCIUM CARBONATE 500 MG/1
500 TABLET, CHEWABLE ORAL DAILY PRN
Status: DISCONTINUED | OUTPATIENT
Start: 2022-01-01 | End: 2022-01-01 | Stop reason: HOSPADM

## 2022-01-01 RX ORDER — HYDRALAZINE HYDROCHLORIDE 25 MG/1
25 TABLET, FILM COATED ORAL 3 TIMES DAILY
Qty: 270 TABLET | Refills: 3 | Status: SHIPPED | OUTPATIENT
Start: 2022-01-01

## 2022-01-01 RX ORDER — LANOLIN ALCOHOL/MO/W.PET/CERES
3 CREAM (GRAM) TOPICAL
Status: DISCONTINUED | OUTPATIENT
Start: 2022-01-01 | End: 2022-01-01 | Stop reason: HOSPADM

## 2022-01-01 RX ORDER — POTASSIUM CHLORIDE 1500 MG/1
80 TABLET, EXTENDED RELEASE ORAL ONCE
Status: DISCONTINUED | OUTPATIENT
Start: 2022-01-01 | End: 2022-01-01

## 2022-01-01 RX ORDER — MAGNESIUM HYDROXIDE/ALUMINUM HYDROXICE/SIMETHICONE 120; 1200; 1200 MG/30ML; MG/30ML; MG/30ML
30 SUSPENSION ORAL EVERY 4 HOURS PRN
Status: DISCONTINUED | OUTPATIENT
Start: 2022-01-01 | End: 2022-01-01 | Stop reason: HOSPADM

## 2022-01-01 RX ORDER — BUMETANIDE 0.5 MG/1
1 TABLET ORAL 2 TIMES DAILY
Status: DISCONTINUED | OUTPATIENT
Start: 2022-01-01 | End: 2022-01-01 | Stop reason: HOSPADM

## 2022-01-01 RX ORDER — OXYCODONE HYDROCHLORIDE 5 MG/1
5 TABLET ORAL EVERY 4 HOURS PRN
Status: DISCONTINUED | OUTPATIENT
Start: 2022-01-01 | End: 2022-01-01 | Stop reason: HOSPADM

## 2022-01-01 RX ORDER — POTASSIUM CHLORIDE 1500 MG/1
60 TABLET, EXTENDED RELEASE ORAL 2 TIMES DAILY
Status: DISCONTINUED | OUTPATIENT
Start: 2022-01-01 | End: 2022-01-01 | Stop reason: HOSPADM

## 2022-01-01 RX ORDER — POTASSIUM CHLORIDE 750 MG/1
20 TABLET, EXTENDED RELEASE ORAL DAILY
Qty: 120 TABLET | Refills: 1 | Status: SHIPPED | OUTPATIENT
Start: 2022-01-01

## 2022-01-01 RX ORDER — HEPARIN SODIUM (PORCINE) LOCK FLUSH IV SOLN 100 UNIT/ML 100 UNIT/ML
100 SOLUTION INTRAVENOUS ONCE
Status: DISCONTINUED | OUTPATIENT
Start: 2022-01-01 | End: 2022-01-01

## 2022-01-01 RX ORDER — DEXMEDETOMIDINE HYDROCHLORIDE 4 UG/ML
.1-1.5 INJECTION, SOLUTION INTRAVENOUS CONTINUOUS
Status: CANCELLED | OUTPATIENT
Start: 2022-01-01

## 2022-01-01 RX ORDER — DEXTROSE MONOHYDRATE 25 G/50ML
25-50 INJECTION, SOLUTION INTRAVENOUS
Status: DISCONTINUED | OUTPATIENT
Start: 2022-01-01 | End: 2022-01-01

## 2022-01-01 RX ORDER — POTASSIUM CHLORIDE 750 MG/1
20 TABLET, EXTENDED RELEASE ORAL 2 TIMES DAILY
Qty: 120 TABLET | Refills: 1 | Status: SHIPPED | OUTPATIENT
Start: 2022-01-01 | End: 2022-01-01

## 2022-01-01 RX ORDER — SACUBITRIL AND VALSARTAN 24; 26 MG/1; MG/1
1 TABLET, FILM COATED ORAL 2 TIMES DAILY
Qty: 42 TABLET | Refills: 0 | COMMUNITY
Start: 2022-01-01 | End: 2022-01-01 | Stop reason: ALTCHOICE

## 2022-01-01 RX ORDER — DIPHENHYDRAMINE HCL 25 MG
25 CAPSULE ORAL AT BEDTIME
Status: DISCONTINUED | OUTPATIENT
Start: 2022-01-01 | End: 2022-01-01 | Stop reason: HOSPADM

## 2022-01-01 RX ORDER — LANOLIN ALCOHOL/MO/W.PET/CERES
1 CREAM (GRAM) TOPICAL
COMMUNITY

## 2022-01-01 RX ORDER — POTASSIUM CHLORIDE 750 MG/1
20 TABLET, EXTENDED RELEASE ORAL 2 TIMES DAILY
Status: DISCONTINUED | OUTPATIENT
Start: 2022-01-01 | End: 2022-01-01 | Stop reason: HOSPADM

## 2022-01-01 RX ORDER — NICOTINE POLACRILEX 4 MG
15-30 LOZENGE BUCCAL
Status: DISCONTINUED | OUTPATIENT
Start: 2022-01-01 | End: 2022-01-01

## 2022-01-01 RX ORDER — METHYLPREDNISOLONE SODIUM SUCCINATE 125 MG/2ML
125 INJECTION, POWDER, LYOPHILIZED, FOR SOLUTION INTRAMUSCULAR; INTRAVENOUS
Status: DISCONTINUED | OUTPATIENT
Start: 2022-01-01 | End: 2022-01-01 | Stop reason: HOSPADM

## 2022-01-01 RX ORDER — POTASSIUM CHLORIDE 750 MG/1
40 TABLET, EXTENDED RELEASE ORAL ONCE
Status: DISCONTINUED | OUTPATIENT
Start: 2022-01-01 | End: 2022-01-01

## 2022-01-01 RX ORDER — OXYCODONE HYDROCHLORIDE 5 MG/1
2.5-5 TABLET ORAL EVERY 6 HOURS PRN
Qty: 30 TABLET | Refills: 0 | Status: SHIPPED | OUTPATIENT
Start: 2022-01-01

## 2022-01-01 RX ORDER — HYDRALAZINE HYDROCHLORIDE 25 MG/1
25 TABLET, FILM COATED ORAL 3 TIMES DAILY
Status: DISCONTINUED | OUTPATIENT
Start: 2022-01-01 | End: 2022-01-01 | Stop reason: HOSPADM

## 2022-01-01 RX ORDER — CARVEDILOL 12.5 MG/1
12.5 TABLET ORAL 2 TIMES DAILY WITH MEALS
Status: DISCONTINUED | OUTPATIENT
Start: 2022-01-01 | End: 2022-01-01 | Stop reason: HOSPADM

## 2022-01-01 RX ORDER — DIAZEPAM 5 MG
5 TABLET ORAL ONCE
Status: COMPLETED | OUTPATIENT
Start: 2022-01-01 | End: 2022-01-01

## 2022-01-01 RX ORDER — ACETAMINOPHEN 325 MG/1
650 TABLET ORAL EVERY 4 HOURS PRN
Status: DISCONTINUED | OUTPATIENT
Start: 2022-01-01 | End: 2022-01-01 | Stop reason: HOSPADM

## 2022-01-01 RX ORDER — FENTANYL CITRATE 50 UG/ML
INJECTION, SOLUTION INTRAMUSCULAR; INTRAVENOUS
Status: DISCONTINUED | OUTPATIENT
Start: 2022-01-01 | End: 2022-01-01 | Stop reason: HOSPADM

## 2022-01-01 RX ORDER — BUMETANIDE 0.25 MG/ML
3 INJECTION INTRAMUSCULAR; INTRAVENOUS ONCE
Status: CANCELLED | OUTPATIENT
Start: 2022-01-01 | End: 2022-01-01

## 2022-01-01 RX ORDER — ONDANSETRON 8 MG/1
8 TABLET, FILM COATED ORAL EVERY 8 HOURS PRN
Status: DISCONTINUED | OUTPATIENT
Start: 2022-01-01 | End: 2022-01-01 | Stop reason: HOSPADM

## 2022-01-01 RX ORDER — ACETAMINOPHEN 325 MG/1
650 TABLET ORAL EVERY 4 HOURS PRN
Status: DISCONTINUED | OUTPATIENT
Start: 2022-01-01 | End: 2022-01-01

## 2022-01-01 RX ORDER — DOBUTAMINE HYDROCHLORIDE 200 MG/100ML
5 INJECTION INTRAVENOUS CONTINUOUS
Status: DISCONTINUED | OUTPATIENT
Start: 2022-01-01 | End: 2022-01-01 | Stop reason: HOSPADM

## 2022-01-01 RX ORDER — LIDOCAINE 40 MG/G
CREAM TOPICAL
Status: CANCELLED | OUTPATIENT
Start: 2022-01-01

## 2022-01-01 RX ORDER — COLCHICINE 0.6 MG/1
0.6 TABLET ORAL ONCE
Status: COMPLETED | OUTPATIENT
Start: 2022-01-01 | End: 2022-01-01

## 2022-01-01 RX ORDER — SPIRONOLACTONE 25 MG/1
25 TABLET ORAL DAILY
Qty: 90 TABLET | Refills: 1 | Status: SHIPPED | OUTPATIENT
Start: 2022-01-01

## 2022-01-01 RX ORDER — HEPARIN SODIUM,PORCINE 10 UNIT/ML
VIAL (ML) INTRAVENOUS
Status: COMPLETED
Start: 2022-01-01 | End: 2022-01-01

## 2022-01-01 RX ORDER — BUMETANIDE 0.25 MG/ML
2 INJECTION INTRAMUSCULAR; INTRAVENOUS ONCE
Status: DISCONTINUED | OUTPATIENT
Start: 2022-01-01 | End: 2022-01-01

## 2022-01-01 RX ORDER — HYDRALAZINE HYDROCHLORIDE 50 MG/1
50 TABLET, FILM COATED ORAL EVERY 8 HOURS SCHEDULED
Status: DISCONTINUED | OUTPATIENT
Start: 2022-01-01 | End: 2022-01-01

## 2022-01-01 RX ORDER — MAGNESIUM SULFATE 1 G/100ML
1 INJECTION INTRAVENOUS ONCE
Status: COMPLETED | OUTPATIENT
Start: 2022-01-01 | End: 2022-01-01

## 2022-01-01 RX ADMIN — INSULIN GLARGINE 8 UNITS: 100 INJECTION, SOLUTION SUBCUTANEOUS at 21:22

## 2022-01-01 RX ADMIN — GABAPENTIN 100 MG: 100 CAPSULE ORAL at 15:18

## 2022-01-01 RX ADMIN — CHLOROTHIAZIDE SODIUM 1000 MG: 500 INJECTION, POWDER, LYOPHILIZED, FOR SOLUTION INTRAVENOUS at 15:09

## 2022-01-01 RX ADMIN — CARVEDILOL 12.5 MG: 12.5 TABLET, FILM COATED ORAL at 07:59

## 2022-01-01 RX ADMIN — DOBUTAMINE HYDROCHLORIDE 5 MCG/KG/MIN: 200 INJECTION INTRAVENOUS at 20:45

## 2022-01-01 RX ADMIN — FERROUS SULFATE TAB 325 MG (65 MG ELEMENTAL FE) 325 MG: 325 (65 FE) TAB at 08:23

## 2022-01-01 RX ADMIN — FUROSEMIDE 20 MG/HR: 10 INJECTION, SOLUTION INTRAMUSCULAR; INTRAVENOUS at 06:08

## 2022-01-01 RX ADMIN — HUMAN ALBUMIN MICROSPHERES AND PERFLUTREN 5 ML: 10; .22 INJECTION, SOLUTION INTRAVENOUS at 11:54

## 2022-01-01 RX ADMIN — ACETAMINOPHEN 500 MG: 500 TABLET ORAL at 07:48

## 2022-01-01 RX ADMIN — ACETAMINOPHEN 650 MG: 325 TABLET, FILM COATED ORAL at 21:58

## 2022-01-01 RX ADMIN — CARVEDILOL 12.5 MG: 12.5 TABLET, FILM COATED ORAL at 00:17

## 2022-01-01 RX ADMIN — HYDROXYZINE HYDROCHLORIDE 10 MG: 10 TABLET ORAL at 21:31

## 2022-01-01 RX ADMIN — POTASSIUM CHLORIDE 20 MEQ: 750 TABLET, EXTENDED RELEASE ORAL at 08:44

## 2022-01-01 RX ADMIN — SODIUM NITROPRUSSIDE 2 MCG: 25 INJECTION, SOLUTION, CONCENTRATE INTRAVENOUS at 18:44

## 2022-01-01 RX ADMIN — GABAPENTIN 100 MG: 100 CAPSULE ORAL at 07:50

## 2022-01-01 RX ADMIN — INSULIN ASPART 1 UNITS: 100 INJECTION, SOLUTION INTRAVENOUS; SUBCUTANEOUS at 22:10

## 2022-01-01 RX ADMIN — INSULIN GLARGINE 15 UNITS: 100 INJECTION, SOLUTION SUBCUTANEOUS at 08:38

## 2022-01-01 RX ADMIN — BUMETANIDE 1 MG: 0.5 TABLET ORAL at 09:48

## 2022-01-01 RX ADMIN — ACETAMINOPHEN 650 MG: 325 TABLET, FILM COATED ORAL at 14:36

## 2022-01-01 RX ADMIN — DIPHENHYDRAMINE HYDROCHLORIDE 25 MG: 25 CAPSULE ORAL at 00:27

## 2022-01-01 RX ADMIN — DOBUTAMINE HYDROCHLORIDE 5 MCG/KG/MIN: 200 INJECTION INTRAVENOUS at 19:05

## 2022-01-01 RX ADMIN — IRON SUCROSE 200 MG: 20 INJECTION, SOLUTION INTRAVENOUS at 14:21

## 2022-01-01 RX ADMIN — LORAZEPAM 0.25 MG: 2 INJECTION INTRAMUSCULAR at 22:27

## 2022-01-01 RX ADMIN — ACETAMINOPHEN 1000 MG: 500 TABLET ORAL at 13:29

## 2022-01-01 RX ADMIN — TICAGRELOR 90 MG: 90 TABLET ORAL at 20:50

## 2022-01-01 RX ADMIN — TICAGRELOR 90 MG: 90 TABLET ORAL at 07:49

## 2022-01-01 RX ADMIN — TICAGRELOR 90 MG: 90 TABLET ORAL at 09:23

## 2022-01-01 RX ADMIN — HYDRALAZINE HYDROCHLORIDE 25 MG: 25 TABLET, FILM COATED ORAL at 19:59

## 2022-01-01 RX ADMIN — POTASSIUM CHLORIDE 10 MEQ: 750 TABLET, EXTENDED RELEASE ORAL at 21:35

## 2022-01-01 RX ADMIN — HYDRALAZINE HYDROCHLORIDE 25 MG: 25 TABLET, FILM COATED ORAL at 07:50

## 2022-01-01 RX ADMIN — INSULIN ASPART 3 UNITS: 100 INJECTION, SOLUTION INTRAVENOUS; SUBCUTANEOUS at 12:01

## 2022-01-01 RX ADMIN — TICAGRELOR 90 MG: 90 TABLET ORAL at 08:24

## 2022-01-01 RX ADMIN — POTASSIUM CHLORIDE 80 MEQ: 1500 TABLET, EXTENDED RELEASE ORAL at 08:38

## 2022-01-01 RX ADMIN — GABAPENTIN 100 MG: 100 CAPSULE ORAL at 14:37

## 2022-01-01 RX ADMIN — CARVEDILOL 12.5 MG: 12.5 TABLET, FILM COATED ORAL at 18:34

## 2022-01-01 RX ADMIN — DOBUTAMINE HYDROCHLORIDE 5 MCG/KG/MIN: 200 INJECTION INTRAVENOUS at 18:04

## 2022-01-01 RX ADMIN — APIXABAN 5 MG: 5 TABLET, FILM COATED ORAL at 09:23

## 2022-01-01 RX ADMIN — HYDRALAZINE HYDROCHLORIDE 25 MG: 25 TABLET, FILM COATED ORAL at 20:30

## 2022-01-01 RX ADMIN — BUMETANIDE 2 MG/HR: 0.25 INJECTION INTRAMUSCULAR; INTRAVENOUS at 20:02

## 2022-01-01 RX ADMIN — ATORVASTATIN CALCIUM 40 MG: 40 TABLET, FILM COATED ORAL at 09:23

## 2022-01-01 RX ADMIN — POTASSIUM CHLORIDE 10 MEQ: 750 TABLET, EXTENDED RELEASE ORAL at 19:47

## 2022-01-01 RX ADMIN — Medication 1 LOZENGE: at 17:54

## 2022-01-01 RX ADMIN — HYDRALAZINE HYDROCHLORIDE 25 MG: 25 TABLET, FILM COATED ORAL at 15:32

## 2022-01-01 RX ADMIN — COLCHICINE 0.6 MG: 0.6 TABLET, FILM COATED ORAL at 07:19

## 2022-01-01 RX ADMIN — IRON SUCROSE 200 MG: 20 INJECTION, SOLUTION INTRAVENOUS at 18:42

## 2022-01-01 RX ADMIN — CARVEDILOL 12.5 MG: 12.5 TABLET, FILM COATED ORAL at 09:02

## 2022-01-01 RX ADMIN — BUMETANIDE 4 MG: 2 TABLET ORAL at 18:34

## 2022-01-01 RX ADMIN — POTASSIUM CHLORIDE 40 MEQ: 750 TABLET, EXTENDED RELEASE ORAL at 22:56

## 2022-01-01 RX ADMIN — Medication 400 MG: at 11:38

## 2022-01-01 RX ADMIN — APIXABAN 5 MG: 5 TABLET, FILM COATED ORAL at 20:25

## 2022-01-01 RX ADMIN — CHLOROTHIAZIDE SODIUM 500 MG: 500 INJECTION, POWDER, LYOPHILIZED, FOR SOLUTION INTRAVENOUS at 12:14

## 2022-01-01 RX ADMIN — TICAGRELOR 90 MG: 90 TABLET ORAL at 07:45

## 2022-01-01 RX ADMIN — ATORVASTATIN CALCIUM 40 MG: 40 TABLET, FILM COATED ORAL at 09:02

## 2022-01-01 RX ADMIN — HYDRALAZINE HYDROCHLORIDE 25 MG: 25 TABLET, FILM COATED ORAL at 08:23

## 2022-01-01 RX ADMIN — GABAPENTIN 100 MG: 100 CAPSULE ORAL at 19:59

## 2022-01-01 RX ADMIN — GABAPENTIN 100 MG: 100 CAPSULE ORAL at 21:57

## 2022-01-01 RX ADMIN — POTASSIUM CHLORIDE 10 MEQ: 750 TABLET, EXTENDED RELEASE ORAL at 08:38

## 2022-01-01 RX ADMIN — GABAPENTIN 100 MG: 100 CAPSULE ORAL at 20:30

## 2022-01-01 RX ADMIN — BUMETANIDE 2 MG: 0.25 INJECTION INTRAMUSCULAR; INTRAVENOUS at 18:44

## 2022-01-01 RX ADMIN — FUROSEMIDE 20 MG/HR: 10 INJECTION, SOLUTION INTRAMUSCULAR; INTRAVENOUS at 05:39

## 2022-01-01 RX ADMIN — METHOHEXITAL SODIUM 25 MG: 500 INJECTION, POWDER, LYOPHILIZED, FOR SOLUTION INTRAMUSCULAR; INTRAVENOUS; RECTAL at 09:44

## 2022-01-01 RX ADMIN — ACETAMINOPHEN 500 MG: 500 TABLET ORAL at 07:49

## 2022-01-01 RX ADMIN — CHLOROTHIAZIDE SODIUM 500 MG: 500 INJECTION, POWDER, LYOPHILIZED, FOR SOLUTION INTRAVENOUS at 18:21

## 2022-01-01 RX ADMIN — DOBUTAMINE HYDROCHLORIDE 2.5 MCG/KG/MIN: 200 INJECTION INTRAVENOUS at 14:31

## 2022-01-01 RX ADMIN — TICAGRELOR 90 MG: 90 TABLET ORAL at 07:47

## 2022-01-01 RX ADMIN — GABAPENTIN 100 MG: 100 CAPSULE ORAL at 09:22

## 2022-01-01 RX ADMIN — ATORVASTATIN CALCIUM 40 MG: 40 TABLET, FILM COATED ORAL at 08:37

## 2022-01-01 RX ADMIN — IRON SUCROSE 200 MG: 20 INJECTION, SOLUTION INTRAVENOUS at 17:18

## 2022-01-01 RX ADMIN — POTASSIUM CHLORIDE 40 MEQ: 750 TABLET, EXTENDED RELEASE ORAL at 10:00

## 2022-01-01 RX ADMIN — CHLOROTHIAZIDE SODIUM 1000 MG: 500 INJECTION, POWDER, LYOPHILIZED, FOR SOLUTION INTRAVENOUS at 17:57

## 2022-01-01 RX ADMIN — GABAPENTIN 100 MG: 100 CAPSULE ORAL at 19:55

## 2022-01-01 RX ADMIN — Medication 3 MG: at 21:13

## 2022-01-01 RX ADMIN — FERROUS SULFATE TAB 325 MG (65 MG ELEMENTAL FE) 325 MG: 325 (65 FE) TAB at 08:43

## 2022-01-01 RX ADMIN — BUMETANIDE 3 MG: 0.25 INJECTION INTRAMUSCULAR; INTRAVENOUS at 09:59

## 2022-01-01 RX ADMIN — DIPHENHYDRAMINE HYDROCHLORIDE 25 MG: 25 CAPSULE ORAL at 22:56

## 2022-01-01 RX ADMIN — TORSEMIDE 100 MG: 100 TABLET ORAL at 17:03

## 2022-01-01 RX ADMIN — APIXABAN 5 MG: 5 TABLET, FILM COATED ORAL at 19:55

## 2022-01-01 RX ADMIN — ACETAMINOPHEN 650 MG: 325 TABLET, FILM COATED ORAL at 02:59

## 2022-01-01 RX ADMIN — APIXABAN 5 MG: 5 TABLET, FILM COATED ORAL at 19:34

## 2022-01-01 RX ADMIN — GABAPENTIN 100 MG: 100 CAPSULE ORAL at 14:21

## 2022-01-01 RX ADMIN — POTASSIUM CHLORIDE 60 MEQ: 1500 TABLET, EXTENDED RELEASE ORAL at 19:59

## 2022-01-01 RX ADMIN — Medication 400 MG: at 07:49

## 2022-01-01 RX ADMIN — INSULIN ASPART 1 UNITS: 100 INJECTION, SOLUTION INTRAVENOUS; SUBCUTANEOUS at 00:36

## 2022-01-01 RX ADMIN — ATORVASTATIN CALCIUM 40 MG: 40 TABLET, FILM COATED ORAL at 07:45

## 2022-01-01 RX ADMIN — POTASSIUM CHLORIDE 60 MEQ: 1500 TABLET, EXTENDED RELEASE ORAL at 20:11

## 2022-01-01 RX ADMIN — FUROSEMIDE 20 MG/HR: 10 INJECTION, SOLUTION INTRAMUSCULAR; INTRAVENOUS at 07:28

## 2022-01-01 RX ADMIN — BUMETANIDE 4 MG: 0.25 INJECTION, SOLUTION INTRAMUSCULAR; INTRAVENOUS at 11:50

## 2022-01-01 RX ADMIN — POTASSIUM CHLORIDE 60 MEQ: 1500 TABLET, EXTENDED RELEASE ORAL at 20:34

## 2022-01-01 RX ADMIN — TORSEMIDE 100 MG: 100 TABLET ORAL at 10:06

## 2022-01-01 RX ADMIN — HYDRALAZINE HYDROCHLORIDE 25 MG: 25 TABLET, FILM COATED ORAL at 14:21

## 2022-01-01 RX ADMIN — ONDANSETRON 4 MG: 2 INJECTION INTRAMUSCULAR; INTRAVENOUS at 09:02

## 2022-01-01 RX ADMIN — IRON SUCROSE 200 MG: 20 INJECTION, SOLUTION INTRAVENOUS at 15:32

## 2022-01-01 RX ADMIN — HYDRALAZINE HYDROCHLORIDE 25 MG: 25 TABLET, FILM COATED ORAL at 15:18

## 2022-01-01 RX ADMIN — Medication 3 MG: at 20:50

## 2022-01-01 RX ADMIN — INSULIN ASPART 2 UNITS: 100 INJECTION, SOLUTION INTRAVENOUS; SUBCUTANEOUS at 21:37

## 2022-01-01 RX ADMIN — Medication 0.25 MG: at 09:23

## 2022-01-01 RX ADMIN — IODIXANOL 15 ML: 320 INJECTION, SOLUTION INTRAVASCULAR at 08:06

## 2022-01-01 RX ADMIN — ACETAMINOPHEN 650 MG: 325 TABLET, FILM COATED ORAL at 21:35

## 2022-01-01 RX ADMIN — BUMETANIDE 4 MG: 0.25 INJECTION, SOLUTION INTRAMUSCULAR; INTRAVENOUS at 22:24

## 2022-01-01 RX ADMIN — GABAPENTIN 100 MG: 100 CAPSULE ORAL at 15:08

## 2022-01-01 RX ADMIN — COLCHICINE 1.2 MG: 0.6 TABLET, FILM COATED ORAL at 06:00

## 2022-01-01 RX ADMIN — DOBUTAMINE HYDROCHLORIDE 5 MCG/KG/MIN: 200 INJECTION INTRAVENOUS at 21:15

## 2022-01-01 RX ADMIN — INSULIN ASPART 2 UNITS: 100 INJECTION, SOLUTION INTRAVENOUS; SUBCUTANEOUS at 18:42

## 2022-01-01 RX ADMIN — BUMETANIDE 2 MG/HR: 0.25 INJECTION INTRAMUSCULAR; INTRAVENOUS at 10:09

## 2022-01-01 RX ADMIN — GABAPENTIN 100 MG: 100 CAPSULE ORAL at 20:50

## 2022-01-01 RX ADMIN — GABAPENTIN 100 MG: 100 CAPSULE ORAL at 15:09

## 2022-01-01 RX ADMIN — CALCIUM CARBONATE (ANTACID) CHEW TAB 500 MG 500 MG: 500 CHEW TAB at 14:36

## 2022-01-01 RX ADMIN — METHOHEXITAL SODIUM 45 MG: 500 INJECTION, POWDER, LYOPHILIZED, FOR SOLUTION INTRAMUSCULAR; INTRAVENOUS; RECTAL at 09:43

## 2022-01-01 RX ADMIN — MAGNESIUM SULFATE HEPTAHYDRATE 1 G: 1 INJECTION, SOLUTION INTRAVENOUS at 23:06

## 2022-01-01 RX ADMIN — CARVEDILOL 12.5 MG: 12.5 TABLET, FILM COATED ORAL at 07:45

## 2022-01-01 RX ADMIN — POTASSIUM CHLORIDE 60 MEQ: 1500 TABLET, EXTENDED RELEASE ORAL at 09:09

## 2022-01-01 RX ADMIN — DOBUTAMINE HYDROCHLORIDE 5 MCG/KG/MIN: 200 INJECTION INTRAVENOUS at 17:18

## 2022-01-01 RX ADMIN — CARVEDILOL 12.5 MG: 12.5 TABLET, FILM COATED ORAL at 18:19

## 2022-01-01 RX ADMIN — INSULIN GLARGINE 15 UNITS: 100 INJECTION, SOLUTION SUBCUTANEOUS at 18:25

## 2022-01-01 RX ADMIN — HYDRALAZINE HYDROCHLORIDE 25 MG: 25 TABLET, FILM COATED ORAL at 08:43

## 2022-01-01 RX ADMIN — DIAZEPAM 5 MG: 5 TABLET ORAL at 07:54

## 2022-01-01 RX ADMIN — INSULIN ASPART 2 UNITS: 100 INJECTION, SOLUTION INTRAVENOUS; SUBCUTANEOUS at 00:15

## 2022-01-01 RX ADMIN — HYDRALAZINE HYDROCHLORIDE 25 MG: 25 TABLET, FILM COATED ORAL at 05:19

## 2022-01-01 RX ADMIN — CARVEDILOL 12.5 MG: 12.5 TABLET, FILM COATED ORAL at 20:20

## 2022-01-01 RX ADMIN — Medication 400 MG: at 12:15

## 2022-01-01 RX ADMIN — GABAPENTIN 100 MG: 100 CAPSULE ORAL at 08:43

## 2022-01-01 RX ADMIN — CARVEDILOL 12.5 MG: 12.5 TABLET, FILM COATED ORAL at 07:47

## 2022-01-01 RX ADMIN — HEPARIN, PORCINE (PF) 10 UNIT/ML INTRAVENOUS SYRINGE 50 UNITS: at 14:52

## 2022-01-01 RX ADMIN — LORATADINE 10 MG: 10 TABLET ORAL at 11:43

## 2022-01-01 RX ADMIN — APIXABAN 5 MG: 5 TABLET, FILM COATED ORAL at 08:38

## 2022-01-01 RX ADMIN — ACETAMINOPHEN 1000 MG: 500 TABLET ORAL at 21:24

## 2022-01-01 RX ADMIN — APIXABAN 5 MG: 5 TABLET, FILM COATED ORAL at 14:59

## 2022-01-01 RX ADMIN — INSULIN GLARGINE 15 UNITS: 100 INJECTION, SOLUTION SUBCUTANEOUS at 08:44

## 2022-01-01 RX ADMIN — GABAPENTIN 100 MG: 100 CAPSULE ORAL at 20:25

## 2022-01-01 RX ADMIN — TICAGRELOR 90 MG: 90 TABLET ORAL at 20:25

## 2022-01-01 RX ADMIN — Medication 5 ML: at 09:26

## 2022-01-01 RX ADMIN — TICAGRELOR 90 MG: 90 TABLET ORAL at 20:20

## 2022-01-01 RX ADMIN — CARVEDILOL 12.5 MG: 12.5 TABLET, FILM COATED ORAL at 18:01

## 2022-01-01 RX ADMIN — HYDRALAZINE HYDROCHLORIDE 25 MG: 25 TABLET, FILM COATED ORAL at 22:56

## 2022-01-01 RX ADMIN — TICAGRELOR 90 MG: 90 TABLET ORAL at 22:26

## 2022-01-01 RX ADMIN — HYDRALAZINE HYDROCHLORIDE 25 MG: 25 TABLET, FILM COATED ORAL at 14:19

## 2022-01-01 RX ADMIN — INSULIN ASPART 4 UNITS: 100 INJECTION, SOLUTION INTRAVENOUS; SUBCUTANEOUS at 15:33

## 2022-01-01 RX ADMIN — HYDRALAZINE HYDROCHLORIDE 25 MG: 25 TABLET, FILM COATED ORAL at 10:06

## 2022-01-01 RX ADMIN — TICAGRELOR 90 MG: 90 TABLET ORAL at 08:37

## 2022-01-01 RX ADMIN — TICAGRELOR 90 MG: 90 TABLET ORAL at 07:59

## 2022-01-01 RX ADMIN — SODIUM NITROPRUSSIDE 1 MCG: 25 INJECTION, SOLUTION, CONCENTRATE INTRAVENOUS at 18:38

## 2022-01-01 RX ADMIN — DIPHENHYDRAMINE HYDROCHLORIDE 25 MG: 25 CAPSULE ORAL at 21:57

## 2022-01-01 RX ADMIN — HYDRALAZINE HYDROCHLORIDE 25 MG: 25 TABLET, FILM COATED ORAL at 12:25

## 2022-01-01 RX ADMIN — TICAGRELOR 90 MG: 90 TABLET ORAL at 19:55

## 2022-01-01 RX ADMIN — HEPARIN SODIUM AND DEXTROSE 850 UNITS/HR: 10000; 5 INJECTION INTRAVENOUS at 01:01

## 2022-01-01 RX ADMIN — GABAPENTIN 100 MG: 100 CAPSULE ORAL at 08:38

## 2022-01-01 RX ADMIN — POTASSIUM CHLORIDE 20 MEQ: 750 TABLET, EXTENDED RELEASE ORAL at 09:12

## 2022-01-01 RX ADMIN — GABAPENTIN 100 MG: 100 CAPSULE ORAL at 10:06

## 2022-01-01 RX ADMIN — APIXABAN 5 MG: 5 TABLET, FILM COATED ORAL at 09:48

## 2022-01-01 RX ADMIN — FUROSEMIDE 20 MG/HR: 10 INJECTION, SOLUTION INTRAMUSCULAR; INTRAVENOUS at 14:37

## 2022-01-01 RX ADMIN — APIXABAN 5 MG: 5 TABLET, FILM COATED ORAL at 08:24

## 2022-01-01 RX ADMIN — INSULIN ASPART 2 UNITS: 100 INJECTION, SOLUTION INTRAVENOUS; SUBCUTANEOUS at 12:37

## 2022-01-01 RX ADMIN — INSULIN ASPART 3 UNITS: 100 INJECTION, SOLUTION INTRAVENOUS; SUBCUTANEOUS at 18:18

## 2022-01-01 RX ADMIN — ATORVASTATIN CALCIUM 40 MG: 40 TABLET, FILM COATED ORAL at 07:59

## 2022-01-01 RX ADMIN — ACETAMINOPHEN 650 MG: 325 TABLET, FILM COATED ORAL at 21:24

## 2022-01-01 RX ADMIN — ACETAMINOPHEN 1000 MG: 500 TABLET ORAL at 09:30

## 2022-01-01 RX ADMIN — GABAPENTIN 100 MG: 100 CAPSULE ORAL at 15:32

## 2022-01-01 RX ADMIN — Medication 400 MG: at 09:10

## 2022-01-01 RX ADMIN — POTASSIUM CHLORIDE 60 MEQ: 1500 TABLET, EXTENDED RELEASE ORAL at 08:39

## 2022-01-01 RX ADMIN — ONDANSETRON 4 MG: 2 INJECTION INTRAMUSCULAR; INTRAVENOUS at 16:50

## 2022-01-01 RX ADMIN — BUMETANIDE 2 MG/HR: 0.25 INJECTION INTRAMUSCULAR; INTRAVENOUS at 08:59

## 2022-01-01 RX ADMIN — HYDRALAZINE HYDROCHLORIDE 25 MG: 25 TABLET, FILM COATED ORAL at 21:03

## 2022-01-01 RX ADMIN — BUMETANIDE 4 MG: 0.25 INJECTION, SOLUTION INTRAMUSCULAR; INTRAVENOUS at 12:18

## 2022-01-01 RX ADMIN — POTASSIUM CHLORIDE 20 MEQ: 750 TABLET, EXTENDED RELEASE ORAL at 09:22

## 2022-01-01 RX ADMIN — ATORVASTATIN CALCIUM 40 MG: 40 TABLET, FILM COATED ORAL at 07:50

## 2022-01-01 RX ADMIN — DIPHENHYDRAMINE HYDROCHLORIDE 25 MG: 25 CAPSULE ORAL at 21:31

## 2022-01-01 RX ADMIN — ACETAMINOPHEN 650 MG: 325 TABLET, FILM COATED ORAL at 20:51

## 2022-01-01 RX ADMIN — APIXABAN 5 MG: 5 TABLET, FILM COATED ORAL at 19:47

## 2022-01-01 RX ADMIN — GABAPENTIN 100 MG: 100 CAPSULE ORAL at 08:24

## 2022-01-01 RX ADMIN — DIPHENHYDRAMINE HYDROCHLORIDE 25 MG: 25 CAPSULE ORAL at 21:24

## 2022-01-01 RX ADMIN — TICAGRELOR 90 MG: 90 TABLET ORAL at 09:02

## 2022-01-01 RX ADMIN — ATORVASTATIN CALCIUM 40 MG: 40 TABLET, FILM COATED ORAL at 08:24

## 2022-01-01 RX ADMIN — ONDANSETRON 4 MG: 2 INJECTION INTRAMUSCULAR; INTRAVENOUS at 18:45

## 2022-01-01 RX ADMIN — GABAPENTIN 100 MG: 100 CAPSULE ORAL at 19:47

## 2022-01-01 RX ADMIN — Medication 5 ML: at 19:57

## 2022-01-01 RX ADMIN — HYDRALAZINE HYDROCHLORIDE 25 MG: 25 TABLET, FILM COATED ORAL at 20:25

## 2022-01-01 RX ADMIN — HYDRALAZINE HYDROCHLORIDE 25 MG: 25 TABLET, FILM COATED ORAL at 08:38

## 2022-01-01 RX ADMIN — HYDRALAZINE HYDROCHLORIDE 25 MG: 25 TABLET, FILM COATED ORAL at 14:37

## 2022-01-01 RX ADMIN — SODIUM NITROPRUSSIDE 1.5 MCG: 25 INJECTION, SOLUTION, CONCENTRATE INTRAVENOUS at 18:41

## 2022-01-01 RX ADMIN — GABAPENTIN 100 MG: 100 CAPSULE ORAL at 14:19

## 2022-01-01 RX ADMIN — Medication 0.25 MG: at 21:09

## 2022-01-01 RX ADMIN — CARVEDILOL 12.5 MG: 12.5 TABLET, FILM COATED ORAL at 09:22

## 2022-01-01 RX ADMIN — HYDRALAZINE HYDROCHLORIDE 25 MG: 25 TABLET, FILM COATED ORAL at 09:09

## 2022-01-01 RX ADMIN — GABAPENTIN 100 MG: 100 CAPSULE ORAL at 09:10

## 2022-01-01 RX ADMIN — POTASSIUM CHLORIDE 60 MEQ: 1500 TABLET, EXTENDED RELEASE ORAL at 12:36

## 2022-01-01 RX ADMIN — HYDRALAZINE HYDROCHLORIDE 25 MG: 25 TABLET, FILM COATED ORAL at 15:09

## 2022-01-01 RX ADMIN — ACETAMINOPHEN 650 MG: 325 TABLET, FILM COATED ORAL at 21:31

## 2022-01-01 RX ADMIN — ATORVASTATIN CALCIUM 40 MG: 40 TABLET, FILM COATED ORAL at 08:44

## 2022-01-01 RX ADMIN — POTASSIUM CHLORIDE 10 MEQ: 750 TABLET, EXTENDED RELEASE ORAL at 20:32

## 2022-01-01 RX ADMIN — ATORVASTATIN CALCIUM 40 MG: 40 TABLET, FILM COATED ORAL at 07:47

## 2022-01-01 RX ADMIN — GABAPENTIN 100 MG: 100 CAPSULE ORAL at 20:11

## 2022-01-01 RX ADMIN — ACETAMINOPHEN 650 MG: 325 TABLET, FILM COATED ORAL at 03:30

## 2022-01-01 RX ADMIN — POTASSIUM CHLORIDE 40 MEQ: 750 TABLET, EXTENDED RELEASE ORAL at 12:17

## 2022-01-01 RX ADMIN — HYDRALAZINE HYDROCHLORIDE 25 MG: 25 TABLET, FILM COATED ORAL at 20:11

## 2022-01-01 RX ADMIN — TICAGRELOR 90 MG: 90 TABLET ORAL at 19:33

## 2022-01-01 RX ADMIN — HYDRALAZINE HYDROCHLORIDE 25 MG: 25 TABLET, FILM COATED ORAL at 21:57

## 2022-01-01 RX ADMIN — FERROUS SULFATE TAB 325 MG (65 MG ELEMENTAL FE) 325 MG: 325 (65 FE) TAB at 07:50

## 2022-01-01 RX ADMIN — TICAGRELOR 90 MG: 90 TABLET ORAL at 19:47

## 2022-01-01 RX ADMIN — INSULIN ASPART 1 UNITS: 100 INJECTION, SOLUTION INTRAVENOUS; SUBCUTANEOUS at 09:10

## 2022-01-01 RX ADMIN — APIXABAN 5 MG: 5 TABLET, FILM COATED ORAL at 07:49

## 2022-01-01 RX ADMIN — HYDRALAZINE HYDROCHLORIDE 25 MG: 25 TABLET, FILM COATED ORAL at 19:47

## 2022-01-01 RX ADMIN — DIPHENHYDRAMINE HYDROCHLORIDE 25 MG: 25 CAPSULE ORAL at 21:35

## 2022-01-01 RX ADMIN — CARVEDILOL 12.5 MG: 12.5 TABLET, FILM COATED ORAL at 17:04

## 2022-01-01 RX ADMIN — APIXABAN 5 MG: 5 TABLET, FILM COATED ORAL at 20:50

## 2022-01-01 RX ADMIN — HYDROXYZINE HYDROCHLORIDE 10 MG: 10 TABLET ORAL at 02:59

## 2022-01-01 RX ADMIN — TICAGRELOR 90 MG: 90 TABLET ORAL at 20:41

## 2022-01-01 RX ADMIN — INSULIN ASPART 1 UNITS: 100 INJECTION, SOLUTION INTRAVENOUS; SUBCUTANEOUS at 21:22

## 2022-01-01 ASSESSMENT — ACTIVITIES OF DAILY LIVING (ADL)
ADLS_ACUITY_SCORE: 24
ADLS_ACUITY_SCORE: 20
ADLS_ACUITY_SCORE: 22
ADLS_ACUITY_SCORE: 20
ADLS_ACUITY_SCORE: 24
ADLS_ACUITY_SCORE: 25
ADLS_ACUITY_SCORE: 20
ADLS_ACUITY_SCORE: 20
ADLS_ACUITY_SCORE: 22
PREVIOUS_RESPONSIBILITIES: MEAL PREP;HOUSEKEEPING;LAUNDRY;SHOPPING;YARDWORK;MEDICATION MANAGEMENT;FINANCES;DRIVING
CHANGE_IN_FUNCTIONAL_STATUS_SINCE_ONSET_OF_CURRENT_ILLNESS/INJURY: NO
VISION_MANAGEMENT: GLASSES AT BEDSIDE
ADLS_ACUITY_SCORE: 29
ADLS_ACUITY_SCORE: 24
NUMBER_OF_TIMES_PATIENT_HAS_FALLEN_WITHIN_LAST_SIX_MONTHS: 3
ADLS_ACUITY_SCORE: 20
ADLS_ACUITY_SCORE: 20
ADLS_ACUITY_SCORE: 28
ADLS_ACUITY_SCORE: 35
DOING_ERRANDS_INDEPENDENTLY_DIFFICULTY: NO
ADLS_ACUITY_SCORE: 24
CONCENTRATING,_REMEMBERING_OR_MAKING_DECISIONS_DIFFICULTY: NO
ADLS_ACUITY_SCORE: 35
ADLS_ACUITY_SCORE: 20
WALKING_OR_CLIMBING_STAIRS_DIFFICULTY: NO
ADLS_ACUITY_SCORE: 20
ADLS_ACUITY_SCORE: 24
ADLS_ACUITY_SCORE: 23
ADLS_ACUITY_SCORE: 24
ADLS_ACUITY_SCORE: 24
ADLS_ACUITY_SCORE: 22
ADLS_ACUITY_SCORE: 21
ADLS_ACUITY_SCORE: 23
ADLS_ACUITY_SCORE: 35
ADLS_ACUITY_SCORE: 20
ADLS_ACUITY_SCORE: 21
ADLS_ACUITY_SCORE: 21
ADLS_ACUITY_SCORE: 23
ADLS_ACUITY_SCORE: 22
ADLS_ACUITY_SCORE: 25
ADLS_ACUITY_SCORE: 35
ADLS_ACUITY_SCORE: 35
CHANGE_IN_FUNCTIONAL_STATUS_SINCE_ONSET_OF_CURRENT_ILLNESS/INJURY: NO
ADLS_ACUITY_SCORE: 20
ADLS_ACUITY_SCORE: 25
ADLS_ACUITY_SCORE: 22
ADLS_ACUITY_SCORE: 20
ADLS_ACUITY_SCORE: 22
ADLS_ACUITY_SCORE: 21
ADLS_ACUITY_SCORE: 35
ADLS_ACUITY_SCORE: 22
ADLS_ACUITY_SCORE: 20
ADLS_ACUITY_SCORE: 23
ADLS_ACUITY_SCORE: 24
ADLS_ACUITY_SCORE: 20
ADLS_ACUITY_SCORE: 25
NUMBER_OF_TIMES_PATIENT_HAS_FALLEN_WITHIN_LAST_SIX_MONTHS: 3
ADLS_ACUITY_SCORE: 23
ADLS_ACUITY_SCORE: 24
ADLS_ACUITY_SCORE: 20
ADLS_ACUITY_SCORE: 22
ADLS_ACUITY_SCORE: 20
ADLS_ACUITY_SCORE: 22
ADLS_ACUITY_SCORE: 23
ADLS_ACUITY_SCORE: 22
ADLS_ACUITY_SCORE: 20
ADLS_ACUITY_SCORE: 24
ADLS_ACUITY_SCORE: 20
ADLS_ACUITY_SCORE: 24
ADLS_ACUITY_SCORE: 24
ADLS_ACUITY_SCORE: 25
ADLS_ACUITY_SCORE: 20
ADLS_ACUITY_SCORE: 20
ADLS_ACUITY_SCORE: 23
ADLS_ACUITY_SCORE: 22
ADLS_ACUITY_SCORE: 20
ADLS_ACUITY_SCORE: 20
ADLS_ACUITY_SCORE: 21
ADLS_ACUITY_SCORE: 20
ADLS_ACUITY_SCORE: 22
ADLS_ACUITY_SCORE: 20
ADLS_ACUITY_SCORE: 22
ADLS_ACUITY_SCORE: 20
ADLS_ACUITY_SCORE: 35
ADLS_ACUITY_SCORE: 21
ADLS_ACUITY_SCORE: 23
ADLS_ACUITY_SCORE: 35
ADLS_ACUITY_SCORE: 35
ADLS_ACUITY_SCORE: 20
ADLS_ACUITY_SCORE: 21
WEAR_GLASSES_OR_BLIND: YES
CONCENTRATING,_REMEMBERING_OR_MAKING_DECISIONS_DIFFICULTY: NO
ADLS_ACUITY_SCORE: 20
ADLS_ACUITY_SCORE: 21
ADLS_ACUITY_SCORE: 21
ADLS_ACUITY_SCORE: 24
ADLS_ACUITY_SCORE: 35
ADLS_ACUITY_SCORE: 23
ADLS_ACUITY_SCORE: 35
EQUIPMENT_CURRENTLY_USED_AT_HOME: CANE, STRAIGHT
ADLS_ACUITY_SCORE: 23
ADLS_ACUITY_SCORE: 35
ADLS_ACUITY_SCORE: 21
ADLS_ACUITY_SCORE: 35
ADLS_ACUITY_SCORE: 23
ADLS_ACUITY_SCORE: 20
ADLS_ACUITY_SCORE: 28
ADLS_ACUITY_SCORE: 20
ADLS_ACUITY_SCORE: 20
ADLS_ACUITY_SCORE: 24
ADLS_ACUITY_SCORE: 35
ADLS_ACUITY_SCORE: 35
ADLS_ACUITY_SCORE: 20
ADLS_ACUITY_SCORE: 24
WALKING_OR_CLIMBING_STAIRS_DIFFICULTY: NO
DOING_ERRANDS_INDEPENDENTLY_DIFFICULTY: NO
ADLS_ACUITY_SCORE: 20
FALL_HISTORY_WITHIN_LAST_SIX_MONTHS: YES
ADLS_ACUITY_SCORE: 20
ADLS_ACUITY_SCORE: 20
DIFFICULTY_EATING/SWALLOWING: NO
TOILETING_ISSUES: NO
ADLS_ACUITY_SCORE: 20
ADLS_ACUITY_SCORE: 20
WEAR_GLASSES_OR_BLIND: YES
DRESSING/BATHING_DIFFICULTY: NO
ADLS_ACUITY_SCORE: 25
ADLS_ACUITY_SCORE: 24
ADLS_ACUITY_SCORE: 20
ADLS_ACUITY_SCORE: 22
ADLS_ACUITY_SCORE: 25
ADLS_ACUITY_SCORE: 20
ADLS_ACUITY_SCORE: 23
ADLS_ACUITY_SCORE: 23
ADLS_ACUITY_SCORE: 22
ADLS_ACUITY_SCORE: 20
ADLS_ACUITY_SCORE: 22
ADLS_ACUITY_SCORE: 23
ADLS_ACUITY_SCORE: 22
ADLS_ACUITY_SCORE: 21
DIFFICULTY_EATING/SWALLOWING: NO
ADLS_ACUITY_SCORE: 24
ADLS_ACUITY_SCORE: 21
ADLS_ACUITY_SCORE: 21
ADLS_ACUITY_SCORE: 25
DEPENDENT_IADLS:: INDEPENDENT
ADLS_ACUITY_SCORE: 20
ADLS_ACUITY_SCORE: 24
ADLS_ACUITY_SCORE: 25
ADLS_ACUITY_SCORE: 20
ADLS_ACUITY_SCORE: 35
ADLS_ACUITY_SCORE: 24
ADLS_ACUITY_SCORE: 23
ADLS_ACUITY_SCORE: 25
DEPENDENT_IADLS:: INDEPENDENT
ADLS_ACUITY_SCORE: 21
ADLS_ACUITY_SCORE: 23
VISION_MANAGEMENT: HAS GLASSES
ADLS_ACUITY_SCORE: 20
FALL_HISTORY_WITHIN_LAST_SIX_MONTHS: YES
ADLS_ACUITY_SCORE: 25
ADLS_ACUITY_SCORE: 20
DRESSING/BATHING_DIFFICULTY: NO
ADLS_ACUITY_SCORE: 28
ADLS_ACUITY_SCORE: 23
TOILETING_ISSUES: NO
ADLS_ACUITY_SCORE: 23
ADLS_ACUITY_SCORE: 22
ADLS_ACUITY_SCORE: 20

## 2022-01-01 ASSESSMENT — ENCOUNTER SYMPTOMS
WEAKNESS: 1
EYE REDNESS: 0
PALPITATIONS: 0
DYSURIA: 0
ARTHRALGIAS: 0
BLOOD IN STOOL: 1
ABDOMINAL PAIN: 0
NECK STIFFNESS: 0
DYSRHYTHMIAS: 1
SHORTNESS OF BREATH: 1
FEVER: 0
FATIGUE: 1
VOMITING: 0
COUGH: 1
COLOR CHANGE: 0
HEMATURIA: 0
DIFFICULTY URINATING: 0
NAUSEA: 0
BRUISES/BLEEDS EASILY: 1
HEADACHES: 0
DIARRHEA: 1
CONFUSION: 0

## 2022-01-01 ASSESSMENT — PAIN SCALES - GENERAL
PAINLEVEL: NO PAIN (0)
PAINLEVEL: NO PAIN (0)

## 2022-01-01 ASSESSMENT — MIFFLIN-ST. JEOR: SCORE: 1423.46

## 2022-01-04 NOTE — TELEPHONE ENCOUNTER
Attempted to contact pt, voicemail message was left with contact information and instructing pt to call back.  1/4/2022 9:22 AM  Padmini Panchal RN

## 2022-01-04 NOTE — TELEPHONE ENCOUNTER
----- Message from Carlito Iglesias sent at 1/4/2022  9:14 AM CST -----  Regarding: ONEIL HERMAN  General phone call:    Caller: BRINA  Primary cardiologist: ONEIL  Detailed reason for call: pt has questions and concerns about his life vest and would like to talk to someone about it, please advise    Best phone number: [phone]   Best time to contact: any  Ok to leave a detailedmessage? yes  Device? yes    Additional Info:

## 2022-01-06 NOTE — TELEPHONE ENCOUNTER
PC back from pt  corresponding information/recommendations reviewed, verbalized understanding, has no further questions at this time, contact information was given for further concerns/questions, scheduling notified to contact pt and order(s) were placed   Will await plan from Dr Pool, pt is aware he will be contacted when we understand what will be needed an done   He also reports that the LifeVest is difficult and cumbersome to wear, and had taken it off  Pt was advised of the importance of wearing this, and was advised to do so   1/6/2022 11:00 AM  Padmini Sawyer RN      From: Shavon Sánchez RN   Sent: 1/5/2022   9:33 AM CST   To: [facility] Support [place] [facility] , when this patient calls back:   Have been speaking with GRABIEL about a plan.  He is going to discuss with Edyta and plan is for CV surgery with GRABIEL and a surgeon to remove leads at the U.  He is still working on the logistics.   GRABIEL would like an IR venogram prior.  I will place the orders and [facility] will need to be contacted.   Thank you!!!   Shavon

## 2022-01-07 NOTE — TELEPHONE ENCOUNTER
Noted.  Will await venogram results, and contact pt with plan at that time.  1/7/2022 7:31 AM  Padmini Sawyer, Markus Ramos MD Lewandowski, Jessica, RN; P [facility] Ep Support Pool - Lhe; Dale Iglesias  Caller: Unspecified (3 days ago,  9:21 AM)  I have spoken with Dr. Lan and gotten his opinion.  I have also spoken with the Cath Lab teams and received their thoughts.  My plan is to wait until we see the outcome of the venogram and then will come up with the definitive plan after discussion with the patient/family.   Thanks  Markus

## 2022-01-07 NOTE — IR NOTE
IR Procedure Pre-call    Spoke with: Skyler/patient  Arrival and procedure time: 0730/0800  Patient has covid test:Yes, given apt # to schedule test  Patient NPO 8 hours prior: No    Pre-call completed.   January 7, 2022 12:02 PM  Yaneth Mason RN

## 2022-01-10 NOTE — TELEPHONE ENCOUNTER
Pemafibrate to Reduce cardiovascular OutcoMes by reducing triglycerides IN patiENts with diabeTes (PROMINENT) clinical trial.    Study telephone visit form:    Subject Number:   [id number]                                     Dr. Vigil- PI      CONCOMITANT MEDICATIONS  ? Investigator to report if participant needs treatment with prohibited medications (Fibrates or other agents with PPAR-? agonist activity (e.g., saroglitazar); See protocol for exclusionary medications and actions to be taken.    VISIT SCHEDULING AND CONTACT CONFIRMATION  ? Confirm date of next study visit   ? Confirm information on Subject Information Form or update as needed      Visit Number:__[id number]_____  Visit Date: 1/10/22      Was the subject, relatives or health care provider (as per consent) contacted by phone?    [x] Yes  [] No    Yes [x]  No []   If Yes, Date of Contact:   Date: ___1/10/22_ ____ ____   [facility]  If No, please document attempts to contact subject (include date and type of contact)   Date: ____ ____ ____   Type: [] Tel _ [] Other___  [] 2 Date:      Name of Person: Contacted:   _____Self/Subject_______________   Relationship to Subject:   ______________________  Type:[]  Tel[x]  Other _______   Date: __1/10/22__ ____ ____   Type: [] Tel [] Other _______    Subject Status on the day assessed     [x] Subject alive  []  Subject    [] Unknown at present Please complete  Death Details Form in InForm and [facility]        Review Subject Contact Information Form and update as needed   [x]  Review concomitant medication use   [x]  Query participant and record any reported AEs   [x] Query participant and record any CV efficacy events   [x] Confirm next study visit: __3/17___________   [x] Instruct participants to bring all study supplies with them to the next in-person visit    Instruct participants to bring all study supplies with them to the next in-person visit   Eduard Tang RN  Clinical Research  Nurse

## 2022-01-10 NOTE — PROGRESS NOTES
Pemafibrate to Reduce cardiovascular OutcoMes by reducing triglycerides IN patiENts with diabeTes (PROMINENT) clinical trial.    ADVERSE EVENT Description: Skyler has an ongoing RV lead malfunction and had a planned intervention with Dr. Roland and Raj on 12/29/21.  The lead was attached to the SVC and the procedure was aborted.  He did stay overnight thus reporting INDERJIT.  There is an ongoing AE for this issue.  He is in follow up with a lifepack as his care team sorts this out.  Otherwise reports he is doing well.    Adverse Event: Malfunction of ICD RV lead  Serious:  [x] Yes   [] No  Reportable to IRB:   [] Yes   [x] No  Severity (mild/moderate/severe): severe   Date of Awareness: 1/10/22  Start Date: 12/29/21  End Date: 12/30/21  Action taken with study treatment:  [x] Dose Not Changed  Outcome:   []Not Recovered/Not Resolved  [x]Recovered/Resolved  Medication or therapies taken:  [x] Yes   [] No    Dr. Vigil: Please assign causality of above AE  Related to study drug?     [] Yes   [x] No

## 2022-01-11 NOTE — PROGRESS NOTES
DEVICE CLINIC RN POST-OP NOTE    Reason for visit: 1 week PO attempted RV lead extraction     Device: Med[employer]onic Cobalt CRTD  Procedure date: 12/29/2021  Implant Diagnosis: Non-ischemic Cardiomyopathy      Assessment  Subjective: Patient reports feeling well without pain  Vitals: There were no vitals taken for this visit.    Incision/device pocket: Incision is clean, dry, intact, with edges well approximated. No drainage or gross swelling noted. Right chest incision is clean, dry, intact, scab noted. Patient instructed to avoid picking at incision to allow healing.       Device Findings  Interrogation of device reveals normal sensing and capture thresholds  See the Paceart Report for a full summary of the device information.    Other: Tachy therapies are turned off.       Patient Education  Ken Doss was unaccompanied today      Signs and symptoms of infection, poor wound healing, and device function were reviewed. Patient was provided a lifevest at discharge from attempted lead extraction procedure. He did not have this on today due to mechanical issues of vest. We discussed the importance of having the lifevest on. He verbalized understanding.     Plan  - Venogram scheduled 1/13/2021

## 2022-01-18 NOTE — TELEPHONE ENCOUNTER
Phone call to patient per Dr. Pool.  Discuss with patient plan moving forward.   Per Dr. Pool and discussion with [employer], device malfunction likely originated in the can and not the lead.  Reviewed IR venogram, SVC patent.  Pt to be scheduled for replacement of ICD generator with DFT testing, if RV lead non functioning then will add new lead at that time.  No need for CV surgery to be present and procedure ok to be done at [facility].   No answer and left message for return call.

## 2022-01-18 NOTE — PROGRESS NOTES
H&P  PMD []  Received [] OV: []  Date: Teach  []   Orders  I [x] P  [x]  Letter []   COVID  FV/EPIC []  Date:  External  []  Date: AC: eliquis   None []  Continue  [x]   Hold:  AM Meds: Take all [x]   Hold:       1945  Home:[phone] (home) Cell:[phone] (mobile)  Emergency Contact: Karen Doss   PCP: Lewis Reeves, [phone]      Important patient information for CSC/Cath Lab staff : None    [facility] EP Cath Lab Procedure Order     Device Implant/Revision:  Procedure: Generator Change Out  Possible revision of RV lead  Device Type: BIV ICD  Device Company/Device Rep Needed for Procedure: Medtronic  Ordering Provider: Dr Pool  Ordering Date: 1/18/2022    Diagnosis:  ICD malfunction  Scheduling Needs Timeframe:  needs change out ASAP on or after 1/20, restarted eliquis on 12/30  Scheduling Restrictions: None  Scheduling Contact: Please contact pt to schedule, if you are unable to schedule date within the next 24 hours please contact pt to update on scheduling process  Scheduling Follow-up apt for NEW HIS/CRT Implants: NA  Pre-Procedural Testing needed: COVID 19 nasal/lab test within 48hrs of procedure  Anesthesia:  General-DFT Only, moderate sedation with precedex for the rest of procedure    [facility] EP Cath Lab Prep   H&P:  Compled by Dr. Foreman on 12/30/21 if scheduled within 30 days, pt to schedule with PMD if procedure outside of this timeframe    Pre-op Labs: Ordered AM of procedure    Medical Records Pertinent for Procedure:  venogram 1/13/22, Echo  11/22/21, EKG 12/29/21, Device Implant Record 9/17/20 and Surgery Record 12/29/21  Most Recent Lab Results: BMP- Within Acceptable Limits for Procedure Heme- Within Acceptable Limits for Procedure    Patient Education:    Teach with Patient: Will be completed via phone prior to procedure, and letter was also sent to pt via mail/[employer] with written pre-procedural instructions.    Risks Reviewed:   Internal Cardiac Defibrillator Check    <1% embolic  event of CVA or pulmonary embolism    Possible ICD system revision    Risks associated with general anesthesia will be addressed by the Anesthesiology Department    If external defibrillation is needed, 25% risk for superficial burn.     Internal Cardiac Defibrillator     <1% for each of the following: infection, bleeding, hematoma,   pneumothorax, subclavian vein thrombosis, cardiac perforation, cardiac tamponade, arrhythmias, pectoral or diaphragmatic stimulation, air embolus, pocket erosion.    <4 % lead dislodgment; <1% lead fracture or generator malfunction.    <0.5% CVA or MI.     <0.1% death.    If external defibrillation is needed, 25% risk for superficial burn.    <5% risk of ICD system revision.    >95% VT/VF success implant rate.    Risks associated with general anesthesia will be addressed by the Anesthesiology Department.    For patients on anticoagulation, the risk of bleeding, hematoma, tamponade, and stroke with partial withdrawal are increased.    Patient education also completed on NATALIE, spurious shocks, driving limitation, and psychological and social aspects of having an ICD.      Pre-Procedure Instruction:  NPO after midnight, Remove all jewelry prior to coming in for procedure, Shower prior to arrival, Notified patient of time and date of procedure by CV , Transportation arrangements needed s/p procedure, Post-procedure follow up process, Sedation plan/orders and Pre-procedure letter was sent to pt by CV     Pre-Procedure Medication Instructions:  Instructions given to pt regarding anticoagulants: Eliquis- instructed to continue anticoagulation uninterrupted through their procedure  Instructions given to pt regarding antiarrhythmic medication: N/A for this type of procedure; N/A  Instructions for medication, other than anticoagulants/antiarrhythmics listed above, given to pt: to hold meformin the morning of procedure, and to take remaining medications with small sips of  water  Allergies: Reviewed allergies, no concerns regarding orders for procedure    No Known Allergies    Current Outpatient Medications:      acetaminophen (TYLENOL) 500 MG tablet, Take 1,000 mg by mouth 2 times daily as needed , Disp: , Rfl:      apixaban ANTICOAGULANT (ELIQUIS ANTICOAGULANT) 5 MG tablet, Take 1 tablet (5 mg) by mouth 2 times daily, Disp: 180 tablet, Rfl: 1     atorvastatin (LIPITOR) 40 MG tablet, Take 40 mg by mouth daily, Disp: , Rfl:      bumetanide (BUMEX) 1 MG tablet, Take 1 tablet (1 mg) by mouth 2 times daily, Disp: 180 tablet, Rfl: 1     carvedilol (COREG) 6.25 MG tablet, Take 2 tablets (12.5 mg) by mouth 2 times daily (with meals), Disp: 360 tablet, Rfl: 1     Coenzyme Q10 (COQ-10) 100 MG CAPS, Take 200 mg by mouth daily , Disp: , Rfl:      colchicine (COLCYRS) 0.6 MG tablet, TAKE 2 TABLETS BY MOUTH THEN 1 TABLET ONE HOUR LATER AS NEEDED FOR GOUT ATTACK AS DIRECTED, Disp: , Rfl:      diphenhydrAMINE (BENADRYL) 25 MG tablet, Take 25 mg by mouth At Bedtime, Disp: , Rfl:      diphenhydrAMINE-acetaminophen (TYLENOL PM)  MG tablet, Take 1 tablet by mouth nightly as needed for sleep, Disp: , Rfl:      ENTRESTO 49-51 MG per tablet, Take 1 tablet by mouth twice daily, Disp: 180 tablet, Rfl: 0     febuxostat (ULORIC) 40 MG TABS tablet, Take 40 mg by mouth daily, Disp: , Rfl:      gabapentin (NEURONTIN) 100 MG capsule, Take 100 mg by mouth 3 times daily, Disp: , Rfl:      glucosamine-chondroitin (GLUCOSAMINE CHONDR COMPLEX) 500-400 MG CAPS per capsule, Take 1 tablet by mouth daily , Disp: , Rfl:      insulin glargine (BASAGLAR KWIKPEN) 100 UNIT/ML pen, Inject 30 Units Subcutaneous At Bedtime, Disp: , Rfl:      metFORMIN (GLUCOPHAGE) 1000 MG tablet, Take 1,000 mg by mouth 2 times daily (with meals), Disp: , Rfl:      Multiple Vitamin (MULTI VITAMIN DAILY PO), Take 1 tablet by mouth daily , Disp: , Rfl:      Omega-3 Fatty Acids (FISH OIL) 500 MG CAPS, Take 1,200 mg by mouth daily , Disp: , Rfl:       spironolactone (ALDACTONE) 25 MG tablet, Take 1/2 (one-half) tablet by mouth once daily, Disp: 45 tablet, Rfl: 1     ticagrelor (BRILINTA) 90 MG tablet, Take 1 tablet (90 mg) by mouth 2 times daily Please schedule lab draw to F/U on creatine; additional refills after, Disp: 180 tablet, Rfl: 0    Documentation Date:1/18/2022 2:27 PM  Padmini Panchal RN

## 2022-01-31 NOTE — TELEPHONE ENCOUNTER
Pre-Procedure Education Phone Call    Procedure: ICD changeout  with with Dr Pool on 2-11-22    Education: Reviewed Pre-Intra-Post education and instructions reviewed via phone- refer to procedure prep for instructions that were reviewed.  Reviewed procedure plan in detail.    COIVD: scheduled on 2-8-22 at a  facility, results will be viewable in EPIC    Pre-Op: scheduled on 2-4-22  at PMD @ [facility]    Medications: Pt verbalized understanding of medication instructions pre procedure, will hold Bumex.    Important patient information for staff: None    1/31/2022 1:04 PM  Shavon Sánchez RN

## 2022-01-31 NOTE — TELEPHONE ENCOUNTER
[facility] Call Center    Phone Message    May a detailed message be left on voicemail: yes     Reason for Call: Other: pt is wanting clarification on what is going to happen on the upcoming procedure with Dr Pool. Pt has questions he'd like to ask.      Action Taken: Message routed to:  Other: [facility] [[id number]]    Travel Screening: Not Applicable

## 2022-02-07 NOTE — TELEPHONE ENCOUNTER
Pre-Procedure Education Phone Call    Procedure: ICD with with Dr Pool on 2-11-22    Education: Reviewed Pre-Intra-Post education and instructions reviewed via phone- refer to procedure prep for instructions that were reviewed    COIVD: scheduled on 2-8-22 at a  facility, results will be viewable in EPIC    [facility]    Medications: Pt verbalized understanding of medication instructions pre procedure    Important patient information for staff: None    2/7/2022 4:27 PM  Shavon Sánchez RN

## 2022-02-07 NOTE — TELEPHONE ENCOUNTER
[facility] Call Center    Phone Message    May a detailed message be left on voicemail: yes     Reason for Call: Other: Pt calling to get instructions for his upcoming procedure, please call  pt back. Thank you     Action Taken: Message routed to:  Other: [facility] [[id number]]    Travel Screening: Not Applicable

## 2022-02-11 PROBLEM — T82.198A MALFUNCTION OF IMPLANTABLE DEFIBRILLATOR VENTRICULAR (ICD) LEAD: Chronic | Status: ACTIVE | Noted: 2021-01-01

## 2022-02-11 PROBLEM — T82.198A MALFUNCTION OF IMPLANTABLE DEFIBRILLATOR VENTRICULAR (ICD) LEAD: Status: ACTIVE | Noted: 2021-01-01

## 2022-02-11 PROBLEM — I47.20 VENTRICULAR TACHYCARDIA (H): Status: ACTIVE | Noted: 2022-01-01

## 2022-02-11 PROBLEM — I47.29 PAROXYSMAL VENTRICULAR TACHYCARDIA (H): Status: ACTIVE | Noted: 2022-01-01

## 2022-02-11 NOTE — Clinical Note
Prepped: left chest. Prepped with: DuraPrep. The patient was draped. .Pre-procedure site marking:Insertion site not predetermined

## 2022-02-11 NOTE — PRE-PROCEDURE
GENERAL PRE-PROCEDURE:   Procedure:  ICD generator change, possible lead revision, DFT testing  Date/Time:  2/11/2022 7:13 AM    Written consent obtained?: Yes    Risks and benefits: Risks, benefits and alternatives were discussed    Consent given by:  Patient  Patient states understanding of procedure being performed: Yes    Patient's understanding of procedure matches consent: Yes    Procedure consent matches procedure scheduled: Yes    Expected level of sedation:  Moderate  Appropriately NPO:  Yes  ASA Class:  3 (CRT-D in situ, RV lead malfunction, HF R EF; NYHA class II-III, NICM; EF 15%, permanent atrial fibrillation; s/p AV node ablation, HTN, HLD, CAD, pHTN, IDDM, ILD, CKD stage III)  Mallampati  :  Grade 1- soft palate, uvula, tonsillar pillars, and posterior pharyngeal wall visible  Lungs:  Lungs clear with good breath sounds bilaterally  Heart:  Normal heart sounds and rate  History & Physical reviewed:  History and physical reviewed and no updates needed  Statement of review:  I have reviewed the lab findings, diagnostic data, medications, and the plan for sedation

## 2022-02-11 NOTE — Clinical Note
Device: Medtronic [place] XT HF CRT-D [facility]  Model: #[id number]  SN: [id number]  Exp: 7-

## 2022-02-11 NOTE — Clinical Note
Tested the right ventricular lead. See vendor printout/MD dictation. [employer] Quattro Secure S MRI SureScan 6935M-62cm  SN: [id number]  Exp: 10-

## 2022-02-11 NOTE — ANESTHESIA CARE TRANSFER NOTE
Patient: Kne Doss    Procedure: Procedure(s):  EP Implantable Cardio-Defibrillator Generator Change Biventricular  EP DFT Test/Follow-up  EP Recision Pacemaker Dual Lead       Diagnosis: ICD malfunction  Diagnosis Additional Information: No value filed.    Anesthesia Type:   General     Note:    Oropharynx: oropharynx clear of all foreign objects and spontaneously breathing  Level of Consciousness: drowsy  Oxygen Supplementation: face mask  Level of Supplemental Oxygen (L/min / FiO2): 10  Independent Airway: airway patency satisfactory and stable  Dentition: dentition unchanged  Vital Signs Stable: post-procedure vital signs reviewed and stable  Report to RN Given: handoff report given  Patient transferred to: Cardiac Special Care          Vitals:  Vitals Value Taken Time   BP 87/53    Temp     Pulse 87    Resp 17 02/11/22 0933   SpO2 100%        Electronically Signed By: MIMI Mesa CRNA  February 11, 2022  9:48 AM

## 2022-02-11 NOTE — ANESTHESIA PREPROCEDURE EVALUATION
Anesthesia Pre-Procedure Evaluation    Patient: Ken Doss   MRN: [id number] : 1945        Preoperative Diagnosis: malfunctioning ICD lead    Procedure : Procedure(s):  AICD placement          Past Medical History:   Diagnosis Date     Acute renal failure (H)      Anemia      Arthritis     osteoarthritis     Arthritis     osteoarthritis     Arthritis     osteoarthritis     CHF (congestive heart failure) (H)      CHF (congestive heart failure) (H)      CHF (congestive heart failure) (H)      Chronic systolic heart failure (H) Dec 2012     Chronic systolic heart failure (H) Dec 2012     Chronic systolic heart failure (H) Dec 2012     Coronary artery disease involving native coronary artery     Non obstructive disease by cath in  Cor angio 2016: RCA 70% (FFR 0.89), and OM1 75%        Coronary artery disease involving native coronary artery     Non obstructive disease by cath in  Cor angio 2016: RCA 70% (FFR 0.89), and OM1 75%        Coronary artery disease involving native coronary artery     Non obstructive disease by cath in  Cor angio 2016: RCA 70% (FFR 0.89), and OM1 75%        Depressive disorder, not elsewhere classified 10/24/2014     Diabetes mellitus, type 2 (H)      Diabetes mellitus, type II (H)      Diabetes mellitus, type II (H)      Diabetes mellitus, type II (H)      Diabetic neuropathy (H)      Diabetic neuropathy (H)      Diabetic neuropathy (H)      Fractures     ankle, leg and arm     High cholesterol      Hypertension      Hypertension      Hypertension      ICD (implantable cardioverter-defibrillator) lead failure 2014    High voltage lead tip inserted in RV free wall RV lead extraction and implantation of the new septal RV lead 2014      ICD (implantable cardioverter-defibrillator) lead failure 2014    High voltage lead tip inserted in RV free wall RV lead extraction and implantation of the new  septal RV lead November 2014      ILD (interstitial lung disease) (H)      Infectious mononucleosis      Ischemic cardiomyopathy 4/22/2015    Chronic: LVEF 25- 30% LVEF 26% echo May 2017     Ischemic cardiomyopathy 4/22/2015    Chronic: LVEF 25- 30% LVEF 26% echo May 2017     Ischemic cardiomyopathy 4/22/2015    Chronic: LVEF 25- 30% LVEF 26% echo May 2017     Kidney stone      LBBB (left bundle branch block)     noted on Dec 19, 2012     LBBB (left bundle branch block)     noted on Dec 19, 2012     LBBB (left bundle branch block)     noted on Dec 19, 2012     Lymphadenopathy, mediastinal      Malfunction of implantable defibrillator ventricular (ICD) lead 11/9/2021    RV lead malfunction c/w insulation breech. Lead extraction and replacement recommended     Myocardial infarction (H)      Myocardial infarction (H)      Myocardial infarction (H)      Osteoarthritis      Osteoarthritis      Osteoarthritis      Pulmonary anthracosis (H)      Pulmonary anthracosis (H)      Recurrent kidney stones       Past Surgical History:   Procedure Laterality Date     APPENDECTOMY       ARTHROSCOPY KNEE Bilateral      ARTHROSCOPY SHOULDER ROTATOR CUFF REPAIR      right     C SHLDR ARTHROSCOP,DIAGNOSTIC      Description: Arthroscopy Shoulder;  Recorded: 06/10/2014;     CARDIAC CATHETERIZATION N/A 12/12/2016    Procedure: Coronary Angiogram;  Surgeon: Delgado Ramirez MD;  Location: [facility] Cath Lab;  Service:      COLONOSCOPY N/A 12/19/2019    Procedure: COLONOSCOPY with polypectomy;  Surgeon: Delgado Price MD;  [facility];  Service: Gastroenterology     CORONARY ANGIOGRAPHY ADULT ORDER       CV CORONARY ANGIOGRAM N/A 2/27/2020    Procedure: CV CORONARY ANGIOGRAM;  Surgeon: Pedro Fontaine MD;  Location: [facility] CARDIAC CATH LAB     CV CORONARY ANGIOGRAM N/A 1/15/2019    Procedure: Coronary Angiogram;  Surgeon: Mason Correa MD;  Location: [facility] Cath Lab;  Service:  Cardiology     CV INTRAVASULAR ULTRASOUND N/A 2/27/2020    Procedure: Intravascular Ultrasound;  Surgeon: Pedro Fontaine MD;  Location: [facility] CARDIAC CATH LAB     CV LEFT HEART CATHETERIZATION WITHOUT LEFT VENTRICULOGRAM Left 1/15/2019    Procedure: Left Heart Catheterization Without Left Ventriculogram;  Surgeon: Mason Correa MD;  Location: [facility] Cath Lab;  Service: Cardiology     CV PCI ANGIOPLASTY N/A 2/27/2020    Procedure: Percutaneous Coronary Intervention Angioplasty;  Surgeon: Pedro Fontaine MD;  Location:  HEART CARDIAC CATH LAB     CV RIGHT HEART CATH MEASUREMENTS RECORDED N/A 2/27/2020    Procedure: Right Heart Cath;  Surgeon: Pedro Fontaine MD;  Location: [facility] CARDIAC CATH LAB     CV RIGHT HEART CATH MEASUREMENTS RECORDED N/A 7/30/2020    Procedure: CV RIGHT HEART CATH;  Surgeon: Ronny Geronimo MD;  Location: [facility] CARDIAC CATH LAB     EP ABLATION AV NODE N/A 9/27/2019    Procedure: EP Ablation AV Node;  Surgeon: Markus Pool MD;  Location: [facility];  Service: Cardiology     EP ICD INSERT      ICD REIMPLANTATION OF A NEW RV LEAD 11/26/2014     EP VENOGRAM N/A 11/12/2021    Procedure: EP Venogram;  Surgeon: Markus Pool MD;  Location: [facility] CV     HC REMOVE TONSILS/ADENOIDS,<13 Y/O      Description: Tonsillectomy With Adenoidectomy;  Recorded: 06/10/2014;     INSERT / REPLACE / REMOVE PACEMAKER       IR UPPER EXTREMITY VENOGRAM LEFT  1/13/2022     JOINT REPLACEMENT      bilateral TKA     OR LASER LEAD EXTRACTION - LEVEL 3 N/A 12/29/2021    Procedure: VIDEO ASSISTED RIGHT THORACOSCOPY;  Surgeon: Brandi Anthony MD;  Location: [facility] CV     OTHER SURGICAL HISTORY  11/26/2014    BIV ICDbiotronic     NV L HRT CATH W/NJX L VENTRICULOGRAPHY IMG S&I Left 12/12/2016    Procedure: Left Heart Catheterization with Left Ventriculogram;  Surgeon: Delgado Ramirez MD;  Location: [facility]  Cath Lab;  Service: Cardiology     REPLACEMENT TOTAL KNEE      bilat     TONSILLECTOMY & ADENOIDECTOMY       US LYMPH NODE BIOPSY  7/10/2019      No Known Allergies   Social History     Tobacco Use     Smoking status: Never Smoker     Smokeless tobacco: Never Used     Tobacco comment: cigars few times a year only   Substance Use Topics     Alcohol use: Yes     Comment: Alcoholic Drinks/day: occasional      Wt Readings from Last 1 Encounters:   02/11/22 70.3 kg (155 lb)        Anesthesia Evaluation   Pt has had prior anesthetic.         ROS/MED HX  ENT/Pulmonary: Comment: Pulmonary anthracosis with insterstitial lung disease.      Neurologic:  - neg neurologic ROS     Cardiovascular:     (+) Dyslipidemia hypertension--CAD -past MI ZLYQ-bctqu-HCP Last EF: 15-20 ESTRADA. fainting (syncope). pacemaker, ICD dysrhythmias, a-fib, pulmonary hypertension,     METS/Exercise Tolerance: >4 METS    Hematologic:  - neg hematologic  ROS     Musculoskeletal:  - neg musculoskeletal ROS (+) arthritis,     GI/Hepatic:  - neg GI/hepatic ROS     Renal/Genitourinary:     (+) renal disease, type: CRI,     Endo:     (+) type II DM, Using insulin, Diabetic complications: nephropathy.  (-) Type I DM   Psychiatric/Substance Use:  - neg psychiatric ROS     Infectious Disease:  - neg infectious disease ROS     Malignancy:  - neg malignancy ROS     Other:  - neg other ROS          Physical Exam    Airway  airway exam normal      Mallampati: I   TM distance: > 3 FB   Neck ROM: full   Mouth opening: > 3 cm    Respiratory Devices and Support         Dental       (+) missing      Cardiovascular   cardiovascular exam normal       Rhythm and rate: regular and normal     Pulmonary   pulmonary exam normal                OUTSIDE LABS:  CBC:   Lab Results   Component Value Date    WBC 9.7 02/11/2022    WBC 8.9 12/30/2021    HGB 11.1 (L) 02/11/2022    HGB 8.7 (L) 12/30/2021    HCT 33.8 (L) 02/11/2022    HCT 26.8 (L) 12/30/2021     02/11/2022      (L) 12/30/2021     BMP:   Lab Results   Component Value Date     02/02/2022     (L) 12/30/2021    POTASSIUM 4.3 02/02/2022    POTASSIUM 4.7 12/30/2021    CHLORIDE 107 02/02/2022    CHLORIDE 103 12/30/2021    CO2 18 (L) 02/02/2022    CO2 17 (L) 12/30/2021    BUN 38 (H) 02/02/2022    BUN 44 (H) 12/30/2021    CR 1.75 (H) 02/02/2022    CR 1.98 (H) 12/30/2021     (H) 02/02/2022     (H) 12/30/2021     COAGS:   Lab Results   Component Value Date    PTT 42 (H) 02/02/2022    INR 1.29 (H) 03/09/2020     POC:   Lab Results   Component Value Date     (H) 07/30/2020     HEPATIC:   Lab Results   Component Value Date    ALBUMIN 3.2 (L) 12/30/2021    PROTTOTAL 6.3 12/30/2021    ALT 23 12/30/2021    AST 25 12/30/2021    ALKPHOS 76 12/30/2021    BILITOTAL 1.3 (H) 12/30/2021     OTHER:   Lab Results   Component Value Date    LACT 0.9 12/29/2021    A1C 7.5 (H) 12/29/2021    LYNNE 8.8 02/02/2022    PHOS 3.9 12/22/2021    MAG 1.5 (L) 07/01/2020    LIPASE 168 (H) 07/09/2019    TSH 1.02 03/09/2020    CRP 2.3 (H) 07/09/2019    SED 35 (H) 07/09/2019       Anesthesia Plan    ASA Status:  4   NPO Status:  NPO Appropriate    Anesthesia Type: General.     - Airway: Mask Only   Induction: Intravenous, Propofol.   Maintenance: Balanced.        Consents    Anesthesia Plan(s) and associated risks, benefits, and realistic alternatives discussed. Questions answered and patient/representative(s) expressed understanding.    - Discussed:     - Discussed with:  Patient, Spouse      - Extended Intubation/Ventilatory Support Discussed: No.      - Patient is DNR/DNI Status: No    Use of blood products discussed: No .     Postoperative Care    Pain management: Multi-modal analgesia, Oral pain medications.   PONV prophylaxis: Ondansetron (or other 5HT-3), Dexamethasone or Solumedrol     Comments:    Other Comments: 70 mg ketamine IV on induction.                Shon Jaimes MD

## 2022-02-11 NOTE — DISCHARGE INSTRUCTIONS
Electrophysiology  Discharge Instructions for Pacemaker Generator Change out or Defibrillator Generator Change Out    1.  You may shower in 3 days.    2.  Contact the [facility] at [phone] should you develop redness, swelling or drainage of the incision area.    3. You may drive in 24 hours.    4. You may remove the dressing tomorrow.    5. If you have steri strips in place, do not attempt to remove, they are glued on.  The nurse will remove them at your follow up visit.      Your Procedural Physician was: Dr. Markus Pool   To reach the Electrophysiology Registered Nurses working with Dr Pool please call [phone]     To reach the Device Registered Nurses regarding questions about your device incision or device function please call [phone] Option #3    [facility]:  [phone]  If you are calling after hours, please listen to the entire voice mail, a live  will answer at the end of the message.

## 2022-02-16 NOTE — ANESTHESIA POSTPROCEDURE EVALUATION
Patient: Ken Doss    Procedure: Procedure(s):  EP Implantable Cardio-Defibrillator Generator Change Biventricular  EP DFT Test/Follow-up  EP Recision Pacemaker Dual Lead       Diagnosis:Chronic systolic heart failure  Nonischemic cardiomyopathy  Paroxysmal ventricular tachycardia  Biventricular ICD in situ, device malfunction.  Diagnosis Additional Information: No value filed.    Anesthesia Type:  General    Note:  Disposition: Outpatient   Postop Pain Control: Uneventful            Sign Out: Well controlled pain   PONV: No   Neuro/Psych: Uneventful            Sign Out: Acceptable/Baseline neuro status   Airway/Respiratory: Uneventful            Sign Out: Acceptable/Baseline resp. status   CV/Hemodynamics: Uneventful            Sign Out: Acceptable CV status; No obvious hypovolemia; No obvious fluid overload   Other NRE: NONE   DID A NON-ROUTINE EVENT OCCUR? No           Last vitals:  Vitals Value Taken Time   BP 89/55 02/11/22 1500   Temp     Pulse 69 02/11/22 1500   Resp 46 02/11/22 1500   SpO2 95 % 02/11/22 1500       Electronically Signed By: Gabby Mclaughlin MD  February 16, 2022  6:29 AM

## 2022-02-21 NOTE — DISCHARGE INSTRUCTIONS
Implantable Cardiac Defibrillator (ICD) Post-operative Checklist      The Device Registered Nurse (RN) reviewed the ICD function.      The Device RN did a wound assessment and wound care teaching.    Please call the Device Nurses with any signs of infection or questions regarding wound healing. Device Nurse Line: [phone], Option#3.      The Device RN demonstrated and displayed the specific remote monitoring system for your ICD.      The Device RN reviewed the Partnership Agreement Form.    Patient Instructions    Do not lift your left arm above the shoulder height, perform any vigorous arm movements such as swimming, golfing, washing windows, shoveling show, vacuuming or lifting greater than 10-15lbs with the affected arm for 4 weeks from the date of implant (until 3/11/22).      To reduce the risk of infection, try to avoid any dental procedures for the first 6 weeks after your ICD implant (until 3/25/22). If you have an emergent or urgent dental need during this time, contact the device clinic for a prescription for an antibiotic.      You will receive a device identification (ID) card in the mail from the device  within 6 weeks to replace the temporary ID card you were given in the hospital.      You may travel by any mode of transportation; just show your ICD ID card. You may be subject to a hand search or use of a handheld wand, but official should not keep the wand over the implant site for greater than 5-10 seconds.       For any surgery, let your doctor know you have an ICD.      Your ICD is not MRI safe.      Most household appliances, including a microwave, will not interfere with your ICD function. If you suspect interference, simply move away from the source. Cell phones do not cause a problem. Please refer to the device booklet from the  or their website under the section on electromagnetic interference (NATALIE) for further guidelines on things that may interfere with your ICD.        If you receive a shock from your device:  1. Keep a diary of your symptoms and activities at the time of the shock.  2. If you receive 1 shock, your symptoms subside and you feel well, call the Device Clinic. If this occurs after hours call the next morning.   3. If you receive 1 shock and your symptoms do not subside, or you receive multiple shocks: Call 911.      Device Clinic Contact Information  Device Nurses: [phone], Option #3.  Device Remote Specialists: [phone], Option #2. For questions about your Remote Transmission or Transmission Schedule   Device Schedulers: [phone], Option #1

## 2022-02-21 NOTE — NURSING NOTE
"DEVICE CLINIC RN POST-OP NOTE    Reason for visit: In-clinic post-operative wound and device check s/p New CRT-D generator and RV lead    Device System: Medtronic [id number] [place] XT HF CRT-D MRI and RV lead: Medtronic 6935M Sprint Quattro Secure S MRI SureScan.  Pre-existing leads - RA: Medtronic 5076 CapSureFix Novus, LV: [employer] 1258T QuickFlex ?, implanted on 01/23/2013 by Dr. George Gonzalez.  Procedure date: 02/11/2022  Implant Diagnosis: Malfunction of implantable cardioverter-defibrillator, Chronic systolic heart failure, Non-ischemic cardiomyopathy, Paroxysmal ventricular tachycardia  Implanting Physician: Dr. Markus Pool      Assessment  Subjective: \"I'm always short of breath, my heart is just petering out.  I think it [device implant site] is good.\"  Vitals: Temp 97.6  F (36.4  C) (Oral)   Heart Sounds: normal  Lung Sounds: Right basilar rales, otherwise clear to auscultation throughout the rest of the lung fields.  Incision/device pocket: Clean, dry and intact with resolving ecchymosis and edema.  There are no signs of infection present.  The Steri-strips were removed at today's visit and the area cleaned of residual adhesive.      Device Findings  Interrogation of device reveals normal sensing and capture thresholds  See the Paceart Report for a full summary of the device information.       Patient Education  Ken Doss was unaccompanied today.     [facility]'s Partnership Agreement for Device Patients and Post-operative Checklist were presented and reviewed at today's appointment.    Signs and symptoms of infection, poor wound healing, and normal device function were reviewed. Range of motion and weight restrictions for the left arm are for four weeks. He was issued a [employer] Relay monitor and instructed on its set-up and use for remote monitoring by the Medtronic representative prior to hospital discharge. These instructions were reviewed at today s " visit. Mr. Doss verbalizes understanding of today's instructions and is agreeable to the plan.      Plan  - Onr month remote transmission on 03/11/2022  - Three month post-op wound and device check in-clinic on 05/11/2022 at our [facility] location    Erendira Mccollum RN, MA  Device Nurse  [facility]

## 2022-02-22 NOTE — TELEPHONE ENCOUNTER
" TL                      Ken Doss \"Skyler\"  Legal Name:   Ken Doss  Male, 76 year old, 1945  MRN:   [id number]  SB#:   #1  Phone:   [phone] (M)        Weight:   70.3 kg (155 lb)  HM Due?:   Due    Allergies  No Known Allergies    Pref Language:   English    PCP:   Lewis Reeves MD  Coverage:   [employer]    Next Appt  With Cardiology (Eduard Tang RN)  02/24/2022 at 2:00 PM                     Message  Received: Today  Josie Agarwal  P [facility] Device  Pool - Lhe  Cc: Stephany Levy RN; Erendira Mccollum RN  Caller: Unspecified (Today,  9:35 AM)           Previous Messages          Call Back (Pacemaker update)     Josie Agarwal routed conversation to You; [facility] Device  Pool - Lhe; Stephany Levy RN 4 minutes ago (10:01 AM)      Kostas Mitchell CMA routed conversation to [facility] Cardiology Adult [facility] 29 minutes ago (9:37 AM)      Mandi Lemon routed conversation to [facility] Adult [facility] 29 minutes ago (9:36 AM)     Mandi Lemon 30 minutes ago (9:36 AM)            ERNESTINE [facility] Call [place]     Phone Message     May a detailed message be left on voicemail: yes      Reason for Call: Other: Pt would like a call back asap as he is having trouble with his pacemaker which is giving him trouble breathing and he wanted to discuss this with his Dr as he is going to the clinic in [place] to get it checked out but wanted a call to discuss      Action Taken: Message routed to:  [facility] Cardio     Travel Screening: Not Applicable              Left message for patient who identifies himself on his voice messaging service.  Message included to seek emergency medical assistance if his breathing has become worse since today's initial phone call.    Yesterday's in-clinic post-operative wound and device check - device exhibiting normal function, no programming changes made.  Implant site was clean, dry " and intact.  Erendira Mccollum, RN, MA  Device Nurse  [facility]

## 2022-02-24 NOTE — Clinical Note
For Review. Complex patient with a lot of history he wanted to share, did not have time to discuss every med/condition in as much detail as would like. Will follow up when returns from trip.

## 2022-02-24 NOTE — LETTER
March 2, 2022  Ken Doss  [address] [place] [id number]    Dear Mr. Doss, [facility]        Thank you for talking with me on Feb 24, 2022 about your health and medications. As a follow-up to our conversation, I have included two documents:      1. Your Recommended To-Do List has steps you should take to get the best results from your medications.  2. Your Medication List will help you keep track of your medications and how to take them.    If you want to talk about these documents, please call Neema Coker RPH at phone: [phone], Monday-Friday 8-4:30pm.    I look forward to working with you and your doctors to make sure your medications work well for you.    Sincerely,    Neema Coker RPH

## 2022-02-24 NOTE — PROGRESS NOTES
Pemafibrate to Reduce cardiovascular OutcoMes by reducing triglycerides IN patiENts with diabeTes (PROMINENT) clinical trial.    Subject seen in clinic today for study unscheduled visit per medication needs.      Met with Skyler to review meds and provide additional study drug box.  As discussed in detail with the sponsor - the small white unmarked study pills in a difficult to open box look the same as most beta blockers.  Skyler sets up his pills a few weeks at a time and has many     Study medication box # [id number] with 0 tabs  Study medication box # [id number] with 0 tabs  Study medication box # [id number] with 0 tabs  Study medication box # [id number] with 2 tabs but brought a baggie of pills attributed to this box numbering 62    10/27/21-2/24/2277=[id number], 240 pills vs 222  returned by subject.  Total tablet count of 64 for 91% compliance.  Study medication box # [id number] dispensed to subject.  Study medication boxes dispensed in IXRS however, subject only needs 1 new box so only gave box [id number] and did not dispense boxes [id number], [id number], [id number] to him.    Education provided on medication compliance and administration    Plan: Next study Visit  31 with subject moved from 3/17 to 3/15 at 7:30am, gave Skyler a card with this written - he'll be back from [place] the weekend prior.    Eduard Tang RN  Clinical Research Nurse  [phone]

## 2022-02-24 NOTE — Clinical Note
Michel Lazaro,    I am a pharmacist who met with Skyler with his PCP clinic. We reviewed meds at the onset of his current Covid infection. Though I only adjusted his insulin today, he asked that I send you my note to keep his care team on the same page.     Please let me know if you have any questions or concerns,    Neema Coker, Pharm D., MPH  Pharmacy Resident - [facility]  Pager: [phone]

## 2022-02-24 NOTE — LETTER
_  Medication List        Prepared on: 3/2/2022     Bring your Medication List when you go to the doctor, hospital, or   emergency room. And, share it with your family or caregivers.     Note any changes to how you take your medications.  Cross out medications when you no longer use them.    Medication How I take it Why I use it Prescriber   acetaminophen (TYLENOL) 500 MG tablet Take 1,000 mg by mouth 2 times daily as needed  Pain Patient Reported   apixaban ANTICOAGULANT (ELIQUIS ANTICOAGULANT) 5 MG tablet Take 1 tablet (5 mg) by mouth 2 times daily Chronic Systolic Heart Failure (H); Coronary artery disease involving native coronary artery of native heart without angina pectoris Tata Payton MD   atorvastatin (LIPITOR) 40 MG tablet Take 40 mg by mouth daily Cholesterol Tata Payton MD   bumetanide (BUMEX) 1 MG tablet Take 1 tablet (1 mg) by mouth 2 times daily Chronic Systolic Heart Failure (H) Tata Payton MD   carvedilol (COREG) 6.25 MG tablet Take 2 tablets (12.5 mg) by mouth 2 times daily (with meals) Primary Cardiomyopathy (H) Linda Marmolejo NP   Coenzyme Q10 (COQ-10) 100 MG CAPS Take 200 mg by mouth daily  General Health Patient Reported   colchicine (COLCYRS) 0.6 MG tablet TAKE 2 TABLETS BY MOUTH THEN 1 TABLET ONE HOUR LATER AS NEEDED FOR GOUT ATTACK AS DIRECTED Gout Lewis Reeves     diphenhydrAMINE (BENADRYL) 25 MG tablet Take 25 mg by mouth At Bedtime Sleep Patient Reported   diphenhydrAMINE-acetaminophen (TYLENOL PM)  MG tablet Take 1 tablet by mouth nightly as needed for sleep Sleep Patient Reported   ENTRESTO 49-51 MG per tablet Take 1 tablet by mouth twice daily Acute on Chronic Systolic Heart Failure (H) Linda Marmolejo NP   febuxostat (ULORIC) 40 MG TABS tablet Take 40 mg by mouth daily  Patient Reported   ferrous sulfate (FEROSUL) 325 (65 Fe) MG tablet Take 325 mg by mouth 2 times daily Iron Deficiency Anemia Katie Kwok NP   gabapentin (NEURONTIN) 100 MG  capsule Take 100 mg by mouth 3 times daily Neuropathy Lewis Reeves     glucosamine-chondroitin (GLUCOSAMINE CHONDR COMPLEX) 500-400 MG CAPS per capsule Take 1 tablet by mouth daily  General Health Patient Reported   hydrOXYzine (ATARAX) 10 MG tablet 1-2 tabs as needed Anxiety Lewis Reeves     insulin glargine (LANTUS PEN) 100 UNIT/ML pen Inject 27 Units Subcutaneous At Bedtime Diabetes Dr. Daily Andrade   metFORMIN (GLUCOPHAGE) 1000 MG tablet Take 1,000 mg by mouth 2 times daily (with meals) Diabetes Dr. Daily Andrade   Multiple Vitamin (MULTI VITAMIN DAILY PO) Take 1 tablet by mouth daily  General health Patient Reported   Omega-3 Fatty Acids (FISH OIL) 500 MG CAPS Take 1,200 mg by mouth daily  General Health Patient Reported   spironolactone (ALDACTONE) 25 MG tablet Take 1/2 (one-half) tablet by mouth once daily CAD (Coronary Artery Disease) Tata Payton MD   study drug pemafibrate 0.2 mg vs placebo (PROMINENT) tablet 0.2 mg  Dyslipidemia Les B MD Musa   ticagrelor (BRILINTA) 90 MG tablet Take 1 tablet (90 mg) by mouth 2 times daily Please schedule lab draw to F/U on creatine; additional refills after s/p Angioplasty with Stent; Coronary artery disease involving native coronary artery of native heart without angina pectoris Tata Payton MD   traMADol (ULTRAM) 50 MG tablet Take 1 tablet by mouth as needed for pain Pain Lewis Reeves           Add new medications, over-the-counter drugs, herbals, vitamins, or  minerals in the blank rows below.    Medication How I take it Why I use it Prescriber                          Allergies:      No Known Allergies        Side effects I have had:              Other Information:             My notes and questions:

## 2022-02-24 NOTE — LETTER
"Recommended To-Do List      Prepared on: 3/2/2022     You can get the best results from your medications by completing the items on this \"To-Do List.\"      Bring your To-Do List when you go to your doctor. And, share it with your family or caregivers.    My To-Do List:  What we talked about: What I should do:   Your medication dosage being too high    Decrease your dosage of insulin glargine (LANTUS PEN) to 27 units daily          What we talked about: What I should do:   Needing additional monitoring    Self-monitor: test blood sugars every morning before breakfast and occasionally 2 hours after 1 meal each day          What we talked about: What I should do:                     "

## 2022-02-24 NOTE — LETTER
2/24/2022    Lewis Reeves MD  [facility] [address] [place] [address]    RE: Ken Doss       Dear Colleague,     I had the pleasure of seeing Ken Doss in the [facility].  Pemafibrate to Reduce cardiovascular OutcoMes by reducing triglycerides IN patiENts with diabeTes (PROMINENT) clinical trial.    Subject seen in clinic today for study unscheduled visit per medication needs.      Met with Skyler to review meds and provide additional study drug box.  As discussed in detail with the sponsor - the small white unmarked study pills in a difficult to open box look the same as most beta blockers.  Skyler sets up his pills a few weeks at a time and has many     Study medication box # [id number] with 0 tabs  Study medication box # 1754581 with 0 tabs  Study medication box # 3432812 with 0 tabs  Study medication box # [id number] with 2 tabs but brought a baggie of pills attributed to this box numbering 62    10/27/21-2/24/2276=[id number], 240 pills vs 222  returned by subject.  Total tablet count of 64 for 91% compliance.  Study medication box # [id number] dispensed to subject.  Study medication boxes dispensed in IXRS however, subject only needs 1 new box so only gave box [id number] and did not dispense boxes [id number], 6208055, [id number] to him.    Education provided on medication compliance and administration    Plan: Next study Visit  31 with subject moved from 3/17 to 3/15 at 7:30am, gave Skyler a card with this written - he'll be back from [place] the weekend prior.    Eduard Tang RN  Clinical Research Nurse  [phone]        Thank you for allowing me to participate in the care of your patient.      Sincerely,     Eduard Tang RN     [facility]  cc:   No referring provider defined for this encounter.

## 2022-02-24 NOTE — PROGRESS NOTES
Medication Therapy Management (MTM) Encounter    ASSESSMENT:                            Medication Adherence/Access: See below for considerations. Patient to follow up with [facility] to verify meds and will follow up with PharmD & PCP with updated med list.    Type 2 Diabetes:  Patient experiencing frequent lows. Recommend reduction in insulin today to prevent hypoglycemia. Recommend occasional post-prandial blood glucose checks too. Recommend to closely monitor kidney function since eGFR recently fluctuating and on metformin. Recommended GLP1 or SGLT2 today for cardiac and renal benefit. Patient declined at this time and would like to discuss further with Cardiologist.      Hyperlipidemia: At goal. No changes recommended today.      HFrEF/Afib/Hypertension/ICD/Hx MI: Stable. No changes recommended at this time. Continue to follow with PCP for Covid test results for dyspnea. Recommend Antibody therapy if patient tests positive as he is high risk for Covid complications.      Hx Fe Deficiency Anemia: Stable. Patient denies signs/symptoms of GI bleed. Educated to monitor closely since on Brillinta and Eliquis. The dosage of elemental iron required to treat iron deficiency anemia in adults is 120 mg per day for three months. Recommend continue Fe Sulfate 325 twice daily and  recheck CBC in 4 weeks with Fe study repeated in 3 months.     Anxiety/Depression: Stable. Did not have time to discuss use of hydroxyzine. Will discuss further at future visit as some concern for anticholinergic properties if using frequently.    Pain/Neuropathy: Stable. Did not have time to discuss at length today and patient not clear what all therapies are for pain. Will discuss further at future visit once tabs are ID'd with Study coordinators today.    Gout: Stable. Infrequent flares treated well with colchicine. Will continue to monitor.    Supplements: Stable. Did not have time to discuss supplements today. May consider discontinuation of  glucosamine if not taking 1500 mg and not having joint pain/deterioration. Will discuss use of Co-Q 10 and review evidence of efficacy. Also not meeting goal of 1000 international unit(s) vitamin D and 1200 mg Ca daily - will address at future visit.    PLAN:                            1) Decrease Lantus to 27 units once daily  2) Continue checking blood glucose fasting before breakfast daily and 2 hours after one meal occasionally.    Follow-up: in approximately 3 weeks, after return from vacation.    SUBJECTIVE/OBJECTIVE:                          Ken Doss is a 76 year old male called for an initial visit. He was referred to me from [email or web address]. Patient was accompanied by His wife Karen..     Reason for visit: Medication Therapy Management.    Allergies/ADRs: Reviewed in chart  Past Medical History: Reviewed in chart  Tobacco: He reports that he has never smoked. He has never used smokeless tobacco.    Medication Adherence/Access: Patient has pillbox.  Is enrolled in study with [facility] and verifies medications with them periodically.  Has visit today to review medications as he is unsure exactly what each medication is and wants to be sure before he leaves on vacation.  Patient notes not wanting to make significant changes plan to go on vacation for 2 weeks in [place] to visit niece.    Type 2 Diabetes:  Currently taking 32 units Lantus daily, metformin 1000 mg twice daily.  He is also taking an unknown capsule as he is in a blinded study with [facility] for heart and DM. Patient is not experiencing side effects.  Blood sugar monitorin time(s) daily. Ranges (patient reported): Fasting - 104, 153, 104, 74, 77, 75, 72, 67, 65, 85, 80, 133, 142, 127, 111, 106, 127, 125, 132, 132, 114, 82  down to 54 some nights; hypoglycemia aware -as it wakes him up when his blood sugars are low and he goes to eat fruit, drink juice, or candy followed by peanut butter  sandwich  Post-Prandial-rarely checks this.  Symptoms of low blood sugar? shaky, dizzy, weak, Frequency of lows- several times per week  Symptoms of high blood sugar? none  Eye exam: due  Foot exam: due  Diet/Exercise: Tries to monitor diet closely and reduce sugar intake.  Aspirin: Not taking due to on Brillinta (see below)  Statin: Yes: Atorvastatin 40 mg  ACEi/ARB: Yes: Entresto (see below).   Urine Albumin: No results found for: UMALCR   Lab Results   Component Value Date    A1C 7.5 12/29/2021    A1C 7.8 03/09/2020    A1C 8.7 09/20/2019    A1C 8.6 01/16/2019          Component Ref Range & Units 13 d ago   (2/11/22) 3 wk ago   (2/2/22) 1 mo ago   (12/30/21)   Sodium 136 - 145 mmol/L 140  139  132 Low     Potassium 3.5 - 5.0 mmol/L 4.0  4.3  4.7    Chloride 98 - 107 mmol/L 107  107  103    Carbon Dioxide (CO2) 22 - 31 mmol/L 21 Low   18 Low   17 Low     Anion Gap 5 - 18 mmol/L 12  14  12    Urea Nitrogen 8 - 28 mg/dL 41 High   38 High   44 High     Creatinine 0.70 - 1.30 mg/dL 1.61 High   1.75 High   1.98 High     Calcium 8.5 - 10.5 mg/dL 8.8  8.8  7.9 Low     Glucose 70 - 125 mg/dL 122  191 High   359 High     GFR Estimate >60 mL/min/1.73m2 44 Low   40 Low  CM  34 Low  CM            Hyperlipidemia: Current therapy includes atorvastatin 40mg daily, fish oil 500 mg once daily .  Patient reports no significant myalgias or other side effects.  The ASCVD Risk score ([place] DC Jr., et al., 2013) failed to calculate for the following reasons:    The valid systolic blood pressure range is 90 to 200 mmHg    The valid total cholesterol range is 130 to 320 mg/dL     Recent Labs   Lab Test 10/07/21  1355 02/29/20  0248   CHOL 117 77   HDL 30* 32*   LDL 52 29   TRIG 176* 79       HFrEF/Afib/Hypertension/ICD/Hx MI: Current medications include Brillinta 90mg 1 tab twice daily (restenosis of stents x2 Feb 2020), bumetanide 1 mg twice daily, carvedilol 6.25 mg 1 twice daily, Eliquis 5mg twice daily, Entresto 49/51 - 1 tab twice  "daily, Spironolactone 25 mg - 1/2 tab daily; placebo from Study. 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  Has been better with new pacemaker.  Patient notes he is active but limited due to no exertion of arm because put in a new pacemaker reset recently (2/11 hospitalization for VTach) at 71 bpm; sees up to 92 bpm.  Feeling physically ok, but taking it easy and trying to rest more. Having a few nights where not able to breathe if laying down -waiting results on Covid test from PCP. Cards following closely with Dr. Pool and Tata - considering cardiac pump, but patient declined as he believes this would reduce quality of life.   Patient reports no current medication side effects including significant bruising/bleeding.   ECHO:  EF 15-20% per ECHO Nov. 2021  Patient is complaining of HF symptoms or of: shortness of breath, paroxysysmal nocturnal dyspnea and cough - believes this is related to possible Covid or URI.    Patient is not measuring daily weights.   Patient does not self-monitor blood pressure.       Hx Fe Deficiency Anemia: Recently found to have low Fe (history of anemia); restarted Fe twice daily with PCP (strength unknown).    Component Ref Range & Units 2 d ago 1 yr ago   Iron 42 - 175 ug/dL 36 Low   143 R      Component Ref Range & Units 2 d ago 1 yr ago   Ferritin 27 - 300 ng/mL 67  127      Component Ref Range & Units 2 d ago 1 yr ago   Vitamin B12 213 - 816 pg/mL 355  410          Anxiety/Depression: Patient reports feeling \"okay\" emotionally.  Understands that his heart function is deteriorating and he is trying to \" make the most of life\".  Currently taking hydroxyzine 10mg - 1-2 tabs q4h as needed for anxiety.  Did not have time to discuss how often using currently.    Pain/Neuropathy: Patient has had lots of with new pacemaker which has improved.  Is taking gabapentin 100 mg - 1 cap three times daily but not sure how effective " this is.  Some nights takes acetaminophen 500 mg - 2 tabs for pain.  Notes a pink pill and capsule he takes at bedtime but is not sure what they are -he will discuss with Study team today .  Takes tramadol 50 mg 1 tablet as needed for shoulder pain but has not requir.ed in months.  Not currently taking Vicodin 5mg/325 mg 1 tab as needed Q6H.    Gout: Currently taking colchicine 0.6 mg as needed for gout flare (not taken in several months).  Patient does not believe he is taking febuxostat, but isn't sure of all pills in pill box. Denies side effects.    Supplements: Currently taking CoQ10 100 mg - 1 once daily, glucosamine/chondrointon 500/400 mg - 1 as needed.       Today's Vitals: There were no vitals taken for this visit.  ----------------  Post Discharge Medication Reconciliation Status: discharge medications reconciled and changed, per note/orders.    I spent 62 minutes with this patient today (an extra 15 minutes was spent creating the Medication Action Plan). Katie Rivera NP was provided the recommendations above  via routed note and is the authorizing prescriber for this visit through the pharmacist collaborative practice agreement.. A copy of the visit note was provided to the patient's provider(s).    The patient was mailed a summary of these recommendations.     Neema Coker, Anahy D., MPH  Pharmacy Resident - [facility]  Pager: [phone]    Patient was seen independently by Dr. Coker. I agree with her assessment and plan.   Magalie Quintana, Pharm.D, [facility]  Medication Therapy Management Pharmacist  [phone]      Telemedicine Visit Details  Type of service:  Telephone visit  Start Time: 8:32 AM  End Time: 9:34 AM  Originating Location (patient location): Home  Distant Location (provider location):  [facility]     Medication Therapy Recommendations  Type 2 diabetes mellitus (H)    Current Medication: insulin glargine (LANTUS PEN) 100 UNIT/ML pen   Rationale: Dose  too high - Dosage too high - Safety   Recommendation: Decrease Dose - Decrease Lantus to 27 units daily   Status: Accepted per CPA          Current Medication: insulin glargine (LANTUS PEN) 100 UNIT/ML pen   Rationale: Medication requires monitoring - Needs additional monitoring - Safety   Recommendation: Self-Monitoring - Recommend increase glucose monitoring to occasional 2 hour after meal readings   Status: Patient Agreed - Adherence/Education          Rationale: Synergistic therapy - Needs additional medication therapy - Indication   Recommendation: Start Medication - Ozempic (0.25 or 0.5 MG/DOSE) 2 MG/1.5ML Sopn - Recommend GLP1 or SGLT2 for renal and cardiac benefit   Status: Declined per Patient

## 2022-02-28 NOTE — TELEPHONE ENCOUNTER
Patient reports that he recently tested positive for Covid and is now having shortness of breath. Instructed patient to seek emergent care if he is having shortness of breath in the setting of a Covid positive test result. Patient states that he feels like he is doing better today than yesterday. Again, encouraged patient to seek emergent care. Patient verbalized understanding.     Bill Cm RN   Device Nurse

## 2022-03-01 NOTE — TELEPHONE ENCOUNTER
VM left from patient asking for a call back regarding breathing issues.    Attempted to contact pt, voicemail message was left with contact information and instructing pt to call back.       To note, patient tested positive for COVID on 2/23 and has been instructed to go to the ER twice.    3/1/2022 10:57 AM  Padmini Panchal RN

## 2022-03-01 NOTE — TELEPHONE ENCOUNTER
VM received from patient.    Attempted to contact pt, voicemail message was left with contact information and instructing pt to call back.  3/1/2022 12:51 PM  Padmini Panchal RN

## 2022-03-03 NOTE — TELEPHONE ENCOUNTER
PC back to pt  Pt has been managed by his PMD, and has since been in contact with her  He reports receiving some therapy for COIVD through an IV at [facility] for COVID, but could not recall the name  His PMD also prescribed an antianxiety medication for him  He feels the SOB is getting better with time  He felt more SOB yesterday and today, because he had to run errands and to the grocery store, before going to the [place] tomorrow  Advised pt he should still be monitoring his symptoms and following with his PMD regarding COVID  Also discussed CDC guideline for isolation with active symptoms and testing postive  Pt verbalized understanding, has no further questions or concerns at this time, and has our contact information if needed.  3/3/2022 3:14 PM  Padmini Sawyer RN

## 2022-03-14 NOTE — TELEPHONE ENCOUNTER
Reminder call for visit tomorrow as we changed this as last visit on 2/24/22.  Skyler acknowledged and will bring his medication box tomorrow.  Eduard Tang RN  Clinical Research Nurse  [phone]
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
## 2022-03-15 PROBLEM — Z91.81 H/O FALL: Status: ACTIVE | Noted: 2022-01-01

## 2022-03-15 NOTE — PATIENT INSTRUCTIONS
Ken Doss,    It was a pleasure to see you today at the [facility].     My recommendations after this visit include:    - No medications changes made today    - Do leg calf muscle exercise for couple minutes before you change your position or stand to prevent fall    - Make sure to drink at least 50-60 ounces per day    -  Follow up with Dr. Vigil in 4 weeks    - Please call ELVIRA Linn on [phone]  if you have any questions or concerns     Arcelia Ring CNP

## 2022-03-15 NOTE — LETTER
3/15/2022    Lewis Reeves MD  [facility] [address] [place] [address]    RE: Ken Doss       Dear Colleague,     I had the pleasure of seeing Ken Doss in the [facility].        Assessment/Recommendations   Assessment:   1.  Dyslipidemia/hypertriglyceridemia: On permanent study.    2.  Coronary artery disease with previous history of PCI to RCA and LAD: Most recent coronary angiogram done on 2/27/2022 showed in-stent restenosis of ostial RCA and mid LAD which were successfully treated with a drug-eluting stents.    Patient was recommended to stay on Brilinta for 12 months and longer if tolerated.  He is currently on Brilinta 90 mg twice a day.  He denies bleeding complications.    3. History of nonischemic cardiomyopathy with systolic dysfunction, NYHA class II-III: Most recent echocardiogram done in November 2021 showed severely reduced LVEF of 15 to 20%.  Patient has baseline shortness of breath with exertion and fatigue.    He is well compensated with no evidence of fluid retention assessment.  Current home weight is stable between 148 to 152 pounds.  He reports adequate fluid intake.  He is trying to follow low-sodium diet.    He most recently followed up with Dr. Avendaño in advanced heart failure clinic back in November 2021-note reviewed.    4.  History of ICD malfunction: Patient underwent successful explantation of chronic CRT, DFT testing, and successful implantation of new biventricular CRT-D ICD generator without any acute complication on 2/11/2022.  Reviewed his most recent in clinic device check and most recent remote device check on 3/11/2022 showed normal device function.  Noted 8 beats of NSVT since 2/21/2022.  Patient denies any specific symptoms.    5.  History of recurrent fall: Patient states that he fell couple times last summer.  He also states that he had a fall couple weeks ago and landed on his left shoulder when he got up  too quickly to use the bathroom.  He denies loss of consciousness.  He denies hitting his head.    6.  Positive COVID on 2/23/2022: Patient had some shortness of breath which is now resolved.  He denies any other symptoms.    Plan:  - No changes to his medications today.  -Instructed patient to do leg calf exercise before changing position or standing up to prevent fall  -Encouraged adequate hydration of at least 50 to 60 ounces of fluid per day  -Continue medical therapy for coronary artery disease  -We discussed about safety  -Continue to follow-up with research per protocol for Prominent research study follow up.    Follow-up with Dr. Vigil in 4 weeks     History of Present Illness/Subjective    Mr. Ken Doss is a 76 year old male with significant past medical history of coronary artery disease with status post PCI to mid LAD and RCA in January 2019, medical noncompliance, hypertriglyceridemia, diabetes type 2, pulmonary fibrosis, paroxysmal atrial fibrillation with status post AV node ablation in September 2019, nonischemic cardiomyopathy with chronic systolic heart failure, paroxysmal ventricular tachycardia with s/p ICD.     He follows up with Dr. Payton in the [facility] for his heart failure management.     Today, Skyler is seen for prominent study follow-up.  His last follow-up with Dr. Vigil was in March.  Patient had CRT-D placed in January 2013 with RV lead extraction and replacement in November 2014 and generator replacement on 9/17/2020.  He had issue with CRT-D malfunction.  The attempt of removal and replacement of RV defibrillator lead was unsuccessful in December.Patient underwent successful explantation of chronic CRT, DFT testing, and successful implantation of new biventricular CRT-D ICD generator without any acute complication.  Reviewed his most recent in clinic device check and most recent remote device check on 3/11/2022 showed normal device function.  Noted 8  "beats of NSVT since 2/21/2022.       He has chronic shortness of breath on exertion and fatigue at baseline.  He states his appetite has not been that great.  He had weight loss of total of 8 pounds over the last couple months.  He denies any chest pain, shortness of breath at rest, PND, orthopnea, abdominal bloating, lower extremity edema or any bleeding complications.  It looks like patient is due for follow-up with Dr. Vigil.  He agreed to follow-up with him.     Physical Examination Review of Systems   BP 98/52 (BP Location: Right arm, Patient Position: Sitting, Cuff Size: Adult Regular)   Pulse 60   Resp 16   Ht 1.753 m (5' 9\")   Wt 70.9 kg (156 lb 3.2 oz)   BMI 23.07 kg/m    Body mass index is 23.07 kg/m .  Wt Readings from Last 3 Encounters:   03/15/22 70.9 kg (156 lb 3.2 oz)   03/15/22 70.9 kg (156 lb 3.2 oz)   02/11/22 70.3 kg (155 lb)       General Appearance:   no distress, normal body habitus   ENT/Mouth: membranes moist, no oral lesions or bleeding gums.      EYES:  no scleral icterus, normal conjunctivae   Neck: no carotid bruits or thyromegaly   Chest/Lungs:   lungs are clear to auscultation, no rales or wheezing,  equal chest wall expansion    Cardiovascular:    Normal first and second heart sounds with no murmurs, rubs, or gallops; the carotid, radial and posterior tibial pulses are intact, Jugular venous pressure flat, no edema bilaterally    Abdomen:  no organomegaly, masses, bruits, or tenderness; bowel sounds are present   Extremities: no cyanosis or clubbing   Skin: no xanthelasma, warm.    Neurologic: normal  bilateral, no tremors     Psychiatric: alert and oriented x3, calm                                               Medical History  Surgical History Family History Social History   Past Medical History:   Diagnosis Date     Acute renal failure (H)      Anemia      Arthritis     osteoarthritis     Arthritis     osteoarthritis     Arthritis     osteoarthritis     CHF (congestive " heart failure) (H)      CHF (congestive heart failure) (H)      CHF (congestive heart failure) (H)      Chronic systolic heart failure (H) Dec 2012     Chronic systolic heart failure (H) Dec 2012     Chronic systolic heart failure (H) Dec 2012     Coronary artery disease involving native coronary artery     Non obstructive disease by cath in 2007 Cor angio Nov 2016: RCA 70% (FFR 0.89), and OM1 75%        Coronary artery disease involving native coronary artery     Non obstructive disease by cath in 2007 Cor angio Nov 2016: RCA 70% (FFR 0.89), and OM1 75%        Coronary artery disease involving native coronary artery     Non obstructive disease by cath in 2007 Cor angio Nov 2016: RCA 70% (FFR 0.89), and OM1 75%        Depressive disorder, not elsewhere classified 10/24/2014     Diabetes mellitus, type 2 (H)      Diabetes mellitus, type II (H) 28/Jan/2013     Diabetes mellitus, type II (H) 28/Jan/2013     Diabetes mellitus, type II (H) 28/Jan/2013     Diabetic neuropathy (H)      Diabetic neuropathy (H)      Diabetic neuropathy (H)      Fractures     ankle, leg and arm     High cholesterol 2007     Hypertension      Hypertension      Hypertension      ICD (implantable cardioverter-defibrillator) lead failure 11/26/2014    High voltage lead tip inserted in RV free wall RV lead extraction and implantation of the new septal RV lead November 2014      ICD (implantable cardioverter-defibrillator) lead failure 11/26/2014    High voltage lead tip inserted in RV free wall RV lead extraction and implantation of the new septal RV lead November 2014      ILD (interstitial lung disease) (H)      Infectious mononucleosis      Ischemic cardiomyopathy 4/22/2015    Chronic: LVEF 25- 30% LVEF 26% echo May 2017     Ischemic cardiomyopathy 4/22/2015    Chronic: LVEF 25- 30% LVEF 26% echo May 2017     Ischemic cardiomyopathy 4/22/2015    Chronic: LVEF 25- 30% LVEF 26% echo May 2017     Kidney stone      LBBB (left bundle branch block)      noted on Dec 19, 2012     LBBB (left bundle branch block)     noted on Dec 19, 2012     LBBB (left bundle branch block)     noted on Dec 19, 2012     Lymphadenopathy, mediastinal      Malfunction of implantable defibrillator ventricular (ICD) lead 11/9/2021    RV lead malfunction c/w insulation breech. Lead extraction and replacement recommended     Myocardial infarction (H)      Myocardial infarction (H)      Myocardial infarction (H)      Osteoarthritis      Osteoarthritis      Osteoarthritis      Paroxysmal ventricular tachycardia (H) 2/11/2022     Pulmonary anthracosis (H)      Pulmonary anthracosis (H)      Recurrent kidney stones     Past Surgical History:   Procedure Laterality Date     APPENDECTOMY       ARTHROSCOPY KNEE Bilateral      ARTHROSCOPY SHOULDER ROTATOR CUFF REPAIR      right     C SHLDR ARTHROSCOP,DIAGNOSTIC      Description: Arthroscopy Shoulder;  Recorded: 06/10/2014;     CARDIAC CATHETERIZATION N/A 12/12/2016    Procedure: Coronary Angiogram;  Surgeon: Delgado Ramirez MD;  Location: [facility];  Service:      COLONOSCOPY N/A 12/19/2019    Procedure: COLONOSCOPY with polypectomy;  Surgeon: Delgado Price MD;  Location: [facility];  Service: Gastroenterology     CORONARY ANGIOGRAPHY ADULT ORDER       CV CORONARY ANGIOGRAM N/A 2/27/2020    Procedure: CV CORONARY ANGIOGRAM;  Surgeon: Pedro Fontaine MD;  Location: [facility] CARDIAC CATH LAB     CV CORONARY ANGIOGRAM N/A 1/15/2019    Procedure: Coronary Angiogram;  Surgeon: Mason Correa MD;  Location: [facility];  Service: Cardiology     CV INTRAVASULAR ULTRASOUND N/A 2/27/2020    Procedure: Intravascular Ultrasound;  Surgeon: Pedro Fontaine MD;  Location: [facility] CARDIAC CATH LAB     CV LEFT HEART CATHETERIZATION WITHOUT LEFT VENTRICULOGRAM Left 1/15/2019    Procedure: Left Heart Catheterization Without Left Ventriculogram;  Surgeon: Mason Correa MD;   Location: [facility];  Service: Cardiology     CV PCI ANGIOPLASTY N/A 2/27/2020    Procedure: Percutaneous Coronary Intervention Angioplasty;  Surgeon: Pedro Fontaine MD;  Location:  HEART CARDIAC CATH LAB     CV RIGHT HEART CATH MEASUREMENTS RECORDED N/A 2/27/2020    Procedure: Right Heart Cath;  Surgeon: Pedro Fontaine MD;  Location:  HEART CARDIAC CATH LAB     CV RIGHT HEART CATH MEASUREMENTS RECORDED N/A 7/30/2020    Procedure: CV RIGHT HEART CATH;  Surgeon: Ronny Geronimo MD;  Location:  HEART CARDIAC CATH LAB     EP ABLATION AV NODE N/A 9/27/2019    Procedure: EP Ablation AV Node;  Surgeon: Markus Pool MD;  Location: [facility] Lab;  Service: Cardiology     EP DFT TESTING FOLLOW UP N/A 2/11/2022    Procedure: EP DFT Test/Follow-up;  Surgeon: Markus Pool MD;  Location: [facility]     EP ICD GENERATOR CHANGE BIVENT Left 2/11/2022    Procedure: EP Implantable Cardio-Defibrillator Generator Change Biventricular;  Surgeon: Markus Pool MD;  Location: [facility]     EP ICD INSERT      ICD REIMPLANTATION OF A NEW RV LEAD 11/26/2014     EP REVISION PACEMAKER DUAL LEAD N/A 2/11/2022    Procedure: EP Recision Pacemaker Dual Lead;  Surgeon: Markus Pool MD;  Location: [facility] CV     EP VENOGRAM N/A 11/12/2021    Procedure: EP Venogram;  Surgeon: Markus Pool MD;  Location: [facility]     HC REMOVE TONSILS/ADENOIDS,<11 Y/O      Description: Tonsillectomy With Adenoidectomy;  Recorded: 06/10/2014;     INSERT / REPLACE / REMOVE PACEMAKER       IR UPPER EXTREMITY VENOGRAM LEFT  1/13/2022     JOINT REPLACEMENT      bilateral TKA     OR LASER LEAD EXTRACTION - LEVEL 3 N/A 12/29/2021    Procedure: VIDEO ASSISTED RIGHT THORACOSCOPY;  Surgeon: Brandi Anthony MD;  Location: [facility]     OTHER SURGICAL HISTORY  11/26/2014    BIV ICDbiotronic     CA L HRT CATH W/NJX L VENTRICULOGRAPHY IMG S&I Left  12/12/2016    Procedure: Left Heart Catheterization with Left Ventriculogram;  Surgeon: Delgado Ramirez MD;  Location: [facility];  Service: Cardiology     REPLACEMENT TOTAL KNEE      bilat     TONSILLECTOMY & ADENOIDECTOMY       US LYMPH NODE BIOPSY  7/10/2019    Family History   Problem Relation Age of Onset     Sudden Death Mother         unexpected death in her sleep in her 50s    Social History     Socioeconomic History     Marital status:      Spouse name: Not on file     Number of children: Not on file     Years of education: Not on file     Highest education level: Not on file   Occupational History     Not on file   Tobacco Use     Smoking status: Never Smoker     Smokeless tobacco: Never Used     Tobacco comment: cigars few times a year only   Substance and Sexual Activity     Alcohol use: Yes     Comment: Alcoholic Drinks/day: occasional     Drug use: No     Sexual activity: Not on file   Other Topics Concern     Parent/sibling w/ CABG, MI or angioplasty before 65F 55M? Not Asked   Social History Narrative     Not on file     Social Determinants of Health     Financial Resource Strain: Not on file   Food Insecurity: Not on file   Transportation Needs: Not on file   Physical Activity: Not on file   Stress: Not on file   Social Connections: Not on file   Intimate Partner Violence: Not on file   Housing Stability: Not on file          Medications  Allergies   Current Outpatient Medications   Medication Sig Dispense Refill     acetaminophen (TYLENOL) 500 MG tablet Take 1,000 mg by mouth 2 times daily as needed        apixaban ANTICOAGULANT (ELIQUIS ANTICOAGULANT) 5 MG tablet Take 1 tablet (5 mg) by mouth 2 times daily 180 tablet 1     atorvastatin (LIPITOR) 40 MG tablet Take 40 mg by mouth daily       bumetanide (BUMEX) 1 MG tablet Take 1 tablet (1 mg) by mouth 2 times daily 180 tablet 1     carvedilol (COREG) 6.25 MG tablet Take 2 tablets (12.5 mg) by mouth 2 times daily (with meals)  (Patient taking differently: Take 6.25 mg by mouth 2 times daily (with meals) ) 360 tablet 1     Coenzyme Q10 (COQ-10) 100 MG CAPS Take 200 mg by mouth daily        colchicine (COLCYRS) 0.6 MG tablet TAKE 2 TABLETS BY MOUTH THEN 1 TABLET ONE HOUR LATER AS NEEDED FOR GOUT ATTACK AS DIRECTED       diphenhydrAMINE (BENADRYL) 25 MG tablet Take 25 mg by mouth At Bedtime       diphenhydrAMINE-acetaminophen (TYLENOL PM)  MG tablet Take 1 tablet by mouth nightly as needed for sleep       ENTRESTO 49-51 MG per tablet Take 1 tablet by mouth twice daily 180 tablet 0     febuxostat (ULORIC) 40 MG TABS tablet Take 40 mg by mouth daily       ferrous sulfate (FEROSUL) 325 (65 Fe) MG tablet Take 325 mg by mouth 2 times daily       gabapentin (NEURONTIN) 100 MG capsule Take 100 mg by mouth 3 times daily       glucosamine-chondroitin (GLUCOSAMINE CHONDR COMPLEX) 500-400 MG CAPS per capsule Take 1 tablet by mouth daily        hydrOXYzine (ATARAX) 10 MG tablet 1-2 tabs       insulin glargine (LANTUS PEN) 100 UNIT/ML pen Inject 27 Units Subcutaneous At Bedtime       metFORMIN (GLUCOPHAGE) 1000 MG tablet Take 1,000 mg by mouth 2 times daily (with meals)       Multiple Vitamin (MULTI VITAMIN DAILY PO) Take 1 tablet by mouth daily        Omega-3 Fatty Acids (FISH OIL) 500 MG CAPS Take 1,200 mg by mouth daily        spironolactone (ALDACTONE) 25 MG tablet Take 1/2 (one-half) tablet by mouth once daily 45 tablet 1     ticagrelor (BRILINTA) 90 MG tablet Take 1 tablet (90 mg) by mouth 2 times daily Please schedule lab draw to F/U on creatine; additional refills after 180 tablet 0     traMADol (ULTRAM) 50 MG tablet Take 1 tablet by mouth as needed for pain      No Known Allergies      Lab Results    Chemistry/lipid CBC Cardiac Enzymes/BNP/TSH/INR   Lab Results   Component Value Date    CHOL 117 10/07/2021    HDL 30 (L) 10/07/2021    TRIG 176 (H) 10/07/2021    BUN 41 (H) 02/11/2022     02/11/2022    CO2 21 (L) 02/11/2022    Lab  Results   Component Value Date    WBC 9.7 02/11/2022    HGB 11.1 (L) 02/11/2022    HCT 33.8 (L) 02/11/2022     02/11/2022     02/11/2022    Lab Results   Component Value Date    TROPONINI 0.02 09/20/2019     (H) 10/01/2020    TSH 1.02 03/09/2020    INR 1.29 (H) 03/09/2020        40  minutes spent on the date of encounter doing chart review, review of test results, interpretation with above tests, patient visit and documentation.        This note has been dictated using voice recognition software. Any grammatical, typographical, or context distortions are unintentional and inherent to the software          Thank you for allowing me to participate in the care of your patient.      Sincerely,     MIMI Shah [facility]  cc:   No referring provider defined for this encounter.

## 2022-03-15 NOTE — LETTER
"3/15/2022    Lewis Reeves MD  [facility] [address] [place] [address]    RE: Ken Doss       Dear Colleague,     I had the pleasure of seeing Ken Doss in the [facility].  Pemafibrate to Reduce cardiovascular OutcoMes by reducing triglycerides IN patiENts with diabeTes (PROMINENT) clinical trial.    Subject seen in clinic today for study Visit 31.      Subject seen by provider Arcelia Ring for study related physical exam.  See provider note and flow sheets for vital signs.    Vitals:    03/15/22 0731   BP: 98/52   BP Location: Right arm   Patient Position: Sitting   Cuff Size: Adult Regular   Pulse: 60   Resp: 16   Weight: 70.9 kg (156 lb 3.2 oz)   Height: 1.753 m (5' 9\")     Waist measures 96 cm     Study efficacy, endpoints and adverse events reviewed with subject.  Cardiovascular risk discussed.     Reports covid negative rapid test but pcr positive 2/23/22 - reports he was feeling back to normal 1 week later 3/1/22.    Study alcohol consumption stipulations and education reviewed with subject:    Subject reports  [] Never [x] Current [] Former.   1 drinks per [] Day [] Week [] Month [x] Occasional.  1 maximum drinks per sitting.     Study medication box # [id number] with 22 tabs    returned by subject.  Total tablet count of 22 for 100+ % compliance.  Subject has 5 days of pills in pill box at home thus would be full compliance.  As stated many times over the study - drug packaging not convenient for patient to mix with several other pills he sets up in advance of the week.  Patient reports he's taking IP twice per day and is compliant.    Study medication box #'s  [id number], [id number], [id number], [id number] dispensed to subject.  Education provided on medication compliance and administration    Plan: Study Visit 32 telephone call with subject in 2 months.  Will schedule study Visit  33 with subject in 4 months back in clinic.    Eduard Tang RN  Clinical " Research Nurse  [phone]    ADVERSE EVENT Description: subject sought covid test per increased shortness of breath, antigen test was negative but days later pcr test was positive.  Subject canceled road trip and treated conservatively. Reports he's back to baseline.    Adverse Event: Covid19 infection  Serious:  [] Yes   [x] No  Reportable to IRB:   [] Yes   [x] No  Severity (mild/moderate/severe): moderate  Date of Awareness: 3/15/22  Start Date: 2/23/22  End Date: 3/1/22  Action taken with study treatment:  [x] Dose Not Changed  Outcome:   []Not Recovered/Not Resolved  [x]Recovered/Resolved    Medication or therapies taken:  [] Yes   [x] No    Dr. Vigil: Please assign causality of above AE  Related to study drug?     [] Yes   [] No            Thank you for allowing me to participate in the care of your patient.      Sincerely,     Eduard Tang RN     [facility]  cc:   No referring provider defined for this encounter.

## 2022-03-15 NOTE — PROGRESS NOTES
"Pemafibrate to Reduce cardiovascular OutcoMes by reducing triglycerides IN patiENts with diabeTes (PROMINENT) clinical trial.    Subject seen in clinic today for study Visit 31.      Subject seen by provider Arcelia Ring for study related physical exam.  See provider note and flow sheets for vital signs.    Vitals:    03/15/22 0731   BP: 98/52   BP Location: Right arm   Patient Position: Sitting   Cuff Size: Adult Regular   Pulse: 60   Resp: 16   Weight: 70.9 kg (156 lb 3.2 oz)   Height: 1.753 m (5' 9\")     Waist measures 96 cm     Study efficacy, endpoints and adverse events reviewed with subject.  Cardiovascular risk discussed.     Reports covid negative rapid test but pcr positive 2/23/22 - reports he was feeling back to normal 1 week later 3/1/22.    Study alcohol consumption stipulations and education reviewed with subject:    Subject reports  [] Never [x] Current [] Former.   1 drinks per [] Day [] Week [] Month [x] Occasional.  1 maximum drinks per sitting.     Study medication box # [id number] with 22 tabs    returned by subject.  Total tablet count of 22 for 100+ % compliance.  Subject has 5 days of pills in pill box at home thus would be full compliance.  As stated many times over the study - drug packaging not convenient for patient to mix with several other pills he sets up in advance of the week.  Patient reports he's taking IP twice per day and is compliant.    Study medication box #'s  [id number], [id number], [id number], [id number] dispensed to subject.  Education provided on medication compliance and administration    Plan: Study Visit 32 telephone call with subject in 2 months.  Will schedule study Visit  33 with subject in 4 months back in clinic.    Eduard Tang RN  Clinical Research Nurse  [phone]    ADVERSE EVENT Description: subject sought covid test per increased shortness of breath, antigen test was negative but days later pcr test was positive.  Subject canceled road trip and treated " conservatively. Reports he's back to baseline.    Adverse Event: Covid19 infection  Serious:  [] Yes   [x] No  Reportable to IRB:   [] Yes   [x] No  Severity (mild/moderate/severe): moderate  Date of Awareness: 3/15/22  Start Date: 2/23/22  End Date: 3/1/22  Action taken with study treatment:  [x] Dose Not Changed  Outcome:   []Not Recovered/Not Resolved  [x]Recovered/Resolved    Medication or therapies taken:  [] Yes   [x] No    Dr. Vigil: Please assign causality of above AE  Related to study drug?     [] Yes   [x] No

## 2022-03-15 NOTE — PROGRESS NOTES
Assessment/Recommendations   Assessment:   1.  Dyslipidemia/hypertriglyceridemia: On permanent study.    2.  Coronary artery disease with previous history of PCI to RCA and LAD: Most recent coronary angiogram done on 2/27/2022 showed in-stent restenosis of ostial RCA and mid LAD which were successfully treated with a drug-eluting stents.    Patient was recommended to stay on Brilinta for 12 months and longer if tolerated.  He is currently on Brilinta 90 mg twice a day.  He denies bleeding complications.    3. History of nonischemic cardiomyopathy with systolic dysfunction, NYHA class II-III: Most recent echocardiogram done in November 2021 showed severely reduced LVEF of 15 to 20%.  Patient has baseline shortness of breath with exertion and fatigue.    He is well compensated with no evidence of fluid retention assessment.  Current home weight is stable between 148 to 152 pounds.  He reports adequate fluid intake.  He is trying to follow low-sodium diet.    He most recently followed up with Dr. Avendaño in advanced heart failure clinic back in November 2021-note reviewed.    4.  History of ICD malfunction: Patient underwent successful explantation of chronic CRT, DFT testing, and successful implantation of new biventricular CRT-D ICD generator without any acute complication on 2/11/2022.  Reviewed his most recent in clinic device check and most recent remote device check on 3/11/2022 showed normal device function.  Noted 8 beats of NSVT since 2/21/2022.  Patient denies any specific symptoms.    5.  History of recurrent fall: Patient states that he fell couple times last summer.  He also states that he had a fall couple weeks ago and landed on his left shoulder when he got up too quickly to use the bathroom.  He denies loss of consciousness.  He denies hitting his head.    6.  Positive COVID on 2/23/2022: Patient had some shortness of breath which is now resolved.  He denies any other symptoms.    Plan:  - No  changes to his medications today.  -Instructed patient to do leg calf exercise before changing position or standing up to prevent fall  -Encouraged adequate hydration of at least 50 to 60 ounces of fluid per day  -Continue medical therapy for coronary artery disease  -We discussed about safety  -Continue to follow-up with research per protocol for Prominent research study follow up.    Follow-up with Dr. Vigil in 4 weeks     History of Present Illness/Subjective    Mr. Ken Doss is a 76 year old male with significant past medical history of coronary artery disease with status post PCI to mid LAD and RCA in January 2019, medical noncompliance, hypertriglyceridemia, diabetes type 2, pulmonary fibrosis, paroxysmal atrial fibrillation with status post AV node ablation in September 2019, nonischemic cardiomyopathy with chronic systolic heart failure, paroxysmal ventricular tachycardia with s/p ICD.     He follows up with Dr. Payton in the [facility] for his heart failure management.     Today, Skyler is seen for prominent study follow-up.  His last follow-up with Dr. Vigil was in March.  Patient had CRT-D placed in January 2013 with RV lead extraction and replacement in November 2014 and generator replacement on 9/17/2020.  He had issue with CRT-D malfunction.  The attempt of removal and replacement of RV defibrillator lead was unsuccessful in December.Patient underwent successful explantation of chronic CRT, DFT testing, and successful implantation of new biventricular CRT-D ICD generator without any acute complication.  Reviewed his most recent in clinic device check and most recent remote device check on 3/11/2022 showed normal device function.  Noted 8 beats of NSVT since 2/21/2022.       He has chronic shortness of breath on exertion and fatigue at baseline.  He states his appetite has not been that great.  He had weight loss of total of 8 pounds over the last couple months.  He  "denies any chest pain, shortness of breath at rest, PND, orthopnea, abdominal bloating, lower extremity edema or any bleeding complications.  It looks like patient is due for follow-up with Dr. Vigil.  He agreed to follow-up with him.     Physical Examination Review of Systems   BP 98/52 (BP Location: Right arm, Patient Position: Sitting, Cuff Size: Adult Regular)   Pulse 60   Resp 16   Ht 1.753 m (5' 9\")   Wt 70.9 kg (156 lb 3.2 oz)   BMI 23.07 kg/m    Body mass index is 23.07 kg/m .  Wt Readings from Last 3 Encounters:   03/15/22 70.9 kg (156 lb 3.2 oz)   03/15/22 70.9 kg (156 lb 3.2 oz)   02/11/22 70.3 kg (155 lb)       General Appearance:   no distress, normal body habitus   ENT/Mouth: membranes moist, no oral lesions or bleeding gums.      EYES:  no scleral icterus, normal conjunctivae   Neck: no carotid bruits or thyromegaly   Chest/Lungs:   lungs are clear to auscultation, no rales or wheezing,  equal chest wall expansion    Cardiovascular:    Normal first and second heart sounds with no murmurs, rubs, or gallops; the carotid, radial and posterior tibial pulses are intact, Jugular venous pressure flat, no edema bilaterally    Abdomen:  no organomegaly, masses, bruits, or tenderness; bowel sounds are present   Extremities: no cyanosis or clubbing   Skin: no xanthelasma, warm.    Neurologic: normal  bilateral, no tremors     Psychiatric: alert and oriented x3, calm                                               Medical History  Surgical History Family History Social History   Past Medical History:   Diagnosis Date     Acute renal failure (H)      Anemia      Arthritis     osteoarthritis     Arthritis     osteoarthritis     Arthritis     osteoarthritis     CHF (congestive heart failure) (H)      CHF (congestive heart failure) (H)      CHF (congestive heart failure) (H)      Chronic systolic heart failure (H) Dec 2012     Chronic systolic heart failure (H) Dec 2012     Chronic systolic heart failure (H) " Dec 2012     Coronary artery disease involving native coronary artery     Non obstructive disease by cath in 2007 Cor angio Nov 2016: RCA 70% (FFR 0.89), and OM1 75%        Coronary artery disease involving native coronary artery     Non obstructive disease by cath in 2007 Cor angio Nov 2016: RCA 70% (FFR 0.89), and OM1 75%        Coronary artery disease involving native coronary artery     Non obstructive disease by cath in 2007 Cor angio Nov 2016: RCA 70% (FFR 0.89), and OM1 75%        Depressive disorder, not elsewhere classified 10/24/2014     Diabetes mellitus, type 2 (H)      Diabetes mellitus, type II (H) 28/Jan/2013     Diabetes mellitus, type II (H) 28/Jan/2013     Diabetes mellitus, type II (H) 28/Jan/2013     Diabetic neuropathy (H)      Diabetic neuropathy (H)      Diabetic neuropathy (H)      Fractures     ankle, leg and arm     High cholesterol 2007     Hypertension      Hypertension      Hypertension      ICD (implantable cardioverter-defibrillator) lead failure 11/26/2014    High voltage lead tip inserted in RV free wall RV lead extraction and implantation of the new septal RV lead November 2014      ICD (implantable cardioverter-defibrillator) lead failure 11/26/2014    High voltage lead tip inserted in RV free wall RV lead extraction and implantation of the new septal RV lead November 2014      ILD (interstitial lung disease) (H)      Infectious mononucleosis      Ischemic cardiomyopathy 4/22/2015    Chronic: LVEF 25- 30% LVEF 26% echo May 2017     Ischemic cardiomyopathy 4/22/2015    Chronic: LVEF 25- 30% LVEF 26% echo May 2017     Ischemic cardiomyopathy 4/22/2015    Chronic: LVEF 25- 30% LVEF 26% echo May 2017     Kidney stone      LBBB (left bundle branch block)     noted on Dec 19, 2012     LBBB (left bundle branch block)     noted on Dec 19, 2012     LBBB (left bundle branch block)     noted on Dec 19, 2012     Lymphadenopathy, mediastinal      Malfunction of implantable defibrillator  ventricular (ICD) lead 11/9/2021    RV lead malfunction c/w insulation breech. Lead extraction and replacement recommended     Myocardial infarction (H)      Myocardial infarction (H)      Myocardial infarction (H)      Osteoarthritis      Osteoarthritis      Osteoarthritis      Paroxysmal ventricular tachycardia (H) 2/11/2022     Pulmonary anthracosis (H)      Pulmonary anthracosis (H)      Recurrent kidney stones     Past Surgical History:   Procedure Laterality Date     APPENDECTOMY       ARTHROSCOPY KNEE Bilateral      ARTHROSCOPY SHOULDER ROTATOR CUFF REPAIR      right     C SHLDR ARTHROSCOP,DIAGNOSTIC      Description: Arthroscopy Shoulder;  Recorded: 06/10/2014;     CARDIAC CATHETERIZATION N/A 12/12/2016    Procedure: Coronary Angiogram;  Surgeon: Delgado Ramirez MD;  Location: [facility];  Service:      COLONOSCOPY N/A 12/19/2019    Procedure: COLONOSCOPY with polypectomy;  Surgeon: Delgado Price MD;  Location: [facility] [place];  Service: Gastroenterology     CORONARY ANGIOGRAPHY ADULT ORDER       CV CORONARY ANGIOGRAM N/A 2/27/2020    Procedure: CV CORONARY ANGIOGRAM;  Surgeon: Pedro Fontaine MD;  Location: [facility] CARDIAC CATH LAB     CV CORONARY ANGIOGRAM N/A 1/15/2019    Procedure: Coronary Angiogram;  Surgeon: Mason Correa MD;  Location: [facility];  Service: Cardiology     CV INTRAVASULAR ULTRASOUND N/A 2/27/2020    Procedure: Intravascular Ultrasound;  Surgeon: Pedro Fontaine MD;  Location: [facility] CARDIAC CATH LAB     CV LEFT HEART CATHETERIZATION WITHOUT LEFT VENTRICULOGRAM Left 1/15/2019    Procedure: Left Heart Catheterization Without Left Ventriculogram;  Surgeon: Mason Correa MD;  Location: [facility];  Service: Cardiology     CV PCI ANGIOPLASTY N/A 2/27/2020    Procedure: Percutaneous Coronary Intervention Angioplasty;  Surgeon: Pedro Fontaine MD;  Location: [facility] CARDIAC CATH LAB      CV RIGHT HEART CATH MEASUREMENTS RECORDED N/A 2/27/2020    Procedure: Right Heart Cath;  Surgeon: Pedro Fontaine MD;  Location:  HEART CARDIAC CATH LAB     CV RIGHT HEART CATH MEASUREMENTS RECORDED N/A 7/30/2020    Procedure: CV RIGHT HEART CATH;  Surgeon: Ronny Geronimo MD;  Location:  HEART CARDIAC CATH LAB     EP ABLATION AV NODE N/A 9/27/2019    Procedure: EP Ablation AV Node;  Surgeon: Markus Pool MD;  Location: [facility];  Service: Cardiology     EP DFT TESTING FOLLOW UP N/A 2/11/2022    Procedure: EP DFT Test/Follow-up;  Surgeon: Markus Pool MD;  Location: [facility] CV     EP ICD GENERATOR CHANGE BIVENT Left 2/11/2022    Procedure: EP Implantable Cardio-Defibrillator Generator Change Biventricular;  Surgeon: Markus Pool MD;  Location: [facility] CV     EP ICD INSERT      ICD REIMPLANTATION OF A NEW RV LEAD 11/26/2014     EP REVISION PACEMAKER DUAL LEAD N/A 2/11/2022    Procedure: EP Recision Pacemaker Dual Lead;  Surgeon: Markus Pool MD;  Location: [facility] CV     EP VENOGRAM N/A 11/12/2021    Procedure: EP Venogram;  Surgeon: Markus Pool MD;  Location: [facility] CV     HC REMOVE TONSILS/ADENOIDS,<11 Y/O      Description: Tonsillectomy With Adenoidectomy;  Recorded: 06/10/2014;     INSERT / REPLACE / REMOVE PACEMAKER       IR UPPER EXTREMITY VENOGRAM LEFT  1/13/2022     JOINT REPLACEMENT      bilateral TKA     OR LASER LEAD EXTRACTION - LEVEL 3 N/A 12/29/2021    Procedure: VIDEO ASSISTED RIGHT THORACOSCOPY;  Surgeon: Brandi Anthony MD;  Location: [facility] CV     OTHER SURGICAL HISTORY  11/26/2014    BIV ICDbiotronic     ME L HRT CATH W/NJX L VENTRICULOGRAPHY IMG S&I Left 12/12/2016    Procedure: Left Heart Catheterization with Left Ventriculogram;  Surgeon: Delgado Ramirez MD;  Location: [facility];  Service: Cardiology     REPLACEMENT TOTAL KNEE      bilat     TONSILLECTOMY & ADENOIDECTOMY         LYMPH NODE BIOPSY  7/10/2019    Family History   Problem Relation Age of Onset     Sudden Death Mother         unexpected death in her sleep in her 50s    Social History     Socioeconomic History     Marital status:      Spouse name: Not on file     Number of children: Not on file     Years of education: Not on file     Highest education level: Not on file   Occupational History     Not on file   Tobacco Use     Smoking status: Never Smoker     Smokeless tobacco: Never Used     Tobacco comment: cigars few times a year only   Substance and Sexual Activity     Alcohol use: Yes     Comment: Alcoholic Drinks/day: occasional     Drug use: No     Sexual activity: Not on file   Other Topics Concern     Parent/sibling w/ CABG, MI or angioplasty before 65F 55M? Not Asked   Social History Narrative     Not on file     Social Determinants of Health     Financial Resource Strain: Not on file   Food Insecurity: Not on file   Transportation Needs: Not on file   Physical Activity: Not on file   Stress: Not on file   Social Connections: Not on file   Intimate Partner Violence: Not on file   Housing Stability: Not on file          Medications  Allergies   Current Outpatient Medications   Medication Sig Dispense Refill     acetaminophen (TYLENOL) 500 MG tablet Take 1,000 mg by mouth 2 times daily as needed        apixaban ANTICOAGULANT (ELIQUIS ANTICOAGULANT) 5 MG tablet Take 1 tablet (5 mg) by mouth 2 times daily 180 tablet 1     atorvastatin (LIPITOR) 40 MG tablet Take 40 mg by mouth daily       bumetanide (BUMEX) 1 MG tablet Take 1 tablet (1 mg) by mouth 2 times daily 180 tablet 1     carvedilol (COREG) 6.25 MG tablet Take 2 tablets (12.5 mg) by mouth 2 times daily (with meals) (Patient taking differently: Take 6.25 mg by mouth 2 times daily (with meals) ) 360 tablet 1     Coenzyme Q10 (COQ-10) 100 MG CAPS Take 200 mg by mouth daily        colchicine (COLCYRS) 0.6 MG tablet TAKE 2 TABLETS BY MOUTH THEN 1 TABLET ONE HOUR  LATER AS NEEDED FOR GOUT ATTACK AS DIRECTED       diphenhydrAMINE (BENADRYL) 25 MG tablet Take 25 mg by mouth At Bedtime       diphenhydrAMINE-acetaminophen (TYLENOL PM)  MG tablet Take 1 tablet by mouth nightly as needed for sleep       ENTRESTO 49-51 MG per tablet Take 1 tablet by mouth twice daily 180 tablet 0     febuxostat (ULORIC) 40 MG TABS tablet Take 40 mg by mouth daily       ferrous sulfate (FEROSUL) 325 (65 Fe) MG tablet Take 325 mg by mouth 2 times daily       gabapentin (NEURONTIN) 100 MG capsule Take 100 mg by mouth 3 times daily       glucosamine-chondroitin (GLUCOSAMINE CHONDR COMPLEX) 500-400 MG CAPS per capsule Take 1 tablet by mouth daily        hydrOXYzine (ATARAX) 10 MG tablet 1-2 tabs       insulin glargine (LANTUS PEN) 100 UNIT/ML pen Inject 27 Units Subcutaneous At Bedtime       metFORMIN (GLUCOPHAGE) 1000 MG tablet Take 1,000 mg by mouth 2 times daily (with meals)       Multiple Vitamin (MULTI VITAMIN DAILY PO) Take 1 tablet by mouth daily        Omega-3 Fatty Acids (FISH OIL) 500 MG CAPS Take 1,200 mg by mouth daily        spironolactone (ALDACTONE) 25 MG tablet Take 1/2 (one-half) tablet by mouth once daily 45 tablet 1     ticagrelor (BRILINTA) 90 MG tablet Take 1 tablet (90 mg) by mouth 2 times daily Please schedule lab draw to F/U on creatine; additional refills after 180 tablet 0     traMADol (ULTRAM) 50 MG tablet Take 1 tablet by mouth as needed for pain      No Known Allergies      Lab Results    Chemistry/lipid CBC Cardiac Enzymes/BNP/TSH/INR   Lab Results   Component Value Date    CHOL 117 10/07/2021    HDL 30 (L) 10/07/2021    TRIG 176 (H) 10/07/2021    BUN 41 (H) 02/11/2022     02/11/2022    CO2 21 (L) 02/11/2022    Lab Results   Component Value Date    WBC 9.7 02/11/2022    HGB 11.1 (L) 02/11/2022    HCT 33.8 (L) 02/11/2022     02/11/2022     02/11/2022    Lab Results   Component Value Date    TROPONINI 0.02 09/20/2019     (H) 10/01/2020    TSH  1.02 03/09/2020    INR 1.29 (H) 03/09/2020        40  minutes spent on the date of encounter doing chart review, review of test results, interpretation with above tests, patient visit and documentation.        This note has been dictated using voice recognition software. Any grammatical, typographical, or context distortions are unintentional and inherent to the software

## 2022-03-15 NOTE — PATIENT INSTRUCTIONS
It was great to see you again today for the Prominent triglyceride study.  We will call you in 2 months for a study telephone visit.    We will also see you back in 4 months for your Visit 33.  Please remember to bring back your study drug and packages (both empty or full) to every study visit.  Inform us of any changes in your health and call with any questions.  Thanks!    Research Hotline: [phone]  Eduard Tang RN  Clinical Research Nurse  [phone]  Clinical Trials Nurse

## 2022-03-18 NOTE — TELEPHONE ENCOUNTER
Phone call to patient and explained that it is recommended to wait 6 weeks for dental work post device procedure.  He states they sent him home and have rescheduled for May.  Pt has no further questions.

## 2022-03-18 NOTE — TELEPHONE ENCOUNTER
----- Message from Zoie Granado, ELVIRA sent at 3/18/2022  1:59 PM CDT -----  Regarding: RE: SWA PT  Usually say no dental work for 6 weeks, sounds like he is already there??   ----- Message -----  From: Shavon Sánchez, ELVIRA  Sent: 3/18/2022   1:12 PM CDT  To: [facility] Device Pool - [facility]  Subject: FW: SWA PT                                       I cant remember if cleaning is ok in the 6 week window??? Pt is s/p gen change with lead revision on 2-11-22.  Thank you!  Shavon  ----- Message -----  From: Jeanette Myers  Sent: 3/18/2022   1:05 PM CDT  To: [facility] Ep Support Pool - [facility]  Subject: SWA PT                                           General phone call:    Caller: [facility]   Primary cardiologist: GRABIEL  Detailed reason for call: PT NEEDS A ROUTINE  CLEANING AND EXAM AND IS IN THE CHAIR CURRENTLY    Best phone number: [phone]  Best time to contact: ANY  Ok to leave a detailedmessage? YES  Device? YES    Additional Info:

## 2022-03-30 NOTE — PROGRESS NOTES
Medication Therapy Management (MTM) Encounter    ASSESSMENT:                            Medication Adherence/Access: No issues identified.     Type 2 Diabetes:  Not at goal. Patient experiencing occasional lows. Recommend reduction in insulin today to prevent hypoglycemia. Recommend to closely monitor kidney function since eGFR recently fluctuating and on metformin. Recommended GLP1 or SGLT2 today for cardiac and renal benefit. Ozempic, Trulicity, or Victoza preferred for evidence reducing MACE. SGLT2 for HF benefit. Patient declined at this time and would like to discuss further with PCP. Due for A1C, microalbumin, eye and foot exam.     Hyperlipidemia: Not At goal. Patient not currently taking statin. Refilled today.    HFrEF/Afib/Hypertension/ICD/Hx MI: Stable. No changes recommended at this time. See above for GLP1 recommendation for MACE benefit and/or SGLT2 for HF benefit.    Hx Fe Deficiency Anemia: Not at goal - needs monitoring. Patient denies signs/symptoms of GI bleed. Educated to monitor closely since on Brillinta and Eliquis. The dosage of elemental iron required to treat iron deficiency anemia in adults is 120 mg per day for three months. Patient not meeting this goal, he believes PCP recommended once daily dosing. Recommend follow-up with PCP in next week or two to recheck CBC with Fe study repeated in 2 months.     Pain/Neuropathy/Insomnia: Not at goal. Patient using 2 products containing diphenhydramine which are likely leading to dry mouth and risk of falls. Patient not willing to stop both diphenhydramine containing products, but agreed to reduce to 1. Will discontinue with Tylenol PM as taking acetaminophen for pain to reduce risk of overdose. Recommended to stop combination Relaxium product as contains several products which may cause interactions and renal decline. Patient agreeable to try melatonin as single-ingredient sleep aid.    Gout: Improving. Infrequent flares treated well with  colchicine (no adjustment needed CrCl>30 with colchicine tablet). Will continue to monitor. Last uric acid level 1 year ago elevated. Recommend re-check and upcoming visit. May consider increase in febuxostat (no dose adjustment required CrCl >30ml/min):  1) Initial dosage: 40 mg orally once daily   2) Dosage titration: May increase to 80 mg/day in patients who do not achieve a serum uric acid less than 6 mg/dL after 2 weeks   3) Concomitant medication: Gout flare prophylaxis with an NSAID or colchicine should be considered when initiating therapy and may be useful for up to 6 months.      Supplements: Stable. Discussed discontinuation of glucosamine as not taking 1500 mg and not likely effective dose. Patient prefers to take this supplement as he feels it helps with joint pain. At future visit plan to discuss use of Co-Q 10 and review evidence of efficacy. Also not meeting goal of 1000 international unit(s) vitamin D and 1200 mg Ca daily - will address at future visit.      PLAN:                            To Patient:  1) Stop acetaminophen PM and Relxaium sleep products.  2) Start melatonin 3mg - take 1 tablet by mouth every night  3) Decrease Lantus to 20 units once daily  4) Refills sent to pharmacy for: Brillinta, colchicine, and atorvastatin.      To PCP:  1) Recommend taper off insulin and start GLP1 for cardiac and renal benefit and to reduce hypoglycemia risk.  2) Due for A1C, microalbumin, eye and foot exam.  3) Due for CBC, may consider increase Fe to 2-3 times daily. Iron studies in 2 months.  4) Due for uric acid level. If not at goal, consider febuxostat increase to 80 mg and return in 2 weeks. May need colchicine prophylaxis for flare with dose increase for up to 6 months.    Follow-up: 1-2 weeks with PCP, 1-2 months with PharmD    SUBJECTIVE/OBJECTIVE:                          Ken Doss is a 76 year old male called for a follow-up visit.  Today's visit is a follow-up MTM visit from  2022     Reason for visit: Medication Therapy Management. Difficulty connecting call today, so visit limited in scope with limited time.    Allergies/ADRs: Reviewed in chart  Past Medical History: Reviewed in chart  Tobacco: He reports that he has never smoked. He has never used smokeless tobacco.    Medication Adherence/Access: Patient has pillbox.  Is enrolled in study with [facility] and verifies medications with them periodically.    Type 2 Diabetes:  Down to 23-24 units Lantus daily, has tried 20 units occasionally, metformin 1000 mg twice daily.  He is also taking an unknown capsule as he is in a blinded study with [facility] for heart and DM. Patient is not experiencing side effects.  Blood sugar monitorin time(s) daily. Ranges (patient reported): Fasting - 80s usually when taking 23-24 units Lantus, 110s when taking 20 units daily. Not testing after meals. Was down to <70 one or two nights past few weeks, which is why he's been decreasing insulin; hypoglycemia aware -as it wakes him up when his blood sugars are low and he goes to eat fruit, drink juice, or candy followed by peanut butter sandwich  Post-Prandial-rarely checks this.  Symptoms of low blood sugar? shaky, dizzy, weak,   Symptoms of high blood sugar? none  Eye exam: due  Foot exam: due  Diet/Exercise: Tries to monitor diet closely and reduce sugar intake.  Aspirin: Not taking due to on Brillinta (see below)  Statin: Yes: Atorvastatin 40 mg (see below)  ACEi/ARB: Yes: Entresto (see below).   Urine Albumin: No results found for: UMALCR   Lab Results   Component Value Date    A1C 7.5 2021    A1C 7.8 2020    A1C 8.7 2019    A1C 8.6 2019       Component Ref Range & Units (22)   Sodium 136 - 145 mmol/L 140    Potassium 3.5 - 5.0 mmol/L 4.0    Chloride 98 - 107 mmol/L 107    Carbon Dioxide (CO2) 22 - 31 mmol/L 21 Low     Anion Gap 5 - 18 mmol/L 12    Urea Nitrogen 8 - 28 mg/dL 41 High      Creatinine 0.70 - 1.30 mg/dL 1.61 High     Calcium 8.5 - 10.5 mg/dL 8.8    Glucose 70 - 125 mg/dL 122    GFR Estimate >60 mL/min/1.73m2 44 Low     CRCl Estimate: 39-42 ml/min      Hyperlipidemia: Current therapy includes fish oil 500 mg twice daily. Patient believes he is to be on atorvastatin 40 mg (consistent with chart & Cardiology, but does not have currently - believes he may have forgotten to refill, unsure how long he's been without).  Patient reports no significant myalgias or other side effects.  The ASCVD Risk score (Brittanyrachel WHITE Jr., et al., 2013) failed to calculate for the following reasons:    The valid systolic blood pressure range is 90 to 200 mmHg    The valid total cholesterol range is 130 to 320 mg/dL     Recent Labs   Lab Test 10/07/21  1355 02/29/20  0248   CHOL 117 77   HDL 30* 32*   LDL 52 29   TRIG 176* 79       HFrEF/Afib/Hypertension/ICD/Hx MI: Current medications include Brillinta 90mg 1 tab twice daily (restenosis of stents x2 Feb 2020), bumetanide 1 mg twice daily, carvedilol 6.25 mg 2 tabs twice daily, Eliquis 5mg twice daily, Entresto 49/51 - 1 tab twice daily, Spironolactone 25 mg - 1/2 tab daily; placebo from Study. 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  Feeling physically ok, but taking it easy and trying to rest more. Feels very drained after recent Covid infection - got MABs and feeling better now after a few weeks out. Cards following closely with Dr. Pool and Tata - considering cardiac pump, but patient declined as he believes this would reduce quality of life.   Patient reports no current medication side effects including significant bruising/bleeding.   ECHO:  EF 15-20% per ECHO Nov. 2021  Patient is not complaining of HF symptoms.   Patient is not measuring daily weights.   Patient does not self-monitor blood pressure.     Per  Cardiology Note dated 3/15/22:    Patient was recommended to stay on Brilinta for 12 months and longer if tolerated.  He is currently on Brilinta 90 mg twice a day.  He denies bleeding complications    Hx Fe Deficiency Anemia: About 4 weeks ago found to have low Fe (history of anemia); restarted Fe once daily with PCP (strength unknown).    Component Ref Range & Units 2 d ago 1 yr ago   Iron 42 - 175 ug/dL 36 Low   143 R      Component Ref Range & Units 2 d ago 1 yr ago   Ferritin 27 - 300 ng/mL 67  127      Component Ref Range & Units 2 d ago 1 yr ago   Vitamin B12 213 - 816 pg/mL 355  410        Pain/Neuropathy/Insomnia: Taking gabapentin 100 mg - 1 cap three times daily and finds this helpful.  Takes acetaminophen 500 mg - 1-2 tabs for pain up to twice daily.  Takes tramadol 50 mg 1 tablet as needed for shoulder pain but has not required in months.  Not currently taking Vicodin 5mg/325 mg 1 tab as needed Q6H. Takes both Tylenol PM and diphenhydramine every night as was told he needed it many years ago but doesn't remember from which provider or for what indication. Also has started Relaxium Supplement (melatonin, magnesium, passionflower, OSMIN, ashwagandha, chamomile, valerian, and tryptophan) to see if it helps with insomnia. Notes dry mouth most nights, denies grogginess or unsteadiness. Does wish to improve sleep.    Gout:  Had recent gout flare. Took 0.6 mg colchicine first day, then took 0.6mg next day, not much improvement so then took 0.6mg x 2 last night, now feeling better (needs refill to have on hand as hasn't used in months). Also taking febuxostat 40mg daily for prophylaxis. Has been many months since flare, so he finds current regimen helpful. Denies side effects.    Uric Acid   Date Value Ref Range Status   02/09/2021 11.3 (H) 3.0 - 8.0 mg/dL Final         Supplements: Currently taking CoQ10 100 mg - 1 once daily, glucosamine/chondrointon 500/400 mg - 1 as needed.      Today's Vitals: There were no  vitals taken for this visit. - virtual visit  ----------------      I spent 45 minutes with this patient today. Dr. Daily Andrade was provided the recommendations above  via routed note and is the authorizing prescriber for this visit through the pharmacist collaborative practice agreement.. A copy of the visit note was provided to the patient's provider(s).    The patient was mailed a summary of these recommendations.     Neema Coker, Pharm D., MPH  Pharmacy Resident - [facility]  Pager: [phone]      Patient was seen independently by Dr. Coker. I agree with her assessment and plan.   Magalie Quintana, Pharm.D, [facility]  Medication Therapy Management Pharmacist  [phone]      Telemedicine Visit Details  Type of service:  Telephone visit  Start Time: 1:20 PM  End Time: 2:05 PM  Originating Location (patient location): [place]  Distant Location (provider location):  [facility]     Medication Therapy Recommendations  Insomnia, unspecified type    Current Medication: diphenhydrAMINE-acetaminophen (TYLENOL PM)  MG tablet   Rationale: Undesirable effect - Adverse medication event - Safety   Recommendation: Discontinue Medication - Stopped due to dry mouth and duplicate therapy   Status: Accepted - no CPA Needed          Current Medication: melatonin 3 MG tablet   Rationale: Synergistic therapy - Needs additional medication therapy - Indication   Recommendation: Start Medication - Needs additional therapy for sleep   Status: Accepted per CPA         Iron deficiency anemia, unspecified iron deficiency anemia type    Current Medication: ferrous sulfate (FEROSUL) 325 (65 Fe) MG tablet   Rationale: Dose too low - Dosage too low - Effectiveness   Recommendation: Increase Frequency - increase to twice daily   Status: Contact Provider - Awaiting Response         Mixed hyperlipidemia    Current Medication: atorvastatin (LIPITOR) 40 MG tablet   Rationale: Patient forgets to take - Adherence  - Adherence   Recommendation: Provide Education - Educated and reordered   Status: Accepted per CPA         Other secondary acute gout of left foot    Current Medication: febuxostat (ULORIC) 40 MG TABS tablet   Rationale: Medication requires monitoring - Needs additional monitoring - Effectiveness   Recommendation: Order Lab - Recommend uric acid level, may need to increase dose   Status: Contact Provider - Awaiting Response         Type 2 diabetes mellitus (H)    Current Medication: insulin glargine (LANTUS PEN) 100 UNIT/ML pen   Rationale: Dose too high - Dosage too high - Safety   Recommendation: Decrease Dose - Decrease to 20 units   Status: Accepted per CPA          Current Medication: insulin glargine (LANTUS PEN) 100 UNIT/ML pen   Rationale: More effective medication available - Ineffective medication - Effectiveness   Recommendation: Change Medication - Ozempic (0.25 or 0.5 MG/DOSE) 2 MG/1.5ML Sopn - Recommend GLP1 for MACE and Renal benefit   Status: Declined per Patient          Current Medication: insulin glargine (LANTUS PEN) 100 UNIT/ML pen   Rationale: Medication requires monitoring - Needs additional monitoring - Effectiveness   Recommendation: Order Lab - Due for A1C and microalbumin   Status: Contact Provider - Awaiting Response

## 2022-03-31 NOTE — PATIENT INSTRUCTIONS
Recommendations from today's MTM visit:                                                    MTM (medication therapy management) is a service provided by a clinical pharmacist designed to help you get the most of out of your medicines.      1) Stop acetaminophen PM and Relxaium sleep products.  2) Start melatonin 3mg - take 1 tablet by mouth every night  3) Decrease Lantus to 20 units once daily  4) Refills sent to pharmacy for: Brillinta, colchicine, and atorvastatin.        Follow-up: 1-2 weeks with Dr. Brandi Andrade, 1-2 months with PharmD    It was great to speak with you today.  I value your experience and would be very thankful for your time with providing feedback on our clinic survey. You may receive a survey via email or text message in the next few days.     To schedule another MTM appointment, please call the clinic directly or you may call the MTM scheduling line at [phone] or toll-free at [phone].     My Clinical Pharmacist's contact information:                                                      Please feel free to contact me with any questions or concerns you have.      Neema Coker, Pharm D., MPH  Pharmacy Resident - [facility]  Pager: [phone]

## 2022-04-12 NOTE — TELEPHONE ENCOUNTER
Pemafibrate to Reduce cardiovascular OutcoMes by reducing triglycerides IN patiENts with diabeTes (PROMINENT) clinical trial.    Subject contacted today to relay study sponsor announcement of ending the trial due to not meeting primary endpoint.      Explained study early closure not due to safety issue but rather it's forecasted they won't meet the data required for their endpoints to get approval.   Asked subject to stop taking study drug today and hold onto their boxes of supply.    Will set up a end of study visit 30 days out to a maximum of 60 days post last dose of drug.  This will include fasting labs, a study provider exam, and general close out.    Patient relayed understanding. Skyler relayed he will be out of town May 23rd for two weeks.  Then out again June 13th for another 2 week so week of May 16th on T/W would be best for final visit.      Eduard Tang RN  Clinical Research Nurse  [phone]

## 2022-06-06 PROBLEM — E87.5 HYPERKALEMIA: Status: ACTIVE | Noted: 2022-01-01

## 2022-06-06 PROBLEM — R53.1 WEAKNESS: Status: ACTIVE | Noted: 2022-01-01

## 2022-06-06 PROBLEM — R06.02 SHORTNESS OF BREATH: Status: ACTIVE | Noted: 2022-01-01

## 2022-06-06 PROBLEM — N17.9 ACUTE KIDNEY INJURY (H): Status: ACTIVE | Noted: 2022-01-01

## 2022-06-06 NOTE — ED PROVIDER NOTES
ED Provider Note  [facility]      History     Chief Complaint   Patient presents with     Fatigue     Chest Pain     The history is provided by the patient, medical records and the spouse.     Ken Doss is a 76 year old male with a PMH of NICM w/ HFrEF (20-25%), CAD s/p PCI to mid LAD and RCA in 2019, PAF s/p AV node ablation, s/p ICD implantation in 2013 c/b malfunction w/ new biventricular CRT-D ICD implantation on 2/11/22, anticoagulation on Eliquis, pulmonary fibrosis, DMII, dyslipidemia and hypertriglyceridemia (enrolled in [employer]OMINENT clinical trial) presenting to the ED from Cardiology clinic for further evaluation of abnormal labs. Patient was seen by Dr. Payton today for routine f/u where labs revealed elevated potassium to 6.4 and creatinine at 2.71. Patient advised to be seen in the ED. Patient is accompanied by his wife. He reports that in the past 3-4 months he has been feeling increasingly generally weak, fatigued, and short of breath on exertion. He states that prior this winter he was able to walk 1 mile without difficulty, but now he is requiring several breaks when walking to the mailbox and [place] the airport. He endorses increased cough, mostly nonproductive, but occasionally wet. He denies chest pain or pressure. He did have Covid in February which caused lots of breathing difficulties. He was having lots of chest heaviness and having to sleep sitting up, however, this has improved since receiving monoclonal antibodies. He denies chest pain or shortness of breath currently at rest. He reports history of retaining fluid around the lungs, but never in the legs. He denies fluid retention now, has been med compliant with his diuretics. He has been urinating normally. He reports having a slight burn when beginning to urinate, otherwise no dysuria or hematuria. He has had an increase in loose, watery stools in the past several months. This has especially  worsened recently. He denies recent antibiotics. This began prior to his monoclonal antibody infusion. He has noted blood in the stool, but thinks he may have hemorrhoids. He denies abdominal pain or vomiting. No recent fever. His BG has been low in the morning, waking him up, but otherwise has been normal.     Past Medical History  Past Medical History:   Diagnosis Date     Acute renal failure (H)      Anemia      Arthritis     osteoarthritis     Arthritis     osteoarthritis     Arthritis     osteoarthritis     CHF (congestive heart failure) (H)      CHF (congestive heart failure) (H)      CHF (congestive heart failure) (H)      Chronic systolic heart failure (H) Dec 2012     Chronic systolic heart failure (H) Dec 2012     Chronic systolic heart failure (H) Dec 2012     Coronary artery disease involving native coronary artery     Non obstructive disease by cath in 2007 Cor angio Nov 2016: RCA 70% (FFR 0.89), and OM1 75%        Coronary artery disease involving native coronary artery     Non obstructive disease by cath in 2007 Cor angio Nov 2016: RCA 70% (FFR 0.89), and OM1 75%        Coronary artery disease involving native coronary artery     Non obstructive disease by cath in 2007 Cor angio Nov 2016: RCA 70% (FFR 0.89), and OM1 75%        Depressive disorder, not elsewhere classified 10/24/2014     Diabetes mellitus, type 2 (H)      Diabetes mellitus, type II (H) 28/Jan/2013     Diabetes mellitus, type II (H) 28/Jan/2013     Diabetes mellitus, type II (H) 28/Jan/2013     Diabetic neuropathy (H)      Diabetic neuropathy (H)      Diabetic neuropathy (H)      Fractures     ankle, leg and arm     High cholesterol 2007     Hypertension      Hypertension      Hypertension      ICD (implantable cardioverter-defibrillator) lead failure 11/26/2014    High voltage lead tip inserted in RV free wall RV lead extraction and implantation of the new septal RV lead November 2014      ICD (implantable cardioverter-defibrillator)  lead failure 11/26/2014    High voltage lead tip inserted in RV free wall RV lead extraction and implantation of the new septal RV lead November 2014      ILD (interstitial lung disease) (H)      Infectious mononucleosis      Ischemic cardiomyopathy 4/22/2015    Chronic: LVEF 25- 30% LVEF 26% echo May 2017     Ischemic cardiomyopathy 4/22/2015    Chronic: LVEF 25- 30% LVEF 26% echo May 2017     Ischemic cardiomyopathy 4/22/2015    Chronic: LVEF 25- 30% LVEF 26% echo May 2017     Kidney stone      LBBB (left bundle branch block)     noted on Dec 19, 2012     LBBB (left bundle branch block)     noted on Dec 19, 2012     LBBB (left bundle branch block)     noted on Dec 19, 2012     Lymphadenopathy, mediastinal      Malfunction of implantable defibrillator ventricular (ICD) lead 11/9/2021    RV lead malfunction c/w insulation breech. Lead extraction and replacement recommended     Myocardial infarction (H)      Myocardial infarction (H)      Myocardial infarction (H)      Osteoarthritis      Osteoarthritis      Osteoarthritis      Paroxysmal ventricular tachycardia (H) 2/11/2022     Pulmonary anthracosis (H)      Pulmonary anthracosis (H)      Recurrent kidney stones      Past Surgical History:   Procedure Laterality Date     APPENDECTOMY       ARTHROSCOPY KNEE Bilateral      ARTHROSCOPY SHOULDER ROTATOR CUFF REPAIR      right     C SHLDR ARTHROSCOP,DIAGNOSTIC      Description: Arthroscopy Shoulder;  Recorded: 06/10/2014;     CARDIAC CATHETERIZATION N/A 12/12/2016    Procedure: Coronary Angiogram;  Surgeon: Delgado Ramirez MD;  Location: [facility] Cath Lab;  Service:      COLONOSCOPY N/A 12/19/2019    Procedure: COLONOSCOPY with polypectomy;  Surgeon: Delgado Price MD;  Location: [facility];  Service: Gastroenterology     CORONARY ANGIOGRAPHY ADULT ORDER       CV CORONARY ANGIOGRAM N/A 2/27/2020    Procedure: CV CORONARY ANGIOGRAM;  Surgeon: Pedro Fontaine MD;  Location: [facility]  CARDIAC CATH LAB     CV CORONARY ANGIOGRAM N/A 1/15/2019    Procedure: Coronary Angiogram;  Surgeon: Mason Correa MD;  Location: [facility] Cath Lab;  Service: Cardiology     CV INTRAVASULAR ULTRASOUND N/A 2/27/2020    Procedure: Intravascular Ultrasound;  Surgeon: Pedro Fontaine MD;  Location: [facility] CARDIAC CATH LAB     CV LEFT HEART CATHETERIZATION WITHOUT LEFT VENTRICULOGRAM Left 1/15/2019    Procedure: Left Heart Catheterization Without Left Ventriculogram;  Surgeon: Mason Correa MD;  Location: [facility] Cath Lab;  Service: Cardiology     CV PCI ANGIOPLASTY N/A 2/27/2020    Procedure: Percutaneous Coronary Intervention Angioplasty;  Surgeon: Pedro Fontaine MD;  Location: [facility] CARDIAC CATH LAB     CV RIGHT HEART CATH MEASUREMENTS RECORDED N/A 2/27/2020    Procedure: Right Heart Cath;  Surgeon: Pedro Fontaine MD;  Location: [facility] CARDIAC CATH LAB     CV RIGHT HEART CATH MEASUREMENTS RECORDED N/A 7/30/2020    Procedure: CV RIGHT HEART CATH;  Surgeon: Ronny Geronimo MD;  Location: [facility] CARDIAC CATH LAB     EP ABLATION AV NODE N/A 9/27/2019    Procedure: EP Ablation AV Node;  Surgeon: Markus Pool MD;  Location: [facility] Cath Lab;  Service: Cardiology     EP DFT TESTING FOLLOW UP N/A 2/11/2022    Procedure: EP DFT Test/Follow-up;  Surgeon: Markus Pool MD;  Location: [facility] CV     EP ICD GENERATOR REPLACEMENT BIVENT Left 2/11/2022    Procedure: EP Implantable Cardio-Defibrillator Generator Change Biventricular;  Surgeon: Markus Pool MD;  Location: [facility] CV     EP ICD INSERT      ICD REIMPLANTATION OF A NEW RV LEAD 11/26/2014     EP REVISION PACEMAKER DUAL LEAD N/A 2/11/2022    Procedure: EP Recision Pacemaker Dual Lead;  Surgeon: Markus Pool MD;  Location: [facility] CV     EP VENOGRAM N/A 11/12/2021    Procedure: EP Venogram;  Surgeon: Markus Pool MD;  Location: [facility]  LAB CV     HC REMOVE TONSILS/ADENOIDS,<13 Y/O      Description: Tonsillectomy With Adenoidectomy;  Recorded: 06/10/2014;     INSERT / REPLACE / REMOVE PACEMAKER       IR UPPER EXTREMITY VENOGRAM LEFT  1/13/2022     JOINT REPLACEMENT      bilateral TKA     OR LASER LEAD EXTRACTION - LEVEL 3 N/A 12/29/2021    Procedure: VIDEO ASSISTED RIGHT THORACOSCOPY;  Surgeon: Brandi Anthony MD;  Location: [facility] CATH LAB CV     OTHER SURGICAL HISTORY  11/26/2014    BIV ICDbiotronic     CO L HRT CATH W/NJX L VENTRICULOGRAPHY IMG S&I Left 12/12/2016    Procedure: Left Heart Catheterization with Left Ventriculogram;  Surgeon: Delgado Ramirez MD;  Location: [facility] Cath Lab;  Service: Cardiology     REPLACEMENT TOTAL KNEE      bilat     TONSILLECTOMY & ADENOIDECTOMY       US LYMPH NODE BIOPSY  7/10/2019     acetaminophen (TYLENOL) 500 MG tablet  apixaban ANTICOAGULANT (ELIQUIS ANTICOAGULANT) 5 MG tablet  atorvastatin (LIPITOR) 40 MG tablet  bumetanide (BUMEX) 1 MG tablet  carvedilol (COREG) 6.25 MG tablet  Coenzyme Q10 (COQ-10 PO)  colchicine (COLCYRS) 0.6 MG tablet  diphenhydrAMINE (BENADRYL) 25 MG tablet  ENTRESTO 49-51 MG per tablet  febuxostat (ULORIC) 40 MG TABS tablet  ferrous sulfate (FEROSUL) 325 (65 Fe) MG tablet  gabapentin (NEURONTIN) 100 MG capsule  glucosamine-chondroitin 500-400 MG CAPS per capsule  hydrOXYzine (ATARAX) 10 MG tablet  insulin glargine (LANTUS PEN) 100 UNIT/ML pen  melatonin 3 MG tablet  metFORMIN (GLUCOPHAGE) 1000 MG tablet  Multiple Vitamin (MULTI VITAMIN DAILY PO)  Omega-3 Fatty Acids (FISH OIL) 1200 MG capsule  potassium chloride ER (K-TAB) 20 MEQ CR tablet  spironolactone (ALDACTONE) 25 MG tablet  ticagrelor (BRILINTA) 90 MG tablet  traMADol (ULTRAM) 50 MG tablet      No Known Allergies  Family History  Family History   Problem Relation Age of Onset     Sudden Death Mother         unexpected death in her sleep in her 50s     Social History   Social History     Tobacco Use     Smoking  status: Never Smoker     Smokeless tobacco: Never Used     Tobacco comment: cigars few times a year only   Substance Use Topics     Alcohol use: Yes     Comment: Alcoholic Drinks/day: occasional     Drug use: No      Past medical history, past surgical history, medications, allergies, family history, and social history were reviewed with the patient. No additional pertinent items.       Review of Systems   Constitutional: Positive for fatigue. Negative for fever.   HENT: Negative for congestion.    Eyes: Negative for redness.   Respiratory: Positive for cough and shortness of breath (on exertion).    Cardiovascular: Negative for chest pain, palpitations and leg swelling.   Gastrointestinal: Positive for blood in stool and diarrhea. Negative for abdominal pain, nausea and vomiting.   Endocrine: Negative for polyuria.   Genitourinary: Negative for difficulty urinating, dysuria and hematuria.   Musculoskeletal: Negative for arthralgias and neck stiffness.   Skin: Negative for color change.   Allergic/Immunologic: Negative for immunocompromised state.   Neurological: Positive for weakness (generalized). Negative for headaches.   Hematological: Bruises/bleeds easily.   Psychiatric/Behavioral: Negative for confusion.     A complete review of systems was performed with pertinent positives and negatives noted in the HPI, and all other systems negative.    Physical Exam   BP: 99/67  Pulse: 81  Temp: 98.1  F (36.7  C)  Resp: 22  SpO2: 98 %  Physical Exam  General: Afebrile, no acute distress   HEENT: Normocephalic, atraumatic, conjunctivae normal. MMM  Neck: non-tender, supple  Cardio: regular rate. regular rhythm   Resp: Normal work of breathing, no respiratory distress, lungs clear bilaterally, no wheezing, rhonchi, rales  Chest/Back: no visual signs of trauma, no CVA tenderness   Abdomen: soft, non distension, no tenderness, no peritoneal signs   Neuro: alert and fully oriented. CN II-XII grossly intact. Grossly normal  strength and sensation in all extremities.   MSK: no deformities. No lower extremity edema. Normal range of motion  Integumentary/Skin: no rash visualized, normal color  Psych: normal affect, normal behavior  ED Course      Procedures    Results for orders placed or performed during the hospital encounter of 06/06/22   [place] Draw     Status: None    Narrative    The following orders were created for panel order [place] Draw.  Procedure                               Abnormality         Status                     ---------                               -----------         ------                     Extra Blue Top Tube[[id number]]                              Final result               Extra Red Top Tube[[id number]]                               Final result               Extra Green Top (Lithium...[[id number]]                      Final result               Extra Purple Top Tube[[id number]]                            Final result                 Please view results for these tests on the individual orders.   Extra Blue Top Tube     Status: None   Result Value Ref Range    Hold Specimen JIC    Extra Red Top Tube     Status: None   Result Value Ref Range    Hold Specimen JIC    Extra Green Top (Lithium Heparin) Tube     Status: None   Result Value Ref Range    Hold Specimen JIC    Extra Purple Top Tube     Status: None   Result Value Ref Range    Hold Specimen JIC    Comprehensive metabolic panel     Status: Abnormal   Result Value Ref Range    Sodium 134 133 - 144 mmol/L    Potassium 5.4 (H) 3.4 - 5.3 mmol/L    Chloride 105 94 - 109 mmol/L    Carbon Dioxide (CO2) 22 20 - 32 mmol/L    Anion Gap 7 3 - 14 mmol/L    Urea Nitrogen 82 (H) 7 - 30 mg/dL    Creatinine 2.65 (H) 0.66 - 1.25 mg/dL    Calcium 9.0 8.5 - 10.1 mg/dL    Glucose 78 70 - 99 mg/dL    Alkaline Phosphatase 113 40 - 150 U/L    AST 24 0 - 45 U/L    ALT 25 0 - 70 U/L    Protein Total 8.3 6.8 - 8.8 g/dL    Albumin 3.7 3.4 - 5.0 g/dL    Bilirubin Total 1.2 0.2 -  1.3 mg/dL    GFR Estimate 24 (L) >60 mL/min/1.73m2   Troponin I     Status: Normal   Result Value Ref Range    Troponin I High Sensitivity 15 <79 ng/L   INR     Status: Abnormal   Result Value Ref Range    INR 1.53 (H) 0.85 - 1.15   CBC with platelets and differential     Status: Abnormal   Result Value Ref Range    WBC Count 8.9 4.0 - 11.0 10e3/uL    RBC Count 3.31 (L) 4.40 - 5.90 10e6/uL    Hemoglobin 10.4 (L) 13.3 - 17.7 g/dL    Hematocrit 31.9 (L) 40.0 - 53.0 %    MCV 96 78 - 100 fL    MCH 31.4 26.5 - 33.0 pg    MCHC 32.6 31.5 - 36.5 g/dL    RDW 15.6 (H) 10.0 - 15.0 %    Platelet Count 212 150 - 450 10e3/uL    % Neutrophils 77 %    % Lymphocytes 11 %    % Monocytes 9 %    % Eosinophils 3 %    % Basophils 0 %    % Immature Granulocytes 0 %    NRBCs per 100 WBC 0 <1 /100    Absolute Neutrophils 6.9 1.6 - 8.3 10e3/uL    Absolute Lymphocytes 1.0 0.8 - 5.3 10e3/uL    Absolute Monocytes 0.8 0.0 - 1.3 10e3/uL    Absolute Eosinophils 0.2 0.0 - 0.7 10e3/uL    Absolute Basophils 0.0 0.0 - 0.2 10e3/uL    Absolute Immature Granulocytes 0.0 <=0.4 10e3/uL    Absolute NRBCs 0.0 10e3/uL   Glucose by meter     Status: Normal   Result Value Ref Range    GLUCOSE BY METER POCT 81 70 - 99 mg/dL   EKG 12 lead     Status: None   Result Value Ref Range    Systolic Blood Pressure  mmHg    Diastolic Blood Pressure  mmHg    Ventricular Rate 70 BPM    Atrial Rate 77 BPM    FL Interval  ms    QRS Duration 158 ms     ms    QTc 503 ms    P Axis  degrees    R AXIS 165 degrees    T Axis 37 degrees    Interpretation ECG       Ventricular-paced rhythm  Abnormal ECG  Unconfirmed report - interpretation of this ECG is computer generated - see medical record for final interpretation  Confirmed by - EMERGENCY ROOM, PHYSICIAN ([id number]),  FRANNY CHAVIRA ([id number]) on 6/6/2022 8:31:07 PM     CBC with platelets differential     Status: Abnormal    Narrative    The following orders were created for panel order CBC with platelets  differential.  Procedure                               Abnormality         Status                     ---------                               -----------         ------                     CBC with platelets and d...[[id number]]  Abnormal            Final result                 Please view results for these tests on the individual orders.   Results for orders placed or performed in visit on 06/06/22   EKG 12-lead, tracing only     Status: None (Preliminary result)   Result Value Ref Range    Systolic Blood Pressure  mmHg    Diastolic Blood Pressure  mmHg    Ventricular Rate 105 BPM    Atrial Rate 78 BPM    MI Interval  ms    QRS Duration 150 ms     ms    QTc 592 ms    P Axis  degrees    R AXIS 168 degrees    T Axis 58 degrees    Interpretation ECG       Ventricular-paced rhythm with frequent Premature ventricular complexes  Biventricular pacemaker detected  Abnormal ECG  When compared with ECG of 11-FEB-2022 13:09,  Premature ventricular complexes are now Present  Vent. rate has increased BY  35 BPM     Results for orders placed or performed in visit on 06/06/22   Basic metabolic panel     Status: Abnormal   Result Value Ref Range    Sodium 136 133 - 144 mmol/L    Potassium 6.4 (HH) 3.4 - 5.3 mmol/L    Chloride 104 94 - 109 mmol/L    Carbon Dioxide (CO2) 19 (L) 20 - 32 mmol/L    Anion Gap 13 3 - 14 mmol/L    Urea Nitrogen 76 (H) 7 - 30 mg/dL    Creatinine 2.71 (H) 0.66 - 1.25 mg/dL    Calcium 8.3 (L) 8.5 - 10.1 mg/dL    Glucose 173 (H) 70 - 99 mg/dL    GFR Estimate 24 (L) >60 mL/min/1.73m2   N terminal pro BNP outpatient     Status: Abnormal   Result Value Ref Range    N Terminal Pro BNP Outpatient 8,906 (H) 0 - 1,800 pg/mL     Medications - No data to display     Assessments & Plan (with Medical Decision Making)     Ken Doss is a 76 year old male with a PMH of NICM w/ HFrEF (20-25%), CAD s/p PCI to mid LAD and RCA in 2019, PAF s/p AV node ablation, s/p ICD implantation in 2013 c/b malfunction w/ new  biventricular CRT-D ICD implantation on 2/11/22, anticoagulation on Eliquis, pulmonary fibrosis, DMII, dyslipidemia and hypertriglyceridemia (enrolled in [employer]OMIN[employer] clinical trial) presenting to the ED from Cardiology clinic for further evaluation of abnormal labs.  Upon arrival patient is well-appearing, afebrile, no distress.  Patient resting comfortably, denies any chest pain, shortness of breath at rest.  Patient hemodynamically stable with blood pressure 99/67, heart rate 81, oxygen 98% on room air.  Patient does report increasing weakness, fatigue, shortness of breath with exertion over the past few months, was sent in by cardiology for increasing creatinine and increased potassium.  Repeat laboratory testing was performed which was remarkable for blood cell count of 8.9, hemoglobin 10.4, potassium mildly elevated at 5.4, BUN 82, creatinine 2.65 (prior 1.61 - Feb 2022), high-sensitivity troponin 5. I reviewed EKG which demonstrates a ventricular paced rhythm with ventricular rate of 70 bpm with no acute ischemic change, no significant change from prior EKG. chest x-ray pending.  Plan on admission to cardiology.  Discussed with cardiologist staff Dr. Johnson as well as cardiology fellow.  Patient and spouse understand and agree with the plan.    I have reviewed the nursing notes. I have reviewed the findings, diagnosis, plan and need for follow up with the patient.    New Prescriptions    No medications on file       Final diagnoses:   Chronic systolic heart failure (H)   Shortness of breath   Weakness   Hyperkalemia   Acute kidney injury (H)   ISophy, am serving as a trained medical scribe to document services personally performed by Dolores Amaarl MD, based on the provider's statements to me.     IDolores MD, was physically present and have reviewed and verified the accuracy of this note documented by Sophy Zaragoza.      --  Dolores Amaral MD  formerly [facility]  EMERGENCY DEPARTMENT  6/6/2022     Dolores Amaral MD  06/06/22 2038

## 2022-06-06 NOTE — TELEPHONE ENCOUNTER
Writer has tried to contact the subject regarding missing today's EOS visit to reschedule. The subject did not answer and the voicemail box is full. Writier will make more attempts in reaching the subject The subject will be out of EOS window at this point, the only available date within reason is on 08Jun2022 at 10:30 am with CRC and 11:15 am with PI to follow for PE. If the subject is not available a phone visit will be offered by CRC and a date arranged to drop of study drug to the .

## 2022-06-06 NOTE — PROGRESS NOTES
HEART CARE RESEARCH NOTE      [facility]  [phone]      Assessment/Recommendations   No changes to his medications today. PROMINENT end of study visit today.    He will see Dr. Payton later today for heart failure follow up.        History of Present Illness/Subjective    Ken Doss is seen at [facility] for PROMINENT research study.  He has a history of coronary artery disease with PCI, heart failure with reduced ejection fraction, CRT-D, paroxysmal atrial fibrillation, pulmonary fibrosis, diabetes, hypertriglyceridemia, and dyslipidemia.  He has noticed a decrease in energy over the past few months.  He has chronic shortness of breath with stairs but denies any worsening.  He has occasional dizziness and loses his balance.  He denies any falls. He denies chest pain, abdominal fullness/bloating and lower extremity edema.  His blood pressure is low today but he is asymptomatic.  He had breakfast and a small glass of juice before appointment.  He was given a glass of water during appointment.     Patient Active Problem List   Diagnosis     Abnormal chest CT     Benign essential hypertension     Cardiovascular stress test abnormal     Chronic systolic heart failure (H)     Coronary artery disease involving native coronary artery     Diabetic peripheral neuropathy associated with type 2 diabetes mellitus (H)     Hypertriglyceridemia     Implantable cardioverter-defibrillator (ICD) in situ     Mediastinal lymphadenopathy     Primary cardiomyopathy (H)     Type 2 diabetes mellitus (H)     ILD (interstitial lung disease) (H)     Pulmonary anthracosis (H)     Persistent atrial fibrillation (H)     Other secondary acute gout of left foot     Malfunction of implantable defibrillator ventricular (ICD) lead     Non-ischemic cardiomyopathy (H)     Paroxysmal ventricular tachycardia (H)     Ventricular tachycardia (H)     Fall     H/O fall       Past Medical History:   Diagnosis Date      Acute renal failure (H)      Anemia      Arthritis     osteoarthritis     Arthritis     osteoarthritis     Arthritis     osteoarthritis     CHF (congestive heart failure) (H)      CHF (congestive heart failure) (H)      CHF (congestive heart failure) (H)      Chronic systolic heart failure (H) Dec 2012     Chronic systolic heart failure (H) Dec 2012     Chronic systolic heart failure (H) Dec 2012     Coronary artery disease involving native coronary artery     Non obstructive disease by cath in 2007 Cor angio Nov 2016: RCA 70% (FFR 0.89), and OM1 75%        Coronary artery disease involving native coronary artery     Non obstructive disease by cath in 2007 Cor angio Nov 2016: RCA 70% (FFR 0.89), and OM1 75%        Coronary artery disease involving native coronary artery     Non obstructive disease by cath in 2007 Cor angio Nov 2016: RCA 70% (FFR 0.89), and OM1 75%        Depressive disorder, not elsewhere classified 10/24/2014     Diabetes mellitus, type 2 (H)      Diabetes mellitus, type II (H) 28/Jan/2013     Diabetes mellitus, type II (H) 28/Jan/2013     Diabetes mellitus, type II (H) 28/Jan/2013     Diabetic neuropathy (H)      Diabetic neuropathy (H)      Diabetic neuropathy (H)      Fractures     ankle, leg and arm     High cholesterol 2007     Hypertension      Hypertension      Hypertension      ICD (implantable cardioverter-defibrillator) lead failure 11/26/2014    High voltage lead tip inserted in RV free wall RV lead extraction and implantation of the new septal RV lead November 2014      ICD (implantable cardioverter-defibrillator) lead failure 11/26/2014    High voltage lead tip inserted in RV free wall RV lead extraction and implantation of the new septal RV lead November 2014      ILD (interstitial lung disease) (H)      Infectious mononucleosis      Ischemic cardiomyopathy 4/22/2015    Chronic: LVEF 25- 30% LVEF 26% echo May 2017     Ischemic cardiomyopathy 4/22/2015    Chronic: LVEF 25- 30% LVEF  26% echo May 2017     Ischemic cardiomyopathy 4/22/2015    Chronic: LVEF 25- 30% LVEF 26% echo May 2017     Kidney stone      LBBB (left bundle branch block)     noted on Dec 19, 2012     LBBB (left bundle branch block)     noted on Dec 19, 2012     LBBB (left bundle branch block)     noted on Dec 19, 2012     Lymphadenopathy, mediastinal      Malfunction of implantable defibrillator ventricular (ICD) lead 11/9/2021    RV lead malfunction c/w insulation breech. Lead extraction and replacement recommended     Myocardial infarction (H)      Myocardial infarction (H)      Myocardial infarction (H)      Osteoarthritis      Osteoarthritis      Osteoarthritis      Paroxysmal ventricular tachycardia (H) 2/11/2022     Pulmonary anthracosis (H)      Pulmonary anthracosis (H)      Recurrent kidney stones        Past Surgical History:   Procedure Laterality Date     APPENDECTOMY       ARTHROSCOPY KNEE Bilateral      ARTHROSCOPY SHOULDER ROTATOR CUFF REPAIR      right     C SHLDR ARTHROSCOP,DIAGNOSTIC      Description: Arthroscopy Shoulder;  Recorded: 06/10/2014;     CARDIAC CATHETERIZATION N/A 12/12/2016    Procedure: Coronary Angiogram;  Surgeon: Delgado Ramirez MD;  Location: [facility];  Service:      COLONOSCOPY N/A 12/19/2019    Procedure: COLONOSCOPY with polypectomy;  Surgeon: Delgado Price MD;  Location: [facility] OR;  Service: Gastroenterology     CORONARY ANGIOGRAPHY ADULT ORDER       CV CORONARY ANGIOGRAM N/A 2/27/2020    Procedure: CV CORONARY ANGIOGRAM;  Surgeon: Pedro Fontaine MD;  Location: [facility] CARDIAC CATH LAB     CV CORONARY ANGIOGRAM N/A 1/15/2019    Procedure: Coronary Angiogram;  Surgeon: Mason Correa MD;  Location: [facility];  Service: Cardiology     CV INTRAVASULAR ULTRASOUND N/A 2/27/2020    Procedure: Intravascular Ultrasound;  Surgeon: Pedro Fontaine MD;  Location:  HEART CARDIAC CATH LAB     CV LEFT HEART  CATHETERIZATION WITHOUT LEFT VENTRICULOGRAM Left 1/15/2019    Procedure: Left Heart Catheterization Without Left Ventriculogram;  Surgeon: Mason Correa MD;  Location: [facility] Cath Lab;  Service: Cardiology     CV PCI ANGIOPLASTY N/A 2/27/2020    Procedure: Percutaneous Coronary Intervention Angioplasty;  Surgeon: Pedro Fontaine MD;  Location:  HEART CARDIAC CATH LAB     CV RIGHT HEART CATH MEASUREMENTS RECORDED N/A 2/27/2020    Procedure: Right Heart Cath;  Surgeon: Pedro Fontaine MD;  Location:  HEART CARDIAC CATH LAB     CV RIGHT HEART CATH MEASUREMENTS RECORDED N/A 7/30/2020    Procedure: CV RIGHT HEART CATH;  Surgeon: Ronny Geronimo MD;  Location: [facility] CARDIAC CATH LAB     EP ABLATION AV NODE N/A 9/27/2019    Procedure: EP Ablation AV Node;  Surgeon: Markus Pool MD;  Location: [facility] Cath Lab;  Service: Cardiology     EP DFT TESTING FOLLOW UP N/A 2/11/2022    Procedure: EP DFT Test/Follow-up;  Surgeon: Markus Pool MD;  Location: [facility]     EP ICD GENERATOR REPLACEMENT BIVENT Left 2/11/2022    Procedure: EP Implantable Cardio-Defibrillator Generator Change Biventricular;  Surgeon: Markus Pool MD;  Location: [facility]     EP ICD INSERT      ICD REIMPLANTATION OF A NEW RV LEAD 11/26/2014     EP REVISION PACEMAKER DUAL LEAD N/A 2/11/2022    Procedure: EP Recision Pacemaker Dual Lead;  Surgeon: Markus Pool MD;  Location: [facility] CV     EP VENOGRAM N/A 11/12/2021    Procedure: EP Venogram;  Surgeon: Markus Pool MD;  Location: [facility]     HC REMOVE TONSILS/ADENOIDS,<11 Y/O      Description: Tonsillectomy With Adenoidectomy;  Recorded: 06/10/2014;     INSERT / REPLACE / REMOVE PACEMAKER       IR UPPER EXTREMITY VENOGRAM LEFT  1/13/2022     JOINT REPLACEMENT      bilateral TKA     OR LASER LEAD EXTRACTION - LEVEL 3 N/A 12/29/2021    Procedure: VIDEO ASSISTED RIGHT THORACOSCOPY;  Surgeon:  Brandi Anthony MD;  Location: [facility] CATH LAB CV     OTHER SURGICAL HISTORY  11/26/2014    BIV ICDbiotronic     [place] L HRT CATH W/NJX L VENTRICULOGRAPHY IMG S&I Left 12/12/2016    Procedure: Left Heart Catheterization with Left Ventriculogram;  Surgeon: Delgado Ramirez MD;  Location: [facility] Cath Lab;  Service: Cardiology     REPLACEMENT TOTAL KNEE      bilat     TONSILLECTOMY & ADENOIDECTOMY       US LYMPH NODE BIOPSY  7/10/2019       Family History   Problem Relation Age of Onset     Sudden Death Mother         unexpected death in her sleep in her 50s       Social History     Socioeconomic History     Marital status:      Spouse name: Not on file     Number of children: Not on file     Years of education: Not on file     Highest education level: Not on file   Occupational History     Not on file   Tobacco Use     Smoking status: Never Smoker     Smokeless tobacco: Never Used     Tobacco comment: cigars few times a year only   Substance and Sexual Activity     Alcohol use: Yes     Comment: Alcoholic Drinks/day: occasional     Drug use: No     Sexual activity: Not on file   Other Topics Concern     Parent/sibling w/ CABG, MI or angioplasty before 65F 55M? Not Asked   Social History Narrative     Not on file     Social Determinants of Health     Financial Resource Strain: Not on file   Food Insecurity: Not on file   Transportation Needs: Not on file   Physical Activity: Not on file   Stress: Not on file   Social Connections: Not on file   Intimate Partner Violence: Not on file   Housing Stability: Not on file       Current Outpatient Medications   Medication Sig Dispense Refill     acetaminophen (TYLENOL) 500 MG tablet Take 500-1,000 mg by mouth 2 times daily as needed        apixaban ANTICOAGULANT (ELIQUIS ANTICOAGULANT) 5 MG tablet Take 1 tablet (5 mg) by mouth 2 times daily 180 tablet 1     atorvastatin (LIPITOR) 40 MG tablet Take 40 mg by mouth daily       bumetanide (BUMEX) 1 MG tablet Take 1  tablet (1 mg) by mouth 2 times daily 180 tablet 1     carvedilol (COREG) 6.25 MG tablet TAKE 2 TABLETS BY MOUTH TWICE DAILY WITH MEALS 360 tablet 0     Coenzyme Q10 (COQ-10 PO) Take 20 mg by mouth daily        colchicine (COLCYRS) 0.6 MG tablet TAKE 2 TABLETS BY MOUTH THEN 1 TABLET ONE HOUR LATER AS NEEDED FOR GOUT ATTACK AS DIRECTED       diphenhydrAMINE (BENADRYL) 25 MG tablet Take 25 mg by mouth At Bedtime       ENTRESTO 49-51 MG per tablet Take 1 tablet by mouth twice daily 180 tablet 0     febuxostat (ULORIC) 40 MG TABS tablet Take 40 mg by mouth daily       ferrous sulfate (FEROSUL) 325 (65 Fe) MG tablet Take 325 mg by mouth daily (with breakfast)        gabapentin (NEURONTIN) 100 MG capsule Take 100 mg by mouth 3 times daily       glucosamine-chondroitin 500-400 MG CAPS per capsule Take 1 tablet by mouth daily        hydrOXYzine (ATARAX) 10 MG tablet Take 10-20 mg by mouth every 8 hours as needed       insulin glargine (LANTUS PEN) 100 UNIT/ML pen Inject 20 Units Subcutaneous At Bedtime        melatonin 3 MG tablet Take 1 mg by mouth nightly as needed for sleep       metFORMIN (GLUCOPHAGE) 1000 MG tablet Take 1,000 mg by mouth 2 times daily (with meals)       Multiple Vitamin (MULTI VITAMIN DAILY PO) Take 1 tablet by mouth daily        Omega-3 Fatty Acids (FISH OIL) 1200 MG capsule Take 1,200 mg by mouth daily        potassium chloride ER (K-TAB) 20 MEQ CR tablet Take 20 mEq by mouth daily       spironolactone (ALDACTONE) 25 MG tablet Take 1/2 (one-half) tablet by mouth once daily 45 tablet 1     ticagrelor (BRILINTA) 90 MG tablet Take 1 tablet (90 mg) by mouth 2 times daily Please schedule lab draw to F/U on creatine; additional refills after 180 tablet 0     traMADol (ULTRAM) 50 MG tablet Take 1 tablet by mouth as needed for pain         No Known Allergies     Physical Examination Review of Systems   BP (!) 84/50   Pulse 72   Resp 14   Wt 68 kg (150 lb)   BMI 22.15 kg/m    Body mass index is 22.15  kg/m .  Wt Readings from Last 3 Encounters:   06/06/22 68 kg (150 lb)   06/06/22 68 kg (150 lb)   03/15/22 70.9 kg (156 lb 3.2 oz)       General Appearance:     Alert, cooperative and in no acute distress.   ENT/Mouth: membranes moist, no oral lesions or bleeding gums.      EYES:  no scleral icterus, normal conjunctivae   Chest/Lungs:   lungs are clear to auscultation, no rales or wheezing, respirations unlabored   Cardiovascular:   Regular. Normal first and second heart sounds, no edema bilateral lower extremities    Abdomen:  Soft, nontender, nondistended, bowel sounds present   Extremities: no cyanosis or clubbing   Skin: warm, dry.    Neurologic: mood and affect are appropriate, alert and oriented x3      Enc Vitals  BP: (!) 84/50  Pulse: 72  Resp: 14  Weight: 68 kg (150 lb)                                             Medical History  Surgical History Family History Social History   Past Medical History:   Diagnosis Date     Acute renal failure (H)      Anemia      Arthritis     osteoarthritis     Arthritis     osteoarthritis     Arthritis     osteoarthritis     CHF (congestive heart failure) (H)      CHF (congestive heart failure) (H)      CHF (congestive heart failure) (H)      Chronic systolic heart failure (H) Dec 2012     Chronic systolic heart failure (H) Dec 2012     Chronic systolic heart failure (H) Dec 2012     Coronary artery disease involving native coronary artery     Non obstructive disease by cath in 2007 Cor angio Nov 2016: RCA 70% (FFR 0.89), and OM1 75%        Coronary artery disease involving native coronary artery     Non obstructive disease by cath in 2007 Cor angio Nov 2016: RCA 70% (FFR 0.89), and OM1 75%        Coronary artery disease involving native coronary artery     Non obstructive disease by cath in 2007 Cor angio Nov 2016: RCA 70% (FFR 0.89), and OM1 75%        Depressive disorder, not elsewhere classified 10/24/2014     Diabetes mellitus, type 2 (H)      Diabetes mellitus, type II  (H) 28/Jan/2013     Diabetes mellitus, type II (H) 28/Jan/2013     Diabetes mellitus, type II (H) 28/Jan/2013     Diabetic neuropathy (H)      Diabetic neuropathy (H)      Diabetic neuropathy (H)      Fractures     ankle, leg and arm     High cholesterol 2007     Hypertension      Hypertension      Hypertension      ICD (implantable cardioverter-defibrillator) lead failure 11/26/2014    High voltage lead tip inserted in RV free wall RV lead extraction and implantation of the new septal RV lead November 2014      ICD (implantable cardioverter-defibrillator) lead failure 11/26/2014    High voltage lead tip inserted in RV free wall RV lead extraction and implantation of the new septal RV lead November 2014      ILD (interstitial lung disease) (H)      Infectious mononucleosis      Ischemic cardiomyopathy 4/22/2015    Chronic: LVEF 25- 30% LVEF 26% echo May 2017     Ischemic cardiomyopathy 4/22/2015    Chronic: LVEF 25- 30% LVEF 26% echo May 2017     Ischemic cardiomyopathy 4/22/2015    Chronic: LVEF 25- 30% LVEF 26% echo May 2017     Kidney stone      LBBB (left bundle branch block)     noted on Dec 19, 2012     LBBB (left bundle branch block)     noted on Dec 19, 2012     LBBB (left bundle branch block)     noted on Dec 19, 2012     Lymphadenopathy, mediastinal      Malfunction of implantable defibrillator ventricular (ICD) lead 11/9/2021    RV lead malfunction c/w insulation breech. Lead extraction and replacement recommended     Myocardial infarction (H)      Myocardial infarction (H)      Myocardial infarction (H)      Osteoarthritis      Osteoarthritis      Osteoarthritis      Paroxysmal ventricular tachycardia (H) 2/11/2022     Pulmonary anthracosis (H)      Pulmonary anthracosis (H)      Recurrent kidney stones     Past Surgical History:   Procedure Laterality Date     APPENDECTOMY       ARTHROSCOPY KNEE Bilateral      ARTHROSCOPY SHOULDER ROTATOR CUFF REPAIR      right     C SHLDR ARTHROSCOP,DIAGNOSTIC       Description: Arthroscopy Shoulder;  Recorded: 06/10/2014;     CARDIAC CATHETERIZATION N/A 12/12/2016    Procedure: Coronary Angiogram;  Surgeon: Delgado Ramirez MD;  Location: [facility] Cath Lab;  Service:      COLONOSCOPY N/A 12/19/2019    Procedure: COLONOSCOPY with polypectomy;  Surgeon: Delgado Price MD;  [place] [facility];  Service: Gastroenterology     CORONARY ANGIOGRAPHY ADULT ORDER       CV CORONARY ANGIOGRAM N/A 2/27/2020    Procedure: CV CORONARY ANGIOGRAM;  Surgeon: Pedro Fontaine MD;  Location: [facility] CARDIAC CATH LAB     CV CORONARY ANGIOGRAM N/A 1/15/2019    Procedure: Coronary Angiogram;  Surgeon: Mason Correa MD;  Location: [facility] Cath Lab;  Service: Cardiology     CV INTRAVASULAR ULTRASOUND N/A 2/27/2020    Procedure: Intravascular Ultrasound;  Surgeon: Pedro Fontaine MD;  Location: [facility] CARDIAC CATH LAB     CV LEFT HEART CATHETERIZATION WITHOUT LEFT VENTRICULOGRAM Left 1/15/2019    Procedure: Left Heart Catheterization Without Left Ventriculogram;  Surgeon: Mason Correa MD;  Location: [facility] Cath Lab;  Service: Cardiology     CV PCI ANGIOPLASTY N/A 2/27/2020    Procedure: Percutaneous Coronary Intervention Angioplasty;  Surgeon: Pedro Fontaine MD;  Location: [facility] CARDIAC CATH LAB     CV RIGHT HEART CATH MEASUREMENTS RECORDED N/A 2/27/2020    Procedure: Right Heart Cath;  Surgeon: Pedro Fontaine MD;  Location: [facility] CARDIAC CATH LAB     CV RIGHT HEART CATH MEASUREMENTS RECORDED N/A 7/30/2020    Procedure: CV RIGHT HEART CATH;  Surgeon: Ronny Geronimo MD;  Location: [facility] CARDIAC CATH LAB     EP ABLATION AV NODE N/A 9/27/2019    Procedure: EP Ablation AV Node;  Surgeon: Markus Pool MD;  Location: [facility] Cath Lab;  Service: Cardiology     EP DFT TESTING FOLLOW UP N/A 2/11/2022    Procedure: EP DFT Test/Follow-up;  Surgeon: Markus Pool MD;  Location:  [facility] CATH LAB CV     EP ICD GENERATOR REPLACEMENT BIVENT Left 2/11/2022    Procedure: EP Implantable Cardio-Defibrillator Generator Change Biventricular;  Surgeon: Markus Pool MD;  Location: [facility]     EP ICD INSERT      ICD REIMPLANTATION OF A NEW RV LEAD 11/26/2014     EP REVISION PACEMAKER DUAL LEAD N/A 2/11/2022    Procedure: EP Recision Pacemaker Dual Lead;  Surgeon: Markus Pool MD;  Location: [facility] CV     EP VENOGRAM N/A 11/12/2021    Procedure: EP Venogram;  Surgeon: Markus Pool MD;  Location: [facility] CV     HC REMOVE TONSILS/ADENOIDS,<13 Y/O      Description: Tonsillectomy With Adenoidectomy;  Recorded: 06/10/2014;     INSERT / REPLACE / REMOVE PACEMAKER       IR UPPER EXTREMITY VENOGRAM LEFT  1/13/2022     JOINT REPLACEMENT      bilateral TKA     OR LASER LEAD EXTRACTION - LEVEL 3 N/A 12/29/2021    Procedure: VIDEO ASSISTED RIGHT THORACOSCOPY;  Surgeon: Brandi Anthony MD;  Location: [facility]     OTHER SURGICAL HISTORY  11/26/2014    BIV ICDbiotronic     [place] L HRT CATH W/NJX L VENTRICULOGRAPHY IMG S&I Left 12/12/2016    Procedure: Left Heart Catheterization with Left Ventriculogram;  Surgeon: Delgado Ramirez MD;  Location: [facility] Lab;  Service: Cardiology     REPLACEMENT TOTAL KNEE      bilat     TONSILLECTOMY & ADENOIDECTOMY       US LYMPH NODE BIOPSY  7/10/2019    Family History   Problem Relation Age of Onset     Sudden Death Mother         unexpected death in her sleep in her 50s    Social History     Socioeconomic History     Marital status:      Spouse name: Not on file     Number of children: Not on file     Years of education: Not on file     Highest education level: Not on file   Occupational History     Not on file   Tobacco Use     Smoking status: Never Smoker     Smokeless tobacco: Never Used     Tobacco comment: cigars few times a year only   Substance and Sexual Activity     Alcohol use: Yes     Comment:  Alcoholic Drinks/day: occasional     Drug use: No     Sexual activity: Not on file   Other Topics Concern     Parent/sibling w/ CABG, MI or angioplasty before 65F 55M? Not Asked   Social History Narrative     Not on file     Social Determinants of Health     Financial Resource Strain: Not on file   Food Insecurity: Not on file   Transportation Needs: Not on file   Physical Activity: Not on file   Stress: Not on file   Social Connections: Not on file   Intimate Partner Violence: Not on file   Housing Stability: Not on file          Medications  Allergies   Current Outpatient Medications   Medication Sig Dispense Refill     acetaminophen (TYLENOL) 500 MG tablet Take 500-1,000 mg by mouth 2 times daily as needed        apixaban ANTICOAGULANT (ELIQUIS ANTICOAGULANT) 5 MG tablet Take 1 tablet (5 mg) by mouth 2 times daily 180 tablet 1     atorvastatin (LIPITOR) 40 MG tablet Take 40 mg by mouth daily       bumetanide (BUMEX) 1 MG tablet Take 1 tablet (1 mg) by mouth 2 times daily 180 tablet 1     carvedilol (COREG) 6.25 MG tablet TAKE 2 TABLETS BY MOUTH TWICE DAILY WITH MEALS 360 tablet 0     Coenzyme Q10 (COQ-10 PO) Take 20 mg by mouth daily        colchicine (COLCYRS) 0.6 MG tablet TAKE 2 TABLETS BY MOUTH THEN 1 TABLET ONE HOUR LATER AS NEEDED FOR GOUT ATTACK AS DIRECTED       diphenhydrAMINE (BENADRYL) 25 MG tablet Take 25 mg by mouth At Bedtime       ENTRESTO 49-51 MG per tablet Take 1 tablet by mouth twice daily 180 tablet 0     febuxostat (ULORIC) 40 MG TABS tablet Take 40 mg by mouth daily       ferrous sulfate (FEROSUL) 325 (65 Fe) MG tablet Take 325 mg by mouth daily (with breakfast)        gabapentin (NEURONTIN) 100 MG capsule Take 100 mg by mouth 3 times daily       glucosamine-chondroitin 500-400 MG CAPS per capsule Take 1 tablet by mouth daily        hydrOXYzine (ATARAX) 10 MG tablet Take 10-20 mg by mouth every 8 hours as needed       insulin glargine (LANTUS PEN) 100 UNIT/ML pen Inject 20 Units Subcutaneous  At Bedtime        melatonin 3 MG tablet Take 1 mg by mouth nightly as needed for sleep       metFORMIN (GLUCOPHAGE) 1000 MG tablet Take 1,000 mg by mouth 2 times daily (with meals)       Multiple Vitamin (MULTI VITAMIN DAILY PO) Take 1 tablet by mouth daily        Omega-3 Fatty Acids (FISH OIL) 1200 MG capsule Take 1,200 mg by mouth daily        potassium chloride ER (K-TAB) 20 MEQ CR tablet Take 20 mEq by mouth daily       spironolactone (ALDACTONE) 25 MG tablet Take 1/2 (one-half) tablet by mouth once daily 45 tablet 1     ticagrelor (BRILINTA) 90 MG tablet Take 1 tablet (90 mg) by mouth 2 times daily Please schedule lab draw to F/U on creatine; additional refills after 180 tablet 0     traMADol (ULTRAM) 50 MG tablet Take 1 tablet by mouth as needed for pain      No Known Allergies

## 2022-06-06 NOTE — NURSING NOTE
Chief Complaint   Patient presents with     Follow Up     RTN HF 75 y.o male with HFrEF EF 20-25% with labs prior.    Vitals were taken and medications reconciled.    Eamon Hurst, EMT  2:22 PM

## 2022-06-06 NOTE — PATIENT INSTRUCTIONS
Medication Changes & Instructions:  No changes today    Follow up Appointment Information:  Your potassium was very elevated today.     EKG performed today that did not show any new concerns however we would like you to come into the emergency department as soon as possible TODAY. Because of your high potassium as this can be dangerous for your heart.     Results:   Latest Reference Range & Units 22 13:41   Sodium 133 - 144 mmol/L 136   Potassium 3.4 - 5.3 mmol/L 6.4 (HH)   Chloride 94 - 109 mmol/L 104   Carbon Dioxide 20 - 32 mmol/L 19 (L)   Urea Nitrogen 7 - 30 mg/dL 76 (H)   Creatinine 0.66 - 1.25 mg/dL 2.71 (H)   GFR Estimate >60 mL/min/1.73m2 24 (L) [1]   Calcium 8.5 - 10.1 mg/dL 8.3 (L)   Anion Gap 3 - 14 mmol/L 13   N-Terminal Pro Bnp 0 - 1,800 pg/mL 8,906 (H) [2]   Glucose 70 - 99 mg/dL 173 (H)         [address] [place] [address]      Thank you for allowing us to be a part of your care here at the [facility].      If you have questions or concerns please contact us at:    Nurse Coordinator: Laurita Kimball RN [phone]  Cardiovascular Clinic  [facility]   [phone] Press #1 to send a message to your care team  On Call Cardiologist for after hours or on weekends: [phone]  option #4     *All co-payments are due at the time of services, if financial concerns are keeping you from attending scheduled clinic visits please contact our financial counselors at [phone] for further assistance.   * Please note that you will NOT receive a reminder call regarding your scheduled testing, reminder calls are for provider appointments only.  If you are scheduled for testing within the [facility] system you may receive a call regarding pre-registration for billing purposes only.**

## 2022-06-06 NOTE — PROGRESS NOTES
Pemafibrate to Reduce cardiovascular OutcoMes by reducing triglycerides IN patiENts with diabeTes (PROMINENT) clinical trial.    Subject seen in clinic today for End of study visit.      Subject seen by provider  for study related physical exam.  See provider note and flow sheets for vital signs.    There were no vitals filed for this visit.  Waist measures 97 cm      EQ-5D-5L questionnaire completed if applicable.    Study efficacy, endpoints and adverse events reviewed with subject.  Cardiovascular risk discussed.     Study central labs and urine drawn per protocol.    Study Dru. [id number] left  [id number]- 70 left  3. [id number] left  4. [id number]- 14 left  254 tablets returned- 98 % compliance    Study alcohol consumption stipulations and education reviewed with subject:    Subject reports  [] Never [x] Current []   2 drinks per [] Day [] Week [x] Month []   Plan: This is the final study visit and there is no further research follow up required  Adverse Events of :  1.Diabetic Peripheral Neuropathy= Bilateral feet.  2.Pulmonary fibrosis  3.Increased activity intolerance  Will be ongoing at the end of the study.    Thank you for your participation in the Pemafibrate to Reduce cardiovascular OutcoMes by reducing triglycerides IN patiENts with diabeTes (PROMINENT) clinical trial.  It's been a pleasure getting to know you during the study and I appreciate all of your time and effort put in.  This is our final contact for the study but we can let you know of any future studies that might be of interest to you.  Don't hesitate to reach out with any questions or thoughts in the future as well.    Best regards,  Mirlande Vick RN   Clinical Research Coordinator

## 2022-06-06 NOTE — LETTER
6/6/2022    Lewis Reeves MD  [facility] [address] [place] [address]    RE: Ken Doss       Dear Colleague,     I had the pleasure of seeing Ken Doss in the [facility].  HEART CARE RESEARCH NOTE      [facility]  [phone]      Assessment/Recommendations   No changes to his medications today. PROMINENT end of study visit today.    He will see Dr. Payton later today for heart failure follow up.        History of Present Illness/Subjective    Ken Doss is seen at [facility] for PROMINENT research study.  He has a history of coronary artery disease with PCI, heart failure with reduced ejection fraction, CRT-D, paroxysmal atrial fibrillation, pulmonary fibrosis, diabetes, hypertriglyceridemia, and dyslipidemia.  He has noticed a decrease in energy over the past few months.  He has chronic shortness of breath with stairs but denies any worsening.  He has occasional dizziness and loses his balance.  He denies any falls. He denies chest pain, abdominal fullness/bloating and lower extremity edema.  His blood pressure is low today but he is asymptomatic.  He had breakfast and a small glass of juice before appointment.  He was given a glass of water during appointment.     Patient Active Problem List   Diagnosis     Abnormal chest CT     Benign essential hypertension     Cardiovascular stress test abnormal     Chronic systolic heart failure (H)     Coronary artery disease involving native coronary artery     Diabetic peripheral neuropathy associated with type 2 diabetes mellitus (H)     Hypertriglyceridemia     Implantable cardioverter-defibrillator (ICD) in situ     Mediastinal lymphadenopathy     Primary cardiomyopathy (H)     Type 2 diabetes mellitus (H)     ILD (interstitial lung disease) (H)     Pulmonary anthracosis (H)     Persistent atrial fibrillation (H)     Other secondary acute gout of left foot      Malfunction of implantable defibrillator ventricular (ICD) lead     Non-ischemic cardiomyopathy (H)     Paroxysmal ventricular tachycardia (H)     Ventricular tachycardia (H)     Fall     H/O fall       Past Medical History:   Diagnosis Date     Acute renal failure (H)      Anemia      Arthritis     osteoarthritis     Arthritis     osteoarthritis     Arthritis     osteoarthritis     CHF (congestive heart failure) (H)      CHF (congestive heart failure) (H)      CHF (congestive heart failure) (H)      Chronic systolic heart failure (H) Dec 2012     Chronic systolic heart failure (H) Dec 2012     Chronic systolic heart failure (H) Dec 2012     Coronary artery disease involving native coronary artery     Non obstructive disease by cath in 2007 Cor angio Nov 2016: RCA 70% (FFR 0.89), and OM1 75%        Coronary artery disease involving native coronary artery     Non obstructive disease by cath in 2007 Cor angio Nov 2016: RCA 70% (FFR 0.89), and OM1 75%        Coronary artery disease involving native coronary artery     Non obstructive disease by cath in 2007 Cor angio Nov 2016: RCA 70% (FFR 0.89), and OM1 75%        Depressive disorder, not elsewhere classified 10/24/2014     Diabetes mellitus, type 2 (H)      Diabetes mellitus, type II (H) 28/Jan/2013     Diabetes mellitus, type II (H) 28/Jan/2013     Diabetes mellitus, type II (H) 28/Jan/2013     Diabetic neuropathy (H)      Diabetic neuropathy (H)      Diabetic neuropathy (H)      Fractures     ankle, leg and arm     High cholesterol 2007     Hypertension      Hypertension      Hypertension      ICD (implantable cardioverter-defibrillator) lead failure 11/26/2014    High voltage lead tip inserted in RV free wall RV lead extraction and implantation of the new septal RV lead November 2014      ICD (implantable cardioverter-defibrillator) lead failure 11/26/2014    High voltage lead tip inserted in RV free wall RV lead extraction and implantation of the new septal RV  lead November 2014      ILD (interstitial lung disease) (H)      Infectious mononucleosis      Ischemic cardiomyopathy 4/22/2015    Chronic: LVEF 25- 30% LVEF 26% echo May 2017     Ischemic cardiomyopathy 4/22/2015    Chronic: LVEF 25- 30% LVEF 26% echo May 2017     Ischemic cardiomyopathy 4/22/2015    Chronic: LVEF 25- 30% LVEF 26% echo May 2017     Kidney stone      LBBB (left bundle branch block)     noted on Dec 19, 2012     LBBB (left bundle branch block)     noted on Dec 19, 2012     LBBB (left bundle branch block)     noted on Dec 19, 2012     Lymphadenopathy, mediastinal      Malfunction of implantable defibrillator ventricular (ICD) lead 11/9/2021    RV lead malfunction c/w insulation breech. Lead extraction and replacement recommended     Myocardial infarction (H)      Myocardial infarction (H)      Myocardial infarction (H)      Osteoarthritis      Osteoarthritis      Osteoarthritis      Paroxysmal ventricular tachycardia (H) 2/11/2022     Pulmonary anthracosis (H)      Pulmonary anthracosis (H)      Recurrent kidney stones        Past Surgical History:   Procedure Laterality Date     APPENDECTOMY       ARTHROSCOPY KNEE Bilateral      ARTHROSCOPY SHOULDER ROTATOR CUFF REPAIR      right     C SHLDR ARTHROSCOP,DIAGNOSTIC      Description: Arthroscopy Shoulder;  Recorded: 06/10/2014;     CARDIAC CATHETERIZATION N/A 12/12/2016    Procedure: Coronary Angiogram;  Surgeon: Delgado Ramirez MD;  Location: [facility] Cath Lab;  Service:      COLONOSCOPY N/A 12/19/2019    Procedure: COLONOSCOPY with polypectomy;  Surgeon: Delgado Price MD;  [place] [facility] [place]  Service: Gastroenterology     CORONARY ANGIOGRAPHY ADULT ORDER       CV CORONARY ANGIOGRAM N/A 2/27/2020    Procedure: CV CORONARY ANGIOGRAM;  Surgeon: ePdro Fontaine MD;  Location: [facility] CARDIAC CATH LAB     CV CORONARY ANGIOGRAM N/A 1/15/2019    Procedure: Coronary Angiogram;  Surgeon: Mason Correa MD;   Location: [facility] Cath Lab;  Service: Cardiology     CV INTRAVASULAR ULTRASOUND N/A 2/27/2020    Procedure: Intravascular Ultrasound;  Surgeon: Pedro Fontaine MD;  Location: [facility] CARDIAC CATH LAB     CV LEFT HEART CATHETERIZATION WITHOUT LEFT VENTRICULOGRAM Left 1/15/2019    Procedure: Left Heart Catheterization Without Left Ventriculogram;  Surgeon: Mason Correa MD;  Location: [facility] Cath Lab;  Service: Cardiology     CV PCI ANGIOPLASTY N/A 2/27/2020    Procedure: Percutaneous Coronary Intervention Angioplasty;  Surgeon: Pedro Fontaine MD;  Location: [facility] CARDIAC CATH LAB     CV RIGHT HEART CATH MEASUREMENTS RECORDED N/A 2/27/2020    Procedure: Right Heart Cath;  Surgeon: Pedro Fontaine MD;  Location: [facility] CARDIAC CATH LAB     CV RIGHT HEART CATH MEASUREMENTS RECORDED N/A 7/30/2020    Procedure: CV RIGHT HEART CATH;  Surgeon: Ronny Geronimo MD;  Location: [facility] CARDIAC CATH LAB     EP ABLATION AV NODE N/A 9/27/2019    Procedure: EP Ablation AV Node;  Surgeon: Markus Pool MD;  Location: [facility] Cath [facility];  Service: Cardiology     EP DFT TESTING FOLLOW UP N/A 2/11/2022    Procedure: EP DFT Test/Follow-up;  Surgeon: aMrkus Pool MD;  Location: [facility] CV     EP ICD GENERATOR REPLACEMENT BIVENT Left 2/11/2022    Procedure: EP Implantable Cardio-Defibrillator Generator Change Biventricular;  Surgeon: Markus Pool MD;  Location: [facility] CV     EP ICD INSERT      ICD REIMPLANTATION OF A NEW RV LEAD 11/26/2014     EP REVISION PACEMAKER DUAL LEAD N/A 2/11/2022    Procedure: EP Recision Pacemaker Dual Lead;  Surgeon: Markus Pool MD;  Location: [facility] CV     EP VENOGRAM N/A 11/12/2021    Procedure: EP Venogram;  Surgeon: Markus Pool MD;  Location: [facility] CV     HC REMOVE TONSILS/ADENOIDS,<13 Y/O      Description: Tonsillectomy With Adenoidectomy;  Recorded: 06/10/2014;     INSERT  / REPLACE / REMOVE PACEMAKER       IR UPPER EXTREMITY VENOGRAM LEFT  1/13/2022     JOINT REPLACEMENT      bilateral TKA     OR LASER LEAD EXTRACTION - LEVEL 3 N/A 12/29/2021    Procedure: VIDEO ASSISTED RIGHT THORACOSCOPY;  Surgeon: Brandi Anthony MD;  Location: [facility] CATH LAB CV     OTHER SURGICAL HISTORY  11/26/2014    BIV ICDbiotronic     [place] L HRT CATH W/NJX L VENTRICULOGRAPHY IMG S&I Left 12/12/2016    Procedure: Left Heart Catheterization with Left Ventriculogram;  Surgeon: Delgado Ramirez MD;  Location: [facility] Cath Lab;  Service: Cardiology     REPLACEMENT TOTAL KNEE      bilat     TONSILLECTOMY & ADENOIDECTOMY       US LYMPH NODE BIOPSY  7/10/2019       Family History   Problem Relation Age of Onset     Sudden Death Mother         unexpected death in her sleep in her 50s       Social History     Socioeconomic History     Marital status:      Spouse name: Not on file     Number of children: Not on file     Years of education: Not on file     Highest education level: Not on file   Occupational History     Not on file   Tobacco Use     Smoking status: Never Smoker     Smokeless tobacco: Never Used     Tobacco comment: cigars few times a year only   Substance and Sexual Activity     Alcohol use: Yes     Comment: Alcoholic Drinks/day: occasional     Drug use: No     Sexual activity: Not on file   Other Topics Concern     Parent/sibling w/ CABG, MI or angioplasty before 65F 55M? Not Asked   Social History Narrative     Not on file     Social Determinants of Health     Financial Resource Strain: Not on file   Food Insecurity: Not on file   Transportation Needs: Not on file   Physical Activity: Not on file   Stress: Not on file   Social Connections: Not on file   Intimate Partner Violence: Not on file   Housing Stability: Not on file       Current Outpatient Medications   Medication Sig Dispense Refill     acetaminophen (TYLENOL) 500 MG tablet Take 500-1,000 mg by mouth 2 times daily as needed         apixaban ANTICOAGULANT (ELIQUIS ANTICOAGULANT) 5 MG tablet Take 1 tablet (5 mg) by mouth 2 times daily 180 tablet 1     atorvastatin (LIPITOR) 40 MG tablet Take 40 mg by mouth daily       bumetanide (BUMEX) 1 MG tablet Take 1 tablet (1 mg) by mouth 2 times daily 180 tablet 1     carvedilol (COREG) 6.25 MG tablet TAKE 2 TABLETS BY MOUTH TWICE DAILY WITH MEALS 360 tablet 0     Coenzyme Q10 (COQ-10 PO) Take 20 mg by mouth daily        colchicine (COLCYRS) 0.6 MG tablet TAKE 2 TABLETS BY MOUTH THEN 1 TABLET ONE HOUR LATER AS NEEDED FOR GOUT ATTACK AS DIRECTED       diphenhydrAMINE (BENADRYL) 25 MG tablet Take 25 mg by mouth At Bedtime       ENTRESTO 49-51 MG per tablet Take 1 tablet by mouth twice daily 180 tablet 0     febuxostat (ULORIC) 40 MG TABS tablet Take 40 mg by mouth daily       ferrous sulfate (FEROSUL) 325 (65 Fe) MG tablet Take 325 mg by mouth daily (with breakfast)        gabapentin (NEURONTIN) 100 MG capsule Take 100 mg by mouth 3 times daily       glucosamine-chondroitin 500-400 MG CAPS per capsule Take 1 tablet by mouth daily        hydrOXYzine (ATARAX) 10 MG tablet Take 10-20 mg by mouth every 8 hours as needed       insulin glargine (LANTUS PEN) 100 UNIT/ML pen Inject 20 Units Subcutaneous At Bedtime        melatonin 3 MG tablet Take 1 mg by mouth nightly as needed for sleep       metFORMIN (GLUCOPHAGE) 1000 MG tablet Take 1,000 mg by mouth 2 times daily (with meals)       Multiple Vitamin (MULTI VITAMIN DAILY PO) Take 1 tablet by mouth daily        Omega-3 Fatty Acids (FISH OIL) 1200 MG capsule Take 1,200 mg by mouth daily        potassium chloride ER (K-TAB) 20 MEQ CR tablet Take 20 mEq by mouth daily       spironolactone (ALDACTONE) 25 MG tablet Take 1/2 (one-half) tablet by mouth once daily 45 tablet 1     ticagrelor (BRILINTA) 90 MG tablet Take 1 tablet (90 mg) by mouth 2 times daily Please schedule lab draw to F/U on creatine; additional refills after 180 tablet 0     traMADol (ULTRAM) 50  MG tablet Take 1 tablet by mouth as needed for pain         No Known Allergies     Physical Examination Review of Systems   BP (!) 84/50   Pulse 72   Resp 14   Wt 68 kg (150 lb)   BMI 22.15 kg/m    Body mass index is 22.15 kg/m .  Wt Readings from Last 3 Encounters:   06/06/22 68 kg (150 lb)   06/06/22 68 kg (150 lb)   03/15/22 70.9 kg (156 lb 3.2 oz)       General Appearance:     Alert, cooperative and in no acute distress.   ENT/Mouth: membranes moist, no oral lesions or bleeding gums.      EYES:  no scleral icterus, normal conjunctivae   Chest/Lungs:   lungs are clear to auscultation, no rales or wheezing, respirations unlabored   Cardiovascular:   Regular. Normal first and second heart sounds, no edema bilateral lower extremities    Abdomen:  Soft, nontender, nondistended, bowel sounds present   Extremities: no cyanosis or clubbing   Skin: warm, dry.    Neurologic: mood and affect are appropriate, alert and oriented x3      Enc Vitals  BP: (!) 84/50  Pulse: 72  Resp: 14  Weight: 68 kg (150 lb)                                             Medical History  Surgical History Family History Social History   Past Medical History:   Diagnosis Date     Acute renal failure (H)      Anemia      Arthritis     osteoarthritis     Arthritis     osteoarthritis     Arthritis     osteoarthritis     CHF (congestive heart failure) (H)      CHF (congestive heart failure) (H)      CHF (congestive heart failure) (H)      Chronic systolic heart failure (H) Dec 2012     Chronic systolic heart failure (H) Dec 2012     Chronic systolic heart failure (H) Dec 2012     Coronary artery disease involving native coronary artery     Non obstructive disease by cath in 2007 Cor angio Nov 2016: RCA 70% (FFR 0.89), and OM1 75%        Coronary artery disease involving native coronary artery     Non obstructive disease by cath in 2007 Cor angio Nov 2016: RCA 70% (FFR 0.89), and OM1 75%        Coronary artery disease involving native coronary  artery     Non obstructive disease by cath in 2007 Cor angio Nov 2016: RCA 70% (FFR 0.89), and OM1 75%        Depressive disorder, not elsewhere classified 10/24/2014     Diabetes mellitus, type 2 (H)      Diabetes mellitus, type II (H) 28/Jan/2013     Diabetes mellitus, type II (H) 28/Jan/2013     Diabetes mellitus, type II (H) 28/Jan/2013     Diabetic neuropathy (H)      Diabetic neuropathy (H)      Diabetic neuropathy (H)      Fractures     ankle, leg and arm     High cholesterol 2007     Hypertension      Hypertension      Hypertension      ICD (implantable cardioverter-defibrillator) lead failure 11/26/2014    High voltage lead tip inserted in RV free wall RV lead extraction and implantation of the new septal RV lead November 2014      ICD (implantable cardioverter-defibrillator) lead failure 11/26/2014    High voltage lead tip inserted in RV free wall RV lead extraction and implantation of the new septal RV lead November 2014      ILD (interstitial lung disease) (H)      Infectious mononucleosis      Ischemic cardiomyopathy 4/22/2015    Chronic: LVEF 25- 30% LVEF 26% echo May 2017     Ischemic cardiomyopathy 4/22/2015    Chronic: LVEF 25- 30% LVEF 26% echo May 2017     Ischemic cardiomyopathy 4/22/2015    Chronic: LVEF 25- 30% LVEF 26% echo May 2017     Kidney stone      LBBB (left bundle branch block)     noted on Dec 19, 2012     LBBB (left bundle branch block)     noted on Dec 19, 2012     LBBB (left bundle branch block)     noted on Dec 19, 2012     Lymphadenopathy, mediastinal      Malfunction of implantable defibrillator ventricular (ICD) lead 11/9/2021    RV lead malfunction c/w insulation breech. Lead extraction and replacement recommended     Myocardial infarction (H)      Myocardial infarction (H)      Myocardial infarction (H)      Osteoarthritis      Osteoarthritis      Osteoarthritis      Paroxysmal ventricular tachycardia (H) 2/11/2022     Pulmonary anthracosis (H)      Pulmonary anthracosis (H)       Recurrent kidney stones     Past Surgical History:   Procedure Laterality Date     APPENDECTOMY       ARTHROSCOPY KNEE Bilateral      ARTHROSCOPY SHOULDER ROTATOR CUFF REPAIR      right     C SHLDR ARTHROSCOP,DIAGNOSTIC      Description: Arthroscopy Shoulder;  Recorded: 06/10/2014;     CARDIAC CATHETERIZATION N/A 12/12/2016    Procedure: Coronary Angiogram;  Surgeon: Delgado Ramirez MD;  Location: [facility] Cath Lab;  Service:      COLONOSCOPY N/A 12/19/2019    Procedure: COLONOSCOPY with polypectomy;  Surgeon: Delgado Price MD;  Location: [facility];  Service: Gastroenterology     CORONARY ANGIOGRAPHY ADULT ORDER       CV CORONARY ANGIOGRAM N/A 2/27/2020    Procedure: CV CORONARY ANGIOGRAM;  Surgeon: Pedro Fontaine MD;  Location: [facility] CARDIAC CATH LAB     CV CORONARY ANGIOGRAM N/A 1/15/2019    Procedure: Coronary Angiogram;  Surgeon: Mason Correa MD;  Location: [facility] Cath Lab;  Service: Cardiology     CV INTRAVASULAR ULTRASOUND N/A 2/27/2020    Procedure: Intravascular Ultrasound;  Surgeon: Pedro Fontaine MD;  Location: [facility] CARDIAC CATH LAB     CV LEFT HEART CATHETERIZATION WITHOUT LEFT VENTRICULOGRAM Left 1/15/2019    Procedure: Left Heart Catheterization Without Left Ventriculogram;  Surgeon: Mason Correa MD;  Location: [facility] Cath Lab;  Service: Cardiology     CV PCI ANGIOPLASTY N/A 2/27/2020    Procedure: Percutaneous Coronary Intervention Angioplasty;  Surgeon: Pedro Fontaine MD;  Location:  HEART CARDIAC CATH LAB     CV RIGHT HEART CATH MEASUREMENTS RECORDED N/A 2/27/2020    Procedure: Right Heart Cath;  Surgeon: Pedro Fontaine MD;  Location:  HEART CARDIAC CATH LAB     CV RIGHT HEART CATH MEASUREMENTS RECORDED N/A 7/30/2020    Procedure: CV RIGHT HEART CATH;  Surgeon: Ronny Geronimo MD;  Location: [facility] CARDIAC CATH LAB     EP ABLATION AV NODE N/A 9/27/2019     Procedure: EP Ablation AV Node;  Surgeon: Markus Pool MD;  Location: [facility];  Service: Cardiology     EP DFT TESTING FOLLOW UP N/A 2/11/2022    Procedure: EP DFT Test/Follow-up;  Surgeon: Markus Pool MD;  Location: [facility]     EP ICD GENERATOR REPLACEMENT BIVENT Left 2/11/2022    Procedure: EP Implantable Cardio-Defibrillator Generator Change Biventricular;  Surgeon: Markus Pool MD;  Location: [facility]     EP ICD INSERT      ICD REIMPLANTATION OF A NEW RV LEAD 11/26/2014     EP REVISION PACEMAKER DUAL LEAD N/A 2/11/2022    Procedure: EP Recision Pacemaker Dual Lead;  Surgeon: Markus Pool MD;  Location: [facility]     EP VENOGRAM N/A 11/12/2021    Procedure: EP Venogram;  Surgeon: Markus Pool MD;  Location: [facility]     HC REMOVE TONSILS/ADENOIDS,<13 Y/O      Description: Tonsillectomy With Adenoidectomy;  Recorded: 06/10/2014;     INSERT / REPLACE / REMOVE PACEMAKER       IR UPPER EXTREMITY VENOGRAM LEFT  1/13/2022     JOINT REPLACEMENT      bilateral TKA     OR LASER LEAD EXTRACTION - LEVEL 3 N/A 12/29/2021    Procedure: VIDEO ASSISTED RIGHT THORACOSCOPY;  Surgeon: Brandi Anthony MD;  Location: [facility]     OTHER SURGICAL HISTORY  11/26/2014    BIV ICDbiotronic     [place] L HRT CATH W/NJX L VENTRICULOGRAPHY IMG S&I Left 12/12/2016    Procedure: Left Heart Catheterization with Left Ventriculogram;  Surgeon: Delgado Ramirez MD;  Location: [facility];  Service: Cardiology     REPLACEMENT TOTAL KNEE      bilat     TONSILLECTOMY & ADENOIDECTOMY       US LYMPH NODE BIOPSY  7/10/2019    Family History   Problem Relation Age of Onset     Sudden Death Mother         unexpected death in her sleep in her 50s    Social History     Socioeconomic History     Marital status:      Spouse name: Not on file     Number of children: Not on file     Years of education: Not on file     Highest education level: Not on file    Occupational History     Not on file   Tobacco Use     Smoking status: Never Smoker     Smokeless tobacco: Never Used     Tobacco comment: cigars few times a year only   Substance and Sexual Activity     Alcohol use: Yes     Comment: Alcoholic Drinks/day: occasional     Drug use: No     Sexual activity: Not on file   Other Topics Concern     Parent/sibling w/ CABG, MI or angioplasty before 65F 55M? Not Asked   Social History Narrative     Not on file     Social Determinants of Health     Financial Resource Strain: Not on file   Food Insecurity: Not on file   Transportation Needs: Not on file   Physical Activity: Not on file   Stress: Not on file   Social Connections: Not on file   Intimate Partner Violence: Not on file   Housing Stability: Not on file          Medications  Allergies   Current Outpatient Medications   Medication Sig Dispense Refill     acetaminophen (TYLENOL) 500 MG tablet Take 500-1,000 mg by mouth 2 times daily as needed        apixaban ANTICOAGULANT (ELIQUIS ANTICOAGULANT) 5 MG tablet Take 1 tablet (5 mg) by mouth 2 times daily 180 tablet 1     atorvastatin (LIPITOR) 40 MG tablet Take 40 mg by mouth daily       bumetanide (BUMEX) 1 MG tablet Take 1 tablet (1 mg) by mouth 2 times daily 180 tablet 1     carvedilol (COREG) 6.25 MG tablet TAKE 2 TABLETS BY MOUTH TWICE DAILY WITH MEALS 360 tablet 0     Coenzyme Q10 (COQ-10 PO) Take 20 mg by mouth daily        colchicine (COLCYRS) 0.6 MG tablet TAKE 2 TABLETS BY MOUTH THEN 1 TABLET ONE HOUR LATER AS NEEDED FOR GOUT ATTACK AS DIRECTED       diphenhydrAMINE (BENADRYL) 25 MG tablet Take 25 mg by mouth At Bedtime       ENTRESTO 49-51 MG per tablet Take 1 tablet by mouth twice daily 180 tablet 0     febuxostat (ULORIC) 40 MG TABS tablet Take 40 mg by mouth daily       ferrous sulfate (FEROSUL) 325 (65 Fe) MG tablet Take 325 mg by mouth daily (with breakfast)        gabapentin (NEURONTIN) 100 MG capsule Take 100 mg by mouth 3 times daily        glucosamine-chondroitin 500-400 MG CAPS per capsule Take 1 tablet by mouth daily        hydrOXYzine (ATARAX) 10 MG tablet Take 10-20 mg by mouth every 8 hours as needed       insulin glargine (LANTUS PEN) 100 UNIT/ML pen Inject 20 Units Subcutaneous At Bedtime        melatonin 3 MG tablet Take 1 mg by mouth nightly as needed for sleep       metFORMIN (GLUCOPHAGE) 1000 MG tablet Take 1,000 mg by mouth 2 times daily (with meals)       Multiple Vitamin (MULTI VITAMIN DAILY PO) Take 1 tablet by mouth daily        Omega-3 Fatty Acids (FISH OIL) 1200 MG capsule Take 1,200 mg by mouth daily        potassium chloride ER (K-TAB) 20 MEQ CR tablet Take 20 mEq by mouth daily       spironolactone (ALDACTONE) 25 MG tablet Take 1/2 (one-half) tablet by mouth once daily 45 tablet 1     ticagrelor (BRILINTA) 90 MG tablet Take 1 tablet (90 mg) by mouth 2 times daily Please schedule lab draw to F/U on creatine; additional refills after 180 tablet 0     traMADol (ULTRAM) 50 MG tablet Take 1 tablet by mouth as needed for pain      No Known Allergies                   Thank you for allowing me to participate in the care of your patient.      Sincerely,     Linda Marmolejo NP     [facility]  cc:   No referring provider defined for this encounter.

## 2022-06-06 NOTE — ED TRIAGE NOTES
Patient arrived ambulatory form triage from clinic.   Patient was referred by cardiologist to come ED due to hypotension and potassium 6.4 and elevated creatinine.     SBP 80's at clinic    Patient reports chest pressure, fatigue, and SOB  for approx 1 week.     PMH: DM, CHF     Triage Assessment     Row Name 06/06/22 [id number]       Triage Assessment (Adult)    Airway WDL WDL       Respiratory WDL    Respiratory WDL WDL       Skin Circulation/Temperature WDL    Skin Circulation/Temperature WDL WDL       Cardiac WDL    Cardiac WDL X;chest pain       Chest Pain Assessment    Chest Pain Location substernal    Character pressure       Peripheral/Neurovascular WDL    Peripheral Neurovascular WDL WDL       Cognitive/Neuro/Behavioral WDL    Cognitive/Neuro/Behavioral WDL WDL

## 2022-06-06 NOTE — LETTER
6/6/2022      RE: Ken Doss  [id number]       Dear Colleague,    Thank you for the opportunity to participate in the care of your patient, Ken Doss, at the [facility] [place] [facility]. Please see a copy of my visit note below.      [facility]  Advanced Heart Failure Clinic  DATE: 11/01/21    HPI:   Mr. Doss is a 75 year old male with medical history significant for Type 2 IDDM, two vessel CAD s/p PCI to mLAD and pRCA Feb 2020, ICM with severe LV systolic dysfunction with LVEF 10-20% s/p CRT-D, A fib s/p AVN ablation, mild ILD who presents for follow up visit.    He was initially referred to us for consideration of LVAD as DT in the setting of his advanced HF, seen by Dr. Payton in Feb 2020 at [facility] where he follows as well. First diagnosed with HF and found to have reduced LV function around 2010, thought to be secondary to silent MI/ICM. At that time he did not have any coronary interventions.  He had CRT-D placed Jan 2013.  He was doing well until 2018 when he developed worsening exertional SOB. [facility] January 2019 showed pLAD and pRCA disease, he underwent PCI with 1 REGLA to mLAD and 2 REGLA to o/p RCA.  He felt better immediately after but noticed the SOB come back again. May 2019 MPI showed medium sized nontransmural infarction in the inferior and inferoseptal region with no evidence of reversible ischemia.  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  He also had a PET scan which showed the mediastinal and hilar LAD along with lower lobe infiltrates light up. Cardiac PET 10/2019 for sarcoid showed no suspicious FDG uptake within the heart. He also was found to have A fib with RVR underwent  AVN ablation in Sept 2019, which he had reported previously did not help his sxs. In addition, he had a high res CT chest 1/2020 which showed mild ILD consistent with UIP and PFTs 3/2020 showed mild restrictive disease.     After his initial visit we initiated LVAD workup. Feb 2020, C showed in-stent restenosis of the ostial RCA and proximal LAD, underwent PCI to both.  RHC at that time showed RA pressure of 16, RV of 60/15, PA pressure of 59/28 with a mean of 35, PCWP 20, TD cardiac output of 3.4 with an index of 1.8, PA saturation of 56.3, and PVR of 4.4 STREET. He was aggressively diuresed and discharged home on high-dose of Bumex instead of Lasix.  His Coreg dose was decreased to 6.25 mg twice a day and his Entresto dose was increased to 49/51 mg twice a day. TTE 3/2020 revealed severely reduced LVEF 10 to 15%.  LVEDD 6.4 cm, RV normal size and function, moderate TR.  Since he felt better since then we have been optimizing his GDMT and been able to hold off on LVAD. Repeat RHC July 2020 showed RA 10, PA 45/23/30, WP 21, F CO/CI 2.8/1.5, TD CO/CI 3.0/1.6, PVR 2.9WU.  In the mean time he has been evaluated by pulmonology Dr. Encarnacion who felt his ILD is stable. He also had a dilated pancreatic duct that was evaluated by GI with repeat CT A/P which showed only mild dilation felt to be benign. He had a generator change 9/2020. Had CPX 10/2020, VO2 max 15.4 mL/kg/min.  He had a CPX 9/22/21 that showed a peak VO2 of 16.35 at RER 1.17, elevated VE/VCO2 slope of 44, attenuated HR and BP response, normal lung function on baseline spirometry. Overall, the patients VO2 increased from 2 previous studies (10.7 2/2020 and 14.5 9/2020) and exercise duration increased as well by 5-7 minutes.     LOV: Nov 2021-Dizziness, dry, cut down bumex to 2mg bid.      Since then:   He had a VF episode 11/7 treated with a shock but it was not a full power shock. He was seen by EP, ultimately he had his CRT-D generator explanted 2/11/22 due to  suspected device malfunction (inappropriately low shock output during clinical VT), replaced and had new RV lead placed.     Today he reports he really feels he has slowed down in the past 6 months.  He used to be able to walk 1-2 blocks, now can't walk to the mail box without feeling SOB and fatigued.  No falls, syncope, or shocks.  No worsening LE edema, orthopnea, PND.  Still occasional dizziness.   Patient denies fever, chills, night sweats,  rashes, lightheadedness, headaches, chest pain, palpitations, nausea, vomiting.  Is having occasional diarrhea.  His weight is 150lbs today, last visit in Nov it was 162. He agrees that he is losing weight.     No bleeding issues on Eliquis.  Bumex has been cut down further to 1mg bid.  He was restarted on KCl 20meq daily.      Device check 5/11/22- chronic AF, 4 NSVT episodes, BiVP 94.1%.       PAST MEDICAL HISTORY:  Past Medical History:  #1 insulin-dependent type 2 diabetes mellitus  #2 severe LV systolic dysfunction with an estimated left ventricular ejection fraction of 20 to 25% in January 2020  #3 two-vessel coronary artery disease in the LAD and RCA status post percutaneous coronary intervention.  His most recent nuclear stress test in May 2019 showed fixed defect in the inferior and inferoseptal region consistent with scar.  He had no reversible ischemia.  #4 atrial fibrillation status post AV marianna ablation.  #5 cardiac resynchronization therapy  #6 mild interstitial lung disease with a total lung capacity of 72%.     Past Surgical History:  #1 Bilateral knee replacement  #2 Right shoulder replacement  #3 Appendectomy    FAMILY HISTORY:  Family History   Problem Relation Age of Onset     Sudden Death Mother         unexpected death in her sleep in her 50s     SOCIAL HISTORY:  Social History     Socioeconomic History     Marital status:      Spouse name: Not on file     Number of children: Not on file     Years of education: Not on file     Highest education  level: Not on file   Occupational History     Not on file   Tobacco Use     Smoking status: Never Smoker     Smokeless tobacco: Never Used     Tobacco comment: cigars few times a year only   Substance and Sexual Activity     Alcohol use: Yes     Comment: Alcoholic Drinks/day: occasional     Drug use: No     Sexual activity: Not on file   Other Topics Concern     Not on file   Social History Narrative     Not on file     CURRENT MEDICATIONS:  No current facility-administered medications on file prior to visit.  acetaminophen (TYLENOL) 500 MG tablet, Take 500-1,000 mg by mouth 2 times daily as needed   apixaban ANTICOAGULANT (ELIQUIS ANTICOAGULANT) 5 MG tablet, Take 1 tablet (5 mg) by mouth 2 times daily  atorvastatin (LIPITOR) 40 MG tablet, Take 40 mg by mouth daily  bumetanide (BUMEX) 1 MG tablet, Take 1 tablet (1 mg) by mouth 2 times daily  carvedilol (COREG) 6.25 MG tablet, TAKE 2 TABLETS BY MOUTH TWICE DAILY WITH MEALS  Coenzyme Q10 (COQ-10 PO), Take 20 mg by mouth daily   colchicine (COLCYRS) 0.6 MG tablet, TAKE 2 TABLETS BY MOUTH THEN 1 TABLET ONE HOUR LATER AS NEEDED FOR GOUT ATTACK AS DIRECTED  diphenhydrAMINE (BENADRYL) 25 MG tablet, Take 25 mg by mouth At Bedtime  ENTRESTO 49-51 MG per tablet, Take 1 tablet by mouth twice daily  febuxostat (ULORIC) 40 MG TABS tablet, Take 40 mg by mouth daily  ferrous sulfate (FEROSUL) 325 (65 Fe) MG tablet, Take 325 mg by mouth daily (with breakfast)   gabapentin (NEURONTIN) 100 MG capsule, Take 100 mg by mouth 3 times daily  glucosamine-chondroitin 500-400 MG CAPS per capsule, Take 1 tablet by mouth daily   hydrOXYzine (ATARAX) 10 MG tablet, Take 10-20 mg by mouth every 8 hours as needed  insulin glargine (LANTUS PEN) 100 UNIT/ML pen, Inject 20 Units Subcutaneous At Bedtime   melatonin 3 MG tablet, Take 1 mg by mouth nightly as needed for sleep  metFORMIN (GLUCOPHAGE) 1000 MG tablet, Take 1,000 mg by mouth 2 times daily (with meals)  Multiple Vitamin (MULTI VITAMIN DAILY  PO), Take 1 tablet by mouth daily   Omega-3 Fatty Acids (FISH OIL) 1200 MG capsule, Take 1,200 mg by mouth daily   potassium chloride ER (K-TAB) 20 MEQ CR tablet, Take 20 mEq by mouth daily  spironolactone (ALDACTONE) 25 MG tablet, Take 1/2 (one-half) tablet by mouth once daily  ticagrelor (BRILINTA) 90 MG tablet, Take 1 tablet (90 mg) by mouth 2 times daily Please schedule lab draw to F/U on creatine; additional refills after  traMADol (ULTRAM) 50 MG tablet, Take 1 tablet by mouth as needed for pain      ROS:  12 point ROS conducted and negative except as per HPI.     EXAM:  BP (!) 88/57 (BP Location: Right arm, Patient Position: Sitting, Cuff Size: Adult Regular)   Pulse 78   Wt 69.4 kg (153 lb)   SpO2 100%   BMI 22.59 kg/m      GENERAL: Appears comfortable, in no acute distress.   HEENT: Eye symmetrical, no discharge or icterus bilaterally. Mucous membranes moist and without lesions. No thrush.   CV: RRR, +S1S2, no murmur, rub, or gallop. JVP elevated 12cm.   RESPIRATORY: Respirations regular, even, and unlabored. Lungs CTA throughout.   GI: Soft and non distended with normoactive bowel sounds present in all quadrants. No tenderness, rebound, guarding.   EXTREMITIES: Trace peripheral edema. 2+ bilateral radial pulses. Warm.   NEUROLOGIC: Alert and oriented x 3. No focal deficits.   MUSCULOSKELETAL: No visible joint swelling or tenderness. His thighs seem skinny.   SKIN: No jaundice. No rashes or lesions.     Labs - reviewed with patient in clinic today:  CBC RESULTS:  Lab Results   Component Value Date    WBC 7.7 06/07/2022    WBC 10.7 07/30/2020    RBC 2.98 (L) 06/07/2022    RBC 3.74 (L) 07/30/2020    HGB 9.2 (L) 06/07/2022    HGB 12.3 (L) 07/30/2020    HCT 28.1 (L) 06/07/2022    HCT 37.0 (L) 07/30/2020    MCV 94 06/07/2022    MCV 99 07/30/2020    MCH 30.9 06/07/2022    MCH 32.9 07/30/2020    MCHC 32.7 06/07/2022    MCHC 33.2 07/30/2020    RDW 15.4 (H) 06/07/2022    RDW 13.1 07/30/2020     06/07/2022      07/30/2020     CMP RESULTS:  Lab Results   Component Value Date     06/07/2022     07/30/2020    POTASSIUM 4.1 06/07/2022    POTASSIUM 4.2 07/30/2020    CHLORIDE 104 06/07/2022    CHLORIDE 105 07/30/2020    CO2 22 06/07/2022    CO2 23 07/30/2020    ANIONGAP 9 06/07/2022    ANIONGAP 8 07/30/2020     (H) 06/07/2022     (H) 06/07/2022     (H) 07/30/2020    BUN 75 (H) 06/07/2022    BUN 40 (H) 07/30/2020    CR 2.40 (H) 06/07/2022    CR 1.28 (H) 07/30/2020    GFRESTIMATED 27 (L) 06/07/2022    GFRESTIMATED 34 (L) 06/07/2021    GFRESTIMATED 55 (L) 07/30/2020    GFRESTBLACK 41 (L) 06/07/2021    GFRESTBLACK 63 07/30/2020    LYNNE 8.4 (L) 06/07/2022    LYNNE 8.8 07/30/2020    BILITOTAL 1.2 06/06/2022    BILITOTAL 1.4 (H) 07/30/2020    ALBUMIN 3.7 06/06/2022    ALBUMIN 3.6 07/30/2020    ALKPHOS 113 06/06/2022    ALKPHOS 88 07/30/2020    ALT 25 06/06/2022    ALT 24 07/30/2020    AST 24 06/06/2022    AST 23 07/30/2020      INR RESULTS:  Lab Results   Component Value Date    INR 1.53 (H) 06/06/2022    INR 1.29 (H) 03/09/2020     LIPID RESULTS:  Lab Results   Component Value Date    CHOL 82 06/07/2022    CHOL 77 02/29/2020    HDL 32 (L) 06/07/2022    HDL 32 (L) 02/29/2020    LDL 28 06/07/2022    LDL 29 02/29/2020    TRIG 112 06/07/2022    TRIG 79 02/29/2020     Lab Results   Component Value Date    MAG 2.1 06/07/2022    MAG 1.8 03/01/2020     Lab Results   Component Value Date    A1C 7.3 (H) 06/06/2022    A1C 7.8 (H) 03/09/2020     Lab Results   Component Value Date    PHOS 3.9 12/22/2021    PHOS 3.8 03/09/2020     Lab Results   Component Value Date    NTBNP 8,906 (H) 06/06/2022    NTBNP 7,195 (H) 03/05/2020     Diagnostic Studies:    TTE 11/22/21  The left ventricle is severely dilated.  The visual ejection fraction is 15-20%.  There is severe global hypokinesia of the left ventricle.  Normal right ventricle size and systolic function.  There is a catheter/pacemaker lead seen in the right  ventricle.  The left atrium is moderate to severely dilated.  The right atrium is moderately dilated.  There is moderate (2+) mitral regurgitation.  There is moderate (2+) tricuspid regurgitation.  The right ventricular systolic pressure is approximated at 50mmHg plus the  right atrial pressure.  IVC diameter >2.1 cm collapsing <50% with sniff suggests a high RA pressure  estimated at 15 mmHg or greater.  When compoared to previous study on 3-9-2020, pulmonary artery pressure is  mildly higher. There is no other significant change.    TTE 3/9/2020  Severely (EF 10-20%) reduced left ventricular function is present.  Right ventricular function, chamber size, wall motion, and thickness are  normal.  Moderate tricuspid insufficiency is present.  Right ventricular systolic pressure is 34mmHg above the right atrial pressure.  Previous study not available for comparison.  Contrast images show a septal crypt,but cannot rule out a small VSD.    CPX 9/22/21   -peak VO2 of 16.35 at RER 1.17, elevated VE/VCO2 slope of 44, attenuated HR and BP response, normal lung function on baseline spirometry. Overall, the patients VO2 increased from 2 previous studies (10.7 2/2020 and 14.5 9/2020) and exercise duration increased as well by 5-7 minutes.       Assessment/Plan:  Mr. Doss is a 75 year old male with history significant for Type 2 IDDM, 2v CAD s/p PCI to mLAD and pRCA, ICM with severe LV systolic dysfunction with EF about 20% s/p CRT-D, atrial fibrillation status post AV marianna ablation, and mild interstitial lung disease who was referred to us for consideration of left ventricular assist device as a destination therapy initially, but who we have been able to manage well with revascularization, aggressive diuresis, and GDMT optimization with improvement in symptoms, functional status, and objective measures based on CPX improvement in exercise capacity and peak VO2 in September.    Unfortunately today, his signs and symptoms  seems to have progressed.  His functional capacity has decreased to Class IIIb.  He was hypotensive today, warm on exam but appears volume up.  His labs are similarly concerning in that his renal function has worsened from 1.6 in Feb to 2.71 today with a K of 6.4, and NT proBNOP is higher today at 8900 from 6400 at our last visit in Nov.  Given his hypotension, RAUL, and his K, we recommended that he go straight to the ED.  He lives about 30 minutes away and was adamant that he was going to go home first so his wife can bring him and he won't have to worry about his car.  Pt is in paced rhythm on EKG.  We gave sign out to our ED colleagues.     His symptoms are concerning for reaching a point in his end stage heart failure that we again re discussed DT LVAD (age).  He said he thought about it and decided he did not want an LVAD from a quality of life and age standpoint, which certainly is reasonable.  We anticipate besides treatment of his potassium and RAUL, this admission might entail a trial of inotrope therapy.     Plan/Follow up:  -hypotensive, RAUL, hyperkalemia- patient to go to ED for treatment and admission, follow up pending on outcome.     I examined the patient and agree with the assessment and plan of Dr. La    Total time today was 45 minutes reviewing notes, imaging, labs, patient visit, orders and documentation         Tata Payton MD   [facility]  Heart Failure, Transplant, and Mechanical Circulatory Support Cardiology   Cardiovascular Division  [facility]   [phone]

## 2022-06-06 NOTE — PROGRESS NOTES
[facility]  Advanced [facility] Failure [facility]  DATE: 11/01/21    HPI:   Mr. Doss is a 75 year old male with medical history significant for Type 2 IDDM, two vessel CAD s/p PCI to mLAD and pRCA Feb 2020, ICM with severe LV systolic dysfunction with LVEF 10-20% s/p CRT-D, A fib s/p AVN ablation, mild ILD who presents for follow up visit.    He was initially referred to us for consideration of LVAD as DT in the setting of his advanced HF, seen by Dr. Payton in Feb 2020 at [facility] where he follows as well. First diagnosed with HF and found to have reduced LV function around 2010, thought to be secondary to silent MI/ICM. At that time he did not have any coronary interventions.  He had CRT-D placed Jan 2013.  He was doing well until 2018 when he developed worsening exertional SOB. C January 2019 showed pLAD and pRCA disease, he underwent PCI with 1 REGLA to mLAD and 2 REGLA to o/p RCA.  He felt better immediately after but noticed the SOB come back again. May 2019 MPI showed medium sized nontransmural infarction in the inferior and inferoseptal region with no evidence of reversible ischemia.  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  He also had a PET scan which showed the mediastinal and hilar LAD along with lower lobe infiltrates light up. Cardiac PET 10/2019 for sarcoid showed no suspicious FDG uptake within the heart. He also was found to have A fib with RVR underwent AVN ablation in Sept 2019, which he had reported previously did not help his sxs. In addition, he had a high res CT chest 1/2020 which showed mild ILD consistent with UIP and PFTs 3/2020 showed mild restrictive disease.     After his initial visit we initiated LVAD workup. Feb 2020, [facility] showed in-stent restenosis of the ostial RCA and  proximal LAD, underwent PCI to both.  RHC at that time showed RA pressure of 16, RV of 60/15, PA pressure of 59/28 with a mean of 35, PCWP 20, TD cardiac output of 3.4 with an index of 1.8, PA saturation of 56.3, and PVR of 4.4 STREET. He was aggressively diuresed and discharged home on high-dose of Bumex instead of Lasix.  His Coreg dose was decreased to 6.25 mg twice a day and his Entresto dose was increased to 49/51 mg twice a day. TTE 3/2020 revealed severely reduced LVEF 10 to 15%.  LVEDD 6.4 cm, RV normal size and function, moderate TR.  Since he felt better since then we have been optimizing his GDMT and been able to hold off on LVAD. Repeat RHC July 2020 showed RA 10, PA 45/23/30, WP 21, F CO/CI 2.8/1.5, TD CO/CI 3.0/1.6, PVR 2.9WU.  In the mean time he has been evaluated by pulmonology Dr. Encarnacion who felt his ILD is stable. He also had a dilated pancreatic duct that was evaluated by GI with repeat CT A/P which showed only mild dilation felt to be benign. He had a generator change 9/2020. Had CPX 10/2020, VO2 max 15.4 mL/kg/min.  He had a CPX 9/22/21 that showed a peak VO2 of 16.35 at RER 1.17, elevated VE/VCO2 slope of 44, attenuated HR and BP response, normal lung function on baseline spirometry. Overall, the patients VO2 increased from 2 previous studies (10.7 2/2020 and 14.5 9/2020) and exercise duration increased as well by 5-7 minutes.     LOV: Nov 2021-Dizziness, dry, cut down bumex to 2mg bid.      Since then:   He had a VF episode 11/7 treated with a shock but it was not a full power shock. He was seen by EP, ultimately he had his CRT-D generator explanted 2/11/22 due to suspected device malfunction (inappropriately low shock output during clinical VT), replaced and had new RV lead placed.     Today he reports he really feels he has slowed down in the past 6 months.  He used to be able to walk 1-2 blocks, now can't walk to the mail box without feeling SOB and fatigued.  No falls, syncope, or shocks.  No  worsening LE edema, orthopnea, PND.  Still occasional dizziness.   Patient denies fever, chills, night sweats,  rashes, lightheadedness, headaches, chest pain, palpitations, nausea, vomiting.  Is having occasional diarrhea.  His weight is 150lbs today, last visit in Nov it was 162. He agrees that he is losing weight.     No bleeding issues on Eliquis.  Bumex has been cut down further to 1mg bid.  He was restarted on KCl 20meq daily.      Device check 5/11/22- chronic AF, 4 NSVT episodes, BiVP 94.1%.       PAST MEDICAL HISTORY:  Past Medical History:  #1 insulin-dependent type 2 diabetes mellitus  #2 severe LV systolic dysfunction with an estimated left ventricular ejection fraction of 20 to 25% in January 2020  #3 two-vessel coronary artery disease in the LAD and RCA status post percutaneous coronary intervention.  His most recent nuclear stress test in May 2019 showed fixed defect in the inferior and inferoseptal region consistent with scar.  He had no reversible ischemia.  #4 atrial fibrillation status post AV marianna ablation.  #5 cardiac resynchronization therapy  #6 mild interstitial lung disease with a total lung capacity of 72%.     Past Surgical History:  #1 Bilateral knee replacement  #2 Right shoulder replacement  #3 Appendectomy    FAMILY HISTORY:  Family History   Problem Relation Age of Onset     Sudden Death Mother         unexpected death in her sleep in her 50s     SOCIAL HISTORY:  Social History     Socioeconomic History     Marital status:      Spouse name: Not on file     Number of children: Not on file     Years of education: Not on file     Highest education level: Not on file   Occupational History     Not on file   Tobacco Use     Smoking status: Never Smoker     Smokeless tobacco: Never Used     Tobacco comment: cigars few times a year only   Substance and Sexual Activity     Alcohol use: Yes     Comment: Alcoholic Drinks/day: occasional     Drug use: No     Sexual activity: Not on file    Other Topics Concern     Not on file   Social History Narrative     Not on file     CURRENT MEDICATIONS:  No current facility-administered medications on file prior to visit.  acetaminophen (TYLENOL) 500 MG tablet, Take 500-1,000 mg by mouth 2 times daily as needed   apixaban ANTICOAGULANT (ELIQUIS ANTICOAGULANT) 5 MG tablet, Take 1 tablet (5 mg) by mouth 2 times daily  atorvastatin (LIPITOR) 40 MG tablet, Take 40 mg by mouth daily  bumetanide (BUMEX) 1 MG tablet, Take 1 tablet (1 mg) by mouth 2 times daily  carvedilol (COREG) 6.25 MG tablet, TAKE 2 TABLETS BY MOUTH TWICE DAILY WITH MEALS  Coenzyme Q10 (COQ-10 PO), Take 20 mg by mouth daily   colchicine (COLCYRS) 0.6 MG tablet, TAKE 2 TABLETS BY MOUTH THEN 1 TABLET ONE HOUR LATER AS NEEDED FOR GOUT ATTACK AS DIRECTED  diphenhydrAMINE (BENADRYL) 25 MG tablet, Take 25 mg by mouth At Bedtime  ENTRESTO 49-51 MG per tablet, Take 1 tablet by mouth twice daily  febuxostat (ULORIC) 40 MG TABS tablet, Take 40 mg by mouth daily  ferrous sulfate (FEROSUL) 325 (65 Fe) MG tablet, Take 325 mg by mouth daily (with breakfast)   gabapentin (NEURONTIN) 100 MG capsule, Take 100 mg by mouth 3 times daily  glucosamine-chondroitin 500-400 MG CAPS per capsule, Take 1 tablet by mouth daily   hydrOXYzine (ATARAX) 10 MG tablet, Take 10-20 mg by mouth every 8 hours as needed  insulin glargine (LANTUS PEN) 100 UNIT/ML pen, Inject 20 Units Subcutaneous At Bedtime   melatonin 3 MG tablet, Take 1 mg by mouth nightly as needed for sleep  metFORMIN (GLUCOPHAGE) 1000 MG tablet, Take 1,000 mg by mouth 2 times daily (with meals)  Multiple Vitamin (MULTI VITAMIN DAILY PO), Take 1 tablet by mouth daily   Omega-3 Fatty Acids (FISH OIL) 1200 MG capsule, Take 1,200 mg by mouth daily   potassium chloride ER (K-TAB) 20 MEQ CR tablet, Take 20 mEq by mouth daily  spironolactone (ALDACTONE) 25 MG tablet, Take 1/2 (one-half) tablet by mouth once daily  ticagrelor (BRILINTA) 90 MG tablet, Take 1 tablet (90 mg)  by mouth 2 times daily Please schedule lab draw to F/U on creatine; additional refills after  traMADol (ULTRAM) 50 MG tablet, Take 1 tablet by mouth as needed for pain      ROS:  12 point ROS conducted and negative except as per HPI.     EXAM:  BP (!) 88/57 (BP Location: Right arm, Patient Position: Sitting, Cuff Size: Adult Regular)   Pulse 78   Wt 69.4 kg (153 lb)   SpO2 100%   BMI 22.59 kg/m      GENERAL: Appears comfortable, in no acute distress.   HEENT: Eye symmetrical, no discharge or icterus bilaterally. Mucous membranes moist and without lesions. No thrush.   CV: RRR, +S1S2, no murmur, rub, or gallop. JVP elevated 12cm.   RESPIRATORY: Respirations regular, even, and unlabored. Lungs CTA throughout.   GI: Soft and non distended with normoactive bowel sounds present in all quadrants. No tenderness, rebound, guarding.   EXTREMITIES: Trace peripheral edema. 2+ bilateral radial pulses. Warm.   NEUROLOGIC: Alert and oriented x 3. No focal deficits.   MUSCULOSKELETAL: No visible joint swelling or tenderness. His thighs seem skinny.   SKIN: No jaundice. No rashes or lesions.     Labs - reviewed with patient in clinic today:  CBC RESULTS:  Lab Results   Component Value Date    WBC 7.7 06/07/2022    WBC 10.7 07/30/2020    RBC 2.98 (L) 06/07/2022    RBC 3.74 (L) 07/30/2020    HGB 9.2 (L) 06/07/2022    HGB 12.3 (L) 07/30/2020    HCT 28.1 (L) 06/07/2022    HCT 37.0 (L) 07/30/2020    MCV 94 06/07/2022    MCV 99 07/30/2020    MCH 30.9 06/07/2022    MCH 32.9 07/30/2020    MCHC 32.7 06/07/2022    MCHC 33.2 07/30/2020    RDW 15.4 (H) 06/07/2022    RDW 13.1 07/30/2020     06/07/2022     07/30/2020     CMP RESULTS:  Lab Results   Component Value Date     06/07/2022     07/30/2020    POTASSIUM 4.1 06/07/2022    POTASSIUM 4.2 07/30/2020    CHLORIDE 104 06/07/2022    CHLORIDE 105 07/30/2020    CO2 22 06/07/2022    CO2 23 07/30/2020    ANIONGAP 9 06/07/2022    ANIONGAP 8 07/30/2020     (H)  06/07/2022     (H) 06/07/2022     (H) 07/30/2020    BUN 75 (H) 06/07/2022    BUN 40 (H) 07/30/2020    CR 2.40 (H) 06/07/2022    CR 1.28 (H) 07/30/2020    GFRESTIMATED 27 (L) 06/07/2022    GFRESTIMATED 34 (L) 06/07/2021    GFRESTIMATED 55 (L) 07/30/2020    GFRESTBLACK 41 (L) 06/07/2021    GFRESTBLACK 63 07/30/2020    LYNNE 8.4 (L) 06/07/2022    LYNNE 8.8 07/30/2020    BILITOTAL 1.2 06/06/2022    BILITOTAL 1.4 (H) 07/30/2020    ALBUMIN 3.7 06/06/2022    ALBUMIN 3.6 07/30/2020    ALKPHOS 113 06/06/2022    ALKPHOS 88 07/30/2020    ALT 25 06/06/2022    ALT 24 07/30/2020    AST 24 06/06/2022    AST 23 07/30/2020      INR RESULTS:  Lab Results   Component Value Date    INR 1.53 (H) 06/06/2022    INR 1.29 (H) 03/09/2020     LIPID RESULTS:  Lab Results   Component Value Date    CHOL 82 06/07/2022    CHOL 77 02/29/2020    HDL 32 (L) 06/07/2022    HDL 32 (L) 02/29/2020    LDL 28 06/07/2022    LDL 29 02/29/2020    TRIG 112 06/07/2022    TRIG 79 02/29/2020     Lab Results   Component Value Date    MAG 2.1 06/07/2022    MAG 1.8 03/01/2020     Lab Results   Component Value Date    A1C 7.3 (H) 06/06/2022    A1C 7.8 (H) 03/09/2020     Lab Results   Component Value Date    PHOS 3.9 12/22/2021    PHOS 3.8 03/09/2020     Lab Results   Component Value Date    NTBNP 8,906 (H) 06/06/2022    NTBNP 7,195 (H) 03/05/2020     Diagnostic Studies:    TTE 11/22/21  The left ventricle is severely dilated.  The visual ejection fraction is 15-20%.  There is severe global hypokinesia of the left ventricle.  Normal right ventricle size and systolic function.  There is a catheter/pacemaker lead seen in the right ventricle.  The left atrium is moderate to severely dilated.  The right atrium is moderately dilated.  There is moderate (2+) mitral regurgitation.  There is moderate (2+) tricuspid regurgitation.  The right ventricular systolic pressure is approximated at 50mmHg plus the  right atrial pressure.  IVC diameter >2.1 cm collapsing <50%  with sniff suggests a high RA pressure  estimated at 15 mmHg or greater.  When compoared to previous study on 3-9-2020, pulmonary artery pressure is  mildly higher. There is no other significant change.    TTE 3/9/2020  Severely (EF 10-20%) reduced left ventricular function is present.  Right ventricular function, chamber size, wall motion, and thickness are  normal.  Moderate tricuspid insufficiency is present.  Right ventricular systolic pressure is 34mmHg above the right atrial pressure.  Previous study not available for comparison.  Contrast images show a septal crypt,but cannot rule out a small VSD.    CPX 9/22/21   -peak VO2 of 16.35 at RER 1.17, elevated VE/VCO2 slope of 44, attenuated HR and BP response, normal lung function on baseline spirometry. Overall, the patients VO2 increased from 2 previous studies (10.7 2/2020 and 14.5 9/2020) and exercise duration increased as well by 5-7 minutes.       Assessment/Plan:  Mr. Doss is a 75 year old male with history significant for Type 2 IDDM, 2v CAD s/p PCI to mLAD and pRCA, ICM with severe LV systolic dysfunction with EF about 20% s/p CRT-D, atrial fibrillation status post AV marianna ablation, and mild interstitial lung disease who was referred to us for consideration of left ventricular assist device as a destination therapy initially, but who we have been able to manage well with revascularization, aggressive diuresis, and GDMT optimization with improvement in symptoms, functional status, and objective measures based on CPX improvement in exercise capacity and peak VO2 in September.    Unfortunately today, his signs and symptoms seems to have progressed.  His functional capacity has decreased to Class IIIb.  He was hypotensive today, warm on exam but appears volume up.  His labs are similarly concerning in that his renal function has worsened from 1.6 in Feb to 2.71 today with a K of 6.4, and NT proBNOP is higher today at 8900 from 6400 at our last visit in  Nov.  Given his hypotension, RAUL, and his K, we recommended that he go straight to the ED.  He lives about 30 minutes away and was adamant that he was going to go home first so his wife can bring him and he won't have to worry about his car.  Pt is in paced rhythm on EKG.  We gave sign out to our ED colleagues.     His symptoms are concerning for reaching a point in his end stage heart failure that we again re discussed DT LVAD (age).  He said he thought about it and decided he did not want an LVAD from a quality of life and age standpoint, which certainly is reasonable.  We anticipate besides treatment of his potassium and RAUL, this admission might entail a trial of inotrope therapy.     Plan/Follow up:  -hypotensive, RAUL, hyperkalemia- patient to go to ED for treatment and admission, follow up pending on outcome.     I examined the patient and agree with the assessment and plan of Dr. La    Total time today was 45 minutes reviewing notes, imaging, labs, patient visit, orders and documentation         Tata Payton MD   [facility]  Heart Failure, Transplant, and Mechanical Circulatory Support Cardiology   Cardiovascular Division  [facility]   [phone]

## 2022-06-06 NOTE — LETTER
6/6/2022    Lewis Reeves MD  [facility] [address] [place] [address]    RE: Ken Doss       Dear Colleague,     I had the pleasure of seeing Ken Doss in the [facility].  Pemafibrate to Reduce cardiovascular OutcoMes by reducing triglycerides IN patiENts with diabeTes (PROMINENT) clinical trial.    Subject seen in clinic today for End of study visit.      Subject seen by provider  for study related physical exam.  See provider note and flow sheets for vital signs.    There were no vitals filed for this visit.  Waist measures 97 cm      EQ-[id number] questionnaire completed if applicable.    Study efficacy, endpoints and adverse events reviewed with subject.  Cardiovascular risk discussed.     Study central labs and urine drawn per protocol.    Study alcohol consumption stipulations and education reviewed with subject:    Subject reports  [] Never [x] Current []   2 drinks per [] Day [] Week [x] Month []   Plan: This is the final study visit and there is no further research follow up required      Thank you for your participation in the Pemafibrate to Reduce cardiovascular OutcoMes by reducing triglycerides IN patiENts with diabeTes (PROMINENT) clinical trial.  It's been a pleasure getting to know you during the study and I appreciate all of your time and effort put in.  This is our final contact for the study but we can let you know of any future studies that might be of interest to you.  Don't hesitate to reach out with any questions or thoughts in the future as well.    Best regards,  Mirlande Vick RN   Clinical Research Coordinator      Thank you for allowing me to participate in the care of your patient.      Sincerely,     Mirlande Vick RN     [facility]  cc:   No referring provider defined for this encounter.

## 2022-06-07 NOTE — PROGRESS NOTES
Writer accidentally ordered a PTT for midnight, later found out that this test is not needed to start hep drip.   Heparin drip currently running at starting infusion rate of 850 units/hour.

## 2022-06-07 NOTE — H&P
[facility]   Cardiology II Service / Advanced Heart Failure  History & Physical    Ken Doss  : 1945  MRN # [id number]    ADMIT DATE: 2022    PCP: Lewis Reeves MD    Primary cardiologist: Dr. Payton    ASSESSMENT & PLAN:  Ken Doss is a 76 year old male with a PMH of ICM w/ HFrEF (20-25%), CAD s/p PCI to mid LAD and RCA in , PAF s/p AV node ablation, s/p ICD implantation in  c/b malfunction w/ new biventricular CRT-D ICD implantation on 22, anticoagulation on Eliquis, pulmonary fibrosis, DMII, dyslipidemia and hypertriglyceridemia (enrolled in PROMINENT clinical trial) presenting to the ED from Cardiology clinic for further evaluation of abnormal labs.     # Acute on chronic systolic heart failure 2/2 ischemic cardiomyopathy, NYHA Class III, stage C  # CAD s/p PCI pLAD, pRCA (2019, 2020) + h/o instent stenosis in the past  # s/p ICD implantation in  c/b malfunction w/ new biventricular CRT-D 22  Appears volume overload according to JVP of 14, crackles bilateral bases, with elevated BNP to 8k(higher than baseline). Denies ICD shocks. Ddx progression of his heart failure, unoptimized diuretic(given not able to keep up with the change).  - fluid status: hypervolemia, JVP 14 cm, weight 153 lbs   - hold pta bumex 1 mg PO BID   - Bumex 4 mg IV*1  - GDMT:   ACEI/ARB: Hold entresto fornow given RAUL and soft BP   BB: Continue carvedilol 12.5 mg BID   MRA: hold Spironolactone 12.5 mg qday given RAUL, hyperkalemia   SGLT-2: none   SCD ppx: bivent ICD 22  - continue brilinta 90 mg BID  - continue atorvastatin 40 mg PO qday   - nitroglycerin 0.4mg prn for chest pain  - daily standing weight, strict Is/Os  - TTE in the AM    Most recent ICD interrogation 22:  3 month Post ICD Device Check. Medtronic [place]  Patient seen in clinic for device evaluation and iterative programming. \X090A\: 94.4%  Effective: 94.1%  Mode: VVIR -  70  Underlying Rhythm: Chronic A-Fib w/rare R-wave @ VVI 30  Heart Rates: Histograms show 70 to 180 BPM, Primarily 70 to 90 BPM  Sensing: RV- 0.45 mV  Pacing Threshold: RV- 2.0 V @ 0.4 ms, LV- 1.25 V @ 0.4 ms  Impedance: RV- 456 ohms, LV- 494 ohms, Shock- 49 ohms  Battery Status: 9.6 years, 3.10 V, Last capacitor reform: 5/11/2022   Incision/Complications: Left Chest- CDI  Atrial Arrhythmia: Chronic A-Fib Anticoagulation: Eliquis  Ventricular Arrhythmia: Available EGMs show 4 episodes of NSVT events with rates from 167 to 188 BPM and the longest duration lasting 9 secs. Patient unaware/asymptomatic.   ATP: None  Shocks: None  Setting Change: RV amplitude changed from 2.0 V to 1.5 V with safety margin of 1.5 x  changed to 2.0 x and the minimum adapted amplitude changed from 2.0 V to 1.5 V. Weekly ATP delivery and shock Alerts via [employer] turned ON.      # RAUL on CKD IIIb  Baseline Creatinine 1.6-1.9. Current creatinine is up to 2.71 with elevated BUN 76 and hyperkalemia 6.4 in the setting of volume overload. Denies NSAIDs use. Likely 2/2 cardiorenal.  - continue to diureses as above  - monitor BMP, Mg q12h  - avoid nephrotoxic meds     # pAF s/p AV node ablation  CHADSVASC=5.On Eliquis 5 mg BID. Rate 73 on admission in V-paced rhythm.   - continue carvedilol 12.5 mg BID   - hold eliquis in the setting of RAUL  - continue low intensity heparin gtt     Chronic Issues  DM2    Hgb a1c 7.3.  - holding antidiabetic meds   - glucose check premeals, HS or q4h when NPO  - insulin sliding scale- low intensity  - hypoglycemia protocol     Hyperlipidemia  Hypertriglyceridemia  - continue atorvastatin 40 mg qday    Floor Care  Fluids: 2L fluid restriction  Food: 2 gram sodium diet  DVT ppx: already anticoagulated  Catheters: none  Lines: PIV  Code Status: full  Discharge plan: inpatient admission, anticipate discharge to prior living situation in 2-3 days    To be staffed in the AM with the cardiology team.    Geraldine  [facility]  Internal Medicine, pgy-2  Pager: [phone]  -----------------------------------------------------------------------------------------------------------------------------------------------------------------------------  CHIEF COMPLAINT: abnormal labs    HPI:   Ken Doss is a 76 year old male with a PMH of ICM w/ HFrEF (20-25%), CAD s/p PCI to mid LAD and RCA in 2019, PAF s/p AV node ablation, s/p ICD implantation in 2013 c/b malfunction w/ new biventricular CRT-D ICD implantation on 2/11/22, anticoagulation on Eliquis, pulmonary fibrosis, DMII, dyslipidemia and hypertriglyceridemia (enrolled in PROMINENT clinical trial) presenting to the ED from Cardiology clinic for further evaluation of abnormal labs.     Has been having worsening functional status in the past 3-4 months with fatigue and shortness of breath on exertion. Also reports having chest pressure on exertion as well. He was able to walk 1 miles without difficulty but now he requires several breaks when walking to the mailbox. Also reports that his blood pressure has been down by 10 points in the past 2 wks(usually 90/60-50 mmHg). Today in the clinic BP was 80s/50s. Reports having lightheadedness when he gets up too fast. Denies any ICD shocks. Denies legs swelling, abdominal fullness, N/V, orthopnea, or PND. Appetite has decreased in the past 2-3 years and has been gradually loosing weight. Also has increase in dry cough but occasionally wet.  Had COVID in February which has caused a lot of breathing difficulties. He got monoclonal antibodies with improvement. He has had an increase in loose, watery stools in the past several months, 2-3 bouts/day; denies abdominal pain, fever/chills, recent abx use. This has especially worsened recently. He takes medications regularly, however, he has pill boxes and unclear whether he's been taking the up to date medications as the dosage changed often.    Patient was seen by Dr. Payton  today for routine f/u where labs revealed elevated potassium to 6.4 and creatinine at 2.71(Baseline 1.61 02/2022). Patient advised to be seen in the ED.    ED course  Hemodynamically stable. On RA satting 98%.   EKG V-paced, rate 70/min without ischemic changes. No significant changes from prior EKG.   Found to have RAUL with creatinine 2.65 BUN 82. Trop negative at 15.  Admit to cardiology.    ROS:   12-pt ROS otherwise negative except as noted above    PMH:  Past Medical History:   Diagnosis Date     Acute renal failure (H)      Anemia      Arthritis     osteoarthritis     Arthritis     osteoarthritis     Arthritis     osteoarthritis     CHF (congestive heart failure) (H)      CHF (congestive heart failure) (H)      CHF (congestive heart failure) (H)      Chronic systolic heart failure (H) Dec 2012     Chronic systolic heart failure (H) Dec 2012     Chronic systolic heart failure (H) Dec 2012     Coronary artery disease involving native coronary artery     Non obstructive disease by cath in 2007 Cor angio Nov 2016: RCA 70% (FFR 0.89), and OM1 75%        Coronary artery disease involving native coronary artery     Non obstructive disease by cath in 2007 Cor angio Nov 2016: RCA 70% (FFR 0.89), and OM1 75%        Coronary artery disease involving native coronary artery     Non obstructive disease by cath in 2007 Cor angio Nov 2016: RCA 70% (FFR 0.89), and OM1 75%        Depressive disorder, not elsewhere classified 10/24/2014     Diabetes mellitus, type 2 (H)      Diabetes mellitus, type II (H) 28/Jan/2013     Diabetes mellitus, type II (H) 28/Jan/2013     Diabetes mellitus, type II (H) 28/Jan/2013     Diabetic neuropathy (H)      Diabetic neuropathy (H)      Diabetic neuropathy (H)      Fractures     ankle, leg and arm     High cholesterol 2007     Hypertension      Hypertension      Hypertension      ICD (implantable cardioverter-defibrillator) lead failure 11/26/2014    High voltage lead tip inserted in RV free  wall RV lead extraction and implantation of the new septal RV lead November 2014      ICD (implantable cardioverter-defibrillator) lead failure 11/26/2014    High voltage lead tip inserted in RV free wall RV lead extraction and implantation of the new septal RV lead November 2014      ILD (interstitial lung disease) (H)      Infectious mononucleosis      Ischemic cardiomyopathy 4/22/2015    Chronic: LVEF 25- 30% LVEF 26% echo May 2017     Ischemic cardiomyopathy 4/22/2015    Chronic: LVEF 25- 30% LVEF 26% echo May 2017     Ischemic cardiomyopathy 4/22/2015    Chronic: LVEF 25- 30% LVEF 26% echo May 2017     Kidney stone      LBBB (left bundle branch block)     noted on Dec 19, 2012     LBBB (left bundle branch block)     noted on Dec 19, 2012     LBBB (left bundle branch block)     noted on Dec 19, 2012     Lymphadenopathy, mediastinal      Malfunction of implantable defibrillator ventricular (ICD) lead 11/9/2021    RV lead malfunction c/w insulation breech. Lead extraction and replacement recommended     Myocardial infarction (H)      Myocardial infarction (H)      Myocardial infarction (H)      Osteoarthritis      Osteoarthritis      Osteoarthritis      Paroxysmal ventricular tachycardia (H) 2/11/2022     Pulmonary anthracosis (H)      Pulmonary anthracosis (H)      Recurrent kidney stones        PSH:  Past Surgical History:   Procedure Laterality Date     APPENDECTOMY       ARTHROSCOPY KNEE Bilateral      ARTHROSCOPY SHOULDER ROTATOR CUFF REPAIR      right     C SHLDR ARTHROSCOP,DIAGNOSTIC      Description: Arthroscopy Shoulder;  Recorded: 06/10/2014;     CARDIAC CATHETERIZATION N/A 12/12/2016    Procedure: Coronary Angiogram;  Surgeon: Delgado Ramirez MD;  Location: [facility] Cath Lab;  Service:      COLONOSCOPY N/A 12/19/2019    Procedure: COLONOSCOPY with polypectomy;  Surgeon: Delgado Price MD;  Location: [facility];  Service: Gastroenterology     CORONARY ANGIOGRAPHY ADULT ORDER       CV  CORONARY ANGIOGRAM N/A 2/27/2020    Procedure: CV CORONARY ANGIOGRAM;  Surgeon: Pedro Fontaine MD;  Location: [facility] CARDIAC CATH LAB     CV CORONARY ANGIOGRAM N/A 1/15/2019    Procedure: Coronary Angiogram;  Surgeon: Mason Correa MD;  Location: [facility] Cath Lab;  Service: Cardiology     CV INTRAVASULAR ULTRASOUND N/A 2/27/2020    Procedure: Intravascular Ultrasound;  Surgeon: Pedro Fontaine MD;  Location: [facility] CARDIAC CATH LAB     CV LEFT HEART CATHETERIZATION WITHOUT LEFT VENTRICULOGRAM Left 1/15/2019    Procedure: Left Heart Catheterization Without Left Ventriculogram;  Surgeon: Mason Correa MD;  Location: [facility] Cath Lab;  Service: Cardiology     CV PCI ANGIOPLASTY N/A 2/27/2020    Procedure: Percutaneous Coronary Intervention Angioplasty;  Surgeon: Pedro Fontaine MD;  Location: [facility] CARDIAC CATH LAB     CV RIGHT HEART CATH MEASUREMENTS RECORDED N/A 2/27/2020    Procedure: Right Heart Cath;  Surgeon: Pedro Fontaine MD;  Location: [facility] CARDIAC CATH LAB     CV RIGHT HEART CATH MEASUREMENTS RECORDED N/A 7/30/2020    Procedure: CV RIGHT HEART CATH;  Surgeon: Ronny Geronimo MD;  Location: [facility] CARDIAC CATH LAB     EP ABLATION AV NODE N/A 9/27/2019    Procedure: EP Ablation AV Node;  Surgeon: Markus Pool MD;  Location: [facility];  Service: Cardiology     EP DFT TESTING FOLLOW UP N/A 2/11/2022    Procedure: EP DFT Test/Follow-up;  Surgeon: Markus Pool MD;  Location: [facility] CV     EP ICD GENERATOR REPLACEMENT BIVENT Left 2/11/2022    Procedure: EP Implantable Cardio-Defibrillator Generator Change Biventricular;  Surgeon: Markus Pool MD;  Location: [facility] CV     EP ICD INSERT      ICD REIMPLANTATION OF A NEW RV LEAD 11/26/2014     EP REVISION PACEMAKER DUAL LEAD N/A 2/11/2022    Procedure: EP Recision Pacemaker Dual Lead;  Surgeon: Markus Pool MD;  Location:  [facility] LAB CV     EP VENOGRAM N/A 11/12/2021    Procedure: EP Venogram;  Surgeon: Markus Pool MD;  Location: [facility] CV     HC REMOVE TONSILS/ADENOIDS,<13 Y/O      Description: Tonsillectomy With Adenoidectomy;  Recorded: 06/10/2014;     INSERT / REPLACE / REMOVE PACEMAKER       IR UPPER EXTREMITY VENOGRAM LEFT  1/13/2022     JOINT REPLACEMENT      bilateral TKA     OR LASER LEAD EXTRACTION - LEVEL 3 N/A 12/29/2021    Procedure: VIDEO ASSISTED RIGHT THORACOSCOPY;  Surgeon: Brandi Anthony MD;  Location: [facility] CV     OTHER SURGICAL HISTORY  11/26/2014    BIV ICDbiotronic     IN L HRT CATH W/NJX L VENTRICULOGRAPHY IMG S&I Left 12/12/2016    Procedure: Left Heart Catheterization with Left Ventriculogram;  Surgeon: Delgado Ramirez MD;  Location: [facility] Cath Lab;  Service: Cardiology     REPLACEMENT TOTAL KNEE      bilat     TONSILLECTOMY & ADENOIDECTOMY       US LYMPH NODE BIOPSY  7/10/2019       MEDICATIONS:  Prior to Admission Medications   Prescriptions Last Dose Informant Patient Reported? Taking?   Coenzyme Q10 (COQ-10 PO)   Yes No   Sig: Take 20 mg by mouth daily    ENTRESTO 49-51 MG per tablet   No No   Sig: Take 1 tablet by mouth twice daily   Multiple Vitamin (MULTI VITAMIN DAILY PO)   Yes No   Sig: Take 1 tablet by mouth daily    Omega-3 Fatty Acids (FISH OIL) 1200 MG capsule   Yes No   Sig: Take 1,200 mg by mouth daily    acetaminophen (TYLENOL) 500 MG tablet   Yes No   Sig: Take 500-1,000 mg by mouth 2 times daily as needed    apixaban ANTICOAGULANT (ELIQUIS ANTICOAGULANT) 5 MG tablet   No No   Sig: Take 1 tablet (5 mg) by mouth 2 times daily   atorvastatin (LIPITOR) 40 MG tablet   Yes No   Sig: Take 40 mg by mouth daily   bumetanide (BUMEX) 1 MG tablet   No No   Sig: Take 1 tablet (1 mg) by mouth 2 times daily   carvedilol (COREG) 6.25 MG tablet   No No   Sig: TAKE 2 TABLETS BY MOUTH TWICE DAILY WITH MEALS   colchicine (COLCYRS) 0.6 MG tablet   Yes No   Sig: TAKE 2  TABLETS BY MOUTH THEN 1 TABLET ONE HOUR LATER AS NEEDED FOR GOUT ATTACK AS DIRECTED   diphenhydrAMINE (BENADRYL) 25 MG tablet   Yes No   Sig: Take 25 mg by mouth At Bedtime   febuxostat (ULORIC) 40 MG TABS tablet   Yes No   Sig: Take 40 mg by mouth daily   ferrous sulfate (FEROSUL) 325 (65 Fe) MG tablet   Yes No   Sig: Take 325 mg by mouth daily (with breakfast)    gabapentin (NEURONTIN) 100 MG capsule   Yes No   Sig: Take 100 mg by mouth 3 times daily   glucosamine-chondroitin 500-400 MG CAPS per capsule   Yes No   Sig: Take 1 tablet by mouth daily    hydrOXYzine (ATARAX) 10 MG tablet   Yes No   Sig: Take 10-20 mg by mouth every 8 hours as needed   insulin glargine (LANTUS PEN) 100 UNIT/ML pen   Yes No   Sig: Inject 20 Units Subcutaneous At Bedtime    melatonin 3 MG tablet   Yes No   Sig: Take 1 mg by mouth nightly as needed for sleep   metFORMIN (GLUCOPHAGE) 1000 MG tablet   Yes No   Sig: Take 1,000 mg by mouth 2 times daily (with meals)   potassium chloride ER (K-TAB) 20 MEQ CR tablet   Yes No   Sig: Take 20 mEq by mouth daily   spironolactone (ALDACTONE) 25 MG tablet   No No   Sig: Take 1/2 (one-half) tablet by mouth once daily   ticagrelor (BRILINTA) 90 MG tablet   No No   Sig: Take 1 tablet (90 mg) by mouth 2 times daily Please schedule lab draw to F/U on creatine; additional refills after   traMADol (ULTRAM) 50 MG tablet   Yes No   Sig: Take 1 tablet by mouth as needed for pain      Facility-Administered Medications: None        ALLERGIES:   No Known Allergies    FAMILY HISTORY:  Family History   Problem Relation Age of Onset     Sudden Death Mother         unexpected death in her sleep in her 50s       SOCIAL HISTORY:  Social History     Socioeconomic History     Marital status:      Spouse name: Not on file     Number of children: Not on file     Years of education: Not on file     Highest education level: Not on file   Occupational History     Not on file   Tobacco Use     Smoking status: Never  Smoker     Smokeless tobacco: Never Used     Tobacco comment: cigars few times a year only   Substance and Sexual Activity     Alcohol use: Yes     Comment: Alcoholic Drinks/day: occasional     Drug use: No     Sexual activity: Not on file   Other Topics Concern     Parent/sibling w/ CABG, MI or angioplasty before 65F 55M? Not Asked   Social History Narrative     Not on file     Social Determinants of Health     Financial Resource Strain: Not on file   Food Insecurity: Not on file   Transportation Needs: Not on file   Physical Activity: Not on file   Stress: Not on file   Social Connections: Not on file   Intimate Partner Violence: Not on file   Housing Stability: Not on file       PHYSICAL EXAM:  Blood pressure 99/67, pulse 81, temperature 98.1  F (36.7  C), temperature source Oral, resp. rate 22, SpO2 98 %.  GENERAL: NAD, on RA  HEENT: EOMI, PERRLA  NECK: Supple and without lymphadenopathy. JVP 14 cm  CV: S1/S2 heard without murmur, regular heart beat   RESPIRATORY: crackles bilateral bases  GI: Soft and non distended with normoactive bowel sounds present in all quadrants. No tenderness, rebound, guarding  EXTREMITIES: No peripheral edema. 2+ bilateral pedal pulses.   NEUROLOGIC: OAx 3. CN II-XII grossly intact. No focal deficits.   MUSCULOSKELETAL: No joint swelling or tenderness.   SKIN: No jaundice. No acute rashes or lesions.     LABS:  BMP  Recent Labs   Lab 06/06/22  1849 06/06/22 1838 06/06/22  1341   NA  --  134 136   POTASSIUM  --  5.4* 6.4*   CHLORIDE  --  105 104   CO2  --  22 19*   BUN  --  82* 76*   CR  --  2.65* 2.71*   GLC 81 78 173*   LYNNE  --  9.0 8.3*     CBC  Recent Labs   Lab 06/06/22 1838   WBC 8.9   HGB 10.4*   HCT 31.9*   MCV 96      LYMPH 11     INR  Recent Labs   Lab 06/06/22  1838   INR 1.53*     LFTs  Recent Labs   Lab 06/06/22 1838   ALKPHOS 113   AST 24   ALT 25   BILITOTAL 1.2   PROTTOTAL 8.3   ALBUMIN 3.7      INFNo lab results found in last 7 days.    IMAGING:    Results  for orders placed or performed in visit on 05/11/22   Cardiac Device Check - In Clinic     Value    Date Time Interrogation Session [id number]    Implantable Pulse Generator  Medtronic    Implantable Pulse Generator Model [id number] Cobalt XT HF CRT-D MRI    Implantable Pulse Generator Serial Number [id number]    Type Interrogation Session In Clinic    Clinic Name [facility]    Implantable Pulse Generator Type Cardiac Resynchronization Therapy - Defibrillator    Implantable Pulse Generator Implant Date 20220211    Implantable Lead  Medtronic    Implantable Lead Model 6935M Sprint Quattro Secure S MRI SureScan    Implantable Lead Serial Number [id number]    Implantable Lead Implant Date 20220211    Implantable Lead Polarity Type Tripolar Lead    Implantable Lead Location Detail 1 UNKNOWN    Implantable Lead Special Function 62 cm    Implantable Lead Location Right Ventricle    Implantable Lead  Medtronic    Implantable Lead Model 5076 CapSureFix Novus    Implantable Lead Serial Number [id number]    Implantable Lead Implant Date 20130123    Implantable Lead Polarity Type Bipolar Lead    Implantable Lead Location Detail 1 RIGHT ATRIUM    Implantable Lead Location Right Atrium    Implantable Lead  [facility]    Implantable Lead Model 1258T QuickFlex Î     Implantable Lead Serial Number [id number]    Implantable Lead Implant Date 20130123    Implantable Lead Polarity Type Bipolar Lead    Implantable Lead Location Detail 1 Cardiac Vein (Left)    Implantable Lead Location Left Ventricle    Adis Setting Mode (NBG Code) VVIR    Adis Setting Lower Rate Limit 70    Adis Setting Maximum Sensor Rate 120    Lead Channel Setting Sensing Polarity Bipolar    Lead Channel Setting Sensing Anode Location Right Atrium    Lead Channel Setting Sensing Anode Terminal Ring    Lead Channel Setting Sensing Cathode Location Right Atrium    Lead Channel Setting Sensing  Cathode Terminal Tip    Lead Channel Setting Sensing Sensitivity Off    Lead Channel Setting Sensing Polarity Bipolar    Lead Channel Setting Sensing Anode Location Right Ventricle    Lead Channel Setting Sensing Anode Terminal Ring    Lead Channel Setting Sensing Cathode Location Right Ventricle    Lead Channel Setting Sensing Cathode Terminal Tip    Lead Channel Setting Sensing Sensitivity 0.45    Ventricular chambers paced during CRT pacing. BiV    CRT LV-RV Delay 0    Lead Channel Setting Pacing Polarity Bipolar    Lead Channel Setting Pacing Anode Location Right Ventricle    Lead Channel Setting Pacing Anode Terminal Ring    Lead Channel Setting Sensing Cathode Location Right Ventricle    Lead Channel Setting Sensing Cathode Terminal Tip    Lead Channel Setting Pacing Pulse Width 0.4    Lead Channel Setting Pacing Amplitude 1.5 A    Lead Channel Setting Pacing Capture Mode Adaptive    Lead Channel Setting Pacing Polarity Bipolar    Lead Channel Setting Pacing Anode Location Right Ventricle    Lead Channel Setting Pacing Anode Terminal Coil    Lead Channel Setting Sensing Cathode Location Left Ventricle    Lead Channel Setting Sensing Cathode Terminal Tip    Lead Channel Setting Pacing Pulse Width 0.4    Lead Channel Setting Pacing Amplitude 1.25 A    Lead Channel Setting Pacing Capture Mode Adaptive    Zone Setting Type Category VF    Zone Setting Detection Interval 320    Zone Setting Detection Beats Numerator 30    Zone Setting Detection Beats Denominator 40    Zone Setting Type Category VT    Zone Setting Detection Interval 240    Zone Setting Type Category VT    Zone Setting Detection Interval 370    Zone Setting Type Category VT    Zone Setting Detection Interval 450    Lead Channel Impedance Value 361    Lead Channel Impedance Value 285    Lead Channel Impedance Value 285    Lead Channel Impedance Value 418    Lead Channel Impedance Value 418    Lead Channel Impedance Value 456    Lead Channel Sensing  Intrinsic Amplitude 3.3    Lead Channel Sensing Intrinsic Amplitude 3.3    Lead Channel Pacing Threshold Amplitude 0.625    Lead Channel Pacing Threshold Pulse Width 0.4    Lead Channel Pacing Threshold Amplitude 0.75    Lead Channel Pacing Threshold Pulse Width 0.4    Lead Channel Impedance Value 475    Lead Channel Impedance Value 475    Lead Channel Impedance Value 3,000    Lead Channel Impedance Value 3,000    Lead Channel Impedance Value 3,000    Lead Channel Impedance Value 3,000    Lead Channel Impedance Value 494    Lead Channel Pacing Threshold Amplitude 0.625    Lead Channel Pacing Threshold Pulse Width 0.4    Lead Channel Pacing Threshold Amplitude 0.75    Lead Channel Pacing Threshold Pulse Width 0.4    Battery Date Time of Measurements [id number]    Battery Status Beginning of Service    Battery RRT Trigger 2.8 V    Battery Remaining Longevity 122    Battery Voltage 3.10    Battery Impedance 49    Capacitor Charge Type Shock    Capacitor Last Charge Date Time 20220418222924    Capacitor Charge Time 4.0    Capacitor Charge Energy 18.0    Capacitor Charge Type Reformation    Capacitor Last Charge Date Time 20220511091154    Capacitor Charge Type FULL_ENERGY    Adis Statistic Date Time Start 20220221093956    Adis Statistic Date Time End 20220511091154    Adis Statistic RA Percent Paced Invalid Value    Adis Statistic RV Percent Paced 94.41    CRT Statistic LV Percent Paced 94.39    Adis Statistic AP  Percent 0    Adis Statistic AS  Percent 94.41    Adis Statistic AP VS Percent 0    Adis Statistic AS VS Percent 5.59    CRT Statistic Date Time Start 20220221093956    CRT Statistic Date Time End 20220511091154    CRT Statistic CRT Percent Paced 94.38    Atrial Tachy Statistic Date Time Start 20220221093956    Atrial Tachy Statistic Date Time End 20220511091154    Atrial Tachy Statistic AT/AF [place] Percent 0    Atrial Tachy Statistic Number Of Mode Switches 0    Therapy Statistic Recent  Shocks Delivered 0    Therapy Statistic Recent Shocks Aborted 0    Therapy Statistic Recent ATP Delivered 0    Therapy Statistic Recent Date Time Start [id number]    Therapy Statistic Recent Date Time End 92929700281213    Therapy Statistic Total Shocks Delivered 0    Therapy Statistic Total Shocks Aborted 0    Therapy Statistic Total ATP Delivered 0    Therapy Statistic Total  Date Time Start 84246822095385    Therapy Statistic Total  Date Time End 97920614199915    Episode Statistic Recent Count 0    Episode Statistic Type Category AT/AF    Episode Statistic Recent Count 0    Episode Statistic Type Category Monitor    Episode Statistic Recent Count 0    Episode Statistic Type Category Patient Activated    Episode Statistic Recent Count 0    Episode Statistic Type Category SVT    Episode Statistic Recent Count 14    Episode Statistic Type Category VT    Episode Statistic Recent Count 0    Episode Statistic Type Category VF    Episode Statistic Recent Count 0    Episode Statistic Type Category VT    Episode Statistic Recent Count 0    Episode Statistic Type Category VT    Episode Statistic Recent Count 0    Episode Statistic Type Category VT    Episode Statistic Recent Date Time Start [id number]    Episode Statistic Recent Date Time End [id number]    Episode Statistic Recent Date Time Start 59380811889116    Episode Statistic Recent Date Time End 65866622662420    Episode Statistic Recent Date Time Start 83567080662684    Episode Statistic Recent Date Time End 18227939363433    Episode Statistic Recent Date Time Start 37654849472859    Episode Statistic Recent Date Time End 29478091590147    Episode Statistic Recent Date Time Start 11502148823263    Episode Statistic Recent Date Time End 68822811355304    Episode Statistic Recent Date Time Start 20220221093956    Episode Statistic Recent Date Time End 79234271053112    Episode Statistic Recent Date Time Start 07513813292964    Episode Statistic Recent  Date Time End 05123623222302    Episode Statistic Recent Date Time Start 84382399859793    Episode Statistic Recent Date Time End 78968818548968    Episode Statistic Recent Date Time Start 95245063975714    Episode Statistic Recent Date Time End 44860465847775    Episode Statistic Total Count 0    Episode Statistic Type Category AT/AF    Episode Statistic Total Count 0    Episode Statistic Type Category Monitor    Episode Statistic Total Count 0    Episode Statistic Type Category Patient Activated    Episode Statistic Total Count 0    Episode Statistic Type Category SVT    Episode Statistic Total Count 16    Episode Statistic Type Category VT    Episode Statistic Total Count 0    Episode Statistic Type Category VF    Episode Statistic Total Count 0    Episode Statistic Type Category VT    Episode Statistic Total Count 0    Episode Statistic Type Category VT    Episode Statistic Total Count 0    Episode Statistic Type Category VT    Episode Statistic Total Date Time Start 00860092057182    Episode Statistic Total Date Time End 95456660853754    Episode Statistic Total Date Time Start 11803834125216    Episode Statistic Total Date Time End 75945589331748    Episode Statistic Total Date Time Start 84390737808086    Episode Statistic Total Date Time End 12824728134571    Episode Statistic Total Date Time Start 27948260305701    Episode Statistic Total Date Time End 95852907553192    Episode Statistic Total Date Time Start 26764429055470    Episode Statistic Total Date Time End 74165351575717    Episode Statistic Total Date Time Start 04407811092997    Episode Statistic Total Date Time End 09824983982960    Episode Statistic Total Date Time Start 86963541283006    Episode Statistic Total Date Time End 87430205150750    Episode Statistic Total Date Time Start 73060542416151    Episode Statistic Total Date Time End 78082692910768    Episode Statistic Total Date Time Start 92798050154172    Episode Statistic Total Date Time  End 36086233363927    Episode Identifier 16    Episode Type Category VT    Episode Date Time [id number]    Episode Duration 2    Episode Identifier 15    Episode Type Category VT    Episode Date Time 68958865146953    Episode Duration 3    Episode Identifier 14    Episode Type Category VT    Episode Date Time 75792231888641    Episode Duration 3    Episode Identifier 13    Episode Type Category VT    Episode Date Time 38644410144386    Episode Duration 1    Episode Identifier 12    Episode Type Category VT    Episode Date Time 05673139406746    Episode Duration 1    Episode Identifier 11    Episode Type Category VT    Episode Date Time [id number]    Episode Duration 1    Narrative    3 month Post ICD Device Check. [employer] [place]  Patient seen in clinic for device evaluation and iterative programming.   \X090A\: 94.4%  Effective: 94.1%  Mode: VVIR - 70  Underlying Rhythm: Chronic A-Fib w/rare R-wave @ VVI 30  Heart Rates: Histograms show 70 to 180 BPM, Primarily 70 to 90 BPM  Sensing: RV- 0.45 mV  Pacing Threshold: RV- 2.0 V @ 0.4 ms, LV- 1.25 V @ 0.4 ms  Impedance: RV- 456 ohms, LV- 494 ohms, Shock- 49 ohms  Battery Status: 9.6 years, 3.10 V, Last capacitor reform: 5/11/2022   Incision/Complications: Left Chest- CDI  Atrial Arrhythmia: Chronic A-Fib Anticoagulation: Eliquis  Ventricular Arrhythmia: Available EGMs show 4 episodes of NSVT events with   rates from 167 to 188 BPM and the longest duration lasting 9 secs. Patient   unaware/asymptomatic.   ATP: None  Shocks: None  Setting Change: RV amplitude changed from 2.0 V to 1.5 V with safety   margin of 1.5 x  changed to 2.0 x and the minimum adapted amplitude   changed from 2.0 V to 1.5 V. Weekly ATP delivery and shock Alerts via Care   Link turned ON.      Care Plan: Remote Scheduled for 8/23/2022. Patient given remote   appointment reminder.     Truong Jeter RN/Stephany Levy RN    I have reviewed and interpreted the device interrogation, settings,    programming and nurse's summary. The device is functioning within normal   device parameters. I agree with the current findings, assessment and plan.

## 2022-06-07 NOTE — PHARMACY-ADMISSION MEDICATION HISTORY
Admission Medication History Completed by Pharmacy    See [facility] Admission Navigator for allergy information, preferred outpatient pharmacy, prior to admission medications and immunization status.     Medication History Sources:     Careverywhere    Surescripts    Patient's own med list    Changes made to PTA medication list (reason):    Added: None    Deleted: None    Changed: None    Additional Information:    None    Prior to Admission medications    Medication Sig Last Dose Taking? Auth Provider   acetaminophen (TYLENOL) 500 MG tablet Take 500-1,000 mg by mouth 2 times daily as needed    Reported, Patient   apixaban ANTICOAGULANT (ELIQUIS ANTICOAGULANT) 5 MG tablet Take 1 tablet (5 mg) by mouth 2 times daily   Tata Payton MD   atorvastatin (LIPITOR) 40 MG tablet Take 40 mg by mouth daily   Reported, Patient   bumetanide (BUMEX) 1 MG tablet Take 1 tablet (1 mg) by mouth 2 times daily   Tata Payton MD   carvedilol (COREG) 6.25 MG tablet TAKE 2 TABLETS BY MOUTH TWICE DAILY WITH MEALS   Linda Marmolejo NP   Coenzyme Q10 (COQ-10 PO) Take 20 mg by mouth daily    Reported, Patient   colchicine (COLCYRS) 0.6 MG tablet TAKE 2 TABLETS BY MOUTH THEN 1 TABLET ONE HOUR LATER AS NEEDED FOR GOUT ATTACK AS DIRECTED   Reported, Patient   diphenhydrAMINE (BENADRYL) 25 MG tablet Take 25 mg by mouth At Bedtime   Reported, Patient   ENTRESTO 49-51 MG per tablet Take 1 tablet by mouth twice daily   Linda Marmolejo NP   febuxostat (ULORIC) 40 MG TABS tablet Take 40 mg by mouth daily   Reported, Patient   ferrous sulfate (FEROSUL) 325 (65 Fe) MG tablet Take 325 mg by mouth daily (with breakfast)    Reported, Patient   gabapentin (NEURONTIN) 100 MG capsule Take 100 mg by mouth 3 times daily   Reported, Patient   glucosamine-chondroitin 500-400 MG CAPS per capsule Take 1 tablet by mouth daily    Reported, Patient   hydrOXYzine (ATARAX) 10 MG tablet Take 10-20 mg by mouth every 8 hours as needed   Reported, Patient    insulin glargine (LANTUS PEN) 100 UNIT/ML pen Inject 20 Units Subcutaneous At Bedtime    Reported, Patient   melatonin 3 MG tablet Take 1 mg by mouth nightly as needed for sleep   Reported, Patient   metFORMIN (GLUCOPHAGE) 1000 MG tablet Take 1,000 mg by mouth 2 times daily (with meals)   Reported, Patient   Multiple Vitamin (MULTI VITAMIN DAILY PO) Take 1 tablet by mouth daily    Reported, Patient   Omega-3 Fatty Acids (FISH OIL) 1200 MG capsule Take 1,200 mg by mouth daily    Reported, Patient   potassium chloride ER (K-TAB) 20 MEQ CR tablet Take 20 mEq by mouth daily   Reported, Patient   spironolactone (ALDACTONE) 25 MG tablet Take 1/2 (one-half) tablet by mouth once daily   Tata Payton MD   ticagrelor (BRILINTA) 90 MG tablet Take 1 tablet (90 mg) by mouth 2 times daily Please schedule lab draw to F/U on creatine; additional refills after   Tata Payton MD   traMADol (ULTRAM) 50 MG tablet Take 1 tablet by mouth as needed for pain   Reported, Patient       Date completed: 06/07/22    Medication history completed by: Garo Carrillo [facility]

## 2022-06-07 NOTE — PROGRESS NOTES
[facility]   Cardiology II Service / Advanced Heart Failure  Daily Progress Note  Date of Service: 6/7/2022      Patient: Ken Doss  MRN: [id number]  Admission Date: 6/6/2022  Hospital Day # 1    Assessment and Plan: Ken Doss is a 76 year old male with a PMH of ICM w/ HFrEF (20-25%), CAD s/p PCI to mid LAD and RCA in 2019, PAF s/p AV node ablation, s/p ICD implantation in 2013 c/b malfunction w/ new biventricular CRT-D ICD implantation on 2/11/22, anticoagulation on Eliquis, pulmonary fibrosis, DM Type II, Dyslipidemia and hypertriglyceridemia (enrolled in PROMINENT clinical trial) presenting from clinic due to fatigue.     Acute on Chronic SCHF secondary to ICM. BNP-8k. Chest X-ray consistent with pulmonary edema.   Stage C, NYHA Class III  ACEi/ARB Entresto held in setting of renal failure   BB Coreg 12.5 mg po BID   Aldosterone antagonist contraindicated due to renal dysfunction  SCD prophylaxis ICD  Fluid status hypervolemic. Bumex 4 mg IV this AM.   - Echo pending.     CAD s/p PCI pLAD, pRCA (1/2019, 2/2020) + h/o instent stenosis in the past.   - Continue Brilinta, Coreg, Eliquis, and Lipitor.     RAUL on CKD Stage IIIb. Likely CRS, improving with diuresis.   - Cr-2.41.    PAF s/p AVN ablation. CHADSVASC-5.   - Discontinue heparin gtt.   - Resume Eliquis 5 mg po BID.   - Continue Coreg.     DM Type II, controlled. Hgb A1C-7.3.  - Novolog sliding scale insulin.     FEN: Cardiac diet   PROPHY:  Eliquis   LINES: PIV  DISPO:  Anticipate discharge to home in 2-3 days  CODE STATUS:  Full Code   ================================================================    Interval History/ROS: He notes improvement in SOB. He denies fever, chills, ESTRADA, chest pain, palpitations, orthopnea, cough, nausea, vomiting, diarrhea, and LE edema. He is tolerating oral intake.    Last 24 hr care team notes reviewed.   ROS:  4 point ROS including Respiratory, CV, GI and , other than that noted in the  HPI, is negative.     Medications: Reviewed in EPIC.     Physical Exam:   BP 97/71   Pulse 73   Temp 98.6  F (37  C) (Oral)   Resp 16   Wt 73 kg (160 lb 15 oz)   SpO2 97%   BMI 23.77 kg/m    GENERAL: Appears alert and oriented times three.   HEENT: Eye symmetrical and free of discharge bilaterally. Mucous membranes moist and without lesions.  NECK: Supple and without lymphadenopathy. JVD to tragus, note severe TR.   CV: RRR, S1S2 present with grade III/VI murmur heard best on LSB.   RESPIRATORY: Respirations regular, even, and unlabored. Lungs CTA throughout.   GI: Soft and non distended with normoactive bowel sounds present in all quadrants. No tenderness, rebound, guarding. No organomegaly.   EXTREMITIES: No peripheral edema. 2+ bilateral pedal pulses.   NEUROLOGIC: Alert and orientated x 3. CN II-XII grossly intact. No focal deficits.   MUSCULOSKELETAL: No joint swelling or tenderness.   SKIN: No jaundice. No rashes or lesions.     Data:  CMPRecent Labs   Lab 06/07/22  1148 06/07/22  0901 06/07/22  0546 06/07/22  0245 06/06/22  1849 06/06/22  1838 06/06/22  1341   NA  --   --  135  --   --  134 136   POTASSIUM  --   --  4.1  --   --  5.4* 6.4*   CHLORIDE  --   --  104  --   --  105 104   CO2  --   --  22  --   --  22 19*   ANIONGAP  --   --  9  --   --  7 13   * 254* 135* 221*   < > 78 173*   BUN  --   --  75*  --   --  82* 76*   CR  --   --  2.40*  --   --  2.65* 2.71*   GFRESTIMATED  --   --  27*  --   --  24* 24*   LYNNE  --   --  8.4*  --   --  9.0 8.3*   MAG  --   --  2.1  --   --  2.2  --    PROTTOTAL  --   --   --   --   --  8.3  --    ALBUMIN  --   --   --   --   --  3.7  --    BILITOTAL  --   --   --   --   --  1.2  --    ALKPHOS  --   --   --   --   --  113  --    AST  --   --   --   --   --  24  --    ALT  --   --   --   --   --  25  --     < > = values in this interval not displayed.     CBC  Recent Labs   Lab 06/07/22  0546 06/06/22  2310 06/06/22  5398   WBC 7.7 7.9 8.9   RBC 2.98* 3.04*  3.31*   HGB 9.2* 9.5* 10.4*   HCT 28.1* 29.4* 31.9*   MCV 94 97 96   MCH 30.9 31.3 31.4   MCHC 32.7 32.3 32.6   RDW 15.4* 15.7* 15.6*    169 212     INR  Recent Labs   Lab 06/06/22  1838   INR 1.53*       Time/Communication  I personally spent a total of 25 minutes. Of that 15 minutes was counseling/coordination of patient's care. Plan of care discussed with patient. See my note above for details. Patient discussed with Dr. Johnson.      Catherine Wells [facility]  6/7/2022

## 2022-06-07 NOTE — UTILIZATION REVIEW
"  Admission Status; Secondary Review Determination         Under the authority of the Utilization Management Committee, the utilization review process indicated a secondary review on the above patient.  The review outcome is based on review of the medical records, discussions with staff, and applying clinical experience noted on the date of the review.        (X)      Inpatient Status Appropriate - This patient's medical care is consistent with medical management for inpatient care and reasonable inpatient medical practice.      () Observation Status Appropriate - This patient does not meet hospital inpatient criteria and is placed in observation status. If this patient's primary payer is Medicare and was admitted as an inpatient, Condition Code 44 should be used and patient status changed to \"observation\".   () Admission Status NOT Appropriate - This patient's medical care is not consistent with medical management for Inpatient or Observation Status.          RATIONALE FOR DETERMINATION     76 year old male with a PMH of ICM w/ HFrEF (20-25%), CAD s/p PCI to mid LAD and RCA in 2019, PAF s/p AV node ablation, s/p ICD implantation in 2013 c/b malfunction w/ new biventricular CRT-D ICD implantation on 2/11/22, anticoagulation on Eliquis, pulmonary fibrosis, DMII, dyslipidemia and hypertriglyceridemia (enrolled in PROMINENT clinical trial) presenting to the ED from Cardiology clinic for further evaluation of abnormal labs (potassium 6.3 and creatinine 2.71)and volume overload.  Patient will have diuresis, cardiac Echo, and is on low dose heparin.  He is appropriate for Inpatient status.       The severity of illness, intensity of service provided, expected LOS and risk for adverse outcome make the care complex, high risk and appropriate for hospital admission.        The information on this document is developed by the utilization review team in order for the business office to ensure compliance.  This only denotes the " appropriateness of proper admission status and does not reflect the quality of care rendered.         The definitions of Inpatient Status and Observation Status used in making the determination above are those provided in the CMS Coverage Manual, Chapter 1 and Chapter 6, section 70.4.      Sincerely,     Mejia Lazaro MD  Physician Advisor  Utilization Review/ Case Management  [facility].

## 2022-06-07 NOTE — PROGRESS NOTES
Shift: 3960-9103    VS: BP 91/57 (BP Location: Left arm)   Pulse 73   Temp 98.6  F (37  C) (Oral)   Resp 16   SpO2 96%     Pain: Denies chest pain, denies shortness of breath  Neuro: WDL  Cardio: WDL on tele  Respiratory: on room air.  Diet: low saturated fat Na < 2400 mg, fluid restriction 2000 ml   GI/:   LDA;  Skin:  Activity:  Tests:  Labs: PTT q  6 hours, pt on heparin.    Plan:

## 2022-06-08 NOTE — PROGRESS NOTES
"Admission          Date:06/07/22 Time:2022  -----------------------------------------------------------  Reason for admission:SOB/ chest pain/ abnormal labs  Primary team notified of pt arrival.  Admitted from:ED  Via: stretcher  Accompanied by:   Belongings: Placed in closet;  Admission Profile: complete  Teaching: orientation to unit and call light- call light within reach, call don't fall, use of console, meal times, when to call for the RN, and enforced importance of safety   Access: one right PIV  Telemetry:Placed on pt  Ht./Wt.: complete  Code Status verified on armband: yes  2 RN Skin Assessment Completed By:   Med Rec completed: yes  Bed surface reassessed with algorithm and charted: yes  New bed surface ordered: no  Suction/Ambu bag/Flowmeter at bedside: yes  Is patient having diarrhea upon admission- if YES fill out testing algorithm : yes, pt does state that it is his baseline to have looser BM 3-4 times a day and has \"been this way for years\"     C. Diff Testing Algorithm (MUST be marked YES)   1. 3 or more loose stools in 24 hrs. [x] Yes [] No       Additional symptoms:(At least ONE must be marked yes)   1. Abdominal pain/discomfort [] Yes [x] No   2. Fever at least 38C (100.4 F) [] Yes [x] No   3. Elevated WBC(>11,000) [] Yes [x] No       Exclusion Criteria:  (MUST be marked YES)   1. Off laxatives for at least 48 hrs. [x] Yes [] No       Pt status:    Blood pressure 105/61, pulse 70, temperature 98.7  F (37.1  C), temperature source Oral, resp. rate 14, weight 73 kg (160 lb 15 oz), SpO2 99 %.  room air, denies chest pain and SOB.     Lesli Bojorquez, RN on 6/8/2022 at 5:07 AM      "

## 2022-06-08 NOTE — PLAN OF CARE
Neuro- A&O x4  Cardiac-  v paced/ 80s-90s frequent PVC's, 7 beats VT see provider notification note.  VS Blood pressure 119/84, pulse 88, temperature 98.6  F (37  C), temperature source Oral, resp. rate 18, weight 73 kg (160 lb 15 oz), SpO2 96 %.  Pain-denied   O2-  room air  GI/-  urinal voiding adequate UOP  Lines-  PIV x1 saline locked   Activity-  pt didn't get much rest overnight. Pt is up independent in room.       No other concerns will continue to monitor and follow POC.   Lesli Bojorquez RN on 6/8/2022 at 6:29 AM

## 2022-06-08 NOTE — PROGRESS NOTES
[facility]   Cardiology II Service / Advanced Heart Failure  Daily Progress Note  Date of Service: 6/8/2022      Patient: Ken Doss  MRN: [id number]  Admission Date: 6/6/2022  Hospital Day # 2    Assessment and Plan: Ken Doss is a 76 year old male with a PMH of ICM w/ HFrEF (20-25%), CAD s/p PCI to mid LAD and RCA in 2019, PAF s/p AV node ablation, s/p ICD implantation in 2013 c/b malfunction w/ new biventricular CRT-D ICD implantation on 2/11/22, anticoagulation on Eliquis, pulmonary fibrosis, DM Type II, Dyslipidemia and hypertriglyceridemia (enrolled in PROMINENT clinical trial) presenting from clinic due to fatigue.      Acute on Chronic SCHF secondary to ICM. BNP-8k. Chest X-ray consistent with pulmonary edema.   Stage C, NYHA Class III  ACEi/ARB Entresto held in setting of renal failure   BB Coreg 12.5 mg po BID   Aldosterone antagonist contraindicated due to renal dysfunction  SCD prophylaxis ICD  Fluid status hypervolemic. Bumex 4 mg IV this AM.   - Echo consistent with EF 15-20%, mildly reduced RV function, moderate to severe tricuspid insufficiency.     VT, Frequent PVC's. Run of VT 7 beats last HS.   - Optimize electrolytes.   - Coreg as above, defer increase given decompensated state.      CAD s/p PCI pLAD, pRCA (1/2019, 2/2020) + h/o instent stenosis in the past.   - Continue Brilinta, Coreg, Eliquis, and Lipitor.      RAUL on CKD Stage IIIb. Likely CRS, improving with diuresis.   - Cr-2.09 (2.41).     PAF s/p AVN ablation. CHADSVASC-5.   - Eliquis 5 mg po BID.   - Continue Coreg.      DM Type II, controlled. Hgb A1C-7.3.  - Novolog sliding scale insulin.      FEN: Cardiac diet   PROPHY:  Eliquis   LINES: PIV  DISPO:  Anticipate discharge to home in AM.   CODE STATUS:  Full Code   ================================================================    Interval History/ROS: He complains of nausea with emesis this AM after taking his medications on an empty stomach. He  denies fever, chills, chest pain, palpitations, cough, diarrhea, and LE edema. He notes improvement to ESTRADA. He is tolerating oral intake and ambulation.     Last 24 hr care team notes reviewed.   ROS:  4 point ROS including Respiratory, CV, GI and , other than that noted in the HPI, is negative.     Medications: Reviewed in EPIC.     Physical Exam:   /71 (BP Location: Left arm)   Pulse 99   Temp 98  F (36.7  C) (Oral)   Resp 20   Wt 66.1 kg (145 lb 11.6 oz)   SpO2 96%   BMI 21.52 kg/m    GENERAL: Appears alert and oriented times three.   HEENT: Eye symmetrical and free of discharge bilaterally. Mucous membranes moist and without lesions.  NECK: Supple and without lymphadenopathy. JVD mid neck.   CV: RRR, S1S2 present with grade III/VI murmur heard best on LSB.   RESPIRATORY: Respirations regular, even, and unlabored. Lungs CTA throughout.   GI: Soft and non distended with normoactive bowel sounds present in all quadrants. No tenderness, rebound, guarding. No organomegaly.   EXTREMITIES: No peripheral edema. 2+ bilateral pedal pulses.   NEUROLOGIC: Alert and orientated x 3. CN II-XII grossly intact. No focal deficits.   MUSCULOSKELETAL: No joint swelling or tenderness.   SKIN: No jaundice. No rashes or lesions.     Data:  CMPRecent Labs   Lab 06/08/22  0806 06/08/22  0544 06/08/22  0335 06/07/22  2216 06/07/22  1807 06/07/22  0901 06/07/22  0546 06/06/22  1849 06/06/22  1838   NA  --  138  --   --  134  --  135  --  134   POTASSIUM  --  3.7  --   --  4.3  --  4.1  --  5.4*   CHLORIDE  --  105  --   --  104  --  104  --  105   CO2  --  23  --   --  23  --  22  --  22   ANIONGAP  --  10  --   --  7  --  9  --  7   * 181* 167* 182* 153*   < > 135*   < > 78   BUN  --  67*  --   --  77*  --  75*  --  82*   CR  --  2.09*  --   --  2.19*  --  2.40*  --  2.65*   GFRESTIMATED  --  32*  --   --  30*  --  27*  --  24*   LYNNE  --  8.6  --   --  8.6  --  8.4*  --  9.0   MAG  --   --   --   --   --   --  2.1   --  2.2   PROTTOTAL  --   --   --   --   --   --   --   --  8.3   ALBUMIN  --   --   --   --   --   --   --   --  3.7   BILITOTAL  --   --   --   --   --   --   --   --  1.2   ALKPHOS  --   --   --   --   --   --   --   --  113   AST  --   --   --   --   --   --   --   --  24   ALT  --   --   --   --   --   --   --   --  25    < > = values in this interval not displayed.     CBC  Recent Labs   Lab 06/07/22  0546 06/06/22  2310 06/06/22  1838   WBC 7.7 7.9 8.9   RBC 2.98* 3.04* 3.31*   HGB 9.2* 9.5* 10.4*   HCT 28.1* 29.4* 31.9*   MCV 94 97 96   MCH 30.9 31.3 31.4   MCHC 32.7 32.3 32.6   RDW 15.4* 15.7* 15.6*    169 212     INR  Recent Labs   Lab 06/06/22  1838   INR 1.53*       Time/Communication  I personally spent a total of 25 minutes. Of that 15 minutes was counseling/coordination of patient's care. Plan of care discussed with patient. See my note above for details.      Catherine Wells [facility]  6/8/2022

## 2022-06-08 NOTE — PROVIDER NOTIFICATION
"Paged on call cards [phone] at [phone]     Information:  Tele called to inform pt had 7 beats of VT at 0410  Also PVC's seemed to have increased- were around 0-9 PVC's per minute since around 0130 pt is having anywhere from 0-21 PVC's per minute.     Assessment: pt states he feels \"fine\" and didn't notice anything. Denies any chest pain or palpitations. BP: have been 105/ -120/80s HR mid 80s-90s.     Interventions: AM labs will be drawn soon, continue to monitor, possibly increase beta blocker today, and will let day team know.     Lesli Bojorquez RN on 6/8/2022 at 4:54 AM    "

## 2022-06-08 NOTE — CONSULTS
Care Management Initial Consult    General Information  Assessment completed with: Patient  Type of CM/SW Visit: Initial Assessment  Primary Care Provider verified and updated as needed: Yes   Readmission within the last 30 days: no previous admission in last 30 days   Advance Care Planning: No ACP documents on file, declines to complete at this time.      Communication Assessment  Patient's communication style: spoken language (English or Bilingual)    Hearing Difficulty or Deaf: no   Wear Glasses or Blind: yes    Cognitive  Cognitive/Neuro/Behavioral: WDL                      Living Environment:   People in home: spouse, Karen  Current living Arrangements: house      Able to return to prior arrangements: yes     Family/Social Support:  Care provided by: self  Provides care for: no one  Marital Status:   Support System: Wife, Children    Description of Support System: Supportive, Involved       Current Resources:   Patient receiving home care services: No  Community Resources: None  Equipment currently used at home: none  Supplies currently used at home: None    Employment/Financial:  Employment Status: employed part-time       Functional Status:  Prior to admission patient needed assistance: Independent with all ADLs.     Mental Health Status:  Mental Health Status: Current Concern, occassionally feels depressed about his tahmina.     Chemical Dependency Status:  Chemical Dependency Status: No Current Concerns           Additional Information:  Care management assessment completed at the bedside. Patient lives locally w/his spouse. Patient is independent at baseline, does not utilize any adaptive equipment. Patient does not have any current in home services. Patient declines any discharge needs or concerns. Care coordination will continue to follow for discharge planning.     Candi Chin, RNCC, BSN    [facility]    [address]  [address] [place] [address]     [email or web address]  [email or web address]    Office: [phone] Pager: [phone]    To contact the weekend [facility] (0800 - 1630) Saturday and Sunday    Units: 4A, 4C, 4E, 5A and 5B- Pager 1: [phone]    Units: 6A, 6B, 6C, 6D- Pager 2: [phone]    Units: 7A, 7B, 7C, 7D, and 5C-Pager 3: [phone]

## 2022-06-09 NOTE — PROVIDER NOTIFICATION
Notified cards 2 [id number]  Information:  Pt is having frequent PVCs up to 20 a minute pt not symptomatic   Lesli Bojorquez RN on 6/9/2022 at 4:33 AM

## 2022-06-09 NOTE — PROGRESS NOTES
[facility]   Cardiology II Service / Advanced Heart Failure  Daily Progress Note  Date of Service: 6/9/2022      Patient: Ken Doss  MRN: [id number]  Admission Date: 6/6/2022  Hospital Day # 3    Assessment and Plan: Ken Doss is a 76 year old male with a PMH of ICM w/ HFrEF (20-25%), CAD s/p PCI to mid LAD and RCA in 2019, PAF s/p AV node ablation, s/p ICD implantation in 2013 c/b malfunction w/ new biventricular CRT-D ICD implantation on 2/11/22, anticoagulation on Eliquis, pulmonary fibrosis, DM Type II, Dyslipidemia and hypertriglyceridemia (enrolled in PROMINENT clinical trial) presenting from clinic due to fatigue.      Acute on Chronic SCHF secondary to ICM. BNP-8k. Chest X-ray consistent with pulmonary edema. Echo consistent with EF 15-20%, mildly reduced RV function, moderate to severe tricuspid insufficiency.   Stage C, NYHA Class III  ACEi/ARB Entresto held in setting of renal failure   BB Coreg 12.5 mg po BID   Aldosterone antagonist contraindicated due to renal dysfunction  SCD prophylaxis ICD  Fluid status hypervolemic. Bumex 4 mg IV this AM.   - RHC in AM (note prior CI-1.49. May need ICU transfer pending numbers with repeat LVAD workup. Pt was approved 2 years ago for the LVAD, but has deferred in the past.      VT, Frequent PVC's. Run of VT 7 beats last HS.   - Optimize electrolytes.   - Coreg as above, defer increase given decompensated state.      CAD s/p PCI pLAD, pRCA (1/2019, 2/2020) + h/o instent stenosis in the past.   - Continue Brilinta, Coreg, Eliquis, and Lipitor.      RAUL on CKD Stage IIIb. Likely CRS, improving with diuresis.   - Cr-2.10 (2.09).     PAF s/p AVN ablation. CHADSVASC-5.   - Eliquis on hold in prep for cath.  - Continue Coreg.      DM Type II, controlled. Hgb A1C-7.3.  - Novolog sliding scale insulin.      FEN: Cardiac diet   PROPHY:  Eliquis   LINES: PIV  DISPO:  Anticipate discharge to home in AM.   CODE STATUS:  Full  Code   ================================================================  Interval History/ROS: He denies fever, chills, chest pain, palpitations, cough, nausea, vomiting, diarrhea, and LE edema. He is tolerating oral intake and ambulation.     Last 24 hr care team notes reviewed.   ROS:  4 point ROS including Respiratory, CV, GI and , other than that noted in the HPI, is negative.     Medications: Reviewed in EPIC.     Physical Exam:   /85 (BP Location: Left arm)   Pulse 99   Temp 98.5  F (36.9  C) (Oral)   Resp 18   Wt 64.8 kg (142 lb 13.7 oz)   SpO2 97%   BMI 21.10 kg/m    GENERAL: Appears alert and oriented times three. Cachectic.   HEENT: Eye symmetrical and free of discharge bilaterally. Mucous membranes moist and without lesions.  NECK: Supple and without lymphadenopathy. JVD 8-10 cm, vwave due to TR.   CV: RRR, S1S2 present with grade IV/VI murmur heard best LSB.   RESPIRATORY: Respirations regular, even, and unlabored. Lungs CTA throughout.   GI: Soft and non distended with normoactive bowel sounds present in all quadrants. No tenderness, rebound, guarding. No organomegaly.   EXTREMITIES: No peripheral edema, warm extremities. 2+ bilateral pedal pulses.   NEUROLOGIC: Alert and orientated x 3. CN II-XII grossly intact. No focal deficits.   MUSCULOSKELETAL: No joint swelling or tenderness.   SKIN: No jaundice. No rashes or lesions.     Data:  CMPRecent Labs   Lab 06/09/22  0746 06/09/22  0459 06/08/22  2206 06/08/22  1955 06/08/22  0806 06/08/22  0544 06/07/22  2216 06/07/22  1807 06/07/22  0901 06/07/22  0546 06/06/22  1849 06/06/22  1838   NA  --  138  --  135  --  138  --  134  --  135  --  134   POTASSIUM  --  4.3  --  4.3  --  3.7  --  4.3  --  4.1  --  5.4*   CHLORIDE  --  104  --  103  --  105  --  104  --  104  --  105   CO2  --  24  --  24  --  23  --  23  --  22  --  22   ANIONGAP  --  10  --  8  --  10  --  7  --  9  --  7   * 202* 233* 304*   < > 181*   < > 153*   < > 135*   <  > 78   BUN  --  60*  --  65*  --  67*  --  77*  --  75*  --  82*   CR  --  2.10*  --  2.02*  --  2.09*  --  2.19*  --  2.40*  --  2.65*   GFRESTIMATED  --  32*  --  34*  --  32*  --  30*  --  27*  --  24*   LYNNE  --  9.5  --  9.0  --  8.6  --  8.6  --  8.4*  --  9.0   MAG  --  2.1  --   --   --   --   --   --   --  2.1  --  2.2   PROTTOTAL  --   --   --   --   --   --   --   --   --   --   --  8.3   ALBUMIN  --   --   --   --   --   --   --   --   --   --   --  3.7   BILITOTAL  --   --   --   --   --   --   --   --   --   --   --  1.2   ALKPHOS  --   --   --   --   --   --   --   --   --   --   --  113   AST  --   --   --   --   --   --   --   --   --   --   --  24   ALT  --   --   --   --   --   --   --   --   --   --   --  25    < > = values in this interval not displayed.     CBC  Recent Labs   Lab 06/09/22  0508 06/07/22  0546 06/06/22  2310 06/06/22  1838   WBC 9.8 7.7 7.9 8.9   RBC 3.25* 2.98* 3.04* 3.31*   HGB 10.1* 9.2* 9.5* 10.4*   HCT 30.9* 28.1* 29.4* 31.9*   MCV 95 94 97 96   MCH 31.1 30.9 31.3 31.4   MCHC 32.7 32.7 32.3 32.6   RDW 15.5* 15.4* 15.7* 15.6*    165 169 212     INR  Recent Labs   Lab 06/06/22  1838   INR 1.53*       Time/Communication  I personally spent a total of 35 minutes. Of that 20 minutes was counseling/coordination of patient's care. Plan of care discussed with patient. See my note above for details. Patient discussed with Dr. Johnson.      Catherine SMILEY  6/9/2022

## 2022-06-09 NOTE — PLAN OF CARE
Neuro- A&O x4  Cardiac- v paced, frequent PVCs  VS Blood pressure 116/83, pulse 100, temperature 97.7  F (36.5  C), temperature source Oral, resp. rate 16, weight 66.1 kg (145 lb 11.6 oz), SpO2 99 %.  Pain-denied  O2- room air  GI/- urinal voiding no BM this shift  Lines-  PIV x1  Activity-  states he did not sleep due to inturrputions, tried to minimize that this evening.   Pt is upset and wants to go home he doesn't like all the noise of the hospital and says he would be able to sleep if he were able to have his sleeping medication.         No other concerns will continue to monitor and follow POC.     Lesli Bojorquez RN on 6/9/2022 at 6:10 AM

## 2022-06-09 NOTE — PROVIDER NOTIFICATION
"Provider notified: Catherine Wells APRN CNP  Time notified: 2257  Message: 6B Selam, pt requesting something for sleep, takes 3 mg of melatonin at home, also wife is at bedside, would like an update on need for cath procedure and \"leaky valve\" found on echo -Jenny RN [id number]  Response: melatonin ordered, provider at bedside for update  "

## 2022-06-09 NOTE — PROGRESS NOTES
CLINICAL NUTRITION SERVICES - ASSESSMENT NOTE     Nutrition Prescription    RECOMMENDATIONS FOR MDs/PROVIDERS TO ORDER:  - Avoid CHO restriction unless necessary given poor PO intake and appetite reduction. Would advocate for complete liberalization to regular diet to optimize PO. --Paged NP on primary team with rec    - Consider adding 100 mg thiamine/day per HF with EF <30% + general multivitamin with minerals per poor PO intake     - Consider endocrine consult if needed with BG levels >200 mg/dL    Malnutrition Status:    Moderate malnutrition in the context of chronic illness     Recommendations already ordered by Registered Dietitian (RD):  1. Send Glucerna ONS with breakfast trays [220 Kcals, 10 gm protein, 26 gm CHO per carton]    2. Send Special K protein bar at 10 AM  [180 Kcals, 12 gm protein, 22 gm CHO per bar]    3. HS snack - Strawberry Greek yogurt + string cheese stick    4. [facility] Supplement menu for additional options PRN if requested (with consideration of fluid restriction, recommended low volume options)    Future/Additional Recommendations:  -Monitor for improvement to PO intake, snack/supplements, meal frequency, weight trends w/ consideration of fluid status, and overall POC     REASON FOR ASSESSMENT  Ken Doss is a/an 76 year old male assessed by the dietitian for Nutrition Risk Monitoring - MST score of 2 for unplanned weight loss and decreased appetite PTA per nutrition screen     CLINICAL HISTORY  Hx of ICM w/ HFrEF (20-25% per H&P), CAD s/p PCI, PAF s/p AV node ablation, ICD implantation, pulmonary fibrosis, T2DM, dyslipidemia, and hypertriglyceridemia (enrolled in PROMINENT clinical trial). Presents to [facility] due to abnormal labs.    NUTRITION HISTORY  -Reduced appetite, ongoing for a few+ months PTA, though has never been a big eater. UBW reported as being ~165 lbs prior to any losses. Occasionally drinks nutrition protein shakes to optimize his PO intake when appetite low.  Sometimes struggles with borderline hypoglycemia in the AM. Very interested in starting supplements/snacks to optimize intake while inpatient.     CURRENT NUTRITION ORDERS  Diet: Low Saturated Fat/2400 mg Sodium, 2000 mL fluid restriction   Intake/Tolerance: ~25% of meals per review of chart and pt self report. Lunch tray in room untouched. Interested in supplements/snacks after discussing rec for small frequent meals, high protein foods to optimize intake. Setting up per pt preference. Provided with [facility] supplement list.     LABS    Reviewed, see summary table below     BG consistently >200 for past 5 readings; Last A1C 7.3% on 6/6/22  Electrolytes  Potassium (mmol/L)   Date Value   06/09/2022 4.3   06/08/2022 4.3   06/08/2022 3.7   07/30/2020 4.2   03/09/2020 3.4   03/05/2020 4.0     Phosphorus (mg/dL)   Date Value   12/22/2021 3.9   09/11/2020 3.5   03/09/2020 3.8   09/20/2019 3.7   11/27/2018 4.2    Blood Glucose  Glucose (mg/dL)   Date Value   06/09/2022 202 (H)   06/08/2022 304 (H)   06/08/2022 181 (H)   06/07/2022 153 (H)   06/07/2022 135 (H)   07/30/2020 153 (H)   03/09/2020 95   03/05/2020 229 (H)   03/01/2020 270 (H)   02/29/2020 190 (H)     GLUCOSE BY METER POCT (mg/dL)   Date Value   06/09/2022 274 (H)   06/09/2022 203 (H)   06/08/2022 233 (H)   06/08/2022 214 (H)   06/08/2022 338 (H)     Hemoglobin A1C POCT (%)   Date Value   03/09/2020 7.8 (H)     Hemoglobin A1C (%)   Date Value   06/06/2022 7.3 (H)   12/29/2021 7.5 (H)   09/20/2019 8.7 (H)   01/16/2019 8.6 (H)    Inflammatory Markers  CRP (mg/dL)   Date Value   07/09/2019 2.3 (H)   01/15/2018 <0.1     WBC (10e9/L)   Date Value   07/30/2020 10.7   03/09/2020 12.5 (H)   02/29/2020 7.2     WBC Count (10e3/uL)   Date Value   06/09/2022 9.8   06/07/2022 7.7   06/06/2022 7.9     Albumin (g/dL)   Date Value   06/06/2022 3.7   12/30/2021 3.2 (L)   12/22/2021 4.0   07/30/2020 3.6   03/09/2020 3.8      Magnesium (mg/dL)   Date Value   06/09/2022 2.1  "  06/07/2022 2.1   06/06/2022 2.2   03/01/2020 1.8   02/29/2020 1.6   02/26/2020 2.4 (H)     Sodium (mmol/L)   Date Value   06/09/2022 138   06/08/2022 135   06/08/2022 138   07/30/2020 137   03/09/2020 137   03/05/2020 130 (L)    Renal  Urea Nitrogen (mg/dL)   Date Value   06/09/2022 60 (H)   06/08/2022 65 (H)   06/08/2022 67 (H)   07/30/2020 40 (H)   03/09/2020 37 (H)   03/05/2020 33 (H)     Creatinine (mg/dL)   Date Value   06/09/2022 2.10 (H)   06/08/2022 2.02 (H)   06/08/2022 2.09 (H)   07/30/2020 1.28 (H)   03/09/2020 0.82   03/05/2020 0.84     Additional  Triglycerides (mg/dL)   Date Value   06/07/2022 112   10/07/2021 176 (H)   02/29/2020 79   12/12/2019 208 (H)     Ketones Urine (mg/dL)   Date Value   03/09/2020 Negative        MEDICATIONS    Lipitor    Low sliding scale insulin     ANTHROPOMETRICS  Height: 5'9\"   Admit wt 73 kg (160 lbs) 6/7/22  Most Recent Weight: 64.8 kg (142 lb 13.7 oz)  IBW: 72.7 kg  89%IBW   BMI: Normal BMI    Weight History:   -Weight down 6.1 kg over past 3-months (8.6%), significant  -Weight down 10.7 kg (14%) since December; significant loss for 6-month time frame.   - Fluid status potentially contributing given hx HF; however appears consistently trending down   Wt Readings from Last 15 Encounters:   06/09/22 64.8 kg (142 lb 13.7 oz)   06/06/22 69.4 kg (153 lb)   06/06/22 68 kg (150 lb)   06/06/22 68 kg (150 lb)   03/15/22 70.9 kg (156 lb 3.2 oz)   03/15/22 70.9 kg (156 lb 3.2 oz)   02/11/22 70.3 kg (155 lb)   12/30/21 75.5 kg (166 lb 6.4 oz)   11/09/21 73.5 kg (162 lb)   11/01/21 73.5 kg (162 lb)   10/27/21 72.5 kg (159 lb 12.8 oz)   10/27/21 72.5 kg (159 lb 12.8 oz)   09/09/21 71.2 kg (157 lb)   07/13/21 73 kg (161 lb)   03/02/21 75.9 kg (167 lb 6.4 oz)     Dosing Weight: 65 kg [current weight; IBW 72.7 kg]    ASSESSED NUTRITION NEEDS  Estimated Energy Needs: 7180-5646+ kcals/day (25 - 30+ kcals/kg)  Justification: Maintenance  Estimated Protein Needs: 78-98 grams protein/day " (1.2 - 1.5 grams of pro/kg)  Justification: Lean body mass preservation   Estimated Fluid Needs:(1 mL/kcal)   Justification: Maintenance    PHYSICAL FINDINGS  See malnutrition section below.    MALNUTRITION  % Intake: </=75% for >/= 1 month (severe)  % Weight Loss: > 7.5% in 3 months (severe)  Subcutaneous Fat Loss: Facial region:  Mild, Upper arm:  Mild and Thoracic/intercostal:  Mild  Muscle Loss: Thoracic region (clavicle, acromium bone, deltoid, trapezius, pectoral):  Mild and Upper arm (bicep, tricep):  Mild  Fluid Accumulation/Edema: None noted  Malnutrition Diagnosis: Moderate malnutrition in the context of chronic illness     NUTRITION DIAGNOSIS  Inadequate protein-energy intake related to reduced appetite, limited interest in food as evidenced by patient self-report, weight loss >7.5% over past 3-months and >10% over past 6-months, mild muscle/fat depletion.       INTERVENTIONS  Implementation  Nutrition education for nutrition relationship to health/disease and Nutrition education for recommended modifications   Collaboration with other providers - Paged primary team with rec to liberalize diet (w/ continued fluid restrict if needed)  Medical food supplement therapy - Initiate  Modify composition of meals/snacks - Initiate    Goals  Patient to consume % of nutritionally adequate meal trays TID, or the equivalent with supplements/snacks.     Monitoring/Evaluation  Progress toward goals will be monitored and evaluated per protocol.  Anthony Sheppard RDN, LD, CNSC  6B RD pager: [phone]   6B work-room RD phone: *[phone]    Weekend/Holiday RD pager [phone]

## 2022-06-10 NOTE — PLAN OF CARE
Major Shift Events:  Pt vitally stable on room air. Neuros intact. Good uop with urinal, no BM. NPO since midnight. Potassium 3.5, creatinine stable at 1.8.    Plan: Heart cath    For vital signs and complete assessments, please see documentation flowsheets.

## 2022-06-10 NOTE — PROGRESS NOTES
[facility]   Cardiology II Service / Advanced Heart Failure  Daily Progress Note  Date of Service: 6/10/2022      Patient: Ken Doss  MRN: [id number]  Admission Date: 6/6/2022  Hospital Day # 4    Assessment and Plan: Ken Doss is a 76 year old male with a PMH of ICM w/ HFrEF (20-25%), CAD s/p PCI to mid LAD and RCA in 2019, PAF s/p AV node ablation, s/p ICD implantation in 2013 c/b malfunction w/ new biventricular CRT-D ICD implantation on 2/11/22, anticoagulation on Eliquis, pulmonary fibrosis, DM Type II, Dyslipidemia and hypertriglyceridemia (enrolled in PROMINENT clinical trial) presenting from clinic due to fatigue.     Plan today:   - RHC.      Acute on Chronic SCHF secondary to ICM. BNP-8k. Chest X-ray consistent with pulmonary edema. Echo consistent with EF 15-20%, mildly reduced RV function, moderate to severe tricuspid insufficiency.   Stage C, NYHA Class III  ACEi/ARB Entresto held in setting of renal failure   BB Coreg 12.5 mg po BID   Aldosterone antagonist contraindicated due to renal dysfunction  SCD prophylaxis ICD  Fluid status hypervolemic. Bumex 4 mg IV this AM.   - RHC today.      VT, Frequent PVC's. Run of VT 7 beats last HS.   - Optimize electrolytes.   - Coreg as above, defer increase given decompensated state.      CAD s/p PCI pLAD, pRCA (1/2019, 2/2020) + h/o instent stenosis in the past.   - Continue Brilinta, Coreg, Eliquis, and Lipitor.      RAUL on CKD Stage IIIb. Likely CRS, improving with diuresis.   - Cr-1.8 (2.1).     PAF s/p AVN ablation. CHADSVASC-5.   - Eliquis on hold in prep for cath.  - Continue Coreg.      DM Type II, controlled. Hgb A1C-7.3.  - Novolog sliding scale insulin.     Anemia of CD.   - Hgb stable at 9.7.     FEN: Cardiac diet   PROPHY:  Eliquis   LINES: PIV  DISPO:  Anticipate discharge to home in AM.   CODE STATUS:  Full Code   ================================================================    Interval History/ROS: He denies  fever, chills, chest pain, palpitations, cough, SOB, nausea, vomiting, diarrhea, and LE edema. He is NPO and ambulating.     Last 24 hr care team notes reviewed.   ROS:  4 point ROS including Respiratory, CV, GI and , other than that noted in the HPI, is negative.     Medications: Reviewed in EPIC.     Physical Exam:   /72 (BP Location: Left arm)   Pulse 95   Temp 97.5  F (36.4  C) (Oral)   Resp 16   Wt 64.8 kg (142 lb 13.7 oz)   SpO2 98%   BMI 21.10 kg/m    GENERAL: Appears alert and oriented times three.   HEENT: Eye symmetrical and free of discharge bilaterally. Mucous membranes moist and without lesions.  NECK: Supple and without lymphadenopathy. JVD 10-12 cm, note severe TR.   CV: RRR, S1S2 present with grade IV/VI murmur heard best at LSB.   RESPIRATORY: Respirations regular, even, and unlabored. Lungs CTA throughout.   GI: Soft and non distended with normoactive bowel sounds present in all quadrants. No tenderness, rebound, guarding. No organomegaly.   EXTREMITIES: No peripheral edema. 2+ bilateral pedal pulses.   NEUROLOGIC: Alert and orientated x 3. CN II-XII grossly intact. No focal deficits.   MUSCULOSKELETAL: No joint swelling or tenderness.   SKIN: No jaundice. No rashes or lesions.     Data:  CMPRecent Labs   Lab 06/10/22  1144 06/10/22  0802 06/10/22  0551 06/10/22  0029 06/09/22  1836 06/09/22  1810 06/09/22  0746 06/09/22  0459 06/08/22  2206 06/08/22  1955 06/07/22  0901 06/07/22  0546 06/06/22  1849 06/06/22  1838   NA  --   --  138  --   --  136  --  138  --  135   < > 135  --  134   POTASSIUM  --   --  3.5  --   --  4.0  --  4.3  --  4.3   < > 4.1  --  5.4*   CHLORIDE  --   --  102  --   --  102  --  104  --  103   < > 104  --  105   CO2  --   --  25  --   --  24  --  24  --  24   < > 22  --  22   ANIONGAP  --   --  11  --   --  10  --  10  --  8   < > 9  --  7   * 210* 198* 209*   < > 363*   < > 202*   < > 304*   < > 135*   < > 78   BUN  --   --  54*  --   --  56*  --  60*   --  65*   < > 75*  --  82*   CR  --   --  1.80*  --   --  1.87*  --  2.10*  --  2.02*   < > 2.40*  --  2.65*   GFRESTIMATED  --   --  39*  --   --  37*  --  32*  --  34*   < > 27*  --  24*   LYNNE  --   --  9.4  --   --  9.0  --  9.5  --  9.0   < > 8.4*  --  9.0   MAG  --   --   --   --   --   --   --  2.1  --   --   --  2.1  --  2.2   PROTTOTAL  --   --   --   --   --   --   --   --   --   --   --   --   --  8.3   ALBUMIN  --   --   --   --   --   --   --   --   --   --   --   --   --  3.7   BILITOTAL  --   --   --   --   --   --   --   --   --   --   --   --   --  1.2   ALKPHOS  --   --   --   --   --   --   --   --   --   --   --   --   --  113   AST  --   --   --   --   --   --   --   --   --   --   --   --   --  24   ALT  --   --   --   --   --   --   --   --   --   --   --   --   --  25    < > = values in this interval not displayed.     CBC  Recent Labs   Lab 06/09/22  0508 06/07/22  0546 06/06/22  2310 06/06/22  1838   WBC 9.8 7.7 7.9 8.9   RBC 3.25* 2.98* 3.04* 3.31*   HGB 10.1* 9.2* 9.5* 10.4*   HCT 30.9* 28.1* 29.4* 31.9*   MCV 95 94 97 96   MCH 31.1 30.9 31.3 31.4   MCHC 32.7 32.7 32.3 32.6   RDW 15.5* 15.4* 15.7* 15.6*    165 169 212     INR  Recent Labs   Lab 06/06/22  1838   INR 1.53*       Time/Communication  I personally spent a total of 25 minutes. Of that 15 minutes was counseling/coordination of patient's care. Plan of care discussed with patient. See my note above for details. Patient discussed with Dr. Johnson.      Catherine SMILEY  6/10/2022

## 2022-06-10 NOTE — PROVIDER NOTIFICATION
Provider notified: Catherine Wells APRN CNP  Time notified: 0752  Message: 6B Selam, give brillinta this AM? -Jenny FELIX [phone]  Response: give medication    Provider notified: Catherine Wells APRN CNP  Time notified: 1240  Message: 6B Selam, monitor techs notified me he is having increased PVCs and was having short runs of SVT this AM -Jenny FELIX [phone]  Response: magnesium lab draw ordered    Provider notified: Catherine Wells APRN CNP  Time notified: 1336  Message: 6B Selam, alba 8/10 sharp pain in left knee, painful and warm to the touch, unable to bear full weight on it. hes requesting a cane. I gave 1g tylenol, elevated and icing now, ordered cane as well. -Jenny FELIX [phone]  Response: will continue to monitor

## 2022-06-11 NOTE — PROVIDER NOTIFICATION
Cards 2 Team notified at 0900 Re:    -Pt reports L Knee pain, Hx of knee replacement. Tenderness with palpation. No swelling noted.     -No interventions ordered at this time.

## 2022-06-11 NOTE — PLAN OF CARE
Neuro: A&Ox4. Calls appropriately.   Cardiac: V-paced, 90s-100. VSS.   Respiratory: Sating >98% on RA.  GI/: Adequate urine output via mae. No BM this shift.  Diet/appetite: Tolerating low fat diet and 2L fluid restriction. Eating well.  Activity:  Assist of 1, up to chair and in halls.   Pain: Continues to have pain in left knee. Team aware. Managed with tylenol, ice and rest.  Skin: No new deficits noted. Cath site WDL post-procedure.     LDA's: R PIV    Plan: Continue with POC. Notify primary team with changes.

## 2022-06-11 NOTE — PLAN OF CARE
Neuro: A&Ox4. Able to make needs known.   Cardiac: V-Paced. PVCs, occasionally. VSS.   Respiratory: Sating >95% on RA, spot checking.   GI/: Adequate urine output, using bedside urinal. LBM 6/8  Diet/appetite: Tolerating Low Fat diet w/2L F.R. .  Activity:  Independent.   Pain: At acceptable level on current regimen.   Skin: No new deficits noted.  LDA's: PIV x1.     Plan: Continue with POC. Notify primary team with changes.

## 2022-06-11 NOTE — PROGRESS NOTES
[facility]   Cardiology II Service / Advanced Heart Failure  Daily Progress Note  Date of Service: 6/10/2022     Patient: Ken Doss  MRN: [id number]  Admission Date: 6/6/2022  Hospital Day # 6      Faculty Attestation  George Johnson M.D.    I personally saw and examined this patient, reviewed recent laboratories and imaging studies, discussed the case with the housestaff, and conveyed plan to the patient.  I answered all questions from patient and/or family. I agree with the examination, assessment and plan outlined here.      1. Patient has declined LVAD evaluation and therapy  2. Patient has cognitive dysfunction  3. Likely to be discharged in am    Assessment and Plan: Ken Doss is a 76 year old male with a PMH of ICM w/ HFrEF (20-25%), CAD s/p PCI to mid LAD and RCA in 2019, PAF s/p AV node ablation, s/p ICD implantation in 2013 c/b malfunction w/ new biventricular CRT-D ICD implantation on 2/11/22, anticoagulation on Eliquis, pulmonary fibrosis, DM Type II, dyslipidemia and hypertriglyceridemia (enrolled in PROMINENT clinical trial) presenting from clinic due to fatigue. Initially admitted with RAUL (Cr 2.71) and hyperkalemia. Now improved. S/p RHC on 06/11    Today 06/11  - Holding diuresis Bumex 1mg BID   - Start Hydralazine 25mg q8h given elevated left sided pressures  - Conversation about advanced HF management such as LVAD  - Glargine 8u at bedtime given glycemias ~ 200-300. Ok to continue with low sliding scale insulin  - Resume Apixaban on 06/11     # Acute on Chronic SCHF secondary to ICM, ef 15-20%  # CAD s/p PCI pLAD, pRCA (1/2019, 2/2020) + h/o instent stenosis in the past.    # Moderate MI  # Severe diffuse hypokinesis  # Stage C, NYHA Class III  BNP-8k. Chest X-ray consistent with pulmonary edema. Echo consistent with EF 15-20%, mildly reduced RV function, moderate to severe tricuspid insufficiency.   - Fluid status: Hypervolemic   - Bumex 1 mg BID    - Bumex  4mg IV on 06/10  - GDMIT   > ACEi/ARB: Holding PTA Entresto given kidney dysfunction   > BB: Coreg 12.5 mg po BID    > Aldosterone antagonist contraindicated due to renal dysfunction. Consider upon discharge                once RAUL is resolved   > SGLT-2:  Consider upon discharge once RAUL is resolved   >  SCD prophylaxis ICD   >  Brilinta, Coreg, Eliquis, and Lipitor    # VT, Frequent PVC's. Run of VT 7 beats last HS.   - Optimize electrolytes.   - Coreg as above, defer increase given decompensated state.      # CAD s/p PCI pLAD, pRCA (1/2019, 2/2020) + h/o instent stenosis in the past.   - Continue Brilinta, Coreg, Eliquis, and Lipitor.      # RAUL on CKD Stage IIIb. Likely CRS, improving with diuresis.   - Cr-1.85 (2.1).     # PAF s/p AVN ablation. CHADSVASC-5.   - Eliquis on hold in prep for cath.  - Continue Coreg.      # DM Type II, controlled. Hgb A1C-7.3.  - Low sliding scale insulin  - Glargine 8 units at bedtime      FEN: Cardiac diet   PROPHY:  Eliquis   LINES: PIV  DISPO:  Anticipate discharge to home in AM.   CODE STATUS:  Full Code     Lizzette Garner  Internal Medicine PGY-1  ================================================================    Interval History/ROS: Nursing texts reviewed. No acute events overnight. States feeling slightly better than yesterday. Still feels weak and fatigues. Denies chest pain, SOB, palpitations, heartburn, dizziness, lightheadedness, syncope. Had asymptomatic hyperglycemias between 200-300 for the past 24 hrs with low sliding scale insulin requiring up to 6 units.     Last 24 hr care team notes reviewed.   ROS:  4 point ROS including Respiratory, CV, GI and , other than that noted in the HPI, is negative.   Medications: Reviewed in EPIC.   Physical Exam:   /70 (BP Location: Left arm, Cuff Size: Adult Regular)   Pulse 83   Temp 97.5  F (36.4  C) (Oral)   Resp 18   Wt 64 kg (141 lb 1.5 oz)   SpO2 100%   BMI 20.84 kg/m    GENERAL: Appears alert and  oriented times three.   HEENT: Eye symmetrical and free of discharge bilaterally. Mucous membranes moist and without lesions.  NECK: Supple and without lymphadenopathy. JVD 10-12 cm, note severe TR.   CV: RRR, S1S2 present with grade IV/VI murmur heard best at LSB.   RESPIRATORY: Respirations regular, even, and unlabored. Lungs CTA throughout.   GI: Soft and non distended with normoactive bowel sounds present in all quadrants. No tenderness, rebound, guarding. No organomegaly.   EXTREMITIES: No peripheral edema. 2+ bilateral pedal pulses.   NEUROLOGIC: Alert and orientated x 3. CN II-XII grossly intact. No focal deficits.   MUSCULOSKELETAL: No joint swelling or tenderness.   SKIN: No jaundice. No rashes or lesions.     Data:  CMP  Recent Labs   Lab 06/11/22  1150 06/11/22  0739 06/11/22  0512 06/10/22  2139 06/10/22  0802 06/10/22  0551 06/09/22  1836 06/09/22  1810 06/09/22  0746 06/09/22  0459 06/07/22  0901 06/07/22  0546 06/06/22  1849 06/06/22  1838   NA  --   --  137  --   --  138  --  136  --  138   < > 135  --  134   POTASSIUM  --   --  4.2  --   --  3.5  --  4.0  --  4.3   < > 4.1  --  5.4*   CHLORIDE  --   --  104  --   --  102  --  102  --  104   < > 104  --  105   CO2  --   --  24  --   --  25  --  24  --  24   < > 22  --  22   ANIONGAP  --   --  9  --   --  11  --  10  --  10   < > 9  --  7   * 185* 228* 329*   < > 198*   < > 363*   < > 202*   < > 135*   < > 78   BUN  --   --  54*  --   --  54*  --  56*  --  60*   < > 75*  --  82*   CR  --   --  1.85*  --   --  1.80*  --  1.87*  --  2.10*   < > 2.40*  --  2.65*   GFRESTIMATED  --   --  37*  --   --  39*  --  37*  --  32*   < > 27*  --  24*   LYNNE  --   --  9.4  --   --  9.4  --  9.0  --  9.5   < > 8.4*  --  9.0   MAG  --   --   --   --   --  2.1  --   --   --  2.1  --  2.1  --  2.2   PROTTOTAL  --   --   --   --   --   --   --   --   --   --   --   --   --  8.3   ALBUMIN  --   --   --   --   --   --   --   --   --   --   --   --   --  3.7    BILITOTAL  --   --   --   --   --   --   --   --   --   --   --   --   --  1.2   ALKPHOS  --   --   --   --   --   --   --   --   --   --   --   --   --  113   AST  --   --   --   --   --   --   --   --   --   --   --   --   --  24   ALT  --   --   --   --   --   --   --   --   --   --   --   --   --  25    < > = values in this interval not displayed.     CBC  Recent Labs   Lab 06/10/22  1849 06/10/22  1821 06/10/22  1820 06/09/22  0508 06/07/22  0546 06/06/22  2310 06/06/22 1838   WBC  --   --   --  9.8 7.7 7.9 8.9   RBC  --   --   --  3.25* 2.98* 3.04* 3.31*   HGB 9.9* 10.3* 9.3* 10.1* 9.2* 9.5* 10.4*   HCT  --   --   --  30.9* 28.1* 29.4* 31.9*   MCV  --   --   --  95 94 97 96   MCH  --   --   --  31.1 30.9 31.3 31.4   MCHC  --   --   --  32.7 32.7 32.3 32.6   RDW  --   --   --  15.5* 15.4* 15.7* 15.6*   PLT  --   --   --  185 165 169 212     INR  Recent Labs   Lab 06/06/22 1838   INR 1.53*

## 2022-06-11 NOTE — PLAN OF CARE
Neuro: A&Ox4. Calls appropriately.    Cardiac: 100% V paced. VSS.   Respiratory: Sating > 96% on RA.  GI/: Adequate urine output. No BM this shift.   Diet/appetite: Tolerating low sat fat/low sodium diet with 2L FR. Eating well.  Activity:  Ind in room.   Pain: At acceptable level on current regimen. L knee pain continues, managed with ice this shift.    Skin: No new deficits noted.  LDA's: PIV x1.     Plan: Continue with POC. Notify primary team with changes.

## 2022-06-12 NOTE — PROGRESS NOTES
DISCHARGE                         6/12/2022  2:00 PM  ----------------------------------------------------------------------------  Discharged to: Home  Via: private transportation  Accompanied by: Self, wife picking pt up  Discharge Instructions: Low Fat, Low Sodium diet. 2L F.R. reviewed with pt. Pt verbalizes understanding. Activity as tolerated, medications, follow up appointments, when to call the MD, aftercare instructions.  Prescriptions: To be filled by [place] [facility]; NST assisted pt in picking medication up. medication list reviewed & sent with pt  Follow Up Appointments: arranged; information given  Belongings: All sent with pt  IV: d/c'd  Telemetry: d/c'd  Pt exhibits understanding of above discharge instructions; all questions answered.      Discharge Paperwork: Signed, copied, and sent home with patient.     Pt left unit via wheelchair with help of NST.

## 2022-06-12 NOTE — PLAN OF CARE
Major Shift Events:  No acute events. Pt remains hyperglycemic. Hydralazine po started tonight. Pt expresses frustration with being unable to go home.   Plan: Continue monitoring until discharge.   For vital signs and complete assessments, please see documentation flowsheets.     Goal Outcome Evaluation:    Problem: Adjustment to Illness (Heart Failure)  Goal: Optimal Coping  Outcome: Ongoing, Progressing     Problem: Cardiac Output Decreased (Heart Failure)  Goal: Optimal Cardiac Output  Outcome: Ongoing, Progressing     Problem: Dysrhythmia (Heart Failure)  Goal: Stable Heart Rate and Rhythm  Outcome: Ongoing, Progressing     Problem: Fluid Imbalance (Heart Failure)  Goal: Fluid Balance  Outcome: Ongoing, Progressing     Problem: Functional Ability Impaired (Heart Failure)  Goal: Optimal Functional Ability  Outcome: Ongoing, Progressing  Intervention: Optimize Functional Ability  Recent Flowsheet Documentation  Taken 6/11/2022 2000 by Pantera To, RN  Activity Management:   activity adjusted per tolerance   ambulated in room     Problem: Oral Intake Inadequate (Heart Failure)  Goal: Optimal Nutrition Intake  Outcome: Ongoing, Progressing     Problem: Diabetes Comorbidity  Goal: Blood Glucose Level Within Targeted Range  Outcome: Ongoing, Progressing

## 2022-06-12 NOTE — PLAN OF CARE
Neuro: A&Ox4. Calls appropriately.    Cardiac: 100% V paced. VSS.            Respiratory: Sating > 96% on RA.  GI/: Adequate urine output. No BM this shift.   Diet/appetite: Tolerating low sat fat/low sodium diet with 2L FR. Eating well.  Activity:  Ind in room.   Pain: New onset pain in R foot this AM from gout per pt report- one time dose of Colchicine given.   Skin: No new deficits noted.  LDA's: PIV x1.      Plan: Continue with POC. Notify primary team with changes.

## 2022-06-12 NOTE — PROVIDER NOTIFICATION
Notified Cards 2 resident at 0530 that pt reporting pain on R foot, pt states from gout. Pt states happens every once in a while, requesting PTA med Colchicine. Pt refusing to get OOB for standing wt at this time.   -Colchicine given per MAR

## 2022-06-12 NOTE — PLAN OF CARE
Neuro: A&Ox4. Able to make needs known. N/T in BLE, baseline per pt. Hx of neuropathy.   Cardiac: SR. VSS.   Respiratory: Sating >92% on RA. Denies any SOB. No cough noted.   GI/: Adequate urine output, using bedside urinal. LBM 6/9. Pt reports he is passing gas and he does not want any stool softeners.   Diet/appetite: Tolerating Low Fat, Low Na diet w/2L F.R.   Activity: Up ad ernst.   Pain: R foot pain. Pt reports it feels like when he has gout flares. Colchicine this morning with adequate relief. PRN tylenol as well.   Skin: Skin tears to BUE, primapore in place.   LDA's: PIV removed.     Plan: Continue with POC. Notify primary team with changes.

## 2022-06-12 NOTE — DISCHARGE SUMMARY
I personally participated in the formulation of discharge and decision regarding anti-coagulation.  Patient is aware of risk of clotting and the need for daily INR checks over the next three days to adjust warfarin in view of recent history of bleeding.  Discharge >30 minutes      [facility]    Cardiology Discharge Summary- Cardiology         Date of Admission:  6/6/2022  Date of Discharge:  6/12/2022  Discharging Provider: Dr. 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 Stage C, NYHA Class III  # CAD s/p PCI pLAD, pRCA (1/2019, 2/2020) + h/o instent stenosis in the past.    # Moderate MI  # Severe diffuse hypokinesis  # VT, Frequent PVC's  # CAD s/p PCI pLAD, pRCA (1/2019, 2/2020) + h/o instent stenosis in the past  # RAUL on CKD Stage IIIb  # PAF s/p AVN ablation  # DM Type II, controlled    Follow-ups Needed After Discharge   Follow-up Appointments     Adult [facility] Follow-up and recommended labs and tests      Follow up with primary care provider, Lewis Reeves, within 7 days   to evaluate medication change.  The following labs/tests are recommended:   - CBC, CMP and Mg labs on 06/14/22  - Follow up with cardiologist on 06/21/22  - Change of medications (lower doses of Entresto) twice daily and a new   medication hydralazine 25mg three times daily    Appointments on [place] and/or [facility] (with [facility] or [facility]   provider or service). Call [phone] if you haven't heard regarding   these appointments within 7 days of discharge.         Follow Up and recommended labs and tests      Follow up with primary care provider, Lewis Reeves, within 7 days   for hospital follow- up.  The following labs/tests are recommended: CMP,   CBC, Mg.  - CBC, CMP and Mg labs on 06/14/22  - Follow up with cardiologist on 06/21/22  - Change of medications (lower doses of Entresto) twice daily and a new   medication  hydralazine 25mg three times daily         Follow up with PCP within 2 weeks after hospital follow up   Follow up with Cardiologist on Tuesday May 21st for hospital follow up   Labs including CMP, CBC, Mg on Tuesday May 14th for hospital follow up  Unresulted Labs Ordered in the Past 30 Days of this Admission     No orders found from 5/7/2022 to 6/7/2022.      These results will be followed up by PCP and Cardiologist    Discharge Disposition   Discharged to home  Condition at discharge: Stable    Hospital Course      Ken Doss is a 76 year old male with a PMH of NICM w/ HFrEF (20-25%), CAD s/p PCI to mid LAD and RCA in 2019, PAF s/p AV node ablation, s/p ICD implantation in 2013 c/b malfunction w/ new biventricular CRT-D ICD implantation on 2/11/22, anticoagulation on Eliquis, pulmonary fibrosis, DMII, dyslipidemia and hypertriglyceridemia (enrolled in PROMINENT clinical trial) presenting to the ED from Cardiology clinic for further evaluation of abnormal labs. Patient was seen by Dr. Payton today for routine f/u where labs revealed elevated potassium to 6.4 and creatinine at 2.71. Patient advised to be seen in the ED. Patient is accompanied by his wife. He reports that in the past 3-4 months he has been feeling increasingly generally weak, fatigued, and short of breath on exertion. He states that prior this winter he was able to walk 1 mile without difficulty, but now he is requiring several breaks when walking to the mail[place] and [place] the airport. He endorses increased cough, mostly nonproductive, but occasionally wet. He denies chest pain or pressure.      # Acute on Chronic SCHF secondary to ICM, ef 15-20%  # CAD s/p PCI pLAD, pRCA (1/2019, 2/2020) + h/o instent stenosis in the past.    # Moderate MI  # Severe diffuse hypokinesis  # Stage C, NYHA Class III    Worsening shortness of breath, weakness, fatigue for the past 3-4 months. Found to have RAUL Cr 2.71 and K 6.4. BNP-8k. Chest X-ray  consistent with pulmonary edema. JVP ~13cm and bibasilar crackles on admission. Echo consistent with EF 15-20%, mildly reduced RV function, moderate to severe tricuspid insufficiency.    - Fluid status: Euvolemic                - Continue with PTA bumex 2mg BID     - Weight on discharge: ~141lbs. Initially admitted with 160lbs.  - GDMIT                > ACEi/ARB: Entresto was held since admission. Resumed Entresto at lower doses (Entresto 24/26 BID) until reevaluation by Cardiologist                > BB: Continue PTA Coreg 12.5 mg po BID                 > Aldosterone antagonist: Holding spironolactone since admission.                  > SGLT-2:  None. Consider during outpatient.                > SCD prophylaxis: ICD                > Ok to continue with Brilinta, Coreg, Eliquis, and Lipitor  - Follow up with CBC, CMP and Mg on 06/14/22  - Follow up with Cardiologist on 06/21/22     # VT, Frequent PVC's. Run of VT 7 beats last HS.   - Optimize electrolytes.   - Coreg as above, defer increase given decompensated state.      # CAD s/p PCI pLAD, pRCA (1/2019, 2/2020) + h/o instent stenosis in the past.   - Continue Brilinta, Coreg, Eliquis, and Lipitor.      # RAUL on CKD Stage IIIb. Likely CRS, improving with diuresis.   # Hyperkalemia  - Cr-1.84. Admitted with Cr. 2.65. Has K 3.9 (6.1).     # PAF s/p AVN ablation. CHADSVASC-5.   - Continue with Eliquis  - Continue Coreg     # DM Type II, controlled. Hgb A1C-7.3.  -Continue with prior to admission insulin    Consultations This Hospital Stay   MEDICATION HISTORY IP PHARMACY CONSULT  MEDICATION HISTORY IP PHARMACY CONSULT  PHARMACY IP CONSULT  PHARMACY IP CONSULT  CARE MANAGEMENT / SOCIAL WORK IP CONSULT  NURSING TO CONSULT FOR VASCULAR ACCESS CARE IP CONSULT  MEDICATION HISTORY IP PHARMACY CONSULT  SMOKING CESSATION PROGRAM IP CONSULT    Code Status   Full Code      Heather Garner MD   Internal Medicine PGY-1  [facility]  ______________________________________________________________________    Physical Exam   Vital Signs: Temp: 98.1  F (36.7  C) Temp src: Oral BP: 91/63 Pulse: 88   Resp: 18 SpO2: 96 % O2 Device: None (Room air)    Weight: 141 lbs 1.51 oz  Constitutional: awake, alert, cooperative, no apparent distress.   Hematologic / Lymphatic: no cervical lymphadenopathy and no supraclavicular lymphadenopathy.  Respiratory: No increased work of breathing, good air exchange, clear to auscultation bilaterally, no crackles or wheezing.  Cardiovascular: RRR, S1S2 present with grade IV/VI murmur heard best at LSB. JVD 8 cm.  GI: No scars, normal bowel sounds, soft, no pain to deep palpation, no hepatosplenomegally    Neurologic: Awake, alert, oriented to name, place and time.  Cranial nerves II-XII are grossly intact.  Motor is 5 out of 5 bilaterally.        Primary Care Physician   Lewis Reeves    Discharge Orders      CBC with platelets     Comprehensive metabolic panel     Magnesium     Reason for your hospital stay    Mr. Selam Moreland were admitted for a HF with reduced EF decompensation and acute kidney injury and hyperkalemia, now improved.     Activity    Your activity upon discharge: activity as tolerated     Follow Up and recommended labs and tests    Follow up with primary care provider, Lewis Reeves, within 7 days for hospital follow- up.  The following labs/tests are recommended: CMP, CBC, Mg.  - CBC, CMP and Mg labs on 06/14/22  - Follow up with cardiologist on 06/21/22  - Change of medications (lower doses of Entresto) twice daily and a new medication hydralazine 25mg three times daily     Adult [facility] Follow-up and recommended labs and tests    Follow up with primary care provider, Lewis Reeves, within 7 days to evaluate medication change.  The following labs/tests are recommended:   - CBC, CMP and Mg labs on 06/14/22  - Follow up with cardiologist on 06/21/22  - Change of medications  (lower doses of Entresto) twice daily and a new medication hydralazine 25mg three times daily    Appointments on [place] and/or [facility] (with [facility] or [facility] provider or service). Call [phone] if you haven't heard regarding these appointments within 7 days of discharge.     Diet    Follow this diet upon discharge: Orders Placed This Encounter      Fluid restriction 2000 ML FLUID      Snacks/Supplements Adult: Glucerna; With Meals      Snacks/Supplements Adult: Other; 10 AM - Special K protein bar (send 1 now please) and see comment for HS snack; Between Meals      Low Saturated Fat Na <2400 mg       Significant Results and Procedures   Most Recent 3 CBC's:Recent Labs   Lab Test 06/12/22  0532 06/10/22  1849 06/10/22  1821 06/10/22  1820 06/09/22  0508 06/07/22  0546   WBC 9.1  --   --   --  9.8 7.7   HGB 9.7* 9.9* 10.3*   < > 10.1* 9.2*   MCV 95  --   --   --  95 94     --   --   --  185 165    < > = values in this interval not displayed.     Most Recent 3 BMP's:Recent Labs   Lab Test 06/12/22  1225 06/12/22  0823 06/12/22  0532 06/11/22  0739 06/11/22  0512 06/10/22  0802 06/10/22  0551   NA  --   --  137  --  137  --  138   POTASSIUM  --   --  3.9  --  4.2  --  3.5   CHLORIDE  --   --  103  --  104  --  102   CO2  --   --  25  --  24  --  25   BUN  --   --  58*  --  54*  --  54*   CR  --   --  1.84*  --  1.85*  --  1.80*   ANIONGAP  --   --  9  --  9  --  11   LYNNE  --   --  9.1  --  9.4  --  9.4   * 181* 212*   < > 228*   < > 198*    < > = values in this interval not displayed.     Most Recent 2 LFT's:Recent Labs   Lab Test 06/06/22  1838 12/30/21  0454   AST 24 25   ALT 25 23   ALKPHOS 113 76   BILITOTAL 1.2 1.3*     Most Recent 3 INR's:Recent Labs   Lab Test 06/06/22  1838 03/09/20  1155 07/09/19  1033   INR 1.53* 1.29* 1.30*     Most Recent 3 Creatinines:Recent Labs   Lab Test 06/12/22  0532 06/11/22  0512 06/10/22  0551   CR 1.84* 1.85* 1.80*     Most Recent 3 Hemoglobins:Recent Labs    Lab Test 06/12/22  0532 06/10/22  1849 06/10/22  1821   HGB 9.7* 9.9* 10.3*     Most Recent 3 Troponin's:Recent Labs   Lab Test 02/25/20  1345   TROPI <0.015     Most Recent 3 BNP's:Recent Labs   Lab Test 06/06/22  1341 12/29/21  1407 11/01/21  0906 07/30/20  0729 05/14/20  0847 03/05/20  0750 02/25/20  1345   NTBNPI  --  5,755*  --  3,182*  --   --  5,924*   NTBNP 8,906*  --  6,357*  --  2,791*   < >  --     < > = values in this interval not displayed.     Most Recent Cholesterol Panel:Recent Labs   Lab Test 06/07/22  0546   CHOL 82   LDL 28   HDL 32*   TRIG 112     Most Recent TSH and T4:Recent Labs   Lab Test 03/09/20  1155   TSH 1.02     Most Recent Hemoglobin A1c:Recent Labs   Lab Test 06/06/22  1838   A1C 7.3*     Most Recent Anemia Panel:Recent Labs   Lab Test 06/12/22  0532 06/06/22  1838 02/22/22  1647 04/10/20  1545 03/09/20  1155   WBC 9.1   < >  --    < > 12.5*   HGB 9.7*   < >  --    < > 13.6   HCT 29.8*   < >  --    < > 40.9   MCV 95   < >  --    < > 93      < >  --    < > 259   IRON  --   --  36*  --  143   IRONSAT  --   --   --   --  36   FEB  --   --   --   --  394   HAI  --   --  67  --  127   B12  --   --  355   < >  --     < > = values in this interval not displayed.     Most Recent CPK:Recent Labs   Lab Test 07/10/19  1236      ,   Results for orders placed or performed during the hospital encounter of 06/06/22   Chest XR,  PA & LAT    Narrative    Exam: XR CHEST 2 VW, 6/6/2022 9:24 PM    Indication: shortness of breath    Comparison: Chest x-ray 2/11/2022. CT 3/9/2020.    Findings:   Left chest wall cardiac device with intact leads in stable  positioning. Unchanged cardiomediastinal silhouette. No pneumothorax  or pleural effusions. More prominent mid to lower lung predominant  reticular interstitial opacities. No focal infectious consolidations.      Impression    Impression: More prominent mid to lower lung predominant reticular  interstitial opacities in the lungs, findings  could represent atypical  infection or progression of previously noted interstitial lung  abnormalities, and/or pulmonary edema.    I have personally reviewed the examination and initial interpretation  and I agree with the findings.    KUSHAL CAMACHO MD         SYSTEM ID:  [id number]   Echo Complete     Value    LVEF  15-20% (severely reduced)    [facility]    [id number]  [id number]  [id number]  [id number]^ALICEJENISE^IMAN     [facility]  Echocardiography Laboratory  [address] [place] [id number]     Name: STEFF FRANK  MRN: [id number]  : 1945  Study Date: 2022 11:15 AM  Age: 76 yrs  Gender: Male  Patient Location: [facility]  Reason For Study: Heart Failure  Ordering Physician: IMAN IRBY  Performed By: Cha Caldera     BSA: 1.8 m2  Height: 69 in  Weight: 153 lb  HR: 71  BP: 100/71 mmHg  ______________________________________________________________________________  Procedure  Complete Portable Echo Adult. Contrast Optison. Optison (NDC #6426-0892-17)  given intravenously. Patient was given 5 ml mixture of 3 ml Optison and 6 ml  saline. 4 ml wasted.  ______________________________________________________________________________  Interpretation Summary  Left ventricular function is decreased. The ejection fraction is 15-20%  (severely reduced).Severe left ventricular dilation is present.  The right ventricle is normal size.Global right ventricular function is mildly  reduced.  Moderate mitral insufficiency is present.  Moderate to severe tricuspid insufficiency is present.  IVC diameter <2.1 cm collapsing >50% with sniff suggests a normal RA pressure  of 3 mmHg.  No pericardial effusion is present.     This study was compared with the study from 21. The RV function is  mildly decreased and TR is moderate to severe  ______________________________________________________________________________  Left Ventricle  Severe left  ventricular dilation is present. LVIDd6.6cm. Left ventricular  function is decreased. The ejection fraction is 15-20% (severely reduced).  Severe diffuse hypokinesis is present.     Right Ventricle  The right ventricle is normal size. Global right ventricular function is  mildly reduced. A pacemaker lead is noted in the right ventricle.     Atria  Severe left atrial enlargement is present. Moderate to severe right atrial  enlargement is present.     Mitral Valve  Moderate mitral insufficiency is present. The cause of the mitral  insufficiency appears to be altered left ventricular size and geometry.     Aortic Valve  Mild aortic valve calcification is present.     Tricuspid Valve  The tricuspid valve is normal. Moderate to severe tricuspid insufficiency is  present. Right ventricular systolic pressure is 29mmHg above the right atrial  pressure.     Pulmonic Valve  The valve leaflets are not well visualized. On Doppler interrogation, there is  no significant stenosis or regurgitation.     Vessels  The aorta root is normal. The thoracic aorta is normal. IVC diameter <2.1 cm  collapsing >50% with sniff suggests a normal RA pressure of 3 mmHg.     Pericardium  No pericardial effusion is present.     Compared to Previous Study  This study was compared with the study from 21 . The RV function is  mildly decreased and TR is moderate to severe.  ______________________________________________________________________________  MMode/2D Measurements & Calculations     IVSd: 0.86 cm  LVIDd: 6.6 cm  LVIDs: 6.1 cm  LVPWd: 0.89 cm  FS: 7.5 %  LV mass(C)d: 243.3 grams  LV mass(C)dI: 132.0 grams/m2  Ao root diam: 2.9 cm  asc Aorta Diam: 2.6 cm  LVOT diam: 2.1 cm  LVOT area: 3.5 cm2  LA Volume (BP): 99.1 ml  LA Volume Index (BP): 53.9 ml/m2  RWT: 0.27     Doppler Measurements & Calculations  PA acc time: 0.08 sec  TR max dada: 267.4 cm/sec  TR max P.6 mmHg      ______________________________________________________________________________  Report approved by: Patricia Lock 06/07/2022 12:59 PM               Discharge Medications   Discharge Medication List as of 6/12/2022 12:35 PM      START taking these medications    Details   hydrALAZINE (APRESOLINE) 25 MG tablet Take 1 tablet (25 mg) by mouth 3 times daily, Disp-30 tablet, R-1, E-Prescribe      sacubitril-valsartan (ENTRESTO) 24-26 MG per tablet Take 1 tablet by mouth 2 times daily, Disp-42 tablet, R-0, E-PrescribePrescribed x 3 weeks (lower doses), has an appointment on Tuesday 06/21 for reevaluation         CONTINUE these medications which have NOT CHANGED    Details   acetaminophen (TYLENOL) 500 MG tablet Take 500-1,000 mg by mouth 2 times daily as needed , Historical      apixaban ANTICOAGULANT (ELIQUIS ANTICOAGULANT) 5 MG tablet Take 1 tablet (5 mg) by mouth 2 times daily, Disp-180 tablet,R-1, E-Prescribe      atorvastatin (LIPITOR) 40 MG tablet Take 40 mg by mouth daily, Historical      bumetanide (BUMEX) 1 MG tablet Take 1 tablet (1 mg) by mouth 2 times daily, Disp-180 tablet, R-1, E-Prescribe      carvedilol (COREG) 6.25 MG tablet TAKE 2 TABLETS BY MOUTH TWICE DAILY WITH MEALS, Disp-360 tablet, R-0, E-Prescribe      Coenzyme Q10 (COQ-10 PO) Take 20 mg by mouth daily , Historical      colchicine (COLCYRS) 0.6 MG tablet TAKE 2 TABLETS BY MOUTH THEN 1 TABLET ONE HOUR LATER AS NEEDED FOR GOUT ATTACK AS DIRECTED, Historical      diphenhydrAMINE (BENADRYL) 25 MG tablet Take 25 mg by mouth At Bedtime, Historical      febuxostat (ULORIC) 40 MG TABS tablet Take 40 mg by mouth daily, Historical      ferrous sulfate (FEROSUL) 325 (65 Fe) MG tablet Take 325 mg by mouth daily (with breakfast) , Historical      gabapentin (NEURONTIN) 100 MG capsule Take 100 mg by mouth 3 times daily, Historical      glucosamine-chondroitin 500-400 MG CAPS per capsule Take 1 tablet by mouth daily , Historical       hydrOXYzine (ATARAX) 10 MG tablet Take 10-20 mg by mouth every 8 hours as needed, Historical      insulin glargine (LANTUS PEN) 100 UNIT/ML pen Inject 20 Units Subcutaneous At Bedtime , Historical      melatonin 3 MG tablet Take 1 mg by mouth nightly as needed for sleep, Historical      metFORMIN (GLUCOPHAGE) 1000 MG tablet Take 1,000 mg by mouth 2 times daily (with meals), Historical      Multiple Vitamin (MULTI VITAMIN DAILY PO) Take 1 tablet by mouth daily , Historical      Omega-3 Fatty Acids (FISH OIL) 1200 MG capsule Take 1,200 mg by mouth daily , Historical      potassium chloride ER (K-TAB) 20 MEQ CR tablet Take 20 mEq by mouth daily, Historical      ticagrelor (BRILINTA) 90 MG tablet Take 1 tablet (90 mg) by mouth 2 times daily Please schedule lab draw to F/U on creatine; additional refills after, Disp-180 tablet,R-0, E-Prescribe      traMADol (ULTRAM) 50 MG tablet Take 1 tablet by mouth as needed for pain, Historical         STOP taking these medications       ENTRESTO 49-51 MG per tablet Comments:   Reason for Stopping:         spironolactone (ALDACTONE) 25 MG tablet Comments:   Reason for Stopping:             Allergies   No Known Allergies

## 2022-06-13 NOTE — PROGRESS NOTES
Clinic Care Coordination Contact    Background: Care Coordination referral placed from [facility] discharge report for reason of patient meeting criteria for a TCM outreach call by [facility] [place] team.    Assessment: Upon chart review, CCR[facility] Team member will cancel/close the referral for TCM outreach due to reason below:    Patient was contacted by a Registered Nurse in Cardiology for hospital discharge and follow-up. RN discussed reason for admission, questions about medications, questions about discharge instructions, and follow-up appointments with patient. No [facility]W outreach call needed at this time.     Plan: Care Coordination referral for TCM outreach canceled.    DONALD Contreras  [phone]  [facility]

## 2022-06-13 NOTE — TELEPHONE ENCOUNTER
"Reason for Admission  admitted with RAUL (Cr 2.71) and hyperkalemia     How are you feeling since you got home?  \"Fairly well besides the gout flare up\"    And questions about medication? (I.E. Taking, received, new medication)  NO. Patient started on hydralazine TID and decreased dose of Entresto. Denies questions    Do you have any questions about the discharge instructions?  YES - Patient is having trouble getting in with a cardiologist before 6/15 when he leaves for a family trip. Discussed to atleast complete lab monitoring in the even changes need to be made to his medications and to follow-up after his trip as planned. Patient agreeable to this plan.    Do you have any other questions?  YES - \"When do I follow-up with Dr. Payton\" Discussed to call and schedule an appointment following his trip    Has a follow up appointment been made?   NO- patient to follow-up with cardiologist in [place] and call to schedule with Dr. Tata Kimball RN on 6/13/2022 at 10:17 AM    "

## 2022-06-15 NOTE — TELEPHONE ENCOUNTER
Called patient and spouse and left a voicemail back regarding abnormal lab results from 6/14. Requested urgent call back.     Updated Dr. Payton regarding abnormal labs and plan needed for patient. VORB to Hold Entresto and Bumex. Recheck weights daily and complete labs while on vacation    Patient updated to plan and verbalized understanding    Laurita Kimball RN on 6/15/2022 at 2:51 PM

## 2022-06-15 NOTE — TELEPHONE ENCOUNTER
Left voicemail for patient to return call to clinic scheduler to schedule follow-up with Dr. Payton in September 2022.    WANDA Kelley

## 2022-06-15 NOTE — TELEPHONE ENCOUNTER
Patient feeling well today other than some general weakness. Patient to seek out local clinic for lab monitoring and let nurse know for lab orders. Patient will obtain a scale and monitor daily. Discussed to notify for any worsening s/s and educated s/s to monitor for regarding elevated creatinine. Patient is agreeable to this plan .    Laurita Kimball RN on 6/16/2022 at 10:02 AM    -------------------------------------------------------------------  Updated MD regarding lab results on 6/15 post hospital recommendations.     Patient left phone in [place]. Currently in [place] for a planned vacation. Discussed with spouse if patient was feeling well. Reports generally weak as normal, some diarrhea otherwise no new concerns since hospital discontinue. Discussed to weigh daily per Dr. Payton. If weight is stable and trending down, plan to hold entresto and bumex. Recheck labs locally in [place]. Spouse to update location of clinic for lab monioring. Discussed concerning lab results and urged to visit ED if changes in s/s and if weight is increasing. Spouse plan to report this to patient as they were not in the same area at the time of phone call. Plan to check in with patient on 6/16.    Laurita Kimball RN on 6/15/2022 at 5:53 PM      -------------------------------------------  [facility] Call Center    Phone Message    May a detailed message be left on voicemail: yes     Reason for Call: Other: Pt's spouse Karen called in regards to prev message and voicemail left by Laurita Kimball RN. Priority line was busy, please review and call Karen back at [phone]. Thank you!     Action Taken: Other: Cardiology    Travel Screening: Not Applicable

## 2022-06-22 NOTE — TELEPHONE ENCOUNTER
Called patient in regards to weight monitoring and lab follow-up while in [place]. Patient reports having difficulty with finding labs and dealing with insurance and does not want to complete labs while out of town. Patient will be back in [place] as of 6/25 and will have labs completed on 6/27 locally. Lab orders faxed to [facility] location per request. Urged patient to notify for any worsening s/s. Patient reports stable weight, has obtained a scale while on vacations. Reports weight today is 140lb. This has not increased from previous week.    Patient is agreeable to this plan.    Laurita Kimball RN on 6/22/2022 at 11:19 AM

## 2022-06-28 NOTE — TELEPHONE ENCOUNTER
Date: 6/28/2022    Time of Call: 4:54 PM     Diagnosis:  CHF     [ VORB ] Ordering provider: Dr. Payton  Order: Bumetanide 1mg BID. Coreg decreased to 6.25mg BID from 12.5 BID. Stop Entresto. Stop potassium. Stop spironolactone. Schedule for admission to hospital week of 7/3 for Inotrope initiation. Contact PCP for homecare initiation for medication education.      Order received by: Dr. Payton     Follow-up/additional notes: Called patient to discuss. Would like a call back on another day. Staff message sent to PCP for home care referral.    Laurita Kimball RN on 6/28/2022 at 4:58 PM

## 2022-06-30 NOTE — PROGRESS NOTES
June 6 research labs ncs including high creat and glucose and a1c with low gfr and h/h and high ldh.

## 2022-07-01 NOTE — TELEPHONE ENCOUNTER
Called patient on 6/30 and reviewed all medications and changes. Patient verbalized understanding. Discussed with patient and spouse worsening creatinine and need for IV inotropes started in hospital due to worsening heart failure and kidney function, educated to medication plan and plan for admission. Patient to have labs week of 7/5 as patient does not want to be admitted until 7/11 Labs faxed to [facility] location. Dr. Payton is okay with this admission date. Patient is agreeable to this plan, will complete labs week of 7/4/22. Admission scheduled for 7/11 at 1500    Laurita Kimball RN on 7/1/2022 at 12:01 PM

## 2022-07-05 NOTE — TELEPHONE ENCOUNTER
Labs faxed on 7/1 as discussed. Discussed with patient if he is not feeling well he needs to come into the ER at the [facility] prior to waiting for scheduled admission on 7/11 for Inotrope initiation. Patient verbalized understanding and stated  he would let careteam know if he decides to come in earlier.     Laurita Kimball RN on 7/5/2022 at 4:01 PM    ---------------------------------------  Patients wife called with 2 requests;  1. Skyler needs labs done but they want to do them at the [facility] in [facility]. She is requesting to fax the lab order to them. Fax number is [phone]  2. Patients wife Carrol would like a phone call to discuss Toms condition she feels he is getting worse. Her phone number is [phone].    Lexx Baumann [facility]

## 2022-07-08 PROBLEM — I50.9 CHF (CONGESTIVE HEART FAILURE) (H): Status: ACTIVE | Noted: 2022-01-01

## 2022-07-08 NOTE — ED TRIAGE NOTES
Triage Assessment     Row Name 07/08/22 1507       Triage Assessment (Adult)    Airway WDL X       Respiratory WDL    Respiratory WDL WDL       Skin Circulation/Temperature WDL    Skin Circulation/Temperature WDL WDL       Cardiac WDL    Cardiac WDL WDL       Peripheral/Neurovascular WDL    Peripheral Neurovascular WDL WDL       Cognitive/Neuro/Behavioral WDL    Cognitive/Neuro/Behavioral WDL WDL

## 2022-07-08 NOTE — H&P
[facility]    Cardiology History and Physical - Cardiology         Date of Admission:  7/8/2022    Assessment & Plan: HVSL    Ken Doss is a 76 year old male with a history of insulin-dependent DM2, 2v CAD s/p PCI to mLAD and pRCA, ICM with severe LV systolic dysfunction with EF 15-20% s/p CRT-D, atrial fibrillation status post AV marianna ablation, and mild interstitial lung disease who presents with nausea, emesis, coughing, and trouble sleeping.    Ken Doss is a 76 year old male admitted on 7/8/2022 for coughing and vomiting in the past 3-4 days with a relevant PMH of T2D, Two vessel CAD s/p PCI to mLAD and pRCA feb 2020, ICM with severe LV systolic dysfunction with LVEF 10-20% s/p CRT-D, Afib s/p AVN ablation, Mild ILD. Patient has been diagnosed with heart failure since 2010 and decline advanced therapy and is currently DNR/DNI. Patient will be optimized on medical therapy while avoiding medications that worsen renal function.     Today  - Initiated dobutamine 2.5mcg/kg  - Hold coreg, entresto, and spironolactone  - Continue Ticagrelor 90mg BID  - Initiated 2mg BID Bumex   > Will assess I/O's in the AM to adjust diuretic and optimize patient's fluid status.     # Acute on chronic decompensated systolic heart failure, stage D, NYHA functional class IV  # Ischemic cardiomyopathy  # CAD s/p PCI to pLAD, pRCA (1/2019, 2/2020) + h/o instent stenosis in the past  - Patient started on dobutamine 2.5mcg/kg   >Initiated due to patient's Biventricular heart failure and indications of poor perfusion such as creatinine. Will continue to monitor patient's response on dobutamine drip  - Hold coreg, Entresto and spironolactone   > Held due to patient's worsening renal function and decreasing contractility of the heart  - Continue Ticagrelor 90mg BID   > Continued for patient's Paroxysmal Atrial Fibrillation     # History of NSVT and frequent PVCs - Has  CRT-D.   - Keep K>4, Mg>2    # RAUL on CKD stage IIIB  - Hold Entresto and spironolactone   > Held due to worsening renal function    # PAF s/p AVN ablation  - Hold coreg   > Held due to patient's worsening heart function and decreasing contractility   - Continue eliquis    # DM2  - SSI         Diet: Low saturated fat Ns <2400mg  DVT Prophylaxis: DOAC  Kamara Catheter: Not present  Code Status: DNR/DNI  Fluids: 2L restriction  Lines: Peripheral IV Right Lower Forearm         Disposition Plan   Expected discharge: 4 - 7 days, recommended to prior living arrangement once fluid volume status optimized on oral medication.    Entered: Francisco Doyle MD 07/08/2022, 1:48 PM     The patient's care was discussed with the Attending Physician, Dr. Maura Patton.    Francisco Doyle MD  [facility]    ______________________________________________________________________    Chief Complaint   Nausea, emesis, coughing, trouble sleeping    History is obtained from the patient and chart review.    History of Present Illness    Ken Doss is a 76 year old male with a history of insulin-dependent DM2, 2v CAD s/p PCI to mLAD and pRCA, ICM with severe LV systolic dysfunction with EF 15-20% s/p CRT-D, atrial fibrillation status post AV marianna ablation, and mild interstitial lung disease who presents with nausea, emesis, coughing, and trouble sleeping.    He last saw Dr. Payton in clinic on 6/6 and during that time, he was having worsening functional capacity and renal function (Cr 1.6-1.7 to ~2.6-2.7 and now Cr up to 3 on 6/27), hypotension (BP 88/57), hyperkalemia (K 6.4), worsening NT-proBNP (6400 -> 8900 from Nov). Dr. Payton rediscussed DT LVAD (due to age) with him at this visit and he decided that he did not want a LVAD from a quality of life standpoint. He was recently hospitalized from 6/6-6/12/22 for decompensated heart failure and volume overload. At the end of  June, he had worsening Cr and coreg was decreased from 12.5 to 6.25 mg BID, Entresto and spironolactone stopped.      He is currently being admitted for the initiation of palliative inotropes.      Review of Systems    The 10 point Review of Systems is negative other than noted in the HPI or here.     Past Medical History    I have reviewed this patient's medical history and updated it with pertinent information if needed.   Past Medical History:   Diagnosis Date     Acute renal failure (H)      Anemia      Arthritis     osteoarthritis     Arthritis     osteoarthritis     Arthritis     osteoarthritis     CHF (congestive heart failure) (H)      CHF (congestive heart failure) (H)      CHF (congestive heart failure) (H)      Chronic systolic heart failure (H) Dec 2012     Chronic systolic heart failure (H) Dec 2012     Chronic systolic heart failure (H) Dec 2012     Coronary artery disease involving native coronary artery     Non obstructive disease by cath in 2007 Cor angio Nov 2016: RCA 70% (FFR 0.89), and OM1 75%        Coronary artery disease involving native coronary artery     Non obstructive disease by cath in 2007 Cor angio Nov 2016: RCA 70% (FFR 0.89), and OM1 75%        Coronary artery disease involving native coronary artery     Non obstructive disease by cath in 2007 Cor angio Nov 2016: RCA 70% (FFR 0.89), and OM1 75%        Depressive disorder, not elsewhere classified 10/24/2014     Diabetes mellitus, type 2 (H)      Diabetes mellitus, type II (H) 28/Jan/2013     Diabetes mellitus, type II (H) 28/Jan/2013     Diabetes mellitus, type II (H) 28/Jan/2013     Diabetic neuropathy (H)      Diabetic neuropathy (H)      Diabetic neuropathy (H)      Fractures     ankle, leg and arm     High cholesterol 2007     Hypertension      Hypertension      Hypertension      ICD (implantable cardioverter-defibrillator) lead failure 11/26/2014    High voltage lead tip inserted in RV free wall RV lead extraction and  implantation of the new septal RV lead November 2014      ICD (implantable cardioverter-defibrillator) lead failure 11/26/2014    High voltage lead tip inserted in RV free wall RV lead extraction and implantation of the new septal RV lead November 2014      ILD (interstitial lung disease) (H)      Infectious mononucleosis      Ischemic cardiomyopathy 4/22/2015    Chronic: LVEF 25- 30% LVEF 26% echo May 2017     Ischemic cardiomyopathy 4/22/2015    Chronic: LVEF 25- 30% LVEF 26% echo May 2017     Ischemic cardiomyopathy 4/22/2015    Chronic: LVEF 25- 30% LVEF 26% echo May 2017     Kidney stone      LBBB (left bundle branch block)     noted on Dec 19, 2012     LBBB (left bundle branch block)     noted on Dec 19, 2012     LBBB (left bundle branch block)     noted on Dec 19, 2012     Lymphadenopathy, mediastinal      Malfunction of implantable defibrillator ventricular (ICD) lead 11/9/2021    RV lead malfunction c/w insulation breech. Lead extraction and replacement recommended     Myocardial infarction (H)      Myocardial infarction (H)      Myocardial infarction (H)      Osteoarthritis      Osteoarthritis      Osteoarthritis      Paroxysmal ventricular tachycardia (H) 2/11/2022     Pulmonary anthracosis (H)      Pulmonary anthracosis (H)      Recurrent kidney stones        Past Surgical History   I have reviewed this patient's surgical history and updated it with pertinent information if needed.  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  - PET 2019: medisatinal and Hilar LAD with lower lobe infilftrates  - Cardiac PET Oct. 2019: for sarcoid. Showed no suspicious FDG uptake within the heart  - AVN Ablation in  sept. 2019  - High res CT chest Jan. 2020: Mild ILD consistent with UIP   - PFTs 3/2020: Mild restrictive disease  - C Feb 2020: in-stent stenosis of the ostial RCA and proximal LAD and underswent PCI for both.   - RH Feb 2020: RA:16, RV: 60/15, PA: 59/28/35, PCWP: 20, TD CO/CI: 3.4/1.8, PA saturation: 56.3, PVR: 4.4WU.  - TTE 3/2020: LVEF 10-15%, LVEDD: 6.4, Moderate TR.   - Generator Change 9/2020  - CPX 9/22/21: peak VO2: 16.35 at RER 1.17, elevated VE/VCO2 slope of 44, attenuated HR and BP response, normal lung fuction on baseline spirometry with patinets overall VO2 increasing from previous 2 studies and increased exercise duration by 5-7minutes.     Past Surgical History:   Procedure Laterality Date     APPENDECTOMY       ARTHROSCOPY KNEE Bilateral      ARTHROSCOPY SHOULDER ROTATOR CUFF REPAIR      right     C SHLDR ARTHROSCOP,DIAGNOSTIC      Description: Arthroscopy Shoulder;  Recorded: 06/10/2014;     CARDIAC CATHETERIZATION N/A 12/12/2016    Procedure: Coronary Angiogram;  Surgeon: Delgado Ramirez MD;  Location: [facility] Cath Lab;  Service:      COLONOSCOPY N/A 12/19/2019    Procedure: COLONOSCOPY with polypectomy;  Surgeon: Delgado Price MD;  Location: [facility];  Service: Gastroenterology     CORONARY ANGIOGRAPHY ADULT ORDER       CV CORONARY ANGIOGRAM N/A 2/27/2020    Procedure: CV CORONARY ANGIOGRAM;  Surgeon: Pedro Fontaine MD;  Location: [facility] CARDIAC CATH LAB     CV CORONARY ANGIOGRAM N/A 1/15/2019    Procedure: Coronary Angiogram;  Surgeon: Mason Correa MD;  Location: [facility] Cath Lab;  Service: Cardiology     CV INTRAVASULAR ULTRASOUND N/A 2/27/2020    Procedure: Intravascular Ultrasound;  Surgeon: Pedro Fontaine MD;  Location: [facility] CARDIAC CATH LAB     CV LEFT HEART CATHETERIZATION WITHOUT LEFT VENTRICULOGRAM Left 1/15/2019    Procedure: Left Heart Catheterization Without Left Ventriculogram;  Surgeon: Mason Correa  MD Godwin;  Location: [facility] Cath Lab;  Service: Cardiology     CV PCI ANGIOPLASTY N/A 2/27/2020    Procedure: Percutaneous Coronary Intervention Angioplasty;  Surgeon: Pedro Fontaine MD;  Location: [facility] CARDIAC CATH LAB     CV RIGHT HEART CATH MEASUREMENTS RECORDED N/A 2/27/2020    Procedure: Right Heart Cath;  Surgeon: Pedro Fontaine MD;  Location: [facility] CARDIAC CATH LAB     CV RIGHT HEART CATH MEASUREMENTS RECORDED N/A 7/30/2020    Procedure: CV RIGHT HEART CATH;  Surgeon: Ronny Geronimo MD;  Location: [facility] CARDIAC CATH LAB     CV RIGHT HEART CATH MEASUREMENTS RECORDED N/A 6/10/2022    Procedure: Right Heart Cath;  Surgeon: Ced Recio MD;  Location: [facility] CARDIAC CATH LAB     CV RIGHT HEART EXERCISE STRESS STUDY N/A 6/10/2022    Procedure: Stress Drug Study;  Surgeon: Ced Recio MD;  Location: [facility] CARDIAC CATH LAB     EP ABLATION AV NODE N/A 9/27/2019    Procedure: EP Ablation AV Node;  Surgeon: Markus Pool MD;  Location: [facility] Cath Lab;  Service: Cardiology     EP DFT TESTING FOLLOW UP N/A 2/11/2022    Procedure: EP DFT Test/Follow-up;  Surgeon: Markus Pool MD;  Location: [facility]     EP ICD GENERATOR REPLACEMENT BIVENT Left 2/11/2022    Procedure: EP Implantable Cardio-Defibrillator Generator Change Biventricular;  Surgeon: Markus Pool MD;  Location: [facility]     EP ICD INSERT      ICD REIMPLANTATION OF A NEW RV LEAD 11/26/2014     EP REVISION PACEMAKER DUAL LEAD N/A 2/11/2022    Procedure: EP Recision Pacemaker Dual Lead;  Surgeon: Markus Pool MD;  Location: [facility] CV     EP VENOGRAM N/A 11/12/2021    Procedure: EP Venogram;  Surgeon: Markus Pool MD;  Location: [facility] CV     HC REMOVE TONSILS/ADENOIDS,<11 Y/O      Description: Tonsillectomy With Adenoidectomy;  Recorded: 06/10/2014;     INSERT / REPLACE / REMOVE PACEMAKER       IR UPPER EXTREMITY VENOGRAM LEFT  1/13/2022      JOINT REPLACEMENT      bilateral TKA     OR LASER LEAD EXTRACTION - LEVEL 3 N/A 12/29/2021    Procedure: VIDEO ASSISTED RIGHT THORACOSCOPY;  Surgeon: Brandi Anthony MD;  Location: [facility] CATH LAB CV     OTHER SURGICAL HISTORY  11/26/2014    BIV ICDbiotronic     NM L HRT CATH W/NJX L VENTRICULOGRAPHY IMG S&I Left 12/12/2016    Procedure: Left Heart Catheterization with Left Ventriculogram;  Surgeon: Delgado Ramirez MD;  Location: [facility] Cath Lab;  Service: Cardiology     REPLACEMENT TOTAL KNEE      bilat     TONSILLECTOMY & ADENOIDECTOMY       US LYMPH NODE BIOPSY  7/10/2019       Social History   I have reviewed this patient's social history and updated it with pertinent information if needed.  Social History     Tobacco Use     Smoking status: Never Smoker     Smokeless tobacco: Never Used     Tobacco comment: cigars few times a year only   Substance Use Topics     Alcohol use: Yes     Comment: Alcoholic Drinks/day: occasional     Drug use: No     Family History   I have reviewed this patient's family history and updated it with pertinent information if needed.   I have reviewed this patient's family history and updated it with pertinent information if needed.  Family History   Problem Relation Age of Onset     Sudden Death Mother         unexpected death in her sleep in her 50s       Prior to Admission Medications   Prior to Admission Medications   Prescriptions Last Dose Informant Patient Reported? Taking?   Coenzyme Q10 (COQ-10 PO)   Yes No   Sig: Take 20 mg by mouth daily    Multiple Vitamin (MULTI VITAMIN DAILY PO)   Yes No   Sig: Take 1 tablet by mouth daily    Omega-3 Fatty Acids (FISH OIL) 1200 MG capsule   Yes No   Sig: Take 1,200 mg by mouth daily    acetaminophen (TYLENOL) 500 MG tablet   Yes No   Sig: Take 500-1,000 mg by mouth 2 times daily as needed    apixaban ANTICOAGULANT (ELIQUIS ANTICOAGULANT) 5 MG tablet   No No   Sig: Take 1 tablet (5 mg) by mouth 2 times daily   atorvastatin  (LIPITOR) 40 MG tablet   Yes No   Sig: Take 40 mg by mouth daily   bumetanide (BUMEX) 1 MG tablet   Yes No   Sig: Take 1 tablet (1 mg) by mouth 2 times daily ON HOLD as of 6/15/22   carvedilol (COREG) 6.25 MG tablet   No No   Sig: Take 1 tablet (6.25 mg) by mouth 2 times daily (with meals)   colchicine (COLCYRS) 0.6 MG tablet   Yes No   Sig: TAKE 2 TABLETS BY MOUTH THEN 1 TABLET ONE HOUR LATER AS NEEDED FOR GOUT ATTACK AS DIRECTED   diphenhydrAMINE (BENADRYL) 25 MG tablet   Yes No   Sig: Take 25 mg by mouth At Bedtime   febuxostat (ULORIC) 40 MG TABS tablet   Yes No   Sig: Take 40 mg by mouth daily   ferrous sulfate (FEROSUL) 325 (65 Fe) MG tablet   Yes No   Sig: Take 325 mg by mouth daily (with breakfast)    gabapentin (NEURONTIN) 100 MG capsule   Yes No   Sig: Take 100 mg by mouth 3 times daily   glucosamine-chondroitin 500-400 MG CAPS per capsule   Yes No   Sig: Take 1 tablet by mouth daily    hydrALAZINE (APRESOLINE) 25 MG tablet   No No   Sig: Take 1 tablet (25 mg) by mouth 3 times daily   hydrOXYzine (ATARAX) 10 MG tablet   Yes No   Sig: Take 10-20 mg by mouth every 8 hours as needed   insulin glargine (LANTUS PEN) 100 UNIT/ML pen   Yes No   Sig: Inject 20 Units Subcutaneous At Bedtime    melatonin 3 MG tablet   Yes No   Sig: Take 1 mg by mouth nightly as needed for sleep   metFORMIN (GLUCOPHAGE) 1000 MG tablet   Yes No   Sig: Take 1,000 mg by mouth 2 times daily (with meals)   ticagrelor (BRILINTA) 90 MG tablet   No No   Sig: Take 1 tablet (90 mg) by mouth 2 times daily Please schedule lab draw to F/U on creatine; additional refills after   traMADol (ULTRAM) 50 MG tablet   Yes No   Sig: Take 1 tablet by mouth as needed for pain      Facility-Administered Medications: None     Allergies   No Known Allergies    Physical Exam   Vital Signs:     BP: 101/73 Pulse: 108   Resp: 19 SpO2: 96 % O2 Device: None (Room air)    Weight: 151 lbs 0 oz    Constitutional: awake  Eyes: vision intact  ENT: atramatic  Hematologic  / Lymphatic: no cervical lymphadenopathy and no supraclavicular lymphadenopathy  Respiratory: Crackles on auscultation BL (Chronic)  Cardiovascular: Normal S1/S2 with Grade III/VI Holosystolic Murmur heard throughout   GI: Abdomen slightly distended  Skin: no bruising or bleeding and normal skin color, texture, turgor  Musculoskeletal: +1 Peripheral edema BL to ankles  Neurologic: Awake, alert, oriented to name, place and time.     Data   Data reviewed today: I reviewed all medications, new labs and imaging results over the last 24 hours.     Recent Labs   Lab 07/08/22  1317   WBC 6.1   HGB 8.9*            POTASSIUM 4.0   CHLORIDE 106   CO2 18*   BUN 70.0*   CR 2.80*   ANIONGAP 14   LYNNE 8.8   *   ALBUMIN 3.6   PROTTOTAL 6.5   BILITOTAL 1.3*   ALKPHOS 166*   ALT 49   AST 36       RHC 6/10/22:  Baseline:    MAP: 84mmHg  RA: 9  RV: 42/9  PA: 42/21/28  W: 16  PA sat: 40.2%  Lorie CO/CI: 2.76/1.54 L/min/m2  PVR:4.4 STREET  SVR: 2172    Nipride 2:  MAP: 61mmHg  PA: 20/10/14  W: 6  Pa Sat: 56%  Lorie CO/CI: 3.58/2.0 L/min/m2  PVR: 2.23 STREET     TTE 6/7/22:  Left ventricular function is decreased. The ejection fraction is 15-20%  (severely reduced).Severe left ventricular dilation is present.  The right ventricle is normal size.Global right ventricular function is mildly  reduced.  Moderate mitral insufficiency is present.  Moderate to severe tricuspid insufficiency is present.  IVC diameter <2.1 cm collapsing >50% with sniff suggests a normal RA pressure  of 3 mmHg.  No pericardial effusion is present.     This study was compared with the study from 11/22/21. The RV function is  mildly decreased and TR is moderate to severe

## 2022-07-08 NOTE — ED TRIAGE NOTES
"Pt arrives ambulatory to triage w/ c/o n/v, coughing, trouble sleeping. Pt states as ongoing issue, worsening last night. Called cardiology who instructed him to be seen in ED for evaluation. On/ off sob. Denies cp. Per pt and wife, pt should have been seen earlier d/t kidney issues, however did not want to come in until he saw family from out of town \"one last time.\" Hx of HF, MI      "

## 2022-07-08 NOTE — ED PROVIDER NOTES
ED Provider Note  [facility]      History     Chief Complaint   Patient presents with     Nausea & Vomiting     HPI  Ken Doss is a 76 year old male with a history of CHF with an EF of 15 to 20% who presents with generalized weakness as well as nausea and vomiting.  Patient has been increasingly weak over the past several days.  Is also been having nausea vomiting.  Patient notes shortness of breath, worse with exertion.  Initial plan was for admission in June and likely starting inotropes due to patient's worsening symptoms as well as decreasing kidney function.  Sided to wait until July as he wanted to visit family.  Just and plan was for admission on 7/11/2022 with same plan.  Patient notes worsening symptoms and has presented to continue with plan.  LVAD was discussed with patient and patient decided against this.  He denies any weight gain or peripheral edema.  No recent illness.  No other symptoms noted.    Past Medical History  Past Medical History:   Diagnosis Date     Acute renal failure (H)      Anemia      Arthritis     osteoarthritis     Arthritis     osteoarthritis     Arthritis     osteoarthritis     CHF (congestive heart failure) (H)      CHF (congestive heart failure) (H)      CHF (congestive heart failure) (H)      Chronic systolic heart failure (H) Dec 2012     Chronic systolic heart failure (H) Dec 2012     Chronic systolic heart failure (H) Dec 2012     Coronary artery disease involving native coronary artery     Non obstructive disease by cath in 2007 Cor angio Nov 2016: RCA 70% (FFR 0.89), and OM1 75%        Coronary artery disease involving native coronary artery     Non obstructive disease by cath in 2007 Cor angio Nov 2016: RCA 70% (FFR 0.89), and OM1 75%        Coronary artery disease involving native coronary artery     Non obstructive disease by cath in 2007 Cor angio Nov 2016: RCA 70% (FFR 0.89), and OM1 75%        Depressive disorder, not elsewhere  classified 10/24/2014     Diabetes mellitus, type 2 (H)      Diabetes mellitus, type II (H) 28/Jan/2013     Diabetes mellitus, type II (H) 28/Jan/2013     Diabetes mellitus, type II (H) 28/Jan/2013     Diabetic neuropathy (H)      Diabetic neuropathy (H)      Diabetic neuropathy (H)      Fractures     ankle, leg and arm     High cholesterol 2007     Hypertension      Hypertension      Hypertension      ICD (implantable cardioverter-defibrillator) lead failure 11/26/2014    High voltage lead tip inserted in RV free wall RV lead extraction and implantation of the new septal RV lead November 2014      ICD (implantable cardioverter-defibrillator) lead failure 11/26/2014    High voltage lead tip inserted in RV free wall RV lead extraction and implantation of the new septal RV lead November 2014      ILD (interstitial lung disease) (H)      Infectious mononucleosis      Ischemic cardiomyopathy 4/22/2015    Chronic: LVEF 25- 30% LVEF 26% echo May 2017     Ischemic cardiomyopathy 4/22/2015    Chronic: LVEF 25- 30% LVEF 26% echo May 2017     Ischemic cardiomyopathy 4/22/2015    Chronic: LVEF 25- 30% LVEF 26% echo May 2017     Kidney stone      LBBB (left bundle branch block)     noted on Dec 19, 2012     LBBB (left bundle branch block)     noted on Dec 19, 2012     LBBB (left bundle branch block)     noted on Dec 19, 2012     Lymphadenopathy, mediastinal      Malfunction of implantable defibrillator ventricular (ICD) lead 11/9/2021    RV lead malfunction c/w insulation breech. Lead extraction and replacement recommended     Myocardial infarction (H)      Myocardial infarction (H)      Myocardial infarction (H)      Osteoarthritis      Osteoarthritis      Osteoarthritis      Paroxysmal ventricular tachycardia (H) 2/11/2022     Pulmonary anthracosis (H)      Pulmonary anthracosis (H)      Recurrent kidney stones      Past Surgical History:   Procedure Laterality Date     APPENDECTOMY       ARTHROSCOPY KNEE Bilateral       ARTHROSCOPY SHOULDER ROTATOR CUFF REPAIR      right     C SHLDR ARTHROSCOP,DIAGNOSTIC      Description: Arthroscopy Shoulder;  Recorded: 06/10/2014;     CARDIAC CATHETERIZATION N/A 12/12/2016    Procedure: Coronary Angiogram;  Surgeon: Delgado Ramirez MD;  Location: [facility] Cath Lab;  Service:      COLONOSCOPY N/A 12/19/2019    Procedure: COLONOSCOPY with polypectomy;  Surgeon: Delgado Price MD;  Location: [facility];  Service: Gastroenterology     CORONARY ANGIOGRAPHY ADULT ORDER       CV CORONARY ANGIOGRAM N/A 2/27/2020    Procedure: CV CORONARY ANGIOGRAM;  Surgeon: Pedro Fontaine MD;  Location: [facility] CARDIAC CATH LAB     CV CORONARY ANGIOGRAM N/A 1/15/2019    Procedure: Coronary Angiogram;  Surgeon: Mason Correa MD;  Location: [facility] Cath Lab;  Service: Cardiology     CV INTRAVASULAR ULTRASOUND N/A 2/27/2020    Procedure: Intravascular Ultrasound;  Surgeon: Pedro Fontaine MD;  Location:  HEART CARDIAC CATH LAB     CV LEFT HEART CATHETERIZATION WITHOUT LEFT VENTRICULOGRAM Left 1/15/2019    Procedure: Left Heart Catheterization Without Left Ventriculogram;  Surgeon: Mason Correa MD;  Location: [facility] Cath Lab;  Service: Cardiology     CV PCI ANGIOPLASTY N/A 2/27/2020    Procedure: Percutaneous Coronary Intervention Angioplasty;  Surgeon: Pedro Fontaine MD;  Location:  HEART CARDIAC CATH LAB     CV RIGHT HEART CATH MEASUREMENTS RECORDED N/A 2/27/2020    Procedure: Right Heart Cath;  Surgeon: Pedro Fontaine MD;  Location:  HEART CARDIAC CATH LAB     CV RIGHT HEART CATH MEASUREMENTS RECORDED N/A 7/30/2020    Procedure: CV RIGHT HEART CATH;  Surgeon: Ronny Geronimo MD;  [facility]     CV RIGHT HEART CATH MEASUREMENTS RECORDED N/A 6/10/2022    Procedure: Right Heart Cath;  Surgeon: Ced Recio MD;  Location:  HEART CARDIAC CATH LAB     CV RIGHT HEART EXERCISE  STRESS STUDY N/A 6/10/2022    Procedure: Stress Drug Study;  Surgeon: Ced Recio MD;  Location:  HEART CARDIAC CATH LAB     EP ABLATION AV NODE N/A 9/27/2019    Procedure: EP Ablation AV Node;  Surgeon: Markus Pool MD;  Location: [facility];  Service: Cardiology     EP DFT TESTING FOLLOW UP N/A 2/11/2022    Procedure: EP DFT Test/Follow-up;  Surgeon: Markus Pool MD;  Location: [facility] CV     EP ICD GENERATOR REPLACEMENT BIVENT Left 2/11/2022    Procedure: EP Implantable Cardio-Defibrillator Generator Change Biventricular;  Surgeon: Markus Pool MD;  Location: [facility] CV     EP ICD INSERT      ICD REIMPLANTATION OF A NEW RV LEAD 11/26/2014     EP REVISION PACEMAKER DUAL LEAD N/A 2/11/2022    Procedure: EP Recision Pacemaker Dual Lead;  Surgeon: Markus Pool MD;  Location: [facility] CV     EP VENOGRAM N/A 11/12/2021    Procedure: EP Venogram;  Surgeon: Markus Pool MD;  Location: [facility] CV     HC REMOVE TONSILS/ADENOIDS,<11 Y/O      Description: Tonsillectomy With Adenoidectomy;  Recorded: 06/10/2014;     INSERT / REPLACE / REMOVE PACEMAKER       IR UPPER EXTREMITY VENOGRAM LEFT  1/13/2022     JOINT REPLACEMENT      bilateral TKA     OR LASER LEAD EXTRACTION - LEVEL 3 N/A 12/29/2021    Procedure: VIDEO ASSISTED RIGHT THORACOSCOPY;  Surgeon: Brandi Anthony MD;  Location: [facility]     OTHER SURGICAL HISTORY  11/26/2014    BIV ICDbiotronic     OH L HRT CATH W/NJX L VENTRICULOGRAPHY IMG S&I Left 12/12/2016    Procedure: Left Heart Catheterization with Left Ventriculogram;  Surgeon: Delgado Ramirez MD;  Location: [facility];  Service: Cardiology     REPLACEMENT TOTAL KNEE      bilat     TONSILLECTOMY & ADENOIDECTOMY       US LYMPH NODE BIOPSY  7/10/2019     acetaminophen (TYLENOL) 500 MG tablet  apixaban ANTICOAGULANT (ELIQUIS ANTICOAGULANT) 5 MG tablet  atorvastatin (LIPITOR) 40 MG tablet  bumetanide (BUMEX) 1 MG  "tablet  carvedilol (COREG) 6.25 MG tablet  Coenzyme Q10 (COQ-10 PO)  colchicine (COLCYRS) 0.6 MG tablet  diphenhydrAMINE (BENADRYL) 25 MG tablet  febuxostat (ULORIC) 40 MG TABS tablet  ferrous sulfate (FEROSUL) 325 (65 Fe) MG tablet  gabapentin (NEURONTIN) 100 MG capsule  glucosamine-chondroitin 500-400 MG CAPS per capsule  hydrALAZINE (APRESOLINE) 25 MG tablet  hydrOXYzine (ATARAX) 10 MG tablet  insulin glargine (LANTUS PEN) 100 UNIT/ML pen  melatonin 3 MG tablet  metFORMIN (GLUCOPHAGE) 1000 MG tablet  Multiple Vitamin (MULTI VITAMIN DAILY PO)  Omega-3 Fatty Acids (FISH OIL) 1200 MG capsule  ticagrelor (BRILINTA) 90 MG tablet  traMADol (ULTRAM) 50 MG tablet      No Known Allergies  Family History  Family History   Problem Relation Age of Onset     Sudden Death Mother         unexpected death in her sleep in her 50s     Social History   Social History     Tobacco Use     Smoking status: Never Smoker     Smokeless tobacco: Never Used     Tobacco comment: cigars few times a year only   Substance Use Topics     Alcohol use: Yes     Comment: Alcoholic Drinks/day: occasional     Drug use: No      Past medical history, past surgical history, medications, allergies, family history, and social history were reviewed with the patient. No additional pertinent items.       Review of Systems  A complete review of systems was performed with pertinent positives and negatives noted in the HPI, and all other systems negative.    Physical Exam   BP: 106/69  Pulse: 98  Resp: 16  Height: 175.3 cm (5' 9\")  Weight: 68.5 kg (151 lb)  SpO2: 100 %  Physical Exam  Vitals and nursing note reviewed.   Constitutional:       General: He is not in acute distress.     Appearance: He is well-developed. He is ill-appearing. He is not diaphoretic.   HENT:      Head: Normocephalic and atraumatic.      Mouth/Throat:      Pharynx: No oropharyngeal exudate.   Eyes:      General: No scleral icterus.        Right eye: No discharge.         Left eye: No " discharge.      Pupils: Pupils are equal, round, and reactive to light.   Cardiovascular:      Rate and Rhythm: Normal rate and regular rhythm.      Heart sounds: Murmur heard.    Systolic murmur is present.    No friction rub. No gallop.   Pulmonary:      Effort: Pulmonary effort is normal. No respiratory distress.      Breath sounds: Normal breath sounds. No wheezing.   Chest:      Chest wall: No tenderness.   Abdominal:      General: Bowel sounds are normal. There is no distension.      Palpations: Abdomen is soft.      Tenderness: There is no abdominal tenderness.   Musculoskeletal:         General: No tenderness or deformity. Normal range of motion.      Cervical back: Normal range of motion and neck supple.      Right lower leg: No edema.      Left lower leg: No edema.   Skin:     General: Skin is warm and dry.      Coloration: Skin is not pale.      Findings: No erythema or rash.   Neurological:      Mental Status: He is alert and oriented to person, place, and time.      Cranial Nerves: No cranial nerve deficit.         ED Course      Procedures                     Results for orders placed or performed during the hospital encounter of 07/08/22   XR Chest Port 1 View     Status: None    Narrative    Exam: XR CHEST PORT 1 VIEW, 7/8/2022 1:24 PM    Indication: CHF    Comparison: 6/6/2022    Findings:   Left chest wall implantable cardiac defibrillator with stable and  intact leads. Cardiac silhouette is borderline enlarged. Coronary  artery stents. Indistinct pulmonary vasculature and increased linear  perihilar interstitial opacities. No pneumothorax or effusions. No  focal infectious dislocations.      Impression    Impression: Findings most consistent with interstitial pulmonary  edema.    I have personally reviewed the examination and initial interpretation  and I agree with the findings.    ANGELICA BARKER DO         SYSTEM ID:  [id number]   Comprehensive metabolic panel     Status: Abnormal   Result Value  Ref Range    Sodium 138 136 - 145 mmol/L    Potassium 4.0 3.4 - 5.3 mmol/L    Creatinine 2.80 (H) 0.67 - 1.17 mg/dL    Urea Nitrogen 70.0 (H) 8.0 - 23.0 mg/dL    Chloride 106 98 - 107 mmol/L    Carbon Dioxide (CO2) 18 (L) 22 - 29 mmol/L    Anion Gap 14 7 - 15 mmol/L    Glucose 213 (H) 70 - 99 mg/dL    Calcium 8.8 8.8 - 10.2 mg/dL    Protein Total 6.5 6.4 - 8.3 g/dL    Albumin 3.6 3.5 - 5.2 g/dL    Bilirubin Total 1.3 (H) <=1.2 mg/dL    Alkaline Phosphatase 166 (H) 40 - 129 U/L    AST 36 10 - 50 U/L    ALT 49 10 - 50 U/L    GFR Estimate 23 (L) >60 mL/min/1.73m2   Troponin T, High Sensitivity     Status: Abnormal   Result Value Ref Range    Troponin T, High Sensitivity 71 (H) <=22 ng/L   [place] Draw *Canceled*     Status: None ()    Narrative    The following orders were created for panel order [place] Draw.  Procedure                               Abnormality         Status                     ---------                               -----------         ------                       Please view results for these tests on the individual orders.   CBC with platelets and differential     Status: Abnormal   Result Value Ref Range    WBC Count 6.1 4.0 - 11.0 10e3/uL    RBC Count 2.87 (L) 4.40 - 5.90 10e6/uL    Hemoglobin 8.9 (L) 13.3 - 17.7 g/dL    Hematocrit 28.7 (L) 40.0 - 53.0 %     78 - 100 fL    MCH 31.0 26.5 - 33.0 pg    MCHC 31.0 (L) 31.5 - 36.5 g/dL    RDW 17.2 (H) 10.0 - 15.0 %    Platelet Count 158 150 - 450 10e3/uL    % Neutrophils 81 %    % Lymphocytes 6 %    % Monocytes 10 %    % Eosinophils 3 %    % Basophils 0 %    % Immature Granulocytes 0 %    NRBCs per 100 WBC 0 <1 /100    Absolute Neutrophils 4.9 1.6 - 8.3 10e3/uL    Absolute Lymphocytes 0.4 (L) 0.8 - 5.3 10e3/uL    Absolute Monocytes 0.6 0.0 - 1.3 10e3/uL    Absolute Eosinophils 0.2 0.0 - 0.7 10e3/uL    Absolute Basophils 0.0 0.0 - 0.2 10e3/uL    Absolute Immature Granulocytes 0.0 <=0.4 10e3/uL    Absolute NRBCs 0.0 10e3/uL   [place] Draw      Status: None    Narrative    The following orders were created for panel order [place] Draw.  Procedure                               Abnormality         Status                     ---------                               -----------         ------                     Extra Blue Top Tube[[id number]]                              Final result               Extra Red Top Tube[[id number]]                               Final result                 Please view results for these tests on the individual orders.   Extra Blue Top Tube     Status: None   Result Value Ref Range    Hold Specimen JIC    Extra Red Top Tube     Status: None   Result Value Ref Range    Hold Specimen JIC    Asymptomatic COVID-19 Virus (Coronavirus) by PCR Nasopharyngeal     Status: Normal    Specimen: Nasopharyngeal; Swab   Result Value Ref Range    SARS CoV2 PCR Negative Negative, Testing sent to reference lab. Results will be returned via unsolicited result    Narrative    Testing was performed using the Xpert Xpress SARS-CoV-2 Assay on the  [x+1] Systems. Additional information about  this Emergency Use Authorization (EUA) assay can be found via the Lab  Guide. This test should be ordered for the detection of SARS-CoV-2 in  individuals who meet SARS-CoV-2 clinical and/or epidemiological  criteria. Test performance is unknown in asymptomatic patients. This  test is for in vitro diagnostic use under the FDA EUA for  laboratories certified under CLIA to perform high complexity testing.  This test has not been FDA cleared or approved. A negative result  does not rule out the presence of PCR inhibitors in the specimen or  target RNA in concentration below the limit of detection for the  assay. The possibility of a false negative should be considered if  the patient's recent exposure or clinical presentation suggests  COVID-19. This test was validated by the [facility]. This  laboratory is certified under  the Clinical Laboratory Improvement Amendments of 1988 (CLIA-88) as  qualified to perform high complexity laboratory testing.     CBC with platelets differential     Status: Abnormal    Narrative    The following orders were created for panel order CBC with platelets differential.  Procedure                               Abnormality         Status                     ---------                               -----------         ------                     CBC with platelets and d...[[id number]]  Abnormal            Final result                 Please view results for these tests on the individual orders.     Medications   lidocaine 1 % 0.1-5 mL (has no administration in time range)   lidocaine (LMX4) cream (has no administration in time range)   sodium chloride (PF) 0.9% PF flush 10-40 mL (has no administration in time range)   DOBUTamine 500 mg in dextrose 5% 250 mL (adult std conc) premix (2.5 mcg/kg/min × 68.5 kg Intravenous New Bag 7/8/22 1431)   HOLD nitroGLYcerin IF (has no administration in time range)   Patient is already receiving anticoagulation with heparin, enoxaparin (LOVENOX), warfarin (COUMADIN)  or other anticoagulant medication (has no administration in time range)   apixaban ANTICOAGULANT (ELIQUIS) tablet 5 mg (has no administration in time range)   atorvastatin (LIPITOR) tablet 40 mg (has no administration in time range)   ferrous sulfate (FEROSUL) tablet 325 mg (has no administration in time range)   gabapentin (NEURONTIN) capsule 100 mg (has no administration in time range)   hydrALAZINE (APRESOLINE) tablet 25 mg (has no administration in time range)   hydrOXYzine (ATARAX) tablet 10-20 mg (has no administration in time range)   ticagrelor (BRILINTA) tablet 90 mg (has no administration in time range)   diphenhydrAMINE (BENADRYL) capsule 25 mg (has no administration in time range)        Assessments & Plan (with Medical Decision Making)   This is a 76-year-old male with  CHF with unknown EF of 15 to 20% who presents with worsening weakness as well as nausea and vomiting.  Initial plan was for admission on 7/11/2022 for likely initiation of inotropes as patient has worsening symptoms as well as increasing creatinine.  Patient has been having more symptoms and has presented to the Emergency Department.  Here, blood pressure is noted to be around 100 systolic which per patient and family is slightly higher than it has been recently.  Lab work shows creatinine of 2.8.  Troponin is 71.  Chest x-ray shows likely interstitial edema.  I discussed the case with Cardiology who recommend starting patient on dobutamine.  They also recommend having PICC line placed as patient will likely need regular infusion of inotropes.  We will admit to the Cardiology service.    I have reviewed the nursing notes. I have reviewed the findings, diagnosis, plan and need for follow up with the patient.    New Prescriptions    No medications on file       Final diagnoses:   Congestive heart failure, unspecified HF chronicity, unspecified heart failure type (H)       --  Rambo Lange DO  [facility] EMERGENCY DEPARTMENT  7/8/2022     Rambo Lange DO  07/08/22 [id number]

## 2022-07-09 NOTE — PROCEDURES
[facility]    Double Lumen PICC Placement    Date/Time: 7/8/2022 7:07 PM  Performed by: Bob Donaldson RN  Authorized by: Maura Patton MD   Indications: vascular access      UNIVERSAL PROTOCOL   Site Marked: Yes  Prior Images Obtained and Reviewed:  Yes  Required items: Required blood products, implants, devices and special equipment available    Patient identity confirmed:  Verbally with patient, arm band, provided demographic data and hospital-assigned identification number  NA - No sedation, light sedation, or local anesthesia  Confirmation Checklist:  Patient's identity using two indicators, relevant allergies, procedure was appropriate and matched the consent or emergent situation and correct equipment/implants were available  Time out: Immediately prior to the procedure a time out was called    Universal Protocol: the Joint Commission Universal Protocol was followed    Preparation: Patient was prepped and draped in usual sterile fashion       ANESTHESIA    Anesthesia: Local infiltration  Local Anesthetic:  Lidocaine 1% without epinephrine  Anesthetic Total (mL):  5      SEDATION    Patient Sedated: No        Preparation: skin prepped with ChloraPrep  Skin prep agent: skin prep agent completely dried prior to procedure  Sterile barriers: maximum sterile barriers were used: cap, mask, sterile gown, sterile gloves, and large sterile sheet  Hand hygiene: hand hygiene performed prior to central venous catheter insertion  Type of line used: Power PICC  Catheter type: double lumen  Lumen type: non-valved  Catheter size: 5 Fr  Brand: Bard  Lot number: [id number]  Placement method: venipuncture, MST, ultrasound and tip navigation system  Number of attempts: 1  Difficulty threading catheter: no  Successful placement: yes  Orientation: right    Location: brachial vein (lateral) (0.59cm)  Tip Location: superior cavoatrial junction  Arm circumference: adults 10  cm  Extremity circumference: 22  Visible catheter length: 1  Total catheter length: 44  Dressing and securement: adhesive securement device, alcohol impregnated caps, blood removed, blood cleaned with CHG, chlorhexidine patch applied, fixation device, line secured, statlock, securement device and transparent securement dressing  Post procedure assessment: blood return through all ports, free fluid flow and placement verified by x-ray  PROCEDURE   Patient Tolerance:  Patient tolerated the procedure well with no immediate complicationsDescribe Procedure: Right lateral-brachial vein 0.59cm 1cm external.Placement verified by CXR.PICC okay to use.

## 2022-07-09 NOTE — PROGRESS NOTES
CLINICAL NUTRITION SERVICES    Reason for Assessment:  Low-sodium (2 g/day) nutrition education    Diet History:  Pt reports no history of receiving low-sodium nutrition education in the past. Patient reports losing 2-3 lb in the last two days due to nausea/vomiting. Pt states he does not like the food in the hospital and lost weight last time he was admitted. Usually has two meals at home, lots of fruits and vegetables, beans for protein. Wife does not like meat so he does not eat much but will have a hot dog now and again or a fish sandwich from [employer]. Has oral nutrition supplements at home, feels like he is losing strength. Would like snacks/supplements while in hospital.     Nutrition Diagnosis:  Food- and nutrition-related knowledge deficit r/t no previous knowledge of low-sodium diet AEB pt report of no previous formal low-sodium nutrition education.    Nutrition Prescription/Recs:  Continue low-sodium diet.      Interventions:  Nutrition Education  1. Provided verbal instruction on low-sodium meal planning.  2. Provided the following handouts: How to Read Nutrition Labels, Low-Sodium Foods and Drinks, Tips for a Low-Sodium Diet  3. Ordered Ensure and cheese and crackers between meals to encourage adequate intakes during admission.     Goals:    Pt will verbalize at least five high sodium foods and the importance of avoiding added salt to foods for cooking or seasoning foods.     Follow-up:   Patient to ask any further nutrition-related questions before discharge. In addition, pt may request outpatient RD appointment.    Elvi Uriostegui MS, RDN, LDN  Weekend RD pager [phone]

## 2022-07-09 NOTE — PLAN OF CARE
Status: DM2, CAD s/p PCI, ICM with severe LV systolic dysfunction with EF 15-20% s/p CRT-D, atrial fibrillation status post AV marianna ablation, and mild interstitial lung disease who presents with nausea, emesis, coughing, and trouble sleeping.    Neuro: A/Ox4.  Cardiac: Paced with PVC's. VSS.   Respiratory: O2 sats stable on 2L 98%, desats on RA when sleeping. crackles in bases  GI/: Voiding in urinal at bedside  Diet/appetite: 2 GM NA, 2L FR.   Activity: Up to commode independently., SBA and walker to walk.   Pain: Denies pain.  Skin: Skin tear on L arm, scabs on shins, scab on L great toe.   LDA's: PICC PIV  Dobutamine gtt at 2.5 mcg/kg/min    Plan: Continue with POC. Notify primary team with changes.

## 2022-07-09 NOTE — PLAN OF CARE
"Goal Outcome Evaluation:    Plan of Care Reviewed With: patient     Overall Patient Progress: improving    Outcome Evaluation: good urine output, ambulated in hallway    Patient admitted for heart failure exacerbation.  PMH of ischemic cardiomyopathy, interstitial lung disease, type 2 diabetes, atrial fibrillation with ablation, CRT-D defibrillator placement, and percutaneous intervention.     Neuro: A&O x4, denied pain, reported intermittent \"wooziness\".  Respiratory:  Had dyspnea on exertion and orthopnea, lung sounds diminished in bases.  Cardiac: Pansystolic heart murmur noted.  No extremity edema noted.    GI: Denied nausea, bowel sounds hyperactive.  Had a bowel movement today.  : Had good urine output, see I&O flowsheet.  Skin: See PCS for assessment and treatment of wounds.   VS: In paced rhythm with frequent PVCs, HR 100s-110s, SBP 100s-110s, SaO2 94-99% on room air.    Drips:  Dobutamine gtt increased to 5 mcg/kg/min (10.3 ml/hr).  Bumetanide gtt started at 2 mg/hr (8 ml/hr).    Electrolytes: No replacement required.  Pain: Denied.  Tests/procedures: None performed during shift.    Mobility: Ambulated in hallway with standby assist and use of walker.    Plan:  Continue to monitor pain, VS, heart rhythm, skin integrity, fluid status, bowel status, cardiac and respiratory status.  Notify care team of changes in patient condition or other concerns.  Continue diuresis.  Potential discharge next week pending euvolemia.  "

## 2022-07-09 NOTE — PROGRESS NOTES
07/08/22 2200   Living Environment   People in Home spouse   Current Living Arrangements house  (Wife- Carrol)   Home Accessibility stairs to enter home;stairs within home   Number of Stairs, Main Entrance 2   Stair Railings, Main Entrance railings safe and in good condition   Number of Stairs, Within Home, Primary greater than 10 stairs   Stair Railings, Within Home, Primary railings safe and in good condition   Transportation Anticipated family or friend will provide   Living Environment Comments Pt reported that he lives in a multi-level home with his spouse. Pt reported that he has WIS and tubshower but during the shower enjoys to use his outside shower (no lip). Pt reported that he also goes to his cabin regularly in the summer. Pt reported standard height toilets and handicapped height toilets. Pt use a SPC to use but does not use it all the time unless hiking.   Self-Care   Usual Activity Tolerance good   Current Activity Tolerance moderate   Regular Exercise Other (see comments)  (Pt reported that he likes to hike but has had increased challeneges with activity tolerance to complete exercise.)   Equipment Currently Used at Home cane, straight   Fall history within last six months yes   Number of times patient has fallen within last six months 3   Activity/Exercise/Self-Care Comment Pt reported that he is independent with all ADLs.   Instrumental Activities of Daily Living (IADL)   Previous Responsibilities meal prep;housekeeping;laundry;shopping;yardwork;medication management;finances;driving   IADL Comments Independent with all IADLs.   General Information   Onset of Illness/Injury or Date of Surgery 07/08/22   Referring Physician Maura Jesus MD   Patient/Family Therapy Goal Statement (OT) I want to get stronger for my family.   Additional Occupational Profile Info/Pertinent History of Current Problem Ken Doss is a 76 year old male with a history of insulin-dependent DM2, 2v CAD s/p PCI to  mLAD and pRCA, ICM with severe LV systolic dysfunction with EF 15-20% s/p CRT-D, atrial fibrillation status post AV marianna ablation, and mild interstitial lung disease who presents with nausea, emesis, coughing, and trouble sleeping.   Left Upper Extremity (Weight-bearing Status) full weight-bearing (FWB)   Right Upper Extremity (Weight-bearing Status) full weight-bearing (FWB)   Left Lower Extremity (Weight-bearing Status) full weight-bearing (FWB)   Right Lower Extremity (Weight-bearing Status) full weight-bearing (FWB)   Heart Disease Risk Factors Age;Gender;Lack of physical activity   Cognitive Status Examination   Orientation Status orientation to person, place and time   Affect/Mental Status (Cognitive) WNL   Follows Commands WNL   Visual Perception   Visual Impairment/Limitations WNL   Sensory   Sensory Quick Adds No deficits were identified   Pain Assessment   Patient Currently in Pain No   Integumentary/Edema   Integumentary/Edema other (describe)   Integumentary/Edema Comments Pt does present with 1+ pitting to BLE   Range of Motion Comprehensive   General Range of Motion other (see comments)   Comment, General Range of Motion Pt with WNL Rom to RUE but does have decreased shoulder ROM to LUE ~ 45 degrees with shoulder flexion. Pt reported that he has a known rotator cuff injury.   Muscle Tone Assessment   Muscle Tone Quick Adds No deficits were identified   Bed Mobility   Bed Mobility sit-supine;supine-sit   Supine-Sit Jasper (Bed Mobility) minimum assist (75% patient effort)   Sit-Supine [place] (Bed Mobility) minimum assist (75% patient effort)   Assistive Device (Bed Mobility) bed rails;other (see comments)   Comment (Bed Mobility) Pt completed supine<>sit with HOB elevated and Mono x1.   Transfers   Transfers sit-stand transfer;toilet transfer;shower transfer   Sit-Stand Transfer   Sit-Stand Jasper (Transfers) contact guard   Assistive Device (Sit-Stand Transfers) walker, front-wheeled    Shower Transfer   Type (Shower Transfer) sit-stand;stand-sit   Saunders Level (Shower Transfer) contact guard   Toilet Transfer   Type (Toilet Transfer) stand-sit;sit-stand   Saunders Level (Toilet Transfer) contact guard   Balance   Balance Assessment no deficits were identified   Activities of Daily Living   BADL Assessment/Intervention lower body dressing   Lower Body Dressing Assessment/Training   Saunders Level (Lower Body Dressing) contact guard assist   Clinical Impression   Criteria for Skilled Therapeutic Interventions Met (OT) Yes, treatment indicated   OT Diagnosis Pt presenting with decreased activity tolerance and strength related to completion of ADLs and IADLs.   OT Problem List-Impairments impacting ADL problems related to;activity tolerance impaired;strength   Assessment of Occupational Performance 1-3 Performance Deficits   Planned Therapy Interventions (OT) ADL retraining;strengthening;home program guidelines;progressive activity/exercise   Clinical Decision Making Complexity (OT) low complexity   Risk & Benefits of therapy have been explained evaluation/treatment results reviewed;care plan/treatment goals reviewed;risks/benefits reviewed;current/potential barriers reviewed;participants voiced agreement with care plan;patient   OT Discharge Planning   OT Discharge Recommendation (DC Rec) home with assist;home with outpatient cardiac rehab   OT Rationale for DC Rec Pt is at or near baseline but would continue to benefit from CR for cardio health.   Total Evaluation Time (Minutes)   Total Evaluation Time (Minutes) 10   OT Goals   Therapy Frequency (OT) 5 times/wk   OT Predicted Duration/Target Date for Goal Attainment 07/20/22   OT Goals Lower Body Dressing;Cardiac Phase 1;Hygiene/Grooming   OT: Hygiene/Grooming modified independent;while standing   OT: Lower Body Dressing Independent   OT: Understanding of cardiac education to maximize quality of life, condition management, and health  outcomes Patient   OT: Perform aerobic activity with stable cardiovascular response continuous;15 minutes;NuStep   OT: Functional/aerobic ambulation tolerance with stable cardiovascular response in order to return to home and community environment Modified independent   OT: Navigation of stairs simulating home set up with stable cardiovascular response in order to return to home and community environment Modified independent

## 2022-07-09 NOTE — PROGRESS NOTES
[facility]    Cardiology History and Physical - Cardiology         Date of Admission:  7/8/2022    Assessment & Plan: HVSL    Ken Doss is a 76 year old male with a history of insulin-dependent DM2, 2v CAD s/p PCI to mLAD and pRCA, ICM with severe LV systolic dysfunction with EF 15-20% s/p CRT-D, atrial fibrillation status post AV marianna ablation, and mild interstitial lung disease who presents with nausea, emesis, coughing, and trouble sleeping.    Ken Doss is a 76 year old male admitted on 7/8/2022 for coughing and vomiting in the past 3-4 days with a relevant PMH of T2D, Two vessel CAD s/p PCI to mLAD and pRCA feb 2020, ICM with severe LV systolic dysfunction with LVEF 10-20% s/p CRT-D, Afib s/p AVN ablation, Mild ILD. Patient has been diagnosed with heart failure since 2010 and decline advanced therapy and is currently DNR/DNI. Patient will be optimized on medical therapy while avoiding medications that worsen renal function.     Today  - Continued dobutamine 2.5mcg/kg  - 3mg bumex bolus given followed by 2mg drip.  - Hold coreg, entresto, and spironolactone  - Continue Ticagrelor 90mg BID  - Initiated 2mg BID Bumex   > Will assess I/O's in the AM to adjust diuretic and optimize patient's fluid status.     # Acute on chronic decompensated systolic heart failure, stage D, NYHA functional class IV  # Ischemic cardiomyopathy  # CAD s/p PCI to pLAD, pRCA (1/2019, 2/2020) + h/o instent stenosis in the past  - Patient continued on dobutamine 2.5mcg/kg   >Initiated due to patient's Biventricular heart failure and indications of poor perfusion such as creatinine. Will continue to monitor patient's response on dobutamine drip  - 3mg bumex bolus given followed by 2mg drip.  - Hold coreg, Entresto and spironolactone   > Held due to patient's worsening renal function and decreasing contractility of the heart  - Continue Ticagrelor 90mg BID   >  Continued for patient's Paroxysmal Atrial Fibrillation     # History of NSVT and frequent PVCs - Has CRT-D.   - Keep K>4, Mg>2    # RAUL on CKD stage IIIB  - Hold Entresto and spironolactone   > Held due to worsening renal function    # PAF s/p AVN ablation  - Hold coreg   > Held due to patient's worsening heart function and decreasing contractility   - Continue eliquis    # DM2  - SSI         Diet: Low saturated fat Ns <2400mg  DVT Prophylaxis: DOAC  Kamara Catheter: Not present  Code Status: DNR/DNI  Fluids: 2L restriction  Lines: Peripheral IV Right Lower Forearm         Disposition Plan   Expected discharge: 4 - 7 days, recommended to prior living arrangement once fluid volume status optimized on oral medication.    Entered: Francisco Doyle MD 07/09/2022, 8:24 AM     The patient's care was discussed with the Attending Physician, Dr. Maura Patton.    Francisco Doyle MD  [facility]    ______________________________________________________________________  Interval   Patient seen at the bedside while PT is present. Patient states he is feeling better. Denies any worsening SOB, chest pressure, chest pain, abdominal pain, nausea, vomiting, peripheral edema.       Physical Exam   Vital Signs: Temp: 98.2  F (36.8  C) Temp src: Oral BP: 105/71 Pulse: 112   Resp: 22 SpO2: 96 % O2 Device: None (Room air) Oxygen Delivery: 2 LPM  Weight: 151 lbs 14.4 oz    Constitutional: awake  Eyes: vision intact  ENT: atramatic  Hematologic / Lymphatic: no cervical lymphadenopathy and no supraclavicular lymphadenopathy  Respiratory: Crackles on auscultation BL (Chronic)  Cardiovascular: Normal S1/S2 with Grade III/VI Holosystolic Murmur heard throughout   GI: Abdomen slightly distended  Skin: no bruising or bleeding and normal skin color, texture, turgor  Musculoskeletal: +1 Peripheral edema BL to ankles  Neurologic: Awake, alert, oriented to name, place and time.     Data   Data reviewed  today: I reviewed all medications, new labs and imaging results over the last 24 hours.     Recent Labs   Lab 07/09/22  0512 07/08/22  2122 07/08/22  1949 07/08/22  1317   WBC 6.9  --  7.6 6.1   HGB 8.3*  --  9.0* 8.9*     --  102* 100     --  160 158   INR 2.52*  --   --   --     137 136 138   POTASSIUM 3.9 3.6 4.0 4.0   CHLORIDE 106 105 105 106   CO2 19* 20* 16* 18*   BUN 66.8* 68.4* 69.4* 70.0*   CR 2.57* 2.68* 2.66* 2.80*   ANIONGAP 13 12 15 14   LYNNE 8.6* 8.8 8.9 8.8   * 146* 150* 213*   ALBUMIN  --   --  3.5 3.6   PROTTOTAL  --   --  6.7 6.5   BILITOTAL  --   --  1.2 1.3*   ALKPHOS  --   --  173* 166*   ALT  --   --  46 49   AST  --   --   --  36       RHC 6/10/22:  Baseline:    MAP: 84mmHg  RA: 9  RV: 42/9  PA: 42/21/28  W: 16  PA sat: 40.2%  Lorie CO/CI: 2.76/1.54 L/min/m2  PVR:4.4 STREET  SVR: 2172    Nipride 2:  MAP: 61mmHg  PA: 20/10/14  W: 6  Pa Sat: 56%  Lorie CO/CI: 3.58/2.0 L/min/m2  PVR: 2.23 STREET     TTE 6/7/22:  Left ventricular function is decreased. The ejection fraction is 15-20%  (severely reduced).Severe left ventricular dilation is present.  The right ventricle is normal size.Global right ventricular function is mildly  reduced.  Moderate mitral insufficiency is present.  Moderate to severe tricuspid insufficiency is present.  IVC diameter <2.1 cm collapsing >50% with sniff suggests a normal RA pressure  of 3 mmHg.  No pericardial effusion is present.     This study was compared with the study from 11/22/21. The RV function is  mildly decreased and TR is moderate to severe

## 2022-07-09 NOTE — PLAN OF CARE
Assumed cares at [phone]     Status: CHF  Neuro:  AOX4  GI/: On bedside commode, (-) UO within 2 hrs  Resp: Deep breathing, sating at 95-98% at RA: Mild crackles on rt lower lobe  Mobility: SBA, Independent going to commode  Cardiac: Trop and BNP elevated, denies chest pain: Lines/Drains: PICC line at rt upper arm heparinized: PIV at rt lower forearm dobutamine 2.5 mcg/kg going  Pain: Denies pain     -Paged the CARDS to make an order for code DNR/DNI not updated  -Called the float pool to send the pt to floor with monitor

## 2022-07-09 NOTE — PROGRESS NOTES
Admission    Diagnosis: CHF  Admitted from: ED at 2100  Via: Cart  Accompanied by:no one  Belongings: Placed in closet. Meds sent to pharmacy. Declined sending any items to security.  Admission Profile: Complete  Teaching: orientation to unit, call don't fall, use of console, meal times, visiting hours, when to call for the RN (angina/sob/dizziness, etc.), and enforced importance of safety   Access: PIV PICC  Telemetry: Placed on patient  Height/Weight: Complete    2 RN skin check completed with Candi FELIX. Findings include skin tear on L arm- old dressing removed, site oozing- quick clot applied. Small scab on L great toe from shoes, scattered dry scabs on allie legs, blanchable erythema on coccyx.

## 2022-07-10 NOTE — PLAN OF CARE
"Goal Outcome Evaluation:    Plan of Care Reviewed With: patient     Overall Patient Progress: no change    Outcome Evaluation: continues to have good urine output, transitioned to different diuretic    Patient admitted for heart failure exacerbation.  PMH of ischemic cardiomyopathy, interstitial lung disease, type 2 diabetes, atrial fibrillation with ablation, CRT-D defibrillator placement, and percutaneous intervention.     Neuro: A&O x4, reported intermittent \"wooziness\".  Respiratory:  Had dyspnea on exertion and orthopnea, fine crackles auscultated in bases, improved by end of shift.  Cardiac: Pansystolic heart murmur noted.  No extremity edema noted.    GI: Denied nausea, bowel sounds hyperactive.  Had a bowel movement today.  : Had good urine output, see I&O flowsheet.  Skin: See PCS for assessment and treatment of wounds.   VS: In paced rhythm with occasional PVCs, HR 70s-80s, SBP 90s-110s, SaO2 97-98% on room air.    Drips:  Dobutamine gtt continued at 5 mcg/kg/min (10.3 ml/hr).  Bumetanide gtt discontinued, patient started on furosemide at 20 mg/hr (2 ml/hr).    Electrolytes: Potassium and magnesium replaced per protocol.  Pain: Reported abdominal pressure and generalized body achiness that improved after taking prn acetaminophen.  Tests/procedures: None performed during shift.    Mobility: Ambulated in hallway with standby assist and use of walker.    Plan:  Continue to monitor pain, VS, heart rhythm, skin integrity, fluid status, bowel status, cardiac and respiratory status.  Notify care team of changes in patient condition or other concerns.  Continue diuresis.  Potential discharge this week pending euvolemia.  "

## 2022-07-10 NOTE — CONSULTS
Care Management Initial Consult    General Information  Assessment completed with: VM-chart review,    Type of CM/SW Visit: Initial Assessment    Primary Care Provider verified and updated as needed:     Readmission within the last 30 days: previous discharge plan unsuccessful         Advance Care Planning: Advance Care Planning Reviewed: no concerns identified          Communication Assessment  Patient's communication style: spoken language (English or Bilingual)    Hearing Difficulty or Deaf: no   Wear Glasses or Blind: yes    Cognitive  Cognitive/Neuro/Behavioral: WDL  Level of Consciousness: alert  Arousal Level: opens eyes spontaneously  Orientation: oriented x 4  Mood/Behavior: cooperative  Best Language: 0 - No aphasia  Speech: clear, spontaneous    Living Environment:   People in home: spouse     Current living Arrangements: house      Able to return to prior arrangements: yes       Family/Social Support:  Care provided by: self  Provides care for: no one  Marital Status:              Description of Support System:           Current Resources:   Patient receiving home care services: No     Community Resources: None  Equipment currently used at home: cane, straight  Supplies currently used at home: None    Employment/Financial:  Employment Status: employed part-time        Financial Concerns:             Lifestyle & Psychosocial Needs:  Social Determinants of Health     Tobacco Use: Low Risk      Smoking Tobacco Use: Never Smoker     Smokeless Tobacco Use: Never Used   Alcohol Use: Not on file   Financial Resource Strain: Not on file   Food Insecurity: Not on file   Transportation Needs: Not on file   Physical Activity: Not on file   Stress: Not on file   Social Connections: Not on file   Intimate Partner Violence: Not on file   Depression: Not at risk     PHQ-2 Score: 1   Housing Stability: Not on file       Functional Status:  Prior to admission patient needed assistance:   Dependent ADLs::  Independent  Dependent IADLs:: Independent       Mental Health Status:          Chemical Dependency Status:                Values/Beliefs:  Spiritual, Cultural Beliefs, Mandaeism Practices, Values that affect care:                 Additional Information:  Patient admits with heart failure complications, fluid overload, was recently here past month for similar. In June was consulted for possible LVAD placement at which time patient declined from a quality of life standpoint. Patient is admitted to hospital and initiating palliative inotropes for treatment instead of more invasive options. Patient lives locally in [place] with his spouse Karen, is independent at baseline, no further likely needs at this point. RNCC/SW internal handoff needed at discharge time to [facility] in [facility] needed prior discharge time. RNCC will continue to follow for discharge planning.    NEW RubioN, BA, RN, CMSRN, RNCC  Covering Units 6D/OBS  Pager: [phone]  Phone: [phone]  6th floor Weekend/Holiday Pager: [phone]  Observation weekend/after hours: [phone]

## 2022-07-10 NOTE — PROGRESS NOTES
"[facility]    Cardiology History and Physical - Cardiology         Date of Admission:  7/8/2022    Assessment & Plan: HVSL    Ken Doss is a 76 year old male with a history of insulin-dependent DM2, 2v CAD s/p PCI to mLAD and pRCA, ICM with severe LV systolic dysfunction with EF 15-20% s/p CRT-D, atrial fibrillation status post AV marianna ablation, and mild interstitial lung disease who presents with nausea, emesis, coughing, and trouble sleeping.    Ken Doss is a 76 year old male admitted on 7/8/2022 for coughing and vomiting in the past 3-4 days with a relevant PMH of T2D, Two vessel CAD s/p PCI to mLAD and pRCA feb 2020, ICM with severe LV systolic dysfunction with LVEF 10-20% s/p CRT-D, Afib s/p AVN ablation, Mild ILD. Patient has been diagnosed with heart failure since 2010 and decline advanced therapy and is currently DNR/DNI. Patient will be optimized on medical therapy while avoiding medications that worsen renal function.     Today  - Continued dobutamine 2.5mcg/kg  - Bumex transitioned to 20mg Lasix drip due to myalgias    > 1g Diuril given due to poor renal output   > Will assess I/O's in the AM to adjust diuretic and optimize patient's fluid status.   - Hold coreg, entresto, and spironolactone  - Hold apixaban and ticagrelor due to dark stools described as \"black\" with downtrend in hgb      # Acute on chronic decompensated systolic heart failure, stage D, NYHA functional class IV  # Ischemic cardiomyopathy  # CAD s/p PCI to pLAD, pRCA (1/2019, 2/2020) + h/o instent stenosis in the past  - Patient continued on dobutamine 2.5mcg/kg   >Initiated due to patient's Biventricular heart failure and indications of poor perfusion such as creatinine. Will continue to monitor patient's response on dobutamine drip  - Bumex transitioned to 20mg Lasix drip due to myalgias    > 1g Diuril given due to poor renal output   > Will assess I/O's in the " "AM to adjust diuretic and optimize patient's fluid status.   - Hold coreg, Entresto and spironolactone   > Held due to patient's worsening renal function and decreasing contractility of the heart  - Hold apixaban and ticagrelor due to dark stools described as \"black\" with downtrend in hgb    #Black Stools 2/2 Ferrous sulfate vs. UGIB  #Chronic Anemia  - Iron studies ordered to assess hgb and anemia  - Lactate    # History of NSVT and frequent PVCs - Has CRT-D.   - Keep K>4, Mg>2    # RAUL on CKD stage IIIB  - Hold Entresto and spironolactone   > Held due to worsening renal function    # PAF s/p AVN ablation  - Hold coreg   > Held due to patient's worsening heart function and decreasing contractility   - Continue eliquis    # DM2  - SSI         Diet: Low saturated fat Ns <2400mg  DVT Prophylaxis: DOAC  Kamara Catheter: Not present  Code Status: DNR/DNI  Fluids: 2L restriction  Lines: Peripheral IV Right Lower Forearm         Disposition Plan   Expected discharge: 4 - 7 days, recommended to prior living arrangement once fluid volume status optimized on oral medication.    Entered: Francisco Doyle MD 07/10/2022, 7:27 AM     The patient's care was discussed with the Attending Physician, Dr. Maura Patton.    Francisco Doyle MD  [facility]    ______________________________________________________________________  Interval   Patient seen at the bedside and inquired on length of stay. Spoke on the plan of diuresing and optimizing heart function with patient. He then spoke about his stomach and feelings of being bloated.        Physical Exam   Vital Signs: Temp: 97.9  F (36.6  C) Temp src: Oral BP: 112/66 Pulse: 99   Resp: 20 SpO2: 100 % O2 Device: None (Room air) Oxygen Delivery: 2 LPM  Weight: 149 lbs 3.2 oz    Constitutional: awake  Eyes: vision intact  ENT: atramatic  Hematologic / Lymphatic: no cervical lymphadenopathy and no supraclavicular lymphadenopathy  Respiratory: " Crackles on auscultation BL (Chronic)  Cardiovascular: Normal S1/S2 with Grade III/VI Holosystolic Murmur heard predominantly at the LLSB   GI: Abdomen slightly distended  Skin: no bruising or bleeding and normal skin color, texture, turgor  Musculoskeletal: no peripheral edema   Neurologic: Awake, alert, oriented to name, place and time.            Data   Data reviewed today: I reviewed all medications, new labs and imaging results over the last 24 hours.     Recent Labs   Lab 07/10/22  0557 07/09/22 2139 07/09/22  1728 07/09/22  0512 07/08/22 2122 07/08/22  1949 07/08/22  1317   WBC 6.6  --  8.2 6.9  --  7.6 6.1   HGB 8.1*  --  8.6* 8.3*  --  9.0* 8.9*   MCV 99  --  101* 100  --  102* 100   *  --  170 156  --  160 158   INR 2.45*  --   --  2.52*  --   --   --      --  136 138   < > 136 138   POTASSIUM 3.0*  --  3.8 3.9   < > 4.0 4.0   CHLORIDE 103  --  103 106   < > 105 106   CO2 22  --  20* 19*   < > 16* 18*   BUN 58.8*  --  64.6* 66.8*   < > 69.4* 70.0*   CR 2.38*  --  2.51* 2.57*   < > 2.66* 2.80*   ANIONGAP 12  --  13 13   < > 15 14   LYNNE 8.3*  --  8.6* 8.6*   < > 8.9 8.8   * 191* 250* 120*   < > 150* 213*   ALBUMIN  --   --   --   --   --  3.5 3.6   PROTTOTAL  --   --   --   --   --  6.7 6.5   BILITOTAL  --   --   --   --   --  1.2 1.3*   ALKPHOS  --   --   --   --   --  173* 166*   ALT  --   --   --   --   --  46 49   AST  --   --   --   --   --   --  36    < > = values in this interval not displayed.       [facility] 6/10/22:  Baseline:    MAP: 84mmHg  RA: 9  RV: 42/9  PA: 42/21/28  W: 16  PA sat: 40.2%  Lorie CO/CI: 2.76/1.54 L/min/m2  PVR:4.4 STREET  SVR: 2172    Nipride 2:  MAP: 61mmHg  PA: 20/10/14  W: 6  Pa Sat: 56%  Lorie CO/CI: 3.58/2.0 L/min/m2  PVR: 2.23 STREET     TTE 6/7/22:  Left ventricular function is decreased. The ejection fraction is 15-20%  (severely reduced).Severe left ventricular dilation is present.  The right ventricle is normal size.Global right ventricular function is  mildly  reduced.  Moderate mitral insufficiency is present.  Moderate to severe tricuspid insufficiency is present.  IVC diameter <2.1 cm collapsing >50% with sniff suggests a normal RA pressure  of 3 mmHg.  No pericardial effusion is present.     This study was compared with the study from 11/22/21. The RV function is  mildly decreased and TR is moderate to severe

## 2022-07-10 NOTE — PLAN OF CARE
Neuro: A/Ox4. Appeared to sleep well between cares.   Cardiac: Paced with frequent PVC's. VSS.   Respiratory: O2 sats 88% when sleeping, O2 applied. LS diminished in bases.  GI/: Voiding in urinal, commode at bedside  Diet/appetite: 2L FR, 2GM NA  Activity:  Up to commode independently, SBA for walking  Pain: C/O muscle/rib pain. Tylenol given with relief.  Skin: No new deficits noted.  LDA's: PICC, PIV  Dobutamine gtt at 5mcg/kg/min  Bumex gtt at 2mg/hr    Plan: Continue with POC. Notify primary team with changes.

## 2022-07-11 NOTE — PROGRESS NOTES
"Care Management Follow Up    Length of Stay (days): 3    Expected Discharge Date: 07/13/2021   Concerns to be Addressed: discharge planning     Patient plan of care discussed at interdisciplinary rounds: Yes    Anticipated Discharge Disposition: Home     Anticipated Discharge Services: Home Infusion  Anticipated Discharge DME: None    Patient/family educated on Medicare website which has current facility and service quality ratings: yes  Education Provided on the Discharge Plan: yes  Patient/Family in Agreement with the Plan: yes    Referrals Placed by CM/SW: Home Infusion  Private pay costs discussed: Not applicable    Additional Information:  Per MD, pt will need to discharge home on palliative IV dobutamine. This writer had sent benefit check to [facility] this morning and they need additional documentation regarding pt's symptom improvement while on the dobutamine. Once they receive additional documentation pt will have the following coverage:    \"Pt has 80/20 coverage up to max OOP $3000 met $427.89 so far.\"    Voicemail left with [facility] requesting PICC/Dobutamine teaching. Pt states his wife will be at the hospital tomorrow so that would be most ideal but pt also states that he could learn independently for now and wife could learn once home.     Palliative consulted and plan for pt/wife to meet with them tomorrow. Pt states he his hopeful to be able to discharge home on dobutamine soon so he can spend time with family.     [facility]  Phone: [phone]    For home IV dobutamine and PICC line cares/supplies.     CC will continue to monitor patient's medical condition and progress towards discharge.  Valerie Mane RN BSN  6C Unit Care Coordinator  Phone number: [phone]  Pager: [phone]        "

## 2022-07-11 NOTE — PLAN OF CARE
Status: DM2, CAD s/p PCI, ICM with severe LV systolic dysfunction with EF 15-20% s/p CRT-D, atrial fibrillation status post AV marianna ablation, and mild interstitial lung disease who presents with nausea, emesis, coughing, and trouble sleeping.      Neuro: A/Ox4. Pleasant and cooperative. Fatigued.   Cardiac: Paced with frequent PVC's.VSS.  Respiratory: O2 sats 98% on 2L NC, LS diminished in bases. Gets very ESTRADA.   GI/: Voiding good amounts in urinal at bedside.  Diet/appetite: Nausea and poor po yesterday. Emesis x 1 overnight. Ativan give IV x 1.   Activity: Up to commode with SBA, was slightly weaker overnight so bed alarm on for safety.   Pain: Tylenol for body aches.   Skin: Has skin tear on L arm, multiple scabs  Lines: PICC DL  Lasix gtt at 20mg/hr  Dobutamine gtt at 5mcg/kg/min    Plan: Continue with POC. Notify primary team with changes.

## 2022-07-11 NOTE — PROGRESS NOTES
[facility]    Cardiology History and Physical - Cardiology         Date of Admission:  7/8/2022    Assessment & Plan: SWELLINGTON Rogers FAITH Doss is a 76 year old male admitted on 7/8/2022 for coughing and vomiting in the past 3-4 days with a relevant PMH of T2D, Two vessel CAD s/p PCI to mLAD and pRCA feb 2020, ICM with severe LV systolic dysfunction with LVEF 10-20% s/p CRT-D, Afib s/p AVN ablation, Mild ILD. Patient has been diagnosed with heart failure since 2010 and decline advanced therapy and is currently DNR/DNI. Patient will be optimized on medical therapy while avoiding medications that worsen renal function.     Today   - Continued dobutamine 2.5mcg/kg    > Patient has shown improvement on dobutamine and will be continued on this medication with discharge given prognosis and GOC.   - Continue 20mg Lasix drip due to myalgias on Bumex    > 1g Diuril given due to poor renal output  - Hold coreg, entresto, and spironolactone  - Restarted apixaban and ticagrelor due to dark stools and iron studies aligning with chronic iron deficiency anemia instead of UGIB   > IV iron given  - GOC   > Discussed with patient his prognosis with Dr. Patton present. Patient was very understanding and agreeable. He stated he wished to spend this time with family. We reviewed the options of hospice and palliative care. Palliative care consult will be placed for tomorrow 7/12 and scheduled for wife to be present as patient wishes.     # Acute on chronic decompensated systolic heart failure, stage D, NYHA functional class IV  # Ischemic cardiomyopathy  # CAD s/p PCI to pLAD, pRCA (1/2019, 2/2020) + h/o instent stenosis in the past  - Patient continued on dobutamine 2.5mcg/kg   >Initiated due to patient's Biventricular heart failure and indications of poor perfusion such as creatinine. Will continue to monitor patient's response on dobutamine drip  - Continue 20mg Lasix drip due  to myalgias on Bumex    > 1g Diuril given due to poor renal output  - Hold coreg, Entresto and spironolactone   > Held due to patient's worsening renal function and decreasing contractility of the heart  - Restarted apixaban and ticagrelor due to dark stools and iron studies aligning with chronic iron deficiency anemia instead of UGIB   > IV iron given    #Black Stools 2/2 Ferrous sulfate vs. UGIB  #Chronic Anemia  - Iron studies ordered to assess hgb and anemia   > Patient was iron deficient, which explains anemia. Patient given IV iron.   - Lactate   >Lactate result was 0.7.     # History of NSVT and frequent PVCs - Has CRT-D.   - Keep K>4, Mg>2    # RAUL on CKD stage IIIB  - Hold Entresto and spironolactone   > Held due to worsening renal function    # PAF s/p AVN ablation  - Hold coreg   > Held due to patient's worsening heart function and decreasing contractility   - Continue eliquis    # DM2  - SSI         Diet: Low saturated fat Ns <2400mg  DVT Prophylaxis: DOAC  Kamara Catheter: Not present  Code Status: DNR/DNI  Fluids: 2L restriction  Lines: Peripheral IV Right Lower Forearm         Disposition Plan   Expected discharge: possibly within 48hrs, recommended to prior living arrangement once fluid volume status optimized on oral medication.    Entered: Francisco Doyle MD 07/11/2022, 7:58 AM     The patient's care was discussed with the Attending Physician, Dr. Maura Patton.    Francisco Doyle MD  [facility]    ______________________________________________________________________  Interval   Patient seen at the bedside and inquired on length of stay again. Given prognosis, [facility] conversation was held with patient between patient and Dr. Patton. Patient was agreeable and understood the goals of hospice vs. Palliative care. Patient wanted palliative scheduled for visit when wife is present. He also expressed his relief with change of medication from bumex to  lasix and continuation of dobutamine. He has greatly improved since his presentation with the diuretics given and dobutamine drip.         Physical Exam   Vital Signs: Temp: 98.2  F (36.8  C) Temp src: Oral BP: 109/67 Pulse: 78   Resp: 22 SpO2: 98 % O2 Device: None (Room air) Oxygen Delivery: 2 LPM  Weight: 143 lbs 3.2 oz    Constitutional: awake  Eyes: vision intact  ENT: atramatic  Hematologic / Lymphatic: no cervical lymphadenopathy and no supraclavicular lymphadenopathy  Respiratory: Crackles on auscultation BL (Chronic)  Cardiovascular: Normal S1/S2 with Grade III/VI Holosystolic Murmur heard predominantly at the LLSB   GI: Positive bowel sounds with no distension   Skin: no bruising or bleeding and normal skin color, texture, turgor  Musculoskeletal: no peripheral edema   Neurologic: Awake, alert, oriented to name, place and time.     Data   Data reviewed today: I reviewed all medications, new labs and imaging results over the last 24 hours.     Recent Labs   Lab 07/11/22  0432 07/11/22  0420 07/10/22  2134 07/10/22  1442 07/10/22  1236 07/10/22  0748 07/10/22  0557 07/09/22  1728 07/09/22  0512 07/08/22  2122 07/08/22  1949 07/08/22  1317   WBC 6.9  --   --  8.0  --   --  6.6   < > 6.9  --  7.6 6.1   HGB 8.4*  --   --  8.6*  --   --  8.1*   < > 8.3*  --  9.0* 8.9*   MCV 97  --   --  100  --   --  99   < > 100  --  102* 100     --   --  159  --   --  147*   < > 156  --  160 158   INR 2.02*  --   --   --   --   --  2.45*  --  2.52*  --   --   --      --   --   --  135*  --  137   < > 138   < > 136 138   POTASSIUM 3.2*  --   --   --  4.5  4.4  --  3.0*   < > 3.9   < > 4.0 4.0   CHLORIDE 100  --   --   --  101  --  103   < > 106   < > 105 106   CO2 26  --   --   --  22  --  22   < > 19*   < > 16* 18*   BUN 61.2*  --   --   --  58.7*  --  58.8*   < > 66.8*   < > 69.4* 70.0*   CR 2.49*  --   --   --  2.26*  --  2.38*   < > 2.57*   < > 2.66* 2.80*   ANIONGAP 12  --   --   --  12  --  12   < > 13   < >  15 14   LYNNE 8.9  --   --   --  8.4*  --  8.3*   < > 8.6*   < > 8.9 8.8   * 168* 249*  --  205*   < > 142*   < > 120*   < > 150* 213*   ALBUMIN  --   --   --   --  3.5  --   --   --   --   --  3.5 3.6   PROTTOTAL  --   --   --   --  6.5  --   --   --   --   --  6.7 6.5   BILITOTAL  --   --   --   --  1.6*  --   --   --   --   --  1.2 1.3*   ALKPHOS  --   --   --   --  222*  --   --   --   --   --  173* 166*   ALT  --   --   --   --  40  --   --   --   --   --  46 49   AST  --   --   --   --  35  --   --   --   --   --   --  36    < > = values in this interval not displayed.       [facility] 6/10/22:  Baseline:    MAP: 84mmHg  RA: 9  RV: 42/9  PA: 42/21/28  W: 16  PA sat: 40.2%  Lorie CO/CI: 2.76/1.54 L/min/m2  PVR:4.4 STREET  SVR: 2172    Nipride 2:  MAP: 61mmHg  PA: 20/10/14  W: 6  Pa Sat: 56%  Lorie CO/CI: 3.58/2.0 L/min/m2  PVR: 2.23 STREET     TTE 6/7/22:  Left ventricular function is decreased. The ejection fraction is 15-20%  (severely reduced).Severe left ventricular dilation is present.  The right ventricle is normal size.Global right ventricular function is mildly  reduced.  Moderate mitral insufficiency is present.  Moderate to severe tricuspid insufficiency is present.  IVC diameter <2.1 cm collapsing >50% with sniff suggests a normal RA pressure  of 3 mmHg.  No pericardial effusion is present.     This study was compared with the study from 11/22/21. The RV function is  mildly decreased and TR is moderate to severe

## 2022-07-12 NOTE — CONSULTS
07/12/22 4018 Mckenna Bland, ELVIRA     Patient and wife seen at bedside for DL PICC/IV medication education. Wife RD on a model with flushing unused lumen, cap change and Changing IV bag on a CADD [employer] infusion pump. Both asked many questions. Wife needed review of all skills. Wife will need to review skills with home care nurse a few times before feeling safe with all skills and being independent. She is careful with handling supplies but states she is overwhelmed with all skills. Pt. States he would like extension tubing and eventually may want to do cares if he feels stronger. Literature given: Handwashing and Skin Care, Caring for Your PICC at Home, Changing the End Cap, Flushing the Line with Heparin, Saline or Citrate, and Literature given: How to Change Your Medicine Bag Using the CADD [employer] Pump.

## 2022-07-12 NOTE — PROGRESS NOTES
[facility]     Received referral from Valerie LANDIS for IV Dobutamine.  Benefits verified.  Pt has [employer] Medicare plan, pt will meet Medicare criteria for coverage once improvement in symptoms is documented in chart by provider.  Pt has 80/20 coverage up to max OOP $3000(met $427.89 so far).  Spoke with patient to review home infusion services, review benefits and offer choice of providers.  Patient would like to keep cares in the [facility] system and will use [facility] for home infusion.  Skyler is expected to discharge soon and will be going home on continuous IV dobutamine.  He has never done home IV therapy before however he and his spouse will have some initial teaching by [facility] nurse today.  Met with patient at bedside and provided him with information about [facility] services.  Explained about administration method, the pump, medication bag and noah pack used.   I informed him that I will assist with hook up of his home medication here at the hospital on day of discharge and he will have a follow up nurse visit at home the following day to continue teaching the daily bag changes.   Informed patient about supplies and delivery of supplies, storage of medication, continuous infusion requirements, backup pump and medication bag, plan for SNV and 24/7 availability of [facility] staff while on IV therapy.   Skyler will receive home nursing through [facility].  Patient verbalized understanding of all information given.  He is willing and able to learn and manage home IV therapy and states his spouse will be able to help.  Informed patient that it will take approximately 5hrs from time discharge orders are received by [facility] pharmacy for delivery to arrive at hospital on day of discharge.  Questions answered.  [facility] Liaison will follow along to assist with discharge home with infusion needs.      Thank you for the referral.     ELVIRA Duran  [facility] Nurse Liaison   [email or web address]  Cell:  [phone] M-F  [facility] Main: [phone]

## 2022-07-12 NOTE — CONSULTS
"[facility]  Palliative Care Consultation Note    Patient: Ken Doss  Date of Admission:  7/8/2022    Requesting Clinician / Team: Cards 2  Reason for consult: Symptom management  Goals of care  Patient and family support    Recommendations:    Goals of care: Skyler is hoping that the IV dobutamine will give him more time at home feeling well enough to be able to say goodbye to people and to finish up things that he wants to get done before he dies.  He is already feeling much better than he felt on admission and hopes that this will continue for as long as possible.  He understands that the cardiologist think he likely only has weeks to maybe months left to live and he hopes that what ever time he has left to live, he will feel as good as he feels now.  He does not want to just \"exist\" and prior to admission he was feeling horrible with severe shortness of breath.  He wants to be able to go home and stay on the dobutamine IV as long as it is helping him feel good.  Once the IV dobutamine stops working he hopes to be able to transition to hospice care and hopes to die peacefully.  He is very scared about becoming severely short of breath without having medications to take to help him feel better.    Skyler is unsure what to do about his ICD and he still has questions as to how it works.  He would greatly appreciate having Dr. Patton speak with him and his wife together on Wednesday after 1 PM.  Skyler is considering having the ICD set to be able to deliver only 1 shock if needed but he is still confused about how the ICD works and therefore would appreciate Dr. Patton's input.    Cardiology to ask social work to try and arrange for patient to go home with home care that also has associated hospice team so that when Skyler needs to transition to hospice, it will be easy transition.     Please ask  to arrange to have hospice meet with patient once he is " home so that he and his wife can have an informational meeting and learn more about hospice so that they will understand how it will work and when to make the transition from home care with dobutamine to hospice without dobutamine.     DNR/DNI - POLST completed and put in chart.      These recommendations have been discussed with cardiology team.      Thank you for the opportunity to participate in the care of this patient and family. Our team: will continue to follow.     During regular M-F work hours -- if you are not sure who specifically to contact -- please contact us by sending a text page to our team consult pager at [phone].    After regular work hours and on weekends/holidays, you can call our answering service at [phone]. Also, who's on call for us is available in [employer].       Assessments:  Ken Doss is a 76 year old male with T2D, Two vessel CAD s/p PCI to mLAD and pRCA feb 2020, Afib s/p AVN ablation, Mild ILD and ICM with severe LV systolic dysfunction with LVEF 10-20% s/p CRT-D. Patient was initially diagnosed with heart failure in 2010 and has since declined advanced therapy. Recently hospitalized 6/6-6/12/22 for decompensated heart failure and volume overload. At the end of June, he had worsening creatinine and coreg was decreased, Entresto and spironolactone stopped. He presented to the hospital on 7/8/22 for coughing and vomiting for 4 days. Decision made to pursue medical optimization and initiate palliative inotropic medicines.     Today, the patient was seen for:   Advanced heart failure   Dyspnea with exertion   Progressive debility   goals of care  support.      Prognosis, Goals, & Planning:      Functional Status just prior to hospitalization: 2 (Ambulatory and capable of all selfcare but unable to carry out any work activities; may need help with IADLs up and about > 50% of waking hours)    3 (Capable of only limited self-care; needs help with ADLs; in bed/chair  ">50% of waking hours)      Prognosis, Goals, and/or Advance Care Planning were addressed today: Yes        Summary/Comments: I met with Skyler and his wife, Karen, today.  Skyler shared with us that the cardiology team told him today that time was very limited and Skyler wanted to know if that meant days or weeks or years left to live.  They shared with me that 2 years ago they were told that Skyler only had 6 months left to live so they have known for many years that he has a limited amount of time left to live.  Over the past 6 months he has had a significant decline in his physical functioning.  I told Skyler and Karen that the cardiology team felt that Skyler likely only had weeks to possibly months left to live.  They were not surprised by this and also hoped that what ever time Skyler has left to live, he could feel well enough to be able to say his goodbyes and finish things that he wanted to accomplish before he .  Skyler told me that he did not want to just \"exist\" and he felt that the dobutamine IV was already helping him feel much better than he felt on admission.      Patient's decision making preferences: shared with support from loved ones          Patient has decision-making capacity today for complex decisions: Yes            I have concerns about the patient/family's health literacy today: No           Patient has a completed Health Care Directive: No.       Code status: No CPR / No Intubation    Coping, Meaning, & Spirituality:   Mood, coping, and/or meaning in the context of serious illness were addressed today: Yes  Summary/Comments: Skyler is not scared to die as his mariano is very strong and provides him with a tremendous amount of comfort.    Social:     Living situation: Lives at home with wife Karen.  They met in high school and dated for 6 years before getting .  They have been  for 54 years    Children: 2 adult sons, 4 grandchildren    Key family / caregivers: Wife    Occupational history: Patient " former contractor. Previous [employer] .     Spiritual Background: Chiquis, wife is a .      History of Present Illness:  History gathered today from: patient, family/loved ones, medical chart, medical team members    Ken Doss is a 76 year old male with T2D, Two vessel CAD s/p PCI to mLAD and pRCA feb 2020, Afib s/p AVN ablation, Mild ILD and ICM with severe LV systolic dysfunction with LVEF 10-20% s/p CRT-D. Patient was initially diagnosed with heart failure in 2010 and has since declined advanced therapy. Recently hospitalized 6/6-6/12/22 for decompensated heart failure and volume overload. At the end of June, he had worsening creatinine and coreg was decreased, Entresto and spironolactone stopped. He presented to the hospital on 7/8/22 for coughing and vomiting for 4 days. Decision made to pursue medical optimization and initiate palliative inotropic medicines.     Past 6 months significant physical decline.     ROS:  Comprehensive ROS is reviewed and is negative except as here & per HPI:      Past Medical History:  Past Medical History:   Diagnosis Date     Acute renal failure (H)      Anemia      Arthritis     osteoarthritis     Arthritis     osteoarthritis     Arthritis     osteoarthritis     CHF (congestive heart failure) (H)      CHF (congestive heart failure) (H)      CHF (congestive heart failure) (H)      Chronic systolic heart failure (H) Dec 2012     Chronic systolic heart failure (H) Dec 2012     Chronic systolic heart failure (H) Dec 2012     Coronary artery disease involving native coronary artery     Non obstructive disease by cath in 2007 Cor angio Nov 2016: RCA 70% (FFR 0.89), and OM1 75%        Coronary artery disease involving native coronary artery     Non obstructive disease by cath in 2007 Cor angio Nov 2016: RCA 70% (FFR 0.89), and OM1 75%        Coronary artery disease involving native coronary artery     Non obstructive disease by cath in 2007 Cor  angio Nov 2016: RCA 70% (FFR 0.89), and OM1 75%        Depressive disorder, not elsewhere classified 10/24/2014     Diabetes mellitus, type 2 (H)      Diabetes mellitus, type II (H) 28/Jan/2013     Diabetes mellitus, type II (H) 28/Jan/2013     Diabetes mellitus, type II (H) 28/Jan/2013     Diabetic neuropathy (H)      Diabetic neuropathy (H)      Diabetic neuropathy (H)      Fractures     ankle, leg and arm     High cholesterol 2007     Hypertension      Hypertension      Hypertension      ICD (implantable cardioverter-defibrillator) lead failure 11/26/2014    High voltage lead tip inserted in RV free wall RV lead extraction and implantation of the new septal RV lead November 2014      ICD (implantable cardioverter-defibrillator) lead failure 11/26/2014    High voltage lead tip inserted in RV free wall RV lead extraction and implantation of the new septal RV lead November 2014      ILD (interstitial lung disease) (H)      Infectious mononucleosis      Ischemic cardiomyopathy 4/22/2015    Chronic: LVEF 25- 30% LVEF 26% echo May 2017     Ischemic cardiomyopathy 4/22/2015    Chronic: LVEF 25- 30% LVEF 26% echo May 2017     Ischemic cardiomyopathy 4/22/2015    Chronic: LVEF 25- 30% LVEF 26% echo May 2017     Kidney stone      LBBB (left bundle branch block)     noted on Dec 19, 2012     LBBB (left bundle branch block)     noted on Dec 19, 2012     LBBB (left bundle branch block)     noted on Dec 19, 2012     Lymphadenopathy, mediastinal      Malfunction of implantable defibrillator ventricular (ICD) lead 11/9/2021    RV lead malfunction c/w insulation breech. Lead extraction and replacement recommended     Myocardial infarction (H)      Myocardial infarction (H)      Myocardial infarction (H)      Osteoarthritis      Osteoarthritis      Osteoarthritis      Paroxysmal ventricular tachycardia (H) 2/11/2022     Pulmonary anthracosis (H)      Pulmonary anthracosis (H)      Recurrent kidney stones         Past Surgical  History:  Past Surgical History:   Procedure Laterality Date     APPENDECTOMY       ARTHROSCOPY KNEE Bilateral      ARTHROSCOPY SHOULDER ROTATOR CUFF REPAIR      right     C SHLDR ARTHROSCOP,DIAGNOSTIC      Description: Arthroscopy Shoulder;  Recorded: 06/10/2014;     CARDIAC CATHETERIZATION N/A 12/12/2016    Procedure: Coronary Angiogram;  Surgeon: Delgado Ramirez MD;  Location: [facility] Cath Lab;  Service:      COLONOSCOPY N/A 12/19/2019    Procedure: COLONOSCOPY with polypectomy;  Surgeon: Delgado Price MD;  Location: [facility];  Service: Gastroenterology     CORONARY ANGIOGRAPHY ADULT ORDER       CV CORONARY ANGIOGRAM N/A 02/27/2020    Procedure: CV CORONARY ANGIOGRAM;  Surgeon: Pedro Fontaine MD;  Location: [facility] CARDIAC CATH LAB     CV CORONARY ANGIOGRAM N/A 01/15/2019    Procedure: Coronary Angiogram;  Surgeon: Mason Correa MD;  Location: [facility] Cath Lab;  Service: Cardiology     CV INTRAVASULAR ULTRASOUND N/A 02/27/2020    Procedure: Intravascular Ultrasound;  Surgeon: Pedro Fontaine MD;  Location: [facility] CARDIAC CATH LAB     CV LEFT HEART CATHETERIZATION WITHOUT LEFT VENTRICULOGRAM Left 01/15/2019    Procedure: Left Heart Catheterization Without Left Ventriculogram;  Surgeon: Mason Correa MD;  Location: [facility] Cath Lab;  Service: Cardiology     CV PCI ANGIOPLASTY N/A 02/27/2020    Procedure: Percutaneous Coronary Intervention Angioplasty;  Surgeon: Pedro Fontaine MD;  Location:  HEART CARDIAC CATH LAB     CV RIGHT HEART CATH MEASUREMENTS RECORDED N/A 02/27/2020    Procedure: Right Heart Cath;  Surgeon: Pedro Fontaine MD;  Location:  HEART CARDIAC CATH LAB     CV RIGHT HEART CATH MEASUREMENTS RECORDED N/A 07/30/2020    Procedure: CV RIGHT HEART CATH;  Surgeon: Ronny Geronimo MD;  Location:  HEART CARDIAC CATH LAB     CV RIGHT HEART CATH MEASUREMENTS RECORDED N/A 06/10/2022     Procedure: Right Heart Cath;  Surgeon: Ced Recio MD;  Location: [facility] CARDIAC CATH LAB     CV RIGHT HEART EXERCISE STRESS STUDY N/A 06/10/2022    Procedure: Stress Drug Study;  Surgeon: Ced Recio MD;  Location: [facility] CARDIAC CATH LAB     EP ABLATION AV NODE N/A 09/27/2019    Procedure: EP Ablation AV Node;  Surgeon: Markus Pool MD;  Location: [facility] Cath Lab;  Service: Cardiology     EP DFT TESTING FOLLOW UP N/A 02/11/2022    Procedure: EP DFT Test/Follow-up;  Surgeon: Markus Pool MD;  Location: [facility] CV     EP ICD GENERATOR REPLACEMENT BIVENT Left 02/11/2022    Procedure: EP Implantable Cardio-Defibrillator Generator Change Biventricular;  Surgeon: Markus Pool MD;  Location: [facility]     EP ICD INSERT      ICD REIMPLANTATION OF A NEW RV LEAD 11/26/2014     EP REVISION PACEMAKER DUAL LEAD N/A 02/11/2022    Procedure: EP Recision Pacemaker Dual Lead;  Surgeon: Markus Pool MD;  Location: [facility] CV     EP VENOGRAM N/A 11/12/2021    Procedure: EP Venogram;  Surgeon: Markus Pool MD;  Location: [facility]     HC REMOVE TONSILS/ADENOIDS,<11 Y/O      Description: Tonsillectomy With Adenoidectomy;  Recorded: 06/10/2014;     INSERT / REPLACE / REMOVE PACEMAKER       IR UPPER EXTREMITY VENOGRAM LEFT  01/13/2022     JOINT REPLACEMENT      bilateral TKA     OR LASER LEAD EXTRACTION - LEVEL 3 N/A 12/29/2021    Procedure: VIDEO ASSISTED RIGHT THORACOSCOPY;  Surgeon: Brandi Anthony MD;  Location: [facility] CV     OTHER SURGICAL HISTORY  11/26/2014    BIV ICDbiotronic     PICC DOUBLE LUMEN PLACEMENT Right 07/08/2022    Right lateral-brachial vein 0.59cm 1cm external.Placement verified by CXR.PICC okay to use.     KS L HRT CATH W/NJX L VENTRICULOGRAPHY IMG S&I Left 12/12/2016    Procedure: Left Heart Catheterization with Left Ventriculogram;  Surgeon: Delgado Ramirez MD;  Location: [facility] Lab;  Service: Cardiology     REPLACEMENT  TOTAL KNEE      bilat     TONSILLECTOMY & ADENOIDECTOMY       US LYMPH NODE BIOPSY  07/10/2019         Family History:  Family History   Problem Relation Age of Onset     Sudden Death Mother         unexpected death in her sleep in her 50s         Allergies:  No Known Allergies     Medications:  I have reviewed this patient's medication profile and medications from this hospitalization.       Physical Exam:  Vital Signs: Temp: 98.4  F (36.9  C) Temp src: Oral BP: 114/63 Pulse: 81   Resp: 16 SpO2: 95 % O2 Device: Nasal cannula    Weight: 139 lbs 8 oz  General: well-appearing, tearful at times, appropriately emotional for situation, appears stated age, in NAD  HEENT: NC/AT, EOMI, MMM    Resp: Breathing comfortably on RA, speaking full sentences   Extremities: no peripheral edema bilaterally   Skin: no skin rashes   Neuro: alert and oriented x4, no gross neurological deficits, appropriate, engaged, sensorium intact         Data reviewed:  Recent imaging and labs reviewed

## 2022-07-12 NOTE — PLAN OF CARE
Goal Outcome Evaluation:      Paced.VSS. RA. Dobutamine at 5mcg/kg/min. Lasix at 20 mg/hr. See I/O. K+ 3.2, replaced, recheck 3.7. denies pain and SOB. SBA to BSC. Continue to monitor and notify team of changes.

## 2022-07-12 NOTE — PROGRESS NOTES
[facility]    Cardiology History and Physical - Cardiology         Date of Admission:  7/8/2022    Assessment & Plan: EMMA Rogers FAITH Doss is a 76 year old male admitted on 7/8/2022 for coughing and vomiting in the past 3-4 days with a relevant PMH of T2D, Two vessel CAD s/p PCI to mLAD and pRCA feb 2020, ICM with severe LV systolic dysfunction with LVEF 10-20% s/p CRT-D, Afib s/p AVN ablation, Mild ILD. Patient has been diagnosed with heart failure since 2010 and decline advanced therapy and is currently DNR/DNI. Patient will be optimized on medical therapy while avoiding medications that worsen renal function.     Today   - Palliative to see patient, GOC  - Continued dobutamine 2.5mcg/kg, will be discharged with this   - Continue 20mg Lasix drip, 1g diuril      # Acute on chronic decompensated systolic heart failure, stage D, NYHA functional class IV  # Ischemic cardiomyopathy  # CAD s/p PCI to pLAD, pRCA (1/2019, 2/2020) + h/o instent stenosis in the past  EF 15-20%  - Patient continued on dobutamine 2.5mcg/kg   >Initiated due to patient's Biventricular heart failure and indications of poor perfusion such as creatinine. Will continue to monitor patient's response on dobutamine drip  - Continue 20mg Lasix drip due to myalgias on Bumex    > 1g Diuril given due to poor renal output  - Hold coreg, Entresto and spironolactone   > Held due to patient's worsening renal function and decreasing contractility of the heart  - Restarted apixaban and ticagrelor due to dark stools and iron studies aligning with chronic iron deficiency anemia instead of UGIB   > IV iron given    #Black Stools 2/2 Ferrous sulfate vs. UGIB  #Chronic Anemia  Hemoglobin stable, dark but not black stools   - Iron studies ordered to assess hgb and anemia   > Patient was iron deficient, which explains anemia. Patient given IV iron.   - Lactate   >Lactate result was 0.7.     # History of NSVT  and frequent PVCs - Has CRT-D.   - Keep K>4, Mg>2    # RAUL on CKD stage IIIB  - Hold Entresto and spironolactone   > Held due to worsening renal function    # PAF s/p AVN ablation  - Hold coreg   > Held due to patient's worsening heart function and decreasing contractility   - Continue apixaban    # DM2  - SSI       Diet: Low saturated fat Ns <2400mg  DVT Prophylaxis: DOAC  Kamara Catheter: Not present  Code Status: DNR/DNI  Fluids: 2L restriction  Lines: Peripheral IV Right Lower Forearm       Disposition Plan   Expected discharge: possibly within 48hrs, recommended to prior living arrangement once fluid volume status optimized on oral medication.    Entered: Anthony Jenkins MD 07/12/2022, 7:16 AM     The patient's care was discussed with the Attending Physician, Dr. Maura Patton.    Irma Jenkins MD  Internal Medicine PGY2    ______________________________________________________________________  Interval  No acute overnight events, patient feels well this morning denies chest pain/shortness of breath or other concerns. Will be discussing goals of care with palliative care and will have home teaching for dobutamine with his wife this afternoon.     4 point ROS otherwise negative    Physical Exam   Vital Signs: Temp: 98.6  F (37  C) Temp src: Oral BP: 95/68 Pulse: 81   Resp: 16 SpO2: 91 % O2 Device: None (Room air)    Weight: 139 lbs 8 oz    Constitutional: awake  Eyes: vision intact  ENT: atramatic  Hematologic / Lymphatic: no cervical lymphadenopathy and no supraclavicular lymphadenopathy  Respiratory: No increased work of breathing on room air, CTAB  Cardiovascular: JVP >15, Normal S1/S2 with Grade III/VI Holosystolic Murmur heard predominantly at the LLSB   GI: Positive bowel sounds with no distension   Skin: no bruising or bleeding and normal skin color, texture, turgor  Musculoskeletal: no peripheral edema   Neurologic: Awake, alert, oriented to name, place and time.     Data   Data reviewed today: I reviewed  all medications, new labs and imaging results over the last 24 hours.     Recent Labs   Lab 07/12/22  0506 07/11/22  2132 07/11/22  1740 07/11/22  1229 07/11/22  0432 07/10/22  1442 07/10/22  1236 07/10/22  0748 07/10/22  0557 07/08/22  2122 07/08/22  1949 07/08/22  1317   WBC 8.2  --  6.4  --  6.9   < >  --   --  6.6   < > 7.6 6.1   HGB 9.3*  --  8.5*  --  8.4*   < >  --   --  8.1*   < > 9.0* 8.9*   MCV 97  --  99  --  97   < >  --   --  99   < > 102* 100     --  161  --  164   < >  --   --  147*   < > 160 158   INR 1.72*  --   --   --  2.02*  --   --   --  2.45*   < >  --   --      --  135*  --  138  --  135*  --  137   < > 136 138   POTASSIUM 3.6  --  3.7  --  3.2*  --  4.5  4.4  --  3.0*   < > 4.0 4.0   CHLORIDE 96*  --  96*  --  100  --  101  --  103   < > 105 106   CO2 28  --  27  --  26  --  22  --  22   < > 16* 18*   BUN 62.2*  --  63.2*  --  61.2*  --  58.7*  --  58.8*   < > 69.4* 70.0*   CR 2.48*  --  2.55*  --  2.49*  --  2.26*  --  2.38*   < > 2.66* 2.80*   ANIONGAP 13  --  12  --  12  --  12  --  12   < > 15 14   LYNNE 9.2  --  9.0  --  8.9  --  8.4*  --  8.3*   < > 8.9 8.8   * 283* 335*   < > 174*   < > 205*   < > 142*   < > 150* 213*   ALBUMIN  --   --   --   --   --   --  3.5  --   --   --  3.5 3.6   PROTTOTAL  --   --   --   --   --   --  6.5  --   --   --  6.7 6.5   BILITOTAL  --   --   --   --   --   --  1.6*  --   --   --  1.2 1.3*   ALKPHOS  --   --   --   --   --   --  222*  --   --   --  173* 166*   ALT  --   --   --   --   --   --  40  --   --   --  46 49   AST  --   --   --   --   --   --  35  --   --   --   --  36    < > = values in this interval not displayed.       [facility] 6/10/22:  Baseline:    MAP: 84mmHg  RA: 9  RV: 42/9  PA: 42/21/28  W: 16  PA sat: 40.2%  Lorie CO/CI: 2.76/1.54 L/min/m2  PVR:4.4 STREET  SVR: 2172    Nipride 2:  MAP: 61mmHg  PA: 20/10/14  W: 6  Pa Sat: 56%  Lorie CO/CI: 3.58/2.0 L/min/m2  PVR: 2.23 STREET     TTE 6/7/22:  Left ventricular function is decreased.  The ejection fraction is 15-20%  (severely reduced).Severe left ventricular dilation is present.  The right ventricle is normal size.Global right ventricular function is mildly  reduced.  Moderate mitral insufficiency is present.  Moderate to severe tricuspid insufficiency is present.  IVC diameter <2.1 cm collapsing >50% with sniff suggests a normal RA pressure  of 3 mmHg.  No pericardial effusion is present.     This study was compared with the study from 11/22/21. The RV function is  mildly decreased and TR is moderate to severe

## 2022-07-12 NOTE — PLAN OF CARE
Temp: 98.6  F (37  C) Temp src: Oral BP: 95/68 Pulse: 81   Resp: 16 SpO2: 91 % O2 Device: None (Room air)       D: CHF  History of CAD, PCI, ICM, LVEF 10-20%, CRT-D, Afib (s/p ablation), mild ILD, RAUL, T2DM    I/A: Pt is AOx4. Tele in place, Vpaced. VSS on RA. Right double lumen PICC in place, red infusing Dobutamine, purple infusing Lasix. Blood glucose ACHS, 283 at bedtime, 2 units given. Up standby to bedside commode, also using bedside urinal. Slept well overnight.    Running: Dobutamine @ 5 mcg/kg/min (10.3 mL/hr), Lasix @ 20 mg/hr (2 mL/hr)  PRN: tylenol    P: Continue to monitor and follow POC. Plan for possible palliative meeting today. Notify Cards 2 with changes.

## 2022-07-13 NOTE — PLAN OF CARE
Admitted on 7/8 for coughing and vomiting in the past 3-4 days. PMHx of T2D, Two vessel CAD s/p PCI to mLAD and pRCA 2/20, ICM with severe LV systolic dysfunction with LVEF 10-20% s/p CRT-D, Afib s/p AVN ablation, Mild ILD.      Neuro: AOx4, Calm and cooperative. No deficits   Cardiac/Tele/VS: 100% Paced with frequent PVC's, HR 70's-90's. No chest pain or palpitations, normotensive and afebrile.  Respiratory: Room air tolerates well, no SOB noted. Sats > 92%  GI/: Voids without difficulty via urinal. Adequate UOP. No BM shift, LBM 7/12.  Diet/Appetite: 2g Na diet with 2L FR  Skin: No overt deficits.   Endocrine: BG checks ACHS with sliding scale coverage.  LDAs: Double lumen PICC Infusing Dobutamine at 5 mcg/kg/min and Lasix at 20 mg/hr.  Activity: Up with SBA  Pain: Denies pain     Plan: Continue POC. Possible discharge pending more patient teaching

## 2022-07-13 NOTE — PROGRESS NOTES
[facility]    Cardiology History and Physical - Cardiology         Date of Admission:  7/8/2022    Assessment & Plan: CROW Rogers FAITH Doss is a 76 year old male admitted on 7/8/2022 for coughing and vomiting in the past 3-4 days with a relevant PMH of T2D, Two vessel CAD s/p PCI to mLAD and pRCA feb 2020, ICM with severe LV systolic dysfunction with LVEF 10-20% s/p CRT-D, Afib s/p AVN ablation, Mild ILD. Patient has been diagnosed with heart failure since 2010 and decline advanced therapy and is currently DNR/DNI. Patient is currently on dobutamine drip at 2.5mcg/kg and had received 1g diuril along with 20mg Lasix gtt with 2.6L output. Patient has a CI of 1.9 on current medication, which is worsening in considerations of yesterday's oxyhemoglobin of 49 and today's 43. Poor prognosis has been discussed with the patient.     Today   - Palliative saw patient, GOC   > Patient will be discharged on dobutamine drip 2.5mcg/kg   > Patient and wife decided to have ICD programmed for 2 shocks for a 2-3 week duration then no shock. Rationale being important events in the upcoming weeks the patient wishes to be present for.   - Continued dobutamine 5mcg/kg, will be discharged with this   - Continue 20mg Lasix drip, 1g diuril    > Patient will be discharged on Torsemide 100mg BID, K 40mEq BID  - Will clean patient's home med list of medications for longevity such as atorvostatin, etc.      # Acute on chronic decompensated systolic heart failure, stage D, NYHA functional class IV  # Ischemic cardiomyopathy  # CAD s/p PCI to pLAD, pRCA (1/2019, 2/2020) + h/o instent stenosis in the past  EF 15-20%  - Patient continued on dobutamine 5mcg/kg   >Initiated due to patient's Biventricular heart failure and indications of poor perfusion such as creatinine. Patient has responded well to dobutamine and will be discharged on this dose.   - Continue 20mg Lasix drip due to myalgias  on Bumex    > Patient will be discharged on 100mg BID Torsemide, 40mEq BID potassium  - Hold coreg, Entresto and spironolactone   > Held due to patient's worsening renal function and decreasing contractility of the heart    #Black Stools 2/2 Ferrous sulfate vs. UGIB, Resolved  #Chronic Anemia  Hemoglobin stable, dark but not black stools   - Iron studies ordered to assess hgb and anemia   > Patient was iron deficient, which explains anemia. Patient given IV iron.   - Lactate   >Lactate result was 0.7.     # History of NSVT and frequent PVCs - Has CRT-D.   - Keep K>4, Mg>2    # RAUL on CKD stage IIIB  - Hold Entresto and spironolactone   > Held due to worsening renal function    # PAF s/p AVN ablation  - Hold coreg   > Held due to patient's worsening heart function and decreasing contractility   - Continue apixaban    # DM2  - SSI       Diet: Low saturated fat Ns <2400mg  DVT Prophylaxis: DOAC  Kamara Catheter: Not present  Code Status: DNR/DNI  Fluids: 2L restriction  Lines: Peripheral IV Right Lower Forearm       Disposition Plan   Expected discharge: Tomorrow, recommended to prior living arrangement once fluid volume status optimized on oral medication.    Entered: Francisco Doyle MD 07/13/2022, 7:52 AM     The patient's care was discussed with the Attending Physician, Dr. Maura Patton.    Francisco Doyle MD on 7/13/2022 at 5:32 PM  Internal Medicine PGY1    ______________________________________________________________________  Interval  No acute overnight events, patient feels well this morning denies chest pain/shortness of breath or other concerns. Patient's wife was at the bedside during visit with Dr. Patton. Discussed patient's GOC and plan to discharge with dobutamine. Verified a nurse will be sent out for first time with pump and PRN when needed afterwards. Patient expressed his wishes to be with family and his wife. He wants to go peacefully and stated he would like ICD reprogrammed. He desires 2  shocks for 2-3 weeks due to upcoming events with family and cabin, but would like them to be removed after completion.     4 point ROS otherwise negative    Physical Exam   Vital Signs: Temp: 98  F (36.7  C) Temp src: Oral BP: 111/63 Pulse: 88   Resp: 20 SpO2: 96 % O2 Device: None (Room air)    Weight: 136 lbs 8 oz    Constitutional: awake  Eyes: vision intact  ENT: atramatic  Hematologic / Lymphatic: no cervical lymphadenopathy and no supraclavicular lymphadenopathy  Respiratory: No increased work of breathing on room air, CTAB  Cardiovascular: JVP >15, Normal S1/S2 with Grade III/VI Holosystolic Murmur heard predominantly at the LLSB   GI: Positive bowel sounds with no distension   Skin: no bruising or bleeding and normal skin color, texture, turgor  Musculoskeletal: no peripheral edema   Neurologic: Awake, alert, oriented to name, place and time.     Data   Data reviewed today: I reviewed all medications, new labs and imaging results over the last 24 hours.     Recent Labs   Lab 07/13/22  0536 07/12/22  2104 07/12/22  1711 07/12/22  1200 07/12/22  0506 07/11/22  2132 07/11/22  1740 07/11/22  1229 07/11/22  0432 07/10/22  1442 07/10/22  1236 07/08/22  2122 07/08/22  1949 07/08/22  1317   WBC 7.9  --   --   --  8.2  --  6.4  --  6.9   < >  --    < > 7.6 6.1   HGB 8.7*  --   --   --  9.3*  --  8.5*  --  8.4*   < >  --    < > 9.0* 8.9*   MCV 95  --   --   --  97  --  99  --  97   < >  --    < > 102* 100     --   --   --  191  --  161  --  164   < >  --    < > 160 158   INR 1.72*  --   --   --  1.72*  --   --   --  2.02*  --   --    < >  --   --      --  129*  --  137  --  135*  --  138  --  135*   < > 136 138   POTASSIUM 3.3*  --  3.5  --  3.6  --  3.7  --  3.2*  --  4.5  4.4   < > 4.0 4.0   CHLORIDE 94*  --  89*  --  96*  --  96*  --  100  --  101   < > 105 106   CO2 30*  --  28  --  28  --  27  --  26  --  22   < > 16* 18*   BUN 69.7*  --  65.2*  --  62.2*  --  63.2*  --  61.2*  --  58.7*   < > 69.4*  70.0*   CR 2.59*  --  2.66*  --  2.48*  --  2.55*  --  2.49*  --  2.26*   < > 2.66* 2.80*   ANIONGAP 13  --  12  --  13  --  12  --  12  --  12   < > 15 14   LYNNE 9.3  --  9.2  --  9.2  --  9.0  --  8.9  --  8.4*   < > 8.9 8.8   * 283* 319*   < > 175*   < > 335*   < > 174*   < > 205*   < > 150* 213*   ALBUMIN  --   --   --   --   --   --   --   --   --   --  3.5  --  3.5 3.6   PROTTOTAL  --   --   --   --   --   --   --   --   --   --  6.5  --  6.7 6.5   BILITOTAL  --   --   --   --   --   --   --   --   --   --  1.6*  --  1.2 1.3*   ALKPHOS  --   --   --   --   --   --   --   --   --   --  222*  --  173* 166*   ALT  --   --   --   --   --   --   --   --   --   --  40  --  46 49   AST  --   --   --   --   --   --   --   --   --   --  35  --   --  36    < > = values in this interval not displayed.       [facility] 6/10/22:  Baseline:    MAP: 84mmHg  RA: 9  RV: 42/9  PA: 42/21/28  W: 16  PA sat: 40.2%  Lorie CO/CI: 2.76/1.54 L/min/m2  PVR:4.4 STREET  SVR: 2172    Nipride 2:  MAP: 61mmHg  PA: 20/10/14  W: 6  Pa Sat: 56%  Lorie CO/CI: 3.58/2.0 L/min/m2  PVR: 2.23 STRETE     TTE 6/7/22:  Left ventricular function is decreased. The ejection fraction is 15-20%  (severely reduced).Severe left ventricular dilation is present.  The right ventricle is normal size.Global right ventricular function is mildly  reduced.  Moderate mitral insufficiency is present.  Moderate to severe tricuspid insufficiency is present.  IVC diameter <2.1 cm collapsing >50% with sniff suggests a normal RA pressure  of 3 mmHg.  No pericardial effusion is present.     This study was compared with the study from 11/22/21. The RV function is  mildly decreased and TR is moderate to severe

## 2022-07-13 NOTE — PROGRESS NOTES
"SPIRITUAL HEALTH SERVICES  PALLIATIVE SPIRITUAL ASSESSMENT   [facility] ([place] 6C    PRIMARY FOCUS:     Goals of care    Support for coping    Visit with patient Ken \"Skyler\" Selam and wife Carrol, with Palliative Care Dr. Grossman and MS4 Giselle.    Goals of Care: Skyler is planning to discharge home with IV dobutamine, then transition to hospice when he no longer feels like he has a good quality of life. He and Carrol know that he will stop dobutamine when he enrolls in hospice. They will have a hospice informational session after they return home.    Coping/Meaning Making:     Skyler and Carrol have been together for 60 years, and  for 54. Skyler said they look to each other for support; both described themselves as emotional, \"sappy\".     They are [employer], and their , with whom Carrol works (she is a ), has visited.     Skyler said he felt a sense of peace when he considers dying: \"I know where I'm going.\"     Intervention: Reviewed documentation. Offered listening presence and meaning-based affirmation of values and coping.    PLAN: I will follow for spiritual support while Palliative Care is consulted.    Jacklyn Toney  Palliative Care Consult Service   Pager [phone]  [facility] Inpatient Team Consult pager [phone] (M-F 8-4:30)  After-hours Answering Service [phone]     "

## 2022-07-13 NOTE — PLAN OF CARE
Goal Outcome Evaluation:    Plan of Care Reviewed With: patient, spouse     Overall Patient Progress: improving    Outcome Evaluation: less excess fluid, has received PICC education with wife    Patient admitted for heart failure exacerbation.  PMH of ischemic cardiomyopathy, interstitial lung disease, type 2 diabetes, atrial fibrillation with ablation, CRT-D defibrillator placement, and percutaneous intervention.     Neuro: A&O x4, denied pain and dizziness.  Respiratory:  Had dyspnea on exertion, fine crackles auscultated in left lung base.  Cardiac: Pansystolic heart murmur noted.  No extremity edema noted.    GI: Had an episode of vomiting, stated that the taste of the food nauseated him.  Bowel sounds hyperactive, had a bowel movement today.  : Had good urine output, see I&O flowsheet.  Skin: See PCS for assessment and treatment of wounds.   VS: In paced rhythm with occasional PVCs, HR 70s-90s, SBP 80s-110s, SaO2 93-96% on room air.    Drips:  Dobutamine gtt continued at 5 mcg/kg/min (10.3 ml/hr).  Furosemide gtt continued at 20 mg/hr (2 ml/hr).    Electrolytes: Potassium and magnesium replaced per protocol.  Iron sucrose infusion given  Pain: Denied.  Tests/procedures: None performed during shift.    Mobility: Ambulated in hallway with standby assist and use of walker.  Social: Wife visited during shift.    Plan:  Continue to monitor pain, VS, heart rhythm, skin integrity, fluid status, bowel status, cardiac and respiratory status.  Notify care team of changes in patient condition or other concerns.  Potential discharge tomorrow.

## 2022-07-13 NOTE — PROGRESS NOTES
Care Management Follow Up    Length of Stay (days): 5    Expected Discharge Date: 07/14/2022     Concerns to be Addressed: discharge planning     Patient plan of care discussed at interdisciplinary rounds: Yes    Anticipated Discharge Disposition: Home     Anticipated Discharge Services: Home Infusion  Anticipated Discharge DME: None (family is getting a walker for pt)    Patient/family educated on Medicare website which has current facility and service quality ratings: yes  Education Provided on the Discharge Plan: yes  Patient/Family in Agreement with the Plan: yes    Referrals Placed by CM/SW: [facility] for IV dobutamine    Additional Information:  Per MD, plan for discharge home tomorrow. This writer requested discharge orders be signed early tomorrow to allow time for delivery of IV dobutamine.  Met with pt and wife, Carrol. Pt looking forward to getting home tomorrow. Pt and Carrol attended [facility] yesterday but will need continued education at home from home care. [facility] will be providing pt's home nursing. Carrol requested an update tomorrow once an eta is known for the delivery of the dobutamine so she can be here for hookup. This writer updated Oni Nelson on the above.     CC will continue to monitor patient's medical condition and progress towards discharge.  Valerie Mane RN BSN  6C Unit Care Coordinator  Phone number: [phone]  Pager: [phone]

## 2022-07-13 NOTE — PROGRESS NOTES
[facility]  Palliative Care Daily Progress Note       Recommendations & Counseling     Goals of care: Skyler is very comfortable with his decision to go home on the dobutamine IV, hoping it will give him more good time at home and he is planning to transition to hospice once he no longer feels well. He also understands that when he transitions to hospice, they will stop his dobutamine.     Social work helped to arrange a hospice informational session when Skyler is at home.  Bedtime and his wife, Carrol, appreciate that very much and want to better understand what hospice will look like when Skyler enrolls.     Cardiology to discuss with Skyler and Carrol the risks and benefits of turning off his ICD.    DNR/DNI - POLST completed     Thank you for the opportunity to continue to participate in the care of this patient and family.  Please feel free to contact on-call palliative provider with any emergent needs.  We can be reached via team pager [phone] (answered 8-4:30 Monday-Friday); after-hours answering service ([phone]);     Tami Grossman MD / Palliative Medicine / Pager [phone]     Total time spent was 30 minutes spent in reviewing record, patient examination and family counseling, discussion with primary team and documentation. >50% of time was spent counseling and/or coordination of care regarding plan of care, goals of care.     Assessments          Ken Doss is a 76 year old male with T2D, Two vessel CAD s/p PCI to mLAD and pRCA feb 2020, Afib s/p AVN ablation, Mild ILD and ICM with severe LV systolic dysfunction with LVEF 10-20% s/p CRT-D. Patient was initially diagnosed with heart failure in 2010 and has since declined advanced therapy. Recently hospitalized 6/6-6/12/22 for decompensated heart failure and volume overload. He presented to the hospital on 7/8/22 for coughing and vomiting for 4 days and he was again found to be in heart failure.  Decision made to pursue medical optimization and initiate palliative inotropic medicines.      Today, the patient was seen for:   Advanced heart failure   Dyspnea with exertion   Progressive debility   goals of care  support.      Prognosis, Goals, or Advance Care Planning was addressed today with: Yes.     Mood, coping, and/or meaning in the context of serious illness were addressed today: Yes.              Interval History:     Chart review/discussion with unit or clinical team members:   No acute events overnight    Per patient or family/caregivers today:  Skyler continues to feel better than he felt on admission  He believes the dobutamine is helping him feel well  He looks forward to going home and hopes that he will have more good time at home before needing to transition to hospice and dying.               Review of Systems:     Besides above, a complete 10+ ROS was reviewed and is unremarkable           Medications:     I have reviewed this patient's medication profile and medications during this hospitalization.           Physical Exam:   Vitals were reviewed  Temp: 98.3  F (36.8  C) Temp src: Oral BP: 110/61 Pulse: 80   Resp: 20 SpO2: 93 % O2 Device: None (Room air)    Gen: Alert, sitting up in bed, appears younger than stated age, well-nourished, in no acute distress  Eyes: Conjunctiva clear. Sclera anicteric   HENT: NCAT; mucous membranes moist  Resp: No increased work of breathing, speaking full sentences  Msk: no gross deformity, no sarcopenia  Skin: No jaundice  Neuro: A&O x 3; CN II-XII grossly intact;   Mental status/Psych: Alert, engaged, appropriate; sensorium intact             Data Reviewed:     Reviewed recent labs and pertinent imaging

## 2022-07-13 NOTE — PLAN OF CARE
Neuro: A&Ox4. Becomes anxious with activity R/T shortness of breath. Having slight difficulty swallowing pills.     Cardiac: Afebrile, VSS.   Respiratory: RA   GI/: Voiding spontaneously. BM this shift.   Diet/appetite: Appetite decreased.  Taking half Ensure and no solids  Activity: Up to commode independently. Walked in unit with assist of one.  Pain: . Denies   Skin: No new deficits noted.  Lines: R PICC.  Lasix infusing at 20 mg and Dobutamine at 5mcg.    Replacements: Re check K+ was 3.5. Replaced with 10 of K+, PO.  Next check with morning labs.    PLU today regarding PICC care and Dobutamine infusion at home.     Problem: Oral Intake Inadequate (Heart Failure)  Goal: Optimal Nutrition Intake  Outcome: Ongoing, Not Progressing     Problem: Functional Ability Impaired (Heart Failure)  Goal: Optimal Functional Ability  Outcome: Ongoing, Progressing     Goal Outcome Evaluation:    Plan of Care Reviewed With: patient

## 2022-07-13 NOTE — PROGRESS NOTES
Care Management Follow Up    Length of Stay (days): 5    Expected Discharge Date: 07/11/2022     Concerns to be Addressed: discharge planning     Patient plan of care discussed at interdisciplinary rounds: Yes    Anticipated Discharge Disposition: Home     Anticipated Discharge Services:    Anticipated Discharge DME:      Patient/family educated on Medicare website which has current facility and service quality ratings: no  Education Provided on the Discharge Plan:    Patient/Family in Agreement with the Plan:      Referrals Placed by CM/SW:    Private pay costs discussed: Not applicable    Additional Information:  SW is following for discharge planning. Pt is discharging on palliative dobutamine to home. Per the Palliative Care physician note on 7/12, the pt and his wife wanted the SW to arrange for a hospice agency to meet with them at home to have an informational meeting + learn about how to make a transition to hospice.     MAUREEN called the assigned RN within  [facility] agency [facility] for 7/13 (Jolie Burton, Phone: [phone]). Maureen spoke with Jolie on what the pt is discharging on, the conversation he and his wife had with the Palliative Care physician, and the type of meeting wanted. Jolie asked the SW when the KATLIN is for the pt, and per chart review + morning discharge rounds, pt will most likely discharge tomorrow. Jolie told the SW that she'd call the pt's wife Karen (Phone: [phone]) to discuss + schedule the informational meeting. MAUREEN called Karen to inform her of the above mentioned information. Karen thanked the SW and is appreciative of the meeting.     MAUREEN will continue to follow for SW needs.     ___________________    TIFFANY Patricia  6C   [facility]  Pager [phone]  Phone [phone]

## 2022-07-14 NOTE — PLAN OF CARE
D: admitted on 7/8/2022 for heart failure exacerbation.    PMH: DM2, two vessel CAD s/p PCI to mLAD and pRCA feb 2020, ICM with severe LV systolic dysfunction with LVEF 10-20% s/p CRT-D, Afib s/p AVN ablation, mild ILD. Patient has been diagnosed with heart failure since 2010 and declines advanced therapy.       I: Monitored vitals and assessed pt status. Encouraged activity.   Changed: Pt had episode of emesis immediately after receiving 8 pm medications. Ativan added to prn orders and administered before giving additional one time dose of those medications.    Running:   - Lasix 20 mg/hr (2 mL/hr)  - Dobutamine 5 mcg/kg/min  PRN: Ativan 0.25 mg, tylenol x1     A: A&Ox4. VSS on RA. Tele shows V-paced rhythm with frequent PVCs, rate 70-90s. Afebrile. Denied pain. Intermittent nausea and vomiting, ativan seemed to help. Urinating adequately. +BM this shift. Up with SBA and walker. Appeared to rest comfortably overnight.      P: Likely discharge home today. Continue to monitor pt status and report changes to Cards 2 treatment team.      [phone]  Jenny Chan, RN on 7/14/2022 at 6:15 AM

## 2022-07-14 NOTE — DISCHARGE SUMMARY
[facility]    Cardiology Discharge Summary- Cardiology         Date of Admission:  7/8/2022  Date of Discharge:  7/14/2022  Discharging Provider: Dr. Maura Patton       Discharge Diagnoses   # Acute on chronic decompensated systolic heart failure, stage D, NYHA functional class IV  # Ischemic cardiomyopathy  # CAD s/p PCI to pLAD, pRCA (1/2019, 2/2020) + h/o instent stenosis in the past  #Black Stools 2/2 Ferrous sulfate vs. UGIB, Resolved  # RAUL on CKD stage IIIB  #hypokalemia- resolved    Chronic conditions  #Chronic Anemia  #Iron deficiency anemia   # PAF s/p AVN ablation  # History of NSVT and frequent PVCs - Has CRT-D  # DM2    Follow-ups Needed After Discharge   - PCP in one week with BMP       Discharge Disposition   Discharged to home  Condition at discharge: Fair    Hospital Course   Ken Doss is a 76 year old male admitted on 7/8/2022 for coughing and vomiting in the past 3-4 days with a relevant PMH of T2D, Two vessel CAD s/p PCI to mLAD and pRCA feb 2020, ICM with severe LV systolic dysfunction with LVEF 10-20% s/p CRT-D, Afib s/p AVN ablation, Mild ILD. HFpEF diagnosed 2010 and declines advanced therapy, and is currently DNR/DNI, admitted for heart failure exacerbation requiring dobutamine for IV diuresis.    # Acute on chronic decompensated systolic heart failure, stage D, NYHA functional class IV  # Ischemic cardiomyopathy  # CAD s/p PCI to pLAD, pRCA (1/2019, 2/2020) + h/o instent stenosis in the past  #Goals of care   EF 15-20%, end-stage heart failure- patient has declined any advanced therapies and is understanding that he is in end-stage heart failure with a poor prognosis, as evidenced by decreased oxyhemoglobin on inotropes this admission. Patient treated with dobutamine and lasix gtt (switched from bumex due to myalgias).  Patient evaluated by palliative care inpatient, and plan to have a hospice informational session when  they return home. When he is ready to transition to hospice, he will stop dobutamine.   - Continue dobutamine home infusion at 5 mcg/kg/min  - Start torsemide 100mg BID   - Potassium chloride 40meq BID  - Symptom management: PRN zofran, oxycodone 2.5-5mg  - PCP with BMP within one week (7/18)  - F/u with Dr. Payton as scheduled 7/20  - Social work helped to arrange hospice information session at home   - Continue ICD/pacemaker, patient plans to stop ICD in 2-3 weeks   - DNR/DNI (POLST completed)  - Discontinue: apixban, ticegralor, iron, tramadol, carvedilol    #Black Stools 2/2 Ferrous sulfate vs. UGIB, Resolved  #Chronic Anemia  #Iron deficiency anemia   Hemoglobin stable, dark but not black stools, likely related to iron therapy. Found to be iron deficient, received 3/5 days IV venofer   - Discontinue iron therapy      # RAUL on CKD stage IIIB  - Continue to hold Entresto and spironolactone with worsening renal function    # PAF s/p AVN ablation  # History of NSVT and frequent PVCs - Has CRT-D.   - Discontinue coreg with end-stage heart failure   - Discontinue apixaban    #hypokalemia- resolved  Secondary to diuresis, patient supplemented PRN during admission.     # DM2  - Continue PTA metformin and insulin     Consultations This Hospital Stay   VASCULAR ACCESS FOR PICC PLACEMENT ADULT IP CONSULT  OCCUPATIONAL THERAPY ADULT IP CONSULT  NUTRITION SERVICES ADULT IP CONSULT  CARE MANAGEMENT / SOCIAL WORK IP CONSULT  PALLIATIVE CARE ADULT IP CONSULT    Code Status   No CPR- Do NOT Intubate        Irma Jenkins MD  Internal Medicine PGY2    ______________________________________________________________________    Physical Exam   Vital Signs: Temp: 97.8  F (36.6  C) Temp src: Oral BP: 114/73 Pulse: 83   Resp: 16 SpO2: 96 % O2 Device: None (Room air)    Weight: 137 lbs 1.6 oz    Constitutional: awake  Eyes: vision intact  ENT: atramatic  Hematologic / Lymphatic: no cervical lymphadenopathy and no supraclavicular  lymphadenopathy  Respiratory: No increased work of breathing on room air, CTAB  Cardiovascular: JVP >15, Normal S1/S2 with Grade III/VI Holosystolic Murmur heard predominantly at the LLSB   GI: Positive bowel sounds with no distension   Skin: no bruising or bleeding and normal skin color, texture, turgor  Musculoskeletal: no peripheral edema   Neurologic: Awake, alert, oriented to name, place and time.          Primary Care Physician   Lewis Reeves    Discharge Orders       Significant Results and Procedures   Most Recent 3 CBC's:  Recent Labs   Lab Test 07/14/22  0512 07/13/22  0536 07/12/22  0506   WBC 9.4 7.9 8.2   HGB 9.0* 8.7* 9.3*   MCV 96 95 97    203 191     Most Recent 3 BMP's:  Recent Labs   Lab Test 07/14/22  0512 07/14/22  0322 07/13/22  2205 07/13/22  1744 07/13/22  1530 07/13/22  1252 07/13/22  0905 07/13/22  0536     --   --  133*  --   --   --  137   POTASSIUM 4.1  --   --  4.7  --  4.4  --  3.3*   CHLORIDE 95*  --   --  92*  --   --   --  94*   CO2 29  --   --  27  --   --   --  30*   BUN 67.8*  --   --  67.6*  --   --   --  69.7*   CR 2.66*  --   --  2.61*  --   --   --  2.59*   ANIONGAP 13  --   --  14  --   --   --  13   LYNNE 9.1  --   --  9.1  --   --   --  9.3   * 133* 207* 276*   < >  --    < > 184*    < > = values in this interval not displayed.   ,   Results for orders placed or performed during the hospital encounter of 07/08/22   XR Chest Port 1 View    Narrative    Exam: XR CHEST PORT 1 VIEW, 7/8/2022 1:24 PM    Indication: CHF    Comparison: 6/6/2022    Findings:   Left chest wall implantable cardiac defibrillator with stable and  intact leads. Cardiac silhouette is borderline enlarged. Coronary  artery stents. Indistinct pulmonary vasculature and increased linear  perihilar interstitial opacities. No pneumothorax or effusions. No  focal infectious dislocations.      Impression    Impression: Findings most consistent with interstitial pulmonary  edema.    I have  personally reviewed the examination and initial interpretation  and I agree with the findings.    ANGELICA BARKER DO         SYSTEM ID:  [id number]   XR Chest Port 1 View    Narrative    XR CHEST PORT 1 VIEW  7/8/2022 7:04 PM      HISTORY: PICC placement.    COMPARISON: 7/8/2022    FINDINGS:   Single AP view the chest. Left chest wall ICD with leads in similar  position. Right arm PICC tip in the mid right atrium. Stable  mediastinum. No pneumothorax. No significant left pleural effusion.  Trace right pleural effusion. Increased perihilar and bibasilar  interstitial opacities.      Impression    IMPRESSION:   1. Right arm PICC tip in the mid right atrium.  2. Increased perihilar and bibasilar interstitial opacities suggestive  of pulmonary edema.  3. Trace right pleural effusion.    I have personally reviewed the examination and initial interpretation  and I agree with the findings.    MELANIE CROSS MD         SYSTEM ID:  [id number]       Discharge Medications   Current Discharge Medication List      START taking these medications    Details   DOBUTamine 500 mg in dextrose 5% 250 mL (adult std conc) premix Inject 342.5 mcg/min into the vein continuous  Qty: 250 mL, Refills: 11    Associated Diagnoses: Acute on chronic systolic congestive heart failure (H)      LORazepam (ATIVAN) 0.5 MG tablet Take 1 tablet (0.5 mg) by mouth every 6 hours as needed for anxiety  Qty: 30 tablet, Refills: 0    Associated Diagnoses: Acute on chronic systolic congestive heart failure (H)      ondansetron (ZOFRAN ODT) 4 MG ODT tab Take 1 tablet (4 mg) by mouth every 6 hours as needed for nausea or vomiting  Qty: 60 tablet, Refills: 0    Associated Diagnoses: Acute on chronic systolic congestive heart failure (H)      oxyCODONE (ROXICODONE) 5 MG tablet Take 0.5-1 tablets (2.5-5 mg) by mouth every 6 hours as needed for pain  Qty: 30 tablet, Refills: 0    Associated Diagnoses: Acute on chronic systolic congestive heart failure (H)       potassium chloride ER (KLOR-CON M) 20 MEQ CR tablet Take 2 tablets (40 mEq) by mouth 2 times daily  Qty: 120 tablet, Refills: 0    Associated Diagnoses: Acute on chronic systolic congestive heart failure (H)      torsemide (DEMADEX) 100 MG tablet Take 1 tablet (100 mg) by mouth 2 times daily  Qty: 120 tablet, Refills: 1    Associated Diagnoses: Acute on chronic systolic congestive heart failure (H)         CONTINUE these medications which have NOT CHANGED    Details   acetaminophen (TYLENOL) 500 MG tablet Take 500-1,000 mg by mouth 2 times daily as needed       Coenzyme Q10 (COQ-10 PO) Take 20 mg by mouth daily       diphenhydrAMINE (BENADRYL) 25 MG tablet Take 25 mg by mouth At Bedtime      gabapentin (NEURONTIN) 100 MG capsule Take 100 mg by mouth 3 times daily      glucosamine-chondroitin 500-400 MG CAPS per capsule Take 1 tablet by mouth daily       hydrALAZINE (APRESOLINE) 25 MG tablet Take 1 tablet (25 mg) by mouth 3 times daily  Qty: 270 tablet, Refills: 3    Associated Diagnoses: Ventricular tachycardia (H)      insulin glargine (LANTUS PEN) 100 UNIT/ML pen Inject 20-30 Units Subcutaneous At Bedtime      melatonin 3 MG tablet Take 1 mg by mouth nightly as needed for sleep      Omega-3 Fatty Acids (FISH OIL) 1200 MG capsule Take 1,200 mg by mouth daily          STOP taking these medications       apixaban ANTICOAGULANT (ELIQUIS ANTICOAGULANT) 5 MG tablet Comments:   Reason for Stopping:         atorvastatin (LIPITOR) 40 MG tablet Comments:   Reason for Stopping:         bumetanide (BUMEX) 1 MG tablet Comments:   Reason for Stopping:         carvedilol (COREG) 6.25 MG tablet Comments:   Reason for Stopping:         colchicine (COLCYRS) 0.6 MG tablet Comments:   Reason for Stopping:         febuxostat (ULORIC) 40 MG TABS tablet Comments:   Reason for Stopping:         ferrous sulfate (FEROSUL) 325 (65 Fe) MG tablet Comments:   Reason for Stopping:         hydrOXYzine (ATARAX) 10 MG tablet Comments:   Reason  for Stopping:         metFORMIN (GLUCOPHAGE) 1000 MG tablet Comments:   Reason for Stopping:         Multiple Vitamin (MULTI VITAMIN DAILY PO) Comments:   Reason for Stopping:         ticagrelor (BRILINTA) 90 MG tablet Comments:   Reason for Stopping:         traMADol (ULTRAM) 50 MG tablet Comments:   Reason for Stopping:             Allergies   No Known Allergies

## 2022-07-14 NOTE — PROGRESS NOTES
Home Infusion  Skyler is discharging today and will be going home on continuous dobutamine therapy.  He requires a hook up of home medication and pump prior to dc.    Met with patient and spouse Carrol at bedside once supplies arrived.  Added extension tubing to red lumen of PICC line.  Educated patient and spouse on hook up of dobutamine after reviewing pump settings and verifying with orders.  Instructed Carrol not to flush medication lumen with NS but will need to flush unused (purple) lumen daily with heparin.  Provided information on plan for SNV, supplies and ongoing supply deliveries, storage of medication, back up pump, 24/7 availability of [facility] staff and how to contact as needed while on home IV therapy.  Spouse overwhelmed by IV therapy administration steps and requested to video record liaison performing hookup for her to reference at home.  Allowed spouse to record training and hookup and assured her that [facility] will send a nurse to home tomorrow to continue training and can send a nurse on Saturday as well if needed.  Questions answered.    Carrol verbalized understanding of information given.   Pt will be seen at home tomorrow by [facility] RN for first bag change.  Patient and spouse feel comfortable discharging and managing the IV therapy at home once teaching is completed.    Skyler's home dobutamine infusion hooked up at 1550.  Pump is running appropriately with clamps open and he is ready for discharge from [facility] perspective.      ELVIRA Duran  [facility] Nurse Liaison   [email or web address]  Cell: [phone] M-F  [facility] Main: [phone]

## 2022-07-14 NOTE — PROGRESS NOTES
Care Management Discharge Note    Discharge Date: 07/14/2022       Discharge Disposition: Home    Discharge Services:  New IV Dobutamine/supplies/HHRN provided by [facility]  New PCP see below    Discharge DME:  [facility] IV Dobutamine supplies    Discharge Transportation: family or friend will provide    Private pay costs discussed: insurance costs per [facility] needs to meet deductible    PAS Confirmation Code:    Patient/family educated on Medicare website which has current facility and service quality ratings: no    Education Provided on the Discharge Plan:    Persons Notified of Discharge Plans: Pt--he asked me to call his wife Karen and I have done this  Patient/Family in Agreement with the Plan:  Yes--signed [facility] letter    Handoff Referral Completed: Yes  Fax to New PCP @ [facility]    Additional Information:  Follow up with primary care provider, Jeremy Amador, within 7 days to evaluate medication change, for hospital follow- up, and BMP follow up.  The following labs/tests are recommended: BMP/Mg.      Mr Doss, You are scheduled with a new Primary Care Doctor: Jeremy Amador  On: Friday, 7/22/22 @ 9am---please bring your insurance card and wear a mask  You will have lab draw approx 9:15am and then see the Doctor.  Location:   [facility]    Family practice physician in [place], [place]  COVID-19 info: [facility]  Address: [address], [place] [address]  Hours:   Open   Closes 5PM  Phone: [phone]  Fax: [phone]  I have fax'd discharge orders/jeremiah Jackson RN     Per [facility] philly Shields--expect supplies to be here by 3pm and he will hook pt up--wife/pt aware.

## 2022-07-14 NOTE — PROGRESS NOTES
This is a recent snapshot of the patient's [facility] medical record.  For current drug dose and complete information and questions, call [phone] or In Basket pool, fv home infusion ([id number])  CSN Number:  [id number]

## 2022-07-14 NOTE — PROGRESS NOTES
Patient hospitalized for CHF exacerbation with increase in weight. Cleared for discharge to home with spouse today per MD. Discharge instructions, medications, and follow-ups reviewed with patient and spouse in detail. Home infusion met with family/patient and discussed PICC care and dobutamine infusion care. Patient and spouse verbalized understanding of discharge instructions. Belongings were returned to patient at time of discharge including belongings sent to security and pharmacy. Patient assisted to pharmacy and car by RN. Discharge medications, including IV dobutamine gtt were filled and sent to outpatient pharmacy. Picked up with assistance of primary RN, with the exception of Ondansetron which was refused. Family had more questions that were answered by primary RN in [facility].  Spouse providing transport home.       Shift summary prior to patient discharge:  VSS, afebrile. Patient denies pain. Dobutamine drip continued at 5mcg/kg/hr. Lasix gt stopped and transitioned to Torsemide BID. Family at bedside during home infusion teaching. Plan for late afternoon discharge.       Neuro: Numbness in all extremities, not an acute finding; vision corrected    -Patient repox independently   -PT/OT following   -Glasses available and used    Respiratory: ESTRADA, frequent dry cough   -deep breaths encouraged    CV: Irregular heart rhythm, murmur detected per secondary RN assessment   -EKG monitoring   -Medication regimen administered as instructed   -Heart Rhythm: , occasional PVCs   -Daily weight (weight increased in the last 24h)  Outstanding lab(s): Creat 2.66, Hgb 9   Replacement protocol(s): K, Mg    GI: N/V (two occurrences of vomiting), PO intake insufficient   -PRN Ativan given x1   -Accupressure    : WDL   -IV diuretic tx to PO Torsemide   -Urinal within reach    I&O: See flowsheets    Musculoskeletal: Generalized weakness   -PT/OT following   -Urinal within reach   -Patient able to perform ADLs with assistance;  home with spouse    Psychosocial: WDL except upon discharge ride to lobby where patient was kicking elevator door and raising voice regarding the lack of timeliness in getting discharged. RN reminded patient that this behavior was not supportive of a safe environment, and pt apologized for behavior    Skin: Scabs scattered, PICC RUE   -CHG wipes performed   -Dressed   -Telemetry patches removed    Plan: Discharge      Provider(s) involved in care: PT/OT, dietician, cardiology, home infusion team  Provider Notification(s):  Page sent to cards 2 asking for orders to be filled so patient is able to discharge on multiple occasions. When order was filled, patient was able to promptly discharge after home infusion teaching.    Patient plateaued, and going home with the knowledge that he does not want to continue restorative cares.  Reviewed information with patient and spouse.  Patient goal to discharge home and spend time with family.

## 2022-07-15 NOTE — PLAN OF CARE
Occupational Therapy Discharge Summary    Reason for therapy discharge:    Discharged to home with outpatient therapy.    Progress towards therapy goal(s). See goals on Care Plan in [facility] electronic health record for goal details.  Goals partially met.  Barriers to achieving goals:   discharge from facility.    Therapy recommendation(s):    Continued therapy is recommended.  Rationale/Recommendations:  Recommend OP CR to progress cardiopulmonary health.

## 2022-07-15 NOTE — PROGRESS NOTES
[facility]    Background: Care Coordination referral placed from [facility] discharge report for reason of patient meeting criteria for a TCM outreach call by [facility] team.    Assessment: Upon chart review, CCRC Team member will cancel/close the referral for TCM outreach due to reason below:    Patient has been discharged to Hospice Care.  Per wife, patient is transitioning to hospice care. Nurse is with him now.    Plan: Care Coordination referral for TCM outreach canceled.      Orin Tracy MA  Connected [facility] Resource Center, [facility]    *Connected Care Resource Team does NOT follow patient ongoing. Referrals are identified based on internal discharge reports and the outreach is to ensure patient has an understanding of their discharge instructions.

## 2022-07-18 NOTE — PROGRESS NOTES
This is a recent snapshot of the patient's [facility] medical record.  For current drug dose and complete information and questions, call [phone] or In Basket pool, fv home infusion ([id number])  CSN Number:  [id number]

## 2022-07-18 NOTE — ED TRIAGE NOTES
"Triage Assessment & Note:    /66   Pulse 54   Temp 97.9  F (36.6  C) (Temporal)   Resp 16   Ht 1.753 m (5' 9\")   Wt 60.3 kg (133 lb)   SpO2 95%   BMI 19.64 kg/m        Patient presents with: Pt with CHF on cont. Dobutamine, comes to triage with reports the PICC line being clogged and needing more heparin syringes. No reports of fever, cough, SOB, CP, or travel.     Home Treatments/Remedies: home medications    Febrile / Afebrile: afebrile    Duration of C/o: n/a    Mohini Loera RN  July 18, 2022            "

## 2022-07-18 NOTE — TELEPHONE ENCOUNTER
Discussed to ask ED when they arrive for PICC line change as there was an issues with clogged line today. If ED is able to draw labs then in-person visit could be changed from Wednesday if that would make home transition easier. Carrol (spouse) will update    Laurita Kimball RN on 7/18/2022 at 1:50 PM    ---------------------------------------------------------  [facility] Call Center    Phone Message    May a detailed message be left on voicemail: yes     Reason for Call: Other: Carrol called stating that Skyler is on his to the ER and they are wondering if he can have is lab work and/or visit with Dr. Payton done there because it is hard to get Skyler out of the house. Please advise. Thank you.      Action Taken: Message routed to:  [facility] ([facility]): [facility]    Travel Screening: Not Applicable

## 2022-07-19 NOTE — PROGRESS NOTES
This is a recent snapshot of the patient's [facility] medical record.  For current drug dose and complete information and questions, call [phone] or In Basket pool, fv home infusion ([id number])  CSN Number:  [id number]

## 2022-07-20 NOTE — NURSING NOTE
Chief Complaint   Patient presents with     Follow Up     RHF with labs prior      Vitals were taken and medications were reconciled.   Castro Fam, EMT  8:10 AM

## 2022-07-20 NOTE — PATIENT INSTRUCTIONS
Medication Changes & Instructions:  Discontinue 20meq tablets of potassium  Start taking 2 tablets of the 10meq potassium tablets (these are smaller pills)  Start taking spironolactone 25mg daily    Follow up Appointment Information/Plan:  We will call weekly to check in. Please call in the interim with any concerns you have  Repeats labs next week     Results:   Latest Reference Range & Units 22 07:11   Sodium 133 - 144 mmol/L 136   Potassium 3.4 - 5.3 mmol/L 4.8   Chloride 94 - 109 mmol/L 102   Carbon Dioxide 20 - 32 mmol/L 24   Urea Nitrogen 7 - 30 mg/dL 74 (H)   Creatinine 0.66 - 1.25 mg/dL 2.49 (H)   GFR Estimate >60 mL/min/1.73m2 26 (L)   Calcium 8.5 - 10.1 mg/dL 8.6   Anion Gap 3 - 14 mmol/L 10   Magnesium 1.6 - 2.3 mg/dL 2.1   Albumin 3.4 - 5.0 g/dL 3.0 (L)   Protein Total 6.8 - 8.8 g/dL 7.7   Alkaline Phosphatase 40 - 150 U/L 215 (H)   ALT 0 - 70 U/L 82 (H)   AST 0 - 45 U/L 67 (H)   Bilirubin Total 0.2 - 1.3 mg/dL 1.7 (H)   N-Terminal Pro Bnp 0 - 1,800 pg/mL 36,224 (H)   Glucose 70 - 99 mg/dL 107 (H)         [address] [place] [address]      Thank you for allowing us to be a part of your care here at the [facility].      If you have questions or concerns please contact us at:    Nurse Coordinator: Laurita Kimball RN [phone]  Cardiovascular Clinic  [facility]   [phone] Press #1 to send a message to your care team  On Call Cardiologist for after hours or on weekends: [phone]  option #4     *All co-payments are due at the time of services, if financial concerns are keeping you from attending scheduled clinic visits please contact our financial counselors at [phone] for further assistance.   * Please note that you will NOT receive a reminder call regarding your scheduled testing, reminder calls are for provider appointments only.  If you are scheduled for testing within the  [facility] system you may receive a call regarding pre-registration for billing purposes only.**

## 2022-07-20 NOTE — PROGRESS NOTES
This is a recent snapshot of the patient's [facility] medical record.  For current drug dose and complete information and questions, call [phone] or In Basket pool, fv home infusion ([id number])  CSN Number:  [id number]

## 2022-07-20 NOTE — NURSING NOTE
Patient educated to the following during visit and educated to contact RN or MD for any questions.     Medication Changes & Instructions:  1. Discontinue 20meq tablets of potassium  2. Start 20meq of the 10meq potassium tablets  3. Start spironolactone 25mg daily    Follow up Appointment Information/Plan:  Follow up as needed.   Patient to call when he is ready for hospice and will come into the clinic to turn his device off.   Has homecare nursing for dobutamin management.   Labs next week    Laurita Kimball RN on 7/20/2022 at 8:56 AM

## 2022-07-20 NOTE — TELEPHONE ENCOUNTER
Called to clarify with pharmacy. Spoke with [facility] staff.     Laurita Kimball RN on 7/20/2022 at 4:45 PM    --------------------------------------------  [facility] Call Center    Phone Message    May a detailed message be left on voicemail: yes     Reason for Call: Medication Question or concern regarding medication   Prescription Clarification  Name of Medication: potassium chloride ER (KLOR-CON ERNESTINE) 10 MEQ CR tablet    Prescribing Provider: Tata   Pharmacy: [facility]   What on the order needs clarification? Meds have unclear directions - one of the potassium chloride ER (KLOR-CON M) 10 MEQ CR tablet says 2 tabs twice daily and one says 2 tabs once daily.  Please confirm which of these instructions is correct.          Action Taken: Message routed to:  [facility] cardio    Travel Screening: Not Applicable

## 2022-07-20 NOTE — PROGRESS NOTES
Service Date: 2022    [place] [address]    RE:      Ken Doss  MRN:  [id number]  :   1945    Dear Dr. Amador:    We had the pleasure of seeing Mr. Skyler Doss in our Heart Failure Clinic for followup.  As you know, he is a 76-year-old gentleman with end-stage nonischemic cardiomyopathy, mild interstitial lung disease, coronary artery disease, status post percutaneous coronary intervention, and mild ILD who presents today for followup.    Mr. Doss was recently hospitalized twice for ambulatory cardiogenic shock and worsening renal failure due to cardiorenal syndrome.  His creatinine was as high as 2.7.  He was intravenously diuresed.  His creatinine improved to 1.9.  We discussed at length with him the option of a destination left ventricular assist device.  However, given his age, frailty, borderline renal function, mild interstitial lung disease and overall social circumstances, he decided he was not interested in the LVAD.  In light of this, he was started on palliative home dobutamine infusion at 5 mg/kg/min.  He was discharged home approximately a week ago.  He returns today for followup.    He states that he is continuing to decline every day.  He does not have any energy.  He feels tired most of the time.  He also has nausea and vomiting intermittently.  He has significant shortness of breath.  He is not able to ambulate much.  His wife is becoming comfortable with handling the home IV infusion pump now.  He has not had any worsening lower extremity edema or abdominal distention.  He has not had any ICD shocks.    His weight has been stable since discharge.    PAST MEDICAL HISTORY:    1.  Insulin-dependent type 2 diabetes mellitus.  2.  Coronary artery disease.  3.  Nonischemic cardiomyopathy with severe LV systolic dysfunction.  4.  Atrial fibrillation.  5.  CRT.  6.  Mild interstitial lung  disease.    REVIEW OF SYSTEMS:  A detailed 10 point review of systems is obtained as described in the History of Present Illness.  All other systems reviewed are negative.     MEDICATIONS:    Current Outpatient Medications   Medication Sig    acetaminophen (TYLENOL) 500 MG tablet Take 500-1,000 mg by mouth 2 times daily as needed     Coenzyme Q10 (COQ-10 PO) Take 20 mg by mouth daily     diphenhydrAMINE (BENADRYL) 25 MG tablet Take 25 mg by mouth At Bedtime    DOBUTamine 500 mg in dextrose 5% 250 mL (adult std conc) premix Inject 342.5 mcg/min into the vein continuous    gabapentin (NEURONTIN) 100 MG capsule Take 100 mg by mouth 3 times daily    glucosamine-chondroitin 500-400 MG CAPS per capsule Take 1 tablet by mouth daily     hydrALAZINE (APRESOLINE) 25 MG tablet Take 1 tablet (25 mg) by mouth 3 times daily    insulin glargine (LANTUS PEN) 100 UNIT/ML pen Inject 20-30 Units Subcutaneous At Bedtime    LORazepam (ATIVAN) 0.5 MG tablet Take 1 tablet (0.5 mg) by mouth every 6 hours as needed for anxiety    melatonin 3 MG tablet Take 1 mg by mouth nightly as needed for sleep    Omega-3 Fatty Acids (FISH OIL) 1200 MG capsule Take 1,200 mg by mouth daily     ondansetron (ZOFRAN ODT) 4 MG ODT tab Take 1 tablet (4 mg) by mouth every 6 hours as needed for nausea or vomiting    oxyCODONE (ROXICODONE) 5 MG tablet Take 0.5-1 tablets (2.5-5 mg) by mouth every 6 hours as needed for pain    potassium chloride ER (KLOR-CON M) 10 MEQ CR tablet Take 2 tablets (20 mEq) by mouth daily    spironolactone (ALDACTONE) 25 MG tablet Take 1 tablet (25 mg) by mouth daily    torsemide (DEMADEX) 100 MG tablet Take 1 tablet (100 mg) by mouth 2 times daily     No current facility-administered medications for this visit.         PHYSICAL EXAMINATION:  He was awake.  He was alert.  He was oriented x3.  He was cachectic.  He had muscle wasting.   /78 (BP Location: Left arm, Patient Position: Supine, Cuff Size: Adult Small)   Pulse 76   Ht  "1.735 m (5' 8.3\")   Wt 61.7 kg (136 lb 1.6 oz)   SpO2 93%   BMI 20.51 kg/m   He had no pallor, cyanosis or jaundice.  His neck exam revealed jugular venous distention up to the angle of the jaw.  His carotids were 1+ bilaterally.  His pulse was irregularly irregular.  Cardiac auscultation revealed normal S1 and S2 with no murmur, rub or gallop.  Auscultation of the lungs revealed equal air entry on both sides with no added sounds.  His abdomen was soft with normal bowel sounds, no tenderness, no rigidity, no guarding.  He had no focal neurological deficit.  His extremities had no edema.  His extremities were relatively cool.      LABORATORY:     CBC RESULTS:   Recent Labs   Lab Test 07/20/22  0711   WBC 11.3*   RBC 3.06*   HGB 9.7*   HCT 30.0*   MCV 98   MCH 31.7   MCHC 32.3   RDW 18.7*        Recent Labs   Lab Test 07/20/22  0711 07/14/22  1317 07/14/22  1028 07/14/22  0512     --   --  137   POTASSIUM 4.8  --   --  4.1   CHLORIDE 102  --   --  95*   CO2 24  --   --  29   ANIONGAP 10  --   --  13   * 226*   < > 132*   BUN 74*  --   --  67.8*   CR 2.49*  --   --  2.66*   LYNNE 8.6  --   --  9.1    < > = values in this interval not displayed.     Liver Function Studies -   Recent Labs   Lab Test 07/20/22  0711   PROTTOTAL 7.7   ALBUMIN 3.0*   BILITOTAL 1.7*   ALKPHOS 215*   AST 67*   ALT 82*     ASSESSMENT AND PLAN:      In summary, Mr. Skyler Doss is a 76-year-old gentleman with a past medical history significant for diabetes mellitus, coronary artery disease, ILD and end-stage nonischemic cardiomyopathy out of proportion to coronary artery disease who is currently on palliative inotrope therapy.    He has lost 16 pounds since hospital discharge.  He does not seem to be significantly volume overloaded except for an elevated JVD.  His renal function, while abnormal, is stable.  This is likely due to cardiorenal.  His electrolytes are okay.  He has not had any further ICD shocks.     " Selam and his wife understand that IV dobutamine is palliative, and life expectancy is very short.  He would like to continue on IV dobutamine for the next several weeks until he visits his [facility] as well as see his son, who lives in [place].  He would like to transition to hospice eventually.  He does not want to be hospitalized anymore.  He does not want to come for outpatient appointments as well.  Whenever he is ready, we will arrange for outpatient home hospice.    In the interim, he is having difficulty with swallowing potassium pills.  We will start him on spironolactone 25 mg daily and decrease his potassium supplement to 20 mEq daily.  We will also change his potassium supplement from 20-10 mEq tablets .    He is DNR/DNI.  We will not schedule any followup appointment.  We will help him over the phone.  We will repeat a chemistry next week to follow up on his potassium with the medication changes.      It was a pleasure meeting Mr. Skyler Frank in our Heart Failure Clinic for followup.    Sincerely,  Tata Payton MD   [facility]  Heart Failure, Transplant, and Mechanical Circulatory Support Cardiology   Cardiovascular Division  [facility]   [phone]          D: 2022   T: 2022   MT: rick    Name:     STEFF FRANK  MRN:      [id number]        Account:      [id number]   :      1945           Service Date: 2022       Document: [id number]

## 2022-07-20 NOTE — LETTER
2022      RE: Ken Doss  [address] [place] [id number]       Dear Colleague,    Thank you for the opportunity to participate in the care of your patient, Ken Doss, at the [facility]. Please see a copy of my visit note below.    Service Date: 2022    Jeremy Amador MD  [address] [address]    RE:      Ken oDss  MRN:  [id number]  :   1945    Dear Dr. Amadro:    We had the pleasure of seeing Mr. Skyler Doss in our Heart Failure Clinic for followup.  As you know, he is a 76-year-old gentleman with end-stage nonischemic cardiomyopathy, mild interstitial lung disease, coronary artery disease, status post percutaneous coronary intervention, and mild ILD who presents today for followup.    Mr. Doss was recently hospitalized twice for ambulatory cardiogenic shock and worsening renal failure due to cardiorenal syndrome.  His creatinine was as high as 2.7.  He was intravenously diuresed.  His creatinine improved to 1.9.  We discussed at length with him the option of a destination left ventricular assist device.  However, given his age, frailty, borderline renal function, mild interstitial lung disease and overall social circumstances, he decided he was not interested in the LVAD.  In light of this, he was started on palliative home dobutamine infusion at 5 mg/kg/min.  He was discharged home approximately a week ago.  He returns today for followup.    He states that he is continuing to decline every day.  He does not have any energy.  He feels tired most of the time.  He also has nausea and vomiting intermittently.  He has significant shortness of breath.  He is not able to ambulate much.  His wife is becoming comfortable with handling the home IV infusion pump now.  He has not had any worsening lower extremity edema or  abdominal distention.  He has not had any ICD shocks.    His weight has been stable since discharge.    PAST MEDICAL HISTORY:    1.  Insulin-dependent type 2 diabetes mellitus.  2.  Coronary artery disease.  3.  Nonischemic cardiomyopathy with severe LV systolic dysfunction.  4.  Atrial fibrillation.  5.  CRT.  6.  Mild interstitial lung disease.    REVIEW OF SYSTEMS:  A detailed 10 point review of systems is obtained as described in the History of Present Illness.  All other systems reviewed are negative.     MEDICATIONS:    Current Outpatient Medications   Medication Sig     acetaminophen (TYLENOL) 500 MG tablet Take 500-1,000 mg by mouth 2 times daily as needed      Coenzyme Q10 (COQ-10 PO) Take 20 mg by mouth daily      diphenhydrAMINE (BENADRYL) 25 MG tablet Take 25 mg by mouth At Bedtime     DOBUTamine 500 mg in dextrose 5% 250 mL (adult std conc) premix Inject 342.5 mcg/min into the vein continuous     gabapentin (NEURONTIN) 100 MG capsule Take 100 mg by mouth 3 times daily     glucosamine-chondroitin 500-400 MG CAPS per capsule Take 1 tablet by mouth daily      hydrALAZINE (APRESOLINE) 25 MG tablet Take 1 tablet (25 mg) by mouth 3 times daily     insulin glargine (LANTUS PEN) 100 UNIT/ML pen Inject 20-30 Units Subcutaneous At Bedtime     LORazepam (ATIVAN) 0.5 MG tablet Take 1 tablet (0.5 mg) by mouth every 6 hours as needed for anxiety     melatonin 3 MG tablet Take 1 mg by mouth nightly as needed for sleep     Omega-3 Fatty Acids (FISH OIL) 1200 MG capsule Take 1,200 mg by mouth daily      ondansetron (ZOFRAN ODT) 4 MG ODT tab Take 1 tablet (4 mg) by mouth every 6 hours as needed for nausea or vomiting     oxyCODONE (ROXICODONE) 5 MG tablet Take 0.5-1 tablets (2.5-5 mg) by mouth every 6 hours as needed for pain     potassium chloride ER (KLOR-CON M) 10 MEQ CR tablet Take 2 tablets (20 mEq) by mouth daily     spironolactone (ALDACTONE) 25 MG tablet Take 1 tablet (25 mg) by mouth daily     torsemide  "(DEMADEX) 100 MG tablet Take 1 tablet (100 mg) by mouth 2 times daily     No current facility-administered medications for this visit.         PHYSICAL EXAMINATION:  He was awake.  He was alert.  He was oriented x3.  He was cachectic.  He had muscle wasting.   /78 (BP Location: Left arm, Patient Position: Supine, Cuff Size: Adult Small)   Pulse 76   Ht 1.735 m (5' 8.3\")   Wt 61.7 kg (136 lb 1.6 oz)   SpO2 93%   BMI 20.51 kg/m   He had no pallor, cyanosis or jaundice.  His neck exam revealed jugular venous distention up to the angle of the jaw.  His carotids were 1+ bilaterally.  His pulse was irregularly irregular.  Cardiac auscultation revealed normal S1 and S2 with no murmur, rub or gallop.  Auscultation of the lungs revealed equal air entry on both sides with no added sounds.  His abdomen was soft with normal bowel sounds, no tenderness, no rigidity, no guarding.  He had no focal neurological deficit.  His extremities had no edema.  His extremities were relatively cool.      LABORATORY:     CBC RESULTS:   Recent Labs   Lab Test 07/20/22  0711   WBC 11.3*   RBC 3.06*   HGB 9.7*   HCT 30.0*   MCV 98   MCH 31.7   MCHC 32.3   RDW 18.7*        Recent Labs   Lab Test 07/20/22  0711 07/14/22  1317 07/14/22  1028 07/14/22  0512     --   --  137   POTASSIUM 4.8  --   --  4.1   CHLORIDE 102  --   --  95*   CO2 24  --   --  29   ANIONGAP 10  --   --  13   * 226*   < > 132*   BUN 74*  --   --  67.8*   CR 2.49*  --   --  2.66*   LYNNE 8.6  --   --  9.1    < > = values in this interval not displayed.     Liver Function Studies -   Recent Labs   Lab Test 07/20/22  0711   PROTTOTAL 7.7   ALBUMIN 3.0*   BILITOTAL 1.7*   ALKPHOS 215*   AST 67*   ALT 82*     ASSESSMENT AND PLAN:      In summary, Mr. Skyler Doss is a 76-year-old gentleman with a past medical history significant for diabetes mellitus, coronary artery disease, ILD and end-stage nonischemic cardiomyopathy out of proportion to coronary " artery disease who is currently on palliative inotrope therapy.    He has lost 16 pounds since hospital discharge.  He does not seem to be significantly volume overloaded except for an elevated JVD.  His renal function, while abnormal, is stable.  This is likely due to cardiorenal.  His electrolytes are okay.  He has not had any further ICD shocks.    Mr. Frank and his wife understand that IV dobutamine is palliative, and life expectancy is very short.  He would like to continue on IV dobutamine for the next several weeks until he visits his [facility] as well as see his son, who lives in [place].  He would like to transition to hospice eventually.  He does not want to be hospitalized anymore.  He does not want to come for outpatient appointments as well.  Whenever he is ready, we will arrange for outpatient home hospice.    In the interim, he is having difficulty with swallowing potassium pills.  We will start him on spironolactone 25 mg daily and decrease his potassium supplement to 20 mEq daily.  We will also change his potassium supplement from 20-10 mEq tablets .    He is DNR/DNI.  We will not schedule any followup appointment.  We will help him over the phone.  We will repeat a chemistry next week to follow up on his potassium with the medication changes.      It was a pleasure meeting Mr. Skyler Frank in our Heart Failure Clinic for followup.    D: 2022   T: 2022   MT: rick    Name:     STEFF FRANK  MRN:      [id number]        Account:      [id number]   :      1945           Service Date: 2022       Document: [id number]      Please do not hesitate to contact me if you have any questions/concerns.     Sincerely,     Tata Payton MD

## 2022-07-20 NOTE — TELEPHONE ENCOUNTER
Called Ilana FELIX with [facility] [phone] to obtain labs x1 week BMP. Verbalized understanding    Laurita Kimball RN on 7/20/2022 at 2:08 PM

## 2022-07-21 NOTE — PROGRESS NOTES
This is a recent snapshot of the patient's [facility] medical record.  For current drug dose and complete information and questions, call [phone] or In Basket pool, fv home infusion ([id number])  CSN Number:  [id number]

## 2022-07-25 NOTE — PROGRESS NOTES
This is a recent snapshot of the patient's [facility] medical record.  For current drug dose and complete information and questions, call [phone] or In Basket pool, fv home infusion ([id number])  CSN Number:  [id number]

## 2022-07-25 NOTE — PROGRESS NOTES
This is a recent snapshot of the patient's [facility] medical record.  For current drug dose and complete information and questions, call [phone] or In Basket pool, fv home infusion ([id number])  CSN Number:  [id number]

## 2022-07-25 NOTE — TELEPHONE ENCOUNTER
[facility] nurse updated that spouse called on 7/24 in evening and wanted to turn off device. Patient changed mind today. Discussed with Ilana FELIX to update any ongoing concerns. Attempted to call spouse to follow-up, unable to reach, left a VM to call back. VO from Dr. Payton to order hospice consult if patient is wanting this and can move forward from this. Plan to continue to reach out to spouse.    Laurita Kimball RN on 7/25/2022 at 1:10 PM

## 2022-07-25 NOTE — TELEPHONE ENCOUNTER
Called [facility] nurse back to discuss.   See alternate phone call noted    Laurita Kimball RN on 7/25/2022 at 1:15 PM    ------------------------------------  [facility] Call Center    Phone Message    May a detailed message be left on voicemail: yes     Reason for Call: Other: [facility] home infusion nurse calling in to discuss getting plans in place palliative care. Please c/b [phone]     Action Taken: Message routed to:  Other: cardio    Travel Screening: Not Applicable

## 2022-07-26 NOTE — TELEPHONE ENCOUNTER
Spouse called and PT is ready for Hospice services. PT and spouse prefer to have hospice visit at home prior to scheduling device clinic appointment. Discussed to call when hospice services established with patient and collaboration to schedule device to be turned off following home visit. PT is established at [place] for device checks and this would be easier to go to that location to have device turned off. Phone number is [phone]. Called and updated Ilana FELIX with [facility] regarding the plan. Dr. Payton updated.     Verbal order received by Dr. Payton: Hospice referral. Device clinic appointment to turn off device. PICC line discontinue.     Laurita Kimball RN on 7/26/2022 at 10:13 AM

## 2022-07-26 NOTE — PROGRESS NOTES
This is a recent snapshot of the patient's [facility] medical record.  For current drug dose and complete information and questions, call [phone] or In Basket pool, fv home infusion ([id number])  CSN Number:  [id number]

## 2022-07-27 NOTE — TELEPHONE ENCOUNTER
Verbal order given to Kira with [facility]  Ph: [phone] for device deactivation, stop dobutamine IV, PICC line discontinue.     Laurita Kimball RN on 7/27/2022 at 11:17 AM

## 2022-07-28 NOTE — PROGRESS NOTES
This is a recent snapshot of the patient's [facility] medical record.  For current drug dose and complete information and questions, call [phone] or In Basket pool, fv home infusion ([id number])  CSN Number:  [id number]

## 2022-10-18 NOTE — PROGRESS NOTES
This is a recent snapshot of the patient's [facility] medical record.  For current drug dose and complete information and questions, call [phone] or In [facility] pool, fv home infusion ([id number])  CSN Number:  [id number]

## 2022-12-01 NOTE — TELEPHONE ENCOUNTER
----- Message from Markus Pool MD sent at 7/26/2019 10:10 AM CDT -----      ----- Message -----  From: Zoie Granado RN  Sent: 7/26/2019   9:31 AM  To: Markus Pool MD    Forwarding on per Dr. Vigil, thanks!    Author: Markus Pool MD Service: -- Author Type: Physician   Filed: 7/26/2019 10:10 AM Encounter Date: 7/24/2019 Status: Signed   : Markus Pool MD (Physician)        Device interrogation and chart reviewed.  At this point the patient seems to have had significant clinical improvement following his hospitalization with improved rate control of his newly persistent atrial fibrillation.  At best I would quote him a 50% ablation success rate for controlling his atrial fibrillation given his comorbid conditions.  He would be a potential candidate for AV node ablation to provide complete rate control as he already has a normally functioning LV lead in place.  I would suggest that he see the EP nurse practitioner in the device clinic in 2-4 weeks to further discuss these options.           (4) no limitation

## 2022-12-02 NOTE — PROGRESS NOTES
This is a recent snapshot of the patient's [facility] medical record.  For current drug dose and complete information and questions, call [phone] or In Basket pool, fv home infusion ([id number])  CSN Number:  [id number]

## 2023-02-22 NOTE — PLAN OF CARE
How Severe Are Your Spot(S)?: mild Neuro: A&Ox4.   Cardiac: VSS. Paced   Respiratory: Sating WNL on RA.  GI/: Adequate urine output. On lasix driip at 5 mg/hr.  Diet/appetite: NPO for heart cath today.  Activity:  Up independently.  Pain: Denies  Skin: No new deficits noted. Pre-op shower done.  LDA's: PIV    Plan: Continue with POC. Notify primary team with changes.     What Type Of Note Output Would You Prefer (Optional)?: Standard Output What Is The Reason For Today's Visit?: Full Body Skin Examination What Is The Reason For Today's Visit? (Being Monitored For X): the development of a new lesion

## 2023-04-04 NOTE — PROGRESS NOTES
This is a recent snapshot of the patient's [facility] medical record.  For current drug dose and complete information and questions, call [phone] or In Basket pool, fv home infusion ([id number])  CSN Number:  [id number]

## 2023-07-26 NOTE — Clinical Note
Tegaderm applied to wound securely.  Cyclophosphamide Counseling:  I discussed with the patient the risks of cyclophosphamide including but not limited to hair loss, hormonal abnormalities, decreased fertility, abdominal pain, diarrhea, nausea and vomiting, bone marrow suppression and infection. The patient understands that monitoring is required while taking this medication.

## 2023-08-09 NOTE — PROGRESS NOTES
In clinic device check with Device RN.  Please see link for full device report.  Patient was informed of results and next follow up during today's visit.     Rituxan Counseling:  I discussed with the patient the risks of Rituxan infusions. Side effects can include infusion reactions, severe drug rashes including mucocutaneous reactions, reactivation of latent hepatitis and other infections and rarely progressive multifocal leukoencephalopathy.  All of the patient's questions and concerns were addressed.

## 2023-08-22 ENCOUNTER — TELEPHONE (OUTPATIENT)
Dept: CARDIOLOGY | Facility: CLINIC | Age: 78
End: 2023-08-22
Payer: COMMERCIAL

## 2023-08-22 NOTE — TELEPHONE ENCOUNTER
Health Call Center    Phone Message    May a detailed message be left on voicemail: yes     Reason for Call: Other: Jolie wants to check on Care plan treatment orders that were sent last year 7/2022. They never received these back. Please contact Jolie to discuss.       Action Taken: Other: cardiology    Travel Screening: Not Applicable  Thank you!  [facility]

## (undated) DEVICE — DEVICE LLD E LEAD LOCKING STYLET SPECTRANETICS 518-039

## (undated) DEVICE — CUSTOM PACK PACER ICD SAN5BPCHEA

## (undated) DEVICE — SHEATH PRELUDE SNAP 25CM 9FR

## (undated) DEVICE — ELECTRODE ADULT PACING MULTI P-211-M1

## (undated) DEVICE — CATH QUADPOLAR 6FR D6DR005RT

## (undated) DEVICE — INTRO SHEATH 7FRX10CM PINNACLE RSS702

## (undated) DEVICE — SU PDS II 3-0 SH 27" Z316H

## (undated) DEVICE — KIT LEAD END CAP 5867-3M

## (undated) DEVICE — INTRO MICRO MINI STICK 5FR STIFF NITINOL

## (undated) DEVICE — Device

## (undated) DEVICE — MANIFOLD KIT ANGIO AUTOMATED 014613

## (undated) DEVICE — CATHETER ICE VIEWFLEX XTRA

## (undated) DEVICE — PACK HEART RIGHT CUSTOM SAN32RHF18

## (undated) DEVICE — CUSTOM PACK GEN MAJOR SBA5BGMHEA

## (undated) DEVICE — CRT-D COBALT XT HF MRI DF1 SURESCAN DTPA2D1: Type: IMPLANTABLE DEVICE | Site: CHEST  WALL | Status: NON-FUNCTIONAL

## (undated) DEVICE — SUCTION TIP HEART CASES 003497

## (undated) DEVICE — DRAPE IOBAN INCISE 23X17" 6650EZ

## (undated) DEVICE — POUCH TYRX ABSORB ANTIBACTERIAL LG

## (undated) DEVICE — DILATOR VASCULAR 10FRX20CM G00993

## (undated) DEVICE — DEVICE SOFT GRIP HEMOSTAT LR-SGH001

## (undated) DEVICE — 0.035IN X 260CM, EMERALD DIAGNOSTIC GUIDEWIRE, FIXED-CORE PTFE COATED, STANDARD, 3MM EXCHANGE J-TIP (EA/1)

## (undated) DEVICE — CATH BALLOON EMERGE 2.5X12MM H7493918912250

## (undated) DEVICE — CATH US OD 5FR OD SEC 2.9FR EAGLE EYE PLATINUM 0.014 85900P

## (undated) DEVICE — PREP CHLORAPREP 26ML TINTED HI-LITE ORANGE 930815

## (undated) DEVICE — GLOVE GAMMEX NEOPRENE ULTRA SZ 7 LF 8514

## (undated) DEVICE — ESU BLADE PEAK PLASMA 3.0S PS210-030S

## (undated) DEVICE — ENDO DISSECTOR BLUNT 05MM  BTD05

## (undated) DEVICE — SOL WATER IRRIG 1000ML BOTTLE 2F7114

## (undated) DEVICE — PLATE GROUNDING ADULT W/CORD 9165L

## (undated) DEVICE — SLEEVE TR BAND RADIAL COMPRESSION DEVICE 24CM TRB24-REG

## (undated) DEVICE — VALVE HEMOSTASIS .096" COPILOT MECH 1003331

## (undated) DEVICE — SYSTEM CLEARIFY VISUALIZATION 21-345

## (undated) DEVICE — SUTURE PDS 2-0 36IN CT PLUS PDP357H

## (undated) DEVICE — CATH ANGIO ANG GLIDE 5FRX65CM CG507

## (undated) DEVICE — TAPE ADH MEDIPORE 2IN HI SOFT CLOTH 2862

## (undated) DEVICE — GUIDEWIRE JTIP 3MM .035 180CM IQ35F180J3

## (undated) DEVICE — SUCTION CANISTER MEDIVAC LINER 3000ML W/LID 65651-530

## (undated) DEVICE — SOL NACL 0.9% IRRIG 1000ML BOTTLE 2F7124

## (undated) DEVICE — CATH JACKY 5FR 3.5 CURVE 40-5023

## (undated) DEVICE — SU PROLENE 3-0 MH 36" 8842H

## (undated) DEVICE — PAD DEFIBRILLATOR ECG ADULT STERILE 2502

## (undated) DEVICE — KIT ACCESSORY INTRO INFLATION SYS 20/30 PRIORITY 1000186-115

## (undated) DEVICE — TUBING SMOKE EVAC PNEUMOCLEAR HIGH FLOW 0620050250

## (undated) DEVICE — DEVICE COIL EXPANDER LR-CEX001

## (undated) DEVICE — KIT HAND CONTROL ACIST 014644 AR-P54

## (undated) DEVICE — DRAPE CV INCISE CVARTS 89466

## (undated) DEVICE — ESU PENCIL SMOKE EVAC W/ROCKER SWITCH 0703-047-000

## (undated) DEVICE — CATH ANGIO IMPULSE 6FR 110CML  PIGTAIL

## (undated) DEVICE — KIT RIGHT HEART CATH 60130719

## (undated) DEVICE — SUTURE VICRYL+ 2-0 27IN CT-1 UND VCP259H

## (undated) DEVICE — SHEATH HEMO 11FR 30CM 406182

## (undated) DEVICE — CATH BRIDGE OCCLUSION BALLOON 6X80MMX90CM 590-001

## (undated) DEVICE — CATH BALLOON NC EMERGE 4.00X15MM H7493926715400

## (undated) DEVICE — CUSTOM PACK EP

## (undated) DEVICE — SHEATH 4FR ULTIMUM 407840

## (undated) DEVICE — FASTENER CATH BALLOON CLAMPX2 STATLOCK 0684-00-493

## (undated) DEVICE — CATH GUIDING BLUE YELLOW PTFE XB3 6FRX100CM 67005200

## (undated) DEVICE — DRSG DRAIN 4X4" 7086

## (undated) DEVICE — GUIDEWIRE VASC 0.014INX180CM RUNTHROUGH 25-1011

## (undated) DEVICE — KIT PREP BRIDGE 591-001

## (undated) DEVICE — ENDO DISSECTOR BLUNT 10MM CHERRY BCD10

## (undated) DEVICE — WIRE GUIDE AMPLATZ SUPER STIFF 0.035"X180CM 46-501

## (undated) DEVICE — INTRO GLIDESHEATH SLENDER 6FR 10X45CM 60-1060

## (undated) DEVICE — TUBING PRESSURE 30"

## (undated) DEVICE — CATH 7FR SAFIRE BIDIR ABLT LARGE CURL 402817

## (undated) DEVICE — CATH GUIDING BLUE YELLOW PTFE JR4 6FRX100CM 67008200

## (undated) DEVICE — SU MONOCRYL+ 4-0 18IN PS2 UND MCP496G

## (undated) DEVICE — CATH BALLOON NC EMERGE 3.25X15MM H7493926715320

## (undated) DEVICE — SUCTION DRY CHEST DRAIN OASIS 3600-100

## (undated) DEVICE — ADH SKIN CLOSURE PREMIERPRO EXOFIN 1.0ML 3470

## (undated) DEVICE — CUSTOM PACK CORONARY SAN5BCRHEA

## (undated) RX ORDER — SODIUM NITROPRUSSIDE 25 MG/ML
INJECTION INTRAVENOUS
Status: DISPENSED
Start: 2022-01-01

## (undated) RX ORDER — DIAZEPAM 5 MG
TABLET ORAL
Status: DISPENSED
Start: 2022-01-01

## (undated) RX ORDER — FENTANYL CITRATE 50 UG/ML
INJECTION, SOLUTION INTRAMUSCULAR; INTRAVENOUS
Status: DISPENSED
Start: 2022-01-01

## (undated) RX ORDER — FENTANYL CITRATE 50 UG/ML
INJECTION, SOLUTION INTRAMUSCULAR; INTRAVENOUS
Status: DISPENSED
Start: 2020-02-27

## (undated) RX ORDER — LIDOCAINE HYDROCHLORIDE 10 MG/ML
INJECTION, SOLUTION EPIDURAL; INFILTRATION; INTRACAUDAL; PERINEURAL
Status: DISPENSED
Start: 2020-07-30

## (undated) RX ORDER — ONDANSETRON 2 MG/ML
INJECTION INTRAMUSCULAR; INTRAVENOUS
Status: DISPENSED
Start: 2022-01-01

## (undated) RX ORDER — HEPARIN SODIUM 1000 [USP'U]/ML
INJECTION, SOLUTION INTRAVENOUS; SUBCUTANEOUS
Status: DISPENSED
Start: 2020-02-27

## (undated) RX ORDER — LIDOCAINE 40 MG/G
CREAM TOPICAL
Status: DISPENSED
Start: 2020-07-30

## (undated) RX ORDER — ASPIRIN 81 MG/1
TABLET, CHEWABLE ORAL
Status: DISPENSED
Start: 2020-02-27

## (undated) RX ORDER — FENTANYL CITRATE 50 UG/ML
INJECTION, SOLUTION INTRAMUSCULAR; INTRAVENOUS
Status: DISPENSED
Start: 2021-01-01

## (undated) RX ORDER — NITROGLYCERIN 5 MG/ML
VIAL (ML) INTRAVENOUS
Status: DISPENSED
Start: 2020-02-27

## (undated) RX ORDER — VANCOMYCIN HYDROCHLORIDE 1 G/20ML
INJECTION, POWDER, LYOPHILIZED, FOR SOLUTION INTRAVENOUS
Status: DISPENSED
Start: 2022-01-01

## (undated) RX ORDER — NICARDIPINE HYDROCHLORIDE 2.5 MG/ML
INJECTION INTRAVENOUS
Status: DISPENSED
Start: 2020-02-27